# Patient Record
Sex: FEMALE | Race: OTHER | HISPANIC OR LATINO | URBAN - METROPOLITAN AREA
[De-identification: names, ages, dates, MRNs, and addresses within clinical notes are randomized per-mention and may not be internally consistent; named-entity substitution may affect disease eponyms.]

---

## 2017-01-13 ENCOUNTER — EMERGENCY (EMERGENCY)
Facility: HOSPITAL | Age: 82
LOS: 1 days | Discharge: HOME | End: 2017-01-13
Attending: EMERGENCY MEDICINE
Payer: COMMERCIAL

## 2017-01-13 VITALS
DIASTOLIC BLOOD PRESSURE: 89 MMHG | WEIGHT: 149.91 LBS | SYSTOLIC BLOOD PRESSURE: 210 MMHG | HEIGHT: 62 IN | OXYGEN SATURATION: 100 % | TEMPERATURE: 98 F | RESPIRATION RATE: 16 BRPM | HEART RATE: 80 BPM

## 2017-01-13 DIAGNOSIS — M79.661 PAIN IN RIGHT LOWER LEG: ICD-10-CM

## 2017-01-13 DIAGNOSIS — I10 ESSENTIAL (PRIMARY) HYPERTENSION: ICD-10-CM

## 2017-01-13 PROCEDURE — 93971 EXTREMITY STUDY: CPT | Mod: 26,RT

## 2017-01-13 PROCEDURE — 99284 EMERGENCY DEPT VISIT MOD MDM: CPT

## 2017-01-13 PROCEDURE — 73590 X-RAY EXAM OF LOWER LEG: CPT | Mod: 26,RT

## 2017-01-13 PROCEDURE — 73590 X-RAY EXAM OF LOWER LEG: CPT

## 2017-01-13 PROCEDURE — 93971 EXTREMITY STUDY: CPT

## 2017-01-13 RX ORDER — ACETAMINOPHEN 500 MG
650 TABLET ORAL ONCE
Qty: 0 | Refills: 0 | Status: COMPLETED | OUTPATIENT
Start: 2017-01-13 | End: 2017-01-13

## 2017-01-13 RX ORDER — ACETAMINOPHEN 500 MG
2 TABLET ORAL
Qty: 20 | Refills: 0 | OUTPATIENT
Start: 2017-01-13

## 2017-01-13 RX ADMIN — Medication 650 MILLIGRAM(S): at 15:00

## 2017-01-13 NOTE — ED ADULT NURSE NOTE - OBJECTIVE STATEMENT
AS PER DAUGHTER, PATIENT WAS GOING UPSTAIRS AND FELL 5 DAYS AGO, SINCE THEN WITH PAIN TO RIGHT LOWER LEG, AMBULATES WITH A LIMP. DENIES LOC, SOB , CHEST PAIN , ASSOCIATED WITH FALL. AS PER DAUGHTER, PATIENT WAS GOING UPSTAIRS ,SKIPPED A STEP AND FELL 5 DAYS AGO, SINCE THEN WITH PAIN TO RIGHT LOWER LEG, AMBULATES WITH A LIMP. DENIES LOC, SOB , CHEST PAIN , ASSOCIATED WITH FALL. AS PER DAUGHTER, PATIENT WAS GOING UPSTAIRS ,SKIPPED A STEP  5 DAYS AGO,AND SINCE THEN WITH PAIN TO RIGHT LOWER LEG. SINCE THEN WITH PAIN TO RIGHT LOWER LEG, AMBULATES WITH A LIMP. DENIES LOC, SOB , CHEST PAIN , ASSOCIATED WITH INJURY

## 2017-01-13 NOTE — ED PROVIDER NOTE - OBJECTIVE STATEMENT
87 y/o F w/ PMHx of HTN, last dosage Labitol 100 mg and Losartan 40 mg taken at 10:20, p/w R leg pain onset 6 days. Pt reports skipping a step and now c/o pain. Pt denies numbness, tingling, or any complaint. NKDA. 89 y/o F w/ PMHx of HTN, last dosage Labitol 100 mg and Losartan 40 mg taken at 10:20, p/w R leg pain onset 6 days. Pt reports skipping a step on a staircase and is now c/o pain. Pt denies fall, numbness, tingling, or any complaint. NKDA.

## 2017-01-13 NOTE — ED PROVIDER NOTE - PHYSICAL EXAMINATION
MSK: Reproducible tenderness to lower tib-fib w/ plantar pressure.  no bony deformities, no leg length discrepancy, femoral and pedal pulses intact, cap refill  < 2 secs.

## 2017-01-13 NOTE — ED PROVIDER NOTE - NS ED MD SCRIBE ATTENDING SCRIBE SECTIONS
DISPOSITION/PAST MEDICAL/SURGICAL/SOCIAL HISTORY/REVIEW OF SYSTEMS/HISTORY OF PRESENT ILLNESS/VITAL SIGNS( Pullset)/PHYSICAL EXAM/HIV

## 2018-04-17 ENCOUNTER — INPATIENT (INPATIENT)
Facility: HOSPITAL | Age: 83
LOS: 2 days | Discharge: HOME | DRG: 244 | End: 2018-04-20
Attending: INTERNAL MEDICINE | Admitting: INTERNAL MEDICINE
Payer: COMMERCIAL

## 2018-04-17 ENCOUNTER — TRANSCRIPTION ENCOUNTER (OUTPATIENT)
Age: 83
End: 2018-04-17

## 2018-04-17 VITALS
OXYGEN SATURATION: 94 % | HEIGHT: 62 IN | TEMPERATURE: 99 F | DIASTOLIC BLOOD PRESSURE: 88 MMHG | RESPIRATION RATE: 18 BRPM | WEIGHT: 160.06 LBS | HEART RATE: 50 BPM | SYSTOLIC BLOOD PRESSURE: 126 MMHG

## 2018-04-17 DIAGNOSIS — Z29.9 ENCOUNTER FOR PROPHYLACTIC MEASURES, UNSPECIFIED: ICD-10-CM

## 2018-04-17 DIAGNOSIS — E78.00 PURE HYPERCHOLESTEROLEMIA, UNSPECIFIED: ICD-10-CM

## 2018-04-17 DIAGNOSIS — I44.2 ATRIOVENTRICULAR BLOCK, COMPLETE: ICD-10-CM

## 2018-04-17 DIAGNOSIS — N28.9 DISORDER OF KIDNEY AND URETER, UNSPECIFIED: ICD-10-CM

## 2018-04-17 DIAGNOSIS — I10 ESSENTIAL (PRIMARY) HYPERTENSION: ICD-10-CM

## 2018-04-17 LAB
ACETONE SERPL-MCNC: ABNORMAL
ALBUMIN SERPL ELPH-MCNC: 3.8 G/DL — SIGNIFICANT CHANGE UP (ref 3.5–5)
ALP SERPL-CCNC: 105 U/L — SIGNIFICANT CHANGE UP (ref 40–120)
ALT FLD-CCNC: 48 U/L DA — SIGNIFICANT CHANGE UP (ref 10–60)
ANION GAP SERPL CALC-SCNC: 9 MMOL/L — SIGNIFICANT CHANGE UP (ref 5–17)
ANION GAP SERPL CALC-SCNC: 9 MMOL/L — SIGNIFICANT CHANGE UP (ref 5–17)
APTT BLD: 31.4 SEC — SIGNIFICANT CHANGE UP (ref 27.5–37.4)
AST SERPL-CCNC: 55 U/L — HIGH (ref 10–40)
BASOPHILS # BLD AUTO: 0.1 K/UL — SIGNIFICANT CHANGE UP (ref 0–0.2)
BASOPHILS NFR BLD AUTO: 1 % — SIGNIFICANT CHANGE UP (ref 0–2)
BILIRUB SERPL-MCNC: 0.6 MG/DL — SIGNIFICANT CHANGE UP (ref 0.2–1.2)
BUN SERPL-MCNC: 31 MG/DL — HIGH (ref 7–18)
BUN SERPL-MCNC: 33 MG/DL — HIGH (ref 7–18)
CALCIUM SERPL-MCNC: 8.8 MG/DL — SIGNIFICANT CHANGE UP (ref 8.4–10.5)
CALCIUM SERPL-MCNC: 9.4 MG/DL — SIGNIFICANT CHANGE UP (ref 8.4–10.5)
CHLORIDE SERPL-SCNC: 104 MMOL/L — SIGNIFICANT CHANGE UP (ref 96–108)
CHLORIDE SERPL-SCNC: 106 MMOL/L — SIGNIFICANT CHANGE UP (ref 96–108)
CK MB BLD-MCNC: 1.7 % — SIGNIFICANT CHANGE UP (ref 0–3.5)
CK MB BLD-MCNC: <1.9 % — SIGNIFICANT CHANGE UP (ref 0–3.5)
CK MB CFR SERPL CALC: 1.1 NG/ML — SIGNIFICANT CHANGE UP (ref 0–3.6)
CK MB CFR SERPL CALC: <1 NG/ML — SIGNIFICANT CHANGE UP (ref 0–3.6)
CK SERPL-CCNC: 54 U/L — SIGNIFICANT CHANGE UP (ref 21–215)
CK SERPL-CCNC: 65 U/L — SIGNIFICANT CHANGE UP (ref 21–215)
CO2 SERPL-SCNC: 25 MMOL/L — SIGNIFICANT CHANGE UP (ref 22–31)
CO2 SERPL-SCNC: 25 MMOL/L — SIGNIFICANT CHANGE UP (ref 22–31)
CREAT SERPL-MCNC: 1.34 MG/DL — HIGH (ref 0.5–1.3)
CREAT SERPL-MCNC: 1.44 MG/DL — HIGH (ref 0.5–1.3)
EOSINOPHIL # BLD AUTO: 0.2 K/UL — SIGNIFICANT CHANGE UP (ref 0–0.5)
EOSINOPHIL NFR BLD AUTO: 2.6 % — SIGNIFICANT CHANGE UP (ref 0–6)
GLUCOSE SERPL-MCNC: 122 MG/DL — HIGH (ref 70–99)
GLUCOSE SERPL-MCNC: 125 MG/DL — HIGH (ref 70–99)
HCT VFR BLD CALC: 37.2 % — SIGNIFICANT CHANGE UP (ref 34.5–45)
HGB BLD-MCNC: 11.8 G/DL — SIGNIFICANT CHANGE UP (ref 11.5–15.5)
INR BLD: 1.05 RATIO — SIGNIFICANT CHANGE UP (ref 0.88–1.16)
LYMPHOCYTES # BLD AUTO: 1.5 K/UL — SIGNIFICANT CHANGE UP (ref 1–3.3)
LYMPHOCYTES # BLD AUTO: 17.6 % — SIGNIFICANT CHANGE UP (ref 13–44)
MAGNESIUM SERPL-MCNC: 2.2 MG/DL — SIGNIFICANT CHANGE UP (ref 1.6–2.6)
MCHC RBC-ENTMCNC: 30.6 PG — SIGNIFICANT CHANGE UP (ref 27–34)
MCHC RBC-ENTMCNC: 31.7 GM/DL — LOW (ref 32–36)
MCV RBC AUTO: 96.6 FL — SIGNIFICANT CHANGE UP (ref 80–100)
MONOCYTES # BLD AUTO: 0.9 K/UL — SIGNIFICANT CHANGE UP (ref 0–0.9)
MONOCYTES NFR BLD AUTO: 10.6 % — SIGNIFICANT CHANGE UP (ref 2–14)
NEUTROPHILS # BLD AUTO: 6 K/UL — SIGNIFICANT CHANGE UP (ref 1.8–7.4)
NEUTROPHILS NFR BLD AUTO: 68.2 % — SIGNIFICANT CHANGE UP (ref 43–77)
NT-PROBNP SERPL-SCNC: 6846 PG/ML — HIGH (ref 0–450)
PLATELET # BLD AUTO: 154 K/UL — SIGNIFICANT CHANGE UP (ref 150–400)
POTASSIUM SERPL-MCNC: 4.3 MMOL/L — SIGNIFICANT CHANGE UP (ref 3.5–5.3)
POTASSIUM SERPL-MCNC: 4.7 MMOL/L — SIGNIFICANT CHANGE UP (ref 3.5–5.3)
POTASSIUM SERPL-SCNC: 4.3 MMOL/L — SIGNIFICANT CHANGE UP (ref 3.5–5.3)
POTASSIUM SERPL-SCNC: 4.7 MMOL/L — SIGNIFICANT CHANGE UP (ref 3.5–5.3)
PROT SERPL-MCNC: 7.8 G/DL — SIGNIFICANT CHANGE UP (ref 6–8.3)
PROTHROM AB SERPL-ACNC: 11.5 SEC — SIGNIFICANT CHANGE UP (ref 9.8–12.7)
RBC # BLD: 3.85 M/UL — SIGNIFICANT CHANGE UP (ref 3.8–5.2)
RBC # FLD: 12.6 % — SIGNIFICANT CHANGE UP (ref 10.3–14.5)
SODIUM SERPL-SCNC: 138 MMOL/L — SIGNIFICANT CHANGE UP (ref 135–145)
SODIUM SERPL-SCNC: 140 MMOL/L — SIGNIFICANT CHANGE UP (ref 135–145)
TROPONIN I SERPL-MCNC: <0.015 NG/ML — SIGNIFICANT CHANGE UP (ref 0–0.04)
TROPONIN I SERPL-MCNC: <0.015 NG/ML — SIGNIFICANT CHANGE UP (ref 0–0.04)
TSH SERPL-MCNC: 1.25 UU/ML — SIGNIFICANT CHANGE UP (ref 0.34–4.82)
WBC # BLD: 8.7 K/UL — SIGNIFICANT CHANGE UP (ref 3.8–10.5)
WBC # FLD AUTO: 8.7 K/UL — SIGNIFICANT CHANGE UP (ref 3.8–10.5)

## 2018-04-17 PROCEDURE — 71045 X-RAY EXAM CHEST 1 VIEW: CPT | Mod: 26

## 2018-04-17 PROCEDURE — 99291 CRITICAL CARE FIRST HOUR: CPT

## 2018-04-17 RX ORDER — HEPARIN SODIUM 5000 [USP'U]/ML
5000 INJECTION INTRAVENOUS; SUBCUTANEOUS EVERY 12 HOURS
Qty: 0 | Refills: 0 | Status: DISCONTINUED | OUTPATIENT
Start: 2018-04-17 | End: 2018-04-18

## 2018-04-17 RX ORDER — LOSARTAN POTASSIUM 100 MG/1
50 TABLET, FILM COATED ORAL DAILY
Qty: 0 | Refills: 0 | Status: DISCONTINUED | OUTPATIENT
Start: 2018-04-17 | End: 2018-04-18

## 2018-04-17 RX ORDER — LOSARTAN POTASSIUM 100 MG/1
1 TABLET, FILM COATED ORAL
Qty: 0 | Refills: 0 | COMMUNITY

## 2018-04-17 RX ORDER — SODIUM CHLORIDE 9 MG/ML
1000 INJECTION INTRAMUSCULAR; INTRAVENOUS; SUBCUTANEOUS
Qty: 0 | Refills: 0 | Status: DISCONTINUED | OUTPATIENT
Start: 2018-04-17 | End: 2018-04-17

## 2018-04-17 RX ORDER — SIMVASTATIN 20 MG/1
40 TABLET, FILM COATED ORAL AT BEDTIME
Qty: 0 | Refills: 0 | Status: DISCONTINUED | OUTPATIENT
Start: 2018-04-17 | End: 2018-04-18

## 2018-04-17 RX ADMIN — SODIUM CHLORIDE 125 MILLILITER(S): 9 INJECTION INTRAMUSCULAR; INTRAVENOUS; SUBCUTANEOUS at 20:59

## 2018-04-17 NOTE — ED PROVIDER NOTE - PROGRESS NOTE DETAILS
pt with [facility], will admit pt for pacemaker pt with [facility], will admit pt for pacemaker, case d/w Dr. Solano, will consult with Dr. DILLON Black case d/w Dr. Black, will admit to ICU, NPO, PPM tomorrow, ext pacing @ 30

## 2018-04-17 NOTE — ED ADULT NURSE NOTE - OBJECTIVE STATEMENT
pt is here for difficulty breathing by EMS.  According to her daughter, pt c/o leg pain yesterday and today.  c/o pain 8/10, pt calm at this time with family member. edema +1 lower extremities.

## 2018-04-17 NOTE — ED PROVIDER NOTE - OBJECTIVE STATEMENT
89 y.o. female BIBA c/o sob since last night, increased fatigue, weakness, wheezing, no CP, fever, vomiting, travelling, dizziness

## 2018-04-17 NOTE — H&P ADULT - ATTENDING COMMENTS
Patient is a 89 y/o female with PMH of HTN admitted with chief complaint of SOB for several days. She takes a beta blocker as one of her medications. ( took this morning ). The patient's ECG showed complete heart block and cardiology was consulted by phone. They are planning for a PPM in am and requested admission to the ICU for continuous monitoring of cardiac rhythm. Will hold negative chronotropes. On physical I detected a systolic murmur 3/6. No parvus et tardus. Initial troponin 0.01ng/ml. Brain natriuretic peptide 7k ng/ml. Dx- complete heart block. Among many potential causes of heart block; inferior ischemia, endocarditis, Lev's disease, Lyme disease and most likely drug effect. Critical care time 35 minutes.

## 2018-04-17 NOTE — H&P ADULT - PROBLEM SELECTOR PLAN 1
on bedside external pacer, will initiate etCO2 continuously  hold home bblocker  obtain TSH, ischemic eval, pt for PPM in AM with Dr Black  pt with elevated proBNP and mild congestion on CXR. Will monitor, provide O2 supplementation as needed and diurese as needed  initiate antiplatelet, statin, no AV benigno blocker in setting of CHB

## 2018-04-17 NOTE — H&P ADULT - ASSESSMENT
89 F with PMH of HTN, HLD, p/w c/o weakness and dyspnea, found to have 3rd degree heart block on EKG, admitted to MICU for monitoring

## 2018-04-17 NOTE — H&P ADULT - HISTORY OF PRESENT ILLNESS
89 yo F from home with PMH of HTN, HLD, p/w c/o weakness and dyspnea for past few days. Pt denies fever, chills, n/v/d, cough, CP, palpitations, syncope. ROS also positive for LE cramping. In ED, pt is well appearing and in NAD, but EKG notable for complete heart block with stable vitals. Pt started on external pacing and cardiology informed for PPM in AM.    GOC d/w family at bedside and they wish for full code at this time

## 2018-04-17 NOTE — ED ADULT TRIAGE NOTE - CCCP TRG CHIEF CMPLNT
difficulty breathing avoid future episode of asthma exacerbation You were seen and examined for your asthma exacerbation. You have clinically improved. You finished a course of antibiotics and on a tapering dose of prednisone.     PLEASE CONTINUE PRESCRIBED MEDS, AND FOLLOW UP WITH PCP IN 5 DAYS. Finished antibiotics course Cardiology Dr. Patel consulted. Echocardiogram Ejection Fraction 55%  Nuclear stress testing normal. BP< 150/90 Stable. Continue prescribed meds

## 2018-04-17 NOTE — H&P ADULT - PROBLEM SELECTOR PLAN 4
with unknown baseline Cr  pt received IVF in ED  will diureses as appropriate  obtain UA and urine studies

## 2018-04-18 ENCOUNTER — TRANSCRIPTION ENCOUNTER (OUTPATIENT)
Age: 83
End: 2018-04-18

## 2018-04-18 DIAGNOSIS — R06.09 OTHER FORMS OF DYSPNEA: ICD-10-CM

## 2018-04-18 DIAGNOSIS — I50.9 HEART FAILURE, UNSPECIFIED: ICD-10-CM

## 2018-04-18 DIAGNOSIS — R53.81 OTHER MALAISE: ICD-10-CM

## 2018-04-18 DIAGNOSIS — Z29.9 ENCOUNTER FOR PROPHYLACTIC MEASURES, UNSPECIFIED: ICD-10-CM

## 2018-04-18 DIAGNOSIS — Z51.5 ENCOUNTER FOR PALLIATIVE CARE: ICD-10-CM

## 2018-04-18 DIAGNOSIS — G30.1 ALZHEIMER'S DISEASE WITH LATE ONSET: ICD-10-CM

## 2018-04-18 PROBLEM — I10 ESSENTIAL (PRIMARY) HYPERTENSION: Chronic | Status: ACTIVE | Noted: 2017-01-13

## 2018-04-18 LAB
ANION GAP SERPL CALC-SCNC: 9 MMOL/L — SIGNIFICANT CHANGE UP (ref 5–17)
BASOPHILS # BLD AUTO: 0.1 K/UL — SIGNIFICANT CHANGE UP (ref 0–0.2)
BASOPHILS NFR BLD AUTO: 0.8 % — SIGNIFICANT CHANGE UP (ref 0–2)
BUN SERPL-MCNC: 32 MG/DL — HIGH (ref 7–18)
CALCIUM SERPL-MCNC: 9.4 MG/DL — SIGNIFICANT CHANGE UP (ref 8.4–10.5)
CHLORIDE SERPL-SCNC: 108 MMOL/L — SIGNIFICANT CHANGE UP (ref 96–108)
CHOLEST SERPL-MCNC: 162 MG/DL — SIGNIFICANT CHANGE UP (ref 10–199)
CK MB BLD-MCNC: <2.1 % — SIGNIFICANT CHANGE UP (ref 0–3.5)
CK MB CFR SERPL CALC: <1 NG/ML — SIGNIFICANT CHANGE UP (ref 0–3.6)
CK SERPL-CCNC: 47 U/L — SIGNIFICANT CHANGE UP (ref 21–215)
CO2 SERPL-SCNC: 22 MMOL/L — SIGNIFICANT CHANGE UP (ref 22–31)
CREAT SERPL-MCNC: 1.29 MG/DL — SIGNIFICANT CHANGE UP (ref 0.5–1.3)
EOSINOPHIL # BLD AUTO: 0.3 K/UL — SIGNIFICANT CHANGE UP (ref 0–0.5)
EOSINOPHIL NFR BLD AUTO: 3.4 % — SIGNIFICANT CHANGE UP (ref 0–6)
GLUCOSE SERPL-MCNC: 112 MG/DL — HIGH (ref 70–99)
HBA1C BLD-MCNC: 5.8 % — HIGH (ref 4–5.6)
HCT VFR BLD CALC: 36 % — SIGNIFICANT CHANGE UP (ref 34.5–45)
HDLC SERPL-MCNC: 56 MG/DL — SIGNIFICANT CHANGE UP (ref 40–125)
HGB BLD-MCNC: 11.8 G/DL — SIGNIFICANT CHANGE UP (ref 11.5–15.5)
LIPID PNL WITH DIRECT LDL SERPL: 91 MG/DL — SIGNIFICANT CHANGE UP
LYMPHOCYTES # BLD AUTO: 1.7 K/UL — SIGNIFICANT CHANGE UP (ref 1–3.3)
LYMPHOCYTES # BLD AUTO: 20.6 % — SIGNIFICANT CHANGE UP (ref 13–44)
MAGNESIUM SERPL-MCNC: 2.4 MG/DL — SIGNIFICANT CHANGE UP (ref 1.6–2.6)
MCHC RBC-ENTMCNC: 31.8 PG — SIGNIFICANT CHANGE UP (ref 27–34)
MCHC RBC-ENTMCNC: 32.7 GM/DL — SIGNIFICANT CHANGE UP (ref 32–36)
MCV RBC AUTO: 97.4 FL — SIGNIFICANT CHANGE UP (ref 80–100)
MONOCYTES # BLD AUTO: 1 K/UL — HIGH (ref 0–0.9)
MONOCYTES NFR BLD AUTO: 11.8 % — SIGNIFICANT CHANGE UP (ref 2–14)
NEUTROPHILS # BLD AUTO: 5.1 K/UL — SIGNIFICANT CHANGE UP (ref 1.8–7.4)
NEUTROPHILS NFR BLD AUTO: 63.4 % — SIGNIFICANT CHANGE UP (ref 43–77)
PHOSPHATE SERPL-MCNC: 3.1 MG/DL — SIGNIFICANT CHANGE UP (ref 2.5–4.5)
PLATELET # BLD AUTO: 114 K/UL — LOW (ref 150–400)
POTASSIUM SERPL-MCNC: 4.2 MMOL/L — SIGNIFICANT CHANGE UP (ref 3.5–5.3)
POTASSIUM SERPL-SCNC: 4.2 MMOL/L — SIGNIFICANT CHANGE UP (ref 3.5–5.3)
RBC # BLD: 3.7 M/UL — LOW (ref 3.8–5.2)
RBC # FLD: 12.9 % — SIGNIFICANT CHANGE UP (ref 10.3–14.5)
SODIUM SERPL-SCNC: 139 MMOL/L — SIGNIFICANT CHANGE UP (ref 135–145)
TOTAL CHOLESTEROL/HDL RATIO MEASUREMENT: 2.9 RATIO — LOW (ref 3.3–7.1)
TRIGL SERPL-MCNC: 75 MG/DL — SIGNIFICANT CHANGE UP (ref 10–149)
TROPONIN I SERPL-MCNC: <0.015 NG/ML — SIGNIFICANT CHANGE UP (ref 0–0.04)
WBC # BLD: 8 K/UL — SIGNIFICANT CHANGE UP (ref 3.8–10.5)
WBC # FLD AUTO: 8 K/UL — SIGNIFICANT CHANGE UP (ref 3.8–10.5)

## 2018-04-18 PROCEDURE — 99223 1ST HOSP IP/OBS HIGH 75: CPT

## 2018-04-18 PROCEDURE — 71045 X-RAY EXAM CHEST 1 VIEW: CPT | Mod: 26

## 2018-04-18 RX ORDER — FAMOTIDINE 10 MG/ML
20 INJECTION INTRAVENOUS DAILY
Qty: 0 | Refills: 0 | Status: DISCONTINUED | OUTPATIENT
Start: 2018-04-18 | End: 2018-04-18

## 2018-04-18 RX ORDER — IPRATROPIUM/ALBUTEROL SULFATE 18-103MCG
3 AEROSOL WITH ADAPTER (GRAM) INHALATION ONCE
Qty: 0 | Refills: 0 | Status: COMPLETED | OUTPATIENT
Start: 2018-04-18 | End: 2018-04-18

## 2018-04-18 RX ORDER — CEFAZOLIN SODIUM 1 G
2000 VIAL (EA) INJECTION EVERY 8 HOURS
Qty: 0 | Refills: 0 | Status: COMPLETED | OUTPATIENT
Start: 2018-04-18 | End: 2018-04-19

## 2018-04-18 RX ORDER — INFLUENZA VIRUS VACCINE 15; 15; 15; 15 UG/.5ML; UG/.5ML; UG/.5ML; UG/.5ML
0.5 SUSPENSION INTRAMUSCULAR ONCE
Qty: 0 | Refills: 0 | Status: COMPLETED | OUTPATIENT
Start: 2018-04-18 | End: 2018-04-18

## 2018-04-18 RX ORDER — AMLODIPINE BESYLATE 2.5 MG/1
10 TABLET ORAL DAILY
Qty: 0 | Refills: 0 | Status: DISCONTINUED | OUTPATIENT
Start: 2018-04-18 | End: 2018-04-19

## 2018-04-18 RX ORDER — CHLORHEXIDINE GLUCONATE 213 G/1000ML
1 SOLUTION TOPICAL
Qty: 0 | Refills: 0 | Status: DISCONTINUED | OUTPATIENT
Start: 2018-04-18 | End: 2018-04-20

## 2018-04-18 RX ORDER — LOSARTAN POTASSIUM 100 MG/1
25 TABLET, FILM COATED ORAL DAILY
Qty: 0 | Refills: 0 | Status: DISCONTINUED | OUTPATIENT
Start: 2018-04-18 | End: 2018-04-19

## 2018-04-18 RX ORDER — ASPIRIN/CALCIUM CARB/MAGNESIUM 324 MG
81 TABLET ORAL DAILY
Qty: 0 | Refills: 0 | Status: DISCONTINUED | OUTPATIENT
Start: 2018-04-18 | End: 2018-04-18

## 2018-04-18 RX ORDER — SODIUM CHLORIDE 9 MG/ML
1000 INJECTION, SOLUTION INTRAVENOUS
Qty: 0 | Refills: 0 | Status: DISCONTINUED | OUTPATIENT
Start: 2018-04-18 | End: 2018-04-18

## 2018-04-18 RX ORDER — IPRATROPIUM/ALBUTEROL SULFATE 18-103MCG
3 AEROSOL WITH ADAPTER (GRAM) INHALATION EVERY 6 HOURS
Qty: 0 | Refills: 0 | Status: DISCONTINUED | OUTPATIENT
Start: 2018-04-18 | End: 2018-04-18

## 2018-04-18 RX ADMIN — Medication 3 MILLILITER(S): at 05:48

## 2018-04-18 RX ADMIN — CHLORHEXIDINE GLUCONATE 1 APPLICATION(S): 213 SOLUTION TOPICAL at 18:26

## 2018-04-18 RX ADMIN — Medication 100 MILLIGRAM(S): at 21:16

## 2018-04-18 RX ADMIN — LOSARTAN POTASSIUM 50 MILLIGRAM(S): 100 TABLET, FILM COATED ORAL at 05:57

## 2018-04-18 RX ADMIN — AMLODIPINE BESYLATE 10 MILLIGRAM(S): 2.5 TABLET ORAL at 22:33

## 2018-04-18 RX ADMIN — HEPARIN SODIUM 5000 UNIT(S): 5000 INJECTION INTRAVENOUS; SUBCUTANEOUS at 05:57

## 2018-04-18 RX ADMIN — SODIUM CHLORIDE 50 MILLILITER(S): 9 INJECTION, SOLUTION INTRAVENOUS at 06:52

## 2018-04-18 NOTE — DISCHARGE NOTE ADULT - CARE PLAN
Principal Discharge DX:	Complete heart block  Goal:	resolved  Assessment and plan of treatment:	You have pacemaker placement for heart block. Follow up with Dr Harrell in a week outpatient.  Secondary Diagnosis:	Atrial fibrillation  Assessment and plan of treatment:	Continue with metoprolol 25mg BID. Medication sent to [facility] on record.  Secondary Diagnosis:	HTN (hypertension)  Assessment and plan of treatment:	Continue with amlodipine and losartan. Amlodipine dose decreased to 2.5 mg daily.

## 2018-04-18 NOTE — DISCHARGE NOTE ADULT - CARE PROVIDER_API CALL
Carol Harrell), Internal Medicine  [address] [address]  Phone: [phone]  Fax: [phone]    Rafy Solano), Internal Medicine; Pulmonary Disease  [address] [place] [address]  Phone: [phone]  Fax: [phone]

## 2018-04-18 NOTE — DISCHARGE NOTE ADULT - NS AS DC FOLLOWUP STROKE INST
PPM insertion and heart block Influenza vaccination ([facility] Pub Date: August 7, 2015)/PPM insertion and heart block

## 2018-04-18 NOTE — CONSULT NOTE ADULT - PROBLEM SELECTOR RECOMMENDATION 2
oxygen supp  R/o Bronchospasm  Bronchodilator prn  Pfts as OP
Pt asymptomatic. Possible PPM this afternoon. Cardiology following. Full code on file.

## 2018-04-18 NOTE — CONSULT NOTE ADULT - PROBLEM SELECTOR RECOMMENDATION 4
Lasix trial  Echo  cardio eval
Patient A&O x 3; dtr at the bedside. Pending PPM - will f/u and discuss goals of care and HCP form prior to discharge. Pt remains a full code.

## 2018-04-18 NOTE — CHART NOTE - NSCHARTNOTEFT_GEN_A_CORE
89 F with PMH of HTN, HLD, p/w c/o weakness and dyspnea, found to have 3rd degree heart block on EKG, admitted to MICU for monitoring        Complete heart block.    -was on bedside external pacer before ICU admission   -last labetalol dose 4/17 at 12PM  -PPM placed today with Dr Black 4/18  (elevated pro BNP and mild congestion on CXR.   Will monitor, provide O2 supplementation as needed and diurese as needed  initiate antiplatelet, statin).  -restarted on metoprolol     HTN (hypertension).    - on b blocker, ARB, peripheral CCB at home    Discussed with ICu attending and patient is medically stable to be downgraded to tele medicine. 89 F with PMH of HTN, HLD, p/w c/o weakness and dyspnea, found to have 3rd degree heart block on EKG, admitted to MICU for monitoring        Complete heart block.    -was on bedside external pacer before ICU admission   -last labetalol dose 4/17 at 12PM  -PPM placed with Dr Black 4/18 and then pt HR in 60s and stable  (elevated pro BNP and mild congestion on CXR)  -restarted on metoprolol for afib, aspirin and atorvastatin    HTN (hypertension).    - on b blocker, ARB, peripheral CCB at home    Discussed with ICU attending and patient is medically stable to be downgraded to tele medicine. 89 F with PMH of HTN, HLD, p/w c/o weakness and dyspnea, found to have 3rd degree heart block on EKG, admitted to MICU for monitoring        Complete heart block.    -was on bedside external pacer before ICU admission   -last labetalol dose 4/17 at 12PM  -PPM placed with Dr Black 4/18 and then pt HR in 60s and stable  (elevated pro BNP and mild congestion on CXR)  -restarted on metoprolol for afib, aspirin and atorvastatin    Atrial Fibrillation  - will increase metoprolol to control Afib and will not start anticoagulation due to age factor and fall risk for bleeding.   -on aspirin    HTN  Will decrease amlodipine dose to have more room for metoprolol for rate control as can not use anticoagulation.   Will continue with losartan    Discussed with ICU attending and patient is medically stable to be downgraded to tele medicine.  Discussed with Dr Lindsey, ICU attending and cardiologist.

## 2018-04-18 NOTE — CONSULT NOTE ADULT - PROBLEM SELECTOR RECOMMENDATION 3
monitor BP  cont meds
Per daughter's report, pt ambulatory, assist with ADLs; + short term memory loss. Pt's daughter moved in with her to help care for her. Pt noted with + weakness, +2 bilateral  strength.

## 2018-04-18 NOTE — DISCHARGE NOTE ADULT - MEDICATION SUMMARY - MEDICATIONS TO TAKE
I will START or STAY ON the medications listed below when I get home from the hospital:    aspirin 81 mg oral tablet, chewable  -- 1 tab(s) by mouth once a day  -- Indication: For Complete heart block    losartan 25 mg oral tablet  -- 2 tab(s) by mouth once a day  -- Indication: For Hypertension    simvastatin 40 mg oral tablet  -- 1 tab(s) by mouth once a day (at bedtime)  -- Indication: For Complete heart block    metoprolol tartrate 25 mg oral tablet  -- 1 tab(s) by mouth 2 times a day  -- Indication: For Atrial fibrillation    amLODIPine 2.5 mg oral tablet  -- 1 tab(s) by mouth once a day   -- It is very important that you take or use this exactly as directed.  Do not skip doses or discontinue unless directed by your doctor.  Some non-prescription drugs may aggravate your condition.  Read all labels carefully.  If a warning appears, check with your doctor before taking.    -- Indication: For Hypertension

## 2018-04-18 NOTE — DISCHARGE NOTE ADULT - PLAN OF CARE
resolved You have pacemaker placement for heart block. Follow up with Dr Harrell in a week outpatient. Continue with metoprolol 25mg BID. Medication sent to  pharmacy on record. Continue with amlodipine and losartan. Amlodipine dose decreased to 2.5 mg daily.

## 2018-04-18 NOTE — CONSULT NOTE ADULT - ASSESSMENT
89 yo F from home with PMH of HTN, HLD, p/w c/o weakness and dyspnea, complete heart block.  1.ICU monitoring.  2.External pacer at bedside.  3.PPM if by noon (24 hrs after last dose of labetalol) today still complete heart block.  4.HTN-Arb.  5.Cont asa,statin.  6.Echocardiogram- eval lv fx.  7.GI and DVT prophylaxis.

## 2018-04-18 NOTE — DISCHARGE NOTE ADULT - PATIENT PORTAL LINK FT
You can access the [facility], offered by [facility], by registering with the following website: [email or web address]

## 2018-04-18 NOTE — DISCHARGE NOTE ADULT - HOSPITAL COURSE
89 F with PMH of HTN, HLD, p/w c/o weakness and dyspnea, found to have 3rd degree heart block on EKG, admitted to MICU for monitoring        Complete heart block.    -was on bedside external pacer before ICU admission   -last labetalol dose 4/17 at 12PM  -PPM placed with Dr Blcak 4/18 and then pt HR in 60s and stable  (elevated pro BNP and mild congestion on CXR)  -restarted on metoprolol for afib, aspirin and atorvastatin    Atrial Fibrillation  - Increased metoprolol to control Afib and will not start anticoagulation due to age factor and fall risk for bleeding.   -on aspirin    HTN  Decreased amlodipine dose to have more room for metoprolol for rate control as can not use anticoagulation.    continue with losartan  Patient was downgraded to floor for further monitoring and plan to discharge after 24 hours.     Discussed with Dr Lindsey and Dr De Paz patient is medically stable to be discharged after 24 hour ICU monitor and 24 hour floor monitor.

## 2018-04-18 NOTE — CHART NOTE - NSCHARTNOTEFT_GEN_A_CORE
Discussed with Daughter patient is not allergic to aspirin. Just stomach indigestion after taking it. Pepcid given for indigestion.

## 2018-04-18 NOTE — CONSULT NOTE ADULT - PROBLEM SELECTOR RECOMMENDATION 9
Cardiology eval  Cardiac monitoring  For PPM today  R/o ACS protocol
Mild; FAST 6C. Pt A&O x 3. + short term memory loss per daughter's report. Pt's daughter moved in with her approx 1 year ago to help take care of her. Pt ambulatory at baseline, requires assistance with ADLs. Full code on file.

## 2018-04-18 NOTE — CONSULT NOTE ADULT - SUBJECTIVE AND OBJECTIVE BOX
CHIEF COMPLAINT:Patient is a 89y old  Female who presents with a chief complaint of weakness, dyspnea.      HPI:  89 yo F from home with PMH of HTN, HLD, p/w c/o weakness and dyspnea for past few days. Pt denies fever, chills, n/v/d, cough, CP, palpitations, syncope. ROS also positive for LE cramping. In ED, pt is well appearing and in NAD, but EKG notable for complete heart block with stable vitals. Pt started on external pacing and cardiology informed .As per daughter last dose of labetalol was at 12 noon prior to presentation.    GOC d/w family at bedside and they wish for full code at this time (17 Apr 2018 22:43)      PAST MEDICAL & SURGICAL HISTORY:  Hypercholesterolemia  HTN (hypertension)        MEDICATIONS  (STANDING):  aspirin  chewable 81 milliGRAM(s) Oral daily  dextrose 5%. 1000 milliLiter(s) (50 mL/Hr) IV Continuous <Continuous>  famotidine    Tablet 20 milliGRAM(s) Oral daily  heparin  Injectable 5000 Unit(s) SubCutaneous every 12 hours  influenza   Vaccine 0.5 milliLiter(s) IntraMuscular once  losartan 50 milliGRAM(s) Oral daily  simvastatin 40 milliGRAM(s) Oral at bedtime          FAMILY HISTORY:NO hx of CAD      SOCIAL HISTORY:    [x ] Non-smoker    [x ] Alcohol-denies    Allergies    No Known Allergies    Intolerances    	    REVIEW OF SYSTEMS:  CONSTITUTIONAL: No fever, weight loss, or fatigue  EYES: No eye pain, visual disturbances, or discharge  ENT:  No difficulty hearing, tinnitus, vertigo; No sinus or throat pain  NECK: No pain or stiffness  RESPIRATORY: No cough, wheezing, chills or hemoptysis; No Shortness of Breath  CARDIOVASCULAR: No chest pain, palpitations, passing out, dizziness, or leg swelling  GASTROINTESTINAL: No abdominal or epigastric pain. No nausea, vomiting, or hematemesis; No diarrhea or constipation. No melena or hematochezia.  GENITOURINARY: No dysuria, frequency, hematuria, or incontinence  NEUROLOGICAL: No headaches, memory loss, loss of strength, numbness, or tremors  SKIN: No itching, burning, rashes, or lesions   LYMPH Nodes: No enlarged glands  ENDOCRINE: No heat or cold intolerance; No hair loss  MUSCULOSKELETAL: No joint pain or swelling; No muscle, back, or extremity pain  PSYCHIATRIC: No depression, anxiety, mood swings, or difficulty sleeping  HEME/LYMPH: No easy bruising, or bleeding gums  ALLERGY AND IMMUNOLOGIC: No hives or eczema	      PHYSICAL EXAM:  T(C): 36.5 (04-18-18 @ 03:57), Max: 37.2 (04-17-18 @ 21:22)  HR: 41 (04-18-18 @ 07:00) (38 - 51)  BP: 151/55 (04-18-18 @ 07:00) (126/88 - 182/47)  RR: 22 (04-18-18 @ 07:00) (16 - 26)  SpO2: 98% (04-18-18 @ 07:00) (94% - 100%)    17 Apr 2018 07:01  -  18 Apr 2018 07:00  --------------------------------------------------------  IN: 50 mL / OUT: 200 mL / NET: -150 mL        Appearance: Normal	  HEENT:   Normal oral mucosa, PERRL, EOMI	  Lymphatic: No lymphadenopathy  Cardiovascular: Normal S1 S2, No JVD, No murmurs, No edema  Respiratory: Lungs clear to auscultation	  Psychiatry: A & O x 3, Mood & affect appropriate  Gastrointestinal:  Soft, Non-tender, + BS	  Skin: No rashes, No ecchymoses, No cyanosis	  Neurologic: Non-focal  Extremities: Normal range of motion, No clubbing, cyanosis or edema  Vascular: Peripheral pulses palpable 2+ bilaterally    Tele: complete heart block with junctional escape	    ECG:  Complete heart block	    	  LABS:	 	    CARDIAC MARKERS:  CARDIAC MARKERS ( 17 Apr 2018 23:07 )  <0.015 ng/mL / x     / 54 U/L / x     / <1.0 ng/mL  CARDIAC MARKERS ( 17 Apr 2018 17:12 )  <0.015 ng/mL / x     / 65 U/L / x     / 1.1 ng/mL                        11.8   8.0   )-----------( 114      ( 18 Apr 2018 05:46 )             36.0     04-18    139  |  108  |  32<H>  ----------------------------<  112<H>  4.2   |  22  |  1.29    Ca    9.4      18 Apr 2018 05:46  Phos  3.1     04-18  Mg     2.4     04-18    TPro  7.8  /  Alb  3.8  /  TBili  0.6  /  DBili  x   /  AST  55<H>  /  ALT  48  /  AlkPhos  105  04-17    proBNP: Serum Pro-Brain Natriuretic Peptide: 6846 pg/mL (04-17 @ 17:12)    TSH: Thyroid Stimulating Hormone, Serum: 1.25 uU/mL (04-17 @ 22:50)        EXAM:  XR CHEST PORTABLE IMMED 1V                            PROCEDURE DATE:  04/17/2018          INTERPRETATION:  AP semierect chest on April 17, 2018 at 5:23 PM. Patient   is short of breath.    COMPARISON: None available.    Heart is magnified by technique. The aorta is somewhat uncoiled.    There is suggestion of a calcified pleural reaction medially against the   right mediastinum.    Bilateral apical thickening that does not suggest activity is noted.    Slight vascular enlargement is suggested consistent with slight CHF.    IMPRESSION: As above.
PULMONARY CONSULT NOTE      CLAUDETTE GARCES  MRN-[id number]    Patient is a 89y old  Female who presents with a chief complaint of weakness, dyspnea (18 Apr 2018 11:04)      History of Present Illness:  Reason for Admission: weakness, dyspnea	  History of Present Illness: 	  89 yo F from home with PMH of HTN, HLD, p/w c/o weakness and dyspnea for past few days. Pt denies fever, chills, n/v/d, cough, CP, palpitations, syncope. ROS also positive for LE cramping. In ED, pt is well appearing and in NAD, but EKG notable for complete heart block with stable vitals. Pt started on external pacing and cardiology informed for PPM in AM.    HISTORY OF PRESENT ILLNESS: As above. Awake, alert, comfortable in bed in NAD. Has Damon with occasional cough and wheezing. No smoking or Asthma hx.    MEDICATIONS  (STANDING):  influenza   Vaccine 0.5 milliLiter(s) IntraMuscular once      MEDICATIONS  (PRN):      Allergies    No Known Allergies    Intolerances        PAST MEDICAL & SURGICAL HISTORY:  Hypercholesterolemia  HTN (hypertension)  No significant past surgical history      FAMILY HISTORY:      SOCIAL HISTORY  Smoking History:     REVIEW OF SYSTEMS:    CONSTITUTIONAL:  No fevers, chills, sweats    HEENT:  Eyes:  No diplopia or blurred vision. ENT:  No earache, sore throat or runny nose.    CARDIOVASCULAR:  No pressure, squeezing, tightness, or heaviness about the chest; no palpitations.    RESPIRATORY:  Per HPI    GASTROINTESTINAL:  No abdominal pain, nausea, vomiting or diarrhea.    GENITOURINARY:  No dysuria, frequency or urgency.    NEUROLOGIC:  No paresthesias, fasciculations, seizures or weakness.    PSYCHIATRIC:  No disorder of thought or mood.    Vital Signs Last 24 Hrs  T(C): 36.9 (18 Apr 2018 07:00), Max: 37.2 (17 Apr 2018 21:22)  T(F): 98.4 (18 Apr 2018 07:00), Max: 98.9 (17 Apr 2018 21:22)  HR: 41 (18 Apr 2018 11:00) (38 - 51)  BP: 164/53 (18 Apr 2018 11:00) (126/88 - 182/47)  BP(mean): 83 (18 Apr 2018 11:00) (71 - 84)  RR: 27 (18 Apr 2018 11:00) (16 - 27)  SpO2: 98% (18 Apr 2018 11:00) (94% - 100%)  I&O's Detail    17 Apr 2018 07:01  -  18 Apr 2018 07:00  --------------------------------------------------------  IN:    dextrose 5%.: 50 mL  Total IN: 50 mL    OUT:    Voided: 200 mL  Total OUT: 200 mL    Total NET: -150 mL      18 Apr 2018 07:01  -  18 Apr 2018 13:55  --------------------------------------------------------  IN:    dextrose 5%.: 200 mL  Total IN: 200 mL    OUT:    Voided: 150 mL  Total OUT: 150 mL    Total NET: 50 mL          PHYSICAL EXAMINATION:    GENERAL: The patient is a well-developed, well-nourished _____in no apparent distress.     HEENT: Head is normocephalic and atraumatic. Extraocular muscles are intact. Mucous membranes are moist.     NECK: Supple.     LUNGS: Clear to auscultation without wheezing, rales, or rhonchi. Respirations unlabored    HEART: Regular rate and rhythm without murmur.    ABDOMEN: Soft, nontender, and nondistended.  No hepatosplenomegaly is noted.    EXTREMITIES: Without any cyanosis, clubbing, rash, lesions or edema.    NEUROLOGIC: Grossly intact.      LABS:                        11.8   8.0   )-----------( 114      ( 18 Apr 2018 05:46 )             36.0     04-18    139  |  108  |  32<H>  ----------------------------<  112<H>  4.2   |  22  |  1.29    Ca    9.4      18 Apr 2018 05:46  Phos  3.1     04-18  Mg     2.4     04-18    TPro  7.8  /  Alb  3.8  /  TBili  0.6  /  DBili  x   /  AST  55<H>  /  ALT  48  /  AlkPhos  105  04-17    PT/INR - ( 17 Apr 2018 17:12 )   PT: 11.5 sec;   INR: 1.05 ratio         PTT - ( 17 Apr 2018 17:12 )  PTT:31.4 sec      CARDIAC MARKERS ( 18 Apr 2018 08:52 )  <0.015 ng/mL / x     / 47 U/L / x     / <1.0 ng/mL  CARDIAC MARKERS ( 17 Apr 2018 23:07 )  <0.015 ng/mL / x     / 54 U/L / x     / <1.0 ng/mL  CARDIAC MARKERS ( 17 Apr 2018 17:12 )  <0.015 ng/mL / x     / 65 U/L / x     / 1.1 ng/mL        Serum Pro-Brain Natriuretic Peptide: 6846 pg/mL (04-17-18 @ 17:12)          MICROBIOLOGY:    RADIOLOGY & ADDITIONAL STUDIES:    CXR:  < from: Xray Chest 1 View-PORTABLE IMMEDIATE (04.17.18 @ 17:49) >    INTERPRETATION:  AP semierect chest on April 17, 2018 at 5:23 PM. Patient   is short of breath.    COMPARISON: None available.    Heart is magnified by technique. The aorta is somewhat uncoiled.    There is suggestion of a calcified pleural reaction medially against the   right mediastinum.    Bilateral apical thickening that does not suggest activity is noted.    Slight vascular enlargement is suggested consistent with slight CHF.    IMPRESSION: As above.        < end of copied text >    Ct scan chest:    ekg;    echo:
89 yo F from home with PMH of HTN, HLD, p/w c/o weakness and dyspnea for past few days. Pt denies fever, chills, n/v/d, cough, CP, palpitations, syncope. ROS also positive for LE cramping. In ED, pt is well appearing and in NAD, but EKG notable for complete heart block with stable vitals. Pt started on external pacing and cardiology informed for PPM in AM.    GOC d/w family at bedside and they wish for full code at this time     Review of Systems:  · General Symptoms	weakness	  · Skin/Breast	negative	  · Ophthalmologic	negative	  · ENMT	negative	  · Respiratory and Thorax Symptoms	dyspnea	  · Cardiovascular Symptoms	dyspnea on exertion; claudication	  · Gastrointestinal	negative	  · Genitourinary	negative	  · Musculoskeletal	negative	  · Neurological	negative	  · Psychiatric	negative	      pt seen in icu [ x ], reg med floor [   ], bed [ x ], chair at bedside [   ], a+o x3 [ x ], lethargic [  ],  nad [x  ]    external ppm at bedside leads attached [ x ]        Allergies    No Known Allergies        Vitals    T(F): 98.4 (04-18-18 @ 15:44), Max: 99.5 (04-18-18 @ 12:00)  HR: 112 (04-18-18 @ 18:00) (38 - 112)  BP: 142/67 (04-18-18 @ 18:00) (140/51 - 182/47)  RR: 23 (04-18-18 @ 18:00) (16 - 30)  SpO2: 94% (04-18-18 @ 18:00) (67% - 100%)  Wt(kg): --  CAPILLARY BLOOD GLUCOSE          Labs                          11.8   8.0   )-----------( 114      ( 18 Apr 2018 05:46 )             36.0       04-18    139  |  108  |  32<H>  ----------------------------<  112<H>  4.2   |  22  |  1.29    Ca    9.4      18 Apr 2018 05:46  Phos  3.1     04-18  Mg     2.4     04-18    TPro  7.8  /  Alb  3.8  /  TBili  0.6  /  DBili  x   /  AST  55<H>  /  ALT  48  /  AlkPhos  105  04-17      CARDIAC MARKERS ( 18 Apr 2018 08:52 )  <0.015 ng/mL / x     / 47 U/L / x     / <1.0 ng/mL  CARDIAC MARKERS ( 17 Apr 2018 23:07 )  <0.015 ng/mL / x     / 54 U/L / x     / <1.0 ng/mL  CARDIAC MARKERS ( 17 Apr 2018 17:12 )  <0.015 ng/mL / x     / 65 U/L / x     / 1.1 ng/mL            Radiology Results      Meds    MEDICATIONS  (STANDING):  ceFAZolin   IVPB 2000 milliGRAM(s) IV Intermittent every 8 hours  chlorhexidine 4% Liquid 1 Application(s) Topical <User Schedule>  influenza   Vaccine 0.5 milliLiter(s) IntraMuscular once      MEDICATIONS  (PRN):      Physical Exam    Neuro :  no focal deficits  Respiratory: CTA B/L  CV: RRR, S1S2, no murmurs,   Abdominal: Soft, NT, ND +BS,  Extremities: No edema, + peripheral pulses    ASSESSMENT    Complete atrioventricular block  ridley  h/o Hypercholesterolemia  HTN (hypertension)  No significant past surgical history        PLAN    external pacer at bedside  cardio cons noted  pt for ppm placement  pt off beta blocker  pulm cons noted  cont current meds  mgmt as per icu
HPI:  89 yo F from home with PMH of HTN, HLD, p/w c/o weakness and dyspnea for past few days. Pt denies fever, chills, n/v/d, cough, CP, palpitations, syncope. ROS also positive for LE cramping. In ED, pt is well appearing and in NAD, but EKG notable for complete heart block with stable vitals. Pt started on external pacing; pending PPM. Full code on file       PAST MEDICAL & SURGICAL HISTORY:  Hypercholesterolemia  HTN (hypertension)  No significant past surgical history      SOCIAL HISTORY:    Admitted from:  home, lives with dtr  Home Opioid hx: none  Preferred Language: [place]awian    Surrogate/HCP/Guardian:  Tanya Gomez (dtr): [phone]    FAMILY HISTORY: noncontributory to current condition    Baseline ADLs (prior to admission): ambulatory, assist with ADLs; + short term memory loss per daughter's report    No Known Allergies    Present Symptoms:   Review of Systems: Pt reports being "tired" - denies pain/SOB      MEDICATIONS  (STANDING):  ALBUTerol/ipratropium for Nebulization 3 milliLiter(s) Nebulizer every 6 hours  aspirin  chewable 81 milliGRAM(s) Oral daily  dextrose 5%. 1000 milliLiter(s) (50 mL/Hr) IV Continuous <Continuous>  famotidine    Tablet 20 milliGRAM(s) Oral daily  heparin  Injectable 5000 Unit(s) SubCutaneous every 12 hours  influenza   Vaccine 0.5 milliLiter(s) IntraMuscular once  losartan 50 milliGRAM(s) Oral daily  simvastatin 40 milliGRAM(s) Oral at bedtime    MEDICATIONS  (PRN):      PHYSICAL EXAM:    Vital Signs Last 24 Hrs  T(C): 36.9 (18 Apr 2018 07:00), Max: 37.2 (17 Apr 2018 21:22)  T(F): 98.4 (18 Apr 2018 07:00), Max: 98.9 (17 Apr 2018 21:22)  HR: 41 (18 Apr 2018 11:00) (38 - 51)  BP: 164/53 (18 Apr 2018 11:00) (126/88 - 182/47)  BP(mean): 83 (18 Apr 2018 11:00) (71 - 84)  RR: 27 (18 Apr 2018 11:00) (16 - 27)  SpO2: 98% (18 Apr 2018 11:00) (94% - 100%)    General: alert  oriented x 3, verbal    Karnofsky Performance Score/Palliative Performance Status Version2:   40  %    HEENT: normal    Lungs: comfortable    CV: bradycardia  GI: normal    : normal   Musculoskeletal: ambulatory at baseline; + weakness, +2 bilateral  strength; assist with ADLs  Skin: normal     Neuro: Pt A&O x 3   Oral intake ability: unable/only mouth care    Diet: NPO    LABS:                        11.8   8.0   )-----------( 114      ( 18 Apr 2018 05:46 )             36.0     04-18    139  |  108  |  32<H>  ----------------------------<  112<H>  4.2   |  22  |  1.29    Ca    9.4      18 Apr 2018 05:46  Phos  3.1     04-18  Mg     2.4     04-18    TPro  7.8  /  Alb  3.8  /  TBili  0.6  /  DBili  x   /  AST  55<H>  /  ALT  48  /  AlkPhos  105  04-17        RADIOLOGY & ADDITIONAL STUDIES:  < from: Xray Chest 1 View-PORTABLE IMMEDIATE (04.17.18 @ 17:49) >  EXAM:  XR CHEST PORTABLE IMMED 1V                            PROCEDURE DATE:  04/17/2018          INTERPRETATION:  AP semierect chest on April 17, 2018 at 5:23 PM. Patient   is short of breath.    COMPARISON: None available.    Heart is magnified by technique. The aorta is somewhat uncoiled.    There is suggestion of a calcified pleural reaction medially against the   right mediastinum.    Bilateral apical thickening that does not suggest activity is noted.    Slight vascular enlargement is suggested consistent with slight CHF.    IMPRESSION: As above.      < end of copied text >    < from: Transthoracic Echocardiogram (04.18.18 @ 07:32) >  Patient name: CLAUDETTE GARCES  YOB: 1928   Age: 89 (F)   MR#: [id number]  Study Date: 4/18/2018  Location: [place] [id number]: Winston Vazquez GARRY  Study quality: Fair  Referring Physician:  SAMINA OTERO MD  Blood Pressure: 165/57 mmHg  Height: 157 cm  Weight: 76 kg  BSA: 1.8 m2  ------------------------------------------------------------------------    PROCEDURE: Transthoracic echocardiogram with 2-D, M-Mode  and complete spectral and color flow Doppler.  INDICATION: Abnormal electrocardiogram [ECG] [EKG] (R94.31)  HISTORY:  ------------------------------------------------------------------------  DIMENSIONS:  Dimensions:     Normal Values:  LA:     3.4 cm    2.0 - 4.0 cm  Ao:     3.0 cm    2.0 - 3.8 cm  SEPTUM: 1.0 cm  0.6 - 1.2 cm  PWT:    1.0 cm    0.6 - 1.1 cm  LVIDd:  4.2 cm    3.0 - 5.6 cm  LVIDs:  2.3 cm    1.8 - 4.0 cm      Derived Variables:  LVMI: 78 g/m2  RWT: 0.47  Ejection Fraction Visual Estimate: >55 %    ------------------------------------------------------------------------  OBSERVATIONS:  Mitral Valve: Mitral annular calcification. Mild mitral  regurgitation.  Aortic Root: Normal aortic root.  Aortic Valve: Calcified aortic valve with decreased  opening. Mean transaortic valve gradient equals 16 mm Hg,  consistent with mild aortic stenosis.  Left Atrium: Mildly dilated left atrium.  LA volume index =  41 cc/m2.  Left Ventricle: Endocardium not well visualized; grossly  normal left ventricular systolic function. Normal left  ventricular internal dimensions and wall thicknesses.  Right Heart: Normal right atrium. Normal right ventricular  size and function. There is moderate tricuspid  regurgitation. There is mild-moderate pulmonic  regurgitation.  Pericardium/PleuraNormal pericardium with no pericardial  effusion.  Hemodynamic: RA Pressure is 10 mm Hg. RV systolic pressure  is 47 mm Hg. Mild pulmonary hypertension.  ------------------------------------------------------------------------  CONCLUSIONS:  1. Mitral annular calcification. Mild mitral regurgitation.  2. Calcified aortic valve with decreased opening. Mean  transaortic valve gradient equals 16 mm Hg, consistent with  mild aortic stenosis.  3. Mildly dilated left atrium.  LA volume index = 41 cc/m2.  4. Normal left ventricular internal dimensions and wall  thicknesses.  5. Endocardium not well visualized; grossly normal left  ventricular systolic function.  6. Normal right ventricular size and function.  7. RV systolic pressure is 47 mm Hg. Mild pulmonary  hypertension.      < end of copied text >        ADVANCE DIRECTIVES: Full code

## 2018-04-18 NOTE — PROGRESS NOTE ADULT - SUBJECTIVE AND OBJECTIVE BOX
INTERVAL HPI/OVERNIGHT EVENTS: Patient is bradycardic to 41 at lowest and asymptomatic.     PRESSORS: [ ] YES [x ] NO  WHICH:    Antimicrobial:    Cardiovascular:  losartan 50 milliGRAM(s) Oral daily    Pulmonary:    Hematalogic:  aspirin  chewable 81 milliGRAM(s) Oral daily  heparin  Injectable 5000 Unit(s) SubCutaneous every 12 hours    Other:  dextrose 5%. 1000 milliLiter(s) IV Continuous <Continuous>  famotidine    Tablet 20 milliGRAM(s) Oral daily  influenza   Vaccine 0.5 milliLiter(s) IntraMuscular once  simvastatin 40 milliGRAM(s) Oral at bedtime    aspirin  chewable 81 milliGRAM(s) Oral daily  dextrose 5%. 1000 milliLiter(s) IV Continuous <Continuous>  famotidine    Tablet 20 milliGRAM(s) Oral daily  heparin  Injectable 5000 Unit(s) SubCutaneous every 12 hours  influenza   Vaccine 0.5 milliLiter(s) IntraMuscular once  losartan 50 milliGRAM(s) Oral daily  simvastatin 40 milliGRAM(s) Oral at bedtime    Drug Dosing Weight  Height (cm): 157.48 (18 Apr 2018 00:00)  Weight (kg): 75.5 (18 Apr 2018 00:00)  BMI (kg/m2): 30.4 (18 Apr 2018 00:00)  BSA (m2): 1.77 (18 Apr 2018 00:00)    CENTRAL LINE: [ ] YES [ ] NO  LOCATION:   DATE INSERTED:  REMOVE: [ ] YES [ ] NO  EXPLAIN:    LYONS: [ ] YES [ ] NO    DATE INSERTED:  REMOVE:  [ ] YES [ ] NO  EXPLAIN:    A-LINE:  [ ] YES [ ] NO  LOCATION:   DATE INSERTED:  REMOVE:  [ ] YES [ ] NO  EXPLAIN:    PMH -reviewed admission note, no change since admission    ICU Vital Signs Last 24 Hrs  T(C): 36.5 (18 Apr 2018 03:57), Max: 37.2 (17 Apr 2018 21:22)  T(F): 97.7 (18 Apr 2018 03:57), Max: 98.9 (17 Apr 2018 21:22)  HR: 41 (18 Apr 2018 07:00) (38 - 51)  BP: 151/55 (18 Apr 2018 07:00) (126/88 - 182/47)  BP(mean): 77 (18 Apr 2018 07:00) (71 - 78)  ABP: --  ABP(mean): --  RR: 22 (18 Apr 2018 07:00) (16 - 26)  SpO2: 98% (18 Apr 2018 07:00) (94% - 100%)            04-17 @ 07:01  -  04-18 @ 07:00  --------------------------------------------------------  IN: 50 mL / OUT: 200 mL / NET: -150 mL            PHYSICAL EXAM:    GENERAL: [x ]NAD, [ x]well-groomed, [ ]well-developed  HEAD:  [x ]Atraumatic, [ x]Normocephalic  EYES: [x ]EOMI, [ ]PERRLA, [ ]conjunctiva and sclera clear  ENMT: [ x]No tonsillar erythema, exudates, or enlargement; [ ]Moist mucous membranes, [ ]Good dentition, [ ]No lesions  NECK: [ x]Supple, normal appearance, [ ]No JVD; [ ]Normal thyroid; [ ]Trachea midline  NERVOUS SYSTEM:  [x ]Alert & Oriented X3, [ ]Good concentration; [ ]Motor Strength 5/5 B/L upper and lower extremities; [ ]DTRs 2+ intact and symmetric  CHEST/LUNG: [x ]No chest deformity; [ x]Normal percussion bilaterally; [ ]No rales, rhonchi, wheezing   HEART: [x ]Regular rate and rhythm; [x ]No murmurs, rubs, or gallops  ABDOMEN: [x ]Soft, Nontender, Nondistended; [ x]Bowel sounds present  EXTREMITIES:  [x ]2+ Peripheral Pulses, [ ]No clubbing, cyanosis, or edema  LYMPH: [x ]No lymphadenopathy noted  SKIN: [x ]No rashes or lesions; [ ]Good capillary refill      LABS:  CBC Full  -  ( 18 Apr 2018 05:46 )  WBC Count : 8.0 K/uL  Hemoglobin : 11.8 g/dL  Hematocrit : 36.0 %  Platelet Count - Automated : 114 K/uL  Mean Cell Volume : 97.4 fl  Mean Cell Hemoglobin : 31.8 pg  Mean Cell Hemoglobin Concentration : 32.7 gm/dL  Auto Neutrophil # : 5.1 K/uL  Auto Lymphocyte # : 1.7 K/uL  Auto Monocyte # : 1.0 K/uL  Auto Eosinophil # : 0.3 K/uL  Auto Basophil # : 0.1 K/uL  Auto Neutrophil % : 63.4 %  Auto Lymphocyte % : 20.6 %  Auto Monocyte % : 11.8 %  Auto Eosinophil % : 3.4 %  Auto Basophil % : 0.8 %    04-18    139  |  108  |  32<H>  ----------------------------<  112<H>  4.2   |  22  |  1.29    Ca    9.4      18 Apr 2018 05:46  Phos  3.1     04-18  Mg     2.4     04-18    TPro  7.8  /  Alb  3.8  /  TBili  0.6  /  DBili  x   /  AST  55<H>  /  ALT  48  /  AlkPhos  105  04-17    PT/INR - ( 17 Apr 2018 17:12 )   PT: 11.5 sec;   INR: 1.05 ratio         PTT - ( 17 Apr 2018 17:12 )  PTT:31.4 sec        RADIOLOGY & ADDITIONAL STUDIES REVIEWED:  ***    [x ]GOALS OF CARE DISCUSSION WITH PATIENT/FAMILY/PROXY:    CRITICAL CARE TIME SPENT: 35 minutes INTERVAL HPI / OVERNIGHT EVENTS: Patient is bradycardic to 41 at lowest and asymptomatic.     at rest pt has no new complaints.  d/w DTR bedside.     PRESSORS: [ ] YES [x ] NO  WHICH:    Antimicrobial:    Cardiovascular:  losartan 50 milliGRAM(s) Oral daily    Pulmonary:    Hematalogic:  aspirin  chewable 81 milliGRAM(s) Oral daily  heparin  Injectable 5000 Unit(s) SubCutaneous every 12 hours    Other:  dextrose 5%. 1000 milliLiter(s) IV Continuous <Continuous>  famotidine    Tablet 20 milliGRAM(s) Oral daily  influenza   Vaccine 0.5 milliLiter(s) IntraMuscular once  simvastatin 40 milliGRAM(s) Oral at bedtime    aspirin  chewable 81 milliGRAM(s) Oral daily  dextrose 5%. 1000 milliLiter(s) IV Continuous <Continuous>  famotidine    Tablet 20 milliGRAM(s) Oral daily  heparin  Injectable 5000 Unit(s) SubCutaneous every 12 hours  influenza   Vaccine 0.5 milliLiter(s) IntraMuscular once  losartan 50 milliGRAM(s) Oral daily  simvastatin 40 milliGRAM(s) Oral at bedtime    Drug Dosing Weight  Height (cm): 157.48 (18 Apr 2018 00:00)  Weight (kg): 75.5 (18 Apr 2018 00:00)  BMI (kg/m2): 30.4 (18 Apr 2018 00:00)  BSA (m2): 1.77 (18 Apr 2018 00:00)    CENTRAL LINE: [ ] YES [ ] NO  LOCATION:   DATE INSERTED:  REMOVE: [ ] YES [ ] NO  EXPLAIN:    LYONS: [ ] YES [ ] NO    DATE INSERTED:  REMOVE:  [ ] YES [ ] NO  EXPLAIN:    A-LINE:  [ ] YES [ ] NO  LOCATION:   DATE INSERTED:  REMOVE:  [ ] YES [ ] NO  EXPLAIN:    PMH -reviewed admission note, no change since admission    ICU Vital Signs Last 24 Hrs  T(C): 36.5 (18 Apr 2018 03:57), Max: 37.2 (17 Apr 2018 21:22)  T(F): 97.7 (18 Apr 2018 03:57), Max: 98.9 (17 Apr 2018 21:22)  HR: 41 (18 Apr 2018 07:00) (38 - 51)  BP: 151/55 (18 Apr 2018 07:00) (126/88 - 182/47)  BP(mean): 77 (18 Apr 2018 07:00) (71 - 78)  ABP: --  ABP(mean): --  RR: 22 (18 Apr 2018 07:00) (16 - 26)  SpO2: 98% (18 Apr 2018 07:00) (94% - 100%)            04-17 @ 07:01  -  04-18 @ 07:00  --------------------------------------------------------  IN: 50 mL / OUT: 200 mL / NET: -150 mL            PHYSICAL EXAM:    GENERAL: [x ]NAD, [ x]well-groomed, [ ]well-developed  HEAD:  [x ]Atraumatic, [ x]Normocephalic  EYES: [x ]EOMI, [ ]PERRLA, [ ]conjunctiva and sclera clear  ENMT: [ x]No tonsillar erythema, exudates, or enlargement; [ ]Moist mucous membranes, [ ]Good dentition, [ ]No lesions  NECK: [ x]Supple, normal appearance, [ ]No JVD; [ ]Normal thyroid; [ ]Trachea midline  NERVOUS SYSTEM:  [x ]Alert & Oriented X3, [ ]Good concentration; [x ]Motor Strength 5/5 B/L upper and lower extremities; [ ]DTRs 2+ intact and symmetric  CHEST/LUNG: [x ]No chest deformity; [ x]Normal percussion bilaterally; [ ]No rales, rhonchi, wheezing   HEART: [x ]Bradycardic; [x ]+ systolic  murmurs, rubs, or gallops  ABDOMEN: [x ]Soft, Nontender, Nondistended; [ x]Bowel sounds present  EXTREMITIES:  [x ]2+ Peripheral Pulses, [ ]No clubbing, cyanosis, or edema  LYMPH: [x ]No lymphadenopathy noted  SKIN: [x ]No rashes or lesions; [ ]Good capillary refill      LABS:  CBC Full  -  ( 18 Apr 2018 05:46 )  WBC Count : 8.0 K/uL  Hemoglobin : 11.8 g/dL  Hematocrit : 36.0 %  Platelet Count - Automated : 114 K/uL  Mean Cell Volume : 97.4 fl  Mean Cell Hemoglobin : 31.8 pg  Mean Cell Hemoglobin Concentration : 32.7 gm/dL  Auto Neutrophil # : 5.1 K/uL  Auto Lymphocyte # : 1.7 K/uL  Auto Monocyte # : 1.0 K/uL  Auto Eosinophil # : 0.3 K/uL  Auto Basophil # : 0.1 K/uL  Auto Neutrophil % : 63.4 %  Auto Lymphocyte % : 20.6 %  Auto Monocyte % : 11.8 %  Auto Eosinophil % : 3.4 %  Auto Basophil % : 0.8 %    04-18    139  |  108  |  32<H>  ----------------------------<  112<H>  4.2   |  22  |  1.29    Ca    9.4      18 Apr 2018 05:46  Phos  3.1     04-18  Mg     2.4     04-18    TPro  7.8  /  Alb  3.8  /  TBili  0.6  /  DBili  x   /  AST  55<H>  /  ALT  48  /  AlkPhos  105  04-17    PT/INR - ( 17 Apr 2018 17:12 )   PT: 11.5 sec;   INR: 1.05 ratio         PTT - ( 17 Apr 2018 17:12 )  PTT:31.4 sec        RADIOLOGY & ADDITIONAL STUDIES REVIEWED:  ***    [x ]GOALS OF CARE DISCUSSION WITH PATIENT/FAMILY/PROXY:    CRITICAL CARE TIME SPENT: 35 minutes

## 2018-04-19 LAB
ANION GAP SERPL CALC-SCNC: 7 MMOL/L — SIGNIFICANT CHANGE UP (ref 5–17)
BUN SERPL-MCNC: 23 MG/DL — HIGH (ref 7–18)
CALCIUM SERPL-MCNC: 9.1 MG/DL — SIGNIFICANT CHANGE UP (ref 8.4–10.5)
CHLORIDE SERPL-SCNC: 109 MMOL/L — HIGH (ref 96–108)
CO2 SERPL-SCNC: 25 MMOL/L — SIGNIFICANT CHANGE UP (ref 22–31)
CREAT SERPL-MCNC: 0.96 MG/DL — SIGNIFICANT CHANGE UP (ref 0.5–1.3)
GLUCOSE SERPL-MCNC: 111 MG/DL — HIGH (ref 70–99)
HCT VFR BLD CALC: 35.6 % — SIGNIFICANT CHANGE UP (ref 34.5–45)
HGB BLD-MCNC: 11.2 G/DL — LOW (ref 11.5–15.5)
MCHC RBC-ENTMCNC: 30.6 PG — SIGNIFICANT CHANGE UP (ref 27–34)
MCHC RBC-ENTMCNC: 31.4 GM/DL — LOW (ref 32–36)
MCV RBC AUTO: 97.4 FL — SIGNIFICANT CHANGE UP (ref 80–100)
PLATELET # BLD AUTO: 125 K/UL — LOW (ref 150–400)
POTASSIUM SERPL-MCNC: 4 MMOL/L — SIGNIFICANT CHANGE UP (ref 3.5–5.3)
POTASSIUM SERPL-SCNC: 4 MMOL/L — SIGNIFICANT CHANGE UP (ref 3.5–5.3)
RBC # BLD: 3.66 M/UL — LOW (ref 3.8–5.2)
RBC # FLD: 12.6 % — SIGNIFICANT CHANGE UP (ref 10.3–14.5)
SODIUM SERPL-SCNC: 141 MMOL/L — SIGNIFICANT CHANGE UP (ref 135–145)
WBC # BLD: 7.9 K/UL — SIGNIFICANT CHANGE UP (ref 3.8–10.5)
WBC # FLD AUTO: 7.9 K/UL — SIGNIFICANT CHANGE UP (ref 3.8–10.5)

## 2018-04-19 PROCEDURE — 71045 X-RAY EXAM CHEST 1 VIEW: CPT | Mod: 26

## 2018-04-19 RX ORDER — DRONEDARONE 400 MG/1
400 TABLET, FILM COATED ORAL
Qty: 0 | Refills: 0 | Status: DISCONTINUED | OUTPATIENT
Start: 2018-04-19 | End: 2018-04-20

## 2018-04-19 RX ORDER — AMLODIPINE BESYLATE 2.5 MG/1
2.5 TABLET ORAL DAILY
Qty: 0 | Refills: 0 | Status: DISCONTINUED | OUTPATIENT
Start: 2018-04-19 | End: 2018-04-20

## 2018-04-19 RX ORDER — ENOXAPARIN SODIUM 100 MG/ML
40 INJECTION SUBCUTANEOUS DAILY
Qty: 0 | Refills: 0 | Status: DISCONTINUED | OUTPATIENT
Start: 2018-04-19 | End: 2018-04-20

## 2018-04-19 RX ORDER — ACETAMINOPHEN 500 MG
650 TABLET ORAL EVERY 6 HOURS
Qty: 0 | Refills: 0 | Status: DISCONTINUED | OUTPATIENT
Start: 2018-04-19 | End: 2018-04-20

## 2018-04-19 RX ORDER — METOPROLOL TARTRATE 50 MG
25 TABLET ORAL
Qty: 0 | Refills: 0 | Status: DISCONTINUED | OUTPATIENT
Start: 2018-04-19 | End: 2018-04-20

## 2018-04-19 RX ORDER — ASPIRIN/CALCIUM CARB/MAGNESIUM 324 MG
81 TABLET ORAL DAILY
Qty: 0 | Refills: 0 | Status: DISCONTINUED | OUTPATIENT
Start: 2018-04-19 | End: 2018-04-20

## 2018-04-19 RX ORDER — LOSARTAN POTASSIUM 100 MG/1
25 TABLET, FILM COATED ORAL DAILY
Qty: 0 | Refills: 0 | Status: DISCONTINUED | OUTPATIENT
Start: 2018-04-19 | End: 2018-04-20

## 2018-04-19 RX ORDER — METOPROLOL TARTRATE 50 MG
25 TABLET ORAL
Qty: 0 | Refills: 0 | Status: DISCONTINUED | OUTPATIENT
Start: 2018-04-19 | End: 2018-04-19

## 2018-04-19 RX ORDER — AMLODIPINE BESYLATE 2.5 MG/1
10 TABLET ORAL DAILY
Qty: 0 | Refills: 0 | Status: DISCONTINUED | OUTPATIENT
Start: 2018-04-19 | End: 2018-04-19

## 2018-04-19 RX ADMIN — Medication 25 MILLIGRAM(S): at 17:24

## 2018-04-19 RX ADMIN — AMLODIPINE BESYLATE 10 MILLIGRAM(S): 2.5 TABLET ORAL at 06:15

## 2018-04-19 RX ADMIN — Medication 650 MILLIGRAM(S): at 22:30

## 2018-04-19 RX ADMIN — Medication 81 MILLIGRAM(S): at 11:55

## 2018-04-19 RX ADMIN — CHLORHEXIDINE GLUCONATE 1 APPLICATION(S): 213 SOLUTION TOPICAL at 06:16

## 2018-04-19 RX ADMIN — DRONEDARONE 400 MILLIGRAM(S): 400 TABLET, FILM COATED ORAL at 17:24

## 2018-04-19 RX ADMIN — ENOXAPARIN SODIUM 40 MILLIGRAM(S): 100 INJECTION SUBCUTANEOUS at 11:55

## 2018-04-19 RX ADMIN — Medication 650 MILLIGRAM(S): at 21:38

## 2018-04-19 RX ADMIN — Medication 100 MILLIGRAM(S): at 14:34

## 2018-04-19 RX ADMIN — Medication 100 MILLIGRAM(S): at 06:15

## 2018-04-19 RX ADMIN — LOSARTAN POTASSIUM 25 MILLIGRAM(S): 100 TABLET, FILM COATED ORAL at 06:15

## 2018-04-19 NOTE — PROGRESS NOTE ADULT - ATTENDING COMMENTS
Patient seen and examined with resident, Addendum to above.     pt feeling well at this time  pt oob  no cp, sob, palp    Assessment  complete heart block s/p PPM  underlying HTN, high chol    Plan  case d/w Dr Harrell, decrease norvasc, asa, arb, statin, Multaq, lopressor.  transfer to tele  d/c planning for ? am if stable
Patient seen and examined with resident, Addendum to above.     pt feeling well at this time  no cp, sob, n/v, light headed ness   + weakness     Assessment  complete heart block needs PPM  pre syncope  underlying HTN, high chol    Plan  case d/w Dr Harrell,  plan for PPM this afternoon.  check Echo  hold BB  can consider transfer to tele after pt stable after PPM

## 2018-04-19 NOTE — PHYSICAL THERAPY INITIAL EVALUATION ADULT - ADDITIONAL COMMENTS
As per daughter able to manage with ADLs at home IND however, for community ambulation needs assistance from daughter.

## 2018-04-19 NOTE — PHYSICAL THERAPY INITIAL EVALUATION ADULT - ACTIVE RANGE OF MOTION EXAMINATION, REHAB EVAL
bilateral  lower extremity Active ROM was WFL (within functional limits)/left shoulder flexion limited due to pacemaker implantation precautions./chiquita. upper extremity Active ROM was WNL (within normal limits)

## 2018-04-19 NOTE — PROGRESS NOTE ADULT - SUBJECTIVE AND OBJECTIVE BOX
CHIEF COMPLAINT:Patient is a 89y old  Female who presents with a chief complaint of weakness, dyspnea .Pt appears comfortable.    	  REVIEW OF SYSTEMS:  CONSTITUTIONAL: No fever, weight loss, or fatigue  EYES: No eye pain, visual disturbances, or discharge  ENT:  No difficulty hearing, tinnitus, vertigo; No sinus or throat pain  NECK: No pain or stiffness  RESPIRATORY: No cough, wheezing, chills or hemoptysis; No Shortness of Breath  CARDIOVASCULAR: No chest pain, palpitations, passing out, dizziness, or leg swelling  GASTROINTESTINAL: No abdominal or epigastric pain. No nausea, vomiting, or hematemesis; No diarrhea or constipation. No melena or hematochezia.  GENITOURINARY: No dysuria, frequency, hematuria, or incontinence  NEUROLOGICAL: No headaches, memory loss, loss of strength, numbness, or tremors  SKIN: No itching, burning, rashes, or lesions   LYMPH Nodes: No enlarged glands  ENDOCRINE: No heat or cold intolerance; No hair loss  MUSCULOSKELETAL: No joint pain or swelling; No muscle, back, or extremity pain  PSYCHIATRIC: No depression, anxiety, mood swings, or difficulty sleeping  HEME/LYMPH: No easy bruising, or bleeding gums  ALLERGY AND IMMUNOLOGIC: No hives or eczema	      PHYSICAL EXAM:  T(C): 37.1 (04-19-18 @ 07:00), Max: 37.7 (04-19-18 @ 00:00)  HR: 60 (04-19-18 @ 10:00) (41 - 112)  BP: 161/61 (04-19-18 @ 10:00) (130/113 - 173/62)  RR: 26 (04-19-18 @ 10:00) (18 - 30)  SpO2: 95% (04-19-18 @ 10:00) (67% - 100%)  Wt(kg): --  I&O's Summary    18 Apr 2018 07:01  -  19 Apr 2018 07:00  --------------------------------------------------------  IN: 400 mL / OUT: 850 mL / NET: -450 mL        Appearance: Normal	  HEENT:   Normal oral mucosa, PERRL, EOMI	  Lymphatic: No lymphadenopathy  Cardiovascular: Normal S1 S2, No JVD, No murmurs, No edema  Respiratory: Lungs clear to auscultation	  Psychiatry: A & O x 3, Mood & affect appropriate  Gastrointestinal:  Soft, Non-tender, + BS	  Skin: No rashes, No ecchymoses, No cyanosis	  Neurologic: Non-focal  Extremities: Normal range of motion, No clubbing, cyanosis or edema  Vascular: Peripheral pulses palpable 2+ bilaterally    MEDICATIONS  (STANDING):  amLODIPine   Tablet 10 milliGRAM(s) Oral daily  aspirin  chewable 81 milliGRAM(s) Oral daily  ceFAZolin   IVPB 2000 milliGRAM(s) IV Intermittent every 8 hours  chlorhexidine 4% Liquid 1 Application(s) Topical <User Schedule>  influenza   Vaccine 0.5 milliLiter(s) IntraMuscular once  losartan 25 milliGRAM(s) Oral daily  metoprolol tartrate 25 milliGRAM(s) Oral two times a day    Tele-v paced  	  LABS:	 	    CARDIAC MARKERS:  CARDIAC MARKERS ( 18 Apr 2018 08:52 )  <0.015 ng/mL / x     / 47 U/L / x     / <1.0 ng/mL  CARDIAC MARKERS ( 17 Apr 2018 23:07 )  <0.015 ng/mL / x     / 54 U/L / x     / <1.0 ng/mL  CARDIAC MARKERS ( 17 Apr 2018 17:12 )  <0.015 ng/mL / x     / 65 U/L / x     / 1.1 ng/mL                        11.2   7.9   )-----------( 125      ( 19 Apr 2018 06:29 )             35.6     04-19    141  |  109<H>  |  23<H>  ----------------------------<  111<H>  4.0   |  25  |  0.96    Ca    9.1      19 Apr 2018 06:29  Phos  3.1     04-18  Mg     2.4     04-18    TPro  7.8  /  Alb  3.8  /  TBili  0.6  /  DBili  x   /  AST  55<H>  /  ALT  48  /  AlkPhos  105  04-17    proBNP: Serum Pro-Brain Natriuretic Peptide: 6846 pg/mL (04-17 @ 17:12)    Lipid Profile: Cholesterol 162  LDL 91  HDL 56  TG 75    HgA1c: Hemoglobin A1C, Whole Blood: 5.8 % (04-18 @ 13:28)    TSH: Thyroid Stimulating Hormone, Serum: 1.25 uU/mL (04-17 @ 22:50)      OBSERVATIONS:  Mitral Valve: Mitral annular calcification. Mild mitral  regurgitation.  Aortic Root: Normal aortic root.  Aortic Valve: Calcified aortic valve with decreased  opening. Mean transaortic valve gradient equals 16 mm Hg,  consistent with mild aortic stenosis.  Left Atrium: Mildly dilated left atrium.  LA volume index =  41 cc/m2.  Left Ventricle: Endocardium not well visualized; grossly  normal left ventricular systolic function. Normal left  ventricular internal dimensions and wall thicknesses.  Right Heart: Normal right atrium. Normal right ventricular  size and function. There is moderate tricuspid  regurgitation. There is mild-moderate pulmonic  regurgitation.  Pericardium/PleuraNormal pericardium with no pericardial  effusion.  Hemodynamic: RA Pressure is 10 mm Hg. RV systolic pressure  is 47 mm Hg. Mild pulmonary hypertension.

## 2018-04-19 NOTE — PROGRESS NOTE ADULT - SUBJECTIVE AND OBJECTIVE BOX
Patient is a 89y old  Female who presents with a chief complaint of weakness, dyspnea (18 Apr 2018 11:04)    pt seen in icu [ x ], reg med floor [   ], bed [ x ], chair at bedside [   ], a+o x3 [ x ], lethargic [  ],  nad [x  ]      Allergies    No Known Allergies        Vitals    T(F): 98.7 (04-19-18 @ 07:00), Max: 99.8 (04-19-18 @ 00:00)  HR: 60 (04-19-18 @ 08:00) (41 - 112)  BP: 148/52 (04-19-18 @ 08:00) (130/113 - 173/62)  RR: 21 (04-19-18 @ 08:00) (18 - 30)  SpO2: 96% (04-19-18 @ 08:00) (67% - 100%)  Wt(kg): --  CAPILLARY BLOOD GLUCOSE          Labs                          11.2   7.9   )-----------( 125      ( 19 Apr 2018 06:29 )             35.6       04-19    141  |  109<H>  |  23<H>  ----------------------------<  111<H>  4.0   |  25  |  0.96    Ca    9.1      19 Apr 2018 06:29  Phos  3.1     04-18  Mg     2.4     04-18    TPro  7.8  /  Alb  3.8  /  TBili  0.6  /  DBili  x   /  AST  55<H>  /  ALT  48  /  AlkPhos  105  04-17      CARDIAC MARKERS ( 18 Apr 2018 08:52 )  <0.015 ng/mL / x     / 47 U/L / x     / <1.0 ng/mL  CARDIAC MARKERS ( 17 Apr 2018 23:07 )  <0.015 ng/mL / x     / 54 U/L / x     / <1.0 ng/mL  CARDIAC MARKERS ( 17 Apr 2018 17:12 )  <0.015 ng/mL / x     / 65 U/L / x     / 1.1 ng/mL            Radiology Results      Meds    MEDICATIONS  (STANDING):  amLODIPine   Tablet 10 milliGRAM(s) Oral daily  aspirin  chewable 81 milliGRAM(s) Oral daily  ceFAZolin   IVPB 2000 milliGRAM(s) IV Intermittent every 8 hours  chlorhexidine 4% Liquid 1 Application(s) Topical <User Schedule>  influenza   Vaccine 0.5 milliLiter(s) IntraMuscular once  losartan 25 milliGRAM(s) Oral daily  metoprolol tartrate 25 milliGRAM(s) Oral two times a day      MEDICATIONS  (PRN):      Physical Exam      Neuro :  no focal deficits  Respiratory: CTA B/L  CV: RRR, S1S2, no murmurs,   Abdominal: Soft, NT, ND +BS,  Extremities: No edema, + peripheral pulses    ASSESSMENT    Complete atrioventricular block  ridley  h/o Hypercholesterolemia  HTN (hypertension)  No significant past surgical history        PLAN    cardio f/u  s/p ppm placement  lopressor restarted  pulm f/u  cont current meds  mgmt as per icu Patient is a 89y old  Female who presents with a chief complaint of weakness, dyspnea (18 Apr 2018 11:04)    pt seen in icu [ x ], reg med floor [   ], bed [  ], chair at bedside [ x  ], a+o x3 [ x ], lethargic [  ],  nad [x  ]      Allergies    No Known Allergies        Vitals    T(F): 98.7 (04-19-18 @ 07:00), Max: 99.8 (04-19-18 @ 00:00)  HR: 60 (04-19-18 @ 08:00) (41 - 112)  BP: 148/52 (04-19-18 @ 08:00) (130/113 - 173/62)  RR: 21 (04-19-18 @ 08:00) (18 - 30)  SpO2: 96% (04-19-18 @ 08:00) (67% - 100%)  Wt(kg): --  CAPILLARY BLOOD GLUCOSE          Labs                          11.2   7.9   )-----------( 125      ( 19 Apr 2018 06:29 )             35.6       04-19    141  |  109<H>  |  23<H>  ----------------------------<  111<H>  4.0   |  25  |  0.96    Ca    9.1      19 Apr 2018 06:29  Phos  3.1     04-18  Mg     2.4     04-18    TPro  7.8  /  Alb  3.8  /  TBili  0.6  /  DBili  x   /  AST  55<H>  /  ALT  48  /  AlkPhos  105  04-17      CARDIAC MARKERS ( 18 Apr 2018 08:52 )  <0.015 ng/mL / x     / 47 U/L / x     / <1.0 ng/mL  CARDIAC MARKERS ( 17 Apr 2018 23:07 )  <0.015 ng/mL / x     / 54 U/L / x     / <1.0 ng/mL  CARDIAC MARKERS ( 17 Apr 2018 17:12 )  <0.015 ng/mL / x     / 65 U/L / x     / 1.1 ng/mL            Radiology Results      Meds    MEDICATIONS  (STANDING):  amLODIPine   Tablet 10 milliGRAM(s) Oral daily  aspirin  chewable 81 milliGRAM(s) Oral daily  ceFAZolin   IVPB 2000 milliGRAM(s) IV Intermittent every 8 hours  chlorhexidine 4% Liquid 1 Application(s) Topical <User Schedule>  influenza   Vaccine 0.5 milliLiter(s) IntraMuscular once  losartan 25 milliGRAM(s) Oral daily  metoprolol tartrate 25 milliGRAM(s) Oral two times a day      MEDICATIONS  (PRN):      Physical Exam      Neuro :  no focal deficits  Respiratory: CTA B/L  CV: RRR, S1S2, no murmurs,   Abdominal: Soft, NT, ND +BS,  Extremities: No edema, + peripheral pulses    ASSESSMENT    Complete atrioventricular block  ridley  h/o Hypercholesterolemia  HTN (hypertension)  No significant past surgical history        PLAN    cardio f/u  s/p ppm placement  lopressor restarted  pulm f/u  cont current meds  mgmt as per icu

## 2018-04-19 NOTE — PROGRESS NOTE ADULT - SUBJECTIVE AND OBJECTIVE BOX
INTERVAL HPI/OVERNIGHT EVENTS: No major event overnight    PRESSORS: [ ] YES [x ] NO      Antimicrobial:  ceFAZolin   IVPB 2000 milliGRAM(s) IV Intermittent every 8 hours    Cardiovascular:  amLODIPine   Tablet 10 milliGRAM(s) Oral daily  losartan 25 milliGRAM(s) Oral daily    Pulmonary:    Hematalogic:    Other:  chlorhexidine 4% Liquid 1 Application(s) Topical <User Schedule>  influenza   Vaccine 0.5 milliLiter(s) IntraMuscular once    amLODIPine   Tablet 10 milliGRAM(s) Oral daily  ceFAZolin   IVPB 2000 milliGRAM(s) IV Intermittent every 8 hours  chlorhexidine 4% Liquid 1 Application(s) Topical <User Schedule>  influenza   Vaccine 0.5 milliLiter(s) IntraMuscular once  losartan 25 milliGRAM(s) Oral daily    Drug Dosing Weight  Height (cm): 157.48 (18 Apr 2018 00:00)  Weight (kg): 75.5 (18 Apr 2018 00:00)  BMI (kg/m2): 30.4 (18 Apr 2018 00:00)  BSA (m2): 1.77 (18 Apr 2018 00:00)    CENTRAL LINE: [ ] YES [x ] NO  LOCATION:   DATE INSERTED:  REMOVE: [ ] YES [ ] NO  EXPLAIN:    LYONS: [ ] YES [x ] NO    DATE INSERTED:  REMOVE:  [ ] YES [ ] NO  EXPLAIN:    A-LINE:  [ ] YES [ x] NO  LOCATION:   DATE INSERTED:  REMOVE:  [ ] YES [ ] NO  EXPLAIN:    PMH -reviewed admission note, no change since admission      ICU Vital Signs Last 24 Hrs  T(C): 37.1 (19 Apr 2018 07:00), Max: 37.7 (19 Apr 2018 00:00)  T(F): 98.7 (19 Apr 2018 07:00), Max: 99.8 (19 Apr 2018 00:00)  HR: 60 (19 Apr 2018 07:00) (41 - 112)  BP: 148/60 (19 Apr 2018 07:00) (130/113 - 173/62)  BP(mean): 82 (19 Apr 2018 07:00) (71 - 117)  ABP: --  ABP(mean): --  RR: 25 (19 Apr 2018 07:00) (18 - 30)  SpO2: 96% (19 Apr 2018 07:00) (67% - 100%)            04-18 @ 07:01  -  04-19 @ 07:00  --------------------------------------------------------  IN: 400 mL / OUT: 850 mL / NET: -450 mL            PHYSICAL EXAM:    GENERAL: [x ]NAD, [ x]well-groomed, [ ]well-developed  HEAD:  [x ]Atraumatic, [ x]Normocephalic  EYES: [x ]EOMI, [ ]PERRLA, [ ]conjunctiva and sclera clear  ENMT: [ x]No tonsillar erythema, exudates, or enlargement; [ ]Moist mucous membranes, [ ]Good dentition, [ ]No lesions  NECK: [ x]Supple, normal appearance, [ ]No JVD; [ ]Normal thyroid; [ ]Trachea midline  NERVOUS SYSTEM:  [x ]Alert & Oriented X3, [ ]Good concentration; [x ]Motor Strength 5/5 B/L upper and lower extremities; [ ]DTRs 2+ intact and symmetric  CHEST/LUNG: [x ]No chest deformity; [ x]Normal percussion bilaterally; [ ]No rales, rhonchi, wheezing   HEART: [x ]Bradycardic; [x ]+ systolic  murmurs, rubs, or gallops  ABDOMEN: [x ]Soft, Nontender, Nondistended; [ x]Bowel sounds present  EXTREMITIES:  [x ]2+ Peripheral Pulses, [ ]No clubbing, cyanosis, or edema  LYMPH: [x ]No lymphadenopathy noted  SKIN: [x ]No rashes or lesions; [ ]Good capillary refill        LABS:  CBC Full  -  ( 19 Apr 2018 06:29 )  WBC Count : 7.9 K/uL  Hemoglobin : 11.2 g/dL  Hematocrit : 35.6 %  Platelet Count - Automated : 125 K/uL  Mean Cell Volume : 97.4 fl  Mean Cell Hemoglobin : 30.6 pg  Mean Cell Hemoglobin Concentration : 31.4 gm/dL    04-19    141  |  109<H>  |  23<H>  ----------------------------<  111<H>  4.0   |  25  |  0.96    Ca    9.1      19 Apr 2018 06:29  Phos  3.1     04-18  Mg     2.4     04-18    TPro  7.8  /  Alb  3.8  /  TBili  0.6  /  DBili  x   /  AST  55<H>  /  ALT  48  /  AlkPhos  105  04-17    PT/INR - ( 17 Apr 2018 17:12 )   PT: 11.5 sec;   INR: 1.05 ratio         PTT - ( 17 Apr 2018 17:12 )  PTT:31.4 sec        RADIOLOGY & ADDITIONAL STUDIES REVIEWED:  ***    [x ]GOALS OF CARE DISCUSSION WITH PATIENT/FAMILY/PROXY:    CRITICAL CARE TIME SPENT: 35 minutes

## 2018-04-19 NOTE — PROGRESS NOTE ADULT - SUBJECTIVE AND OBJECTIVE BOX
Patient is a 89y old  Female who presents with a chief complaint of weakness, dyspnea (18 Apr 2018 11:04)  Awake, alert, comfortable in NAD. S/p PPM placement.    INTERVAL HPI/OVERNIGHT EVENTS:      VITAL SIGNS:  T(F): 98.7 (04-19-18 @ 07:00)  HR: 60 (04-19-18 @ 11:00)  BP: 157/74 (04-19-18 @ 11:00)  RR: 26 (04-19-18 @ 11:00)  SpO2: 95% (04-19-18 @ 11:00)  Wt(kg): --  I&O's Detail    18 Apr 2018 07:01  -  19 Apr 2018 07:00  --------------------------------------------------------  IN:    dextrose 5%.: 200 mL    Oral Fluid: 200 mL  Total IN: 400 mL    OUT:    Voided: 850 mL  Total OUT: 850 mL    Total NET: -450 mL      19 Apr 2018 07:01  -  19 Apr 2018 12:19  --------------------------------------------------------  IN:    Oral Fluid: 200 mL  Total IN: 200 mL    OUT:  Total OUT: 0 mL    Total NET: 200 mL              REVIEW OF SYSTEMS:    CONSTITUTIONAL:  No fevers, chills, sweats    HEENT:  Eyes:  No diplopia or blurred vision. ENT:  No earache, sore throat or runny nose.    CARDIOVASCULAR:  No pressure, squeezing, tightness, or heaviness about the chest; no palpitations.    RESPIRATORY:  Per HPI    GASTROINTESTINAL:  No abdominal pain, nausea, vomiting or diarrhea.    GENITOURINARY:  No dysuria, frequency or urgency.    NEUROLOGIC:  No paresthesias, fasciculations, seizures or weakness.    PSYCHIATRIC:  No disorder of thought or mood.      PHYSICAL EXAM:    Constitutional: Well developed and nourished  Eyes:Perrla  ENMT: normal  Neck:supple  Respiratory: good air entry  Cardiovascular: S1 S2 regular  Gastrointestinal: Soft, Non tender  Extremities: No edema  Vascular:normal  Neurological:Awake, alert,Ox3  Musculoskeletal:Normal      MEDICATIONS  (STANDING):  amLODIPine   Tablet 2.5 milliGRAM(s) Oral daily  aspirin  chewable 81 milliGRAM(s) Oral daily  ceFAZolin   IVPB 2000 milliGRAM(s) IV Intermittent every 8 hours  chlorhexidine 4% Liquid 1 Application(s) Topical <User Schedule>  dronedarone 400 milliGRAM(s) Oral two times a day  enoxaparin Injectable 40 milliGRAM(s) SubCutaneous daily  influenza   Vaccine 0.5 milliLiter(s) IntraMuscular once  losartan 25 milliGRAM(s) Oral daily  metoprolol tartrate 25 milliGRAM(s) Oral two times a day    MEDICATIONS  (PRN):      Allergies    No Known Allergies    Intolerances        LABS:                        11.2   7.9   )-----------( 125      ( 19 Apr 2018 06:29 )             35.6     04-19    141  |  109<H>  |  23<H>  ----------------------------<  111<H>  4.0   |  25  |  0.96    Ca    9.1      19 Apr 2018 06:29  Phos  3.1     04-18  Mg     2.4     04-18    TPro  7.8  /  Alb  3.8  /  TBili  0.6  /  DBili  x   /  AST  55<H>  /  ALT  48  /  AlkPhos  105  04-17    PT/INR - ( 17 Apr 2018 17:12 )   PT: 11.5 sec;   INR: 1.05 ratio         PTT - ( 17 Apr 2018 17:12 )  PTT:31.4 sec      CARDIAC MARKERS ( 18 Apr 2018 08:52 )  <0.015 ng/mL / x     / 47 U/L / x     / <1.0 ng/mL  CARDIAC MARKERS ( 17 Apr 2018 23:07 )  <0.015 ng/mL / x     / 54 U/L / x     / <1.0 ng/mL  CARDIAC MARKERS ( 17 Apr 2018 17:12 )  <0.015 ng/mL / x     / 65 U/L / x     / 1.1 ng/mL      CAPILLARY BLOOD GLUCOSE        pro-bnp 6846 04-17 @ 17:12     d-dimer --  04-17 @ 17:12      RADIOLOGY & ADDITIONAL TESTS:    CXR:  < from: Xray Chest 1 View- PORTABLE-Urgent (04.18.18 @ 15:35) >  IMPRESSION: Somewhat improved CHF. Pacemaker has been inserted.    < end of copied text >    Ct scan chest:    ekg;    echo:

## 2018-04-20 VITALS
RESPIRATION RATE: 16 BRPM | DIASTOLIC BLOOD PRESSURE: 45 MMHG | HEART RATE: 59 BPM | OXYGEN SATURATION: 100 % | SYSTOLIC BLOOD PRESSURE: 123 MMHG | TEMPERATURE: 99 F

## 2018-04-20 LAB
ANION GAP SERPL CALC-SCNC: 8 MMOL/L — SIGNIFICANT CHANGE UP (ref 5–17)
BUN SERPL-MCNC: 27 MG/DL — HIGH (ref 7–18)
CALCIUM SERPL-MCNC: 8.9 MG/DL — SIGNIFICANT CHANGE UP (ref 8.4–10.5)
CHLORIDE SERPL-SCNC: 107 MMOL/L — SIGNIFICANT CHANGE UP (ref 96–108)
CO2 SERPL-SCNC: 26 MMOL/L — SIGNIFICANT CHANGE UP (ref 22–31)
CREAT SERPL-MCNC: 0.96 MG/DL — SIGNIFICANT CHANGE UP (ref 0.5–1.3)
GLUCOSE SERPL-MCNC: 113 MG/DL — HIGH (ref 70–99)
HCT VFR BLD CALC: 33.4 % — LOW (ref 34.5–45)
HGB BLD-MCNC: 11 G/DL — LOW (ref 11.5–15.5)
MCHC RBC-ENTMCNC: 32.1 PG — SIGNIFICANT CHANGE UP (ref 27–34)
MCHC RBC-ENTMCNC: 32.9 GM/DL — SIGNIFICANT CHANGE UP (ref 32–36)
MCV RBC AUTO: 97.5 FL — SIGNIFICANT CHANGE UP (ref 80–100)
PLATELET # BLD AUTO: 110 K/UL — LOW (ref 150–400)
POTASSIUM SERPL-MCNC: 4.2 MMOL/L — SIGNIFICANT CHANGE UP (ref 3.5–5.3)
POTASSIUM SERPL-SCNC: 4.2 MMOL/L — SIGNIFICANT CHANGE UP (ref 3.5–5.3)
RBC # BLD: 3.43 M/UL — LOW (ref 3.8–5.2)
RBC # FLD: 12.8 % — SIGNIFICANT CHANGE UP (ref 10.3–14.5)
SODIUM SERPL-SCNC: 141 MMOL/L — SIGNIFICANT CHANGE UP (ref 135–145)
WBC # BLD: 6.7 K/UL — SIGNIFICANT CHANGE UP (ref 3.8–10.5)
WBC # FLD AUTO: 6.7 K/UL — SIGNIFICANT CHANGE UP (ref 3.8–10.5)

## 2018-04-20 PROCEDURE — C1898: CPT

## 2018-04-20 PROCEDURE — 84443 ASSAY THYROID STIM HORMONE: CPT

## 2018-04-20 PROCEDURE — 80053 COMPREHEN METABOLIC PANEL: CPT

## 2018-04-20 PROCEDURE — 83735 ASSAY OF MAGNESIUM: CPT

## 2018-04-20 PROCEDURE — 86901 BLOOD TYPING SEROLOGIC RH(D): CPT

## 2018-04-20 PROCEDURE — 85730 THROMBOPLASTIN TIME PARTIAL: CPT

## 2018-04-20 PROCEDURE — 85027 COMPLETE CBC AUTOMATED: CPT

## 2018-04-20 PROCEDURE — 82550 ASSAY OF CK (CPK): CPT

## 2018-04-20 PROCEDURE — 99291 CRITICAL CARE FIRST HOUR: CPT | Mod: 25

## 2018-04-20 PROCEDURE — C1785: CPT

## 2018-04-20 PROCEDURE — 83036 HEMOGLOBIN GLYCOSYLATED A1C: CPT

## 2018-04-20 PROCEDURE — 85610 PROTHROMBIN TIME: CPT

## 2018-04-20 PROCEDURE — 84484 ASSAY OF TROPONIN QUANT: CPT

## 2018-04-20 PROCEDURE — 82009 KETONE BODYS QUAL: CPT

## 2018-04-20 PROCEDURE — 93306 TTE W/DOPPLER COMPLETE: CPT

## 2018-04-20 PROCEDURE — 94640 AIRWAY INHALATION TREATMENT: CPT

## 2018-04-20 PROCEDURE — 76000 FLUOROSCOPY <1 HR PHYS/QHP: CPT

## 2018-04-20 PROCEDURE — 80061 LIPID PANEL: CPT

## 2018-04-20 PROCEDURE — 71045 X-RAY EXAM CHEST 1 VIEW: CPT

## 2018-04-20 PROCEDURE — C1894: CPT

## 2018-04-20 PROCEDURE — 80048 BASIC METABOLIC PNL TOTAL CA: CPT

## 2018-04-20 PROCEDURE — 93005 ELECTROCARDIOGRAM TRACING: CPT

## 2018-04-20 PROCEDURE — 82553 CREATINE MB FRACTION: CPT

## 2018-04-20 PROCEDURE — C1769: CPT

## 2018-04-20 PROCEDURE — 86850 RBC ANTIBODY SCREEN: CPT

## 2018-04-20 PROCEDURE — 83880 ASSAY OF NATRIURETIC PEPTIDE: CPT

## 2018-04-20 PROCEDURE — 36415 COLL VENOUS BLD VENIPUNCTURE: CPT

## 2018-04-20 PROCEDURE — 97161 PT EVAL LOW COMPLEX 20 MIN: CPT

## 2018-04-20 PROCEDURE — 84100 ASSAY OF PHOSPHORUS: CPT

## 2018-04-20 RX ORDER — LABETALOL HCL 100 MG
1 TABLET ORAL
Qty: 0 | Refills: 0 | COMMUNITY

## 2018-04-20 RX ORDER — AMLODIPINE BESYLATE 2.5 MG/1
1 TABLET ORAL
Qty: 30 | Refills: 0 | OUTPATIENT
Start: 2018-04-20 | End: 2018-05-19

## 2018-04-20 RX ORDER — DOCUSATE SODIUM 100 MG
100 CAPSULE ORAL THREE TIMES A DAY
Qty: 0 | Refills: 0 | Status: DISCONTINUED | OUTPATIENT
Start: 2018-04-20 | End: 2018-04-20

## 2018-04-20 RX ORDER — METOPROLOL TARTRATE 50 MG
1 TABLET ORAL
Qty: 60 | Refills: 0 | OUTPATIENT
Start: 2018-04-20 | End: 2018-05-19

## 2018-04-20 RX ORDER — ASPIRIN/CALCIUM CARB/MAGNESIUM 324 MG
1 TABLET ORAL
Qty: 30 | Refills: 0 | OUTPATIENT
Start: 2018-04-20 | End: 2018-05-19

## 2018-04-20 RX ORDER — AMLODIPINE BESYLATE 2.5 MG/1
1 TABLET ORAL
Qty: 0 | Refills: 0 | COMMUNITY

## 2018-04-20 RX ORDER — SENNA PLUS 8.6 MG/1
2 TABLET ORAL AT BEDTIME
Qty: 0 | Refills: 0 | Status: DISCONTINUED | OUTPATIENT
Start: 2018-04-20 | End: 2018-04-20

## 2018-04-20 RX ADMIN — Medication 650 MILLIGRAM(S): at 11:26

## 2018-04-20 RX ADMIN — CHLORHEXIDINE GLUCONATE 1 APPLICATION(S): 213 SOLUTION TOPICAL at 07:00

## 2018-04-20 RX ADMIN — ENOXAPARIN SODIUM 40 MILLIGRAM(S): 100 INJECTION SUBCUTANEOUS at 11:25

## 2018-04-20 RX ADMIN — Medication 650 MILLIGRAM(S): at 12:44

## 2018-04-20 RX ADMIN — LOSARTAN POTASSIUM 25 MILLIGRAM(S): 100 TABLET, FILM COATED ORAL at 06:02

## 2018-04-20 RX ADMIN — AMLODIPINE BESYLATE 2.5 MILLIGRAM(S): 2.5 TABLET ORAL at 06:02

## 2018-04-20 RX ADMIN — DRONEDARONE 400 MILLIGRAM(S): 400 TABLET, FILM COATED ORAL at 06:02

## 2018-04-20 RX ADMIN — Medication 25 MILLIGRAM(S): at 06:02

## 2018-04-20 RX ADMIN — Medication 81 MILLIGRAM(S): at 12:43

## 2018-04-20 NOTE — PROGRESS NOTE ADULT - PROBLEM SELECTOR PLAN 3
monitor BP  cont meds
IMPROVE VTE Individual Risk Assessment    RISK                                                          Points  [] Previous VTE                                           3  [] Thrombophilia                                        2  [] Lower limb paralysis                              2   [] Current Cancer                                       2   [x] Immobilization > 24 hrs                        1  [x] ICU/CCU stay > 24 hours                       1  [x] Age > 60                                                   1    IMPROVE VTE Score; 3  On heparin DVT PPx dose.
monitor BP  cont meds
IMPROVE VTE Individual Risk Assessment    RISK                                                          Points  [] Previous VTE                                           3  [] Thrombophilia                                        2  [] Lower limb paralysis                              2   [] Current Cancer                                       2   [x] Immobilization > 24 hrs                        1  [x] ICU/CCU stay > 24 hours                       1  [x] Age > 60                                                   1    IMPROVE VTE Score; 3  On heparin DVT PPx dose.

## 2018-04-20 NOTE — PROGRESS NOTE ADULT - PROBLEM SELECTOR PLAN 1
S/p PPM placement  Cardio follow up
-was on bedside external pacer before ICU admission   -last labetalol dose 4/17 at 12PM  -PPM placed today with Dr Black 4/18  (elevated pro BNP and mild congestion on CXR.   Will monitor, provide O2 supplementation as needed and diurese as needed  initiate antiplatelet, statin).  -restarted on metoprolol
-was on bedside external pacer, will initiate etCO2 continuously  -last labetalol dose 4/17 at 12PM  -possible PPM after 12PM with Dr Black   (elevated pro BNP and mild congestion on CXR.   Will monitor, provide O2 supplementation as needed and diurese as needed  initiate antiplatelet, statin, no AV benigno blocker in setting of CHB)
S/p PPM placement  Cardio follow up

## 2018-04-20 NOTE — PROGRESS NOTE ADULT - ASSESSMENT
89 yo F from home with PMH of HTN, HLD, p/w c/o weakness and dyspnea, complete heart block,PAF, S/P PPM.  1.Transfer to Telemetry.  2.Dec norvasc 2.5mg qd.  3.PAF-ASA,add multaq 400mg bid and lopressor 25mg bid.  4.HTN-Arb.  5.Cont asa,statin.  6..GI and DVT prophylaxis.
89 yo F from home with PMH of HTN, HLD, p/w c/o weakness and dyspnea for past few days. Pt denies fever, chills, n/v/d, cough, CP, palpitations and syncope.     1. Complete heart block.    -was on bedside external pacer before ICU admission   -last labetalol dose 4/17 at 12PM  -PPM placed with Dr Black 4/18 and then pt HR in 60s and stable  (elevated pro BNP and mild congestion on CXR)  -restarted on metoprolol for afib, aspirin and atorvastatin    2. Atrial Fibrillation  - Increased metoprolol to control Afib and will not start anticoagulation due to age factor and fall risk for bleeding.   -on aspirin    3. HTN  Decreased amlodipine dose to have more room for metoprolol for rate control as can not use anticoagulation.    continue with losartan    4. DVT and GI ppx     patient stable for discharge today.
89 F with PMH of HTN, HLD, p/w c/o weakness and dyspnea, found to have 3rd degree heart block on EKG, admitted to MICU for monitoring
89 yo F from home with PMH of HTN, HLD, p/w c/o weakness and dyspnea, complete heart block,PAF, S/P PPM.  1.Cont norvasc 2.5mg qd.  2.PAF-ASA, multaq 400mg bid and lopressor 25mg bid.  3.HTN-Arb.  4.Cont asa,statin.  5.GI and DVT prophylaxis.  6.F/U as outpatient 2 weeks for PPM check.
89 F with PMH of HTN, HLD, p/w c/o weakness and dyspnea, found to have 3rd degree heart block on EKG, admitted to MICU for monitoring

## 2018-04-20 NOTE — PROGRESS NOTE ADULT - SUBJECTIVE AND OBJECTIVE BOX
INTERVAL HPI/OVERNIGHT EVENTS: patient was transferred to medical floor from ICU     VITAL SIGNS:  T(F): 97.9 (04-20-18 @ 05:42)  HR: 61 (04-20-18 @ 05:42)  BP: 141/69 (04-20-18 @ 05:42)  RR: 18 (04-20-18 @ 05:42)  SpO2: 97% (04-20-18 @ 05:42)  Wt(kg): --    PHYSICAL EXAM:    Constitutional: NAD, patient is stable and sitting comfortable in bed.   Eyes: PERRL, sclera clear  ENMT: no external lesions   Neck: supple, no JVD  Respiratory: CTAB   Cardiovascular: s1,s2 present no RMG  Gastrointestinal: soft non tender, non distended   Extremities: no cyanosis, edema or clubbing   Vascular: pulses 2+  Neurological: AO x 3      MEDICATIONS  (STANDING):  amLODIPine   Tablet 2.5 milliGRAM(s) Oral daily  aspirin  chewable 81 milliGRAM(s) Oral daily  chlorhexidine 4% Liquid 1 Application(s) Topical <User Schedule>  docusate sodium 100 milliGRAM(s) Oral three times a day  dronedarone 400 milliGRAM(s) Oral two times a day  enoxaparin Injectable 40 milliGRAM(s) SubCutaneous daily  influenza   Vaccine 0.5 milliLiter(s) IntraMuscular once  losartan 25 milliGRAM(s) Oral daily  metoprolol tartrate 25 milliGRAM(s) Oral two times a day  senna 2 Tablet(s) Oral at bedtime    MEDICATIONS  (PRN):  acetaminophen   Tablet. 650 milliGRAM(s) Oral every 6 hours PRN Moderate Pain (4 - 6)      Allergies    No Known Allergies    Intolerances        LABS:                        11.0   6.7   )-----------( 110      ( 20 Apr 2018 07:36 )             33.4     04-20    141  |  107  |  27<H>  ----------------------------<  113<H>  4.2   |  26  |  0.96    Ca    8.9      20 Apr 2018 07:36            RADIOLOGY & ADDITIONAL TESTS:

## 2018-04-20 NOTE — PROGRESS NOTE ADULT - PROBLEM SELECTOR PLAN 2
oxygen supp prn  Bronchodilators prn  Pfts as OP
- on b blocker, ARB, peripheral CCB at home  held BB  goal SBP <130.
oxygen supp prn  Bronchodilators prn  Pfts as OP
-on b blocker, ARB, peripheral CCB at home  held BB  goal SBP <130.

## 2018-04-20 NOTE — PROGRESS NOTE ADULT - PROVIDER SPECIALTY LIST ADULT
Cardiology
Critical Care
Internal Medicine
Pulmonology
Cardiology
Critical Care
Pulmonology

## 2018-04-20 NOTE — PROGRESS NOTE ADULT - SUBJECTIVE AND OBJECTIVE BOX
Patient is a 89y old  Female who presents with a chief complaint of weakness, dyspnea (18 Apr 2018 11:04)  Awake, alert, comfortable in bed in NAD. Mildly tachypnea but denies sob. S/p PPM because of complete HB    INTERVAL HPI/OVERNIGHT EVENTS:      VITAL SIGNS:  T(F): 97.9 (04-20-18 @ 05:42)  HR: 61 (04-20-18 @ 05:42)  BP: 141/69 (04-20-18 @ 05:42)  RR: 18 (04-20-18 @ 05:42)  SpO2: 97% (04-20-18 @ 05:42)  Wt(kg): --  I&O's Detail    19 Apr 2018 07:01  -  20 Apr 2018 07:00  --------------------------------------------------------  IN:    Oral Fluid: 300 mL    Solution: 50 mL  Total IN: 350 mL    OUT:  Total OUT: 0 mL    Total NET: 350 mL              REVIEW OF SYSTEMS:    CONSTITUTIONAL:  No fevers, chills, sweats    HEENT:  Eyes:  No diplopia or blurred vision. ENT:  No earache, sore throat or runny nose.    CARDIOVASCULAR:  No pressure, squeezing, tightness, or heaviness about the chest; no palpitations.    RESPIRATORY:  Per HPI    GASTROINTESTINAL:  No abdominal pain, nausea, vomiting or diarrhea.    GENITOURINARY:  No dysuria, frequency or urgency.    NEUROLOGIC:  No paresthesias, fasciculations, seizures or weakness.    PSYCHIATRIC:  No disorder of thought or mood.      PHYSICAL EXAM:    Constitutional: Well developed and nourished  Eyes:Perrla  ENMT: normal  Neck:supple  Respiratory: good air entry  Cardiovascular: S1 S2 regular  Gastrointestinal: Soft, Non tender  Extremities: No edema  Vascular:normal  Neurological:Awake, alert,Ox3  Musculoskeletal:Normal      MEDICATIONS  (STANDING):  amLODIPine   Tablet 2.5 milliGRAM(s) Oral daily  aspirin  chewable 81 milliGRAM(s) Oral daily  chlorhexidine 4% Liquid 1 Application(s) Topical <User Schedule>  docusate sodium 100 milliGRAM(s) Oral three times a day  dronedarone 400 milliGRAM(s) Oral two times a day  enoxaparin Injectable 40 milliGRAM(s) SubCutaneous daily  influenza   Vaccine 0.5 milliLiter(s) IntraMuscular once  losartan 25 milliGRAM(s) Oral daily  metoprolol tartrate 25 milliGRAM(s) Oral two times a day  senna 2 Tablet(s) Oral at bedtime    MEDICATIONS  (PRN):  acetaminophen   Tablet. 650 milliGRAM(s) Oral every 6 hours PRN Moderate Pain (4 - 6)      Allergies    No Known Allergies    Intolerances        LABS:                        11.0   6.7   )-----------( 110      ( 20 Apr 2018 07:36 )             33.4     04-20    141  |  107  |  27<H>  ----------------------------<  113<H>  4.2   |  26  |  0.96    Ca    8.9      20 Apr 2018 07:36                CAPILLARY BLOOD GLUCOSE        pro-bnp 6846 04-17 @ 17:12     d-dimer --  04-17 @ 17:12      RADIOLOGY & ADDITIONAL TESTS:    CXR:  < from: Xray Chest 1 View- PORTABLE-Routine (04.19.18 @ 12:48) >  IMPRESSION: Congestive changes somewhat improved.    < end of copied text >    Ct scan chest:    ekg;    echo:

## 2018-04-20 NOTE — PROGRESS NOTE ADULT - SUBJECTIVE AND OBJECTIVE BOX
Patient is a 89y old  Female who presents with a chief complaint of weakness, dyspnea (18 Apr 2018 11:04)    pt seen in icu [ x ], reg med floor [   ], bed [  ], chair at bedside [ x  ], a+o x3 [ x ], lethargic [  ],  nad [x  ]      Allergies    No Known Allergies        Vitals    T(F): 97.9 (04-20-18 @ 05:42), Max: 99.1 (04-19-18 @ 15:23)  HR: 61 (04-20-18 @ 05:42) (60 - 63)  BP: 141/69 (04-20-18 @ 05:42) (120/108 - 165/63)  RR: 18 (04-20-18 @ 05:42) (18 - 28)  SpO2: 97% (04-20-18 @ 05:42) (93% - 99%)  Wt(kg): --  CAPILLARY BLOOD GLUCOSE          Labs                          11.0   6.7   )-----------( 110      ( 20 Apr 2018 07:36 )             33.4       04-20    141  |  107  |  27<H>  ----------------------------<  113<H>  4.2   |  26  |  0.96    Ca    8.9      20 Apr 2018 07:36                  Radiology Results      Meds    MEDICATIONS  (STANDING):  amLODIPine   Tablet 2.5 milliGRAM(s) Oral daily  aspirin  chewable 81 milliGRAM(s) Oral daily  chlorhexidine 4% Liquid 1 Application(s) Topical <User Schedule>  docusate sodium 100 milliGRAM(s) Oral three times a day  dronedarone 400 milliGRAM(s) Oral two times a day  enoxaparin Injectable 40 milliGRAM(s) SubCutaneous daily  influenza   Vaccine 0.5 milliLiter(s) IntraMuscular once  losartan 25 milliGRAM(s) Oral daily  metoprolol tartrate 25 milliGRAM(s) Oral two times a day  senna 2 Tablet(s) Oral at bedtime      MEDICATIONS  (PRN):  acetaminophen   Tablet. 650 milliGRAM(s) Oral every 6 hours PRN Moderate Pain (4 - 6)      Physical Exam      Neuro :  no focal deficits  Respiratory: CTA B/L  CV: RRR, S1S2, no murmurs,   Abdominal: Soft, NT, ND +BS,  Extremities: No edema, + peripheral pulses    ASSESSMENT    Complete atrioventricular block  ridley  h/o Hypercholesterolemia  HTN (hypertension)  No significant past surgical history        PLAN    cardio f/u  s/p ppm placement  f/u cardiology outpt in 2 weeks for ppm ck  cont lopressor, multaq, amlodipine and losartan  pulm f/u  cont current meds  pt stable for d/c

## 2018-04-20 NOTE — PROGRESS NOTE ADULT - SUBJECTIVE AND OBJECTIVE BOX
CHIEF COMPLAINT:Patient is a 89y old  Female who presents with a chief complaint of weakness, dyspnea. Pt appears comfortable.    	  REVIEW OF SYSTEMS:  CONSTITUTIONAL: No fever, weight loss, or fatigue  EYES: No eye pain, visual disturbances, or discharge  ENT:  No difficulty hearing, tinnitus, vertigo; No sinus or throat pain  NECK: No pain or stiffness  RESPIRATORY: No cough, wheezing, chills or hemoptysis; No Shortness of Breath  CARDIOVASCULAR: No chest pain, palpitations, passing out, dizziness, or leg swelling  GASTROINTESTINAL: No abdominal or epigastric pain. No nausea, vomiting, or hematemesis; No diarrhea or constipation. No melena or hematochezia.  GENITOURINARY: No dysuria, frequency, hematuria, or incontinence  NEUROLOGICAL: No headaches, memory loss, loss of strength, numbness, or tremors  SKIN: No itching, burning, rashes, or lesions   LYMPH Nodes: No enlarged glands  ENDOCRINE: No heat or cold intolerance; No hair loss  MUSCULOSKELETAL: No joint pain or swelling; No muscle, back, or extremity pain  PSYCHIATRIC: No depression, anxiety, mood swings, or difficulty sleeping  HEME/LYMPH: No easy bruising, or bleeding gums  ALLERGY AND IMMUNOLOGIC: No hives or eczema	      PHYSICAL EXAM:  T(C): 36.6 (04-20-18 @ 05:42), Max: 37.3 (04-19-18 @ 15:23)  HR: 61 (04-20-18 @ 05:42) (60 - 63)  BP: 141/69 (04-20-18 @ 05:42) (120/108 - 165/63)  RR: 18 (04-20-18 @ 05:42) (18 - 28)  SpO2: 97% (04-20-18 @ 05:42) (93% - 99%)    I&O's Summary    19 Apr 2018 07:01  -  20 Apr 2018 07:00  --------------------------------------------------------  IN: 350 mL / OUT: 0 mL / NET: 350 mL        Appearance: Normal	  HEENT:   Normal oral mucosa, PERRL, EOMI	  Lymphatic: No lymphadenopathy  Cardiovascular: Normal S1 S2, No JVD, No murmurs, No edema  Respiratory: Lungs clear to auscultation	  Psychiatry: A & O x 3, Mood & affect appropriate  Gastrointestinal:  Soft, Non-tender, + BS	  Skin: No rashes, No ecchymoses, No cyanosis	  Neurologic: Non-focal  Extremities: Normal range of motion, No clubbing, cyanosis or edema  Vascular: Peripheral pulses palpable 2+ bilaterally    MEDICATIONS  (STANDING):  amLODIPine   Tablet 2.5 milliGRAM(s) Oral daily  aspirin  chewable 81 milliGRAM(s) Oral daily  chlorhexidine 4% Liquid 1 Application(s) Topical <User Schedule>  docusate sodium 100 milliGRAM(s) Oral three times a day  dronedarone 400 milliGRAM(s) Oral two times a day  enoxaparin Injectable 40 milliGRAM(s) SubCutaneous daily  losartan 25 milliGRAM(s) Oral daily  metoprolol tartrate 25 milliGRAM(s) Oral two times a day  senna 2 Tablet(s) Oral at bedtime        	  LABS:	 	                        11.0   6.7   )-----------( 110      ( 20 Apr 2018 07:36 )             33.4     04-20    141  |  107  |  27<H>  ----------------------------<  113<H>  4.2   |  26  |  0.96    Ca    8.9      20 Apr 2018 07:36      proBNP: Serum Pro-Brain Natriuretic Peptide: 6846 pg/mL (04-17 @ 17:12)    Lipid Profile: Cholesterol 162  LDL 91  HDL 56  TG 75    HgA1c: Hemoglobin A1C, Whole Blood: 5.8 % (04-18 @ 13:28)    TSH: Thyroid Stimulating Hormone, Serum: 1.25 uU/mL (04-17 @ 22:50)      EXAM:  XR CHEST PORTABLE ROUTINE 1V                            PROCEDURE DATE:  04/19/2018          INTERPRETATION:  AP chest on April 19, 2018 at 6:26 AM. Patient has   atrioventricular block. Patient had recent pacemaker placement.    Heart is magnified by technique. Double wire left-sided pacemaker again   noted.    On April 18 there was a interstitial prominence consistent with CHF. This   is again noted but shows slight improvement.    Chronic apical thickening right greater than left again noted.    IMPRESSION: Congestive changes somewhat improved.

## 2019-01-14 NOTE — H&P ADULT - NSTOBACCOSCREENHP_GEN_A_CS
Patient alert and oriented X4, vitals stable. Pain controlled with dilaudid and benadryl for itching. Continues with IVFs.  Patient wanting to remove tele and shower but before doing so I received a call from tele stating that patient heart rate was sustain No

## 2019-03-19 ENCOUNTER — INPATIENT (INPATIENT)
Facility: HOSPITAL | Age: 84
LOS: 3 days | Discharge: HOME | DRG: 291 | End: 2019-03-23
Attending: INTERNAL MEDICINE | Admitting: INTERNAL MEDICINE
Payer: COMMERCIAL

## 2019-03-19 VITALS
WEIGHT: 160.06 LBS | TEMPERATURE: 98 F | DIASTOLIC BLOOD PRESSURE: 105 MMHG | HEART RATE: 72 BPM | SYSTOLIC BLOOD PRESSURE: 208 MMHG | RESPIRATION RATE: 22 BRPM | OXYGEN SATURATION: 99 % | HEIGHT: 62 IN

## 2019-03-19 DIAGNOSIS — R06.02 SHORTNESS OF BREATH: ICD-10-CM

## 2019-03-19 DIAGNOSIS — I10 ESSENTIAL (PRIMARY) HYPERTENSION: ICD-10-CM

## 2019-03-19 DIAGNOSIS — Z29.9 ENCOUNTER FOR PROPHYLACTIC MEASURES, UNSPECIFIED: ICD-10-CM

## 2019-03-19 DIAGNOSIS — Z95.0 PRESENCE OF CARDIAC PACEMAKER: Chronic | ICD-10-CM

## 2019-03-19 DIAGNOSIS — I48.91 UNSPECIFIED ATRIAL FIBRILLATION: ICD-10-CM

## 2019-03-19 PROBLEM — E78.00 PURE HYPERCHOLESTEROLEMIA, UNSPECIFIED: Chronic | Status: ACTIVE | Noted: 2018-04-17

## 2019-03-19 LAB
ACETONE SERPL-MCNC: NEGATIVE — SIGNIFICANT CHANGE UP
ALBUMIN SERPL ELPH-MCNC: 1.5 G/DL — LOW (ref 3.5–5)
ALBUMIN SERPL ELPH-MCNC: 3.5 G/DL — SIGNIFICANT CHANGE UP (ref 3.5–5)
ALP SERPL-CCNC: 31 U/L — LOW (ref 40–120)
ALP SERPL-CCNC: 76 U/L — SIGNIFICANT CHANGE UP (ref 40–120)
ALT FLD-CCNC: 12 U/L DA — SIGNIFICANT CHANGE UP (ref 10–60)
ALT FLD-CCNC: 28 U/L DA — SIGNIFICANT CHANGE UP (ref 10–60)
ANION GAP SERPL CALC-SCNC: 5 MMOL/L — SIGNIFICANT CHANGE UP (ref 5–17)
ANION GAP SERPL CALC-SCNC: 8 MMOL/L — SIGNIFICANT CHANGE UP (ref 5–17)
APTT BLD: 29.8 SEC — SIGNIFICANT CHANGE UP (ref 27.5–36.3)
AST SERPL-CCNC: 10 U/L — SIGNIFICANT CHANGE UP (ref 10–40)
AST SERPL-CCNC: 34 U/L — SIGNIFICANT CHANGE UP (ref 10–40)
BASOPHILS # BLD AUTO: 0.07 K/UL — SIGNIFICANT CHANGE UP (ref 0–0.2)
BASOPHILS NFR BLD AUTO: 1.1 % — SIGNIFICANT CHANGE UP (ref 0–2)
BILIRUB SERPL-MCNC: 0.3 MG/DL — SIGNIFICANT CHANGE UP (ref 0.2–1.2)
BILIRUB SERPL-MCNC: 0.6 MG/DL — SIGNIFICANT CHANGE UP (ref 0.2–1.2)
BUN SERPL-MCNC: 14 MG/DL — SIGNIFICANT CHANGE UP (ref 7–18)
BUN SERPL-MCNC: 8 MG/DL — SIGNIFICANT CHANGE UP (ref 7–18)
CALCIUM SERPL-MCNC: 8.8 MG/DL — SIGNIFICANT CHANGE UP (ref 8.4–10.5)
CALCIUM SERPL-MCNC: <5 MG/DL — CRITICAL LOW (ref 8.4–10.5)
CHLORIDE SERPL-SCNC: 106 MMOL/L — SIGNIFICANT CHANGE UP (ref 96–108)
CHLORIDE SERPL-SCNC: 131 MMOL/L — HIGH (ref 96–108)
CK SERPL-CCNC: 22 U/L — SIGNIFICANT CHANGE UP (ref 21–215)
CO2 SERPL-SCNC: 14 MMOL/L — LOW (ref 22–31)
CO2 SERPL-SCNC: 28 MMOL/L — SIGNIFICANT CHANGE UP (ref 22–31)
CREAT SERPL-MCNC: 0.27 MG/DL — LOW (ref 0.5–1.3)
CREAT SERPL-MCNC: 1.07 MG/DL — SIGNIFICANT CHANGE UP (ref 0.5–1.3)
EOSINOPHIL # BLD AUTO: 0.26 K/UL — SIGNIFICANT CHANGE UP (ref 0–0.5)
EOSINOPHIL NFR BLD AUTO: 4.3 % — SIGNIFICANT CHANGE UP (ref 0–6)
FLU A RESULT: SIGNIFICANT CHANGE UP
FLU A RESULT: SIGNIFICANT CHANGE UP
FLUAV AG NPH QL: SIGNIFICANT CHANGE UP
FLUBV AG NPH QL: SIGNIFICANT CHANGE UP
GLUCOSE SERPL-MCNC: 114 MG/DL — HIGH (ref 70–99)
GLUCOSE SERPL-MCNC: 52 MG/DL — LOW (ref 70–99)
HCT VFR BLD CALC: 34.8 % — SIGNIFICANT CHANGE UP (ref 34.5–45)
HGB BLD-MCNC: 11.4 G/DL — LOW (ref 11.5–15.5)
IMM GRANULOCYTES NFR BLD AUTO: 0.3 % — SIGNIFICANT CHANGE UP (ref 0–1.5)
INR BLD: 1.1 RATIO — SIGNIFICANT CHANGE UP (ref 0.88–1.16)
LACTATE SERPL-SCNC: 1.1 MMOL/L — SIGNIFICANT CHANGE UP (ref 0.7–2)
LYMPHOCYTES # BLD AUTO: 0.81 K/UL — LOW (ref 1–3.3)
LYMPHOCYTES # BLD AUTO: 13.3 % — SIGNIFICANT CHANGE UP (ref 13–44)
MAGNESIUM SERPL-MCNC: 1 MG/DL — CRITICAL LOW (ref 1.6–2.6)
MCHC RBC-ENTMCNC: 31.8 PG — SIGNIFICANT CHANGE UP (ref 27–34)
MCHC RBC-ENTMCNC: 32.8 GM/DL — SIGNIFICANT CHANGE UP (ref 32–36)
MCV RBC AUTO: 96.9 FL — SIGNIFICANT CHANGE UP (ref 80–100)
MONOCYTES # BLD AUTO: 0.38 K/UL — SIGNIFICANT CHANGE UP (ref 0–0.9)
MONOCYTES NFR BLD AUTO: 6.2 % — SIGNIFICANT CHANGE UP (ref 2–14)
NEUTROPHILS # BLD AUTO: 4.57 K/UL — SIGNIFICANT CHANGE UP (ref 1.8–7.4)
NEUTROPHILS NFR BLD AUTO: 74.8 % — SIGNIFICANT CHANGE UP (ref 43–77)
NRBC # BLD: 0 /100 WBCS — SIGNIFICANT CHANGE UP (ref 0–0)
NT-PROBNP SERPL-SCNC: 822 PG/ML — HIGH (ref 0–450)
PLATELET # BLD AUTO: 147 K/UL — LOW (ref 150–400)
POTASSIUM SERPL-MCNC: 2 MMOL/L — CRITICAL LOW (ref 3.5–5.3)
POTASSIUM SERPL-MCNC: 4.7 MMOL/L — SIGNIFICANT CHANGE UP (ref 3.5–5.3)
POTASSIUM SERPL-SCNC: 2 MMOL/L — CRITICAL LOW (ref 3.5–5.3)
POTASSIUM SERPL-SCNC: 4.7 MMOL/L — SIGNIFICANT CHANGE UP (ref 3.5–5.3)
PROT SERPL-MCNC: 3.1 G/DL — LOW (ref 6–8.3)
PROT SERPL-MCNC: 7.7 G/DL — SIGNIFICANT CHANGE UP (ref 6–8.3)
PROTHROM AB SERPL-ACNC: 12.2 SEC — SIGNIFICANT CHANGE UP (ref 10–12.9)
RBC # BLD: 3.59 M/UL — LOW (ref 3.8–5.2)
RBC # FLD: 12.9 % — SIGNIFICANT CHANGE UP (ref 10.3–14.5)
RSV RESULT: SIGNIFICANT CHANGE UP
RSV RNA RESP QL NAA+PROBE: SIGNIFICANT CHANGE UP
SODIUM SERPL-SCNC: 139 MMOL/L — SIGNIFICANT CHANGE UP (ref 135–145)
SODIUM SERPL-SCNC: 153 MMOL/L — HIGH (ref 135–145)
TROPONIN I SERPL-MCNC: 0.02 NG/ML — SIGNIFICANT CHANGE UP (ref 0–0.04)
WBC # BLD: 6.11 K/UL — SIGNIFICANT CHANGE UP (ref 3.8–10.5)
WBC # FLD AUTO: 6.11 K/UL — SIGNIFICANT CHANGE UP (ref 3.8–10.5)

## 2019-03-19 PROCEDURE — 71045 X-RAY EXAM CHEST 1 VIEW: CPT | Mod: 26

## 2019-03-19 PROCEDURE — 99285 EMERGENCY DEPT VISIT HI MDM: CPT

## 2019-03-19 RX ORDER — HYDRALAZINE HCL 50 MG
10 TABLET ORAL ONCE
Qty: 0 | Refills: 0 | Status: COMPLETED | OUTPATIENT
Start: 2019-03-19 | End: 2019-03-19

## 2019-03-19 RX ORDER — POTASSIUM CHLORIDE 20 MEQ
10 PACKET (EA) ORAL
Qty: 0 | Refills: 0 | Status: COMPLETED | OUTPATIENT
Start: 2019-03-19 | End: 2019-03-19

## 2019-03-19 RX ORDER — AZITHROMYCIN 500 MG/1
500 TABLET, FILM COATED ORAL ONCE
Qty: 0 | Refills: 0 | Status: COMPLETED | OUTPATIENT
Start: 2019-03-19 | End: 2019-03-19

## 2019-03-19 RX ORDER — POTASSIUM CHLORIDE 20 MEQ
40 PACKET (EA) ORAL
Qty: 0 | Refills: 0 | Status: DISCONTINUED | OUTPATIENT
Start: 2019-03-19 | End: 2019-03-19

## 2019-03-19 RX ORDER — METOPROLOL TARTRATE 50 MG
100 TABLET ORAL ONCE
Qty: 0 | Refills: 0 | Status: COMPLETED | OUTPATIENT
Start: 2019-03-19 | End: 2019-03-19

## 2019-03-19 RX ORDER — SODIUM CHLORIDE 9 MG/ML
1000 INJECTION INTRAMUSCULAR; INTRAVENOUS; SUBCUTANEOUS
Qty: 0 | Refills: 0 | Status: DISCONTINUED | OUTPATIENT
Start: 2019-03-19 | End: 2019-03-19

## 2019-03-19 RX ORDER — ASPIRIN/CALCIUM CARB/MAGNESIUM 324 MG
81 TABLET ORAL DAILY
Qty: 0 | Refills: 0 | Status: DISCONTINUED | OUTPATIENT
Start: 2019-03-19 | End: 2019-03-23

## 2019-03-19 RX ORDER — CEFTRIAXONE 500 MG/1
1 INJECTION, POWDER, FOR SOLUTION INTRAMUSCULAR; INTRAVENOUS ONCE
Qty: 0 | Refills: 0 | Status: COMPLETED | OUTPATIENT
Start: 2019-03-19 | End: 2019-03-19

## 2019-03-19 RX ORDER — HYDRALAZINE HCL 50 MG
25 TABLET ORAL
Qty: 0 | Refills: 0 | Status: DISCONTINUED | OUTPATIENT
Start: 2019-03-19 | End: 2019-03-20

## 2019-03-19 RX ORDER — IPRATROPIUM/ALBUTEROL SULFATE 18-103MCG
3 AEROSOL WITH ADAPTER (GRAM) INHALATION
Qty: 0 | Refills: 0 | Status: COMPLETED | OUTPATIENT
Start: 2019-03-19 | End: 2019-03-19

## 2019-03-19 RX ORDER — MAGNESIUM SULFATE 500 MG/ML
2 VIAL (ML) INJECTION
Qty: 0 | Refills: 0 | Status: COMPLETED | OUTPATIENT
Start: 2019-03-19 | End: 2019-03-19

## 2019-03-19 RX ORDER — DRONEDARONE 400 MG/1
400 TABLET, FILM COATED ORAL
Qty: 0 | Refills: 0 | Status: DISCONTINUED | OUTPATIENT
Start: 2019-03-19 | End: 2019-03-23

## 2019-03-19 RX ORDER — METOPROLOL TARTRATE 50 MG
100 TABLET ORAL
Qty: 0 | Refills: 0 | Status: DISCONTINUED | OUTPATIENT
Start: 2019-03-19 | End: 2019-03-20

## 2019-03-19 RX ORDER — CALCIUM GLUCONATE 100 MG/ML
1 VIAL (ML) INTRAVENOUS ONCE
Qty: 0 | Refills: 0 | Status: COMPLETED | OUTPATIENT
Start: 2019-03-19 | End: 2019-03-19

## 2019-03-19 RX ORDER — SIMVASTATIN 20 MG/1
40 TABLET, FILM COATED ORAL AT BEDTIME
Qty: 0 | Refills: 0 | Status: DISCONTINUED | OUTPATIENT
Start: 2019-03-19 | End: 2019-03-23

## 2019-03-19 RX ORDER — LOSARTAN POTASSIUM 100 MG/1
2 TABLET, FILM COATED ORAL
Qty: 0 | Refills: 0 | COMMUNITY

## 2019-03-19 RX ADMIN — Medication 80 MILLIGRAM(S): at 18:28

## 2019-03-19 RX ADMIN — Medication 100 MILLIGRAM(S): at 18:02

## 2019-03-19 RX ADMIN — SODIUM CHLORIDE 125 MILLILITER(S): 9 INJECTION INTRAMUSCULAR; INTRAVENOUS; SUBCUTANEOUS at 22:09

## 2019-03-19 RX ADMIN — CEFTRIAXONE 1 GRAM(S): 500 INJECTION, POWDER, FOR SOLUTION INTRAMUSCULAR; INTRAVENOUS at 17:13

## 2019-03-19 RX ADMIN — Medication 10 MILLIGRAM(S): at 18:02

## 2019-03-19 RX ADMIN — Medication 3 MILLILITER(S): at 18:29

## 2019-03-19 RX ADMIN — CEFTRIAXONE 100 GRAM(S): 500 INJECTION, POWDER, FOR SOLUTION INTRAMUSCULAR; INTRAVENOUS at 14:03

## 2019-03-19 RX ADMIN — Medication 3 MILLILITER(S): at 18:00

## 2019-03-19 RX ADMIN — Medication 3 MILLILITER(S): at 18:05

## 2019-03-19 RX ADMIN — AZITHROMYCIN 250 MILLIGRAM(S): 500 TABLET, FILM COATED ORAL at 14:04

## 2019-03-19 RX ADMIN — AZITHROMYCIN 500 MILLIGRAM(S): 500 TABLET, FILM COATED ORAL at 17:13

## 2019-03-19 NOTE — ED PROVIDER NOTE - SECONDARY DIAGNOSIS.
RAD (reactive airway disease) with wheezing PNA (pneumonia) Hypernatremia Hypomagnesemia Hypocalcemia Hypokalemia

## 2019-03-19 NOTE — H&P ADULT - NSICDXPASTMEDICALHX_GEN_ALL_CORE_FT
PAST MEDICAL HISTORY:  Atrial fibrillation     History of complete heart block     HTN (hypertension)     Hypercholesterolemia     Pulmonary HTN

## 2019-03-19 NOTE — ED PROVIDER NOTE - CARE PLAN
Principal Discharge DX:	Shortness of breath  Secondary Diagnosis:	RAD (reactive airway disease) with wheezing  Secondary Diagnosis:	PNA (pneumonia)  Secondary Diagnosis:	Hypernatremia  Secondary Diagnosis:	Hypokalemia  Secondary Diagnosis:	Hypomagnesemia  Secondary Diagnosis:	Hypocalcemia

## 2019-03-19 NOTE — H&P ADULT - HISTORY OF PRESENT ILLNESS
89F, from home, AAOx3, lives with daugher w/ PMHx Complete Heart Block (s/p PPM), HTN 89F, from home, AAOx3, lives with daughter w/ PMHx Complete Heart Block (s/p PPM), HTN, HLD, Afib (on Multaq) p/w SOB and wheezing x yesterday. Daughter mentions patient symptoms worsened today in the morning after patient was found gasping for air. Denies cough, fever/chills, CP, SHERWOOD, palpitations, LE edema or any other complaint. 89F, from home, AAOx3, lives with daughter w/ PMHx Complete Heart Block (s/p PPM), HTN, HLD, Afib (on Multaq), pulmonary HTN p/w SOB and wheezing x yesterday. Daughter mentions patient symptoms worsened today in the morning after she was found gasping for air. Denies cough, fever/chills, CP, SHERWOOD, palpitations, LE edema or any other complaint. No recent travel or sick contacts. Also denies tobacco smoking in the past, Hx COPD or asthma. 89F, from home, AAOx3, lives with daughter w/ PMHx Complete Heart Block (s/p PPM), HTN, HLD, Afib (on Multaq), pulmonary HTN p/w SOB and wheezing x yesterday. Daughter mentions patient symptoms worsened today in the morning after she was found gasping for air. Denies cough, fever/chills, CP, SHERWOOD, palpitations, LE edema or any other complaint. No recent travel or sick contacts. Also denies tobacco smoking in the past, Hx COPD or asthma.

## 2019-03-19 NOTE — ED PROVIDER NOTE - MUSCULOSKELETAL, MLM
Spine appears normal, range of motion is not limited, no muscle or joint tenderness no calves tenderness

## 2019-03-19 NOTE — H&P ADULT - NSICDXPROBLEM_GEN_ALL_CORE_FT
PROBLEM DIAGNOSES  Problem: SOB (shortness of breath)  Assessment and Plan: Patient p/w SOB, wheezing x yesterday. Denies cough, fever.chills, CP  Afebrile, no leukocytosis, no signs of fluid overload, RVP negative  Could be 2/2 Interstitial Lung Disease, Vs worsening Pulmonary HTN vs Reactive Airway Dz  Lungs CTA B/L  Will need repeat TTE and a CT chest for further evaluation  c/w DUONEB's and O2 supplementation for now    Problem: HTN (hypertension)  Assessment and Plan: Resume home Hydralazine 25 mg BID + Lopressor 100 mg BID home dose  Monitor BP and adjust meds as needed    Problem: Afib  Assessment and Plan: c/w Multaq 400 mg BID + Lopressor 100 BID  Rate controlled at the moment    Problem: Need for prophylactic measure  Assessment and Plan: VTE score: 2, c/w Lovenox SQ for DVT PPx PROBLEM DIAGNOSES  Problem: SOB (shortness of breath)  Assessment and Plan: Patient p/w SOB, wheezing x yesterday. Denies cough, fever.chills, CP  Afebrile, no leukocytosis, no signs of fluid overload, RVP negative  Could be 2/2 Interstitial Lung Disease, Vs worsening Pulmonary HTN vs Reactive Airway Dz  Expiratory wheezing upon lung auscultation  Will need repeat TTE and a CT chest for further evaluation  c/w DUONEB's and O2 supplementation + Solumedrol for now  Lasix 40 mg IV    Problem: HTN (hypertension)  Assessment and Plan: Resume home Hydralazine 25 mg BID + Lopressor 100 mg BID home dose  Monitor BP and adjust meds as needed    Problem: Afib  Assessment and Plan: c/w Multaq 400 mg BID + Lopressor 100 BID  Rate controlled at the moment    Problem: Need for prophylactic measure  Assessment and Plan: VTE score: 2, c/w Lovenox SQ for DVT PPx

## 2019-03-19 NOTE — H&P ADULT - ASSESSMENT
89F, from home, AAOx3, lives with daughter w/ PMHx Complete Heart Block (s/p PPM), HTN, HLD, Afib (on Multaq), pulmonary HTN p/w SOB and wheezing x yesterday. Daughter mentions patient symptoms worsened today in the morning after she was found gasping for air. Denies cough, fever/chills, CP, SHERWOOD, palpitations, LE edema or any other complaint. No recent travel or sick contacts. Also denies tobacco smoking in the past, Hx COPD or asthma.     ED course: Vitals significant for BP: 208/105 mmHg RR: 22 rpm, afebrile, WBC WNL's, RVP negative, probNP: 822, ECG: paced rhythm VR 70 bpm, T1: negative  Initial BMP lab error. 89F, from home, AAOx3, lives with daughter w/ PMHx Complete Heart Block (s/p PPM), HTN, HLD, Afib (on Multaq), pulmonary HTN p/w SOB and wheezing x yesterday. Daughter mentions patient symptoms worsened today in the morning after she was found gasping for air. Denies cough, fever/chills, CP, SHERWOOD, palpitations, LE edema or any other complaint. No recent travel or sick contacts. Also denies tobacco smoking in the past, Hx COPD or asthma.     ED course: Vitals significant for BP: 208/105 mmHg RR: 22 rpm, afebrile, WBC WNL's, RVP negative, probNP: 822, ECG: paced rhythm VR 70 bpm, T1: negative  Initial BMP lab error.

## 2019-03-19 NOTE — ED PROVIDER NOTE - OBJECTIVE STATEMENT
89 y/o F with past hx of pacemaker presents to the ED c/o SOB, wheezing, weakness, and heavy cough. Since the beginning of February, pt has been experiencing wheezing and cough. Her appetite has been normal but pt appears mildly SOB. Pt is on Multaq and Prednisone, is a nonsmoker. She denies vomiting or CP. No further complaints at this time. 89 y/o F with past hx of pacemaker presents to the ED c/o SOB, wheezing, weakness, and heavy cough. Since the beginning of February, condition improved with Prednisone.  pt has been experiencing wheezing and cough. Her appetite has been normal but pt appears mildly SOB. Pt is on Multaq, is a nonsmoker. She denies vomiting or CP. No further complaints at this time.

## 2019-03-19 NOTE — ED PROVIDER NOTE - PROGRESS NOTE DETAILS
pt with RAD with wheezing, hypernatremia, hypokalemia, hypomagnesemia, d/w Dr. Lindsey & Dr. Harrell, will admit pt.

## 2019-03-20 DIAGNOSIS — E87.8 OTHER DISORDERS OF ELECTROLYTE AND FLUID BALANCE, NOT ELSEWHERE CLASSIFIED: ICD-10-CM

## 2019-03-20 DIAGNOSIS — I27.20 PULMONARY HYPERTENSION, UNSPECIFIED: ICD-10-CM

## 2019-03-20 LAB
24R-OH-CALCIDIOL SERPL-MCNC: 13.1 NG/ML — LOW (ref 30–80)
ANION GAP SERPL CALC-SCNC: 6 MMOL/L — SIGNIFICANT CHANGE UP (ref 5–17)
APPEARANCE UR: CLEAR — SIGNIFICANT CHANGE UP
BASOPHILS # BLD AUTO: 0.01 K/UL — SIGNIFICANT CHANGE UP (ref 0–0.2)
BASOPHILS NFR BLD AUTO: 0.2 % — SIGNIFICANT CHANGE UP (ref 0–2)
BILIRUB UR-MCNC: NEGATIVE — SIGNIFICANT CHANGE UP
BUN SERPL-MCNC: 16 MG/DL — SIGNIFICANT CHANGE UP (ref 7–18)
CALCIUM SERPL-MCNC: 9 MG/DL — SIGNIFICANT CHANGE UP (ref 8.4–10.5)
CHLORIDE SERPL-SCNC: 106 MMOL/L — SIGNIFICANT CHANGE UP (ref 96–108)
CHOLEST SERPL-MCNC: 149 MG/DL — SIGNIFICANT CHANGE UP (ref 10–199)
CO2 SERPL-SCNC: 26 MMOL/L — SIGNIFICANT CHANGE UP (ref 22–31)
COLOR SPEC: YELLOW — SIGNIFICANT CHANGE UP
CREAT SERPL-MCNC: 0.98 MG/DL — SIGNIFICANT CHANGE UP (ref 0.5–1.3)
DIFF PNL FLD: NEGATIVE — SIGNIFICANT CHANGE UP
EOSINOPHIL # BLD AUTO: 0 K/UL — SIGNIFICANT CHANGE UP (ref 0–0.5)
EOSINOPHIL NFR BLD AUTO: 0 % — SIGNIFICANT CHANGE UP (ref 0–6)
FOLATE SERPL-MCNC: 8.9 NG/ML — SIGNIFICANT CHANGE UP
GLUCOSE SERPL-MCNC: 125 MG/DL — HIGH (ref 70–99)
GLUCOSE UR QL: NEGATIVE — SIGNIFICANT CHANGE UP
HBA1C BLD-MCNC: 5.9 % — HIGH (ref 4–5.6)
HCT VFR BLD CALC: 35.9 % — SIGNIFICANT CHANGE UP (ref 34.5–45)
HDLC SERPL-MCNC: 65 MG/DL — SIGNIFICANT CHANGE UP
HGB BLD-MCNC: 11.8 G/DL — SIGNIFICANT CHANGE UP (ref 11.5–15.5)
IMM GRANULOCYTES NFR BLD AUTO: 0.5 % — SIGNIFICANT CHANGE UP (ref 0–1.5)
KETONES UR-MCNC: NEGATIVE — SIGNIFICANT CHANGE UP
LEUKOCYTE ESTERASE UR-ACNC: ABNORMAL
LIPID PNL WITH DIRECT LDL SERPL: 75 MG/DL — SIGNIFICANT CHANGE UP
LYMPHOCYTES # BLD AUTO: 0.58 K/UL — LOW (ref 1–3.3)
LYMPHOCYTES # BLD AUTO: 9.7 % — LOW (ref 13–44)
MAGNESIUM SERPL-MCNC: 2.3 MG/DL — SIGNIFICANT CHANGE UP (ref 1.6–2.6)
MCHC RBC-ENTMCNC: 31.6 PG — SIGNIFICANT CHANGE UP (ref 27–34)
MCHC RBC-ENTMCNC: 32.9 GM/DL — SIGNIFICANT CHANGE UP (ref 32–36)
MCV RBC AUTO: 96 FL — SIGNIFICANT CHANGE UP (ref 80–100)
MONOCYTES # BLD AUTO: 0.05 K/UL — SIGNIFICANT CHANGE UP (ref 0–0.9)
MONOCYTES NFR BLD AUTO: 0.8 % — LOW (ref 2–14)
NEUTROPHILS # BLD AUTO: 5.34 K/UL — SIGNIFICANT CHANGE UP (ref 1.8–7.4)
NEUTROPHILS NFR BLD AUTO: 88.8 % — HIGH (ref 43–77)
NITRITE UR-MCNC: NEGATIVE — SIGNIFICANT CHANGE UP
NRBC # BLD: 0 /100 WBCS — SIGNIFICANT CHANGE UP (ref 0–0)
PH UR: 5 — SIGNIFICANT CHANGE UP (ref 5–8)
PHOSPHATE SERPL-MCNC: 3.3 MG/DL — SIGNIFICANT CHANGE UP (ref 2.5–4.5)
PLATELET # BLD AUTO: 147 K/UL — LOW (ref 150–400)
POTASSIUM SERPL-MCNC: 4 MMOL/L — SIGNIFICANT CHANGE UP (ref 3.5–5.3)
POTASSIUM SERPL-SCNC: 4 MMOL/L — SIGNIFICANT CHANGE UP (ref 3.5–5.3)
PROCALCITONIN SERPL-MCNC: 0.03 NG/ML — SIGNIFICANT CHANGE UP (ref 0.02–0.1)
PROT UR-MCNC: NEGATIVE — SIGNIFICANT CHANGE UP
RBC # BLD: 3.74 M/UL — LOW (ref 3.8–5.2)
RBC # FLD: 13 % — SIGNIFICANT CHANGE UP (ref 10.3–14.5)
SODIUM SERPL-SCNC: 138 MMOL/L — SIGNIFICANT CHANGE UP (ref 135–145)
SP GR SPEC: 1.01 — SIGNIFICANT CHANGE UP (ref 1.01–1.02)
TOTAL CHOLESTEROL/HDL RATIO MEASUREMENT: 2.3 RATIO — LOW (ref 3.3–7.1)
TRIGL SERPL-MCNC: 43 MG/DL — SIGNIFICANT CHANGE UP (ref 10–149)
TSH SERPL-MCNC: 0.38 UU/ML — SIGNIFICANT CHANGE UP (ref 0.34–4.82)
UROBILINOGEN FLD QL: NEGATIVE — SIGNIFICANT CHANGE UP
VIT B12 SERPL-MCNC: 1218 PG/ML — SIGNIFICANT CHANGE UP (ref 232–1245)
WBC # BLD: 6.01 K/UL — SIGNIFICANT CHANGE UP (ref 3.8–10.5)
WBC # FLD AUTO: 6.01 K/UL — SIGNIFICANT CHANGE UP (ref 3.8–10.5)

## 2019-03-20 PROCEDURE — 71250 CT THORAX DX C-: CPT | Mod: 26

## 2019-03-20 RX ORDER — HYDRALAZINE HCL 50 MG
25 TABLET ORAL EVERY 8 HOURS
Qty: 0 | Refills: 0 | Status: DISCONTINUED | OUTPATIENT
Start: 2019-03-20 | End: 2019-03-20

## 2019-03-20 RX ORDER — HYDRALAZINE HCL 50 MG
50 TABLET ORAL EVERY 8 HOURS
Qty: 0 | Refills: 0 | Status: DISCONTINUED | OUTPATIENT
Start: 2019-03-20 | End: 2019-03-23

## 2019-03-20 RX ORDER — IPRATROPIUM/ALBUTEROL SULFATE 18-103MCG
3 AEROSOL WITH ADAPTER (GRAM) INHALATION EVERY 6 HOURS
Qty: 0 | Refills: 0 | Status: DISCONTINUED | OUTPATIENT
Start: 2019-03-20 | End: 2019-03-23

## 2019-03-20 RX ORDER — FUROSEMIDE 40 MG
40 TABLET ORAL DAILY
Qty: 0 | Refills: 0 | Status: DISCONTINUED | OUTPATIENT
Start: 2019-03-20 | End: 2019-03-22

## 2019-03-20 RX ORDER — METOPROLOL TARTRATE 50 MG
100 TABLET ORAL
Qty: 0 | Refills: 0 | Status: DISCONTINUED | OUTPATIENT
Start: 2019-03-20 | End: 2019-03-23

## 2019-03-20 RX ADMIN — Medication 40 MILLIGRAM(S): at 13:51

## 2019-03-20 RX ADMIN — Medication 25 MILLIGRAM(S): at 06:18

## 2019-03-20 RX ADMIN — Medication 25 MILLIGRAM(S): at 12:35

## 2019-03-20 RX ADMIN — Medication 3 MILLILITER(S): at 03:12

## 2019-03-20 RX ADMIN — DRONEDARONE 400 MILLIGRAM(S): 400 TABLET, FILM COATED ORAL at 06:18

## 2019-03-20 RX ADMIN — Medication 40 MILLIGRAM(S): at 11:42

## 2019-03-20 RX ADMIN — Medication 100 MILLIGRAM(S): at 06:18

## 2019-03-20 RX ADMIN — Medication 81 MILLIGRAM(S): at 11:42

## 2019-03-20 RX ADMIN — Medication 100 MILLIGRAM(S): at 18:23

## 2019-03-20 RX ADMIN — Medication 3 MILLILITER(S): at 14:36

## 2019-03-20 RX ADMIN — DRONEDARONE 400 MILLIGRAM(S): 400 TABLET, FILM COATED ORAL at 18:23

## 2019-03-20 NOTE — CONSULT NOTE ADULT - ASSESSMENT
89 yr old F, from home, AAOx3, lives with daughter w/ PMHx Complete Heart Block (s/p PPM), HTN, HLD, Afib (on Multaq), pulmonary HTN p/w SOB and wheezing x yesterday, acute diastolic HF, uncontrolled HTN.  1.D/C Steroids.  2.Cont IV lasix.  3.PAF-asa,lopressor,multaq.  4.HTN-inc hydralazine 50mg q8h.  5.Prediabetes-diet.  6.Lipid d/o- statin.  7.GI and DVT prophylaxis.

## 2019-03-20 NOTE — CONSULT NOTE ADULT - SUBJECTIVE AND OBJECTIVE BOX
PULMONARY CONSULT NOTE      CLAUDETTE GARCES  MRN-[id number]    Patient is a 89y old  Female who presents with a chief complaint of SOB (20 Mar 2019 08:20)    History of Present Illness:  Reason for Admission: SOB	  History of Present Illness: 	  89F, from home, AAOx3, lives with daughter w/ PMHx Complete Heart Block (s/p PPM), HTN, HLD, Afib (on Multaq), pulmonary HTN p/w SOB and wheezing x yesterday. Daughter mentions patient symptoms worsened today in the morning after she was found gasping for air. Denies cough, fever/chills, CP, SHERWOOD, palpitations, LE edema or any other complaint. No recent travel or sick contacts. Also denies tobacco smoking in the past, Hx COPD or asthma.      HISTORY OF PRESENT ILLNESS: As above. Awake, alert, comfortable in bed in NAD.    MEDICATIONS  (STANDING):  ALBUTerol/ipratropium for Nebulization 3 milliLiter(s) Nebulizer every 6 hours  aspirin  chewable 81 milliGRAM(s) Oral daily  dronedarone 400 milliGRAM(s) Oral two times a day  furosemide   Injectable 40 milliGRAM(s) IV Push daily  hydrALAZINE 25 milliGRAM(s) Oral two times a day  methylPREDNISolone sodium succinate Injectable 40 milliGRAM(s) IV Push every 8 hours  metoprolol tartrate 100 milliGRAM(s) Oral two times a day  simvastatin 40 milliGRAM(s) Oral at bedtime      MEDICATIONS  (PRN):      Allergies    No Known Allergies    Intolerances        PAST MEDICAL & SURGICAL HISTORY:  Pulmonary HTN  History of complete heart block  Atrial fibrillation  Hypercholesterolemia  HTN (hypertension)  Artificial pacemaker      FAMILY HISTORY:      SOCIAL HISTORY  Smoking History:     REVIEW OF SYSTEMS:    CONSTITUTIONAL:  No fevers, chills, sweats    HEENT:  Eyes:  No diplopia or blurred vision. ENT:  No earache, sore throat or runny nose.    CARDIOVASCULAR:  No pressure, squeezing, tightness, or heaviness about the chest; no palpitations.    RESPIRATORY:  Per HPI    GASTROINTESTINAL:  No abdominal pain, nausea, vomiting or diarrhea.    GENITOURINARY:  No dysuria, frequency or urgency.    NEUROLOGIC:  No paresthesias, fasciculations, seizures or weakness.    PSYCHIATRIC:  No disorder of thought or mood.    Vital Signs Last 24 Hrs  T(C): 36.8 (20 Mar 2019 07:17), Max: 37.4 (19 Mar 2019 12:43)  T(F): 98.2 (20 Mar 2019 07:17), Max: 99.4 (19 Mar 2019 12:43)  HR: 80 (20 Mar 2019 07:17) (67 - 85)  BP: 169/70 (20 Mar 2019 07:17) (159/75 - 208/105)  BP(mean): --  RR: 18 (20 Mar 2019 07:17) (16 - 22)  SpO2: 97% (20 Mar 2019 07:17) (97% - 99%)  I&O's Detail      PHYSICAL EXAMINATION:    GENERAL: The patient is a well-developed, well-nourished _____in no apparent distress.     HEENT: Head is normocephalic and atraumatic. Extraocular muscles are intact. Mucous membranes are moist.     NECK: Supple.     LUNGS: Clear to auscultation without wheezing, rales, or rhonchi. Respirations unlabored    HEART: Regular rate and rhythm without murmur.    ABDOMEN: Soft, nontender, and nondistended.  No hepatosplenomegaly is noted.    EXTREMITIES: Without any cyanosis, clubbing, rash, lesions or edema.    NEUROLOGIC: Grossly intact.      LABS:                        11.8   6.01  )-----------( 147      ( 20 Mar 2019 07:52 )             35.9     03-20    138  |  106  |  16  ----------------------------<  125<H>  4.0   |  26  |  0.98    Ca    9.0      20 Mar 2019 07:52  Phos  3.3     03-20  Mg     2.3     03-20    TPro  7.7  /  Alb  3.5  /  TBili  0.6  /  DBili  x   /  AST  34  /  ALT  28  /  AlkPhos  76  03-19    PT/INR - ( 19 Mar 2019 13:33 )   PT: 12.2 sec;   INR: 1.10 ratio         PTT - ( 19 Mar 2019 13:33 )  PTT:29.8 sec      CARDIAC MARKERS ( 19 Mar 2019 13:33 )  0.022 ng/mL / x     / 22 U/L / x     / x            Serum Pro-Brain Natriuretic Peptide: 822 pg/mL (03-19-19 @ 13:33)    Lactate, Blood: 1.1 mmol/L (03-19-19 @ 13:46)    Procalcitonin, Serum: 0.03 ng/mL (03-20-19 @ 09:56)      MICROBIOLOGY:    RADIOLOGY & ADDITIONAL STUDIES:    CXR:  < from: Xray Chest 1 View AP/PA (03.19.19 @ 14:03) >  Impression: No evidence for pleural effusion, or pneumothorax.     Pulmonary vascular congestion is again noted, slightly progressed;   underlying pulmonary infiltrates/pneumonia cannot be excluded. This   finding is communicated with the emergency department via the PACS   communication system.    Stable cardiac silhouette and left cardiac device.    < end of copied text >    Ct scan chest:  < from: CT Chest No Cont (03.20.19 @ 09:50) >  IMPRESSION:     1.  No evidence of interstitial lung disease.    2.  Small layering bilateral pleural effusions and subsegmental   atelectasis of basilar lower lobes.    < end of copied text >    ekg;    echo:

## 2019-03-20 NOTE — PROGRESS NOTE ADULT - SUBJECTIVE AND OBJECTIVE BOX
89F, from home, AAOx3, lives with daughter w/ PMHx Complete Heart Block (s/p PPM), HTN, HLD, Afib (on Multaq), pulmonary HTN p/w SOB and wheezing x yesterday. Daughter mentions patient symptoms worsened today in the morning after she was found gasping for air. Denies cough, fever/chills, CP, SHERWOOD, palpitations, LE edema or any other complaint. No recent travel or sick contacts. Also denies tobacco smoking in the past, Hx COPD or asthma.    Review of Systems:  Other Review of Systems: All other review of systems negative, except as noted in HPI 	      pt seen in ed [ x ], reg med floor [   ], bed [ x ], chair at bedside [   ], awake and responsive [ x ], lethargic [  ],  nad [ x ]      Allergies    No Known Allergies        Vitals    T(F): 98.2 (03-20-19 @ 07:17), Max: 99.4 (03-19-19 @ 12:43)  HR: 80 (03-20-19 @ 07:17) (67 - 85)  BP: 169/70 (03-20-19 @ 07:17) (159/75 - 208/105)  RR: 18 (03-20-19 @ 07:17) (16 - 22)  SpO2: 97% (03-20-19 @ 07:17) (97% - 99%)  Wt(kg): --  CAPILLARY BLOOD GLUCOSE      POCT Blood Glucose.: 114 mg/dL (19 Mar 2019 17:03)      Labs                          11.8   6.01  )-----------( 147      ( 20 Mar 2019 07:52 )             35.9       03-19    139  |  106  |  14  ----------------------------<  114<H>  4.7   |  28  |  1.07    Ca    8.8      19 Mar 2019 17:56  Mg     1.0     03-19    TPro  7.7  /  Alb  3.5  /  TBili  0.6  /  DBili  x   /  AST  34  /  ALT  28  /  AlkPhos  76  03-19      CARDIAC MARKERS ( 19 Mar 2019 13:33 )  0.022 ng/mL / x     / 22 U/L / x     / x                Radiology Results    < from: Xray Chest 1 View AP/PA (03.19.19 @ 14:03) >  Impression: No evidence for pleural effusion, or pneumothorax.     Pulmonary vascular congestion is again noted, slightly progressed;   underlying pulmonary infiltrates/pneumonia cannot be excluded. This   finding is communicated with the emergency department via the PACS   communication system.    Stable cardiac silhouette and left cardiac device.    < end of copied text >        Meds    MEDICATIONS  (STANDING):  ALBUTerol/ipratropium for Nebulization 3 milliLiter(s) Nebulizer every 6 hours  aspirin  chewable 81 milliGRAM(s) Oral daily  dronedarone 400 milliGRAM(s) Oral two times a day  hydrALAZINE 25 milliGRAM(s) Oral two times a day  metoprolol tartrate 100 milliGRAM(s) Oral two times a day  simvastatin 40 milliGRAM(s) Oral at bedtime      MEDICATIONS  (PRN):      Physical Exam    Neuro :  no focal deficits  Respiratory: CTA B/L with occasional wheeze  CV: RRR, S1S2, no murmurs,   Abdominal: Soft, NT, ND +BS,  Extremities: No edema, + peripheral pulses    ASSESSMENT    Shortness of breath poss 2nd to reactive airway disease vs chf  uncontrolled htn  s/p electrolyte imbalance  h/o Pulmonary HTN  History of complete heart block  Atrial fibrillation  Hypercholesterolemia  HTN (hypertension)  Artificial pacemaker        PLAN      cont bronchodilators  add trial of steroids  pulm cons  cxr with pulm vasc congestion noted above  add lasix 40 mg iv daily  cardio cons  bp better controlled  trop x 1 neg  electrolytes stable  cont current meds 89F, from home, AAOx3, lives with daughter w/ PMHx Complete Heart Block (s/p PPM), HTN, HLD, Afib (on Multaq), pulmonary HTN p/w SOB and wheezing x yesterday. Daughter mentions patient symptoms worsened today in the morning after she was found gasping for air. Denies cough, fever/chills, CP, SHERWOOD, palpitations, LE edema or any other complaint. No recent travel or sick contacts. Also denies tobacco smoking in the past, Hx COPD or asthma.    Review of Systems:  Other Review of Systems: All other review of systems negative, except as noted in HPI 	      pt seen in ed [ x ], reg med floor [   ], bed [ x ], chair at bedside [   ], awake and responsive [ x ], lethargic [  ],  nad [ x ]      Allergies    No Known Allergies        Vitals    T(F): 98.2 (03-20-19 @ 07:17), Max: 99.4 (03-19-19 @ 12:43)  HR: 80 (03-20-19 @ 07:17) (67 - 85)  BP: 169/70 (03-20-19 @ 07:17) (159/75 - 208/105)  RR: 18 (03-20-19 @ 07:17) (16 - 22)  SpO2: 97% (03-20-19 @ 07:17) (97% - 99%)  Wt(kg): --  CAPILLARY BLOOD GLUCOSE      POCT Blood Glucose.: 114 mg/dL (19 Mar 2019 17:03)      Labs                          11.8   6.01  )-----------( 147      ( 20 Mar 2019 07:52 )             35.9       03-19    139  |  106  |  14  ----------------------------<  114<H>  4.7   |  28  |  1.07    Ca    8.8      19 Mar 2019 17:56  Mg     1.0     03-19    TPro  7.7  /  Alb  3.5  /  TBili  0.6  /  DBili  x   /  AST  34  /  ALT  28  /  AlkPhos  76  03-19      CARDIAC MARKERS ( 19 Mar 2019 13:33 )  0.022 ng/mL / x     / 22 U/L / x     / x                Radiology Results    < from: Xray Chest 1 View AP/PA (03.19.19 @ 14:03) >  Impression: No evidence for pleural effusion, or pneumothorax.     Pulmonary vascular congestion is again noted, slightly progressed;   underlying pulmonary infiltrates/pneumonia cannot be excluded. This   finding is communicated with the emergency department via the PACS   communication system.    Stable cardiac silhouette and left cardiac device.    < end of copied text >        Meds    MEDICATIONS  (STANDING):  ALBUTerol/ipratropium for Nebulization 3 milliLiter(s) Nebulizer every 6 hours  aspirin  chewable 81 milliGRAM(s) Oral daily  dronedarone 400 milliGRAM(s) Oral two times a day  hydrALAZINE 25 milliGRAM(s) Oral two times a day  metoprolol tartrate 100 milliGRAM(s) Oral two times a day  simvastatin 40 milliGRAM(s) Oral at bedtime      MEDICATIONS  (PRN):      Physical Exam    Neuro :  no focal deficits  Respiratory: CTA B/L with occasional wheeze  CV: RRR, S1S2, no murmurs,   Abdominal: Soft, NT, ND +BS,  Extremities: No edema, + peripheral pulses    ASSESSMENT    Shortness of breath poss 2nd to reactive airway disease vs chf  uncontrolled htn  s/p electrolyte imbalance  h/o Pulmonary HTN  History of complete heart block  Atrial fibrillation  Hypercholesterolemia  HTN (hypertension)  Artificial pacemaker        PLAN      cont bronchodilators  add trial of steroids  pulm cons  cxr with pulm vasc congestion noted above  add lasix 40 mg iv daily  cardio cons  bp better controlled  f/u echo  trop x 1 neg  electrolytes stable  cont current meds

## 2019-03-20 NOTE — CONSULT NOTE ADULT - SUBJECTIVE AND OBJECTIVE BOX
CHIEF COMPLAINT:Patient is a 89y old  Female who presents with a chief complaint of SOB.      HPI:  89 yr old F, from home, AAOx3, lives with daughter w/ PMHx Complete Heart Block (s/p PPM), HTN, HLD, Afib (on Multaq), pulmonary HTN p/w SOB and wheezing x yesterday. Daughter mentions patient symptoms worsened today in the morning after she was found gasping for air. Denies cough, fever/chills, CP, SHERWOOD, palpitations, LE edema or any other complaint. No recent travel or sick contacts. Also denies tobacco smoking in the past, Hx COPD or asthma. (19 Mar 2019 22:58)      PAST MEDICAL & SURGICAL HISTORY:  Pulmonary HTN  History of complete heart block  Parox Atrial fibrillation  Hypercholesterolemia  HTN (hypertension)  Artificial pacemaker      MEDICATIONS  (STANDING):  ALBUTerol/ipratropium for Nebulization 3 milliLiter(s) Nebulizer every 6 hours  aspirin  chewable 81 milliGRAM(s) Oral daily  dronedarone 400 milliGRAM(s) Oral two times a day  furosemide   Injectable 40 milliGRAM(s) IV Push daily  hydrALAZINE 25 milliGRAM(s) Oral every 8 hours  methylPREDNISolone sodium succinate Injectable 40 milliGRAM(s) IV Push every 8 hours  metoprolol tartrate 100 milliGRAM(s) Oral two times a day  simvastatin 40 milliGRAM(s) Oral at bedtime    MEDICATIONS  (PRN):      FAMILY HISTORY:No hx of CAD      SOCIAL HISTORY:    [x ] Non-smoker    [ x] Alcohol-denies    Allergies    No Known Allergies    Intolerances    	    REVIEW OF SYSTEMS:  CONSTITUTIONAL: No fever, weight loss, or fatigue  EYES: No eye pain, visual disturbances, or discharge  ENT:  No difficulty hearing, tinnitus, vertigo; No sinus or throat pain  NECK: No pain or stiffness  RESPIRATORY: No cough, wheezing, chills or hemoptysis; + Shortness of Breath  CARDIOVASCULAR: No chest pain, palpitations, passing out, dizziness, or leg swelling  GASTROINTESTINAL: No abdominal or epigastric pain. No nausea, vomiting, or hematemesis; No diarrhea or constipation. No melena or hematochezia.  GENITOURINARY: No dysuria, frequency, hematuria, or incontinence  NEUROLOGICAL: No headaches, memory loss, loss of strength, numbness, or tremors  SKIN: No itching, burning, rashes, or lesions   LYMPH Nodes: No enlarged glands  ENDOCRINE: No heat or cold intolerance; No hair loss  MUSCULOSKELETAL: No joint pain or swelling; No muscle, back, or extremity pain  PSYCHIATRIC: No depression, anxiety, mood swings, or difficulty sleeping  HEME/LYMPH: No easy bruising, or bleeding gums  ALLERGY AND IMMUNOLOGIC: No hives or eczema	      PHYSICAL EXAM:  T(C): 36.7 (03-20-19 @ 11:34), Max: 37 (03-19-19 @ 19:17)  HR: 68 (03-20-19 @ 11:34) (67 - 85)  BP: 187/63 (03-20-19 @ 11:34) (159/75 - 206/100)  RR: 18 (03-20-19 @ 11:34) (16 - 20)  SpO2: 98% (03-20-19 @ 11:34) (97% - 99%)    Appearance: Normal	  HEENT:   Normal oral mucosa, PERRL, EOMI	  Lymphatic: No lymphadenopathy  Cardiovascular: Normal S1 S2, No JVD, No murmurs, No edema  Respiratory: Lungs clear to auscultation	  Psychiatry: A & O x 3, Mood & affect appropriate  Gastrointestinal:  Soft, Non-tender, + BS	  Skin: No rashes, No ecchymoses, No cyanosis	  Neurologic: Non-focal  Extremities: Normal range of motion, No clubbing, cyanosis or edema  Vascular: Peripheral pulses palpable 2+ bilaterally    	    ECG:  	a sensed v paced    	  LABS:	 	    CARDIAC MARKERS:  CARDIAC MARKERS ( 19 Mar 2019 13:33 )  0.022 ng/mL / x     / 22 U/L / x     / x                             11.8   6.01  )-----------( 147      ( 20 Mar 2019 07:52 )             35.9     03-20    138  |  106  |  16  ----------------------------<  125<H>  4.0   |  26  |  0.98    Ca    9.0      20 Mar 2019 07:52  Phos  3.3     03-20  Mg     2.3     03-20    TPro  7.7  /  Alb  3.5  /  TBili  0.6  /  DBili  x   /  AST  34  /  ALT  28  /  AlkPhos  76  03-19      Lipid Profile: Cholesterol 149  LDL 75  HDL 65  TG 43    HgA1c: Hemoglobin A1C, Whole Blood: 5.9 % (03-20 @ 09:49)    TSH: Thyroid Stimulating Hormone, Serum: 0.38 uU/mL (03-20 @ 07:52)    EXAM:  CT CHEST                            PROCEDURE DATE:  03/20/2019          INTERPRETATION:  CT CHEST WITHOUT CONTRAST    INDICATION: Shortness of breath, pulmonary hypertension, rule out   interstitial lung disease    TECHNIQUE: Unenhanced helical images were obtained of the chest. Coronal   and sagittal images were reconstructed. Maximum intensity projection   images were generated.     COMPARISON: No prior chest CT.    FINDINGS: The quality of the images are degraded by respiratory motion   and shallow inspiration.    TUBES/LINES: Tips of the leads of the left pacer within the right atrium   and right ventricle.    AIRWAYS, LUNGS, PLEURA: Patent central airways. No bronchial wall   thickening or bronchiectasis. Mucoid impaction within the bilateral lower   lobes.    Minimal interlobular septal thickening possibly mild interstitial edema.     No evidence of interstitial lung disease.    Small layering bilateral pleural effusions and subsegmental atelectasis   of basilar lower lobes.    MEDIASTINUM, LYMPH NODES: Multiple subcentimeter supraclavicular,   subpectoral and mediastinal lymph nodes of uncertain clinical   significance, probably benign.    HEART AND VASCULATURE: The left atrium is mildly dilated. No pericardial   effusion. The thoracic aorta and pulmonary artery are normal in course   and caliber. Mild aortic valve leaflet calcification.    UPPER ABDOMEN: The upper and 8 cm too small to characterize   hypoattenuation within the left lobe of the liver    BONES AND SOFT TISSUES: No aggressive osseous lesion. Degenerative   changes of the spine.    LOWER NECK: Multinodular goiter.    IMPRESSION:     1.  No evidence of interstitial lung disease.    2.  Small layering bilateral pleural effusions and subsegmental   atelectasis of basilar lower lobes.          OBSERVATIONS:  Mitral Valve: Mitral annular calcification. Mild mitral  regurgitation.  Aortic Root: Normal aortic root.  Aortic Valve: Calcified aortic valve with decreased  opening. Mean transaortic valve gradient equals 16 mm Hg,  consistent with mild aortic stenosis.  Left Atrium: Mildly dilated left atrium.  LA volume index =  41 cc/m2.  Left Ventricle: Endocardium not well visualized; grossly  normal left ventricular systolic function. Normal left  ventricular internal dimensions and wall thicknesses.  Right Heart: Normal right atrium. Normal right ventricular  size and function. There is moderate tricuspid  regurgitation. There is mild-moderate pulmonic  regurgitation.  Pericardium/PleuraNormal pericardium with no pericardial  effusion.  Hemodynamic: RA Pressure is 10 mm Hg. RV systolic pressure  is 47 mm Hg. Mild pulmonary hypertension.

## 2019-03-21 LAB — LEGIONELLA AG UR QL: NEGATIVE — SIGNIFICANT CHANGE UP

## 2019-03-21 PROCEDURE — 93306 TTE W/DOPPLER COMPLETE: CPT | Mod: 26

## 2019-03-21 RX ORDER — CHOLECALCIFEROL (VITAMIN D3) 125 MCG
1000 CAPSULE ORAL
Qty: 0 | Refills: 0 | Status: DISCONTINUED | OUTPATIENT
Start: 2019-03-21 | End: 2019-03-21

## 2019-03-21 RX ORDER — LOSARTAN POTASSIUM 100 MG/1
25 TABLET, FILM COATED ORAL DAILY
Qty: 0 | Refills: 0 | Status: DISCONTINUED | OUTPATIENT
Start: 2019-03-21 | End: 2019-03-23

## 2019-03-21 RX ORDER — ERGOCALCIFEROL 1.25 MG/1
50000 CAPSULE ORAL
Qty: 0 | Refills: 0 | Status: DISCONTINUED | OUTPATIENT
Start: 2019-03-21 | End: 2019-03-23

## 2019-03-21 RX ORDER — CHOLECALCIFEROL (VITAMIN D3) 125 MCG
1000 CAPSULE ORAL DAILY
Qty: 0 | Refills: 0 | Status: DISCONTINUED | OUTPATIENT
Start: 2019-03-21 | End: 2019-03-22

## 2019-03-21 RX ADMIN — Medication 100 MILLIGRAM(S): at 06:08

## 2019-03-21 RX ADMIN — Medication 3 MILLILITER(S): at 20:30

## 2019-03-21 RX ADMIN — Medication 50 MILLIGRAM(S): at 22:35

## 2019-03-21 RX ADMIN — DRONEDARONE 400 MILLIGRAM(S): 400 TABLET, FILM COATED ORAL at 17:26

## 2019-03-21 RX ADMIN — SIMVASTATIN 40 MILLIGRAM(S): 20 TABLET, FILM COATED ORAL at 02:05

## 2019-03-21 RX ADMIN — SIMVASTATIN 40 MILLIGRAM(S): 20 TABLET, FILM COATED ORAL at 22:35

## 2019-03-21 RX ADMIN — Medication 100 MILLIGRAM(S): at 17:26

## 2019-03-21 RX ADMIN — Medication 3 MILLILITER(S): at 09:02

## 2019-03-21 RX ADMIN — ERGOCALCIFEROL 50000 UNIT(S): 1.25 CAPSULE ORAL at 12:36

## 2019-03-21 RX ADMIN — Medication 50 MILLIGRAM(S): at 13:39

## 2019-03-21 RX ADMIN — DRONEDARONE 400 MILLIGRAM(S): 400 TABLET, FILM COATED ORAL at 06:08

## 2019-03-21 RX ADMIN — Medication 3 MILLILITER(S): at 02:05

## 2019-03-21 RX ADMIN — LOSARTAN POTASSIUM 25 MILLIGRAM(S): 100 TABLET, FILM COATED ORAL at 13:39

## 2019-03-21 RX ADMIN — Medication 50 MILLIGRAM(S): at 02:05

## 2019-03-21 RX ADMIN — Medication 81 MILLIGRAM(S): at 12:36

## 2019-03-21 NOTE — PROGRESS NOTE ADULT - SUBJECTIVE AND OBJECTIVE BOX
Patient is a 89y old  Female who presents with a chief complaint of SOB (21 Mar 2019 10:35)    pt seen in ed [ x ], reg med floor [   ], bed [ x ], chair at bedside [   ], awake and responsive [ x ], lethargic [  ],  nad [ x ]        Allergies    No Known Allergies        Vitals    T(F): 97.1 (03-21-19 @ 05:21), Max: 98.7 (03-20-19 @ 16:24)  HR: 66 (03-21-19 @ 12:38) (65 - 74)  BP: 167/58 (03-21-19 @ 12:38) (144/55 - 175/76)  RR: 16 (03-21-19 @ 12:38) (16 - 18)  SpO2: 97% (03-21-19 @ 12:38) (97% - 100%)  Wt(kg): --  CAPILLARY BLOOD GLUCOSE      POCT Blood Glucose.: 114 mg/dL (19 Mar 2019 17:03)      Labs                          11.8   6.01  )-----------( 147      ( 20 Mar 2019 07:52 )             35.9       03-20    138  |  106  |  16  ----------------------------<  125<H>  4.0   |  26  |  0.98    Ca    9.0      20 Mar 2019 07:52  Phos  3.3     03-20  Mg     2.3     03-20    TPro  7.7  /  Alb  3.5  /  TBili  0.6  /  DBili  x   /  AST  34  /  ALT  28  /  AlkPhos  76  03-19      CARDIAC MARKERS ( 19 Mar 2019 13:33 )  0.022 ng/mL / x     / 22 U/L / x     / x            .Blood  03-19 @ 18:55   No growth to date.  --  --          Radiology Results  < from: CT Chest No Cont (03.20.19 @ 09:50) >  IMPRESSION:     1.  No evidence of interstitial lung disease.    2.  Small layering bilateral pleural effusions and subsegmental   atelectasis of basilar lower lobes.    < end of copied text >            < from: Xray Chest 1 View AP/PA (03.19.19 @ 14:03) >  Impression: No evidence for pleural effusion, or pneumothorax.     Pulmonary vascular congestion is again noted, slightly progressed;   underlying pulmonary infiltrates/pneumonia cannot be excluded. This   finding is communicated with the emergency department via the PACS   communication system.    Stable cardiac silhouette and left cardiac device.      < end of copied text >          Meds    MEDICATIONS  (STANDING):  ALBUTerol/ipratropium for Nebulization 3 milliLiter(s) Nebulizer every 6 hours  aspirin  chewable 81 milliGRAM(s) Oral daily  dronedarone 400 milliGRAM(s) Oral two times a day  ergocalciferol 32955 Unit(s) Oral <User Schedule>  furosemide   Injectable 40 milliGRAM(s) IV Push daily  hydrALAZINE 50 milliGRAM(s) Oral every 8 hours  losartan 25 milliGRAM(s) Oral daily  metoprolol tartrate 100 milliGRAM(s) Oral two times a day  simvastatin 40 milliGRAM(s) Oral at bedtime      MEDICATIONS  (PRN):      Physical Exam    Neuro :  no focal deficits  Respiratory: CTA B/L with occasional wheeze  CV: RRR, S1S2, no murmurs,   Abdominal: Soft, NT, ND +BS,  Extremities: No edema, + peripheral pulses        ASSESSMENT    Shortness of breath poss 2nd to reactive airway disease vs chf  uncontrolled htn  s/p electrolyte imbalance  h/o Pulmonary HTN  History of complete heart block  Atrial fibrillation  Hypercholesterolemia  HTN (hypertension)  Artificial pacemaker        PLAN      cont bronchodilators  pulm cons noted  cxr with pulm vasc congestion noted above  cont lasix 40 mg iv daily  cardio cons noted  PAF-asa,lopressor,multaq  bp better controlled  f/u echo  trop x 1 neg  electrolytes stable  cont current meds Patient is a 89y old  Female who presents with a chief complaint of SOB (21 Mar 2019 10:35)    pt seen in ed [ x ], reg med floor [   ], bed [ x ], chair at bedside [   ], awake and responsive [ x ], lethargic [  ],  nad [ x ]        Allergies    No Known Allergies        Vitals    T(F): 97.1 (03-21-19 @ 05:21), Max: 98.7 (03-20-19 @ 16:24)  HR: 66 (03-21-19 @ 12:38) (65 - 74)  BP: 167/58 (03-21-19 @ 12:38) (144/55 - 175/76)  RR: 16 (03-21-19 @ 12:38) (16 - 18)  SpO2: 97% (03-21-19 @ 12:38) (97% - 100%)  Wt(kg): --  CAPILLARY BLOOD GLUCOSE      POCT Blood Glucose.: 114 mg/dL (19 Mar 2019 17:03)      Labs                          11.8   6.01  )-----------( 147      ( 20 Mar 2019 07:52 )             35.9       03-20    138  |  106  |  16  ----------------------------<  125<H>  4.0   |  26  |  0.98    Ca    9.0      20 Mar 2019 07:52  Phos  3.3     03-20  Mg     2.3     03-20    TPro  7.7  /  Alb  3.5  /  TBili  0.6  /  DBili  x   /  AST  34  /  ALT  28  /  AlkPhos  76  03-19      CARDIAC MARKERS ( 19 Mar 2019 13:33 )  0.022 ng/mL / x     / 22 U/L / x     / x            .Blood  03-19 @ 18:55   No growth to date.  --  --          Radiology Results  < from: CT Chest No Cont (03.20.19 @ 09:50) >  IMPRESSION:     1.  No evidence of interstitial lung disease.    2.  Small layering bilateral pleural effusions and subsegmental   atelectasis of basilar lower lobes.    < end of copied text >            < from: Xray Chest 1 View AP/PA (03.19.19 @ 14:03) >  Impression: No evidence for pleural effusion, or pneumothorax.     Pulmonary vascular congestion is again noted, slightly progressed;   underlying pulmonary infiltrates/pneumonia cannot be excluded. This   finding is communicated with the emergency department via the PACS   communication system.    Stable cardiac silhouette and left cardiac device.      < end of copied text >          Meds    MEDICATIONS  (STANDING):  ALBUTerol/ipratropium for Nebulization 3 milliLiter(s) Nebulizer every 6 hours  aspirin  chewable 81 milliGRAM(s) Oral daily  dronedarone 400 milliGRAM(s) Oral two times a day  ergocalciferol 63980 Unit(s) Oral <User Schedule>  furosemide   Injectable 40 milliGRAM(s) IV Push daily  hydrALAZINE 50 milliGRAM(s) Oral every 8 hours  losartan 25 milliGRAM(s) Oral daily  metoprolol tartrate 100 milliGRAM(s) Oral two times a day  simvastatin 40 milliGRAM(s) Oral at bedtime      MEDICATIONS  (PRN):      Physical Exam    Neuro :  no focal deficits  Respiratory: CTA B/L with occasional wheeze  CV: RRR, S1S2, no murmurs,   Abdominal: Soft, NT, ND +BS,  Extremities: No edema, + peripheral pulses        ASSESSMENT    Shortness of breath poss 2nd to reactive airway disease vs chf  uncontrolled htn  s/p electrolyte imbalance  h/o Pulmonary HTN  History of complete heart block  Atrial fibrillation  Hypercholesterolemia  HTN (hypertension)  Artificial pacemaker        PLAN      cont bronchodilators  pulm cons noted  cxr with pulm vasc congestion noted above  cont lasix 40 mg iv daily  cardio cons noted  PAF-asa,lopressor,multaq, add cozaar  bp better controlled  trop x 1 neg  electrolytes stable  cont current meds Patient is a 89y old  Female who presents with a chief complaint of SOB (21 Mar 2019 10:35)    pt seen in ed [ x ], reg med floor [   ], bed [ x ], chair at bedside [   ], awake and responsive [ x ], lethargic [  ],  nad [ x ]        Allergies    No Known Allergies        Vitals    T(F): 97.1 (03-21-19 @ 05:21), Max: 98.7 (03-20-19 @ 16:24)  HR: 66 (03-21-19 @ 12:38) (65 - 74)  BP: 167/58 (03-21-19 @ 12:38) (144/55 - 175/76)  RR: 16 (03-21-19 @ 12:38) (16 - 18)  SpO2: 97% (03-21-19 @ 12:38) (97% - 100%)  Wt(kg): --  CAPILLARY BLOOD GLUCOSE      POCT Blood Glucose.: 114 mg/dL (19 Mar 2019 17:03)      Labs                          11.8   6.01  )-----------( 147      ( 20 Mar 2019 07:52 )             35.9       03-20    138  |  106  |  16  ----------------------------<  125<H>  4.0   |  26  |  0.98    Ca    9.0      20 Mar 2019 07:52  Phos  3.3     03-20  Mg     2.3     03-20    TPro  7.7  /  Alb  3.5  /  TBili  0.6  /  DBili  x   /  AST  34  /  ALT  28  /  AlkPhos  76  03-19      CARDIAC MARKERS ( 19 Mar 2019 13:33 )  0.022 ng/mL / x     / 22 U/L / x     / x            .Blood  03-19 @ 18:55   No growth to date.  --  --          Radiology Results  < from: CT Chest No Cont (03.20.19 @ 09:50) >  IMPRESSION:     1.  No evidence of interstitial lung disease.    2.  Small layering bilateral pleural effusions and subsegmental   atelectasis of basilar lower lobes.    < end of copied text >            < from: Xray Chest 1 View AP/PA (03.19.19 @ 14:03) >  Impression: No evidence for pleural effusion, or pneumothorax.     Pulmonary vascular congestion is again noted, slightly progressed;   underlying pulmonary infiltrates/pneumonia cannot be excluded. This   finding is communicated with the emergency department via the PACS   communication system.    Stable cardiac silhouette and left cardiac device.      < end of copied text >          Meds    MEDICATIONS  (STANDING):  ALBUTerol/ipratropium for Nebulization 3 milliLiter(s) Nebulizer every 6 hours  aspirin  chewable 81 milliGRAM(s) Oral daily  dronedarone 400 milliGRAM(s) Oral two times a day  ergocalciferol 40925 Unit(s) Oral <User Schedule>  furosemide   Injectable 40 milliGRAM(s) IV Push daily  hydrALAZINE 50 milliGRAM(s) Oral every 8 hours  losartan 25 milliGRAM(s) Oral daily  metoprolol tartrate 100 milliGRAM(s) Oral two times a day  simvastatin 40 milliGRAM(s) Oral at bedtime      MEDICATIONS  (PRN):      Physical Exam    Neuro :  no focal deficits  Respiratory: CTA B/L with occasional wheeze  CV: RRR, S1S2, no murmurs,   Abdominal: Soft, NT, ND +BS,  Extremities: No edema, + peripheral pulses        ASSESSMENT    Shortness of breath poss 2nd to reactive airway disease   uncontrolled htn  s/p electrolyte imbalance  h/o Pulmonary HTN  History of complete heart block  Atrial fibrillation  Hypercholesterolemia  HTN (hypertension)  Artificial pacemaker        PLAN      cont bronchodilators  pulm cons noted  cxr with pulm vasc congestion noted above  cont lasix 40 mg iv daily  cardio cons noted  PAF-asa,lopressor,multaq, add cozaar  bp better controlled  trop x 1 neg  electrolytes stable  cont current meds Patient is a 89y old  Female who presents with a chief complaint of SOB (21 Mar 2019 10:35)    pt seen in ed [ x ], reg med floor [   ], bed [ x ], chair at bedside [   ], awake and responsive [ x ], lethargic [  ],  nad [ x ]        Allergies    No Known Allergies        Vitals    T(F): 97.1 (03-21-19 @ 05:21), Max: 98.7 (03-20-19 @ 16:24)  HR: 66 (03-21-19 @ 12:38) (65 - 74)  BP: 167/58 (03-21-19 @ 12:38) (144/55 - 175/76)  RR: 16 (03-21-19 @ 12:38) (16 - 18)  SpO2: 97% (03-21-19 @ 12:38) (97% - 100%)  Wt(kg): --  CAPILLARY BLOOD GLUCOSE      POCT Blood Glucose.: 114 mg/dL (19 Mar 2019 17:03)      Labs                          11.8   6.01  )-----------( 147      ( 20 Mar 2019 07:52 )             35.9       03-20    138  |  106  |  16  ----------------------------<  125<H>  4.0   |  26  |  0.98    Ca    9.0      20 Mar 2019 07:52  Phos  3.3     03-20  Mg     2.3     03-20    TPro  7.7  /  Alb  3.5  /  TBili  0.6  /  DBili  x   /  AST  34  /  ALT  28  /  AlkPhos  76  03-19      CARDIAC MARKERS ( 19 Mar 2019 13:33 )  0.022 ng/mL / x     / 22 U/L / x     / x            .Blood  03-19 @ 18:55   No growth to date.  --  --          Radiology Results  < from: CT Chest No Cont (03.20.19 @ 09:50) >  IMPRESSION:     1.  No evidence of interstitial lung disease.    2.  Small layering bilateral pleural effusions and subsegmental   atelectasis of basilar lower lobes.    < end of copied text >            < from: Xray Chest 1 View AP/PA (03.19.19 @ 14:03) >  Impression: No evidence for pleural effusion, or pneumothorax.     Pulmonary vascular congestion is again noted, slightly progressed;   underlying pulmonary infiltrates/pneumonia cannot be excluded. This   finding is communicated with the emergency department via the PACS   communication system.    Stable cardiac silhouette and left cardiac device.      < end of copied text >          Meds    MEDICATIONS  (STANDING):  ALBUTerol/ipratropium for Nebulization 3 milliLiter(s) Nebulizer every 6 hours  aspirin  chewable 81 milliGRAM(s) Oral daily  dronedarone 400 milliGRAM(s) Oral two times a day  ergocalciferol 29862 Unit(s) Oral <User Schedule>  furosemide   Injectable 40 milliGRAM(s) IV Push daily  hydrALAZINE 50 milliGRAM(s) Oral every 8 hours  losartan 25 milliGRAM(s) Oral daily  metoprolol tartrate 100 milliGRAM(s) Oral two times a day  simvastatin 40 milliGRAM(s) Oral at bedtime      MEDICATIONS  (PRN):      Physical Exam    Neuro :  no focal deficits  Respiratory: CTA B/L with occasional wheeze  CV: RRR, S1S2, no murmurs,   Abdominal: Soft, NT, ND +BS,  Extremities: No edema, + peripheral pulses        ASSESSMENT    Shortness of breath poss 2nd to reactive airway disease and acute diastolic heart failure   uncontrolled htn  s/p electrolyte imbalance  h/o Pulmonary HTN  History of complete heart block  Atrial fibrillation  Hypercholesterolemia  HTN (hypertension)  Artificial pacemaker        PLAN      cont bronchodilators  pulm cons noted  cxr with pulm vasc congestion noted above  cont lasix 40 mg iv daily  cardio cons noted  PAF-asa,lopressor,multaq, add cozaar  bp better controlled  trop x 1 neg  electrolytes stable  cont current meds

## 2019-03-21 NOTE — ED PROVIDER NOTE - CRITICAL CARE INDICATION, MLM
[facility] Medical Progress Note NAME: Jose Ma :  1943 MRM:  [id number] Date/Time: 3/21/2019  10:24 AM 
 
  
Assessment and Plan:  
 
Guille Moser cirrhosis / recurrent ascites - Failed to keep follow up with hepatologist. Lethia Plan too low for aldactone/lasix.  s/p 11L paracentesis on , s/p IV albumin. GI and palliative care following. Given poor prognosis, patient will enter hospice. Getting IR to place paracentesis port for comfort at home on hospice 
  
Severe anemia / severe thrombocytopenia - POA, persistent, related to cirrhosis vs early MDS.  Patient saw [facility] hematology (Dr. Alfred Morfin) once, but refused to go back. [facility] was started on prednisone 70mg daily, but can stop on hospice.  Now s/p 4u RBCs. Hematology following 
  
R pleural effusion - Secondary to ascites. thoracentesis is not needed, and dangerous I setting of low platelets. 
  
Esophageal varices - PPI 
  
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  Nutrition following. Encourage PO 
  
UTI - Dx on admit, but reject Dx based on dipheroid, likely contaminant. Subjective: Chief Complaint:  Weak. Awaiting drain port. Wants to stay due to fear of going home ROS: 
(bold if positive, if negative) ConstipationAbd PainSOB/HORN Tolerating minimal PT Tolerating some Diet Objective:  
 
Last 24hrs VS reviewed since prior progress note. Most recent are: 
 
Visit Vitals /59 (BP 1 Location: Left arm, BP Patient Position: At rest) Pulse 82 Temp 98.1 °F (36.7 °C) Resp 12 Ht 5' 10\" (1.778 m) Wt 71.3 kg (157 lb 3.2 oz) SpO2 96% BMI 22.56 kg/m² SpO2 Readings from Last 6 Encounters:  
19 96% 19 100% 19 100% 19 99% 19 98% 19 96% Intake/Output Summary (Last 24 hours) at 3/21/2019 1024 Last data filed at 3/21/2019 4887 Gross per 24 hour Intake 600 ml Output 200 ml Net 400 ml Physical Exam: 
 
Gen:  Frail, cachectic, in no acute distress HEENT:  Pink conjunctivae, PERRL, hearing intact to voice, moist mucous membranes Neck:  Supple, without masses, thyroid non-tender Resp:  No accessory muscle use, clear breath sounds without wheezes rales or rhonchi 
Card:  No murmurs, normal S1, S2 without thrills, bruits or peripheral edema Abd:  Soft, non-tender, distended, normoactive bowel sounds are present, no mass Lymph:  No cervical or inguinal adenopathy Musc:  No cyanosis or clubbing Skin:  No rashes or ulcers, skin turgor is reduced Neuro:  Cranial nerves are grossly intact, severe motor weakness, follows commands vaguely Psych: Poor insight, oriented to person, place, anxious Telemetry reviewed:   normal sinus rhythm 
__________________________________________________________________ Medications Reviewed: (see below) Medications:  
 
Current Facility-Administered Medications Medication Dose Route Frequency  0.9% sodium chloride infusion 250 mL  250 mL IntraVENous PRN  
 0.9% sodium chloride infusion 250 mL  250 mL IntraVENous PRN  pantoprazole (PROTONIX) tablet 40 mg  40 mg Oral ACB  sodium chloride (NS) flush 5-40 mL  5-40 mL IntraVENous Q8H  
 sodium chloride (NS) flush 5-40 mL  5-40 mL IntraVENous PRN  
 HYDROmorphone (PF) (DILAUDID) injection 0.5 mg  0.5 mg IntraVENous Q4H PRN  
 naloxone (NARCAN) injection 0.4 mg  0.4 mg IntraVENous PRN  
 diphenhydrAMINE (BENADRYL) injection 12.5 mg  12.5 mg IntraVENous Q4H PRN  
 ondansetron (ZOFRAN) injection 4 mg  4 mg IntraVENous Q6H PRN  
 docusate sodium (COLACE) capsule 100 mg  100 mg Oral BID  predniSONE (DELTASONE) tablet 70 mg  70 mg Oral DAILY WITH BREAKFAST Lab Data Reviewed: (see below) Lab Review:  
 
Recent Labs  
  03/21/19 
0207 03/20/19 
0422 03/19/19 
8534 WBC 2.4* 2.3* 2.2* HGB 7.9* 6.9* 6.7* HCT 22.5* 20.1* 18.9*  
PLT 16* 15* 18* Recent Labs  
  03/21/19 5018 03/20/19 
0422 03/19/19 
9102 * 129* 131*  
K 5.0 4.1 3.9 CL 97 98 100 CO2 26 25 25 * 133* 118* BUN 31* 26* 33* CREA 0.50* 0.44* 0.55* CA 8.0* 8.0* 7.6*  
MG 1.7 1.8 1.8 PHOS 2.0* 1.6* 2.3* No results found for: Altamease Patience No results for input(s): PH, PCO2, PO2, HCO3, FIO2 in the last 72 hours. No results for input(s): INR in the last 72 hours. No lab exists for component: INREXT All Micro Results Procedure Component Value Units Date/Time CULTURE, BODY FLUID Joshua Pollock [[id number]] Collected:  03/18/19 1057 Order Status:  Completed Specimen:  Ascitic Fluid Updated:  03/20/19 1115 Special Requests: NO SPECIAL REQUESTS     
  GRAM STAIN NO WBC'S SEEN     
   NO ORGANISMS SEEN Culture result:    
  Culture performed on Fluid swab specimen NO GROWTH 2 DAYS     
 CULTURE, URINE [[id number]]  (Abnormal) Collected:  03/17/19 1614 Order Status:  Completed Specimen:  Cath Urine Updated:  03/19/19 1003 Special Requests: NO SPECIAL REQUESTS Wiley Count --     
  [id number] COLONIES/mL Culture result: PROBABLE STAPHYLOCOCCUS SPECIES, COAGULASE NEGATIVE (2 COLONY TYPES) DIPHTHEROIDS (2 COLONY TYPES) URINE CULTURE HOLD SAMPLE [[id number]] Collected:  03/17/19 1614 Order Status:  Completed Specimen:  Urine from Serum Updated:  03/17/19 1635 Urine culture hold URINE ON HOLD IN MICROBIOLOGY DEPT FOR 3 DAYS. IF UNPRESERVED URINE IS SUBMITTED, IT CANNOT BE USED FOR ADDITIONAL TESTING AFTER 24 HRS, RECOLLECTION WILL BE REQUIRED. I have reviewed notes of prior 24hr. Other pertinent lab: none Total time spent with patient: 45 Minutes Care Plan discussed with: Patient, Care Manager, Nursing Staff, Consultant/Specialist and >50% of time spent in counseling and coordination of care Discussed:  Care Plan and D/C Planning Prophylaxis:  H2B/PPI Disposition:  Home w/Family and hospice 
        
___________________________________________________ Attending Physician: Lyn Chapman MD  
  
 patient was critically ill... Patient was critically ill with a high probability of imminent or life threatening deterioration.

## 2019-03-21 NOTE — PROGRESS NOTE ADULT - SUBJECTIVE AND OBJECTIVE BOX
Patient is a 89y old  Female who presents with a chief complaint of SOB (21 Mar 2019 10:35)  Awake, alert, comfortable in bed in NAD. NO chest pain    INTERVAL HPI/OVERNIGHT EVENTS:      VITAL SIGNS:  T(F): 97.1 (19 @ 05:21)  HR: 66 (19 @ 12:38)  BP: 167/58 (19 @ 12:38)  RR: 16 (19 @ 12:38)  SpO2: 97% (19 @ 12:38)  Wt(kg): --  I&O's Detail          REVIEW OF SYSTEMS:    CONSTITUTIONAL:  No fevers, chills, sweats    HEENT:  Eyes:  No diplopia or blurred vision. ENT:  No earache, sore throat or runny nose.    CARDIOVASCULAR:  No pressure, squeezing, tightness, or heaviness about the chest; no palpitations.    RESPIRATORY:  Per HPI    GASTROINTESTINAL:  No abdominal pain, nausea, vomiting or diarrhea.    GENITOURINARY:  No dysuria, frequency or urgency.    NEUROLOGIC:  No paresthesias, fasciculations, seizures or weakness.    PSYCHIATRIC:  No disorder of thought or mood.      PHYSICAL EXAM:    Constitutional: Well developed and nourished  Eyes:Perrla  ENMT: normal  Neck:supple  Respiratory: good air entry  Cardiovascular: S1 S2 regular  Gastrointestinal: Soft, Non tender  Extremities: No edema  Vascular:normal  Neurological:Awake, alert,Ox3  Musculoskeletal:Normal      MEDICATIONS  (STANDING):  ALBUTerol/ipratropium for Nebulization 3 milliLiter(s) Nebulizer every 6 hours  aspirin  chewable 81 milliGRAM(s) Oral daily  dronedarone 400 milliGRAM(s) Oral two times a day  ergocalciferol 20342 Unit(s) Oral <User Schedule>  furosemide   Injectable 40 milliGRAM(s) IV Push daily  hydrALAZINE 50 milliGRAM(s) Oral every 8 hours  losartan 25 milliGRAM(s) Oral daily  metoprolol tartrate 100 milliGRAM(s) Oral two times a day  simvastatin 40 milliGRAM(s) Oral at bedtime    MEDICATIONS  (PRN):      Allergies    No Known Allergies    Intolerances        LABS:                        11.8   6.01  )-----------( 147      ( 20 Mar 2019 07:52 )             35.9     03-20    138  |  106  |  16  ----------------------------<  125<H>  4.0   |  26  |  0.98    Ca    9.0      20 Mar 2019 07:52  Phos  3.3     -  Mg     2.3         TPro  7.7  /  Alb  3.5  /  TBili  0.6  /  DBili  x   /  AST  34  /  ALT  28  /  AlkPhos  76  -    PT/INR - ( 19 Mar 2019 13:33 )   PT: 12.2 sec;   INR: 1.10 ratio         PTT - ( 19 Mar 2019 13:33 )  PTT:29.8 sec  Urinalysis Basic - ( 20 Mar 2019 16:25 )    Color: Yellow / Appearance: Clear / S.010 / pH: x  Gluc: x / Ketone: Negative  / Bili: Negative / Urobili: Negative   Blood: x / Protein: Negative / Nitrite: Negative   Leuk Esterase: Trace / RBC: 0-2 /HPF / WBC 0-2 /HPF   Sq Epi: x / Non Sq Epi: Occasional /HPF / Bacteria: Few /HPF        CARDIAC MARKERS ( 19 Mar 2019 13:33 )  0.022 ng/mL / x     / 22 U/L / x     / x          CAPILLARY BLOOD GLUCOSE        pro-bnp 822  @ 13:33     d-dimer --   @ 13:33      RADIOLOGY & ADDITIONAL TESTS:    CXR:    Ct scan chest:    ekg;    echo:

## 2019-03-21 NOTE — PROGRESS NOTE ADULT - ASSESSMENT
89F, from home, AAOx3, lives with daughter w/ PMHx Complete Heart Block (s/p PPM), HTN, HLD, Afib (on Multaq), pulmonary HTN p/w SOB and wheezing x yesterday. Daughter mentions patient symptoms worsened today in the morning after she was found gasping for air. Denies cough, fever/chills, CP, SHERWOOD, palpitations, LE edema or any other complaint. No recent travel or sick contacts. Also denies tobacco smoking in the past, Hx COPD or asthma.     ED course: Vitals significant for BP: 208/105 mmHg RR: 22 rpm, afebrile, WBC WNL's, RVP negative, probNP: 822, ECG: paced rhythm VR 70 bpm, T1: negative  Initial BMP lab error.       Problem Selector:  PROBLEM DIAGNOSES  Problem: SOB (shortness of breath)  Assessment and Plan: Patient p/w SOB, wheezing x yesterday. Denies cough, fever.chills, CP  Afebrile, no leukocytosis, no signs of fluid overload, RVP negative  Could be 2/2 Interstitial Lung Disease, Vs worsening Pulmonary HTN vs Reactive Airway Dz  Expiratory wheezing upon lung auscultation  Will need repeat TTE and a CT chest for further evaluation  c/w DUONEB's and O2 supplementation + Solumedrol for now  Lasix 40 mg IV    Problem: HTN (hypertension)  Assessment and Plan: Resume home Hydralazine 25 mg BID + Lopressor 100 mg BID home dose  Monitor BP and adjust meds as needed    Problem: Afib  Assessment and Plan: c/w Multaq 400 mg BID + Lopressor 100 BID  Rate controlled at the moment    Problem: Need for prophylactic measure  Assessment and Plan: VTE score: 2, c/w Lovenox SQ for DVT PPx. 89F, from home, AAOx3, lives with daughter w/ PMHx Complete Heart Block (s/p PPM), HTN, HLD, Afib (on Multaq), pulmonary HTN p/w SOB and wheezing x yesterday. Daughter mentions patient symptoms worsened today in the morning after she was found gasping for air. Denies cough, fever/chills, CP, SHERWOOD, palpitations, LE edema or any other complaint. No recent travel or sick contacts. Also denies tobacco smoking in the past, Hx COPD or asthma.     ED course: Vitals significant for BP: 208/105 mmHg RR: 22 rpm, afebrile, WBC WNL's, RVP negative, probNP: 822, ECG: paced rhythm VR 70 bpm, T1: negative  Initial BMP lab error.       Problem Selector:  PROBLEM DIAGNOSES  Problem: SOB (shortness of breath)  Assessment and Plan: Patient p/w SOB, wheezing x yesterday. Denies cough, fever.chills, CP  Infectious etiology less likely given that pt remains afebrile, no leukocytosis, BCx, UA, RVP all negative  Acute CHF unlikely given baseline BNP, no symptoms or signs of fluid overload, CXR/CTC showing no evidence of CHF  More likely worsening Pulmonary HTN vs Reactive Airway Disease  Expiratory wheezing upon lung auscultation  Will need repeat TTE for further evaluation  c/w DUONEB's and O2 supplementation + Solumedrol for now  Lasix 40 mg IV    Problem: HTN (hypertension)  Assessment and Plan: Resume home Hydralazine 25 mg BID + Lopressor 100 mg BID home dose  Monitor BP and adjust meds as needed    Problem: Afib  Assessment and Plan: c/w Multaq 400 mg BID + Lopressor 100 BID  Rate controlled at the moment    Problem: Need for prophylactic measure  Assessment and Plan: VTE score: 2, c/w Lovenox SQ for DVT PPx. 89F, from home, AAOx3, lives with daughter w/ PMHx Complete Heart Block (s/p PPM), HTN, HLD, Afib (on Multaq), pulmonary HTN p/w SOB and wheezing x yesterday. Daughter mentions patient symptoms worsened today in the morning after she was found gasping for air. Denies cough, fever/chills, CP, SHERWOOD, palpitations, LE edema or any other complaint. No recent travel or sick contacts. Also denies tobacco smoking in the past, Hx COPD or asthma.     ED course: Vitals significant for BP: 208/105 mmHg RR: 22 rpm, afebrile, WBC WNL's, RVP negative, probNP: 822, ECG: paced rhythm VR 70 bpm, T1: negative  Initial BMP lab error.       Problem Selector:  PROBLEM DIAGNOSES  Problem: SOB (shortness of breath)  Assessment and Plan: Patient p/w SOB, wheezing x yesterday. Denies cough, fever.chills, CP  Infectious etiology less likely given that pt remains afebrile, no leukocytosis, BCx, UA, RVP all negative  Acute CHF unlikely given baseline BNP, no symptoms or signs of fluid overload, CXR/CTC showing no evidence of CHF  More likely worsening Pulmonary HTN vs Reactive Airway Disease  Expiratory wheezing upon lung auscultation  Will need repeat TTE for further evaluation  c/w DUONEB's and O2 supplementation + Solumedrol for now  Lasix 40 mg IV    Problem: HTN (hypertension)  Assessment and Plan: Resume home Hydralazine 25 mg BID + Lopressor 100 mg BID home dose  Monitor BP and adjust meds as needed  Pt's SBP still high, SBP sometimes peaking to 170s on 3/22. Consulted Dr. Harrell 3/22 for possible anti-HTN medication regimen adjustment.    Problem: Afib  Assessment and Plan: c/w Multaq 400 mg BID + Lopressor 100 BID  Rate controlled at the moment    Problem: Need for prophylactic measure  Assessment and Plan: VTE score: 2, c/w Lovenox SQ for DVT PPx.

## 2019-03-21 NOTE — PROGRESS NOTE ADULT - ASSESSMENT
89 yr old F, from home, AAOx3, lives with daughter w/ PMHx Complete Heart Block (s/p PPM), HTN, HLD, Afib (on Multaq), pulmonary HTN p/w SOB and wheezing x yesterday, acute diastolic HF, uncontrolled HTN.  1.Replace vit d.  2.Cont IV lasix.  3.PAF-asa,lopressor,multaq,add cozaar 25mg qd.  4.HTN- hydralazine 50mg q8h.  5.Prediabetes-diet.  6.Lipid d/o- statin.  7.GI and DVT prophylaxis.

## 2019-03-22 ENCOUNTER — TRANSCRIPTION ENCOUNTER (OUTPATIENT)
Age: 84
End: 2019-03-22

## 2019-03-22 LAB
ANION GAP SERPL CALC-SCNC: 6 MMOL/L — SIGNIFICANT CHANGE UP (ref 5–17)
BUN SERPL-MCNC: 30 MG/DL — HIGH (ref 7–18)
CALCIUM SERPL-MCNC: 9.2 MG/DL — SIGNIFICANT CHANGE UP (ref 8.4–10.5)
CHLORIDE SERPL-SCNC: 104 MMOL/L — SIGNIFICANT CHANGE UP (ref 96–108)
CO2 SERPL-SCNC: 27 MMOL/L — SIGNIFICANT CHANGE UP (ref 22–31)
CREAT ?TM UR-MCNC: 84 MG/DL — SIGNIFICANT CHANGE UP
CREAT SERPL-MCNC: 1.32 MG/DL — HIGH (ref 0.5–1.3)
CULTURE RESULTS: SIGNIFICANT CHANGE UP
GLUCOSE SERPL-MCNC: 102 MG/DL — HIGH (ref 70–99)
HCT VFR BLD CALC: 40.5 % — SIGNIFICANT CHANGE UP (ref 34.5–45)
HGB BLD-MCNC: 12.8 G/DL — SIGNIFICANT CHANGE UP (ref 11.5–15.5)
MCHC RBC-ENTMCNC: 30.5 PG — SIGNIFICANT CHANGE UP (ref 27–34)
MCHC RBC-ENTMCNC: 31.6 GM/DL — LOW (ref 32–36)
MCV RBC AUTO: 96.7 FL — SIGNIFICANT CHANGE UP (ref 80–100)
NRBC # BLD: 0 /100 WBCS — SIGNIFICANT CHANGE UP (ref 0–0)
OSMOLALITY UR: 448 MOS/KG — SIGNIFICANT CHANGE UP (ref 50–1200)
PLATELET # BLD AUTO: 186 K/UL — SIGNIFICANT CHANGE UP (ref 150–400)
POTASSIUM SERPL-MCNC: 4 MMOL/L — SIGNIFICANT CHANGE UP (ref 3.5–5.3)
POTASSIUM SERPL-SCNC: 4 MMOL/L — SIGNIFICANT CHANGE UP (ref 3.5–5.3)
PROT ?TM UR-MCNC: 7 MG/DL — SIGNIFICANT CHANGE UP (ref 0–12)
RBC # BLD: 4.19 M/UL — SIGNIFICANT CHANGE UP (ref 3.8–5.2)
RBC # FLD: 13.5 % — SIGNIFICANT CHANGE UP (ref 10.3–14.5)
S PNEUM AG SER QL: SIGNIFICANT CHANGE UP
SODIUM SERPL-SCNC: 137 MMOL/L — SIGNIFICANT CHANGE UP (ref 135–145)
SODIUM UR-SCNC: 48 MMOL/L — SIGNIFICANT CHANGE UP (ref 40–220)
SPECIMEN SOURCE: SIGNIFICANT CHANGE UP
WBC # BLD: 8.4 K/UL — SIGNIFICANT CHANGE UP (ref 3.8–10.5)
WBC # FLD AUTO: 8.4 K/UL — SIGNIFICANT CHANGE UP (ref 3.8–10.5)

## 2019-03-22 RX ORDER — SODIUM CHLORIDE 9 MG/ML
1000 INJECTION INTRAMUSCULAR; INTRAVENOUS; SUBCUTANEOUS
Qty: 0 | Refills: 0 | Status: DISCONTINUED | OUTPATIENT
Start: 2019-03-22 | End: 2019-03-22

## 2019-03-22 RX ORDER — METOPROLOL TARTRATE 50 MG
1 TABLET ORAL
Qty: 0 | Refills: 0 | COMMUNITY

## 2019-03-22 RX ORDER — HYDRALAZINE HCL 50 MG
1 TABLET ORAL
Qty: 90 | Refills: 0 | OUTPATIENT
Start: 2019-03-22 | End: 2019-04-20

## 2019-03-22 RX ORDER — LOSARTAN POTASSIUM 100 MG/1
1 TABLET, FILM COATED ORAL
Qty: 30 | Refills: 0 | OUTPATIENT
Start: 2019-03-22 | End: 2019-04-20

## 2019-03-22 RX ORDER — HYDRALAZINE HCL 50 MG
1 TABLET ORAL
Qty: 0 | Refills: 0 | COMMUNITY

## 2019-03-22 RX ORDER — METOPROLOL TARTRATE 50 MG
1 TABLET ORAL
Qty: 0 | Refills: 0 | COMMUNITY
Start: 2019-03-22

## 2019-03-22 RX ADMIN — Medication 81 MILLIGRAM(S): at 11:50

## 2019-03-22 RX ADMIN — DRONEDARONE 400 MILLIGRAM(S): 400 TABLET, FILM COATED ORAL at 06:30

## 2019-03-22 RX ADMIN — Medication 50 MILLIGRAM(S): at 15:12

## 2019-03-22 RX ADMIN — Medication 3 MILLILITER(S): at 15:53

## 2019-03-22 RX ADMIN — SIMVASTATIN 40 MILLIGRAM(S): 20 TABLET, FILM COATED ORAL at 23:14

## 2019-03-22 RX ADMIN — Medication 100 MILLIGRAM(S): at 17:36

## 2019-03-22 RX ADMIN — Medication 1000 UNIT(S): at 11:50

## 2019-03-22 RX ADMIN — Medication 3 MILLILITER(S): at 20:37

## 2019-03-22 RX ADMIN — LOSARTAN POTASSIUM 25 MILLIGRAM(S): 100 TABLET, FILM COATED ORAL at 06:30

## 2019-03-22 RX ADMIN — Medication 3 MILLILITER(S): at 09:33

## 2019-03-22 RX ADMIN — Medication 100 MILLIGRAM(S): at 06:30

## 2019-03-22 RX ADMIN — Medication 50 MILLIGRAM(S): at 23:13

## 2019-03-22 RX ADMIN — DRONEDARONE 400 MILLIGRAM(S): 400 TABLET, FILM COATED ORAL at 17:36

## 2019-03-22 RX ADMIN — Medication 40 MILLIGRAM(S): at 06:30

## 2019-03-22 RX ADMIN — Medication 50 MILLIGRAM(S): at 06:30

## 2019-03-22 NOTE — DISCHARGE NOTE PROVIDER - NSDCCPCAREPLAN_GEN_ALL_CORE_FT
PRINCIPAL DISCHARGE DIAGNOSIS  Diagnosis: Shortness of breath  Assessment and Plan of Treatment: You came here with shortness of breath and wheezing. cxr with pulm vasc congestion. She was treated with bronchodilators.  Lasix has been held since your Creatinine level increased, indicating possible deterioration of your kidney function. Please visit your PCP within 1 week of discharge to re-check for your kidney function and to see if you could be restarted on Lasix if indicated.      SECONDARY DISCHARGE DIAGNOSES  Diagnosis: Hypertension  Assessment and Plan of Treatment: You have a history of hypertension. Your blood pressure has been controlled with Cozar and hydralazxine. Hydralazine dose is increased from 25 to 50. Continue takilng cozaar 25. Continue with your medications as prescribed. You are advised to eat DASH diet. Please monitor your blood pressure daily, record it and take it to your primary doctor. Follow up with your Primary medical doctor.      Diagnosis: Atrial fibrillation  Assessment and Plan of Treatment: You were having abnormal electrical activity in your heart. Please continue the current medication regimen of aspirin, multaq. Follow up with your cardiology as out patient. Please see the physician if you notice any bleeding as you are on anticoagulants.

## 2019-03-22 NOTE — PROGRESS NOTE ADULT - SUBJECTIVE AND OBJECTIVE BOX
Pt is awake, alert, lying in bed in NAD. No cough/SOB at present.     INTERVAL HPI/OVERNIGHT EVENTS:      VITAL SIGNS:  T(F): 97 (19 @ 05:14)  HR: 63 (19 @ 11:31)  BP: 158/67 (19 @ 09:12)  RR: 16 (19 @ 05:14)  SpO2: 94% (19 @ 11:31)  Wt(kg): --  I&O's Detail          REVIEW OF SYSTEMS:    CONSTITUTIONAL:  No fevers, chills, sweats    HEENT:  Eyes:  No diplopia or blurred vision. ENT:  No earache, sore throat or runny nose.    CARDIOVASCULAR:  No pressure, squeezing, tightness, or heaviness about the chest; no palpitations.    RESPIRATORY:  Per HPI    GASTROINTESTINAL:  No abdominal pain, nausea, vomiting or diarrhea.    GENITOURINARY:  No dysuria, frequency or urgency.    NEUROLOGIC:  No paresthesias, fasciculations, seizures or weakness.    PSYCHIATRIC:  No disorder of thought or mood.      PHYSICAL EXAM:    Constitutional: Well developed and nourished  Eyes:Perrla  ENMT: normal  Neck:supple  Respiratory: good air entry, CTA   Cardiovascular: S1 S2 regular  Gastrointestinal: Soft, Non tender  Extremities: No edema  Vascular:normal  Neurological:Awake, alert,Ox3  Musculoskeletal:Normal      MEDICATIONS  (STANDING):  ALBUTerol/ipratropium for Nebulization 3 milliLiter(s) Nebulizer every 6 hours  aspirin  chewable 81 milliGRAM(s) Oral daily  dronedarone 400 milliGRAM(s) Oral two times a day  ergocalciferol 58133 Unit(s) Oral <User Schedule>  hydrALAZINE 50 milliGRAM(s) Oral every 8 hours  losartan 25 milliGRAM(s) Oral daily  metoprolol tartrate 100 milliGRAM(s) Oral two times a day  simvastatin 40 milliGRAM(s) Oral at bedtime    MEDICATIONS  (PRN):      Allergies    No Known Allergies    Intolerances        LABS:                        12.8   8.40  )-----------( 186      ( 22 Mar 2019 07:20 )             40.5         137  |  104  |  30<H>  ----------------------------<  102<H>  4.0   |  27  |  1.32<H>    Ca    9.2      22 Mar 2019 07:20        Urinalysis Basic - ( 20 Mar 2019 16:25 )    Color: Yellow / Appearance: Clear / S.010 / pH: x  Gluc: x / Ketone: Negative  / Bili: Negative / Urobili: Negative   Blood: x / Protein: Negative / Nitrite: Negative   Leuk Esterase: Trace / RBC: 0-2 /HPF / WBC 0-2 /HPF   Sq Epi: x / Non Sq Epi: Occasional /HPF / Bacteria: Few /HPF            CAPILLARY BLOOD GLUCOSE        pro-bnp 822  @ 13:33     d-dimer --   @ 13:33      RADIOLOGY & ADDITIONAL TESTS:    CXR:  < from: Xray Chest 1 View AP/PA (19 @ 14:03) >  Impression: No evidence for pleural effusion, or pneumothorax.     Pulmonary vascular congestion is again noted, slightly progressed;   underlying pulmonary infiltrates/pneumonia cannot be excluded. This   finding is communicated with the emergency department via the PACS   communication system.    Stable cardiac silhouette and left cardiac device.    < end of copied text >    Ct scan chest:  < from: CT Chest No Cont (19 @ 09:50) >  IMPRESSION:     1.  No evidence of interstitial lung disease.    2.  Small layering bilateral pleural effusions and subsegmental   atelectasis of basilar lower lobes.    < end of copied text >    ekg;    echo:  < from: Transthoracic Echocardiogram (19 @ 11:26) >  CONCLUSIONS:  1. Moderate posterior mitral annular calcification.  Moderate to severe mitral regurgitation.  2. Calcified trileaflet aortic valve with decreased  opening. Moderate aortic stenosis. Trace aortic  regurgitation.  3. Normal aortic root.  4. Normal left atrium.  5. Normal left ventricular internal dimensions and wall  thicknesses.  6. Normal Left Ventricular Systolic Function,  (EF = 60% by  biplane)  7. Grade II diastolic dysfunction.  8. Normal right atrium.  9. Normal right ventricular size and systolic function  (TAPSE 2.9 cm). A device lead is visualized in the right  heart.  10. RV systolic pressure is moderately increased at  43 mm  Hg.  11. Normal tricuspid valve. Moderate to severe tricuspid  regurgitation.  12. Pulmonic valve not well seen. Mild pulmonic  insufficiency is noted.  13. No pericardial effusion.    < end of copied text >

## 2019-03-22 NOTE — DISCHARGE NOTE PROVIDER - HOSPITAL COURSE
Lasix has been held since your Creatinine level increased, indicating possible deterioration of your kidney function. Please visit your PCP within 1 week of discharge to re-check for your kidney function and to see if you could be restarted on Lasix if indicated. 89F, from home, AAOx3, lives with daughter w/ PMHx Complete Heart Block (s/p PPM), HTN, HLD, Afib (on Multaq), pulmonary HTN p/w SOB and Wheezing. Vitals significant for BP: 208/105 mmHg RR: 22 rpm, afebrile, WBC WNL's, RVP negative, probNP: 822, ECG: paced rhythm VR 70 bpm, Troponins negative. cxr with pulm vasc congestion. She was treated with bronchodilators. Lasix was stopped due to worsening kidney function. Her shortness of breath improved.     cont bronchodilators. Given patient's improved clinical status and current hemodynamic stability, decision was made to discharge. Discussed with attending    Please refer to patient's complete medical chart with documents for a full hospital course, for this is only a brief summary.

## 2019-03-22 NOTE — PROGRESS NOTE ADULT - ASSESSMENT
89 yr old F, from home, AAOx3, lives with daughter w/ PMHx Complete Heart Block (s/p PPM), HTN, HLD, Afib (on Multaq), pulmonary HTN p/w SOB and wheezing x yesterday, acute diastolic HF, uncontrolled HTN.  1.Replace vit d.  2.D/C  IV lasix.  3.PAF-asa,lopressor,multaq.  4.HTN- Cont cozaar,hydralazine 50mg q8h.  5.Prediabetes-diet.  6.Lipid d/o- statin.  7.GI and DVT prophylaxis.

## 2019-03-22 NOTE — PROGRESS NOTE ADULT - SUBJECTIVE AND OBJECTIVE BOX
PGY 1 Note discussed with supervising resident and primary attending    Patient is a 89y old  Female who presents with a chief complaint of SOB (21 Mar 2019 12:52)      INTERVAL HPI/OVERNIGHT EVENTS: No acute events overnight, remains afebrile; HD stable, H/H stable, WBC WNL    MEDICATIONS  (STANDING):  ALBUTerol/ipratropium for Nebulization 3 milliLiter(s) Nebulizer every 6 hours  aspirin  chewable 81 milliGRAM(s) Oral daily  cholecalciferol 1000 Unit(s) Oral daily  dronedarone 400 milliGRAM(s) Oral two times a day  ergocalciferol 98827 Unit(s) Oral <User Schedule>  furosemide   Injectable 40 milliGRAM(s) IV Push daily  hydrALAZINE 50 milliGRAM(s) Oral every 8 hours  losartan 25 milliGRAM(s) Oral daily  metoprolol tartrate 100 milliGRAM(s) Oral two times a day  simvastatin 40 milliGRAM(s) Oral at bedtime  sodium chloride 0.9%. 1000 milliLiter(s) (60 mL/Hr) IV Continuous <Continuous>    MEDICATIONS  (PRN):      __________________________________________________  REVIEW OF SYSTEMS:    CONSTITUTIONAL: No fever,   EYES: no acute visual disturbances  NECK: No pain or stiffness  RESPIRATORY: No cough; shortness of breath, improving  CARDIOVASCULAR: No chest pain, no palpitations  GASTROINTESTINAL: No pain. No nausea or vomiting; No diarrhea   NEUROLOGICAL: No headache or numbness, no tremors  MUSCULOSKELETAL: No joint pain, no muscle pain  GENITOURINARY: no dysuria, no frequency, no hesitancy      Vital Signs Last 24 Hrs  T(C): 36.1 (22 Mar 2019 05:14), Max: 36.6 (21 Mar 2019 21:39)  T(F): 97 (22 Mar 2019 05:14), Max: 97.9 (21 Mar 2019 21:39)  HR: 63 (22 Mar 2019 11:31) (60 - 66)  BP: 158/67 (22 Mar 2019 09:12) (149/55 - 173/70)  BP(mean): --  RR: 16 (22 Mar 2019 05:14) (16 - 16)  SpO2: 94% (22 Mar 2019 11:31) (94% - 97%)    ________________________________________________  PHYSICAL EXAM:    GENERAL: NAD  HEENT: Normocephalic;  conjunctivae and sclerae clear; moist mucous membranes;   NECK : supple  CHEST/LUNG: Clear to auscultation bilaterally with decreased air entry   HEART: S1 S2  regular; no murmurs, gallops or rubs  ABDOMEN: Soft, Nontender, Nondistended; Bowel sounds present  EXTREMITIES: no cyanosis; no edema; no calf tenderness  SKIN: warm and dry; no rash  NERVOUS SYSTEM:  Awake and alert; no new deficits    _________________________________________________  LABS:                        12.8   8.40  )-----------( 186      ( 22 Mar 2019 07:20 )             40.5     03-22    137  |  104  |  30<H>  ----------------------------<  102<H>  4.0   |  27  |  1.32<H>    Ca    9.2      22 Mar 2019 07:20        Urinalysis Basic - ( 20 Mar 2019 16:25 )    Color: Yellow / Appearance: Clear / S.010 / pH: x  Gluc: x / Ketone: Negative  / Bili: Negative / Urobili: Negative   Blood: x / Protein: Negative / Nitrite: Negative   Leuk Esterase: Trace / RBC: 0-2 /HPF / WBC 0-2 /HPF   Sq Epi: x / Non Sq Epi: Occasional /HPF / Bacteria: Few /HPF      CAPILLARY BLOOD GLUCOSE            RADIOLOGY & ADDITIONAL TESTS:    Imaging Personally Reviewed:  YES    Consultant(s) Notes Reviewed:   YES    Care Discussed with Consultants :     Plan of care was discussed with patient and /or primary care giver; all questions and concerns were addressed and care was aligned with patient's wishes. Progress note to be completed    PGY 1 Note discussed with supervising resident and primary attending    Patient is a 89y old  Female who presents with a chief complaint of SOB (21 Mar 2019 12:52)      INTERVAL HPI/OVERNIGHT EVENTS: No acute events overnight, remains afebrile; HD stable, H/H stable, WBC WNL    MEDICATIONS  (STANDING):  ALBUTerol/ipratropium for Nebulization 3 milliLiter(s) Nebulizer every 6 hours  aspirin  chewable 81 milliGRAM(s) Oral daily  cholecalciferol 1000 Unit(s) Oral daily  dronedarone 400 milliGRAM(s) Oral two times a day  ergocalciferol 23533 Unit(s) Oral <User Schedule>  furosemide   Injectable 40 milliGRAM(s) IV Push daily  hydrALAZINE 50 milliGRAM(s) Oral every 8 hours  losartan 25 milliGRAM(s) Oral daily  metoprolol tartrate 100 milliGRAM(s) Oral two times a day  simvastatin 40 milliGRAM(s) Oral at bedtime  sodium chloride 0.9%. 1000 milliLiter(s) (60 mL/Hr) IV Continuous <Continuous>    MEDICATIONS  (PRN):      __________________________________________________  REVIEW OF SYSTEMS:    CONSTITUTIONAL: No fever,   EYES: no acute visual disturbances  NECK: No pain or stiffness  RESPIRATORY: No cough; shortness of breath, improving  CARDIOVASCULAR: No chest pain, no palpitations  GASTROINTESTINAL: No pain. No nausea or vomiting; No diarrhea   NEUROLOGICAL: No headache or numbness, no tremors  MUSCULOSKELETAL: No joint pain, no muscle pain  GENITOURINARY: no dysuria, no frequency, no hesitancy      Vital Signs Last 24 Hrs  T(C): 36.1 (22 Mar 2019 05:14), Max: 36.6 (21 Mar 2019 21:39)  T(F): 97 (22 Mar 2019 05:14), Max: 97.9 (21 Mar 2019 21:39)  HR: 63 (22 Mar 2019 11:31) (60 - 66)  BP: 158/67 (22 Mar 2019 09:12) (149/55 - 173/70)  BP(mean): --  RR: 16 (22 Mar 2019 05:14) (16 - 16)  SpO2: 94% (22 Mar 2019 11:31) (94% - 97%)    ________________________________________________  PHYSICAL EXAM:    GENERAL: NAD  HEENT: Normocephalic;  conjunctivae and sclerae clear; moist mucous membranes;   NECK : supple  CHEST/LUNG: Clear to auscultation bilaterally with decreased air entry   HEART: S1 S2  regular; no murmurs, gallops or rubs  ABDOMEN: Soft, Nontender, Nondistended; Bowel sounds present  EXTREMITIES: no cyanosis; no edema; no calf tenderness  SKIN: warm and dry; no rash  NERVOUS SYSTEM:  Awake and alert; no new deficits    _________________________________________________  LABS:                        12.8   8.40  )-----------( 186      ( 22 Mar 2019 07:20 )             40.5     03-22    137  |  104  |  30<H>  ----------------------------<  102<H>  4.0   |  27  |  1.32<H>    Ca    9.2      22 Mar 2019 07:20        Urinalysis Basic - ( 20 Mar 2019 16:25 )    Color: Yellow / Appearance: Clear / S.010 / pH: x  Gluc: x / Ketone: Negative  / Bili: Negative / Urobili: Negative   Blood: x / Protein: Negative / Nitrite: Negative   Leuk Esterase: Trace / RBC: 0-2 /HPF / WBC 0-2 /HPF   Sq Epi: x / Non Sq Epi: Occasional /HPF / Bacteria: Few /HPF      CAPILLARY BLOOD GLUCOSE            RADIOLOGY & ADDITIONAL TESTS:    Imaging Personally Reviewed:  YES    Consultant(s) Notes Reviewed:   YES    Care Discussed with Consultants :     Plan of care was discussed with patient and /or primary care giver; all questions and concerns were addressed and care was aligned with patient's wishes. PGY 1 Note discussed with supervising resident and primary attending    Patient is a 89y old  Female who presents with a chief complaint of SOB (21 Mar 2019 12:52)      INTERVAL HPI/OVERNIGHT EVENTS: No acute events overnight, remains afebrile; HD stable, H/H stable, WBC WNL  -Cr 1.07 0.98 1.32, urine lytes ordered, patient placed on Ns at 60  -TTE WNL except for G2DD. No significant PHTN noted.  -Lasix 40 mg IV discontinued in light of worsening kidney function  -SOB better, no wheezing noted on exam.     MEDICATIONS  (STANDING):  ALBUTerol/ipratropium for Nebulization 3 milliLiter(s) Nebulizer every 6 hours  aspirin  chewable 81 milliGRAM(s) Oral daily  cholecalciferol 1000 Unit(s) Oral daily  dronedarone 400 milliGRAM(s) Oral two times a day  ergocalciferol 44732 Unit(s) Oral <User Schedule>  furosemide   Injectable 40 milliGRAM(s) IV Push daily  hydrALAZINE 50 milliGRAM(s) Oral every 8 hours  losartan 25 milliGRAM(s) Oral daily  metoprolol tartrate 100 milliGRAM(s) Oral two times a day  simvastatin 40 milliGRAM(s) Oral at bedtime  sodium chloride 0.9%. 1000 milliLiter(s) (60 mL/Hr) IV Continuous <Continuous>    MEDICATIONS  (PRN):      __________________________________________________  REVIEW OF SYSTEMS:    CONSTITUTIONAL: No fever,   EYES: no acute visual disturbances  NECK: No pain or stiffness  RESPIRATORY: No cough; shortness of breath, improving  CARDIOVASCULAR: No chest pain, no palpitations  GASTROINTESTINAL: No pain. No nausea or vomiting; No diarrhea   NEUROLOGICAL: No headache or numbness, no tremors  MUSCULOSKELETAL: No joint pain, no muscle pain  GENITOURINARY: no dysuria, no frequency, no hesitancy      Vital Signs Last 24 Hrs  T(C): 36.1 (22 Mar 2019 05:14), Max: 36.6 (21 Mar 2019 21:39)  T(F): 97 (22 Mar 2019 05:14), Max: 97.9 (21 Mar 2019 21:39)  HR: 63 (22 Mar 2019 11:31) (60 - 66)  BP: 158/67 (22 Mar 2019 09:12) (149/55 - 173/70)  BP(mean): --  RR: 16 (22 Mar 2019 05:14) (16 - 16)  SpO2: 94% (22 Mar 2019 11:31) (94% - 97%)    ________________________________________________  PHYSICAL EXAM:    GENERAL: NAD  HEENT: Normocephalic;  conjunctivae and sclerae clear; moist mucous membranes;   NECK : supple  CHEST/LUNG: Decreased air entry, no wheezing noted this AM  HEART: S1 S2  regular; no murmurs, gallops or rubs  ABDOMEN: Soft, Nontender, Nondistended; Bowel sounds present  EXTREMITIES: no cyanosis; no edema; no calf tenderness  SKIN: warm and dry; no rash  NERVOUS SYSTEM:  Awake and alert; no new deficits    _________________________________________________  LABS:                        12.8   8.40  )-----------( 186      ( 22 Mar 2019 07:20 )             40.5     03-22    137  |  104  |  30<H>  ----------------------------<  102<H>  4.0   |  27  |  1.32<H>    Ca    9.2      22 Mar 2019 07:20        Urinalysis Basic - ( 20 Mar 2019 16:25 )    Color: Yellow / Appearance: Clear / S.010 / pH: x  Gluc: x / Ketone: Negative  / Bili: Negative / Urobili: Negative   Blood: x / Protein: Negative / Nitrite: Negative   Leuk Esterase: Trace / RBC: 0-2 /HPF / WBC 0-2 /HPF   Sq Epi: x / Non Sq Epi: Occasional /HPF / Bacteria: Few /HPF      CAPILLARY BLOOD GLUCOSE            RADIOLOGY & ADDITIONAL TESTS:    Imaging Personally Reviewed:  YES    Consultant(s) Notes Reviewed:   YES    Care Discussed with Consultants :     Plan of care was discussed with patient and /or primary care giver; all questions and concerns were addressed and care was aligned with patient's wishes.

## 2019-03-22 NOTE — PROGRESS NOTE ADULT - SUBJECTIVE AND OBJECTIVE BOX
Patient is a 89y old  Female who presents with a chief complaint of SOB (22 Mar 2019 11:37)    pt seen in ed [ ], reg med floor [ x  ], bed [ x ], chair at bedside [   ], awake and responsive [ x ], lethargic [  ],  nad [ x ]          Allergies    No Known Allergies        Vitals    T(F): 97 (03-22-19 @ 05:14), Max: 97.9 (03-21-19 @ 21:39)  HR: 63 (03-22-19 @ 11:31) (60 - 66)  BP: 158/67 (03-22-19 @ 09:12) (149/55 - 173/70)  RR: 16 (03-22-19 @ 05:14) (16 - 16)  SpO2: 94% (03-22-19 @ 11:31) (94% - 97%)  Wt(kg): --  CAPILLARY BLOOD GLUCOSE          Labs                          12.8   8.40  )-----------( 186      ( 22 Mar 2019 07:20 )             40.5       03-22    137  |  104  |  30<H>  ----------------------------<  102<H>  4.0   |  27  |  1.32<H>    Ca    9.2      22 Mar 2019 07:20              .Urine  03-21 @ 00:20   <10,000 CFU/mL Normal Urogenital Rosa Maria  --  --      .Blood  03-19 @ 18:55   No growth to date.  --  --          Radiology Results      Meds    MEDICATIONS  (STANDING):  ALBUTerol/ipratropium for Nebulization 3 milliLiter(s) Nebulizer every 6 hours  aspirin  chewable 81 milliGRAM(s) Oral daily  cholecalciferol 1000 Unit(s) Oral daily  dronedarone 400 milliGRAM(s) Oral two times a day  ergocalciferol 80765 Unit(s) Oral <User Schedule>  furosemide   Injectable 40 milliGRAM(s) IV Push daily  hydrALAZINE 50 milliGRAM(s) Oral every 8 hours  losartan 25 milliGRAM(s) Oral daily  metoprolol tartrate 100 milliGRAM(s) Oral two times a day  simvastatin 40 milliGRAM(s) Oral at bedtime  sodium chloride 0.9%. 1000 milliLiter(s) (60 mL/Hr) IV Continuous <Continuous>      MEDICATIONS  (PRN):        Physical Exam    Neuro :  no focal deficits  Respiratory: CTA B/L   CV: RRR, S1S2, no murmurs,   Abdominal: Soft, NT, ND +BS,  Extremities: No edema, + peripheral pulses        ASSESSMENT    Shortness of breath poss 2nd to reactive airway disease and acute diastolic heart failure   uncontrolled htn  s/p electrolyte imbalance  h/o Pulmonary HTN  History of complete heart block  Atrial fibrillation  Hypercholesterolemia  HTN (hypertension)  Artificial pacemaker        PLAN      cont bronchodilators  pulm f/u  cxr with pulm vasc congestion noted above  cont lasix 40 mg iv daily  cardio f/u  PAF- asa, lopressor, multaq, cozaar  bp better controlled  trop x 1 neg  electrolytes stable  cont current meds

## 2019-03-22 NOTE — PROGRESS NOTE ADULT - ASSESSMENT
89F, from home, AAOx3, lives with daughter w/ PMHx Complete Heart Block (s/p PPM), HTN, HLD, Afib (on Multaq), pulmonary HTN p/w SOB and wheezing x yesterday. Daughter mentions patient symptoms worsened today in the morning after she was found gasping for air. Denies cough, fever/chills, CP, SHERWOOD, palpitations, LE edema or any other complaint. No recent travel or sick contacts. Also denies tobacco smoking in the past, Hx COPD or asthma.     ED course: Vitals significant for BP: 208/105 mmHg RR: 22 rpm, afebrile, WBC WNL's, RVP negative, probNP: 822, ECG: paced rhythm VR 70 bpm, T1: negative  Initial BMP lab error.       Problem Selector:  PROBLEM DIAGNOSES  Problem: SOB (shortness of breath)  Assessment and Plan: Patient p/w SOB, wheezing x yesterday. Denies cough, fever.chills, CP  Infectious etiology less likely given that pt remains afebrile, no leukocytosis, BCx, UA, RVP all negative  Acute CHF unlikely given baseline BNP, no symptoms or signs of fluid overload, CXR/CTC showing no evidence of CHF  More likely worsening Pulmonary HTN vs Reactive Airway Disease  Expiratory wheezing upon lung auscultation  Will need repeat TTE for further evaluation  c/w DUONEB's and O2 supplementation + Solumedrol for now  Lasix 40 mg IV    Problem: HTN (hypertension)  Assessment and Plan: Resume home Hydralazine 25 mg BID + Lopressor 100 mg BID home dose  Monitor BP and adjust meds as needed    Problem: Afib  Assessment and Plan: c/w Multaq 400 mg BID + Lopressor 100 BID  Rate controlled at the moment    Problem: Need for prophylactic measure  Assessment and Plan: VTE score: 2, c/w Lovenox SQ for DVT PPx. 89F, from home, AAOx3, lives with daughter w/ PMHx Complete Heart Block (s/p PPM), HTN, HLD, Afib (on Multaq), pulmonary HTN p/w SOB and wheezing x yesterday. Daughter mentions patient symptoms worsened today in the morning after she was found gasping for air. Denies cough, fever/chills, CP, SHERWOOD, palpitations, LE edema or any other complaint. No recent travel or sick contacts. Also denies tobacco smoking in the past, Hx COPD or asthma.     ED course: Vitals significant for BP: 208/105 mmHg RR: 22 rpm, afebrile, WBC WNL's, RVP negative, probNP: 822, ECG: paced rhythm VR 70 bpm, T1: negative  Initial BMP lab error.       Problem Selector:  PROBLEM DIAGNOSES  Problem: SOB (shortness of breath)  Assessment and Plan: Patient p/w SOB, wheezing x yesterday. Denies cough, fever.chills, CP  Infectious etiology less likely given that pt remains afebrile, no leukocytosis, BCx, UA, RVP all negative  Acute CHF unlikely given baseline BNP, no symptoms or signs of fluid overload, CXR/CTC showing no evidence of CHF  TTE 3/21 WNL except for G2DD. No significant PHTN noted.  Given TTE findings ssx likely from reactive Airway Disease, rather than worsening Pulmonary HTN   C/w DUONEB's and O2 supplementation  Lasix 40 mg IV discontinued in light of worsening kidney function  Solu-Medrol discontinued    Problem: HTN (hypertension)  Assessment and Plan: Resume home Hydralazine 25 mg BID + Lopressor 100 mg BID home dose  Monitor BP and adjust meds as needed    Problem: Afib  Assessment and Plan: c/w Multaq 400 mg BID + Lopressor 100 BID  Rate controlled at the moment    Problem: CAMILLE  Assessment and Plan: Cr 1.07->0.98->1.32  Patient placed on gentle hydration  Furosemide discontinued for now    Problem: Need for prophylactic measure  Assessment and Plan: VTE score: 2, c/w Lovenox SQ for DVT PPx.

## 2019-03-22 NOTE — PROGRESS NOTE ADULT - SUBJECTIVE AND OBJECTIVE BOX
CHIEF COMPLAINT:Patient is a 89y old  Female who presents with a chief complaint of SOB (22 Mar 2019 12:05)    	  REVIEW OF SYSTEMS:  CONSTITUTIONAL: No fever, weight loss, or fatigue  EYES: No eye pain, visual disturbances, or discharge  ENT:  No difficulty hearing, tinnitus, vertigo; No sinus or throat pain  NECK: No pain or stiffness  RESPIRATORY: No cough, wheezing, chills or hemoptysis; No Shortness of Breath  CARDIOVASCULAR: No chest pain, palpitations, passing out, dizziness, or leg swelling  GASTROINTESTINAL: No abdominal or epigastric pain. No nausea, vomiting, or hematemesis; No diarrhea or constipation. No melena or hematochezia.  GENITOURINARY: No dysuria, frequency, hematuria, or incontinence  NEUROLOGICAL: No headaches, memory loss, loss of strength, numbness, or tremors  SKIN: No itching, burning, rashes, or lesions   LYMPH Nodes: No enlarged glands  ENDOCRINE: No heat or cold intolerance; No hair loss  MUSCULOSKELETAL: No joint pain or swelling; No muscle, back, or extremity pain  PSYCHIATRIC: No depression, anxiety, mood swings, or difficulty sleeping  HEME/LYMPH: No easy bruising, or bleeding gums  ALLERGY AND IMMUNOLOGIC: No hives or eczema	    [ ] All others negative	  [ ] Unable to obtain    PHYSICAL EXAM:  T(C): 36.1 (03-22-19 @ 05:14), Max: 36.6 (03-21-19 @ 21:39)  HR: 63 (03-22-19 @ 11:31) (60 - 66)  BP: 158/67 (03-22-19 @ 09:12) (149/55 - 173/70)  RR: 16 (03-22-19 @ 05:14) (16 - 16)  SpO2: 94% (03-22-19 @ 11:31) (94% - 97%)  Wt(kg): --  I&O's Summary      Appearance: Normal	  HEENT:   Normal oral mucosa, PERRL, EOMI	  Lymphatic: No lymphadenopathy  Cardiovascular: Normal S1 S2, No JVD, No murmurs, No edema  Respiratory: Lungs clear to auscultation	  Psychiatry: A & O x 3, Mood & affect appropriate  Gastrointestinal:  Soft, Non-tender, + BS	  Skin: No rashes, No ecchymoses, No cyanosis	  Neurologic: Non-focal  Extremities: Normal range of motion, No clubbing, cyanosis or edema  Vascular: Peripheral pulses palpable 2+ bilaterally    MEDICATIONS  (STANDING):  ALBUTerol/ipratropium for Nebulization 3 milliLiter(s) Nebulizer every 6 hours  aspirin  chewable 81 milliGRAM(s) Oral daily  dronedarone 400 milliGRAM(s) Oral two times a day  ergocalciferol 58420 Unit(s) Oral <User Schedule>  hydrALAZINE 50 milliGRAM(s) Oral every 8 hours  losartan 25 milliGRAM(s) Oral daily  metoprolol tartrate 100 milliGRAM(s) Oral two times a day  simvastatin 40 milliGRAM(s) Oral at bedtime      TELEMETRY: 	    ECG:  	  RADIOLOGY:  OTHER: 	  	  LABS:	 	    CARDIAC MARKERS:                                12.8   8.40  )-----------( 186      ( 22 Mar 2019 07:20 )             40.5     03-22    137  |  104  |  30<H>  ----------------------------<  102<H>  4.0   |  27  |  1.32<H>    Ca    9.2      22 Mar 2019 07:20      proBNP: Serum Pro-Brain Natriuretic Peptide: 822 pg/mL (03-19 @ 13:33)    Lipid Profile: Cholesterol 149  LDL 75  HDL 65  TG 43    HgA1c: Hemoglobin A1C, Whole Blood: 5.9 % (03-20 @ 09:49)    TSH: Thyroid Stimulating Hormone, Serum: 0.38 uU/mL (03-20 @ 07:52)

## 2019-03-23 ENCOUNTER — TRANSCRIPTION ENCOUNTER (OUTPATIENT)
Age: 84
End: 2019-03-23

## 2019-03-23 VITALS
TEMPERATURE: 99 F | OXYGEN SATURATION: 97 % | RESPIRATION RATE: 18 BRPM | DIASTOLIC BLOOD PRESSURE: 59 MMHG | SYSTOLIC BLOOD PRESSURE: 122 MMHG | HEART RATE: 73 BPM

## 2019-03-23 DIAGNOSIS — N17.9 ACUTE KIDNEY FAILURE, UNSPECIFIED: ICD-10-CM

## 2019-03-23 DIAGNOSIS — R06.02 SHORTNESS OF BREATH: ICD-10-CM

## 2019-03-23 PROCEDURE — 82607 VITAMIN B-12: CPT

## 2019-03-23 PROCEDURE — 93306 TTE W/DOPPLER COMPLETE: CPT

## 2019-03-23 PROCEDURE — 81001 URINALYSIS AUTO W/SCOPE: CPT

## 2019-03-23 PROCEDURE — 82306 VITAMIN D 25 HYDROXY: CPT

## 2019-03-23 PROCEDURE — 83605 ASSAY OF LACTIC ACID: CPT

## 2019-03-23 PROCEDURE — 84100 ASSAY OF PHOSPHORUS: CPT

## 2019-03-23 PROCEDURE — 80048 BASIC METABOLIC PNL TOTAL CA: CPT

## 2019-03-23 PROCEDURE — 96374 THER/PROPH/DIAG INJ IV PUSH: CPT

## 2019-03-23 PROCEDURE — 80061 LIPID PANEL: CPT

## 2019-03-23 PROCEDURE — 83935 ASSAY OF URINE OSMOLALITY: CPT

## 2019-03-23 PROCEDURE — 85730 THROMBOPLASTIN TIME PARTIAL: CPT

## 2019-03-23 PROCEDURE — 83735 ASSAY OF MAGNESIUM: CPT

## 2019-03-23 PROCEDURE — 82746 ASSAY OF FOLIC ACID SERUM: CPT

## 2019-03-23 PROCEDURE — 84156 ASSAY OF PROTEIN URINE: CPT

## 2019-03-23 PROCEDURE — 96375 TX/PRO/DX INJ NEW DRUG ADDON: CPT

## 2019-03-23 PROCEDURE — 87631 RESP VIRUS 3-5 TARGETS: CPT

## 2019-03-23 PROCEDURE — 87040 BLOOD CULTURE FOR BACTERIA: CPT

## 2019-03-23 PROCEDURE — 84443 ASSAY THYROID STIM HORMONE: CPT

## 2019-03-23 PROCEDURE — 82550 ASSAY OF CK (CPK): CPT

## 2019-03-23 PROCEDURE — 87086 URINE CULTURE/COLONY COUNT: CPT

## 2019-03-23 PROCEDURE — 84484 ASSAY OF TROPONIN QUANT: CPT

## 2019-03-23 PROCEDURE — 85610 PROTHROMBIN TIME: CPT

## 2019-03-23 PROCEDURE — 82570 ASSAY OF URINE CREATININE: CPT

## 2019-03-23 PROCEDURE — 84300 ASSAY OF URINE SODIUM: CPT

## 2019-03-23 PROCEDURE — 87449 NOS EACH ORGANISM AG IA: CPT

## 2019-03-23 PROCEDURE — 71045 X-RAY EXAM CHEST 1 VIEW: CPT

## 2019-03-23 PROCEDURE — 82962 GLUCOSE BLOOD TEST: CPT

## 2019-03-23 PROCEDURE — 83880 ASSAY OF NATRIURETIC PEPTIDE: CPT

## 2019-03-23 PROCEDURE — 36415 COLL VENOUS BLD VENIPUNCTURE: CPT

## 2019-03-23 PROCEDURE — 99285 EMERGENCY DEPT VISIT HI MDM: CPT | Mod: 25

## 2019-03-23 PROCEDURE — 80053 COMPREHEN METABOLIC PANEL: CPT

## 2019-03-23 PROCEDURE — 85027 COMPLETE CBC AUTOMATED: CPT

## 2019-03-23 PROCEDURE — 84145 PROCALCITONIN (PCT): CPT

## 2019-03-23 PROCEDURE — 71250 CT THORAX DX C-: CPT

## 2019-03-23 PROCEDURE — 82009 KETONE BODYS QUAL: CPT

## 2019-03-23 PROCEDURE — 94640 AIRWAY INHALATION TREATMENT: CPT

## 2019-03-23 PROCEDURE — 93005 ELECTROCARDIOGRAM TRACING: CPT

## 2019-03-23 PROCEDURE — 83036 HEMOGLOBIN GLYCOSYLATED A1C: CPT

## 2019-03-23 PROCEDURE — 87899 AGENT NOS ASSAY W/OPTIC: CPT

## 2019-03-23 RX ORDER — SIMVASTATIN 20 MG/1
1 TABLET, FILM COATED ORAL
Qty: 30 | Refills: 0
Start: 2019-03-23 | End: 2019-04-21

## 2019-03-23 RX ORDER — ASPIRIN/CALCIUM CARB/MAGNESIUM 324 MG
1 TABLET ORAL
Qty: 30 | Refills: 0
Start: 2019-03-23 | End: 2019-04-21

## 2019-03-23 RX ORDER — HYDRALAZINE HCL 50 MG
1 TABLET ORAL
Qty: 90 | Refills: 0 | OUTPATIENT
Start: 2019-03-23 | End: 2019-04-21

## 2019-03-23 RX ORDER — SIMVASTATIN 20 MG/1
1 TABLET, FILM COATED ORAL
Qty: 0 | Refills: 0 | COMMUNITY

## 2019-03-23 RX ORDER — DRONEDARONE 400 MG/1
1 TABLET, FILM COATED ORAL
Qty: 60 | Refills: 0
Start: 2019-03-23 | End: 2019-04-21

## 2019-03-23 RX ORDER — ERGOCALCIFEROL 1.25 MG/1
1 CAPSULE ORAL
Qty: 1 | Refills: 0
Start: 2019-03-23 | End: 2019-03-28

## 2019-03-23 RX ORDER — LOSARTAN POTASSIUM 100 MG/1
1 TABLET, FILM COATED ORAL
Qty: 30 | Refills: 0 | OUTPATIENT
Start: 2019-03-23 | End: 2019-04-21

## 2019-03-23 RX ORDER — DRONEDARONE 400 MG/1
1 TABLET, FILM COATED ORAL
Qty: 0 | Refills: 0 | COMMUNITY

## 2019-03-23 RX ORDER — METOPROLOL TARTRATE 50 MG
1 TABLET ORAL
Qty: 60 | Refills: 0
Start: 2019-03-23 | End: 2019-04-21

## 2019-03-23 RX ADMIN — DRONEDARONE 400 MILLIGRAM(S): 400 TABLET, FILM COATED ORAL at 06:05

## 2019-03-23 RX ADMIN — Medication 3 MILLILITER(S): at 02:46

## 2019-03-23 RX ADMIN — Medication 3 MILLILITER(S): at 09:23

## 2019-03-23 RX ADMIN — Medication 50 MILLIGRAM(S): at 06:05

## 2019-03-23 RX ADMIN — LOSARTAN POTASSIUM 25 MILLIGRAM(S): 100 TABLET, FILM COATED ORAL at 06:05

## 2019-03-23 RX ADMIN — Medication 81 MILLIGRAM(S): at 11:27

## 2019-03-23 RX ADMIN — Medication 100 MILLIGRAM(S): at 06:05

## 2019-03-23 NOTE — PROGRESS NOTE ADULT - ASSESSMENT
89 yr old F, from home, AAOx3, lives with daughter w/ PMHx Complete Heart Block (s/p PPM), HTN, HLD, Afib (on Multaq), pulmonary HTN p/w SOB and wheezing x yesterday, acute diastolic HF, uncontrolled HTN.  1.Replace vit d.  2.PAF-asa,lopressor,multaq.  3.HTN- Cont cozaar,hydralazine 50mg q8h.  4.Prediabetes-diet.  5.Lipid d/o- statin.  6.GI and DVT prophylaxis.

## 2019-03-23 NOTE — DISCHARGE NOTE NURSING/CASE MANAGEMENT/SOCIAL WORK - NSDCDPATPORTLINK_GEN_ALL_CORE
You can access the [facility] Patient Portal, offered by [facility], by registering with the following website: [email or web address]
Izabella Lee(Attending)

## 2019-03-23 NOTE — PROGRESS NOTE ADULT - SUBJECTIVE AND OBJECTIVE BOX
Patient is a 89y old  Female who presents with a chief complaint of SOB (22 Mar 2019 16:15)    pt seen in ed [ ], reg med floor [ x  ], bed [ x ], chair at bedside [   ], awake and responsive [ x ], lethargic [  ],  nad [ x ]      Allergies    No Known Allergies        Vitals    T(F): 98.2 (03-23-19 @ 05:56), Max: 98.2 (03-23-19 @ 05:56)  HR: 80 (03-23-19 @ 05:56) (60 - 80)  BP: 169/59 (03-23-19 @ 05:56) (128/53 - 171/66)  RR: 17 (03-23-19 @ 05:56) (16 - 17)  SpO2: 97% (03-23-19 @ 05:56) (94% - 97%)  Wt(kg): --  CAPILLARY BLOOD GLUCOSE          Labs                          12.8   8.40  )-----------( 186      ( 22 Mar 2019 07:20 )             40.5       03-22    137  |  104  |  30<H>  ----------------------------<  102<H>  4.0   |  27  |  1.32<H>    Ca    9.2      22 Mar 2019 07:20              .Urine  03-21 @ 00:20   <10,000 CFU/mL Normal Urogenital Rosa Maria  --  --      .Blood  03-19 @ 18:55   No growth to date.  --  --          Radiology Results      Meds    MEDICATIONS  (STANDING):  ALBUTerol/ipratropium for Nebulization 3 milliLiter(s) Nebulizer every 6 hours  aspirin  chewable 81 milliGRAM(s) Oral daily  dronedarone 400 milliGRAM(s) Oral two times a day  ergocalciferol 04411 Unit(s) Oral <User Schedule>  hydrALAZINE 50 milliGRAM(s) Oral every 8 hours  losartan 25 milliGRAM(s) Oral daily  metoprolol tartrate 100 milliGRAM(s) Oral two times a day  simvastatin 40 milliGRAM(s) Oral at bedtime      MEDICATIONS  (PRN):      Physical Exam      Neuro :  no focal deficits  Respiratory: CTA B/L   CV: RRR, S1S2, no murmurs,   Abdominal: Soft, NT, ND +BS,  Extremities: No edema, + peripheral pulses        ASSESSMENT    Shortness of breath poss 2nd to reactive airway disease and acute diastolic heart failure   uncontrolled htn  s/p electrolyte imbalance  h/o Pulmonary HTN  History of complete heart block  Atrial fibrillation  Hypercholesterolemia  HTN (hypertension)  Artificial pacemaker        PLAN      cont bronchodilators  pulm f/u  cxr with pulm vasc congestion noted above  lasix d/c'd  cardio f/u  PAF- asa, lopressor, multaq, cozaar, hydralazine 50mg q8h  bp better controlled  trop x 1 neg  electrolytes stable  cont current meds  d/c plan if cleared by cardio

## 2019-03-23 NOTE — PROGRESS NOTE ADULT - SUBJECTIVE AND OBJECTIVE BOX
CHIEF COMPLAINT:Patient is a 89y old  Female who presents with a chief complaint of SOB.Pt appears comfortable.    	  REVIEW OF SYSTEMS:  CONSTITUTIONAL: No fever, weight loss, or fatigue  EYES: No eye pain, visual disturbances, or discharge  ENT:  No difficulty hearing, tinnitus, vertigo; No sinus or throat pain  NECK: No pain or stiffness  RESPIRATORY: No cough, wheezing, chills or hemoptysis; No Shortness of Breath  CARDIOVASCULAR: No chest pain, palpitations, passing out, dizziness, or leg swelling  GASTROINTESTINAL: No abdominal or epigastric pain. No nausea, vomiting, or hematemesis; No diarrhea or constipation. No melena or hematochezia.  GENITOURINARY: No dysuria, frequency, hematuria, or incontinence  NEUROLOGICAL: No headaches, memory loss, loss of strength, numbness, or tremors  SKIN: No itching, burning, rashes, or lesions   LYMPH Nodes: No enlarged glands  ENDOCRINE: No heat or cold intolerance; No hair loss  MUSCULOSKELETAL: No joint pain or swelling; No muscle, back, or extremity pain  PSYCHIATRIC: No depression, anxiety, mood swings, or difficulty sleeping  HEME/LYMPH: No easy bruising, or bleeding gums  ALLERGY AND IMMUNOLOGIC: No hives or eczema	      PHYSICAL EXAM:  T(C): 36.8 (03-23-19 @ 05:56), Max: 36.8 (03-23-19 @ 05:56)  HR: 80 (03-23-19 @ 05:56) (63 - 80)  BP: 169/59 (03-23-19 @ 05:56) (128/53 - 171/66)  RR: 17 (03-23-19 @ 05:56) (16 - 17)  SpO2: 97% (03-23-19 @ 05:56) (94% - 97%)    Appearance: Normal	  HEENT:   Normal oral mucosa, PERRL, EOMI	  Lymphatic: No lymphadenopathy  Cardiovascular: Normal S1 S2, No JVD, No murmurs, No edema  Respiratory: Lungs clear to auscultation	  Psychiatry: A & O x 3, Mood & affect appropriate  Gastrointestinal:  Soft, Non-tender, + BS	  Skin: No rashes, No ecchymoses, No cyanosis	  Neurologic: Non-focal  Extremities: Normal range of motion, No clubbing, cyanosis or edema  Vascular: Peripheral pulses palpable 2+ bilaterally    MEDICATIONS  (STANDING):  ALBUTerol/ipratropium for Nebulization 3 milliLiter(s) Nebulizer every 6 hours  aspirin  chewable 81 milliGRAM(s) Oral daily  dronedarone 400 milliGRAM(s) Oral two times a day  ergocalciferol 00283 Unit(s) Oral <User Schedule>  hydrALAZINE 50 milliGRAM(s) Oral every 8 hours  losartan 25 milliGRAM(s) Oral daily  metoprolol tartrate 100 milliGRAM(s) Oral two times a day  simvastatin 40 milliGRAM(s) Oral at bedtime      	  LABS:	 	                      12.8   8.40  )-----------( 186      ( 22 Mar 2019 07:20 )             40.5     03-22    137  |  104  |  30<H>  ----------------------------<  102<H>  4.0   |  27  |  1.32<H>    Ca    9.2      22 Mar 2019 07:20      proBNP: Serum Pro-Brain Natriuretic Peptide: 822 pg/mL (03-19 @ 13:33)    Lipid Profile: Cholesterol 149  LDL 75  HDL 65  TG 43    HgA1c: Hemoglobin A1C, Whole Blood: 5.9 % (03-20 @ 09:49)    TSH: Thyroid Stimulating Hormone, Serum: 0.38 uU/mL (03-20 @ 07:52)

## 2019-03-23 NOTE — DISCHARGE NOTE NURSING/CASE MANAGEMENT/SOCIAL WORK - NSDCPEPT PROEDHF_GEN_ALL_CORE
Activities as tolerated/Report signs and symptoms to primary care provider/Low salt diet/Call primary care provider for follow up after discharge/Monitor weight daily

## 2019-03-23 NOTE — PROGRESS NOTE ADULT - SUBJECTIVE AND OBJECTIVE BOX
Patient is a 89y old  Female who presents with a chief complaint of SOB (23 Mar 2019 09:08)    Pt is awake, alert, lying in bed in NAD. No cough/SOB at  this moment. Pt has no new complaints.     INTERVAL HPI/OVERNIGHT EVENTS:      VITAL SIGNS:  T(F): 98.2 (03-23-19 @ 05:56)  HR: 80 (03-23-19 @ 05:56)  BP: 169/59 (03-23-19 @ 05:56)  RR: 17 (03-23-19 @ 05:56)  SpO2: 97% (03-23-19 @ 05:56)  Wt(kg): --  I&O's Detail          REVIEW OF SYSTEMS:    CONSTITUTIONAL:  No fevers, chills, sweats    HEENT:  Eyes:  No diplopia or blurred vision. ENT:  No earache, sore throat or runny nose.    CARDIOVASCULAR:  No pressure, squeezing, tightness, or heaviness about the chest; no palpitations.    RESPIRATORY:  Per HPI    GASTROINTESTINAL:  No abdominal pain, nausea, vomiting or diarrhea.    GENITOURINARY:  No dysuria, frequency or urgency.    NEUROLOGIC:  No paresthesias, fasciculations, seizures or weakness.    PSYCHIATRIC:  No disorder of thought or mood.      PHYSICAL EXAM:    Constitutional: Well developed and nourished  Eyes:Perrla  ENMT: normal  Neck:supple  Respiratory: good air entry  Cardiovascular: S1 S2 regular  Gastrointestinal: Soft, Non tender  Extremities: No edema  Vascular:normal  Neurological:Awake, alert,Ox3  Musculoskeletal:Normal      MEDICATIONS  (STANDING):  ALBUTerol/ipratropium for Nebulization 3 milliLiter(s) Nebulizer every 6 hours  aspirin  chewable 81 milliGRAM(s) Oral daily  dronedarone 400 milliGRAM(s) Oral two times a day  ergocalciferol 35101 Unit(s) Oral <User Schedule>  hydrALAZINE 50 milliGRAM(s) Oral every 8 hours  losartan 25 milliGRAM(s) Oral daily  metoprolol tartrate 100 milliGRAM(s) Oral two times a day  simvastatin 40 milliGRAM(s) Oral at bedtime    MEDICATIONS  (PRN):      Allergies    No Known Allergies    Intolerances        LABS:                        12.8   8.40  )-----------( 186      ( 22 Mar 2019 07:20 )             40.5     03-22    137  |  104  |  30<H>  ----------------------------<  102<H>  4.0   |  27  |  1.32<H>    Ca    9.2      22 Mar 2019 07:20                CAPILLARY BLOOD GLUCOSE        pro-bnp 822 03-19 @ 13:33     d-dimer --  03-19 @ 13:33      RADIOLOGY & ADDITIONAL TESTS:  CXR:  < from: Xray Chest 1 View AP/PA (03.19.19 @ 14:03) >  Impression: No evidence for pleural effusion, or pneumothorax.     Pulmonary vascular congestion is again noted, slightly progressed;   underlying pulmonary infiltrates/pneumonia cannot be excluded. This   finding is communicated with the emergency department via the PACS   communication system.    Stable cardiac silhouette and left cardiac device.    < end of copied text >    Ct scan chest:  < from: CT Chest No Cont (03.20.19 @ 09:50) >  IMPRESSION:     1.  No evidence of interstitial lung disease.    2.  Small layering bilateral pleural effusions and subsegmental   atelectasis of basilar lower lobes.    < end of copied text >    ekg;    echo:  < from: Transthoracic Echocardiogram (03.21.19 @ 11:26) >  CONCLUSIONS:  1. Moderate posterior mitral annular calcification.  Moderate to severe mitral regurgitation.  2. Calcified trileaflet aortic valve with decreased  opening. Moderate aortic stenosis. Trace aortic  regurgitation.  3. Normal aortic root.  4. Normal left atrium.  5. Normal left ventricular internal dimensions and wall  thicknesses.  6. Normal Left Ventricular Systolic Function,  (EF = 60% by  biplane)  7. Grade II diastolic dysfunction.  8. Normal right atrium.  9. Normal right ventricular size and systolic function  (TAPSE 2.9 cm). A device lead is visualized in the right  heart.  10. RV systolic pressure is moderately increased at  43 mm  Hg.  11. Normal tricuspid valve. Moderate to severe tricuspid  regurgitation.  12. Pulmonic valve not well seen. Mild pulmonic  insufficiency is noted.  13. No pericardial effusion.    < end of copied text >

## 2019-03-24 LAB
CULTURE RESULTS: SIGNIFICANT CHANGE UP
CULTURE RESULTS: SIGNIFICANT CHANGE UP
SPECIMEN SOURCE: SIGNIFICANT CHANGE UP
SPECIMEN SOURCE: SIGNIFICANT CHANGE UP

## 2019-04-05 ENCOUNTER — INPATIENT (INPATIENT)
Facility: HOSPITAL | Age: 84
LOS: 2 days | Discharge: HOME | DRG: 291 | End: 2019-04-08
Attending: INTERNAL MEDICINE | Admitting: INTERNAL MEDICINE
Payer: COMMERCIAL

## 2019-04-05 VITALS
OXYGEN SATURATION: 100 % | SYSTOLIC BLOOD PRESSURE: 229 MMHG | HEART RATE: 85 BPM | DIASTOLIC BLOOD PRESSURE: 73 MMHG | RESPIRATION RATE: 22 BRPM | HEIGHT: 62 IN | WEIGHT: 160.06 LBS

## 2019-04-05 DIAGNOSIS — I50.9 HEART FAILURE, UNSPECIFIED: ICD-10-CM

## 2019-04-05 DIAGNOSIS — Z29.9 ENCOUNTER FOR PROPHYLACTIC MEASURES, UNSPECIFIED: ICD-10-CM

## 2019-04-05 DIAGNOSIS — I48.91 UNSPECIFIED ATRIAL FIBRILLATION: ICD-10-CM

## 2019-04-05 DIAGNOSIS — I10 ESSENTIAL (PRIMARY) HYPERTENSION: ICD-10-CM

## 2019-04-05 DIAGNOSIS — I27.20 PULMONARY HYPERTENSION, UNSPECIFIED: ICD-10-CM

## 2019-04-05 DIAGNOSIS — J96.01 ACUTE RESPIRATORY FAILURE WITH HYPOXIA: ICD-10-CM

## 2019-04-05 DIAGNOSIS — Z95.0 PRESENCE OF CARDIAC PACEMAKER: Chronic | ICD-10-CM

## 2019-04-05 PROBLEM — Z86.79 PERSONAL HISTORY OF OTHER DISEASES OF THE CIRCULATORY SYSTEM: Chronic | Status: ACTIVE | Noted: 2019-03-19

## 2019-04-05 LAB
ALBUMIN SERPL ELPH-MCNC: 3.8 G/DL — SIGNIFICANT CHANGE UP (ref 3.5–5)
ALP SERPL-CCNC: 83 U/L — SIGNIFICANT CHANGE UP (ref 40–120)
ALT FLD-CCNC: 49 U/L DA — SIGNIFICANT CHANGE UP (ref 10–60)
ANION GAP SERPL CALC-SCNC: 7 MMOL/L — SIGNIFICANT CHANGE UP (ref 5–17)
APTT BLD: 31.1 SEC — SIGNIFICANT CHANGE UP (ref 27.5–36.3)
AST SERPL-CCNC: 37 U/L — SIGNIFICANT CHANGE UP (ref 10–40)
BASE EXCESS BLDA CALC-SCNC: 1.3 MMOL/L — SIGNIFICANT CHANGE UP (ref -2–2)
BASE EXCESS BLDV CALC-SCNC: 1.9 MMOL/L — SIGNIFICANT CHANGE UP (ref -2–2)
BASOPHILS # BLD AUTO: 0.08 K/UL — SIGNIFICANT CHANGE UP (ref 0–0.2)
BASOPHILS NFR BLD AUTO: 0.9 % — SIGNIFICANT CHANGE UP (ref 0–2)
BILIRUB SERPL-MCNC: 0.7 MG/DL — SIGNIFICANT CHANGE UP (ref 0.2–1.2)
BLOOD GAS COMMENTS ARTERIAL: SIGNIFICANT CHANGE UP
BLOOD GAS COMMENTS, VENOUS: SIGNIFICANT CHANGE UP
BUN SERPL-MCNC: 16 MG/DL — SIGNIFICANT CHANGE UP (ref 7–18)
CALCIUM SERPL-MCNC: 9 MG/DL — SIGNIFICANT CHANGE UP (ref 8.4–10.5)
CHLORIDE SERPL-SCNC: 105 MMOL/L — SIGNIFICANT CHANGE UP (ref 96–108)
CK MB BLD-MCNC: 2 % — SIGNIFICANT CHANGE UP (ref 0–3.5)
CK MB CFR SERPL CALC: 1.3 NG/ML — SIGNIFICANT CHANGE UP (ref 0–3.6)
CK SERPL-CCNC: 64 U/L — SIGNIFICANT CHANGE UP (ref 21–215)
CO2 SERPL-SCNC: 26 MMOL/L — SIGNIFICANT CHANGE UP (ref 22–31)
CREAT SERPL-MCNC: 1.11 MG/DL — SIGNIFICANT CHANGE UP (ref 0.5–1.3)
D DIMER BLD IA.RAPID-MCNC: 1030 NG/ML DDU — HIGH
EOSINOPHIL # BLD AUTO: 0.21 K/UL — SIGNIFICANT CHANGE UP (ref 0–0.5)
EOSINOPHIL NFR BLD AUTO: 2.2 % — SIGNIFICANT CHANGE UP (ref 0–6)
GLUCOSE SERPL-MCNC: 117 MG/DL — HIGH (ref 70–99)
HCO3 BLDA-SCNC: 26 MMOL/L — SIGNIFICANT CHANGE UP (ref 23–27)
HCO3 BLDV-SCNC: 28 MMOL/L — SIGNIFICANT CHANGE UP (ref 21–29)
HCT VFR BLD CALC: 37.9 % — SIGNIFICANT CHANGE UP (ref 34.5–45)
HGB BLD-MCNC: 12.1 G/DL — SIGNIFICANT CHANGE UP (ref 11.5–15.5)
HOROWITZ INDEX BLDA+IHG-RTO: 40 — SIGNIFICANT CHANGE UP
IMM GRANULOCYTES NFR BLD AUTO: 0.5 % — SIGNIFICANT CHANGE UP (ref 0–1.5)
INR BLD: 1.03 RATIO — SIGNIFICANT CHANGE UP (ref 0.88–1.16)
LYMPHOCYTES # BLD AUTO: 1.02 K/UL — SIGNIFICANT CHANGE UP (ref 1–3.3)
LYMPHOCYTES # BLD AUTO: 10.9 % — LOW (ref 13–44)
MCHC RBC-ENTMCNC: 31.3 PG — SIGNIFICANT CHANGE UP (ref 27–34)
MCHC RBC-ENTMCNC: 31.9 GM/DL — LOW (ref 32–36)
MCV RBC AUTO: 98.2 FL — SIGNIFICANT CHANGE UP (ref 80–100)
MONOCYTES # BLD AUTO: 0.52 K/UL — SIGNIFICANT CHANGE UP (ref 0–0.9)
MONOCYTES NFR BLD AUTO: 5.6 % — SIGNIFICANT CHANGE UP (ref 2–14)
NEUTROPHILS # BLD AUTO: 7.48 K/UL — HIGH (ref 1.8–7.4)
NEUTROPHILS NFR BLD AUTO: 79.9 % — HIGH (ref 43–77)
NRBC # BLD: 0 /100 WBCS — SIGNIFICANT CHANGE UP (ref 0–0)
NT-PROBNP SERPL-SCNC: 2291 PG/ML — HIGH (ref 0–450)
PCO2 BLDA: 43 MMHG — SIGNIFICANT CHANGE UP (ref 32–46)
PCO2 BLDV: 53 MMHG — HIGH (ref 35–50)
PH BLDA: 7.4 — SIGNIFICANT CHANGE UP (ref 7.35–7.45)
PH BLDV: 7.33 — LOW (ref 7.35–7.45)
PLATELET # BLD AUTO: 193 K/UL — SIGNIFICANT CHANGE UP (ref 150–400)
PO2 BLDA: 119 MMHG — HIGH (ref 74–108)
PO2 BLDV: 36 MMHG — SIGNIFICANT CHANGE UP (ref 25–45)
POTASSIUM SERPL-MCNC: 4.1 MMOL/L — SIGNIFICANT CHANGE UP (ref 3.5–5.3)
POTASSIUM SERPL-SCNC: 4.1 MMOL/L — SIGNIFICANT CHANGE UP (ref 3.5–5.3)
PROT SERPL-MCNC: 7.8 G/DL — SIGNIFICANT CHANGE UP (ref 6–8.3)
PROTHROM AB SERPL-ACNC: 11.4 SEC — SIGNIFICANT CHANGE UP (ref 10–12.9)
RBC # BLD: 3.86 M/UL — SIGNIFICANT CHANGE UP (ref 3.8–5.2)
RBC # FLD: 13.2 % — SIGNIFICANT CHANGE UP (ref 10.3–14.5)
SAO2 % BLDA: 99 % — HIGH (ref 92–96)
SAO2 % BLDV: 59 % — LOW (ref 67–88)
SODIUM SERPL-SCNC: 138 MMOL/L — SIGNIFICANT CHANGE UP (ref 135–145)
TROPONIN I SERPL-MCNC: <0.015 NG/ML — SIGNIFICANT CHANGE UP (ref 0–0.04)
TROPONIN I SERPL-MCNC: <0.015 NG/ML — SIGNIFICANT CHANGE UP (ref 0–0.04)
WBC # BLD: 9.36 K/UL — SIGNIFICANT CHANGE UP (ref 3.8–10.5)
WBC # FLD AUTO: 9.36 K/UL — SIGNIFICANT CHANGE UP (ref 3.8–10.5)

## 2019-04-05 PROCEDURE — 93010 ELECTROCARDIOGRAM REPORT: CPT

## 2019-04-05 PROCEDURE — 71045 X-RAY EXAM CHEST 1 VIEW: CPT | Mod: 26

## 2019-04-05 PROCEDURE — 71275 CT ANGIOGRAPHY CHEST: CPT | Mod: 26

## 2019-04-05 PROCEDURE — 99285 EMERGENCY DEPT VISIT HI MDM: CPT

## 2019-04-05 RX ORDER — METOPROLOL TARTRATE 50 MG
100 TABLET ORAL
Qty: 0 | Refills: 0 | Status: DISCONTINUED | OUTPATIENT
Start: 2019-04-05 | End: 2019-04-08

## 2019-04-05 RX ORDER — SIMVASTATIN 20 MG/1
40 TABLET, FILM COATED ORAL AT BEDTIME
Qty: 0 | Refills: 0 | Status: DISCONTINUED | OUTPATIENT
Start: 2019-04-05 | End: 2019-04-08

## 2019-04-05 RX ORDER — ALBUTEROL 90 UG/1
1 AEROSOL, METERED ORAL EVERY 4 HOURS
Qty: 0 | Refills: 0 | Status: DISCONTINUED | OUTPATIENT
Start: 2019-04-05 | End: 2019-04-08

## 2019-04-05 RX ORDER — TIOTROPIUM BROMIDE 18 UG/1
1 CAPSULE ORAL; RESPIRATORY (INHALATION) DAILY
Qty: 0 | Refills: 0 | Status: DISCONTINUED | OUTPATIENT
Start: 2019-04-05 | End: 2019-04-08

## 2019-04-05 RX ORDER — HYDRALAZINE HCL 50 MG
50 TABLET ORAL EVERY 8 HOURS
Qty: 0 | Refills: 0 | Status: DISCONTINUED | OUTPATIENT
Start: 2019-04-05 | End: 2019-04-06

## 2019-04-05 RX ORDER — IPRATROPIUM/ALBUTEROL SULFATE 18-103MCG
3 AEROSOL WITH ADAPTER (GRAM) INHALATION ONCE
Qty: 0 | Refills: 0 | Status: COMPLETED | OUTPATIENT
Start: 2019-04-05 | End: 2019-04-05

## 2019-04-05 RX ORDER — NITROGLYCERIN 6.5 MG
0.4 CAPSULE, EXTENDED RELEASE ORAL ONCE
Qty: 0 | Refills: 0 | Status: COMPLETED | OUTPATIENT
Start: 2019-04-05 | End: 2019-04-05

## 2019-04-05 RX ORDER — FUROSEMIDE 40 MG
80 TABLET ORAL ONCE
Qty: 0 | Refills: 0 | Status: COMPLETED | OUTPATIENT
Start: 2019-04-05 | End: 2019-04-05

## 2019-04-05 RX ORDER — LOSARTAN POTASSIUM 100 MG/1
25 TABLET, FILM COATED ORAL DAILY
Qty: 0 | Refills: 0 | Status: DISCONTINUED | OUTPATIENT
Start: 2019-04-05 | End: 2019-04-06

## 2019-04-05 RX ORDER — ENOXAPARIN SODIUM 100 MG/ML
30 INJECTION SUBCUTANEOUS DAILY
Qty: 0 | Refills: 0 | Status: DISCONTINUED | OUTPATIENT
Start: 2019-04-05 | End: 2019-04-08

## 2019-04-05 RX ORDER — ASPIRIN/CALCIUM CARB/MAGNESIUM 324 MG
81 TABLET ORAL DAILY
Qty: 0 | Refills: 0 | Status: DISCONTINUED | OUTPATIENT
Start: 2019-04-05 | End: 2019-04-08

## 2019-04-05 RX ORDER — DRONEDARONE 400 MG/1
400 TABLET, FILM COATED ORAL
Qty: 0 | Refills: 0 | Status: DISCONTINUED | OUTPATIENT
Start: 2019-04-05 | End: 2019-04-08

## 2019-04-05 RX ORDER — LABETALOL HCL 100 MG
5 TABLET ORAL ONCE
Qty: 0 | Refills: 0 | Status: COMPLETED | OUTPATIENT
Start: 2019-04-05 | End: 2019-04-05

## 2019-04-05 RX ORDER — IPRATROPIUM/ALBUTEROL SULFATE 18-103MCG
3 AEROSOL WITH ADAPTER (GRAM) INHALATION EVERY 6 HOURS
Qty: 0 | Refills: 0 | Status: DISCONTINUED | OUTPATIENT
Start: 2019-04-05 | End: 2019-04-06

## 2019-04-05 RX ORDER — ERGOCALCIFEROL 1.25 MG/1
50000 CAPSULE ORAL
Qty: 0 | Refills: 0 | Status: DISCONTINUED | OUTPATIENT
Start: 2019-04-05 | End: 2019-04-08

## 2019-04-05 RX ORDER — PANTOPRAZOLE SODIUM 20 MG/1
40 TABLET, DELAYED RELEASE ORAL
Qty: 0 | Refills: 0 | Status: DISCONTINUED | OUTPATIENT
Start: 2019-04-05 | End: 2019-04-08

## 2019-04-05 RX ORDER — FUROSEMIDE 40 MG
40 TABLET ORAL DAILY
Qty: 0 | Refills: 0 | Status: DISCONTINUED | OUTPATIENT
Start: 2019-04-06 | End: 2019-04-06

## 2019-04-05 RX ORDER — LOSARTAN POTASSIUM 100 MG/1
25 TABLET, FILM COATED ORAL ONCE
Qty: 0 | Refills: 0 | Status: COMPLETED | OUTPATIENT
Start: 2019-04-05 | End: 2019-04-05

## 2019-04-05 RX ADMIN — Medication 100 MILLIGRAM(S): at 15:39

## 2019-04-05 RX ADMIN — SIMVASTATIN 40 MILLIGRAM(S): 20 TABLET, FILM COATED ORAL at 21:30

## 2019-04-05 RX ADMIN — Medication 50 MILLIGRAM(S): at 21:30

## 2019-04-05 RX ADMIN — LOSARTAN POTASSIUM 25 MILLIGRAM(S): 100 TABLET, FILM COATED ORAL at 18:58

## 2019-04-05 RX ADMIN — Medication 3 MILLILITER(S): at 11:48

## 2019-04-05 RX ADMIN — LOSARTAN POTASSIUM 25 MILLIGRAM(S): 100 TABLET, FILM COATED ORAL at 15:40

## 2019-04-05 RX ADMIN — DRONEDARONE 400 MILLIGRAM(S): 400 TABLET, FILM COATED ORAL at 18:58

## 2019-04-05 RX ADMIN — Medication 80 MILLIGRAM(S): at 13:00

## 2019-04-05 RX ADMIN — Medication 5 MILLIGRAM(S): at 16:58

## 2019-04-05 RX ADMIN — Medication 50 MILLIGRAM(S): at 15:39

## 2019-04-05 RX ADMIN — ERGOCALCIFEROL 50000 UNIT(S): 1.25 CAPSULE ORAL at 21:30

## 2019-04-05 NOTE — H&P ADULT - ASSESSMENT
Patient is a 89 yr old F, from home, AAOx3, lives with daughter w/ PMHx Complete Heart Block (s/p PPM), HTN, HLD, Afib (on Multaq), pulmonary HTN p/w SOB and wheezing x yesterday. patient daughter at bed side, as per her patient was discharged 2 weeks ago with SOB, but she started feeling difficulty breathing since 1 week,  her progressively Got worse, Pt was gasping for air yesterday, using abdominal muscles for breathing. Patient also reports of dry cough. Patient denies fever, chills, headaches, chest pain, nausea, vomiting, diarrhea, abdominal pain, dysuria, or skin rashes.    on admission pt was afebrile, hypertensive, O2 88 at room air, no leucocytosis, normal electrolytes and renal functions   - CXR showed pulmonary congestion   - Trop Neg, BNP 2291  - EKG paced rhythm

## 2019-04-05 NOTE — ED PROVIDER NOTE - OBJECTIVE STATEMENT
89 y/o F with PMHx of pulmonary HTN, atrial fibrillation, HLD and PSHx of pacemaker placement presents to the ED with complaints of SOB x 2 days. Patient has history of similar in the past. Patient recently discharged, however concern for PNA. As per family, SOB worsened with this episode. Denies fever, chills, cough, leg pain, leg swelling or any other acute complaints.

## 2019-04-05 NOTE — H&P ADULT - PROBLEM SELECTOR PLAN 5
IMPROVE VTE Individual Risk Assessment          RISK                                                          Points  [  ] Previous VTE                                                3  [  ] Thrombophilia                                             2  [  ] Lower limb paralysis                                   2        (unable to hold up >15 seconds)    [  ] Current Cancer                                             2         (within 6 months)  [ x ] Immobilization > 24 hrs                              1  [  ] ICU/CCU stay > 24 hours                             1  [  x] Age > 60                                                         1    IMPROVE VTE Score: VTE score 2  Lovenox for Dvt prophylaxis

## 2019-04-05 NOTE — ED PROVIDER NOTE - CLINICAL SUMMARY MEDICAL DECISION MAKING FREE TEXT BOX
89 y/o F presents with SOB with accessory muscle use. Will treat with Albuterol given wheezing. Obtain blood work and X-ray. BiPAP and reassess.

## 2019-04-05 NOTE — H&P ADULT - HISTORY OF PRESENT ILLNESS
Patient is a 89 yr old F, from home, AAOx3, lives with daughter w/ PMHx Complete Heart Block (s/p PPM), HTN, HLD, Afib (on Multaq), pulmonary HTN p/w SOB and wheezing x yesterday. patient daughter at bed side, as per her patient was discharged 2 weeks ago with SOB, but she started feeling difficulty breathing since 1 week,  her progressively Got worse, Pt was gasping for air yesterday, using abdominal muscles for breathing. Patient also reports of dry cough. Patient denies fever, chills, headaches, chest pain, nausea, vomiting, diarrhea, abdominal pain, dysuria, or skin rashes.    on admission pt was afebrile, hypertensive, O2 88 at room air, no leucocytosis, normal electrolytes and renal functions   - CXR showed pulmonary congestion   - Trop Neg, BNP 2291

## 2019-04-05 NOTE — ED PROVIDER NOTE - PMH
Atrial fibrillation    History of complete heart block    HTN (hypertension)    Hypercholesterolemia    Pulmonary HTN

## 2019-04-05 NOTE — H&P ADULT - NSHPPHYSICALEXAM_GEN_ALL_CORE
Vital Signs Last 24 Hrs  T(C): 36.5 (05 Apr 2019 11:30), Max: 36.5 (05 Apr 2019 11:30)  T(F): 97.7 (05 Apr 2019 11:30), Max: 97.7 (05 Apr 2019 11:30)  HR: 72 (05 Apr 2019 11:30) (72 - 85)  BP: 190/70 (05 Apr 2019 11:30) (190/70 - 229/73)  BP(mean): --  RR: 20 (05 Apr 2019 11:30) (20 - 22)  SpO2: 98% (05 Apr 2019 11:30) (98% - 100%)

## 2019-04-05 NOTE — H&P ADULT - PROBLEM SELECTOR PLAN 1
patient came with sob, wheezing, with respiratory distress  on admission pt was afebrile, hypertensive, O2 88 at room air, no leucocytosis, normal electrolytes and renal functions   - CXR showed pulmonary congestion   - Trop Neg, BNP 2291  - EKG paced rhythm   - s/p IV lasix 80 mg and Bipap O2 sat improved   - respiratory failure likely due to Pulmonary congestion 2/2 DD heart failure   - ECHO march 2019 showed Mod AS, EF 60%, G 2 DD  - continue with aspirin, statin and BB  - lasix 40 IV daily   - f/u D-dimer

## 2019-04-05 NOTE — ED ADULT NURSE REASSESSMENT NOTE - NS ED NURSE REASSESS COMMENT FT1
Patient improved following O2 therapy and Bi-pap. Breathing is even and unlabored, she denies pain or discomfort. Will continue to monitor.

## 2019-04-05 NOTE — ED PROVIDER NOTE - PROGRESS NOTE DETAILS
patient taken off bipap for 30 minute, respiration improved. ambulated to bathroom. admitted to dr burden for chf, sob

## 2019-04-06 LAB
ANION GAP SERPL CALC-SCNC: 6 MMOL/L — SIGNIFICANT CHANGE UP (ref 5–17)
APPEARANCE UR: CLEAR — SIGNIFICANT CHANGE UP
BASOPHILS # BLD AUTO: 0.06 K/UL — SIGNIFICANT CHANGE UP (ref 0–0.2)
BASOPHILS NFR BLD AUTO: 1.1 % — SIGNIFICANT CHANGE UP (ref 0–2)
BILIRUB UR-MCNC: NEGATIVE — SIGNIFICANT CHANGE UP
BUN SERPL-MCNC: 15 MG/DL — SIGNIFICANT CHANGE UP (ref 7–18)
CALCIUM SERPL-MCNC: 8.9 MG/DL — SIGNIFICANT CHANGE UP (ref 8.4–10.5)
CHLORIDE SERPL-SCNC: 102 MMOL/L — SIGNIFICANT CHANGE UP (ref 96–108)
CK MB BLD-MCNC: 3 % — SIGNIFICANT CHANGE UP (ref 0–3.5)
CK MB CFR SERPL CALC: 1.6 NG/ML — SIGNIFICANT CHANGE UP (ref 0–3.6)
CK SERPL-CCNC: 53 U/L — SIGNIFICANT CHANGE UP (ref 21–215)
CO2 SERPL-SCNC: 30 MMOL/L — SIGNIFICANT CHANGE UP (ref 22–31)
COLOR SPEC: YELLOW — SIGNIFICANT CHANGE UP
CREAT SERPL-MCNC: 1.22 MG/DL — SIGNIFICANT CHANGE UP (ref 0.5–1.3)
DIFF PNL FLD: NEGATIVE — SIGNIFICANT CHANGE UP
EOSINOPHIL # BLD AUTO: 0.17 K/UL — SIGNIFICANT CHANGE UP (ref 0–0.5)
EOSINOPHIL NFR BLD AUTO: 3.1 % — SIGNIFICANT CHANGE UP (ref 0–6)
GLUCOSE SERPL-MCNC: 119 MG/DL — HIGH (ref 70–99)
GLUCOSE UR QL: NEGATIVE — SIGNIFICANT CHANGE UP
HCT VFR BLD CALC: 35.3 % — SIGNIFICANT CHANGE UP (ref 34.5–45)
HGB BLD-MCNC: 11.5 G/DL — SIGNIFICANT CHANGE UP (ref 11.5–15.5)
IMM GRANULOCYTES NFR BLD AUTO: 0.5 % — SIGNIFICANT CHANGE UP (ref 0–1.5)
KETONES UR-MCNC: NEGATIVE — SIGNIFICANT CHANGE UP
LEUKOCYTE ESTERASE UR-ACNC: ABNORMAL
LYMPHOCYTES # BLD AUTO: 0.88 K/UL — LOW (ref 1–3.3)
LYMPHOCYTES # BLD AUTO: 16 % — SIGNIFICANT CHANGE UP (ref 13–44)
MAGNESIUM SERPL-MCNC: 2.2 MG/DL — SIGNIFICANT CHANGE UP (ref 1.6–2.6)
MCHC RBC-ENTMCNC: 31.3 PG — SIGNIFICANT CHANGE UP (ref 27–34)
MCHC RBC-ENTMCNC: 32.6 GM/DL — SIGNIFICANT CHANGE UP (ref 32–36)
MCV RBC AUTO: 96.2 FL — SIGNIFICANT CHANGE UP (ref 80–100)
MONOCYTES # BLD AUTO: 0.46 K/UL — SIGNIFICANT CHANGE UP (ref 0–0.9)
MONOCYTES NFR BLD AUTO: 8.4 % — SIGNIFICANT CHANGE UP (ref 2–14)
NEUTROPHILS # BLD AUTO: 3.89 K/UL — SIGNIFICANT CHANGE UP (ref 1.8–7.4)
NEUTROPHILS NFR BLD AUTO: 70.9 % — SIGNIFICANT CHANGE UP (ref 43–77)
NITRITE UR-MCNC: NEGATIVE — SIGNIFICANT CHANGE UP
NRBC # BLD: 0 /100 WBCS — SIGNIFICANT CHANGE UP (ref 0–0)
PH UR: 5 — SIGNIFICANT CHANGE UP (ref 5–8)
PHOSPHATE SERPL-MCNC: 2.7 MG/DL — SIGNIFICANT CHANGE UP (ref 2.5–4.5)
PLATELET # BLD AUTO: 178 K/UL — SIGNIFICANT CHANGE UP (ref 150–400)
POTASSIUM SERPL-MCNC: 3.4 MMOL/L — LOW (ref 3.5–5.3)
POTASSIUM SERPL-SCNC: 3.4 MMOL/L — LOW (ref 3.5–5.3)
PROT UR-MCNC: 15
RBC # BLD: 3.67 M/UL — LOW (ref 3.8–5.2)
RBC # FLD: 13.2 % — SIGNIFICANT CHANGE UP (ref 10.3–14.5)
SODIUM SERPL-SCNC: 138 MMOL/L — SIGNIFICANT CHANGE UP (ref 135–145)
SP GR SPEC: 1.02 — SIGNIFICANT CHANGE UP (ref 1.01–1.02)
TROPONIN I SERPL-MCNC: <0.015 NG/ML — SIGNIFICANT CHANGE UP (ref 0–0.04)
UROBILINOGEN FLD QL: NEGATIVE — SIGNIFICANT CHANGE UP
WBC # BLD: 5.49 K/UL — SIGNIFICANT CHANGE UP (ref 3.8–10.5)
WBC # FLD AUTO: 5.49 K/UL — SIGNIFICANT CHANGE UP (ref 3.8–10.5)

## 2019-04-06 PROCEDURE — 93970 EXTREMITY STUDY: CPT | Mod: 26

## 2019-04-06 RX ORDER — IPRATROPIUM/ALBUTEROL SULFATE 18-103MCG
3 AEROSOL WITH ADAPTER (GRAM) INHALATION ONCE
Qty: 0 | Refills: 0 | Status: COMPLETED | OUTPATIENT
Start: 2019-04-06 | End: 2019-04-06

## 2019-04-06 RX ORDER — HYDRALAZINE HCL 50 MG
75 TABLET ORAL EVERY 8 HOURS
Qty: 0 | Refills: 0 | Status: DISCONTINUED | OUTPATIENT
Start: 2019-04-06 | End: 2019-04-08

## 2019-04-06 RX ORDER — IPRATROPIUM/ALBUTEROL SULFATE 18-103MCG
3 AEROSOL WITH ADAPTER (GRAM) INHALATION EVERY 6 HOURS
Qty: 0 | Refills: 0 | Status: DISCONTINUED | OUTPATIENT
Start: 2019-04-06 | End: 2019-04-08

## 2019-04-06 RX ORDER — FUROSEMIDE 40 MG
20 TABLET ORAL DAILY
Qty: 0 | Refills: 0 | Status: DISCONTINUED | OUTPATIENT
Start: 2019-04-06 | End: 2019-04-08

## 2019-04-06 RX ORDER — INFLUENZA VIRUS VACCINE 15; 15; 15; 15 UG/.5ML; UG/.5ML; UG/.5ML; UG/.5ML
0.5 SUSPENSION INTRAMUSCULAR ONCE
Qty: 0 | Refills: 0 | Status: COMPLETED | OUTPATIENT
Start: 2019-04-06 | End: 2019-04-06

## 2019-04-06 RX ADMIN — Medication 50 MILLIGRAM(S): at 06:02

## 2019-04-06 RX ADMIN — Medication 100 MILLIGRAM(S): at 18:04

## 2019-04-06 RX ADMIN — Medication 40 MILLIGRAM(S): at 06:02

## 2019-04-06 RX ADMIN — DRONEDARONE 400 MILLIGRAM(S): 400 TABLET, FILM COATED ORAL at 18:03

## 2019-04-06 RX ADMIN — Medication 100 MILLIGRAM(S): at 06:02

## 2019-04-06 RX ADMIN — DRONEDARONE 400 MILLIGRAM(S): 400 TABLET, FILM COATED ORAL at 06:02

## 2019-04-06 RX ADMIN — Medication 3 MILLILITER(S): at 14:09

## 2019-04-06 RX ADMIN — SIMVASTATIN 40 MILLIGRAM(S): 20 TABLET, FILM COATED ORAL at 21:58

## 2019-04-06 RX ADMIN — LOSARTAN POTASSIUM 25 MILLIGRAM(S): 100 TABLET, FILM COATED ORAL at 06:02

## 2019-04-06 RX ADMIN — Medication 75 MILLIGRAM(S): at 21:57

## 2019-04-06 RX ADMIN — Medication 75 MILLIGRAM(S): at 13:09

## 2019-04-06 RX ADMIN — ENOXAPARIN SODIUM 30 MILLIGRAM(S): 100 INJECTION SUBCUTANEOUS at 12:37

## 2019-04-06 RX ADMIN — Medication 3 MILLILITER(S): at 20:11

## 2019-04-06 RX ADMIN — PANTOPRAZOLE SODIUM 40 MILLIGRAM(S): 20 TABLET, DELAYED RELEASE ORAL at 06:01

## 2019-04-06 RX ADMIN — Medication 3 MILLILITER(S): at 01:13

## 2019-04-06 RX ADMIN — Medication 3 MILLILITER(S): at 08:00

## 2019-04-06 RX ADMIN — Medication 81 MILLIGRAM(S): at 12:37

## 2019-04-06 RX ADMIN — Medication 3 MILLILITER(S): at 21:44

## 2019-04-06 NOTE — PROGRESS NOTE ADULT - SUBJECTIVE AND OBJECTIVE BOX
Patient is a 89 yr old F, from home, AAOx3, lives with daughter w/ PMHx Complete Heart Block (s/p PPM), HTN, HLD, Afib (on Multaq), pulmonary HTN p/w SOB and wheezing x yesterday. patient daughter at bed side, as per her patient was discharged 2 weeks ago with SOB, but she started feeling difficulty breathing since 1 week,  her progressively Got worse, Pt was gasping for air yesterday, using abdominal muscles for breathing. Patient also reports of dry cough. Patient denies fever, chills, headaches, chest pain, nausea, vomiting, diarrhea, abdominal pain, dysuria, or skin rashes.    on admission pt was afebrile, hypertensive, O2 88 at room air, no leucocytosis, normal electrolytes and renal functions   - CXR showed pulmonary congestion   - Trop Neg, BNP 2291    Review of Systems:  Other Review of Systems: All other review of systems negative, except as noted in HPI	      pt seen in tele [ x ], reg med floor [   ], bed [x ], chair at bedside [   ], awake and responsive [ x ], lethargic [  ],  nad [ x ]      Allergies    No Known Allergies        Vitals    T(F): 98.5 (04-06-19 @ 07:32), Max: 98.6 (04-06-19 @ 04:47)  HR: 67 (04-06-19 @ 08:15) (62 - 94)  BP: 164/56 (04-06-19 @ 07:32) (154/59 - 229/73)  RR: 18 (04-06-19 @ 07:32) (17 - 22)  SpO2: 96% (04-06-19 @ 08:15) (93% - 100%)  Wt(kg): --  CAPILLARY BLOOD GLUCOSE          Labs                          11.5   5.49  )-----------( 178      ( 06 Apr 2019 07:42 )             35.3       04-06    138  |  102  |  15  ----------------------------<  119<H>  3.4<L>   |  30  |  1.22    Ca    8.9      06 Apr 2019 07:42  Phos  2.7     04-06  Mg     2.2     04-06    TPro  7.8  /  Alb  3.8  /  TBili  0.7  /  DBili  x   /  AST  37  /  ALT  49  /  AlkPhos  83  04-05      CARDIAC MARKERS ( 05 Apr 2019 23:43 )  <0.015 ng/mL / x     / 53 U/L / x     / 1.6 ng/mL  CARDIAC MARKERS ( 05 Apr 2019 18:52 )  <0.015 ng/mL / x     / 64 U/L / x     / 1.3 ng/mL  CARDIAC MARKERS ( 05 Apr 2019 11:15 )  <0.015 ng/mL / x     / x     / x     / x        < from: Transthoracic Echocardiogram (03.21.19 @ 11:26) >  CONCLUSIONS:  1. Moderate posterior mitral annular calcification.  Moderate to severe mitral regurgitation.  2. Calcified trileaflet aortic valve with decreased  opening. Moderate aortic stenosis. Trace aortic  regurgitation.  3. Normal aortic root.  4. Normal left atrium.  5. Normal left ventricular internal dimensions and wall  thicknesses.  6. Normal Left Ventricular Systolic Function,  (EF = 60% by  biplane)  7. Grade II diastolic dysfunction.  8. Normal right atrium.  9. Normal right ventricular size and systolic function  (TAPSE 2.9 cm). A device lead is visualized in the right  heart.  10. RV systolic pressure is moderately increased at  43 mm  Hg.  11. Normal tricuspid valve. Moderate to severe tricuspid  regurgitation.  12. Pulmonic valve not well seen. Mild pulmonic  insufficiency is noted.  13. No pericardial effusion.    < end of copied text >        Radiology Results    < from: Xray Chest 1 View-PORTABLE IMMEDIATE (04.05.19 @ 11:23) >  IMPRESSION: Increased interstitial opacities are identified suggesting   acute interstitial pulmonary edema (noted CT chest evaluation performed   March 20, 2019 showed no evidence for interstitial lung disease).    < end of copied text >      Meds    MEDICATIONS  (STANDING):  ALBUTerol    90 MICROgram(s) HFA Inhaler 1 Puff(s) Inhalation every 4 hours  ALBUTerol/ipratropium for Nebulization 3 milliLiter(s) Nebulizer every 6 hours  aspirin  chewable 81 milliGRAM(s) Oral daily  dronedarone 400 milliGRAM(s) Oral two times a day  enoxaparin Injectable 30 milliGRAM(s) SubCutaneous daily  ergocalciferol 85831 Unit(s) Oral <User Schedule>  furosemide   Injectable 20 milliGRAM(s) IV Push daily  hydrALAZINE 75 milliGRAM(s) Oral every 8 hours  influenza   Vaccine 0.5 milliLiter(s) IntraMuscular once  metoprolol tartrate 100 milliGRAM(s) Oral two times a day  pantoprazole    Tablet 40 milliGRAM(s) Oral before breakfast  simvastatin 40 milliGRAM(s) Oral at bedtime  tiotropium 18 MICROgram(s) Capsule 1 Capsule(s) Inhalation daily      MEDICATIONS  (PRN):      Physical Exam    Neuro :  no focal deficits  Respiratory:  B/L lower lobe crackles  CV: RRR, S1S2, no murmurs,   Abdominal: Soft, NT, ND +BS,  Extremities: No edema, + peripheral pulses    ASSESSMENT    acute heart failure  pulm edema  h/o Pulmonary HTN  History of complete heart block  paroxysmal Atrial fibrillation  Hypercholesterolemia  HTN (hypertension)  Artificial pacemaker      PLAN    cont tele,   ce q8 x3 neg noted above  echo 3/19 noted above  cardio cons noted  D/C Cozaar.  cont lasix 20mg iv qd.  Incr hydralazine 75mg q8h.  cont asa, lopressor, multaq.  pulm cons  cont bronchodilators  cont current meds

## 2019-04-06 NOTE — CONSULT NOTE ADULT - ASSESSMENT
Patient is a 89 yr old F, from home, AAOx3, lives with daughter w/ PMHx Complete Heart Block (s/p PPM), HTN, HLD, Afib (on Multaq), pulmonary HTN p/w SOB and wheezing x yesterday. patient daughter at bed side, as per her patient was discharged 2 weeks ago with SOB, but she started feeling difficulty breathing since 1 week,  her progressively Got worse, Pt was gasping for air yesterday, using abdominal muscles for breathing. Patient also reports of dry cough. Patient denies fever, chills, headaches, chest pain, nausea, vomiting, diarrhea, abdominal pain, dysuria, or skin rashes.

## 2019-04-06 NOTE — CONSULT NOTE ADULT - SUBJECTIVE AND OBJECTIVE BOX
PULMONARY CONSULT NOTE      CLAUDETTE GARCES  MRN-[id number]    Patient is a 89y old  Female who presents with a chief complaint of SOB (06 Apr 2019 10:14)       History of Present Illness:  Reason for Admission: SOB	  History of Present Illness: 	  Patient is a 89 yr old F, from home, AAOx3, lives with daughter w/ PMHx Complete Heart Block (s/p PPM), HTN, HLD, Afib (on Multaq), pulmonary HTN p/w SOB and wheezing x yesterday. patient daughter at bed side, as per her patient was discharged 2 weeks ago with SOB, but she started feeling difficulty breathing since 1 week,  her progressively Got worse, Pt was gasping for air yesterday, using abdominal muscles for breathing. Patient also reports of dry cough. Patient denies fever, chills, headaches, chest pain, nausea, vomiting, diarrhea, abdominal pain, dysuria, or skin rashes.    HISTORY OF PRESENT ILLNESS: As above ,awake ,alert , lying in bed. C/o SOB and dry cough x 1 week.     MEDICATIONS  (STANDING):  ALBUTerol    90 MICROgram(s) HFA Inhaler 1 Puff(s) Inhalation every 4 hours  ALBUTerol/ipratropium for Nebulization 3 milliLiter(s) Nebulizer every 6 hours  aspirin  chewable 81 milliGRAM(s) Oral daily  dronedarone 400 milliGRAM(s) Oral two times a day  enoxaparin Injectable 30 milliGRAM(s) SubCutaneous daily  ergocalciferol 48421 Unit(s) Oral <User Schedule>  furosemide   Injectable 20 milliGRAM(s) IV Push daily  hydrALAZINE 75 milliGRAM(s) Oral every 8 hours  influenza   Vaccine 0.5 milliLiter(s) IntraMuscular once  metoprolol tartrate 100 milliGRAM(s) Oral two times a day  pantoprazole    Tablet 40 milliGRAM(s) Oral before breakfast  simvastatin 40 milliGRAM(s) Oral at bedtime  tiotropium 18 MICROgram(s) Capsule 1 Capsule(s) Inhalation daily      MEDICATIONS  (PRN):      Allergies    No Known Allergies    Intolerances        PAST MEDICAL & SURGICAL HISTORY:  Pulmonary HTN  History of complete heart block  Atrial fibrillation  Hypercholesterolemia  HTN (hypertension)  Artificial pacemaker      FAMILY HISTORY:      SOCIAL HISTORY  Smoking History:     REVIEW OF SYSTEMS:    CONSTITUTIONAL:  No fevers, chills, sweats    HEENT:  Eyes:  No diplopia or blurred vision. ENT:  No earache, sore throat or runny nose.    CARDIOVASCULAR:  No pressure, squeezing, tightness, or heaviness about the chest; no palpitations.    RESPIRATORY:  Per HPI    GASTROINTESTINAL:  No abdominal pain, nausea, vomiting or diarrhea.    GENITOURINARY:  No dysuria, frequency or urgency.    NEUROLOGIC:  No paresthesias, fasciculations, seizures or weakness.    PSYCHIATRIC:  No disorder of thought or mood.    Vital Signs Last 24 Hrs  T(C): 36.9 (06 Apr 2019 07:32), Max: 37 (06 Apr 2019 04:47)  T(F): 98.5 (06 Apr 2019 07:32), Max: 98.6 (06 Apr 2019 04:47)  HR: 67 (06 Apr 2019 08:15) (62 - 94)  BP: 164/56 (06 Apr 2019 07:32) (154/59 - 184/55)  BP(mean): --  RR: 18 (06 Apr 2019 07:32) (17 - 19)  SpO2: 96% (06 Apr 2019 08:15) (93% - 98%)  I&O's Detail      PHYSICAL EXAMINATION:    GENERAL: The patient is a well-developed, well-nourished _____in no apparent distress.     HEENT: Head is normocephalic and atraumatic. Extraocular muscles are intact. Mucous membranes are moist.     NECK: Supple.     LUNGS: + basal lung crackles  and scattered wheezing      HEART: Regular rate and rhythm without murmur.    ABDOMEN: Soft, nontender, and nondistended.  No hepatosplenomegaly is noted.    EXTREMITIES: + edema     NEUROLOGIC: Grossly intact.      LABS:                        11.5   5.49  )-----------( 178      ( 06 Apr 2019 07:42 )             35.3     04-06    138  |  102  |  15  ----------------------------<  119<H>  3.4<L>   |  30  |  1.22    Ca    8.9      06 Apr 2019 07:42  Phos  2.7     04-06  Mg     2.2     04-06    TPro  7.8  /  Alb  3.8  /  TBili  0.7  /  DBili  x   /  AST  37  /  ALT  49  /  AlkPhos  83  04-05    PT/INR - ( 05 Apr 2019 11:15 )   PT: 11.4 sec;   INR: 1.03 ratio         PTT - ( 05 Apr 2019 11:15 )  PTT:31.1 sec    ABG - ( 05 Apr 2019 11:44 )  pH, Arterial: 7.40  pH, Blood: x     /  pCO2: 43    /  pO2: 119   / HCO3: 26    / Base Excess: 1.3   /  SaO2: 99                CARDIAC MARKERS ( 05 Apr 2019 23:43 )  <0.015 ng/mL / x     / 53 U/L / x     / 1.6 ng/mL  CARDIAC MARKERS ( 05 Apr 2019 18:52 )  <0.015 ng/mL / x     / 64 U/L / x     / 1.3 ng/mL  CARDIAC MARKERS ( 05 Apr 2019 11:15 )  <0.015 ng/mL / x     / x     / x     / x          D-Dimer Assay, Quantitative: 1030 ng/mL DDU (04-05-19 @ 15:46)    Serum Pro-Brain Natriuretic Peptide: 2291 pg/mL (04-05-19 @ 11:15)          MICROBIOLOGY:    RADIOLOGY & ADDITIONAL STUDIES:    CXR:  < from: Xray Chest 1 View-PORTABLE IMMEDIATE (04.05.19 @ 11:23) >  IMPRESSION: Increased interstitial opacities are identified suggesting   acute interstitial pulmonary edema (noted CT chest evaluation performed   March 20, 2019 showed no evidence for interstitial lung disease).    < end of copied text >    Ct scan chest:  < from: CT Angio Chest w/ IV Cont (04.05.19 @ 19:40) >  No evidence of pulmonary embolism. Pulmonary outflow tract, right main   pulmonary artery, left main pulmonary artery and visualized secondary   branches of the pulmonary arterial system patent.    No thoracic aortic aneurysm or pericardial effusion. Cardiac device is   visible in the soft tissues of the left anterior chest.    Central airway intact. Enlarged heterogeneous thyroid with calcification   likely related to the presence of a goiter.    No mediastinal lesions evident. No hilar lesions evident.    Small bibasilar effusions present with associated airspace   disease-compressive atelectasis.    No evidence of adrenal lesion. The spleen is not enlarged. Hypodensity   anterior left hepatic lobe likely a cyst too small to characterize. No   acute appearing osseous abnormalities.    IMPRESSION: No evidence of pulmonary embolism, see above report.    < end of copied text >    ekg;    echo: PULMONARY CONSULT NOTE      CLAUDETTE GARCES  MRN-[id number]    Patient is a 89y old  Female who presents with a chief complaint of SOB (06 Apr 2019 10:14)       History of Present Illness:  Reason for Admission: SOB	  History of Present Illness: 	  Patient is a 89 yr old F, from home, AAOx3, lives with daughter w/ PMHx Complete Heart Block (s/p PPM), HTN, HLD, Afib (on Multaq), pulmonary HTN p/w SOB and wheezing x yesterday. patient daughter at bed side, as per her patient was discharged 2 weeks ago with SOB, but she started feeling difficulty breathing since 1 week,  her progressively Got worse, Pt was gasping for air yesterday, using abdominal muscles for breathing. Patient also reports of dry cough. Patient denies fever, chills, headaches, chest pain, nausea, vomiting, diarrhea, abdominal pain, dysuria, or skin rashes.    HISTORY OF PRESENT ILLNESS: As above. Awake, alert, lying in bed. C/o SOB and dry cough x 1 week.     MEDICATIONS  (STANDING):  ALBUTerol    90 MICROgram(s) HFA Inhaler 1 Puff(s) Inhalation every 4 hours  ALBUTerol/ipratropium for Nebulization 3 milliLiter(s) Nebulizer every 6 hours  aspirin  chewable 81 milliGRAM(s) Oral daily  dronedarone 400 milliGRAM(s) Oral two times a day  enoxaparin Injectable 30 milliGRAM(s) SubCutaneous daily  ergocalciferol 31632 Unit(s) Oral <User Schedule>  furosemide   Injectable 20 milliGRAM(s) IV Push daily  hydrALAZINE 75 milliGRAM(s) Oral every 8 hours  influenza   Vaccine 0.5 milliLiter(s) IntraMuscular once  metoprolol tartrate 100 milliGRAM(s) Oral two times a day  pantoprazole    Tablet 40 milliGRAM(s) Oral before breakfast  simvastatin 40 milliGRAM(s) Oral at bedtime  tiotropium 18 MICROgram(s) Capsule 1 Capsule(s) Inhalation daily      MEDICATIONS  (PRN):      Allergies    No Known Allergies    Intolerances        PAST MEDICAL & SURGICAL HISTORY:  Pulmonary HTN  History of complete heart block  Atrial fibrillation  Hypercholesterolemia  HTN (hypertension)  Artificial pacemaker      FAMILY HISTORY:      SOCIAL HISTORY  Smoking History:     REVIEW OF SYSTEMS:    CONSTITUTIONAL:  No fevers, chills, sweats    HEENT:  Eyes:  No diplopia or blurred vision. ENT:  No earache, sore throat or runny nose.    CARDIOVASCULAR:  No pressure, squeezing, tightness, or heaviness about the chest; no palpitations.    RESPIRATORY:  Per HPI    GASTROINTESTINAL:  No abdominal pain, nausea, vomiting or diarrhea.    GENITOURINARY:  No dysuria, frequency or urgency.    NEUROLOGIC:  No paresthesias, fasciculations, seizures or weakness.    PSYCHIATRIC:  No disorder of thought or mood.    Vital Signs Last 24 Hrs  T(C): 36.9 (06 Apr 2019 07:32), Max: 37 (06 Apr 2019 04:47)  T(F): 98.5 (06 Apr 2019 07:32), Max: 98.6 (06 Apr 2019 04:47)  HR: 67 (06 Apr 2019 08:15) (62 - 94)  BP: 164/56 (06 Apr 2019 07:32) (154/59 - 184/55)  BP(mean): --  RR: 18 (06 Apr 2019 07:32) (17 - 19)  SpO2: 96% (06 Apr 2019 08:15) (93% - 98%)  I&O's Detail      PHYSICAL EXAMINATION:    GENERAL: The patient is a well-developed, well-nourished _____in no apparent distress.     HEENT: Head is normocephalic and atraumatic. Extraocular muscles are intact. Mucous membranes are moist.     NECK: Supple.     LUNGS: + basal lung crackles  and scattered wheezing      HEART: Regular rate and rhythm without murmur.    ABDOMEN: Soft, nontender, and nondistended.  No hepatosplenomegaly is noted.    EXTREMITIES: + edema     NEUROLOGIC: Grossly intact.      LABS:                        11.5   5.49  )-----------( 178      ( 06 Apr 2019 07:42 )             35.3     04-06    138  |  102  |  15  ----------------------------<  119<H>  3.4<L>   |  30  |  1.22    Ca    8.9      06 Apr 2019 07:42  Phos  2.7     04-06  Mg     2.2     04-06    TPro  7.8  /  Alb  3.8  /  TBili  0.7  /  DBili  x   /  AST  37  /  ALT  49  /  AlkPhos  83  04-05    PT/INR - ( 05 Apr 2019 11:15 )   PT: 11.4 sec;   INR: 1.03 ratio         PTT - ( 05 Apr 2019 11:15 )  PTT:31.1 sec    ABG - ( 05 Apr 2019 11:44 )  pH, Arterial: 7.40  pH, Blood: x     /  pCO2: 43    /  pO2: 119   / HCO3: 26    / Base Excess: 1.3   /  SaO2: 99                CARDIAC MARKERS ( 05 Apr 2019 23:43 )  <0.015 ng/mL / x     / 53 U/L / x     / 1.6 ng/mL  CARDIAC MARKERS ( 05 Apr 2019 18:52 )  <0.015 ng/mL / x     / 64 U/L / x     / 1.3 ng/mL  CARDIAC MARKERS ( 05 Apr 2019 11:15 )  <0.015 ng/mL / x     / x     / x     / x          D-Dimer Assay, Quantitative: 1030 ng/mL DDU (04-05-19 @ 15:46)    Serum Pro-Brain Natriuretic Peptide: 2291 pg/mL (04-05-19 @ 11:15)          MICROBIOLOGY:    RADIOLOGY & ADDITIONAL STUDIES:    CXR:  < from: Xray Chest 1 View-PORTABLE IMMEDIATE (04.05.19 @ 11:23) >  IMPRESSION: Increased interstitial opacities are identified suggesting   acute interstitial pulmonary edema (noted CT chest evaluation performed   March 20, 2019 showed no evidence for interstitial lung disease).    < end of copied text >    Ct scan chest:  < from: CT Angio Chest w/ IV Cont (04.05.19 @ 19:40) >  No evidence of pulmonary embolism. Pulmonary outflow tract, right main   pulmonary artery, left main pulmonary artery and visualized secondary   branches of the pulmonary arterial system patent.    No thoracic aortic aneurysm or pericardial effusion. Cardiac device is   visible in the soft tissues of the left anterior chest.    Central airway intact. Enlarged heterogeneous thyroid with calcification   likely related to the presence of a goiter.    No mediastinal lesions evident. No hilar lesions evident.    Small bibasilar effusions present with associated airspace   disease-compressive atelectasis.    No evidence of adrenal lesion. The spleen is not enlarged. Hypodensity   anterior left hepatic lobe likely a cyst too small to characterize. No   acute appearing osseous abnormalities.    IMPRESSION: No evidence of pulmonary embolism, see above report.    < end of copied text >    ekg;    echo:

## 2019-04-06 NOTE — CONSULT NOTE ADULT - PROBLEM SELECTOR RECOMMENDATION 9
----- Message from Georgia Menendez sent at 9/27/2017 11:14 AM CDT -----  Contact: Patient [phone]  Prescription Request:     Name of medication: predniSONE (DELTASONE) 10 MG tablet    Reason for request: Refill    Pharmacy: [facility] - [place], [place] - [id number]    Please advise.    Thank You     likely secondary to pulmonary congestion vs heart failure   CXR showed pulmonary congestion   Oxygen supp  Echo   aspirin, statin and BB  Lasix IV daily   Cardio eval likely secondary to heart failure   CXR showed pulmonary congestion   Oxygen supp  Echo   aspirin, statin and BB  Lasix IV daily   Cardio eval  R/o ACS protocol  Bronchodilators prn  [facility]fts as OP

## 2019-04-06 NOTE — CONSULT NOTE ADULT - ASSESSMENT
89 yr old F, from home, AAOx3, lives with daughter w/ PMHx Complete Heart Block (s/p PPM), HTN, HLD, Afib (on Multaq), pulmonary HTN p/w SOB and wheezing x yesterday,acute diastolic hf.  1.Tele monitoring.  2.D/C Cozaar.  3.Change lasix 20mg iv qd.  4.Inc hydralazine &5mg q8h.  5.HTN-cont rest of BP medication.  6.PAF-asa,b blocker,multaq.  7.PPI.  8.GI and DVT prophylaxis.

## 2019-04-06 NOTE — CONSULT NOTE ADULT - SUBJECTIVE AND OBJECTIVE BOX
CHIEF COMPLAINT:Patient is a 89y old  Female who presents with a chief complaint of SOB.      HPI:  89 yr old F, from home, AAOx3, lives with daughter w/ PMHx Complete Heart Block (s/p PPM), HTN, HLD, Afib (on Multaq), pulmonary HTN p/w SOB and wheezing x yesterday. patient daughter at bed side, as per her patient was discharged 2 weeks ago with SOB, but she started feeling difficulty breathing since 1 week,  her progressively Got worse, Pt was gasping for air yesterday, using abdominal muscles for breathing. Patient also reports of dry cough. Patient denies fever, chills, headaches, chest pain, nausea, vomiting, diarrhea, abdominal pain, dysuria, or skin rashes.  on admission pt was afebrile, hypertensive, O2 88 at room air, no leucocytosis, normal electrolytes and renal functions   - CXR showed pulmonary congestion   - Trop Neg, BNP 2291 (05 Apr 2019 15:14)      PAST MEDICAL & SURGICAL HISTORY:  Pulmonary HTN  History of complete heart block  Atrial fibrillation  Hypercholesterolemia  HTN (hypertension)  Artificial pacemaker      MEDICATIONS  (STANDING):  ALBUTerol    90 MICROgram(s) HFA Inhaler 1 Puff(s) Inhalation every 4 hours  ALBUTerol/ipratropium for Nebulization 3 milliLiter(s) Nebulizer every 6 hours  aspirin  chewable 81 milliGRAM(s) Oral daily  dronedarone 400 milliGRAM(s) Oral two times a day  enoxaparin Injectable 30 milliGRAM(s) SubCutaneous daily  ergocalciferol 22355 Unit(s) Oral <User Schedule>  furosemide   Injectable 40 milliGRAM(s) IV Push daily  hydrALAZINE 50 milliGRAM(s) Oral every 8 hours  influenza   Vaccine 0.5 milliLiter(s) IntraMuscular once  losartan 25 milliGRAM(s) Oral daily  methylPREDNISolone sodium succinate Injectable 40 milliGRAM(s) IV Push every 12 hours  metoprolol tartrate 100 milliGRAM(s) Oral two times a day  pantoprazole    Tablet 40 milliGRAM(s) Oral before breakfast  simvastatin 40 milliGRAM(s) Oral at bedtime  tiotropium 18 MICROgram(s) Capsule 1 Capsule(s) Inhalation daily    MEDICATIONS  (PRN):      FAMILY HISTORY:No hx of CAD      SOCIAL HISTORY:    [ x] Non-smoker    [x ] Alcohol-denies    Allergies    No Known Allergies    Intolerances    	    REVIEW OF SYSTEMS:  CONSTITUTIONAL: No fever, weight loss, or fatigue  EYES: No eye pain, visual disturbances, or discharge  ENT:  No difficulty hearing, tinnitus, vertigo; No sinus or throat pain  NECK: No pain or stiffness  RESPIRATORY: No cough, wheezing, chills or hemoptysis; + Shortness of Breath  CARDIOVASCULAR: No chest pain, palpitations, passing out, dizziness, or leg swelling  GASTROINTESTINAL: No abdominal or epigastric pain. No nausea, vomiting, or hematemesis; No diarrhea or constipation. No melena or hematochezia.  GENITOURINARY: No dysuria, frequency, hematuria, or incontinence  NEUROLOGICAL: No headaches, memory loss, loss of strength, numbness, or tremors  SKIN: No itching, burning, rashes, or lesions   LYMPH Nodes: No enlarged glands  ENDOCRINE: No heat or cold intolerance; No hair loss  MUSCULOSKELETAL: No joint pain or swelling; No muscle, back, or extremity pain  PSYCHIATRIC: No depression, anxiety, mood swings, or difficulty sleeping  HEME/LYMPH: No easy bruising, or bleeding gums  ALLERGY AND IMMUNOLOGIC: No hives or eczema	      PHYSICAL EXAM:  T(C): 36.9 (04-06-19 @ 07:32), Max: 37 (04-06-19 @ 04:47)  HR: 65 (04-06-19 @ 07:32) (62 - 94)  BP: 164/56 (04-06-19 @ 07:32) (154/59 - 229/73)  RR: 18 (04-06-19 @ 07:32) (17 - 22)  SpO2: 94% (04-06-19 @ 07:32) (93% - 100%)        Appearance: Normal	  HEENT:   Normal oral mucosa, PERRL, EOMI	  Lymphatic: No lymphadenopathy  Cardiovascular: Normal S1 S2, No JVD, No murmurs, No edema  Respiratory: Lungs clear to auscultation	  Psychiatry: A & O x 3, Mood & affect appropriate  Gastrointestinal:  Soft, Non-tender, + BS	  Skin: No rashes, No ecchymoses, No cyanosis	  Neurologic: Non-focal  Extremities: Normal range of motion, No clubbing, cyanosis or edema  Vascular: Peripheral pulses palpable 2+ bilaterally    TELEMETRY: 	nsr, intermittent paced      	  LABS:	 	    CARDIAC MARKERS:  CARDIAC MARKERS ( 05 Apr 2019 23:43 )  <0.015 ng/mL / x     / 53 U/L / x     / 1.6 ng/mL  CARDIAC MARKERS ( 05 Apr 2019 18:52 )  <0.015 ng/mL / x     / 64 U/L / x     / 1.3 ng/mL  CARDIAC MARKERS ( 05 Apr 2019 11:15 )  <0.015 ng/mL / x     / x     / x     / x                             11.5   5.49  )-----------( 178      ( 06 Apr 2019 07:42 )             35.3     04-06    138  |  102  |  15  ----------------------------<  119<H>  3.4<L>   |  30  |  1.22    Ca    8.9      06 Apr 2019 07:42  Phos  2.7     04-06  Mg     2.2     04-06    TPro  7.8  /  Alb  3.8  /  TBili  0.7  /  DBili  x   /  AST  37  /  ALT  49  /  AlkPhos  83  04-05    proBNP: Serum Pro-Brain Natriuretic Peptide: 2291 pg/mL (04-05 @ 11:15)      EXAM:  CT ANGIO CHEST (W)AW IC                            PROCEDURE DATE:  04/05/2019          INTERPRETATION:  Clinical information: Shortness of breath    Comparison study dated 3/20/2019.    Axial images obtained, coronal and sagittal images computer reformatted.   56 cc of Omnipaque 350 intravenously administered, 44 cc discarded. MIP   images obtained.      No evidence of pulmonary embolism. Pulmonary outflow tract, right main   pulmonary artery, left main pulmonary artery and visualized secondary   branches of the pulmonary arterial system patent.    No thoracic aortic aneurysm or pericardial effusion. Cardiac device is   visible in the soft tissues of the left anterior chest.    Central airway intact. Enlarged heterogeneous thyroid with calcification   likely related to the presence of a goiter.    No mediastinal lesions evident. No hilar lesions evident.    Small bibasilar effusions present with associated airspace   disease-compressive atelectasis.    No evidence of adrenal lesion. The spleen is not enlarged. Hypodensity   anterior left hepatic lobe likely a cyst too small to characterize. No   acute appearing osseous abnormalities.    IMPRESSION: No evidence of pulmonary embolism, see above report.    OBSERVATIONS:  Mitral Valve: Moderate posterior mitral annular  calcification. Moderate to severe mitral regurgitation.  Aortic Root: Normal aortic root.  Aortic Valve: Calcified trileaflet aortic valve with  decreased opening. Peak transaortic valve gradient equals  22.5 mm Hg, estimated aortic valve area equals 1.2 sqcm (by  continuity equation), consistent with moderate aortic  stenosis. Trace aortic regurgitation.  Left Atrium: Normal left atrium.  LA volume index = 34  cc/m2.  Left Ventricle: Normal Left Ventricular Systolic Function,  (EF = 60% by biplane) Not all LV wall segments were seen.  Normal left ventricular internal dimensions and wall  thicknesses. Grade II diastolic dysfunction.  Right Heart: Normal right atrium. Normal right ventricular  size and systolic function (TAPSE 2.9 cm). A device lead is  visualized in the right heart. Normal tricuspid valve.  Moderate to severe tricuspid regurgitation. Pulmonic valve  not well seen. Mild pulmonic insufficiency is noted.  Pericardium/PleuraNo pericardial effusion.  Hemodynamic: RA Pressure is 8 mm Hg. RV systolic pressure  is moderately increased at  43 mm Hg.

## 2019-04-07 LAB
ANION GAP SERPL CALC-SCNC: 7 MMOL/L — SIGNIFICANT CHANGE UP (ref 5–17)
BUN SERPL-MCNC: 23 MG/DL — HIGH (ref 7–18)
CALCIUM SERPL-MCNC: 9.1 MG/DL — SIGNIFICANT CHANGE UP (ref 8.4–10.5)
CHLORIDE SERPL-SCNC: 103 MMOL/L — SIGNIFICANT CHANGE UP (ref 96–108)
CO2 SERPL-SCNC: 30 MMOL/L — SIGNIFICANT CHANGE UP (ref 22–31)
CREAT SERPL-MCNC: 1.22 MG/DL — SIGNIFICANT CHANGE UP (ref 0.5–1.3)
CULTURE RESULTS: SIGNIFICANT CHANGE UP
GLUCOSE SERPL-MCNC: 131 MG/DL — HIGH (ref 70–99)
POTASSIUM SERPL-MCNC: 3.4 MMOL/L — LOW (ref 3.5–5.3)
POTASSIUM SERPL-SCNC: 3.4 MMOL/L — LOW (ref 3.5–5.3)
SODIUM SERPL-SCNC: 140 MMOL/L — SIGNIFICANT CHANGE UP (ref 135–145)
SPECIMEN SOURCE: SIGNIFICANT CHANGE UP
TSH SERPL-MCNC: 0.39 UU/ML — SIGNIFICANT CHANGE UP (ref 0.34–4.82)

## 2019-04-07 RX ORDER — POTASSIUM CHLORIDE 20 MEQ
20 PACKET (EA) ORAL ONCE
Qty: 0 | Refills: 0 | Status: COMPLETED | OUTPATIENT
Start: 2019-04-07 | End: 2019-04-07

## 2019-04-07 RX ADMIN — Medication 20 MILLIEQUIVALENT(S): at 11:18

## 2019-04-07 RX ADMIN — Medication 3 MILLILITER(S): at 09:08

## 2019-04-07 RX ADMIN — Medication 81 MILLIGRAM(S): at 11:18

## 2019-04-07 RX ADMIN — Medication 75 MILLIGRAM(S): at 05:53

## 2019-04-07 RX ADMIN — DRONEDARONE 400 MILLIGRAM(S): 400 TABLET, FILM COATED ORAL at 19:04

## 2019-04-07 RX ADMIN — PANTOPRAZOLE SODIUM 40 MILLIGRAM(S): 20 TABLET, DELAYED RELEASE ORAL at 05:53

## 2019-04-07 RX ADMIN — Medication 100 MILLIGRAM(S): at 19:04

## 2019-04-07 RX ADMIN — Medication 100 MILLIGRAM(S): at 05:53

## 2019-04-07 RX ADMIN — ENOXAPARIN SODIUM 30 MILLIGRAM(S): 100 INJECTION SUBCUTANEOUS at 11:18

## 2019-04-07 RX ADMIN — Medication 20 MILLIGRAM(S): at 05:53

## 2019-04-07 RX ADMIN — DRONEDARONE 400 MILLIGRAM(S): 400 TABLET, FILM COATED ORAL at 05:53

## 2019-04-07 RX ADMIN — Medication 75 MILLIGRAM(S): at 21:25

## 2019-04-07 RX ADMIN — Medication 3 MILLILITER(S): at 14:20

## 2019-04-07 RX ADMIN — Medication 3 MILLILITER(S): at 20:04

## 2019-04-07 RX ADMIN — SIMVASTATIN 40 MILLIGRAM(S): 20 TABLET, FILM COATED ORAL at 21:25

## 2019-04-07 NOTE — PROGRESS NOTE ADULT - SUBJECTIVE AND OBJECTIVE BOX
CHIEF COMPLAINT:Patient is a 89y old  Female who presents with a chief complaint of SOB.Pt appears comfortable.    	  REVIEW OF SYSTEMS:  CONSTITUTIONAL: No fever, weight loss, or fatigue  EYES: No eye pain, visual disturbances, or discharge  ENT:  No difficulty hearing, tinnitus, vertigo; No sinus or throat pain  NECK: No pain or stiffness  RESPIRATORY: No cough, wheezing, chills or hemoptysis; No Shortness of Breath  CARDIOVASCULAR: No chest pain, palpitations, passing out, dizziness, or leg swelling  GASTROINTESTINAL: No abdominal or epigastric pain. No nausea, vomiting, or hematemesis; No diarrhea or constipation. No melena or hematochezia.  GENITOURINARY: No dysuria, frequency, hematuria, or incontinence  NEUROLOGICAL: No headaches, memory loss, loss of strength, numbness, or tremors  SKIN: No itching, burning, rashes, or lesions   LYMPH Nodes: No enlarged glands  ENDOCRINE: No heat or cold intolerance; No hair loss  MUSCULOSKELETAL: No joint pain or swelling; No muscle, back, or extremity pain  PSYCHIATRIC: No depression, anxiety, mood swings, or difficulty sleeping  HEME/LYMPH: No easy bruising, or bleeding gums  ALLERGY AND IMMUNOLOGIC: No hives or eczema	      PHYSICAL EXAM:  T(C): 36.9 (04-07-19 @ 07:08), Max: 36.9 (04-06-19 @ 15:03)  HR: 77 (04-07-19 @ 07:08) (65 - 84)  BP: 140/52 (04-07-19 @ 07:08) (131/46 - 168/77)  RR: 18 (04-07-19 @ 07:08) (18 - 18)  SpO2: 94% (04-07-19 @ 07:08) (93% - 98%)    I&O's Summary    06 Apr 2019 07:01  -  07 Apr 2019 07:00  --------------------------------------------------------  IN: 850 mL / OUT: 0 mL / NET: 850 mL        Appearance: Normal	  HEENT:   Normal oral mucosa, PERRL, EOMI	  Lymphatic: No lymphadenopathy  Cardiovascular: Normal S1 S2, No JVD, No murmurs, No edema  Respiratory: Lungs clear to auscultation	  Psychiatry: A & O x 3, Mood & affect appropriate  Gastrointestinal:  Soft, Non-tender, + BS	  Skin: No rashes, No ecchymoses, No cyanosis	  Neurologic: Non-focal  Extremities: Normal range of motion, No clubbing, cyanosis or edema  Vascular: Peripheral pulses palpable 2+ bilaterally    MEDICATIONS  (STANDING):  ALBUTerol    90 MICROgram(s) HFA Inhaler 1 Puff(s) Inhalation every 4 hours  ALBUTerol/ipratropium for Nebulization 3 milliLiter(s) Nebulizer every 6 hours  aspirin  chewable 81 milliGRAM(s) Oral daily  dronedarone 400 milliGRAM(s) Oral two times a day  enoxaparin Injectable 30 milliGRAM(s) SubCutaneous daily  ergocalciferol 97272 Unit(s) Oral <User Schedule>  furosemide   Injectable 20 milliGRAM(s) IV Push daily  hydrALAZINE 75 milliGRAM(s) Oral every 8 hours  influenza   Vaccine 0.5 milliLiter(s) IntraMuscular once  metoprolol tartrate 100 milliGRAM(s) Oral two times a day  pantoprazole    Tablet 40 milliGRAM(s) Oral before breakfast  potassium chloride    Tablet ER 20 milliEquivalent(s) Oral once  simvastatin 40 milliGRAM(s) Oral at bedtime  tiotropium 18 MICROgram(s) Capsule 1 Capsule(s) Inhalation daily        	  LABS:	 	    CARDIAC MARKERS:  CARDIAC MARKERS ( 05 Apr 2019 23:43 )  <0.015 ng/mL / x     / 53 U/L / x     / 1.6 ng/mL  CARDIAC MARKERS ( 05 Apr 2019 18:52 )  <0.015 ng/mL / x     / 64 U/L / x     / 1.3 ng/mL  CARDIAC MARKERS ( 05 Apr 2019 11:15 )  <0.015 ng/mL / x     / x     / x     / x                                    11.5   5.49  )-----------( 178      ( 06 Apr 2019 07:42 )             35.3     04-07    140  |  103  |  23<H>  ----------------------------<  131<H>  3.4<L>   |  30  |  1.22    Ca    9.1      07 Apr 2019 08:38  Phos  2.7     04-06  Mg     2.2     04-06    TPro  7.8  /  Alb  3.8  /  TBili  0.7  /  DBili  x   /  AST  37  /  ALT  49  /  AlkPhos  83  04-05    proBNP: Serum Pro-Brain Natriuretic Peptide: 2291 pg/mL (04-05 @ 11:15)  Serum Pro-Brain Natriuretic Peptide: 822 pg/mL (03-19 @ 13:33)    Lipid Profile: Cholesterol 149  LDL 75  HDL 65  TG 43    HgA1c: Hemoglobin A1C, Whole Blood: 5.9 % (03-20 @ 09:49)    TSH: Thyroid Stimulating Hormone, Serum: 0.39 uU/mL (04-07 @ 08:38)  Thyroid Stimulating Hormone, Serum: 0.38 uU/mL (03-20 @ 07:52)

## 2019-04-07 NOTE — PROGRESS NOTE ADULT - SUBJECTIVE AND OBJECTIVE BOX
Patient is a 89y old  Female who presents with a chief complaint of SOB (2019 11:35)      INTERVAL HPI/OVERNIGHT EVENTS: no overnight events    MEDICATIONS  (STANDING):  ALBUTerol    90 MICROgram(s) HFA Inhaler 1 Puff(s) Inhalation every 4 hours  ALBUTerol/ipratropium for Nebulization 3 milliLiter(s) Nebulizer every 6 hours  aspirin  chewable 81 milliGRAM(s) Oral daily  dronedarone 400 milliGRAM(s) Oral two times a day  enoxaparin Injectable 30 milliGRAM(s) SubCutaneous daily  ergocalciferol 42347 Unit(s) Oral <User Schedule>  furosemide   Injectable 20 milliGRAM(s) IV Push daily  hydrALAZINE 75 milliGRAM(s) Oral every 8 hours  influenza   Vaccine 0.5 milliLiter(s) IntraMuscular once  metoprolol tartrate 100 milliGRAM(s) Oral two times a day  pantoprazole    Tablet 40 milliGRAM(s) Oral before breakfast  simvastatin 40 milliGRAM(s) Oral at bedtime  tiotropium 18 MICROgram(s) Capsule 1 Capsule(s) Inhalation daily    MEDICATIONS  (PRN):      Allergies    No Known Allergies    Intolerances        REVIEW OF SYSTEMS:  CONSTITUTIONAL: No fever, weight loss, or fatigue  RESPIRATORY: No cough, wheezing, chills or hemoptysis; No shortness of breath  CARDIOVASCULAR: No chest pain, palpitations, dizziness, or leg swelling  GASTROINTESTINAL: No abdominal or epigastric pain. No nausea, vomiting, or hematemesis; No diarrhea or constipation. No melena or hematochezia.  NEUROLOGICAL: No headaches, memory loss, loss of strength, numbness, or tremors  SKIN: No itching, burning, rashes, or lesions     Vital Signs Last 24 Hrs  T(C): 36.5 (2019 04:33), Max: 36.9 (2019 07:32)  T(F): 97.7 (2019 04:33), Max: 98.5 (2019 07:32)  HR: 84 (2019 04:33) (65 - 84)  BP: 168/77 (2019 04:33) (131/46 - 168/77)  BP(mean): --  RR: 18 (2019 04:33) (18 - 18)  SpO2: 94% (2019 04:33) (93% - 98%)    PHYSICAL EXAM:  GENERAL: NAD,  HEAD:  Atraumatic, Normocephalic  EYES: EOMI, PERRLA, conjunctiva and sclera clear  NECK: Supple, No JVD, Normal thyroid  CHEST/LUNG: Clear to percussion bilaterally; No rales, rhonchi, wheezing, or rubs  HEART: Regular rate and rhythm; No murmurs, rubs, or gallops  ABDOMEN: Soft, Nontender, Nondistended; Bowel sounds present  NERVOUS SYSTEM:  Alert & Oriented X3, Good concentration; Motor Strength 5/5 B/L   EXTREMITIES:  2+ Peripheral Pulses, No clubbing, cyanosis, or edema  SKIN;    LABS:                        11.5   5.49  )-----------( 178      ( 2019 07:42 )             35.3     04-06    138  |  102  |  15  ----------------------------<  119<H>  3.4<L>   |  30  |  1.22    Ca    8.9      2019 07:42  Phos  2.7     04-06  Mg     2.2     04-06    TPro  7.8  /  Alb  3.8  /  TBili  0.7  /  DBili  x   /  AST  37  /  ALT  49  /  AlkPhos  83  04-05    PT/INR - ( 2019 11:15 )   PT: 11.4 sec;   INR: 1.03 ratio         PTT - ( 2019 11:15 )  PTT:31.1 sec  Urinalysis Basic - ( 2019 20:07 )    Color: Yellow / Appearance: Clear / S.020 / pH: x  Gluc: x / Ketone: Negative  / Bili: Negative / Urobili: Negative   Blood: x / Protein: 15 / Nitrite: Negative   Leuk Esterase: Trace / RBC: 0-2 /HPF / WBC 6-10 /HPF   Sq Epi: x / Non Sq Epi: Moderate /HPF / Bacteria: Few /HPF      CAPILLARY BLOOD GLUCOSE          RADIOLOGY & ADDITIONAL TESTS:

## 2019-04-07 NOTE — PROGRESS NOTE ADULT - ASSESSMENT
89 yr old F, from home, AAOx3, lives with daughter w/ PMHx Complete Heart Block (s/p PPM), HTN, HLD, Afib (on Multaq), pulmonary HTN p/w SOB and wheezing x yesterday,acute diastolic hf.  1.Tele monitoring.  2.Replace k+.  3.Cont lasix 20mg iv qd.  4.Daughter was not giving hydralazine q8h.  5.HTN-cont  BP medication.  6.PAF-asa,b blocker,multaq.  7.PPI.  8.GI and DVT prophylaxis.

## 2019-04-07 NOTE — PROGRESS NOTE ADULT - PROBLEM SELECTOR PLAN 1
- CXR showed pulmonary congestion   - Trop Neg, BNP 2291  - EKG paced rhythm   - respiratory failure likely due to Pulmonary congestion 2/2 DD heart failure   - ECHO march 2019 showed Mod AS, EF 60%, G 2 DD  - continue with aspirin, statin and BB  - lasix 20 IV daily  c/w duonebs, spiriva

## 2019-04-07 NOTE — PROGRESS NOTE ADULT - PROBLEM SELECTOR PLAN 1
likely secondary to heart failure   CXR showed pulmonary congestion   Oxygen supp  Echo   aspirin, statin and BB  Lasix IV daily   Cardio follow up   R/o ACS protocol  Bronchodilators prn  Pfts as OP. likely secondary to heart failure   CXR showed pulmonary congestion   Oxygen supp  Echo   aspirin, statin and BB  Lasix IV daily   Cardio follow up   R/o ACS protocol  Replace lytes  Bronchodilators prn  Pfts as OP.

## 2019-04-07 NOTE — PROGRESS NOTE ADULT - SUBJECTIVE AND OBJECTIVE BOX
Patient is a 89y old  Female who presents with a chief complaint of SOB (07 Apr 2019 07:10)    pt seen in tele [ x ], reg med floor [   ], bed [x ], chair at bedside [   ], awake and responsive [ x ], lethargic [  ],  nad [ x ]    Allergies    No Known Allergies        Vitals    T(F): 98.5 (04-07-19 @ 07:08), Max: 98.5 (04-07-19 @ 07:08)  HR: 77 (04-07-19 @ 07:08) (65 - 84)  BP: 140/52 (04-07-19 @ 07:08) (131/46 - 168/77)  RR: 18 (04-07-19 @ 07:08) (18 - 18)  SpO2: 94% (04-07-19 @ 07:08) (93% - 98%)  Wt(kg): --  CAPILLARY BLOOD GLUCOSE          Labs                          11.5   5.49  )-----------( 178      ( 06 Apr 2019 07:42 )             35.3       04-06    138  |  102  |  15  ----------------------------<  119<H>  3.4<L>   |  30  |  1.22    Ca    8.9      06 Apr 2019 07:42  Phos  2.7     04-06  Mg     2.2     04-06    TPro  7.8  /  Alb  3.8  /  TBili  0.7  /  DBili  x   /  AST  37  /  ALT  49  /  AlkPhos  83  04-05      CARDIAC MARKERS ( 05 Apr 2019 23:43 )  <0.015 ng/mL / x     / 53 U/L / x     / 1.6 ng/mL  CARDIAC MARKERS ( 05 Apr 2019 18:52 )  <0.015 ng/mL / x     / 64 U/L / x     / 1.3 ng/mL  CARDIAC MARKERS ( 05 Apr 2019 11:15 )  <0.015 ng/mL / x     / x     / x     / x            .Blood  04-05 @ 19:49   No growth to date.  --  --        Radiology Results      Meds    MEDICATIONS  (STANDING):  ALBUTerol    90 MICROgram(s) HFA Inhaler 1 Puff(s) Inhalation every 4 hours  ALBUTerol/ipratropium for Nebulization 3 milliLiter(s) Nebulizer every 6 hours  aspirin  chewable 81 milliGRAM(s) Oral daily  dronedarone 400 milliGRAM(s) Oral two times a day  enoxaparin Injectable 30 milliGRAM(s) SubCutaneous daily  ergocalciferol 09735 Unit(s) Oral <User Schedule>  furosemide   Injectable 20 milliGRAM(s) IV Push daily  hydrALAZINE 75 milliGRAM(s) Oral every 8 hours  influenza   Vaccine 0.5 milliLiter(s) IntraMuscular once  metoprolol tartrate 100 milliGRAM(s) Oral two times a day  pantoprazole    Tablet 40 milliGRAM(s) Oral before breakfast  simvastatin 40 milliGRAM(s) Oral at bedtime  tiotropium 18 MICROgram(s) Capsule 1 Capsule(s) Inhalation daily      MEDICATIONS  (PRN):      Physical Exam      Neuro :  no focal deficits  Respiratory:  mild B/L lower lobe crackles  CV: RRR, S1S2, no murmurs,   Abdominal: Soft, NT, ND +BS,  Extremities: No edema, + peripheral pulses    ASSESSMENT    acute diastolic heart failure  pulm edema  h/o Pulmonary HTN  History of complete heart block  HFpEF  paroxysmal Atrial fibrillation  Hypercholesterolemia  HTN (hypertension)  Artificial pacemaker      PLAN    cont tele,   ce q8 x3 neg noted above  echo 3/19 noted with HFpEF  cardio f/u   cont lasix 20mg iv qd.  cont hydralazine 75mg q8h.  cont asa, lopressor, multaq.  pulm f/u  cont bronchodilators  cont current meds

## 2019-04-07 NOTE — PROGRESS NOTE ADULT - ASSESSMENT
Patient is a 89 yr old F, from home, AAOx3, lives with daughter w/ PMHx Complete Heart Block (s/p PPM), HTN, HLD, Afib (on Multaq), pulmonary HTN p/w SOB and wheezing x yesterday.

## 2019-04-08 ENCOUNTER — TRANSCRIPTION ENCOUNTER (OUTPATIENT)
Age: 84
End: 2019-04-08

## 2019-04-08 VITALS
SYSTOLIC BLOOD PRESSURE: 140 MMHG | HEART RATE: 63 BPM | RESPIRATION RATE: 17 BRPM | DIASTOLIC BLOOD PRESSURE: 48 MMHG | OXYGEN SATURATION: 95 % | TEMPERATURE: 98 F

## 2019-04-08 PROCEDURE — 80048 BASIC METABOLIC PNL TOTAL CA: CPT

## 2019-04-08 PROCEDURE — 82553 CREATINE MB FRACTION: CPT

## 2019-04-08 PROCEDURE — 99285 EMERGENCY DEPT VISIT HI MDM: CPT | Mod: 25

## 2019-04-08 PROCEDURE — 93970 EXTREMITY STUDY: CPT

## 2019-04-08 PROCEDURE — 87040 BLOOD CULTURE FOR BACTERIA: CPT

## 2019-04-08 PROCEDURE — 71275 CT ANGIOGRAPHY CHEST: CPT

## 2019-04-08 PROCEDURE — 82550 ASSAY OF CK (CPK): CPT

## 2019-04-08 PROCEDURE — 85730 THROMBOPLASTIN TIME PARTIAL: CPT

## 2019-04-08 PROCEDURE — 94660 CPAP INITIATION&MGMT: CPT

## 2019-04-08 PROCEDURE — 96374 THER/PROPH/DIAG INJ IV PUSH: CPT | Mod: XU

## 2019-04-08 PROCEDURE — 80053 COMPREHEN METABOLIC PANEL: CPT

## 2019-04-08 PROCEDURE — 84484 ASSAY OF TROPONIN QUANT: CPT

## 2019-04-08 PROCEDURE — 85379 FIBRIN DEGRADATION QUANT: CPT

## 2019-04-08 PROCEDURE — 94640 AIRWAY INHALATION TREATMENT: CPT

## 2019-04-08 PROCEDURE — 82803 BLOOD GASES ANY COMBINATION: CPT

## 2019-04-08 PROCEDURE — 71045 X-RAY EXAM CHEST 1 VIEW: CPT

## 2019-04-08 PROCEDURE — 85027 COMPLETE CBC AUTOMATED: CPT

## 2019-04-08 PROCEDURE — 83735 ASSAY OF MAGNESIUM: CPT

## 2019-04-08 PROCEDURE — 84443 ASSAY THYROID STIM HORMONE: CPT

## 2019-04-08 PROCEDURE — 87086 URINE CULTURE/COLONY COUNT: CPT

## 2019-04-08 PROCEDURE — 84100 ASSAY OF PHOSPHORUS: CPT

## 2019-04-08 PROCEDURE — 93005 ELECTROCARDIOGRAM TRACING: CPT

## 2019-04-08 PROCEDURE — 81001 URINALYSIS AUTO W/SCOPE: CPT

## 2019-04-08 PROCEDURE — 85610 PROTHROMBIN TIME: CPT

## 2019-04-08 PROCEDURE — 83880 ASSAY OF NATRIURETIC PEPTIDE: CPT

## 2019-04-08 PROCEDURE — 36415 COLL VENOUS BLD VENIPUNCTURE: CPT

## 2019-04-08 RX ORDER — FUROSEMIDE 40 MG
1 TABLET ORAL
Qty: 30 | Refills: 0
Start: 2019-04-08 | End: 2019-05-07

## 2019-04-08 RX ORDER — POTASSIUM CHLORIDE 20 MEQ
40 PACKET (EA) ORAL ONCE
Qty: 0 | Refills: 0 | Status: COMPLETED | OUTPATIENT
Start: 2019-04-08 | End: 2019-04-08

## 2019-04-08 RX ORDER — SPIRONOLACTONE 25 MG/1
1 TABLET, FILM COATED ORAL
Qty: 30 | Refills: 0
Start: 2019-04-08 | End: 2019-05-07

## 2019-04-08 RX ORDER — FUROSEMIDE 40 MG
20 TABLET ORAL DAILY
Qty: 0 | Refills: 0 | Status: DISCONTINUED | OUTPATIENT
Start: 2019-04-08 | End: 2019-04-08

## 2019-04-08 RX ORDER — IPRATROPIUM/ALBUTEROL SULFATE 18-103MCG
3 AEROSOL WITH ADAPTER (GRAM) INHALATION
Qty: 3 | Refills: 0
Start: 2019-04-08 | End: 2019-05-07

## 2019-04-08 RX ORDER — HYDRALAZINE HCL 50 MG
1 TABLET ORAL
Qty: 90 | Refills: 0 | OUTPATIENT
Start: 2019-04-08 | End: 2019-05-07

## 2019-04-08 RX ORDER — SPIRONOLACTONE 25 MG/1
25 TABLET, FILM COATED ORAL DAILY
Qty: 0 | Refills: 0 | Status: DISCONTINUED | OUTPATIENT
Start: 2019-04-08 | End: 2019-04-08

## 2019-04-08 RX ORDER — HYDRALAZINE HCL 50 MG
100 TABLET ORAL EVERY 8 HOURS
Qty: 0 | Refills: 0 | Status: DISCONTINUED | OUTPATIENT
Start: 2019-04-08 | End: 2019-04-08

## 2019-04-08 RX ADMIN — Medication 81 MILLIGRAM(S): at 11:55

## 2019-04-08 RX ADMIN — Medication 3 MILLILITER(S): at 08:08

## 2019-04-08 RX ADMIN — Medication 75 MILLIGRAM(S): at 05:07

## 2019-04-08 RX ADMIN — DRONEDARONE 400 MILLIGRAM(S): 400 TABLET, FILM COATED ORAL at 05:07

## 2019-04-08 RX ADMIN — Medication 40 MILLIEQUIVALENT(S): at 11:56

## 2019-04-08 RX ADMIN — SPIRONOLACTONE 25 MILLIGRAM(S): 25 TABLET, FILM COATED ORAL at 09:03

## 2019-04-08 RX ADMIN — Medication 20 MILLIGRAM(S): at 05:06

## 2019-04-08 RX ADMIN — Medication 100 MILLIGRAM(S): at 05:07

## 2019-04-08 RX ADMIN — ENOXAPARIN SODIUM 30 MILLIGRAM(S): 100 INJECTION SUBCUTANEOUS at 11:55

## 2019-04-08 RX ADMIN — Medication 20 MILLIGRAM(S): at 09:03

## 2019-04-08 NOTE — DISCHARGE NOTE PROVIDER - CARE PROVIDER_API CALL
Rafy Solano)  Internal Medicine; Pulmonary Disease  [address] [place] [address]  Phone: [phone]  Fax: [phone]  Follow Up Time:

## 2019-04-08 NOTE — DISCHARGE NOTE PROVIDER - HOSPITAL COURSE
Patient is a 89 yr old F, from home, AAOx3, lives with daughter w/ PMHx Complete Heart Block (s/p PPM), HTN, HLD, Afib (on Multaq), pulmonary HTN p/w SOB and wheezing x yesterday. patient daughter at bed side, as per her patient was discharged 2 weeks ago with SOB, but she started feeling difficulty breathing since 1 week,  her progressively Got worse, Pt was gasping for air yesterday, using abdominal muscles for breathing. Patient also reports of dry cough. Patient denies fever, chills, headaches, chest pain, nausea, vomiting, diarrhea, abdominal pain, dysuria, or skin rashes. On admission pt was afebrile, hypertensive, O2 88 at room air, no leucocytosis, normal electrolytes and renal functions     - CXR showed pulmonary congestion, Trop Neg x3 , BNP 2291. Patient's echo seen to [facility] preserved EF, grade 2 DD. Her cozaar was held as per cardio Dr Harrell, started on aldactone, increased dose of hydralazine. Also neds to be on bronchodilators for pulm hitn as per pulmo Dr Solano.        Patient clinically stable for discharge as discussed with attending Dr Lindsey

## 2019-04-08 NOTE — PROGRESS NOTE ADULT - SUBJECTIVE AND OBJECTIVE BOX
Patient is a 89y old  Female who presents with a chief complaint of SOB (08 Apr 2019 08:05)    pt seen in tele [ x ], reg med floor [   ], bed [x ], chair at bedside [   ], awake and responsive [ x ], lethargic [  ],  nad [ x ]      Allergies    No Known Allergies        Vitals    T(F): 97.7 (04-08-19 @ 08:03), Max: 98.3 (04-07-19 @ 10:54)  HR: 65 (04-08-19 @ 08:54) (63 - 72)  BP: 147/44 (04-08-19 @ 08:03) (113/38 - 164/51)  RR: 18 (04-08-19 @ 08:03) (18 - 18)  SpO2: 94% (04-08-19 @ 08:54) (91% - 100%)  Wt(kg): --  CAPILLARY BLOOD GLUCOSE          Labs        04-07    140  |  103  |  23<H>  ----------------------------<  131<H>  3.4<L>   |  30  |  1.22    Ca    9.1      07 Apr 2019 08:38              .Urine  04-06 @ 22:43   <10,000 CFU/mL Normal Urogenital Rosa Maria  --  --      .Blood  04-05 @ 19:49   No growth to date.  --  --        Radiology Results      Meds    MEDICATIONS  (STANDING):  ALBUTerol    90 MICROgram(s) HFA Inhaler 1 Puff(s) Inhalation every 4 hours  ALBUTerol/ipratropium for Nebulization 3 milliLiter(s) Nebulizer every 6 hours  aspirin  chewable 81 milliGRAM(s) Oral daily  dronedarone 400 milliGRAM(s) Oral two times a day  enoxaparin Injectable 30 milliGRAM(s) SubCutaneous daily  ergocalciferol 87405 Unit(s) Oral <User Schedule>  furosemide    Tablet 20 milliGRAM(s) Oral daily  hydrALAZINE 100 milliGRAM(s) Oral every 8 hours  metoprolol tartrate 100 milliGRAM(s) Oral two times a day  pantoprazole    Tablet 40 milliGRAM(s) Oral before breakfast  potassium chloride    Tablet ER 40 milliEquivalent(s) Oral once  simvastatin 40 milliGRAM(s) Oral at bedtime  spironolactone 25 milliGRAM(s) Oral daily  tiotropium 18 MICROgram(s) Capsule 1 Capsule(s) Inhalation daily      MEDICATIONS  (PRN):      Physical Exam      Neuro :  no focal deficits  Respiratory:  cta b/l  CV: RRR, S1S2, no murmurs,   Abdominal: Soft, NT, ND +BS,  Extremities: No edema, + peripheral pulses    ASSESSMENT    acute diastolic heart failure  pulm edema  h/o Pulmonary HTN  History of complete heart block  HFpEF  paroxysmal Atrial fibrillation  Hypercholesterolemia  HTN (hypertension)  Artificial pacemaker      PLAN    d/c tele,   ce q8 x3 neg noted above  echo 3/19 noted with HFpEF  cardio f/u noted  Add aldactone 25mg qd  Change lasix 20mg po qd.  inc hydralazine 100mg q8,  cont asa, lopressor, multaq.  pt to f/u with cardio outpt  pulm f/u  cont bronchodilators  cont current meds  pt stable for d/c

## 2019-04-08 NOTE — PROGRESS NOTE ADULT - PROBLEM SELECTOR PLAN 1
likely secondary to heart failure   CXR showed pulmonary congestion   Oxygen supp  Echo   aspirin, statin and BB  Lasix IV daily   Cardio follow up   R/o ACS protocol  Replace lytes  Bronchodilators prn  Pfts as OP.

## 2019-04-08 NOTE — PROGRESS NOTE ADULT - PROBLEM SELECTOR PLAN 5
DVT PPx
DVT PPx.
IMPROVE VTE Individual Risk Assessment          RISK                                                          Points  [  ] Previous VTE                                                3  [  ] Thrombophilia                                             2  [  ] Lower limb paralysis                                   2        (unable to hold up >15 seconds)    [  ] Current Cancer                                             2         (within 6 months)  [ x ] Immobilization > 24 hrs                              1  [  ] ICU/CCU stay > 24 hours                             1  [  x] Age > 60                                                         1    IMPROVE VTE Score: VTE score 2  Lovenox for Dvt prophylaxis

## 2019-04-08 NOTE — PROGRESS NOTE ADULT - SUBJECTIVE AND OBJECTIVE BOX
Patient is a 89y old  Female who presents with a chief complaint of SOB (2019 10:40)    Awake, alert, lying in bed in NAD. Feeling better. BP better controlled    INTERVAL HPI/OVERNIGHT EVENTS:      VITAL SIGNS:  T(F): 97.7 (19 @ 08:03)  HR: 65 (19 @ 08:54)  BP: 147/44 (19 @ 08:03)  RR: 18 (19 @ 08:03)  SpO2: 94% (19 @ 08:54)  Wt(kg): --  I&O's Detail    2019 07:01  -  2019 07:00  --------------------------------------------------------  IN:    Oral Fluid: 350 mL  Total IN: 350 mL    OUT:  Total OUT: 0 mL    Total NET: 350 mL              REVIEW OF SYSTEMS:    CONSTITUTIONAL:  No fevers, chills, sweats    HEENT:  Eyes:  No diplopia or blurred vision. ENT:  No earache, sore throat or runny nose.    CARDIOVASCULAR:  No pressure, squeezing, tightness, or heaviness about the chest; no palpitations.    RESPIRATORY:  Per HPI    GASTROINTESTINAL:  No abdominal pain, nausea, vomiting or diarrhea.    GENITOURINARY:  No dysuria, frequency or urgency.    NEUROLOGIC:  No paresthesias, fasciculations, seizures or weakness.    PSYCHIATRIC:  No disorder of thought or mood.      PHYSICAL EXAM:    Constitutional: Well developed and nourished  Eyes:Perrla  ENMT: normal  Neck:supple  Respiratory: + basal lung crackles  and scattered wheezing  Cardiovascular: S1 S2 regular  Gastrointestinal: Soft, Non tender  Extremities: + edema  Vascular:normal  Neurological:Awake, alert,Ox3  Musculoskeletal:Normal    MEDICATIONS  (STANDING):  ALBUTerol    90 MICROgram(s) HFA Inhaler 1 Puff(s) Inhalation every 4 hours  ALBUTerol/ipratropium for Nebulization 3 milliLiter(s) Nebulizer every 6 hours  aspirin  chewable 81 milliGRAM(s) Oral daily  dronedarone 400 milliGRAM(s) Oral two times a day  enoxaparin Injectable 30 milliGRAM(s) SubCutaneous daily  ergocalciferol 21632 Unit(s) Oral <User Schedule>  furosemide    Tablet 20 milliGRAM(s) Oral daily  hydrALAZINE 100 milliGRAM(s) Oral every 8 hours  metoprolol tartrate 100 milliGRAM(s) Oral two times a day  pantoprazole    Tablet 40 milliGRAM(s) Oral before breakfast  potassium chloride    Tablet ER 40 milliEquivalent(s) Oral once  simvastatin 40 milliGRAM(s) Oral at bedtime  spironolactone 25 milliGRAM(s) Oral daily  tiotropium 18 MICROgram(s) Capsule 1 Capsule(s) Inhalation daily    MEDICATIONS  (PRN):      Allergies    No Known Allergies    Intolerances        LABS:        140  |  103  |  23<H>  ----------------------------<  131<H>  3.4<L>   |  30  |  1.22    Ca    9.1      2019 08:38        Urinalysis Basic - ( 2019 20:07 )    Color: Yellow / Appearance: Clear / S.020 / pH: x  Gluc: x / Ketone: Negative  / Bili: Negative / Urobili: Negative   Blood: x / Protein: 15 / Nitrite: Negative   Leuk Esterase: Trace / RBC: 0-2 /HPF / WBC 6-10 /HPF   Sq Epi: x / Non Sq Epi: Moderate /HPF / Bacteria: Few /HPF            CAPILLARY BLOOD GLUCOSE        pro-bnp -- 04- @ 15:46     d-dimer 1030  - @ 15:46  pro-bnp 2291 - @ 11:15     d-dimer --  05 @ 11:15      RADIOLOGY & ADDITIONAL TESTS:    CXR:  < from: Xray Chest 1 View-PORTABLE IMMEDIATE (19 @ 11:23) >  IMPRESSION: Increased interstitial opacities are identified suggesting   acute interstitial pulmonary edema (noted CT chest evaluation performed   2019 showed no evidence for interstitial lung disease).    < end of copied text >    Ct scan chest:  < from: CT Angio Chest w/ IV Cont (19 @ 19:40) >  No evidence of pulmonary embolism. Pulmonary outflow tract, right main   pulmonary artery, left main pulmonary artery and visualized secondary   branches of the pulmonary arterial system patent.    No thoracic aortic aneurysm or pericardial effusion. Cardiac device is   visible in the soft tissues of the left anterior chest.    Central airway intact. Enlarged heterogeneous thyroid with calcification   likely related to the presence of a goiter.    No mediastinal lesions evident. No hilar lesions evident.    Small bibasilar effusions present with associated airspace   disease-compressive atelectasis.    No evidence of adrenal lesion. The spleen is not enlarged. Hypodensity   anterior left hepatic lobe likely a cyst too small to characterize. No   acute appearing osseous abnormalities.    IMPRESSION: No evidence of pulmonary embolism, see above report.    < end of copied text >    EKG:    echo: Patient is a 89y old  Female who presents with a chief complaint of SOB (2019 10:40)    Awake, alert, lying in bed in NAD. Feeling better. BP better controlled. No sob at rest.    INTERVAL HPI/OVERNIGHT EVENTS:      VITAL SIGNS:  T(F): 97.7 (19 @ 08:03)  HR: 65 (19 @ 08:54)  BP: 147/44 (19 @ 08:03)  RR: 18 (19 @ 08:03)  SpO2: 94% (19 @ 08:54)  Wt(kg): --  I&O's Detail    2019 07:01  -  2019 07:00  --------------------------------------------------------  IN:    Oral Fluid: 350 mL  Total IN: 350 mL    OUT:  Total OUT: 0 mL    Total NET: 350 mL              REVIEW OF SYSTEMS:    CONSTITUTIONAL:  No fevers, chills, sweats    HEENT:  Eyes:  No diplopia or blurred vision. ENT:  No earache, sore throat or runny nose.    CARDIOVASCULAR:  No pressure, squeezing, tightness, or heaviness about the chest; no palpitations.    RESPIRATORY:  Per HPI    GASTROINTESTINAL:  No abdominal pain, nausea, vomiting or diarrhea.    GENITOURINARY:  No dysuria, frequency or urgency.    NEUROLOGIC:  No paresthesias, fasciculations, seizures or weakness.    PSYCHIATRIC:  No disorder of thought or mood.      PHYSICAL EXAM:    Constitutional: Well developed and nourished  Eyes:Perrla  ENMT: normal  Neck:supple  Respiratory: + basal lung crackles  and scattered wheezing  Cardiovascular: S1 S2 regular  Gastrointestinal: Soft, Non tender  Extremities: + edema  Vascular:normal  Neurological:Awake, alert,Ox3  Musculoskeletal:Normal    MEDICATIONS  (STANDING):  ALBUTerol    90 MICROgram(s) HFA Inhaler 1 Puff(s) Inhalation every 4 hours  ALBUTerol/ipratropium for Nebulization 3 milliLiter(s) Nebulizer every 6 hours  aspirin  chewable 81 milliGRAM(s) Oral daily  dronedarone 400 milliGRAM(s) Oral two times a day  enoxaparin Injectable 30 milliGRAM(s) SubCutaneous daily  ergocalciferol 43746 Unit(s) Oral <User Schedule>  furosemide    Tablet 20 milliGRAM(s) Oral daily  hydrALAZINE 100 milliGRAM(s) Oral every 8 hours  metoprolol tartrate 100 milliGRAM(s) Oral two times a day  pantoprazole    Tablet 40 milliGRAM(s) Oral before breakfast  potassium chloride    Tablet ER 40 milliEquivalent(s) Oral once  simvastatin 40 milliGRAM(s) Oral at bedtime  spironolactone 25 milliGRAM(s) Oral daily  tiotropium 18 MICROgram(s) Capsule 1 Capsule(s) Inhalation daily    MEDICATIONS  (PRN):      Allergies    No Known Allergies    Intolerances        LABS:        140  |  103  |  23<H>  ----------------------------<  131<H>  3.4<L>   |  30  |  1.22    Ca    9.1      2019 08:38        Urinalysis Basic - ( 2019 20:07 )    Color: Yellow / Appearance: Clear / S.020 / pH: x  Gluc: x / Ketone: Negative  / Bili: Negative / Urobili: Negative   Blood: x / Protein: 15 / Nitrite: Negative   Leuk Esterase: Trace / RBC: 0-2 /HPF / WBC 6-10 /HPF   Sq Epi: x / Non Sq Epi: Moderate /HPF / Bacteria: Few /HPF            CAPILLARY BLOOD GLUCOSE        pro-bnp -- 04-05 @ 15:46     d-dimer 1030  - @ 15:46  pro-bnp 2291 -05 @ 11:15     d-dimer --  -05 @ 11:15      RADIOLOGY & ADDITIONAL TESTS:    CXR:  < from: Xray Chest 1 View-PORTABLE IMMEDIATE (19 @ 11:23) >  IMPRESSION: Increased interstitial opacities are identified suggesting   acute interstitial pulmonary edema (noted CT chest evaluation performed   2019 showed no evidence for interstitial lung disease).    < end of copied text >    Ct scan chest:  < from: CT Angio Chest w/ IV Cont (19 @ 19:40) >  No evidence of pulmonary embolism. Pulmonary outflow tract, right main   pulmonary artery, left main pulmonary artery and visualized secondary   branches of the pulmonary arterial system patent.    No thoracic aortic aneurysm or pericardial effusion. Cardiac device is   visible in the soft tissues of the left anterior chest.    Central airway intact. Enlarged heterogeneous thyroid with calcification   likely related to the presence of a goiter.    No mediastinal lesions evident. No hilar lesions evident.    Small bibasilar effusions present with associated airspace   disease-compressive atelectasis.    No evidence of adrenal lesion. The spleen is not enlarged. Hypodensity   anterior left hepatic lobe likely a cyst too small to characterize. No   acute appearing osseous abnormalities.    IMPRESSION: No evidence of pulmonary embolism, see above report.    < end of copied text >    EKG:    echo:

## 2019-04-08 NOTE — PROGRESS NOTE ADULT - PROVIDER SPECIALTY LIST ADULT
Cardiology
Cardiology
Internal Medicine
Pulmonology
Pulmonology

## 2019-04-08 NOTE — DISCHARGE NOTE PROVIDER - NSDCCPCAREPLAN_GEN_ALL_CORE_FT
PRINCIPAL DISCHARGE DIAGNOSIS  Diagnosis: Congestive heart failure, unspecified HF chronicity, unspecified heart failure type  Assessment and Plan of Treatment:       SECONDARY DISCHARGE DIAGNOSES  Diagnosis: HTN (hypertension)  Assessment and Plan of Treatment: HTN (hypertension)    Diagnosis: Atrial fibrillation  Assessment and Plan of Treatment: Atrial fibrillation    Diagnosis: Pulmonary HTN  Assessment and Plan of Treatment: COntinue nebulizers as prescribed, Follow up with Dr Solano. PRINCIPAL DISCHARGE DIAGNOSIS  Diagnosis: Congestive heart failure, unspecified HF chronicity, unspecified heart failure type  Assessment and Plan of Treatment: You were admitted for shortness of breath wheezing likely from your heart failure and pulmonary HTN. You were started on iv lasix and nebulizers with improvement. OCntinue with oyur medications a sprescribed. Follow up with your primary doctor in 3 days      SECONDARY DISCHARGE DIAGNOSES  Diagnosis: HTN (hypertension)  Assessment and Plan of Treatment: Your blood pressure medicaiotns were adjusted by our cardiologist: stop cozaar, added aldactone, increased fdose for your hydralazine. Please continue medications as prescribed. MOnitor your blood pressure. maintain a log and follow with your primary doctor as well as cardiologist.    Diagnosis: Atrial fibrillation  Assessment and Plan of Treatment: Please continue multaq, follow up with your cardiologist on a regular basis    Diagnosis: Pulmonary HTN  Assessment and Plan of Treatment: Continue nebulizers as prescribed, Follow up with Dr Solano.

## 2019-04-08 NOTE — PROGRESS NOTE ADULT - SUBJECTIVE AND OBJECTIVE BOX
CHIEF COMPLAINT:Patient is a 89y old  Female who presents with a chief complaint of SOB.Pt appears comfortable.    	  REVIEW OF SYSTEMS:  CONSTITUTIONAL: No fever, weight loss, or fatigue  EYES: No eye pain, visual disturbances, or discharge  ENT:  No difficulty hearing, tinnitus, vertigo; No sinus or throat pain  NECK: No pain or stiffness  RESPIRATORY: No cough, wheezing, chills or hemoptysis; No Shortness of Breath  CARDIOVASCULAR: No chest pain, palpitations, passing out, dizziness, or leg swelling  GASTROINTESTINAL: No abdominal or epigastric pain. No nausea, vomiting, or hematemesis; No diarrhea or constipation. No melena or hematochezia.  GENITOURINARY: No dysuria, frequency, hematuria, or incontinence  NEUROLOGICAL: No headaches, memory loss, loss of strength, numbness, or tremors  SKIN: No itching, burning, rashes, or lesions   LYMPH Nodes: No enlarged glands  ENDOCRINE: No heat or cold intolerance; No hair loss  MUSCULOSKELETAL: No joint pain or swelling; No muscle, back, or extremity pain  PSYCHIATRIC: No depression, anxiety, mood swings, or difficulty sleeping  HEME/LYMPH: No easy bruising, or bleeding gums  ALLERGY AND IMMUNOLOGIC: No hives or eczema	      PHYSICAL EXAM:  T(C): 36.5 (04-08-19 @ 08:03), Max: 36.8 (04-07-19 @ 10:54)  HR: 63 (04-08-19 @ 08:03) (63 - 72)  BP: 147/44 (04-08-19 @ 08:03) (113/38 - 164/51)  RR: 18 (04-08-19 @ 08:03) (18 - 18)  SpO2: 95% (04-08-19 @ 08:03) (91% - 100%)  Wt(kg): --  I&O's Summary    07 Apr 2019 07:01  -  08 Apr 2019 07:00  --------------------------------------------------------  IN: 350 mL / OUT: 0 mL / NET: 350 mL        Appearance: Normal	  HEENT:   Normal oral mucosa, PERRL, EOMI	  Lymphatic: No lymphadenopathy  Cardiovascular: Normal S1 S2, No JVD, No murmurs, No edema  Respiratory: Lungs clear to auscultation	  Psychiatry: A & O x 3, Mood & affect appropriate  Gastrointestinal:  Soft, Non-tender, + BS	  Skin: No rashes, No ecchymoses, No cyanosis	  Neurologic: Non-focal  Extremities: Normal range of motion, No clubbing, cyanosis or edema  Vascular: Peripheral pulses palpable 2+ bilaterally    MEDICATIONS  (STANDING):  ALBUTerol    90 MICROgram(s) HFA Inhaler 1 Puff(s) Inhalation every 4 hours  ALBUTerol/ipratropium for Nebulization 3 milliLiter(s) Nebulizer every 6 hours  aspirin  chewable 81 milliGRAM(s) Oral daily  dronedarone 400 milliGRAM(s) Oral two times a day  enoxaparin Injectable 30 milliGRAM(s) SubCutaneous daily  ergocalciferol 28223 Unit(s) Oral <User Schedule>  furosemide    Tablet 20 milliGRAM(s) Oral daily  hydrALAZINE 100 milliGRAM(s) Oral every 8 hours  metoprolol tartrate 100 milliGRAM(s) Oral two times a day  pantoprazole    Tablet 40 milliGRAM(s) Oral before breakfast  simvastatin 40 milliGRAM(s) Oral at bedtime  spironolactone 25 milliGRAM(s) Oral daily  tiotropium 18 MICROgram(s) Capsule 1 Capsule(s) Inhalation daily        	  LABS:	 	      04-07    140  |  103  |  23<H>  ----------------------------<  131<H>  3.4<L>   |  30  |  1.22    Ca    9.1      07 Apr 2019 08:38      proBNP: Serum Pro-Brain Natriuretic Peptide: 2291 pg/mL (04-05 @ 11:15)  Serum Pro-Brain Natriuretic Peptide: 822 pg/mL (03-19 @ 13:33)    Lipid Profile: Cholesterol 149  LDL 75  HDL 65  TG 43    HgA1c: Hemoglobin A1C, Whole Blood: 5.9 % (03-20 @ 09:49)    TSH: Thyroid Stimulating Hormone, Serum: 0.39 uU/mL (04-07 @ 08:38)  Thyroid Stimulating Hormone, Serum: 0.38 uU/mL (03-20 @ 07:52)

## 2019-04-08 NOTE — PROGRESS NOTE ADULT - PROBLEM SELECTOR PLAN 3
Cont meds  Tele monitoring   Cardio follow up. Cont meds  Tele monitoring   Cardio follow up.  Replace lytes

## 2019-04-08 NOTE — DISCHARGE NOTE NURSING/CASE MANAGEMENT/SOCIAL WORK - NSDCDPATPORTLINK_GEN_ALL_CORE
You can access the [facility] Patient Portal, offered by [facility], by registering with the following website: [email or web address]

## 2019-04-08 NOTE — PROGRESS NOTE ADULT - ASSESSMENT
89 yr old F, from home, AAOx3, lives with daughter w/ PMHx Complete Heart Block (s/p PPM), HTN, HLD, Afib (on Multaq), pulmonary HTN p/w SOB and wheezing x yesterday,acute diastolic hf.  1.D/C Tele monitoring.  2.Add aldactone 25mg qd  3.Change lasix 20mg po qd.  4.HTN-inc hydralazine 100mg q8,cont rest of BP medication.  6.PAF-asa,b blocker,multaq.  7.PPI.  8.GI and DVT prophylaxis.

## 2019-04-08 NOTE — PROGRESS NOTE ADULT - PROBLEM SELECTOR PLAN 2
Bronchodilators  Oxygen supp  CCB.
Bronchodilators  Oxygen supp  CCB.
s/p on PPM  - continue with Multaq and metoprolol    - aspirin for Stroke prophyalxis

## 2019-04-08 NOTE — DISCHARGE NOTE NURSING/CASE MANAGEMENT/SOCIAL WORK - NSDCPEPT PROEDHF_GEN_ALL_CORE
Activities as tolerated/Call primary care provider for follow up after discharge/Report signs and symptoms to primary care provider/Low salt diet/Monitor weight daily

## 2019-04-15 ENCOUNTER — INPATIENT (INPATIENT)
Facility: HOSPITAL | Age: 84
LOS: 2 days | Discharge: HOME | DRG: 872 | End: 2019-04-18
Attending: INTERNAL MEDICINE | Admitting: INTERNAL MEDICINE
Payer: COMMERCIAL

## 2019-04-15 VITALS
HEIGHT: 63 IN | TEMPERATURE: 101 F | SYSTOLIC BLOOD PRESSURE: 133 MMHG | OXYGEN SATURATION: 97 % | RESPIRATION RATE: 18 BRPM | WEIGHT: 160.06 LBS | DIASTOLIC BLOOD PRESSURE: 69 MMHG | HEART RATE: 78 BPM

## 2019-04-15 DIAGNOSIS — N39.0 URINARY TRACT INFECTION, SITE NOT SPECIFIED: ICD-10-CM

## 2019-04-15 DIAGNOSIS — Z95.0 PRESENCE OF CARDIAC PACEMAKER: Chronic | ICD-10-CM

## 2019-04-15 LAB
ALBUMIN SERPL ELPH-MCNC: 3.1 G/DL — LOW (ref 3.5–5)
ALP SERPL-CCNC: 64 U/L — SIGNIFICANT CHANGE UP (ref 40–120)
ALT FLD-CCNC: 21 U/L DA — SIGNIFICANT CHANGE UP (ref 10–60)
ANION GAP SERPL CALC-SCNC: 7 MMOL/L — SIGNIFICANT CHANGE UP (ref 5–17)
APPEARANCE UR: CLEAR — SIGNIFICANT CHANGE UP
APTT BLD: 26.8 SEC — LOW (ref 27.5–36.3)
AST SERPL-CCNC: 15 U/L — SIGNIFICANT CHANGE UP (ref 10–40)
BASOPHILS # BLD AUTO: 0 K/UL — SIGNIFICANT CHANGE UP (ref 0–0.2)
BASOPHILS NFR BLD AUTO: 0 % — SIGNIFICANT CHANGE UP (ref 0–2)
BILIRUB SERPL-MCNC: 0.5 MG/DL — SIGNIFICANT CHANGE UP (ref 0.2–1.2)
BILIRUB UR-MCNC: NEGATIVE — SIGNIFICANT CHANGE UP
BUN SERPL-MCNC: 26 MG/DL — HIGH (ref 7–18)
CALCIUM SERPL-MCNC: 8.6 MG/DL — SIGNIFICANT CHANGE UP (ref 8.4–10.5)
CHLORIDE SERPL-SCNC: 102 MMOL/L — SIGNIFICANT CHANGE UP (ref 96–108)
CO2 SERPL-SCNC: 27 MMOL/L — SIGNIFICANT CHANGE UP (ref 22–31)
COLOR SPEC: YELLOW — SIGNIFICANT CHANGE UP
CREAT SERPL-MCNC: 1.81 MG/DL — HIGH (ref 0.5–1.3)
DIFF PNL FLD: NEGATIVE — SIGNIFICANT CHANGE UP
EOSINOPHIL # BLD AUTO: 0 K/UL — SIGNIFICANT CHANGE UP (ref 0–0.5)
EOSINOPHIL NFR BLD AUTO: 0 % — SIGNIFICANT CHANGE UP (ref 0–6)
FLU A RESULT: SIGNIFICANT CHANGE UP
FLU A RESULT: SIGNIFICANT CHANGE UP
FLUAV AG NPH QL: SIGNIFICANT CHANGE UP
FLUBV AG NPH QL: SIGNIFICANT CHANGE UP
GLUCOSE SERPL-MCNC: 123 MG/DL — HIGH (ref 70–99)
GLUCOSE UR QL: NEGATIVE — SIGNIFICANT CHANGE UP
HCT VFR BLD CALC: 32.6 % — LOW (ref 34.5–45)
HGB BLD-MCNC: 10.7 G/DL — LOW (ref 11.5–15.5)
INR BLD: 1.27 RATIO — HIGH (ref 0.88–1.16)
KETONES UR-MCNC: NEGATIVE — SIGNIFICANT CHANGE UP
LACTATE SERPL-SCNC: 2 MMOL/L — SIGNIFICANT CHANGE UP (ref 0.7–2)
LEUKOCYTE ESTERASE UR-ACNC: ABNORMAL
LYMPHOCYTES # BLD AUTO: 0.66 K/UL — LOW (ref 1–3.3)
LYMPHOCYTES # BLD AUTO: 3 % — LOW (ref 13–44)
MCHC RBC-ENTMCNC: 31.3 PG — SIGNIFICANT CHANGE UP (ref 27–34)
MCHC RBC-ENTMCNC: 32.8 GM/DL — SIGNIFICANT CHANGE UP (ref 32–36)
MCV RBC AUTO: 95.3 FL — SIGNIFICANT CHANGE UP (ref 80–100)
MONOCYTES # BLD AUTO: 1.1 K/UL — HIGH (ref 0–0.9)
MONOCYTES NFR BLD AUTO: 5 % — SIGNIFICANT CHANGE UP (ref 2–14)
NEUTROPHILS # BLD AUTO: 20.26 K/UL — HIGH (ref 1.8–7.4)
NEUTROPHILS NFR BLD AUTO: 92 % — HIGH (ref 43–77)
NITRITE UR-MCNC: NEGATIVE — SIGNIFICANT CHANGE UP
PH UR: 5 — SIGNIFICANT CHANGE UP (ref 5–8)
PLATELET # BLD AUTO: 168 K/UL — SIGNIFICANT CHANGE UP (ref 150–400)
POTASSIUM SERPL-MCNC: 3.3 MMOL/L — LOW (ref 3.5–5.3)
POTASSIUM SERPL-SCNC: 3.3 MMOL/L — LOW (ref 3.5–5.3)
PROT SERPL-MCNC: 6.7 G/DL — SIGNIFICANT CHANGE UP (ref 6–8.3)
PROT UR-MCNC: 30 MG/DL
PROTHROM AB SERPL-ACNC: 14.2 SEC — HIGH (ref 10–12.9)
RBC # BLD: 3.42 M/UL — LOW (ref 3.8–5.2)
RBC # FLD: 13.4 % — SIGNIFICANT CHANGE UP (ref 10.3–14.5)
RSV RESULT: SIGNIFICANT CHANGE UP
RSV RNA RESP QL NAA+PROBE: SIGNIFICANT CHANGE UP
SODIUM SERPL-SCNC: 136 MMOL/L — SIGNIFICANT CHANGE UP (ref 135–145)
SP GR SPEC: 1.01 — SIGNIFICANT CHANGE UP (ref 1.01–1.02)
UROBILINOGEN FLD QL: NEGATIVE — SIGNIFICANT CHANGE UP
WBC # BLD: 22.02 K/UL — HIGH (ref 3.8–10.5)
WBC # FLD AUTO: 22.02 K/UL — HIGH (ref 3.8–10.5)

## 2019-04-15 PROCEDURE — 71045 X-RAY EXAM CHEST 1 VIEW: CPT | Mod: 26

## 2019-04-15 PROCEDURE — 71250 CT THORAX DX C-: CPT | Mod: 26

## 2019-04-15 PROCEDURE — 74176 CT ABD & PELVIS W/O CONTRAST: CPT | Mod: 26

## 2019-04-15 PROCEDURE — 99291 CRITICAL CARE FIRST HOUR: CPT

## 2019-04-15 RX ORDER — METRONIDAZOLE 500 MG
TABLET ORAL
Qty: 0 | Refills: 0 | Status: DISCONTINUED | OUTPATIENT
Start: 2019-04-16 | End: 2019-04-17

## 2019-04-15 RX ORDER — CEFTRIAXONE 500 MG/1
1 INJECTION, POWDER, FOR SOLUTION INTRAMUSCULAR; INTRAVENOUS ONCE
Qty: 0 | Refills: 0 | Status: COMPLETED | OUTPATIENT
Start: 2019-04-15 | End: 2019-04-15

## 2019-04-15 RX ORDER — METRONIDAZOLE 500 MG
500 TABLET ORAL EVERY 8 HOURS
Qty: 0 | Refills: 0 | Status: DISCONTINUED | OUTPATIENT
Start: 2019-04-16 | End: 2019-04-17

## 2019-04-15 RX ORDER — VANCOMYCIN HCL 1 G
125 VIAL (EA) INTRAVENOUS EVERY 6 HOURS
Qty: 0 | Refills: 0 | Status: DISCONTINUED | OUTPATIENT
Start: 2019-04-15 | End: 2019-04-17

## 2019-04-15 RX ORDER — ACETAMINOPHEN 500 MG
975 TABLET ORAL ONCE
Qty: 0 | Refills: 0 | Status: COMPLETED | OUTPATIENT
Start: 2019-04-15 | End: 2019-04-15

## 2019-04-15 RX ORDER — SODIUM CHLORIDE 9 MG/ML
500 INJECTION INTRAMUSCULAR; INTRAVENOUS; SUBCUTANEOUS ONCE
Qty: 0 | Refills: 0 | Status: COMPLETED | OUTPATIENT
Start: 2019-04-15 | End: 2019-04-15

## 2019-04-15 RX ORDER — METRONIDAZOLE 500 MG
500 TABLET ORAL ONCE
Qty: 0 | Refills: 0 | Status: COMPLETED | OUTPATIENT
Start: 2019-04-15 | End: 2019-04-16

## 2019-04-15 RX ADMIN — Medication 975 MILLIGRAM(S): at 19:13

## 2019-04-15 RX ADMIN — SODIUM CHLORIDE 1000 MILLILITER(S): 9 INJECTION INTRAMUSCULAR; INTRAVENOUS; SUBCUTANEOUS at 15:02

## 2019-04-15 RX ADMIN — CEFTRIAXONE 100 GRAM(S): 500 INJECTION, POWDER, FOR SOLUTION INTRAMUSCULAR; INTRAVENOUS at 19:56

## 2019-04-15 RX ADMIN — Medication 975 MILLIGRAM(S): at 15:02

## 2019-04-15 RX ADMIN — SODIUM CHLORIDE 1000 MILLILITER(S): 9 INJECTION INTRAMUSCULAR; INTRAVENOUS; SUBCUTANEOUS at 19:13

## 2019-04-15 NOTE — ED PROVIDER NOTE - OBJECTIVE STATEMENT
89 y/o F with a significant PMHx of atrial fibrillation, HTN, hypercholesterolemia, history of complete heart block and a significant PSHx of artificial pacemaker presents to the ED with her daughter with complaints of diarrhea x 5 days and fever x 2 days. Patient's daughter states that the patient complained of sore abdominal pain, resolved now. Patient's daughter reports the diarrhea looks pureed and is yellow; 2 episodes in the past 2 days. Patient's daughter states patient has not been eating as much as usual. Patient received Motrin last night. Denies cough, congestion, rhinorrhea, or any other complaints. 89 y/o F with a significant PMHx of atrial fibrillation, HTN, hypercholesterolemia, history of complete heart block and a significant PSHx of artificial pacemaker presents to the ED with her daughter with complaints of diarrhea x 5 days and fever x 2 days. Patient's daughter states that the patient complained of sore abdominal pain, resolved now. Patient's daughter reports the diarrhea looks pureed and is yellow, non-watery; 2 episodes in the past 2 days. Patient's daughter states patient has not been eating as much as usual. Patient received Motrin last night. Denies cough, congestion, rhinorrhea, or any other complaints. Was just discharged with pulmonary HTN, last abx use 1 month ago.

## 2019-04-15 NOTE — ED PROVIDER NOTE - CROS ED ENMT ALL NEG
% Increase/Decrease From Last Treatment: 25 Post-Care Instructions: I reviewed with the patient in detail post-care instructions. Patient should stay away from the sun and wear sun protection until treated areas are fully healed. Location #2: right elbow Patient Id:  Dose Setting #2 (Mj/Cm2): 468 Total Pulses (Will Not Render If 0): 0 Total Energy In Joules: 136 Treatment Number: 3 Consent: Written consent obtained, risks reviewed including but not limited to crusting, scabbing, blistering, scarring, darker or lighter pigmentary change, incidental hair removal, bruising, and/or incomplete removal. Total Square Area In Cm2 (Required For Proper Billing): 63.78 Detail Level: Simple Location #1: scalp - - -

## 2019-04-15 NOTE — H&P ADULT - PROBLEM SELECTOR PLAN 5
Will hold off from antihypertensives in setting of sepsis w/ SBP: 130's.  Monitor BP and start medication when feasible

## 2019-04-15 NOTE — H&P ADULT - PROBLEM/PLAN-3
Hypothyroid Medications  Protocol Criteria:  Appointment scheduled in the past 12 months or the next 3 months  TSH resulted in the past 12 months that is normal  Recent Outpatient Visits            9 months ago Hamstring muscle strain, left, initial encoun DISPLAY PLAN FREE TEXT

## 2019-04-15 NOTE — ED PROVIDER NOTE - CARE PLAN
Principal Discharge DX:	UTI (urinary tract infection)  Secondary Diagnosis:	Sepsis  Secondary Diagnosis:	CAMILLE (acute kidney injury)

## 2019-04-15 NOTE — ED PROVIDER NOTE - PHYSICAL EXAMINATION
Febrile, hemodynamically stable, saturating well  NAD, non toxic appearing  Head NCAT  EOMI grossly, anicteric  MM dry  RRR, nml S1/S2, no m/r/g  Lungs CTAB, no w/r/r  Abd soft, NT, ND, nml BS, no rebound or guarding  AAO, CN's 3-12 grossly intact  HERNANDEZ spontaneously, no leg cyanosis or edema  Skin warm, dry, no rashes or hives

## 2019-04-15 NOTE — H&P ADULT - ASSESSMENT
89F, from home, lives w/ daughter, w/ PMHx Complete Heart Block (s/p PPM), HTN, HLD, Afib (on Multaq), pulmonary HTN , p/w diarrhea and chills x 1 week. 89F, from home, lives w/ daughter, w/ PMHx Complete Heart Block (s/p PPM), HTN, HLD, Afib (on Multaq), pulmonary HTN , p/w diarrhea and chills x 1 week.     ED course: vitals significant for T: 101.2F, labs significant for WBC: 22 w/ left shift, lactate 2.0, BUN/Cr: 26/1.8, K: 3.3    CT A/P : Severe colitis involving the ascending colon and to a lesser degree the transverse colon.    Patient admitted for sepsis 2/2 colitis, will need to r/o C diff given recent hospitalization

## 2019-04-15 NOTE — ED PROVIDER NOTE - CLINICAL SUMMARY MEDICAL DECISION MAKING FREE TEXT BOX
No cellulitis, murmur of endocarditis, meningeal signs. Abdomen benign with low suspicion for acute process. No e/o PNA on exam. In light of high leukocytosis, will obtain CT C/A/P to r/o other etiology. Given abx, fluids. Not given full 30/kg fluid bolus in light of strong h/o CHF, including admission 4 days ago for fluid overload and LE edema. Signed off care to Dr. PATT Roland who will f/u pan-scan and admit.

## 2019-04-15 NOTE — ED PROVIDER NOTE - PROGRESS NOTE DETAILS
Signed off care to Dr. PATT Roland, will f/u CT's and reassess, anticipate admission I reassessed the patient, reviewed vital signs. <2 sec capillary refill, 2+ radial pulse, skin warm and dry. CT  reveals severe colitis, concern for c-diff given recent hospitalization. admit for isolation bed.

## 2019-04-15 NOTE — H&P ADULT - PROBLEM SELECTOR PLAN 1
2/2 severe R sided colitis, recently hospitalized   c/w Vanco 125 mg q6hrs + Flagyl 500 mg IV q 8hrs for now until C diff ruled out  c/w isolation precautions for now  Keep NPO, c/w IVF  Monitor BMP and replace electrolytes as needed 2/2 severe R sided colitis, recently hospitalized   c/w Vanco 125 mg q6hrs + Flagyl 500 mg IV q 8hrs for now until C diff ruled out  c/w isolation precautions for now  Keep NPO, c/w IVF  Monitor BMP and replace electrolytes as needed  Follow up C diff PCR, Stool Cx, BCx

## 2019-04-15 NOTE — H&P ADULT - HISTORY OF PRESENT ILLNESS
89F, from home, lives w/ daughter, w/ PMHx Complete Heart Block (s/p PPM), HTN, HLD, Afib (on Multaq), pulmonary HTN , p/w diarrhea and chills x 1 week. Patient was recently discharged from [facility] on 3/23 after being admitted for CHF exacerbation. Daughter mentions patient started developing symptoms several days after being discharged, w/ up to 4-5x loose bowel BM's. Daughter mentions patient could not make it to the bathroom and was having multiple accidents in bedroom. Mentions giving Imodium w/ some relief of diarrhea, however, diarrhea has worsened and now is associated w/ chills and LLQ pain. Patient denies N/V, melena, hematochezia, recent abx use. 89F, from home, lives w/ daughter, w/ PMHx Complete Heart Block (s/p PPM), HTN, HLD, Afib (on Multaq), pulmonary HTN , p/w diarrhea and chills x 1 week. Patient was recently discharged from [facility] on 3/23 after being admitted for CHF exacerbation. Daughter mentions patient started developing symptoms several days after being discharged, w/ up to 4-5x loose bowel BM's. Daughter mentions patient could not make it to the bathroom and was having multiple accidents in bedroom. Mentions giving Imodium w/ some relief of diarrhea, however, diarrhea has worsened and now is associated w/ chills and diffuse abdominal cramps. Patient denies N/V, melena, hematochezia, recent abx use. 89F, from home, lives w/ daughter, w/ PMHx Complete Heart Block (s/p PPM), HTN, HLD, Afib (on Multaq), pulmonary HTN , p/w diarrhea and chills x 1 week. Patient was recently discharged from [facility] on 3/23 after being admitted for CHF exacerbation. Daughter mentions patient started developing symptoms several days after being discharged, w/ up to 4-5x loose, foul smell, mucoid stools. Daughter mentions patient could not make it to the bathroom and was having multiple accidents in bedroom. Mentions giving Imodium w/ some relief of diarrhea, however, diarrhea has worsened and now is associated w/ chills and diffuse abdominal cramps. Patient denies N/V, melena, hematochezia, recent abx use.

## 2019-04-15 NOTE — H&P ADULT - PROBLEM SELECTOR PLAN 2
Likely pre-renal in setting of diarrhea/sepsis  BUN/Cr: 26/1.8, K: 3.3 (replacing)  s/p 1L NS, c/w IVF @75cc/hr x 12 hrs  Follow up BMP in AM

## 2019-04-15 NOTE — H&P ADULT - PROBLEM SELECTOR PLAN 4
c/w Multaq and Lopressor 25 mg BID (on 100 mg BID at home, but given sepsis will lower dose)  Monitor vitals and up-titrate lopressor as needed

## 2019-04-16 DIAGNOSIS — I10 ESSENTIAL (PRIMARY) HYPERTENSION: ICD-10-CM

## 2019-04-16 DIAGNOSIS — N17.9 ACUTE KIDNEY FAILURE, UNSPECIFIED: ICD-10-CM

## 2019-04-16 DIAGNOSIS — I48.91 UNSPECIFIED ATRIAL FIBRILLATION: ICD-10-CM

## 2019-04-16 DIAGNOSIS — N39.0 URINARY TRACT INFECTION, SITE NOT SPECIFIED: ICD-10-CM

## 2019-04-16 DIAGNOSIS — Z29.9 ENCOUNTER FOR PROPHYLACTIC MEASURES, UNSPECIFIED: ICD-10-CM

## 2019-04-16 DIAGNOSIS — Z71.89 OTHER SPECIFIED COUNSELING: ICD-10-CM

## 2019-04-16 DIAGNOSIS — A41.9 SEPSIS, UNSPECIFIED ORGANISM: ICD-10-CM

## 2019-04-16 LAB
24R-OH-CALCIDIOL SERPL-MCNC: 16.1 NG/ML — LOW (ref 30–80)
ANION GAP SERPL CALC-SCNC: 11 MMOL/L — SIGNIFICANT CHANGE UP (ref 5–17)
APTT BLD: 22.6 SEC — LOW (ref 27.5–36.3)
BASOPHILS # BLD AUTO: 0.08 K/UL — SIGNIFICANT CHANGE UP (ref 0–0.2)
BASOPHILS NFR BLD AUTO: 0.4 % — SIGNIFICANT CHANGE UP (ref 0–2)
BUN SERPL-MCNC: 26 MG/DL — HIGH (ref 7–18)
C DIFF BY PCR RESULT: DETECTED
C DIFF TOX GENS STL QL NAA+PROBE: SIGNIFICANT CHANGE UP
CALCIUM SERPL-MCNC: 8.4 MG/DL — SIGNIFICANT CHANGE UP (ref 8.4–10.5)
CHLORIDE SERPL-SCNC: 108 MMOL/L — SIGNIFICANT CHANGE UP (ref 96–108)
CHOLEST SERPL-MCNC: 122 MG/DL — SIGNIFICANT CHANGE UP (ref 10–199)
CO2 SERPL-SCNC: 21 MMOL/L — LOW (ref 22–31)
CREAT SERPL-MCNC: 1.42 MG/DL — HIGH (ref 0.5–1.3)
CULTURE RESULTS: SIGNIFICANT CHANGE UP
EOSINOPHIL # BLD AUTO: 0.09 K/UL — SIGNIFICANT CHANGE UP (ref 0–0.5)
EOSINOPHIL NFR BLD AUTO: 0.5 % — SIGNIFICANT CHANGE UP (ref 0–6)
FOLATE SERPL-MCNC: 9.3 NG/ML — SIGNIFICANT CHANGE UP
GLUCOSE SERPL-MCNC: 101 MG/DL — HIGH (ref 70–99)
HBA1C BLD-MCNC: 5.8 % — HIGH (ref 4–5.6)
HCT VFR BLD CALC: 31.7 % — LOW (ref 34.5–45)
HDLC SERPL-MCNC: 42 MG/DL — LOW
HGB BLD-MCNC: 10.5 G/DL — LOW (ref 11.5–15.5)
IMM GRANULOCYTES NFR BLD AUTO: 0.9 % — SIGNIFICANT CHANGE UP (ref 0–1.5)
INR BLD: 1.3 RATIO — HIGH (ref 0.88–1.16)
LIPID PNL WITH DIRECT LDL SERPL: 67 MG/DL — SIGNIFICANT CHANGE UP
LYMPHOCYTES # BLD AUTO: 0.69 K/UL — LOW (ref 1–3.3)
LYMPHOCYTES # BLD AUTO: 3.8 % — LOW (ref 13–44)
MAGNESIUM SERPL-MCNC: 2 MG/DL — SIGNIFICANT CHANGE UP (ref 1.6–2.6)
MCHC RBC-ENTMCNC: 31.5 PG — SIGNIFICANT CHANGE UP (ref 27–34)
MCHC RBC-ENTMCNC: 33.1 GM/DL — SIGNIFICANT CHANGE UP (ref 32–36)
MCV RBC AUTO: 95.2 FL — SIGNIFICANT CHANGE UP (ref 80–100)
MONOCYTES # BLD AUTO: 1.34 K/UL — HIGH (ref 0–0.9)
MONOCYTES NFR BLD AUTO: 7.4 % — SIGNIFICANT CHANGE UP (ref 2–14)
NEUTROPHILS # BLD AUTO: 15.81 K/UL — HIGH (ref 1.8–7.4)
NEUTROPHILS NFR BLD AUTO: 87 % — HIGH (ref 43–77)
NRBC # BLD: 0 /100 WBCS — SIGNIFICANT CHANGE UP (ref 0–0)
PHOSPHATE SERPL-MCNC: 2.6 MG/DL — SIGNIFICANT CHANGE UP (ref 2.5–4.5)
PLATELET # BLD AUTO: 148 K/UL — LOW (ref 150–400)
POTASSIUM SERPL-MCNC: 3.3 MMOL/L — LOW (ref 3.5–5.3)
POTASSIUM SERPL-SCNC: 3.3 MMOL/L — LOW (ref 3.5–5.3)
PROTHROM AB SERPL-ACNC: 14.6 SEC — HIGH (ref 10–12.9)
RBC # BLD: 3.33 M/UL — LOW (ref 3.8–5.2)
RBC # FLD: 13.2 % — SIGNIFICANT CHANGE UP (ref 10.3–14.5)
SODIUM SERPL-SCNC: 140 MMOL/L — SIGNIFICANT CHANGE UP (ref 135–145)
SPECIMEN SOURCE: SIGNIFICANT CHANGE UP
TOTAL CHOLESTEROL/HDL RATIO MEASUREMENT: 2.9 RATIO — LOW (ref 3.3–7.1)
TRIGL SERPL-MCNC: 67 MG/DL — SIGNIFICANT CHANGE UP (ref 10–149)
TSH SERPL-MCNC: 0.25 UU/ML — LOW (ref 0.34–4.82)
VIT B12 SERPL-MCNC: 1935 PG/ML — HIGH (ref 232–1245)
WBC # BLD: 18.17 K/UL — HIGH (ref 3.8–10.5)
WBC # FLD AUTO: 18.17 K/UL — HIGH (ref 3.8–10.5)

## 2019-04-16 RX ORDER — IPRATROPIUM/ALBUTEROL SULFATE 18-103MCG
3 AEROSOL WITH ADAPTER (GRAM) INHALATION EVERY 6 HOURS
Qty: 0 | Refills: 0 | Status: DISCONTINUED | OUTPATIENT
Start: 2019-04-16 | End: 2019-04-18

## 2019-04-16 RX ORDER — POTASSIUM CHLORIDE 20 MEQ
40 PACKET (EA) ORAL ONCE
Qty: 0 | Refills: 0 | Status: COMPLETED | OUTPATIENT
Start: 2019-04-16 | End: 2019-04-16

## 2019-04-16 RX ORDER — SODIUM CHLORIDE 9 MG/ML
1000 INJECTION, SOLUTION INTRAVENOUS
Qty: 0 | Refills: 0 | Status: DISCONTINUED | OUTPATIENT
Start: 2019-04-16 | End: 2019-04-18

## 2019-04-16 RX ORDER — ASPIRIN/CALCIUM CARB/MAGNESIUM 324 MG
81 TABLET ORAL DAILY
Qty: 0 | Refills: 0 | Status: DISCONTINUED | OUTPATIENT
Start: 2019-04-16 | End: 2019-04-18

## 2019-04-16 RX ORDER — DRONEDARONE 400 MG/1
400 TABLET, FILM COATED ORAL
Qty: 0 | Refills: 0 | Status: DISCONTINUED | OUTPATIENT
Start: 2019-04-16 | End: 2019-04-18

## 2019-04-16 RX ORDER — CEFTRIAXONE 500 MG/1
1 INJECTION, POWDER, FOR SOLUTION INTRAMUSCULAR; INTRAVENOUS EVERY 24 HOURS
Qty: 0 | Refills: 0 | Status: DISCONTINUED | OUTPATIENT
Start: 2019-04-17 | End: 2019-04-17

## 2019-04-16 RX ORDER — POTASSIUM CHLORIDE 20 MEQ
10 PACKET (EA) ORAL
Qty: 0 | Refills: 0 | Status: COMPLETED | OUTPATIENT
Start: 2019-04-16 | End: 2019-04-16

## 2019-04-16 RX ORDER — CEFTRIAXONE 500 MG/1
1 INJECTION, POWDER, FOR SOLUTION INTRAMUSCULAR; INTRAVENOUS ONCE
Qty: 0 | Refills: 0 | Status: COMPLETED | OUTPATIENT
Start: 2019-04-16 | End: 2019-04-16

## 2019-04-16 RX ORDER — CEFTRIAXONE 500 MG/1
INJECTION, POWDER, FOR SOLUTION INTRAMUSCULAR; INTRAVENOUS
Qty: 0 | Refills: 0 | Status: DISCONTINUED | OUTPATIENT
Start: 2019-04-16 | End: 2019-04-17

## 2019-04-16 RX ORDER — SPIRONOLACTONE 25 MG/1
25 TABLET, FILM COATED ORAL DAILY
Qty: 0 | Refills: 0 | Status: DISCONTINUED | OUTPATIENT
Start: 2019-04-16 | End: 2019-04-16

## 2019-04-16 RX ORDER — FUROSEMIDE 40 MG
20 TABLET ORAL DAILY
Qty: 0 | Refills: 0 | Status: DISCONTINUED | OUTPATIENT
Start: 2019-04-16 | End: 2019-04-16

## 2019-04-16 RX ORDER — HEPARIN SODIUM 5000 [USP'U]/ML
5000 INJECTION INTRAVENOUS; SUBCUTANEOUS EVERY 8 HOURS
Qty: 0 | Refills: 0 | Status: DISCONTINUED | OUTPATIENT
Start: 2019-04-16 | End: 2019-04-18

## 2019-04-16 RX ORDER — SIMVASTATIN 20 MG/1
40 TABLET, FILM COATED ORAL AT BEDTIME
Qty: 0 | Refills: 0 | Status: DISCONTINUED | OUTPATIENT
Start: 2019-04-16 | End: 2019-04-18

## 2019-04-16 RX ORDER — ACETAMINOPHEN 500 MG
650 TABLET ORAL EVERY 6 HOURS
Qty: 0 | Refills: 0 | Status: DISCONTINUED | OUTPATIENT
Start: 2019-04-16 | End: 2019-04-18

## 2019-04-16 RX ORDER — METOPROLOL TARTRATE 50 MG
25 TABLET ORAL
Qty: 0 | Refills: 0 | Status: DISCONTINUED | OUTPATIENT
Start: 2019-04-16 | End: 2019-04-17

## 2019-04-16 RX ADMIN — Medication 100 MILLIGRAM(S): at 02:00

## 2019-04-16 RX ADMIN — Medication 125 MILLIGRAM(S): at 11:36

## 2019-04-16 RX ADMIN — DRONEDARONE 400 MILLIGRAM(S): 400 TABLET, FILM COATED ORAL at 18:01

## 2019-04-16 RX ADMIN — Medication 100 MILLIEQUIVALENT(S): at 02:00

## 2019-04-16 RX ADMIN — Medication 100 MILLIGRAM(S): at 23:01

## 2019-04-16 RX ADMIN — Medication 125 MILLIGRAM(S): at 18:22

## 2019-04-16 RX ADMIN — Medication 100 MILLIEQUIVALENT(S): at 07:00

## 2019-04-16 RX ADMIN — Medication 25 MILLIGRAM(S): at 06:13

## 2019-04-16 RX ADMIN — Medication 125 MILLIGRAM(S): at 05:59

## 2019-04-16 RX ADMIN — HEPARIN SODIUM 5000 UNIT(S): 5000 INJECTION INTRAVENOUS; SUBCUTANEOUS at 06:13

## 2019-04-16 RX ADMIN — Medication 100 MILLIGRAM(S): at 13:50

## 2019-04-16 RX ADMIN — HEPARIN SODIUM 5000 UNIT(S): 5000 INJECTION INTRAVENOUS; SUBCUTANEOUS at 23:01

## 2019-04-16 RX ADMIN — Medication 125 MILLIGRAM(S): at 23:01

## 2019-04-16 RX ADMIN — Medication 100 MILLIEQUIVALENT(S): at 04:00

## 2019-04-16 RX ADMIN — HEPARIN SODIUM 5000 UNIT(S): 5000 INJECTION INTRAVENOUS; SUBCUTANEOUS at 13:50

## 2019-04-16 RX ADMIN — Medication 81 MILLIGRAM(S): at 11:26

## 2019-04-16 RX ADMIN — SIMVASTATIN 40 MILLIGRAM(S): 20 TABLET, FILM COATED ORAL at 23:01

## 2019-04-16 RX ADMIN — Medication 25 MILLIGRAM(S): at 18:01

## 2019-04-16 RX ADMIN — SODIUM CHLORIDE 75 MILLILITER(S): 9 INJECTION, SOLUTION INTRAVENOUS at 06:29

## 2019-04-16 RX ADMIN — DRONEDARONE 400 MILLIGRAM(S): 400 TABLET, FILM COATED ORAL at 06:13

## 2019-04-16 RX ADMIN — CEFTRIAXONE 100 GRAM(S): 500 INJECTION, POWDER, FOR SOLUTION INTRAMUSCULAR; INTRAVENOUS at 06:14

## 2019-04-16 RX ADMIN — Medication 100 MILLIGRAM(S): at 08:21

## 2019-04-16 NOTE — CONSULT NOTE ADULT - SUBJECTIVE AND OBJECTIVE BOX
[  ] STAT REQUEST              [ X ] ROUTINE REQUEST    Patient is a 89 year old female with abdominal pain and diarrhea. GI consulted to evaluate.    HPI:  89 year old female with multiple medical problems presented with abdominal pain, diarrhea and chills.  No nausea, vomiting, hematemesis, hematochezia, melena, fever, chills, chest pain, SOB, cough, palpitation, cough, hematuria or dysuria reported.        PAIN MANAGEMENT:  Pain Scale:               2-3/10  Pain Location:  Diffuse abdominal pain    Prior Colonoscopy:  Unknown    PAST MEDICAL HISTORY  Pulmonary HTN  History of complete heart block  Atrial fibrillation  Hypercholesterolemia  HTN (hypertension)      PAST SURGICAL HISTORY  Artificial pacemaker        Allergies    No Known Allergies          MEDICATIONS  (STANDING):  aspirin  chewable 81 milliGRAM(s) Oral daily  cefTRIAXone   IVPB      dronedarone 400 milliGRAM(s) Oral two times a day  heparin  Injectable 5000 Unit(s) SubCutaneous every 8 hours  metoprolol tartrate 25 milliGRAM(s) Oral two times a day  metroNIDAZOLE  IVPB      metroNIDAZOLE  IVPB 500 milliGRAM(s) IV Intermittent every 8 hours  simvastatin 40 milliGRAM(s) Oral at bedtime  sodium chloride 0.45%. 1000 milliLiter(s) (75 mL/Hr) IV Continuous <Continuous>  vancomycin    Solution 125 milliGRAM(s) Oral every 6 hours    MEDICATIONS  (PRN):  acetaminophen    Suspension .. 650 milliGRAM(s) Oral every 6 hours PRN Temp greater or equal to 38C (100.4F)      SOCIAL HISTORY  Advanced Directives:       [ X ] Full Code       [  ] DNR  Marital Status:         [  ] M      [ X ] S      [  ] D       [  ] W  Children:       [ X ] Yes      [  ] No  Occupation:        [  ] Employed       [X  ] Unemployed       [  ] Retired  Diet:       [ X ] Regular       [  ] PEG feeding          [  ] NG tube feeding  Drug Use:           [ X ] No          [  ] Yes  Alcohol:           [ X ] No             [  ] Yes (socially)         [  ] Yes (chronic)  Tobacco:           [  ] Yes           [X  ] No        FAMILY HISTORY  [ X ] Heart Disease            [  ] Diabetes             [  ] HTN             [  ] Colon Cancer             [  ] Stomach Cancer              [  ] Pancreatic Cancer        VITAL SIGNS   Vital Signs Last 24 Hrs  T(C): 36.4 (16 Apr 2019 15:30), Max: 36.8 (16 Apr 2019 08:28)  T(F): 97.6 (16 Apr 2019 15:30), Max: 98.3 (16 Apr 2019 08:28)  HR: 79 (16 Apr 2019 15:30) (60 - 87)  BP: 139/57 (16 Apr 2019 15:30) (139/57 - 151/58)   RR: 18 (16 Apr 2019 15:30) (18 - 19)  SpO2: 100% (16 Apr 2019 15:30) (97% - 100%)         CBC Full  -  ( 16 Apr 2019 05:45 )  WBC Count : 18.17 K/uL  RBC Count : 3.33 M/uL  Hemoglobin : 10.5 g/dL  Hematocrit : 31.7 %  Platelet Count - Automated : 148 K/uL  Mean Cell Volume : 95.2 fl  Mean Cell Hemoglobin : 31.5 pg  Mean Cell Hemoglobin Concentration : 33.1 gm/dL  Auto Neutrophil # : 15.81 K/uL  Auto Lymphocyte # : 0.69 K/uL  Auto Monocyte # : 1.34 K/uL  Auto Eosinophil # : 0.09 K/uL  Auto Basophil # : 0.08 K/uL  Auto Neutrophil % : 87.0 %  Auto Lymphocyte % : 3.8 %  Auto Monocyte % : 7.4 %  Auto Eosinophil % : 0.5 %  Auto Basophil % : 0.4 %      04-16    140  |  108  |  26<H>  ----------------------------<  101<H>  3.3<L>   |  21<L>  |  1.42<H>    Ca    8.4      16 Apr 2019 05:45  Phos  2.6     04-16  Mg     2.0     04-16    TPro  6.7  /  Alb  3.1<L>  /  TBili  0.5  /  DBili  x   /  AST  15  /  ALT  21  /  AlkPhos  64  04-15     PT/INR - ( 16 Apr 2019 10:41 )   PT: 14.6 sec;   INR: 1.30 ratio     PTT - ( 16 Apr 2019 10:41 )  PTT:22.6 sec    Urinalysis (04.15.19 @ 16:05)    Blood, Urine: Negative    Glucose Qualitative, Urine: Negative    pH Urine: 5.0    Color: Yellow    Urine Appearance: Clear    Bilirubin: Negative    Ketone - Urine: Negative    Specific Gravity: 1.015    Protein, Urine: 30 mg/dL    Urobilinogen: Negative    Nitrite: Negative    Leukocyte Esterase Concentration: Moderate    ECG  Ventricular Rate 73 BPM    Atrial Rate 73 BPM    P-R Interval 272 ms    QRS Duration 154 ms    Q-T Interval 468 ms    QTC Calculation(Bezet) 515 ms    P Axis 59 degrees    R Axis -69 degrees    T Axis 96 degrees    Diagnosis Line Atrial-sensed ventricular-paced rhythm with prolonged AV conduction  Abnormal ECG      RADIOLOGY/IMAGING     EXAM:  CT CHEST                          EXAM:  CT ABDOMEN AND PELVIS                            PROCEDURE DATE:  04/15/2019          INTERPRETATION:  CT CHEST/ABDOMEN/PELVIS:     CLINICAL INFORMATION: CT pulmonary angiogram of 4/5/2019    COMPARISON: None available.    PROCEDURE:  Using multislice helical CT, 2.5 mm sections were obtained   from the thoracic inlet through the ischial tuberosities without the   administration of intravenous contrast. Oral contrast was not   administered.    Coronal and sagittal reformations were performed by  CT technologist and   made available for review.    FINDINGS:  The visualized structures of the lower neck are again   remarkable for enlarged heterogeneous thyroid gland which may be further   evaluated with ultrasound on a nonemergent basis. There is   atherosclerotic calcification of the aorta. Pacemaker wires are seen..   The visualized aorta is of normal course and caliber. The heart is not   enlarged. There is no evidence of pericardial effusion.    There is no evidence of axillary, hilar, or mediastinal lymphadenopathy.    No focal lung consolidations identified. No evidence of pleural effusion   or pneumothorax. There is mild scarring in the lung apices. There is a   subpleural pulmonary nodule again noted in the right apex posterior   medially measuring approximately 6 mm..      The liver is of normal contour. No evidence of focal hepatic lesion on   this nonenhanced examination. The gallbladder is present. No evidence of   intra or extrahepatic biliary dilatation.     The pancreas, spleen, and adrenal glands are grossly unremarkable. There   is no evidence of hydronephrosis or perinephric stranding. No evidence of    nephrolithiasis.The bladder is unremarkable. There is no hydronephrosis.   The left kidney demonstrates a small mid pole cyst.    No evidence of lymphadenopathy utilizing CT size criteria. The aorta is   not aneurysmal. There is a right adnexal cyst measuring 2.8 cm which   should be further evaluated on a nonemergent basis with ultrasound.    There is no evidence of bowel obstruction. There is no evidence of   pneumoperitoneum or free fluid. A normal appendix is appreciated. There   is severe wall thickening involving the adjacent colon and transverse   colon with stranding in the surrounding fat consistent with a nonspecific   colitis    The visualized osseous structures demonstrate degenerative changes.    IMPRESSION: Severe colitis involving the ascending colon and to a lesser   degree the transverse colon.   Pacemaker  Heterogeneous enlarged thyroid gland again noted may be further evaluated   with ultrasound on a nonemergent basis.  Right adnexal cyst may be further evaluated with ultrasound on a   nonemergent basis  Nonspecific 6 mm subpleural right upper lobe pulmonary nodule is again   appreciated.

## 2019-04-16 NOTE — PROGRESS NOTE ADULT - SUBJECTIVE AND OBJECTIVE BOX
89F, from home, lives w/ daughter, w/ PMHx Complete Heart Block (s/p PPM), HTN, HLD, Afib (on Multaq), pulmonary HTN , p/w diarrhea and chills x 1 week. Patient was recently discharged from [facility] on 3/23 after being admitted for CHF exacerbation. Daughter mentions patient started developing symptoms several days after being discharged, w/ up to 4-5x loose, foul smell, mucoid stools. Daughter mentions patient could not make it to the bathroom and was having multiple accidents in bedroom. Mentions giving Imodium w/ some relief of diarrhea, however, diarrhea has worsened and now is associated w/ chills and diffuse abdominal cramps. Patient denies N/V, melena, hematochezia, recent abx use.    Review of Systems:  Other Review of Systems: All other review of systems negative, except as noted in HPI        pt seen in ed [ x ], reg med floor [   ], bed [ x ], chair at bedside [   ], a+o x3 [ x ], lethargic [  ],  nad [ x ]        Allergies    No Known Allergies        Vitals    T(F): 98.3 (04-16-19 @ 08:28), Max: 101.2 (04-15-19 @ 11:21)  HR: 87 (04-16-19 @ 08:28) (60 - 87)  BP: 151/58 (04-16-19 @ 08:28) (133/56 - 151/58)  RR: 19 (04-16-19 @ 08:28) (18 - 19)  SpO2: 98% (04-16-19 @ 08:28) (93% - 98%)  Wt(kg): --  CAPILLARY BLOOD GLUCOSE      POCT Blood Glucose.: 127 mg/dL (15 Apr 2019 12:07)      Labs                          10.5   18.17 )-----------( 148      ( 16 Apr 2019 05:45 )             31.7       04-16    140  |  108  |  26<H>  ----------------------------<  101<H>  3.3<L>   |  21<L>  |  1.42<H>    Ca    8.4      16 Apr 2019 05:45  Phos  2.6     04-16  Mg     2.0     04-16    TPro  6.7  /  Alb  3.1<L>  /  TBili  0.5  /  DBili  x   /  AST  15  /  ALT  21  /  AlkPhos  64  04-15          Radiology Results    < from: CT Abdomen and Pelvis No Cont (04.15.19 @ 18:52) >  IMPRESSION: Severe colitis involving the ascending colon and to a lesser   degree the transverse colon.   Pacemaker  Heterogeneous enlarged thyroid gland again noted may be further evaluated   with ultrasound on a nonemergent basis.  Right adnexal cyst may be further evaluated with ultrasound on a   nonemergent basis  Nonspecific 6 mm subpleural right upper lobe pulmonary nodule is again   appreciated.      < end of copied text >        Meds    MEDICATIONS  (STANDING):  aspirin  chewable 81 milliGRAM(s) Oral daily  cefTRIAXone   IVPB      dronedarone 400 milliGRAM(s) Oral two times a day  heparin  Injectable 5000 Unit(s) SubCutaneous every 8 hours  metoprolol tartrate 25 milliGRAM(s) Oral two times a day  metroNIDAZOLE  IVPB      metroNIDAZOLE  IVPB 500 milliGRAM(s) IV Intermittent every 8 hours  simvastatin 40 milliGRAM(s) Oral at bedtime  sodium chloride 0.45%. 1000 milliLiter(s) (75 mL/Hr) IV Continuous <Continuous>  vancomycin    Solution 125 milliGRAM(s) Oral every 6 hours      MEDICATIONS  (PRN):  acetaminophen    Suspension .. 650 milliGRAM(s) Oral every 6 hours PRN Temp greater or equal to 38C (100.4F)      Physical Exam    Neuro :  no focal deficits  Respiratory: CTA B/L  CV: RRR, S1S2, no murmurs,   Abdominal: Soft, NT, ND +BS,  Extremities: No edema, + peripheral pulses    ASSESSMENT      colitis  Urinary tract infection  darcie  hypokalemia  pulmonary nodule  h/o Pulmonary HTN  HFpEF  History of complete heart block  Atrial fibrillation  Hypercholesterolemia  HTN (hypertension)  Artificial pacemaker        PLAN      cont roceph and flagyl  id cons  f/u blood and ucx  f/u stool studies including c diff  ct abd pelv with ascending and transverse colitis  gi cons  supplement potassium  cont ivf  cardio cons  pulm cons  cont current meds

## 2019-04-16 NOTE — CONSULT NOTE ADULT - ASSESSMENT
1. Colitis  - Antibiotic  - Panculture  - Stool studies - R/O CDiff  - ID Eval  - GI Eval  - Replace lytes PRN  - CT Scan noted.   - GI PPX     2. Pulmonary Nodule  - CT chest noted - 6mm nodule - Subpleural RUL.   - Send for Quantiferon TB Gold.   - F/U CT Chest in 3-6mo to assess stability.     3. Pulmonary HTN  - Echo 3/2019  - RVP 43  - CCB  - Bronchodilators PRN  - O2 Supplement.

## 2019-04-16 NOTE — CONSULT NOTE ADULT - ASSESSMENT
The etiology for abdominal pain and diaarhea is due to:  1. C. diff colitis    Suggestions:    1. Po Metranidazole  2. Monitor electrolytes  3. Avoid NSAID  4. Protonix daily  5. DVT prophylaxis The etiology for abdominal pain and diaarhea is due to:  1. C. diff colitis    Suggestions:    1. Metronidazole PO  2. Monitor electrolytes  3. Avoid NSAID  4. Protonix daily  5. DVT prophylaxis

## 2019-04-16 NOTE — CONSULT NOTE ADULT - SUBJECTIVE AND OBJECTIVE BOX
PULMONARY CONSULT NOTE      CLAUDETTE GARCES  MRN-[id number]    HISTORY OF PRESENT ILLNESS:  History of Present Illness:  Reason for Admission: diarrhea	  History of Present Illness: 	  89F, from home, lives w/ daughter, w/ PMHx Complete Heart Block (s/p PPM), HTN, HLD, Afib (on Multaq), pulmonary HTN , p/w diarrhea and chills x 1 week. Patient was recently discharged from [facility] on 3/23 after being admitted for CHF exacerbation. Daughter mentions patient started developing symptoms several days after being discharged, w/ up to 4-5x loose, foul smell, mucoid stools. Daughter mentions patient could not make it to the bathroom and was having multiple accidents in bedroom. Mentions giving Imodium w/ some relief of diarrhea, however, diarrhea has worsened and now is associated w/ chills and diffuse abdominal cramps. Patient denies N/V, melena, hematochezia, recent abx use.    PT is awake, alert, lying in bed in NAD. O2 NC in place. States she feels better.     MEDICATIONS  (STANDING):  aspirin  chewable 81 milliGRAM(s) Oral daily  cefTRIAXone   IVPB      dronedarone 400 milliGRAM(s) Oral two times a day  heparin  Injectable 5000 Unit(s) SubCutaneous every 8 hours  metoprolol tartrate 25 milliGRAM(s) Oral two times a day  metroNIDAZOLE  IVPB      metroNIDAZOLE  IVPB 500 milliGRAM(s) IV Intermittent every 8 hours  simvastatin 40 milliGRAM(s) Oral at bedtime  sodium chloride 0.45%. 1000 milliLiter(s) (75 mL/Hr) IV Continuous <Continuous>  vancomycin    Solution 125 milliGRAM(s) Oral every 6 hours      MEDICATIONS  (PRN):  acetaminophen    Suspension .. 650 milliGRAM(s) Oral every 6 hours PRN Temp greater or equal to 38C (100.4F)      Allergies    No Known Allergies    Intolerances        PAST MEDICAL & SURGICAL HISTORY:  Pulmonary HTN  History of complete heart block  Atrial fibrillation  Hypercholesterolemia  HTN (hypertension)  Artificial pacemaker      FAMILY HISTORY:      SOCIAL HISTORY  Smoking History:     REVIEW OF SYSTEMS:    CONSTITUTIONAL:  No fevers, chills, sweats    HEENT:  Eyes:  No diplopia or blurred vision. ENT:  No earache, sore throat or runny nose.    CARDIOVASCULAR:  No pressure, squeezing, tightness, or heaviness about the chest; no palpitations.    RESPIRATORY:  Per HPI    GASTROINTESTINAL:  No abdominal pain, nausea, vomiting or diarrhea.    GENITOURINARY:  No dysuria, frequency or urgency.    NEUROLOGIC:  No paresthesias, fasciculations, seizures or weakness.    PSYCHIATRIC:  No disorder of thought or mood.    Vital Signs Last 24 Hrs  T(C): 36.4 (2019 15:30), Max: 36.8 (2019 08:28)  T(F): 97.6 (2019 15:30), Max: 98.3 (2019 08:28)  HR: 79 (2019 15:30) (60 - 87)  BP: 139/57 (2019 15:30) (139/57 - 151/58)  BP(mean): --  RR: 18 (2019 15:30) (18 - 19)  SpO2: 100% (2019 15:30) (97% - 100%)  I&O's Detail      PHYSICAL EXAMINATION:    GENERAL: The patient is a well-developed, well-nourished _____in no apparent distress.     HEENT: Head is normocephalic and atraumatic. Extraocular muscles are intact. Mucous membranes are moist.     NECK: Supple.     LUNGS: Clear to auscultation without wheezing, rales, or rhonchi. Respirations unlabored    HEART: Regular rate and rhythm without murmur.    ABDOMEN: Soft, nontender, and nondistended.  No hepatosplenomegaly is noted.    EXTREMITIES: Without any cyanosis, clubbing, rash, lesions or edema.    NEUROLOGIC: Grossly intact.      LABS:                        10.5   18.17 )-----------( 148      ( 2019 05:45 )             31.7     04-16    140  |  108  |  26<H>  ----------------------------<  101<H>  3.3<L>   |  21<L>  |  1.42<H>    Ca    8.4      2019 05:45  Phos  2.6     04-16  Mg     2.0     04-16    TPro  6.7  /  Alb  3.1<L>  /  TBili  0.5  /  DBili  x   /  AST  15  /  ALT  21  /  AlkPhos  64  04-15    PT/INR - ( 2019 10:41 )   PT: 14.6 sec;   INR: 1.30 ratio         PTT - ( 2019 10:41 )  PTT:22.6 sec  Urinalysis Basic - ( 15 Apr 2019 16:05 )    Color: Yellow / Appearance: Clear / S.015 / pH: x  Gluc: x / Ketone: Negative  / Bili: Negative / Urobili: Negative   Blood: x / Protein: 30 mg/dL / Nitrite: Negative   Leuk Esterase: Moderate / RBC: 0-2 /HPF / WBC 11-25 /HPF   Sq Epi: x / Non Sq Epi: Few /HPF / Bacteria: Moderate /HPF                      MICROBIOLOGY:    RADIOLOGY & ADDITIONAL STUDIES:    CXR:  < from: Xray Chest 1 View-PORTABLE IMMEDIATE (04.15.19 @ 15:18) >  IMPRESSION:    No lung consolidations.    < end of copied text >    Ct scan chest:  < from: CT Chest No Cont (04.15.19 @ 18:54) >  IMPRESSION: Severe colitis involving the ascending colon and to a lesser   degree the transverse colon.   Pacemaker  Heterogeneous enlarged thyroid gland again noted may be further evaluated   with ultrasound on a nonemergent basis.  Right adnexal cyst may be further evaluated with ultrasound on a   nonemergent basis  Nonspecific 6 mm subpleural right upper lobe pulmonary nodule is again   appreciated.    < end of copied text >    < from: US Duplex Venous Lower Ext Complete, Bilateral (19 @ 11:55) >  IMPRESSION:  No ultrasound evidence of DVT.    < end of copied text >    ekg;    echo:  < from: Transthoracic Echocardiogram (19 @ 11:26) >  CONCLUSIONS:  1. Moderate posterior mitral annular calcification.  Moderate to severe mitral regurgitation.  2. Calcified trileaflet aortic valve with decreased  opening. Moderate aortic stenosis. Trace aortic  regurgitation.  3. Normal aortic root.  4. Normal left atrium.  5. Normal left ventricular internal dimensions and wall  thicknesses.  6. Normal Left Ventricular Systolic Function,  (EF = 60% by  biplane)  7. Grade II diastolic dysfunction.  8. Normal right atrium.  9. Normal right ventricular size and systolic function  (TAPSE 2.9 cm). A device lead is visualized in the right  heart.  10. RV systolic pressure is moderately increased at  43 mm  Hg.  11. Normal tricuspid valve. Moderate to severe tricuspid  regurgitation.  12. Pulmonic valve not well seen. Mild pulmonic  insufficiency is noted.  13. No pericardial effusion.    < end of copied text >

## 2019-04-16 NOTE — CONSULT NOTE ADULT - ASSESSMENT
89 yr old F, from home, AAOx3, lives with daughter w/ PMHx Complete Heart Block (s/p PPM), HTN, HLD, Afib (on Multaq), pulmonary HTN here with colitis.  1.ABX.  2.GI eval.  3.HTN-cont BP medication.  4.AF-asa,b blocker,multaq.  5.PI.  6.I and DVT prophylaxis.

## 2019-04-16 NOTE — CONSULT NOTE ADULT - SUBJECTIVE AND OBJECTIVE BOX
CHIEF COMPLAINT:Patient is a 89y old  Female who presents with a chief complaint of diarrhea (16 Apr 2019 10:05)      HPI:  89F, from home, lives w/ daughter, w/ PMHx Complete Heart Block (s/p PPM), HTN, HLD, Afib (on Multaq), pulmonary HTN , p/w diarrhea and chills x 1 week. Patient was recently discharged from [facility] on 3/23 after being admitted for CHF exacerbation. Daughter mentions patient started developing symptoms several days after being discharged, w/ up to 4-5x loose, foul smell, mucoid stools. Daughter mentions patient could not make it to the bathroom and was having multiple accidents in bedroom. Mentions giving Imodium w/ some relief of diarrhea, however, diarrhea has worsened and now is associated w/ chills and diffuse abdominal cramps. Patient denies N/V, melena, hematochezia, recent abx use. (15 Apr 2019 23:09)      PAST MEDICAL & SURGICAL HISTORY:  Pulmonary HTN  History of complete heart block  Atrial fibrillation  Hypercholesterolemia  HTN (hypertension)  Artificial pacemaker      MEDICATIONS  (STANDING):  aspirin  chewable 81 milliGRAM(s) Oral daily  cefTRIAXone   IVPB      dronedarone 400 milliGRAM(s) Oral two times a day  heparin  Injectable 5000 Unit(s) SubCutaneous every 8 hours  metoprolol tartrate 25 milliGRAM(s) Oral two times a day  metroNIDAZOLE  IVPB      metroNIDAZOLE  IVPB 500 milliGRAM(s) IV Intermittent every 8 hours  simvastatin 40 milliGRAM(s) Oral at bedtime  sodium chloride 0.45%. 1000 milliLiter(s) (75 mL/Hr) IV Continuous <Continuous>  vancomycin    Solution 125 milliGRAM(s) Oral every 6 hours    MEDICATIONS  (PRN):  acetaminophen    Suspension .. 650 milliGRAM(s) Oral every 6 hours PRN Temp greater or equal to 38C (100.4F)      FAMILY HISTORY:No hx of CAD      SOCIAL HISTORY:    [x ] Non-smoker    [x ] Alcohol-denies    Allergies    No Known Allergies    Intolerances    	    REVIEW OF SYSTEMS:  CONSTITUTIONAL: No fever, weight loss, or fatigue  EYES: No eye pain, visual disturbances, or discharge  ENT:  No difficulty hearing, tinnitus, vertigo; No sinus or throat pain  NECK: No pain or stiffness  RESPIRATORY: No cough, wheezing, chills or hemoptysis; No Shortness of Breath  CARDIOVASCULAR: No chest pain, palpitations, passing out, dizziness, or leg swelling  GASTROINTESTINAL: No abdominal or epigastric pain. No nausea, vomiting, or hematemesis; + diarrhea.  GENITOURINARY: No dysuria, frequency, hematuria, or incontinence  NEUROLOGICAL: No headaches, memory loss, loss of strength, numbness, or tremors  SKIN: No itching, burning, rashes, or lesions   LYMPH Nodes: No enlarged glands  ENDOCRINE: No heat or cold intolerance; No hair loss  MUSCULOSKELETAL: No joint pain or swelling; No muscle, back, or extremity pain  PSYCHIATRIC: No depression, anxiety, mood swings, or difficulty sleeping  HEME/LYMPH: No easy bruising, or bleeding gums  ALLERGY AND IMMUNOLOGIC: No hives or eczema	      PHYSICAL EXAM:  T(C): 36.7 (04-16-19 @ 11:39), Max: 37.7 (04-15-19 @ 16:47)  HR: 80 (04-16-19 @ 11:39) (60 - 87)  BP: 141/59 (04-16-19 @ 11:39) (133/56 - 151/58)  RR: 18 (04-16-19 @ 11:39) (18 - 19)  SpO2: 100% (04-16-19 @ 11:39) (93% - 100%)      Appearance: Normal	  HEENT:   Normal oral mucosa, PERRL, EOMI	  Lymphatic: No lymphadenopathy  Cardiovascular: Normal S1 S2, No JVD, No murmurs, No edema  Respiratory: Lungs clear to auscultation	  Psychiatry: A & O x 3, Mood & affect appropriate  Gastrointestinal:  Soft, Non-tender, + BS	  Skin: No rashes, No ecchymoses, No cyanosis	  Neurologic: Non-focal  Extremities: Normal range of motion, No clubbing, cyanosis or edema  Vascular: Peripheral pulses palpable 2+ bilaterally    	    ECG:  	paced    	  LABS:	 	                        10.5   18.17 )-----------( 148      ( 16 Apr 2019 05:45 )             31.7     04-16    140  |  108  |  26<H>  ----------------------------<  101<H>  3.3<L>   |  21<L>  |  1.42<H>    Ca    8.4      16 Apr 2019 05:45  Phos  2.6     04-16  Mg     2.0     04-16    TPro  6.7  /  Alb  3.1<L>  /  TBili  0.5  /  DBili  x   /  AST  15  /  ALT  21  /  AlkPhos  64  04-15      Lipid Profile: Cholesterol 122  LDL 67  HDL 42  TG 67    HgA1c: Hemoglobin A1C, Whole Blood: 5.8 % (04-16 @ 10:23)    TSH: Thyroid Stimulating Hormone, Serum: 0.25 uU/mL (04-16 @ 05:45)      OBSERVATIONS:  Mitral Valve: Moderate posterior mitral annular  calcification. Moderate to severe mitral regurgitation.  Aortic Root: Normal aortic root.  Aortic Valve: Calcified trileaflet aortic valve with  decreased opening. Peak transaortic valve gradient equals  22.5 mm Hg, estimated aortic valve area equals 1.2 sqcm (by  continuity equation), consistent with moderate aortic  stenosis. Trace aortic regurgitation.  Left Atrium: Normal left atrium.  LA volume index = 34  cc/m2.  Left Ventricle: Normal Left Ventricular Systolic Function,  (EF = 60% by biplane) Not all LV wall segments were seen.  Normal left ventricular internal dimensions and wall  thicknesses. Grade II diastolic dysfunction.  Right Heart: Normal right atrium. Normal right ventricular  size and systolic function (TAPSE 2.9 cm). A device lead is  visualized in the right heart. Normal tricuspid valve.  Moderate to severe tricuspid regurgitation. Pulmonic valve  not well seen. Mild pulmonic insufficiency is noted.  Pericardium/PleuraNo pericardial effusion.  Hemodynamic: RA Pressure is 8 mm Hg. RV systolic pressure  is moderately increased at  43 mm Hg.      EXAM:  CT CHEST                          EXAM:  CT ABDOMEN AND PELVIS                            PROCEDURE DATE:  04/15/2019          INTERPRETATION:  CT CHEST/ABDOMEN/PELVIS:     CLINICAL INFORMATION: CT pulmonary angiogram of 4/5/2019    COMPARISON: None available.    PROCEDURE:  Using multislice helical CT, 2.5 mm sections were obtained   from the thoracic inlet through the ischial tuberosities without the   administration of intravenous contrast. Oral contrast was not   administered.    Coronal and sagittal reformations were performed by  CT technologist and   made available for review.    FINDINGS:  The visualized structures of the lower neck are again   remarkable for enlarged heterogeneous thyroid gland which may be further   evaluated with ultrasound on a nonemergent basis. There is   atherosclerotic calcification of the aorta. Pacemaker wires are seen..   The visualized aorta is of normal course and caliber. The heart is not   enlarged. There is no evidence of pericardial effusion.    There is no evidence of axillary, hilar, or mediastinal lymphadenopathy.    No focal lung consolidations identified. No evidence of pleural effusion   or pneumothorax. There is mild scarring in the lung apices. There is a   subpleural pulmonary nodule again noted in the right apex posterior   medially measuring approximately 6 mm..      The liver is of normal contour. No evidence of focal hepatic lesion on   this nonenhanced examination. The gallbladder is present. No evidence of   intra or extrahepatic biliary dilatation.     The pancreas, spleen, and adrenal glands are grossly unremarkable. There   is no evidence of hydronephrosis or perinephric stranding. No evidence of    nephrolithiasis.The bladder is unremarkable. There is no hydronephrosis.   The left kidney demonstrates a small mid pole cyst.    No evidence of lymphadenopathy utilizing CT size criteria. The aorta is   not aneurysmal. There is a right adnexal cyst measuring 2.8 cm which   should be further evaluated on a nonemergent basis with ultrasound.    There is no evidence of bowel obstruction. There is no evidence of   pneumoperitoneum or free fluid. A normal appendix is appreciated. There   is severe wall thickening involving the adjacent colon and transverse   colon with stranding in the surrounding fat consistent with a nonspecific   colitis    The visualized osseous structures demonstrate degenerative changes.    IMPRESSION: Severe colitis involving the ascending colon and to a lesser   degree the transverse colon.   Pacemaker  Heterogeneous enlarged thyroid gland again noted may be further evaluated   with ultrasound on a nonemergent basis.  Right adnexal cyst may be further evaluated with ultrasound on a   nonemergent basis  Nonspecific 6 mm subpleural right upper lobe pulmonary nodule is again   appreciated.

## 2019-04-17 LAB
ANION GAP SERPL CALC-SCNC: 10 MMOL/L — SIGNIFICANT CHANGE UP (ref 5–17)
BUN SERPL-MCNC: 20 MG/DL — HIGH (ref 7–18)
CALCIUM SERPL-MCNC: 8.6 MG/DL — SIGNIFICANT CHANGE UP (ref 8.4–10.5)
CHLORIDE SERPL-SCNC: 108 MMOL/L — SIGNIFICANT CHANGE UP (ref 96–108)
CO2 SERPL-SCNC: 21 MMOL/L — LOW (ref 22–31)
CREAT SERPL-MCNC: 0.96 MG/DL — SIGNIFICANT CHANGE UP (ref 0.5–1.3)
GLUCOSE SERPL-MCNC: 109 MG/DL — HIGH (ref 70–99)
HCT VFR BLD CALC: 31.1 % — LOW (ref 34.5–45)
HGB BLD-MCNC: 10.1 G/DL — LOW (ref 11.5–15.5)
MCHC RBC-ENTMCNC: 31.2 PG — SIGNIFICANT CHANGE UP (ref 27–34)
MCHC RBC-ENTMCNC: 32.5 GM/DL — SIGNIFICANT CHANGE UP (ref 32–36)
MCV RBC AUTO: 96 FL — SIGNIFICANT CHANGE UP (ref 80–100)
NRBC # BLD: 0 /100 WBCS — SIGNIFICANT CHANGE UP (ref 0–0)
PLATELET # BLD AUTO: 162 K/UL — SIGNIFICANT CHANGE UP (ref 150–400)
POTASSIUM SERPL-MCNC: 3.5 MMOL/L — SIGNIFICANT CHANGE UP (ref 3.5–5.3)
POTASSIUM SERPL-SCNC: 3.5 MMOL/L — SIGNIFICANT CHANGE UP (ref 3.5–5.3)
RBC # BLD: 3.24 M/UL — LOW (ref 3.8–5.2)
RBC # FLD: 13.4 % — SIGNIFICANT CHANGE UP (ref 10.3–14.5)
SODIUM SERPL-SCNC: 139 MMOL/L — SIGNIFICANT CHANGE UP (ref 135–145)
WBC # BLD: 10.7 K/UL — HIGH (ref 3.8–10.5)
WBC # FLD AUTO: 10.7 K/UL — HIGH (ref 3.8–10.5)

## 2019-04-17 RX ORDER — VANCOMYCIN HCL 1 G
250 VIAL (EA) INTRAVENOUS EVERY 6 HOURS
Qty: 0 | Refills: 0 | Status: DISCONTINUED | OUTPATIENT
Start: 2019-04-17 | End: 2019-04-18

## 2019-04-17 RX ORDER — METOPROLOL TARTRATE 50 MG
50 TABLET ORAL
Qty: 0 | Refills: 0 | Status: DISCONTINUED | OUTPATIENT
Start: 2019-04-17 | End: 2019-04-18

## 2019-04-17 RX ADMIN — Medication 3 MILLILITER(S): at 16:06

## 2019-04-17 RX ADMIN — HEPARIN SODIUM 5000 UNIT(S): 5000 INJECTION INTRAVENOUS; SUBCUTANEOUS at 06:15

## 2019-04-17 RX ADMIN — Medication 250 MILLIGRAM(S): at 17:14

## 2019-04-17 RX ADMIN — DRONEDARONE 400 MILLIGRAM(S): 400 TABLET, FILM COATED ORAL at 06:15

## 2019-04-17 RX ADMIN — Medication 81 MILLIGRAM(S): at 11:38

## 2019-04-17 RX ADMIN — Medication 100 MILLIGRAM(S): at 06:14

## 2019-04-17 RX ADMIN — Medication 250 MILLIGRAM(S): at 11:38

## 2019-04-17 RX ADMIN — Medication 3 MILLILITER(S): at 20:09

## 2019-04-17 RX ADMIN — Medication 25 MILLIGRAM(S): at 17:13

## 2019-04-17 RX ADMIN — Medication 3 MILLILITER(S): at 00:06

## 2019-04-17 RX ADMIN — HEPARIN SODIUM 5000 UNIT(S): 5000 INJECTION INTRAVENOUS; SUBCUTANEOUS at 13:22

## 2019-04-17 RX ADMIN — Medication 3 MILLILITER(S): at 10:14

## 2019-04-17 RX ADMIN — SIMVASTATIN 40 MILLIGRAM(S): 20 TABLET, FILM COATED ORAL at 21:32

## 2019-04-17 RX ADMIN — Medication 50 MILLIGRAM(S): at 18:48

## 2019-04-17 RX ADMIN — HEPARIN SODIUM 5000 UNIT(S): 5000 INJECTION INTRAVENOUS; SUBCUTANEOUS at 21:32

## 2019-04-17 RX ADMIN — Medication 125 MILLIGRAM(S): at 06:18

## 2019-04-17 RX ADMIN — CEFTRIAXONE 100 GRAM(S): 500 INJECTION, POWDER, FOR SOLUTION INTRAMUSCULAR; INTRAVENOUS at 06:15

## 2019-04-17 RX ADMIN — Medication 25 MILLIGRAM(S): at 06:15

## 2019-04-17 RX ADMIN — DRONEDARONE 400 MILLIGRAM(S): 400 TABLET, FILM COATED ORAL at 17:13

## 2019-04-17 NOTE — PROGRESS NOTE ADULT - ASSESSMENT
89 yr old F, from home, AAOx3, lives with daughter w/ PMHx Complete Heart Block (s/p PPM), HTN, HLD, Afib (on Multaq), pulmonary HTN here with c.diff colitis.  1.ABX.  2.GI f/u.  3.HTN-cont BP medication.  4.AF-asa,b blocker,multaq.  5.GI and DVT prophylaxis.

## 2019-04-17 NOTE — PROGRESS NOTE ADULT - SUBJECTIVE AND OBJECTIVE BOX
CHIEF COMPLAINT:Patient is a 89y old  Female who presents with a chief complaint of diarrhea.Pt appears comfortable.    	  REVIEW OF SYSTEMS:  CONSTITUTIONAL: No fever, weight loss, or fatigue  EYES: No eye pain, visual disturbances, or discharge  ENT:  No difficulty hearing, tinnitus, vertigo; No sinus or throat pain  NECK: No pain or stiffness  RESPIRATORY: No cough, wheezing, chills or hemoptysis; No Shortness of Breath  CARDIOVASCULAR: No chest pain, palpitations, passing out, dizziness, or leg swelling  GASTROINTESTINAL: No abdominal or epigastric pain. No nausea, vomiting, or hematemesis; No diarrhea or constipation. No melena or hematochezia.  GENITOURINARY: No dysuria, frequency, hematuria, or incontinence  NEUROLOGICAL: No headaches, memory loss, loss of strength, numbness, or tremors  SKIN: No itching, burning, rashes, or lesions   LYMPH Nodes: No enlarged glands  ENDOCRINE: No heat or cold intolerance; No hair loss  MUSCULOSKELETAL: No joint pain or swelling; No muscle, back, or extremity pain  PSYCHIATRIC: No depression, anxiety, mood swings, or difficulty sleeping  HEME/LYMPH: No easy bruising, or bleeding gums  ALLERGY AND IMMUNOLOGIC: No hives or eczema	      PHYSICAL EXAM:  T(C): 36.8 (04-17-19 @ 11:38), Max: 36.8 (04-17-19 @ 07:13)  HR: 59 (04-17-19 @ 11:38) (59 - 79)  BP: 151/49 (04-17-19 @ 11:38) (139/57 - 157/76)  RR: 18 (04-17-19 @ 11:38) (18 - 18)  SpO2: 98% (04-17-19 @ 11:38) (98% - 100%)      Appearance: Normal	  HEENT:   Normal oral mucosa, PERRL, EOMI	  Lymphatic: No lymphadenopathy  Cardiovascular: Normal S1 S2, No JVD, No murmurs, No edema  Respiratory: Lungs clear to auscultation	  Psychiatry: A & O x 3, Mood & affect appropriate  Gastrointestinal:  Soft, Non-tender, + BS	  Skin: No rashes, No ecchymoses, No cyanosis	  Neurologic: Non-focal  Extremities: Normal range of motion, No clubbing, cyanosis or edema  Vascular: Peripheral pulses palpable 2+ bilaterally    MEDICATIONS  (STANDING):  ALBUTerol/ipratropium for Nebulization 3 milliLiter(s) Nebulizer every 6 hours  aspirin  chewable 81 milliGRAM(s) Oral daily  cefTRIAXone   IVPB      cefTRIAXone   IVPB 1 Gram(s) IV Intermittent every 24 hours  dronedarone 400 milliGRAM(s) Oral two times a day  heparin  Injectable 5000 Unit(s) SubCutaneous every 8 hours  metoprolol tartrate 25 milliGRAM(s) Oral two times a day  simvastatin 40 milliGRAM(s) Oral at bedtime  sodium chloride 0.45%. 1000 milliLiter(s) (75 mL/Hr) IV Continuous <Continuous>  vancomycin    Solution 250 milliGRAM(s) Oral every 6 hours        	  LABS:	 	                      10.1   10.70 )-----------( 162      ( 17 Apr 2019 07:14 )             31.1     04-17    139  |  108  |  20<H>  ----------------------------<  109<H>  3.5   |  21<L>  |  0.96    Ca    8.6      17 Apr 2019 07:14  Phos  2.6     04-16  Mg     2.0     04-16    TPro  6.7  /  Alb  3.1<L>  /  TBili  0.5  /  DBili  x   /  AST  15  /  ALT  21  /  AlkPhos  64  04-15    proBNP: Serum Pro-Brain Natriuretic Peptide: 2291 pg/mL (04-05 @ 11:15)  Serum Pro-Brain Natriuretic Peptide: 822 pg/mL (03-19 @ 13:33)    Lipid Profile: Cholesterol 122  LDL 67  HDL 42  TG 67  Cholesterol 149  LDL 75  HDL 65  TG 43    HgA1c: Hemoglobin A1C, Whole Blood: 5.8 % (04-16 @ 10:23)  Hemoglobin A1C, Whole Blood: 5.9 % (03-20 @ 09:49)    TSH: Thyroid Stimulating Hormone, Serum: 0.25 uU/mL (04-16 @ 05:45)  Thyroid Stimulating Hormone, Serum: 0.39 uU/mL (04-07 @ 08:38)  Thyroid Stimulating Hormone, Serum: 0.38 uU/mL (03-20 @ 07:52)    Clostridium difficile Toxin by PCR (04.16.19 @ 09:33)    Clostridium difficile Toxin by PCR: The results of this test should be interpreted with consideration of all  clinical and laboratory findings. This test determines the presence of  the C. difficile tcdB gene at a given time and is not intended to  identify antibiotic associated disease or C. difficile infection without  clinical context.  Successful treatment is based on the resolution of clinical symptoms.  This test should not be used as a "test of cure" because C. difficile DNA  will persist after successful treatment. Repeat testing will not be  permitted.    This test is performed on the BD MAX system using Real-Time PCR and  fluorogenic target-specific hybridization.    C Diff by PCR Result: Detected      	      Culture - Urine (04.16.19 @ 01:24)    Specimen Source: .Urine    Culture Results:   <10,000 CFU/mL Normal Urogenital Rosa Maria

## 2019-04-17 NOTE — PROGRESS NOTE ADULT - SUBJECTIVE AND OBJECTIVE BOX
Patient is a 89y old  Female who presents with a chief complaint of diarrhea (16 Apr 2019 20:28)    pt seen in ed [ x ], reg med floor [   ], bed [ x ], chair at bedside [   ], a+o x3 [ x ], lethargic [  ],  nad [ x ]    pt and daughter states diarrhea is much improved    Allergies    No Known Allergies        Vitals    T(F): 98.3 (04-17-19 @ 07:13), Max: 98.3 (04-17-19 @ 07:13)  HR: 62 (04-17-19 @ 07:13) (60 - 80)  BP: 157/76 (04-17-19 @ 07:13) (139/57 - 157/76)  RR: 18 (04-17-19 @ 07:13) (18 - 18)  SpO2: 100% (04-17-19 @ 05:07) (100% - 100%)  Wt(kg): --  CAPILLARY BLOOD GLUCOSE      POCT Blood Glucose.: 127 mg/dL (15 Apr 2019 12:07)      Labs                          10.1   10.70 )-----------( 162      ( 17 Apr 2019 07:14 )             31.1       04-17    139  |  108  |  20<H>  ----------------------------<  109<H>  3.5   |  21<L>  |  0.96    Ca    8.6      17 Apr 2019 07:14  Phos  2.6     04-16  Mg     2.0     04-16    TPro  6.7  /  Alb  3.1<L>  /  TBili  0.5  /  DBili  x   /  AST  15  /  ALT  21  /  AlkPhos  64  04-15          Clostridium difficile Toxin by PCR (04.16.19 @ 09:33)    C Diff by PCR Result: Detected    .Stool None basilia tayo  04-16 @ 11:35   No enteric pathogens to date: Final culture pending  --  --    Culture - Urine (04.16.19 @ 01:24)    Specimen Source: .Urine    Culture Results:   <10,000 CFU/mL Normal Urogenital Rosa Maria        Radiology Results      Meds    MEDICATIONS  (STANDING):  ALBUTerol/ipratropium for Nebulization 3 milliLiter(s) Nebulizer every 6 hours  aspirin  chewable 81 milliGRAM(s) Oral daily  cefTRIAXone   IVPB      cefTRIAXone   IVPB 1 Gram(s) IV Intermittent every 24 hours  dronedarone 400 milliGRAM(s) Oral two times a day  heparin  Injectable 5000 Unit(s) SubCutaneous every 8 hours  metoprolol tartrate 25 milliGRAM(s) Oral two times a day  simvastatin 40 milliGRAM(s) Oral at bedtime  sodium chloride 0.45%. 1000 milliLiter(s) (75 mL/Hr) IV Continuous <Continuous>  vancomycin    Solution 250 milliGRAM(s) Oral every 6 hours      MEDICATIONS  (PRN):  acetaminophen    Suspension .. 650 milliGRAM(s) Oral every 6 hours PRN Temp greater or equal to 38C (100.4F)      Physical Exam      Neuro :  no focal deficits  Respiratory: CTA B/L  CV: RRR, S1S2, no murmurs,   Abdominal: Soft, NT, ND +BS,  Extremities: No edema, + peripheral pulses    ASSESSMENT      c diff colitis  Urinary tract infection  s/p darcie  s/p hypokalemia  pulmonary nodule  h/o Pulmonary HTN  HFpEF  History of complete heart block  Atrial fibrillation  Hypercholesterolemia  HTN (hypertension)  Artificial pacemaker        PLAN      cont roceph   d/c flagyl   cont vanco 250mg q6  id cons  f/u blood and   ucx with <10,000 CFU/mL Normal Urogenital Rosa Maria noted above  stool c diff positive noted above  ct abd pelv with ascending and transverse colitis  gi f/u  d/c ivf  start clear liquids  cardio f/u  pulm f/u  cont current meds

## 2019-04-17 NOTE — PROGRESS NOTE ADULT - ASSESSMENT
1. Colitis  - 2nd to C. Diff.  - Antibiotic  - F/U cultures.   - ID Eval  - GI Eval noted.   - Replace lytes PRN  - CT Scan noted.   - Contact isolation.   - GI PPX     2. Pulmonary Nodule  - CT chest noted - 6mm nodule - Subpleural RUL.   - Send for Quantiferon TB Gold.   - F/U CT Chest in 3-6mo to assess stability.     3. Pulmonary HTN  - Echo 3/2019  - RVP 43  - CCB  - Bronchodilators PRN  - O2 Supplement.

## 2019-04-17 NOTE — PROGRESS NOTE ADULT - ASSESSMENT
1. C.diff colitis  2. Anemia  3. No evidence of acute GI bleeding    Suggestions:    1. Continue antibiotics  2. Monitor electrolytes  3. Avoid NSAID  4. Monitor H/H  5. Transfuse PRBC as needed  6. DVT prophylaxix

## 2019-04-17 NOTE — PROGRESS NOTE ADULT - SUBJECTIVE AND OBJECTIVE BOX
Pt is awake, alert, lying in bed in NAD. Stool + for CDiff, was seen by GI, awaiting ID eval. On contact isolation precautions.     INTERVAL HPI/OVERNIGHT EVENTS:      VITAL SIGNS:  T(F): 97.6 (04-17-19 @ 15:08)  HR: 84 (04-17-19 @ 15:08)  BP: 156/41 (04-17-19 @ 15:08)  RR: 18 (04-17-19 @ 15:08)  SpO2: 97% (04-17-19 @ 15:08)  Wt(kg): --  I&O's Detail          REVIEW OF SYSTEMS:    CONSTITUTIONAL:  No fevers, chills, sweats    HEENT:  Eyes:  No diplopia or blurred vision. ENT:  No earache, sore throat or runny nose.    CARDIOVASCULAR:  No pressure, squeezing, tightness, or heaviness about the chest; no palpitations.    RESPIRATORY:  Per HPI    GASTROINTESTINAL:  No abdominal pain, nausea, vomiting or diarrhea.    GENITOURINARY:  No dysuria, frequency or urgency.    NEUROLOGIC:  No paresthesias, fasciculations, seizures or weakness.    PSYCHIATRIC:  No disorder of thought or mood.      PHYSICAL EXAM:    Constitutional: Well developed and nourished  Eyes:Perrla  ENMT: normal  Neck:supple  Respiratory: good air entry  Cardiovascular: S1 S2 regular  Gastrointestinal: Soft, Non tender  Extremities: No edema  Vascular:normal  Neurological:Awake, alert,Ox3  Musculoskeletal:Normal      MEDICATIONS  (STANDING):  ALBUTerol/ipratropium for Nebulization 3 milliLiter(s) Nebulizer every 6 hours  aspirin  chewable 81 milliGRAM(s) Oral daily  dronedarone 400 milliGRAM(s) Oral two times a day  heparin  Injectable 5000 Unit(s) SubCutaneous every 8 hours  metoprolol tartrate 50 milliGRAM(s) Oral two times a day  simvastatin 40 milliGRAM(s) Oral at bedtime  sodium chloride 0.45%. 1000 milliLiter(s) (75 mL/Hr) IV Continuous <Continuous>  vancomycin    Solution 250 milliGRAM(s) Oral every 6 hours    MEDICATIONS  (PRN):  acetaminophen    Suspension .. 650 milliGRAM(s) Oral every 6 hours PRN Temp greater or equal to 38C (100.4F)      Allergies    No Known Allergies    Intolerances        LABS:                        10.1   10.70 )-----------( 162      ( 17 Apr 2019 07:14 )             31.1     04-17    139  |  108  |  20<H>  ----------------------------<  109<H>  3.5   |  21<L>  |  0.96    Ca    8.6      17 Apr 2019 07:14  Phos  2.6     04-16  Mg     2.0     04-16      PT/INR - ( 16 Apr 2019 10:41 )   PT: 14.6 sec;   INR: 1.30 ratio         PTT - ( 16 Apr 2019 10:41 )  PTT:22.6 sec          CAPILLARY BLOOD GLUCOSE      Culture - Stool (04.16.19 @ 11:35)    Specimen Source: .Stool None basilia tayo    Culture Results:   No enteric pathogens to date: Final culture pending    Clostridium difficile Toxin by PCR (04.16.19 @ 09:33)    Clostridium difficile Toxin by PCR: The results of this test should be interpreted with consideration of all  clinical and laboratory findings. This test determines the presence of  the C. difficile tcdB gene at a given time and is not intended to  identify antibiotic associated disease or C. difficile infection without  clinical context.  Successful treatment is based on the resolution of clinical symptoms.  This test should not be used as a "test of cure" because C. difficile DNA  will persist after successful treatment. Repeat testing will not be  permitted.    This test is performed on the BD MAX system using Real-Time PCR and  fluorogenic target-specific hybridization.    C Diff by PCR Result: Detected          RADIOLOGY & ADDITIONAL TESTS:    CXR:    Ct scan chest:  < from: CT Abdomen and Pelvis No Cont (04.15.19 @ 18:52) >  IMPRESSION: Severe colitis involving the ascending colon and to a lesser   degree the transverse colon.   Pacemaker  Heterogeneous enlarged thyroid gland again noted may be further evaluated   with ultrasound on a nonemergent basis.  Right adnexal cyst may be further evaluated with ultrasound on a   nonemergent basis  Nonspecific 6 mm subpleural right upper lobe pulmonary nodule is again   appreciated.      < end of copied text >    ekg;    echo:

## 2019-04-17 NOTE — PROGRESS NOTE ADULT - SUBJECTIVE AND OBJECTIVE BOX
[   ] ICU                                          [   ] CCU                                      [  X ] Medical Floor    Patient is comfortable. No new complaints reported, No abdominal pain, N/V, hematemesis, hematochezia, melena, fever, chills, chest pain, SOB, cough or diarrhea reported.    VITALS  Vital Signs Last 24 Hrs  T(C): 36.2 (17 Apr 2019 20:35), Max: 36.8 (17 Apr 2019 07:13)  T(F): 97.1 (17 Apr 2019 20:35), Max: 98.3 (17 Apr 2019 07:13)  HR: 71 (17 Apr 2019 20:35) (59 - 84)  BP: 152/49 (17 Apr 2019 22:23) (144/48 - 174/66)   RR: 18 (17 Apr 2019 20:35) (17 - 18)  SpO2: 96% (17 Apr 2019 20:35) (96% - 100%)       MEDICATIONS  (STANDING):  ALBUTerol/ipratropium for Nebulization 3 milliLiter(s) Nebulizer every 6 hours  aspirin  chewable 81 milliGRAM(s) Oral daily  dronedarone 400 milliGRAM(s) Oral two times a day  heparin  Injectable 5000 Unit(s) SubCutaneous every 8 hours  metoprolol tartrate 50 milliGRAM(s) Oral two times a day  simvastatin 40 milliGRAM(s) Oral at bedtime  sodium chloride 0.45%. 1000 milliLiter(s) (75 mL/Hr) IV Continuous <Continuous>  vancomycin    Solution 250 milliGRAM(s) Oral every 6 hours    MEDICATIONS  (PRN):  acetaminophen    Suspension .. 650 milliGRAM(s) Oral every 6 hours PRN Temp greater or equal to 38C (100.4F)                            10.1   10.70 )-----------( 162      ( 17 Apr 2019 07:14 )             31.1       04-17    139  |  108  |  20<H>  ----------------------------<  109<H>  3.5   |  21<L>  |  0.96    Ca    8.6      17 Apr 2019 07:14  Phos  2.6     04-16  Mg     2.0     04-16        PT/INR - ( 16 Apr 2019 10:41 )   PT: 14.6 sec;   INR: 1.30 ratio         PTT - ( 16 Apr 2019 10:41 )  PTT:22.6 sec

## 2019-04-17 NOTE — PATIENT PROFILE ADULT - NSPROIMPLANTSMEDDEV_GEN_A_NUR
Constitutional: Alert, NAD.   ENT: Airway patent. Nose clear. Mouth with normal mucosa.   Head: Atraumatic.   Eyes: Clear bilaterally. PERRL.   Cardiac: Normal rate.   Respiratory: Breath sounds clear bilaterally.   GI: Abdomen soft, non-tender, no guarding.   : No CVA or bladder tenderness.   Musculoskeletal: FROM, no muscle or joint tenderness or swelling.   Neuro: alert and oriented, no focal deficits, no motor or sensory deficits.   Skin: Dry, intact, no rash. PICC in right upper arm with loose dressing  Psych: normal mood and affect.
Automatic Implantable Cardioverter Defibrillator

## 2019-04-18 ENCOUNTER — TRANSCRIPTION ENCOUNTER (OUTPATIENT)
Age: 84
End: 2019-04-18

## 2019-04-18 VITALS — DIASTOLIC BLOOD PRESSURE: 48 MMHG | SYSTOLIC BLOOD PRESSURE: 143 MMHG | HEART RATE: 82 BPM

## 2019-04-18 DIAGNOSIS — Z86.79 PERSONAL HISTORY OF OTHER DISEASES OF THE CIRCULATORY SYSTEM: ICD-10-CM

## 2019-04-18 DIAGNOSIS — A04.72 ENTEROCOLITIS DUE TO CLOSTRIDIUM DIFFICILE, NOT SPECIFIED AS RECURRENT: ICD-10-CM

## 2019-04-18 PROCEDURE — 82746 ASSAY OF FOLIC ACID SERUM: CPT

## 2019-04-18 PROCEDURE — 87493 C DIFF AMPLIFIED PROBE: CPT

## 2019-04-18 PROCEDURE — 83605 ASSAY OF LACTIC ACID: CPT

## 2019-04-18 PROCEDURE — 71045 X-RAY EXAM CHEST 1 VIEW: CPT

## 2019-04-18 PROCEDURE — 87040 BLOOD CULTURE FOR BACTERIA: CPT

## 2019-04-18 PROCEDURE — 74176 CT ABD & PELVIS W/O CONTRAST: CPT

## 2019-04-18 PROCEDURE — 85730 THROMBOPLASTIN TIME PARTIAL: CPT

## 2019-04-18 PROCEDURE — 87631 RESP VIRUS 3-5 TARGETS: CPT

## 2019-04-18 PROCEDURE — 80048 BASIC METABOLIC PNL TOTAL CA: CPT

## 2019-04-18 PROCEDURE — 36415 COLL VENOUS BLD VENIPUNCTURE: CPT

## 2019-04-18 PROCEDURE — 99291 CRITICAL CARE FIRST HOUR: CPT | Mod: 25

## 2019-04-18 PROCEDURE — 83735 ASSAY OF MAGNESIUM: CPT

## 2019-04-18 PROCEDURE — 80053 COMPREHEN METABOLIC PANEL: CPT

## 2019-04-18 PROCEDURE — 85610 PROTHROMBIN TIME: CPT

## 2019-04-18 PROCEDURE — 82306 VITAMIN D 25 HYDROXY: CPT

## 2019-04-18 PROCEDURE — 85027 COMPLETE CBC AUTOMATED: CPT

## 2019-04-18 PROCEDURE — 87046 STOOL CULTR AEROBIC BACT EA: CPT

## 2019-04-18 PROCEDURE — 87045 FECES CULTURE AEROBIC BACT: CPT

## 2019-04-18 PROCEDURE — 84443 ASSAY THYROID STIM HORMONE: CPT

## 2019-04-18 PROCEDURE — 80061 LIPID PANEL: CPT

## 2019-04-18 PROCEDURE — 84100 ASSAY OF PHOSPHORUS: CPT

## 2019-04-18 PROCEDURE — 82962 GLUCOSE BLOOD TEST: CPT

## 2019-04-18 PROCEDURE — 87086 URINE CULTURE/COLONY COUNT: CPT

## 2019-04-18 PROCEDURE — 82607 VITAMIN B-12: CPT

## 2019-04-18 PROCEDURE — 93005 ELECTROCARDIOGRAM TRACING: CPT

## 2019-04-18 PROCEDURE — 71250 CT THORAX DX C-: CPT

## 2019-04-18 PROCEDURE — 81001 URINALYSIS AUTO W/SCOPE: CPT

## 2019-04-18 PROCEDURE — 83036 HEMOGLOBIN GLYCOSYLATED A1C: CPT

## 2019-04-18 PROCEDURE — 94640 AIRWAY INHALATION TREATMENT: CPT

## 2019-04-18 RX ORDER — HYDRALAZINE HCL 50 MG
100 TABLET ORAL ONCE
Qty: 0 | Refills: 0 | Status: COMPLETED | OUTPATIENT
Start: 2019-04-18 | End: 2019-04-18

## 2019-04-18 RX ORDER — HYDRALAZINE HCL 50 MG
1 TABLET ORAL
Qty: 0 | Refills: 0 | DISCHARGE
Start: 2019-04-18

## 2019-04-18 RX ORDER — HYDRALAZINE HCL 50 MG
100 TABLET ORAL THREE TIMES A DAY
Qty: 0 | Refills: 0 | Status: DISCONTINUED | OUTPATIENT
Start: 2019-04-18 | End: 2019-04-18

## 2019-04-18 RX ADMIN — Medication 3 MILLILITER(S): at 09:17

## 2019-04-18 RX ADMIN — Medication 50 MILLIGRAM(S): at 06:26

## 2019-04-18 RX ADMIN — Medication 3 MILLILITER(S): at 14:20

## 2019-04-18 RX ADMIN — HEPARIN SODIUM 5000 UNIT(S): 5000 INJECTION INTRAVENOUS; SUBCUTANEOUS at 06:27

## 2019-04-18 RX ADMIN — Medication 100 MILLIGRAM(S): at 13:24

## 2019-04-18 RX ADMIN — Medication 250 MILLIGRAM(S): at 12:31

## 2019-04-18 RX ADMIN — Medication 250 MILLIGRAM(S): at 06:29

## 2019-04-18 RX ADMIN — Medication 250 MILLIGRAM(S): at 00:24

## 2019-04-18 RX ADMIN — DRONEDARONE 400 MILLIGRAM(S): 400 TABLET, FILM COATED ORAL at 06:26

## 2019-04-18 RX ADMIN — Medication 81 MILLIGRAM(S): at 11:14

## 2019-04-18 NOTE — PROGRESS NOTE ADULT - ASSESSMENT
89 yr old F, from home, AAOx3, lives with daughter w/ PMHx Complete Heart Block (s/p PPM), HTN, HLD, Afib (on Multaq), pulmonary HTN here with c.diff colitis.  1.ABX.  2.GI f/u.  3.HTN-resume hydralazine 100mg q8h, cont  rest of BP medication.  4.AF-asa,b blocker,multaq.  5.GI and DVT prophylaxis.

## 2019-04-18 NOTE — PROGRESS NOTE ADULT - SUBJECTIVE AND OBJECTIVE BOX
Patient is a 89y old  Female who presents with a chief complaint of diarrhea (18 Apr 2019 10:10)    pt seen in ed [ x ], reg med floor [   ], bed [ x ], chair at bedside [   ], a+o x3 [ x ], lethargic [  ],  nad [ x ]    pt and daughter states diarrhea is much improved      Allergies    No Known Allergies        Vitals    T(F): 98.8 (04-18-19 @ 06:02), Max: 98.8 (04-18-19 @ 06:02)  HR: 70 (04-18-19 @ 09:30) (70 - 84)  BP: 153/61 (04-18-19 @ 06:02) (147/65 - 174/66)  RR: 16 (04-18-19 @ 06:02) (16 - 18)  SpO2: 97% (04-18-19 @ 09:30) (96% - 98%)  Wt(kg): --  CAPILLARY BLOOD GLUCOSE          Labs                          10.1   10.70 )-----------( 162      ( 17 Apr 2019 07:14 )             31.1       04-17    139  |  108  |  20<H>  ----------------------------<  109<H>  3.5   |  21<L>  |  0.96    Ca    8.6      17 Apr 2019 07:14              .Stool None basilia tayo  04-16 @ 11:35   No enteric pathogens to date: Final culture pending  --  --      .Urine  04-16 @ 01:24   <10,000 CFU/mL Normal Urogenital Rosa Maria  --  --      .Blood  04-15 @ 19:32   No growth to date.  --  --      .Urine  04-06 @ 22:43   <10,000 CFU/mL Normal Urogenital Rosa Maria  --  --      .Blood  04-05 @ 19:49   No growth at 5 days.  --  --        Radiology Results      Meds    MEDICATIONS  (STANDING):  ALBUTerol/ipratropium for Nebulization 3 milliLiter(s) Nebulizer every 6 hours  aspirin  chewable 81 milliGRAM(s) Oral daily  dronedarone 400 milliGRAM(s) Oral two times a day  heparin  Injectable 5000 Unit(s) SubCutaneous every 8 hours  metoprolol tartrate 50 milliGRAM(s) Oral two times a day  simvastatin 40 milliGRAM(s) Oral at bedtime  sodium chloride 0.45%. 1000 milliLiter(s) (75 mL/Hr) IV Continuous <Continuous>  vancomycin    Solution 250 milliGRAM(s) Oral every 6 hours      MEDICATIONS  (PRN):  acetaminophen    Suspension .. 650 milliGRAM(s) Oral every 6 hours PRN Temp greater or equal to 38C (100.4F)      Physical Exam      Neuro :  no focal deficits  Respiratory: CTA B/L  CV: RRR, S1S2, no murmurs,   Abdominal: Soft, NT, ND +BS,  Extremities: No edema, + peripheral pulses    ASSESSMENT      c diff colitis  Urinary tract infection  s/p darcie  s/p hypokalemia  pulmonary nodule  h/o Pulmonary HTN  HFpEF  History of complete heart block  Atrial fibrillation  Hypercholesterolemia  HTN (hypertension)  Artificial pacemaker        PLAN      cont roceph   d/c flagyl   cont vanco 250mg q6  id cons  f/u blood cx  ucx with <10,000 CFU/mL Normal Urogenital Rosa Maria noted above  stool c diff positive noted above  ct abd pelv with ascending and transverse colitis  gi f/u  d/c ivf  start clear liquids  cardio f/u  pulm f/u  cont current meds

## 2019-04-18 NOTE — PROGRESS NOTE ADULT - SUBJECTIVE AND OBJECTIVE BOX
CHIEF COMPLAINT:Patient is a 89y old  Female who presents with a chief complaint of diarrhea, c-diff+.Pt appears comfortable, no further diarrhea.    	  REVIEW OF SYSTEMS:  CONSTITUTIONAL: No fever, weight loss, or fatigue  EYES: No eye pain, visual disturbances, or discharge  ENT:  No difficulty hearing, tinnitus, vertigo; No sinus or throat pain  NECK: No pain or stiffness  RESPIRATORY: No cough, wheezing, chills or hemoptysis; No Shortness of Breath  CARDIOVASCULAR: No chest pain, palpitations, passing out, dizziness, or leg swelling  GASTROINTESTINAL: No abdominal or epigastric pain. No nausea, vomiting, or hematemesis; No diarrhea or constipation. No melena or hematochezia.  GENITOURINARY: No dysuria, frequency, hematuria, or incontinence  NEUROLOGICAL: No headaches, memory loss, loss of strength, numbness, or tremors  SKIN: No itching, burning, rashes, or lesions   LYMPH Nodes: No enlarged glands  ENDOCRINE: No heat or cold intolerance; No hair loss  MUSCULOSKELETAL: No joint pain or swelling; No muscle, back, or extremity pain  PSYCHIATRIC: No depression, anxiety, mood swings, or difficulty sleeping  HEME/LYMPH: No easy bruising, or bleeding gums  ALLERGY AND IMMUNOLOGIC: No hives or eczema	      PHYSICAL EXAM:  T(C): 36.4 (04-18-19 @ 12:17), Max: 37.1 (04-18-19 @ 06:02)  HR: 74 (04-18-19 @ 12:53) (70 - 84)  BP: 181/68 (04-18-19 @ 12:53) (147/65 - 181/68)  RR: 17 (04-18-19 @ 12:17) (16 - 18)  SpO2: 95% (04-18-19 @ 12:17) (95% - 98%)      Appearance: Normal	  HEENT:   Normal oral mucosa, PERRL, EOMI	  Lymphatic: No lymphadenopathy  Cardiovascular: Normal S1 S2, No JVD, No murmurs, No edema  Respiratory: Lungs clear to auscultation	  Psychiatry: A & O x 3, Mood & affect appropriate  Gastrointestinal:  Soft, Non-tender, + BS	  Skin: No rashes, No ecchymoses, No cyanosis	  Neurologic: Non-focal  Extremities: Normal range of motion, No clubbing, cyanosis or edema  Vascular: Peripheral pulses palpable 2+ bilaterally    MEDICATIONS  (STANDING):  ALBUTerol/ipratropium for Nebulization 3 milliLiter(s) Nebulizer every 6 hours  aspirin  chewable 81 milliGRAM(s) Oral daily  dronedarone 400 milliGRAM(s) Oral two times a day  heparin  Injectable 5000 Unit(s) SubCutaneous every 8 hours  metoprolol tartrate 50 milliGRAM(s) Oral two times a day  simvastatin 40 milliGRAM(s) Oral at bedtime  sodium chloride 0.45%. 1000 milliLiter(s) (75 mL/Hr) IV Continuous <Continuous>  vancomycin    Solution 250 milliGRAM(s) Oral every 6 hours      LABS:	 	                     10.1   10.70 )-----------( 162      ( 17 Apr 2019 07:14 )             31.1     04-17    139  |  108  |  20<H>  ----------------------------<  109<H>  3.5   |  21<L>  |  0.96    Ca    8.6      17 Apr 2019 07:14      proBNP: Serum Pro-Brain Natriuretic Peptide: 2291 pg/mL (04-05 @ 11:15)  Serum Pro-Brain Natriuretic Peptide: 822 pg/mL (03-19 @ 13:33)    Lipid Profile: Cholesterol 122  LDL 67  HDL 42  TG 67  Cholesterol 149  LDL 75  HDL 65  TG 43    HgA1c: Hemoglobin A1C, Whole Blood: 5.8 % (04-16 @ 10:23)  Hemoglobin A1C, Whole Blood: 5.9 % (03-20 @ 09:49)    TSH: Thyroid Stimulating Hormone, Serum: 0.25 uU/mL (04-16 @ 05:45)  Thyroid Stimulating Hormone, Serum: 0.39 uU/mL (04-07 @ 08:38)  Thyroid Stimulating Hormone, Serum: 0.38 uU/mL (03-20 @ 07:52)

## 2019-04-18 NOTE — PROGRESS NOTE ADULT - ASSESSMENT
89F, from home, lives w/ daughter, w/ PMHx Complete Heart Block (s/p PPM), HTN, HLD, Afib (on Multaq), pulmonary HTN , p/w diarrhea and chills x 1 week. Patient was recently discharged from [facility] on 3/23 after being admitted for CHF exacerbation. Daughter mentions patient started developing symptoms several days after being discharged, w/ up to 4-5x loose, foul smell, mucoid stools. Daughter mentions patient could not make it to the bathroom and was having multiple accidents in bedroom. Mentions giving Imodium w/ some relief of diarrhea, however, diarrhea has worsened and now is associated w/ chills and diffuse abdominal cramps. Patient denies N/V, melena, hematochezia, recent abx use.  4/18/19 patient states her diarrhea improved. Patient calm, comfortable, continue with vancomycin PO.

## 2019-04-18 NOTE — PROGRESS NOTE ADULT - PROVIDER SPECIALTY LIST ADULT
Cardiology
Cardiology
Internal Medicine
Pulmonology
Pulmonology
Internal Medicine
Gastroenterology

## 2019-04-18 NOTE — DISCHARGE NOTE PROVIDER - NSDCCPCAREPLAN_GEN_ALL_CORE_FT
PRINCIPAL DISCHARGE DIAGNOSIS  Diagnosis: Clostridium difficile colitis  Assessment and Plan of Treatment: Clostridium difficile colitis      SECONDARY DISCHARGE DIAGNOSES  Diagnosis: HTN (hypertension)  Assessment and Plan of Treatment: HTN (hypertension)    Diagnosis: CAMILLE (acute kidney injury)  Assessment and Plan of Treatment:     Diagnosis: Sepsis  Assessment and Plan of Treatment:

## 2019-04-18 NOTE — PROGRESS NOTE ADULT - SUBJECTIVE AND OBJECTIVE BOX
Pt is awake, alert, lying in bed in NAD. Stool + for CDiff, was seen by GI, awaiting ID eval. On contact isolation precautions. Pt is awake, alert, lying in bed in NAD. Stool + for CDiff, on Vancomycin. Diarrhea improving. On contact isolation precautions.     INTERVAL HPI/OVERNIGHT EVENTS:      VITAL SIGNS:  T(F): 97.6 (04-18-19 @ 12:17)  HR: 82 (04-18-19 @ 14:30)  BP: 143/48 (04-18-19 @ 14:30)  RR: 17 (04-18-19 @ 12:17)  SpO2: 95% (04-18-19 @ 12:17)  Wt(kg): --  I&O's Detail          REVIEW OF SYSTEMS:    CONSTITUTIONAL:  No fevers, chills, sweats    HEENT:  Eyes:  No diplopia or blurred vision. ENT:  No earache, sore throat or runny nose.    CARDIOVASCULAR:  No pressure, squeezing, tightness, or heaviness about the chest; no palpitations.    RESPIRATORY:  Per HPI    GASTROINTESTINAL:  No abdominal pain, nausea, vomiting or diarrhea.    GENITOURINARY:  No dysuria, frequency or urgency.    NEUROLOGIC:  No paresthesias, fasciculations, seizures or weakness.    PSYCHIATRIC:  No disorder of thought or mood.      PHYSICAL EXAM:    Constitutional: Well developed and nourished  Eyes:Perrla  ENMT: normal  Neck:supple  Respiratory: good air entry  Cardiovascular: S1 S2 regular  Gastrointestinal: Soft, Non tender  Extremities: No edema  Vascular:normal  Neurological:Awake, alert   Musculoskeletal:Normal      MEDICATIONS  (STANDING):  ALBUTerol/ipratropium for Nebulization 3 milliLiter(s) Nebulizer every 6 hours  aspirin  chewable 81 milliGRAM(s) Oral daily  dronedarone 400 milliGRAM(s) Oral two times a day  heparin  Injectable 5000 Unit(s) SubCutaneous every 8 hours  hydrALAZINE 100 milliGRAM(s) Oral three times a day  metoprolol tartrate 50 milliGRAM(s) Oral two times a day  simvastatin 40 milliGRAM(s) Oral at bedtime  sodium chloride 0.45%. 1000 milliLiter(s) (75 mL/Hr) IV Continuous <Continuous>  vancomycin    Solution 250 milliGRAM(s) Oral every 6 hours    MEDICATIONS  (PRN):  acetaminophen    Suspension .. 650 milliGRAM(s) Oral every 6 hours PRN Temp greater or equal to 38C (100.4F)      Allergies    No Known Allergies    Intolerances        LABS:                        10.1   10.70 )-----------( 162      ( 17 Apr 2019 07:14 )             31.1     04-17    139  |  108  |  20<H>  ----------------------------<  109<H>  3.5   |  21<L>  |  0.96    Ca    8.6      17 Apr 2019 07:14                CAPILLARY BLOOD GLUCOSE            RADIOLOGY & ADDITIONAL TESTS:    CXR:    Ct scan chest:    ekg;    echo:

## 2019-04-18 NOTE — DISCHARGE NOTE PROVIDER - HOSPITAL COURSE
89F, from home, lives w/ daughter, w/ PMHx Complete Heart Block (s/p PPM), HTN, HLD, Afib (on Multaq), pulmonary HTN , p/w diarrhea and chills x 1 week. Patient was recently discharged from [facility] on 3/23 after being admitted for CHF exacerbation. Daughter mentions patient started developing symptoms several days after being discharged, w/ up to 4-5x loose, foul smell, mucoid stools. Daughter mentions patient could not make it to the bathroom and was having multiple accidents in bedroom. Mentions giving Imodium w/ some relief of diarrhea, however, diarrhea has worsened and now is associated w/ chills and diffuse abdominal cramps. Patient denies N/V, melena, hematochezia, recent abx use.    4/18/19 patient states her diarrhea improved. Patient calm, comfortable, continue with vancomycin PO.

## 2019-04-18 NOTE — PROGRESS NOTE ADULT - ASSESSMENT
1. Colitis  - 2nd to C. Diff.  - Antibiotic  - F/U cultures.   - ID Eval  - GI Eval noted.   - Replace lytes PRN  - CT Scan noted.   - Contact isolation.   - GI PPX     2. Pulmonary Nodule  - CT chest noted - 6mm nodule - Subpleural RUL.   - Send for Quantiferon TB Gold.   - F/U CT Chest in 3-6mo to assess stability.     3. Pulmonary HTN  - Echo 3/2019  - RVP 43  - CCB  - Bronchodilators PRN  - O2 Supplement. 1. Colitis  - 2nd to C. Diff.  - Antibiotic  - F/U cultures.   - Replace lytes PRN  - CT Scan noted.   - Contact isolation.   - GI PPX     2. Pulmonary Nodule  - CT chest noted - 6mm nodule - Subpleural RUL.   - Send for Quantiferon TB Gold.   - F/U CT Chest in 3-6mo to assess stability.     3. Pulmonary HTN  - Echo 3/2019  - RVP 43  - CCB  - Bronchodilators PRN  - O2 Supplement.

## 2019-04-18 NOTE — DISCHARGE NOTE PROVIDER - PROVIDER TOKENS
PROVIDER:[TOKEN:[[id number]],FOLLOWUP:[Routine]],PROVIDER:[TOKEN:[[id number]],FOLLOWUP:[Routine]]

## 2019-04-18 NOTE — PROGRESS NOTE ADULT - SUBJECTIVE AND OBJECTIVE BOX
NP Note discussed with  Primary Attending    Patient is a 89y old  Female who presents with a chief complaint of diarrhea (17 Apr 2019 23:33)      INTERVAL HPI/OVERNIGHT EVENTS: patient states diarrhea improved, one BM reported during the night. Hemodynamically stable. Awake, alert, oriented X 3.    MEDICATIONS  (STANDING):  ALBUTerol/ipratropium for Nebulization 3 milliLiter(s) Nebulizer every 6 hours  aspirin  chewable 81 milliGRAM(s) Oral daily  dronedarone 400 milliGRAM(s) Oral two times a day  heparin  Injectable 5000 Unit(s) SubCutaneous every 8 hours  metoprolol tartrate 50 milliGRAM(s) Oral two times a day  simvastatin 40 milliGRAM(s) Oral at bedtime  sodium chloride 0.45%. 1000 milliLiter(s) (75 mL/Hr) IV Continuous <Continuous>  vancomycin    Solution 250 milliGRAM(s) Oral every 6 hours    MEDICATIONS  (PRN):  acetaminophen    Suspension .. 650 milliGRAM(s) Oral every 6 hours PRN Temp greater or equal to 38C (100.4F)      __________________________________________________  REVIEW OF SYSTEMS:    CONSTITUTIONAL: No fever, well nourished and maintained for current condition.  EYES: no acute visual disturbances  NECK: No pain or stiffness  RESPIRATORY: No cough; No shortness of breath  CARDIOVASCULAR: No chest pain, no palpitations  GASTROINTESTINAL: No pain. No nausea or vomiting; loose stools x 1 in last 12 hours reported by patient.  NEUROLOGICAL: No headache or numbness, no tremors  MUSCULOSKELETAL: No joint pain, no muscle pain  GENITOURINARY: no dysuria, no frequency, no hesitancy  PSYCHIATRY: no depression , no anxiety  ALL OTHER  ROS negative        Vital Signs Last 24 Hrs  T(C): 37.1 (18 Apr 2019 06:02), Max: 37.1 (18 Apr 2019 06:02)  T(F): 98.8 (18 Apr 2019 06:02), Max: 98.8 (18 Apr 2019 06:02)  HR: 71 (18 Apr 2019 06:02) (59 - 84)  BP: 153/61 (18 Apr 2019 06:02) (147/65 - 174/66)  BP(mean): --  RR: 16 (18 Apr 2019 06:02) (16 - 18)  SpO2: 98% (18 Apr 2019 06:02) (96% - 98%)    ________________________________________________  PHYSICAL EXAM:  GENERAL: NAD  HEENT: Normocephalic;  conjunctivae and sclerae clear; moist mucous membranes;   NECK : supple  CHEST/LUNG: Clear to auscultation bilaterally with good air entry   HEART: S1 S2  regular; no murmurs, gallops or rubs  ABDOMEN: Soft, Nontender, Nondistended; Bowel sounds present  EXTREMITIES: no cyanosis; no edema; no calf tenderness  SKIN: warm and dry; no rash  NERVOUS SYSTEM:  Awake and alert; Oriented  to place, person and time ; no new deficits    _________________________________________________  LABS:                        10.1   10.70 )-----------( 162      ( 17 Apr 2019 07:14 )             31.1     04-17    139  |  108  |  20<H>  ----------------------------<  109<H>  3.5   |  21<L>  |  0.96    Ca    8.6      17 Apr 2019 07:14      PT/INR - ( 16 Apr 2019 10:41 )   PT: 14.6 sec;   INR: 1.30 ratio         PTT - ( 16 Apr 2019 10:41 )  PTT:22.6 sec    CAPILLARY BLOOD GLUCOSE            RADIOLOGY & ADDITIONAL TESTS:    < from: CT Chest No Cont (04.15.19 @ 18:54) >  IMPRESSION: Severe colitis involving the ascending colon and to a lesser   degree the transverse colon.   Pacemaker  Heterogeneous enlarged thyroid gland again noted may be further evaluated   with ultrasound on a nonemergent basis.  Right adnexal cyst may be further evaluated with ultrasound on a   nonemergent basis  Nonspecific 6 mm subpleural right upper lobe pulmonary nodule is again   appreciated.        < end of copied text >      Imaging  Reviewed:  YES    Consultant(s) Notes Reviewed:   YES/ No      Plan of care was discussed with patient and /or primary care giver; all questions and concerns were addressed

## 2019-04-18 NOTE — DISCHARGE NOTE PROVIDER - CARE PROVIDER_API CALL
Carol Harrell (MD)  Internal Medicine  [address]  [place] [address]  Phone: [phone]  Fax: [phone]  Follow Up Time: Routine    Yenni Black (DO)  Cardiology Medicine  [address]  [place] [address]  Phone: [phone]  Fax: [phone]  Follow Up Time: Routine

## 2019-04-18 NOTE — DISCHARGE NOTE NURSING/CASE MANAGEMENT/SOCIAL WORK - NSDCDPATPORTLINK_GEN_ALL_CORE
You can access the [facility] Patient Portal, offered by [facility], by registering with the following website: [email or web address]

## 2019-04-19 LAB
CULTURE RESULTS: SIGNIFICANT CHANGE UP
SPECIMEN SOURCE: SIGNIFICANT CHANGE UP

## 2020-01-09 ENCOUNTER — EMERGENCY (EMERGENCY)
Facility: HOSPITAL | Age: 85
LOS: 1 days | Discharge: HOME | End: 2020-01-09
Attending: EMERGENCY MEDICINE
Payer: COMMERCIAL

## 2020-01-09 VITALS
RESPIRATION RATE: 17 BRPM | OXYGEN SATURATION: 96 % | DIASTOLIC BLOOD PRESSURE: 64 MMHG | SYSTOLIC BLOOD PRESSURE: 157 MMHG | HEIGHT: 62 IN | TEMPERATURE: 97 F | HEART RATE: 60 BPM | WEIGHT: 164.91 LBS

## 2020-01-09 DIAGNOSIS — Z95.0 PRESENCE OF CARDIAC PACEMAKER: Chronic | ICD-10-CM

## 2020-01-09 PROCEDURE — 73502 X-RAY EXAM HIP UNI 2-3 VIEWS: CPT

## 2020-01-09 PROCEDURE — 73502 X-RAY EXAM HIP UNI 2-3 VIEWS: CPT | Mod: 26,RT

## 2020-01-09 PROCEDURE — 93971 EXTREMITY STUDY: CPT

## 2020-01-09 PROCEDURE — 99284 EMERGENCY DEPT VISIT MOD MDM: CPT | Mod: 25

## 2020-01-09 PROCEDURE — 93971 EXTREMITY STUDY: CPT | Mod: 26,RT

## 2020-01-09 PROCEDURE — 99284 EMERGENCY DEPT VISIT MOD MDM: CPT

## 2020-01-09 RX ORDER — ACETAMINOPHEN 500 MG
650 TABLET ORAL ONCE
Refills: 0 | Status: COMPLETED | OUTPATIENT
Start: 2020-01-09 | End: 2020-01-09

## 2020-01-09 RX ADMIN — Medication 650 MILLIGRAM(S): at 16:58

## 2020-01-09 NOTE — ED ADULT NURSE NOTE - NSIMPLEMENTINTERV_GEN_ALL_ED
Implemented All Fall Risk Interventions:  [place] to call system. Call bell, personal items and telephone within reach. Instruct patient to call for assistance. Room bathroom lighting operational. Non-slip footwear when patient is off stretcher. Physically safe environment: no spills, clutter or unnecessary equipment. Stretcher in lowest position, wheels locked, appropriate side rails in place. Provide visual cue, wrist band, yellow gown, etc. Monitor gait and stability. Monitor for mental status changes and reorient to person, place, and time. Review medications for side effects contributing to fall risk. Reinforce activity limits and safety measures with patient and family.

## 2020-01-09 NOTE — ED PROVIDER NOTE - MUSCULOSKELETAL MINIMAL EXAM
hip no shortening or external rotation, able to flex at knee and hips, thigh tenderness, no deformity

## 2020-01-09 NOTE — ED PROVIDER NOTE - OBJECTIVE STATEMENT
89 y/o F pt with a PMHx of Afib, HTN, HLD, pulmonary HTN, and a significant PSHx of artificial pacemaker, presents to the ED with complaints of right thigh pain after being on a 30hr trip. Patient notes pain with lifting. Patient denies falls, shortness of breath, previous clots or any other complaints. NKDA.

## 2020-01-09 NOTE — ED ADULT NURSE NOTE - OBJECTIVE STATEMENT
Pt presented to the Ed with pain to the right leg. PTs daughter states her mother was traveling for 30 hrs by [employer]trak Saturday into Sunday and after she started to get severe cramping in the leg.  Pt has SOB. Pt hx HTN, heart failure.  Pt uses nebulizer 2x/day.

## 2020-03-02 ENCOUNTER — INPATIENT (INPATIENT)
Facility: HOSPITAL | Age: 85
LOS: 2 days | Discharge: HOME | DRG: 194 | End: 2020-03-05
Attending: INTERNAL MEDICINE | Admitting: INTERNAL MEDICINE
Payer: COMMERCIAL

## 2020-03-02 VITALS
SYSTOLIC BLOOD PRESSURE: 180 MMHG | HEART RATE: 78 BPM | DIASTOLIC BLOOD PRESSURE: 70 MMHG | RESPIRATION RATE: 22 BRPM | OXYGEN SATURATION: 95 % | TEMPERATURE: 98 F | WEIGHT: 160.06 LBS

## 2020-03-02 DIAGNOSIS — I10 ESSENTIAL (PRIMARY) HYPERTENSION: ICD-10-CM

## 2020-03-02 DIAGNOSIS — J18.9 PNEUMONIA, UNSPECIFIED ORGANISM: ICD-10-CM

## 2020-03-02 DIAGNOSIS — I48.91 UNSPECIFIED ATRIAL FIBRILLATION: ICD-10-CM

## 2020-03-02 DIAGNOSIS — Z95.0 PRESENCE OF CARDIAC PACEMAKER: Chronic | ICD-10-CM

## 2020-03-02 DIAGNOSIS — N17.9 ACUTE KIDNEY FAILURE, UNSPECIFIED: ICD-10-CM

## 2020-03-02 DIAGNOSIS — Z29.9 ENCOUNTER FOR PROPHYLACTIC MEASURES, UNSPECIFIED: ICD-10-CM

## 2020-03-02 DIAGNOSIS — I50.9 HEART FAILURE, UNSPECIFIED: ICD-10-CM

## 2020-03-02 DIAGNOSIS — J10.1 INFLUENZA DUE TO OTHER IDENTIFIED INFLUENZA VIRUS WITH OTHER RESPIRATORY MANIFESTATIONS: ICD-10-CM

## 2020-03-02 LAB
ACETONE SERPL-MCNC: NEGATIVE — SIGNIFICANT CHANGE UP
ALBUMIN SERPL ELPH-MCNC: 3.6 G/DL — SIGNIFICANT CHANGE UP (ref 3.5–5)
ALP SERPL-CCNC: 51 U/L — SIGNIFICANT CHANGE UP (ref 40–120)
ALT FLD-CCNC: 24 U/L DA — SIGNIFICANT CHANGE UP (ref 10–60)
ANION GAP SERPL CALC-SCNC: 6 MMOL/L — SIGNIFICANT CHANGE UP (ref 5–17)
APPEARANCE UR: CLEAR — SIGNIFICANT CHANGE UP
APTT BLD: 38.1 SEC — HIGH (ref 27.5–36.3)
AST SERPL-CCNC: 21 U/L — SIGNIFICANT CHANGE UP (ref 10–40)
BACTERIA # UR AUTO: NEGATIVE /HPF — SIGNIFICANT CHANGE UP
BASOPHILS # BLD AUTO: 0 K/UL — SIGNIFICANT CHANGE UP (ref 0–0.2)
BASOPHILS NFR BLD AUTO: 0 % — SIGNIFICANT CHANGE UP (ref 0–2)
BILIRUB SERPL-MCNC: 0.5 MG/DL — SIGNIFICANT CHANGE UP (ref 0.2–1.2)
BILIRUB UR-MCNC: NEGATIVE — SIGNIFICANT CHANGE UP
BUN SERPL-MCNC: 20 MG/DL — HIGH (ref 7–18)
CALCIUM SERPL-MCNC: 9.2 MG/DL — SIGNIFICANT CHANGE UP (ref 8.4–10.5)
CHLORIDE SERPL-SCNC: 100 MMOL/L — SIGNIFICANT CHANGE UP (ref 96–108)
CO2 SERPL-SCNC: 30 MMOL/L — SIGNIFICANT CHANGE UP (ref 22–31)
COLOR SPEC: YELLOW — SIGNIFICANT CHANGE UP
CREAT SERPL-MCNC: 1.45 MG/DL — HIGH (ref 0.5–1.3)
DIFF PNL FLD: ABNORMAL
EOSINOPHIL # BLD AUTO: 0.12 K/UL — SIGNIFICANT CHANGE UP (ref 0–0.5)
EOSINOPHIL NFR BLD AUTO: 2 % — SIGNIFICANT CHANGE UP (ref 0–6)
EPI CELLS # UR: NEGATIVE /HPF — SIGNIFICANT CHANGE UP
FLU A RESULT: DETECTED
FLU A RESULT: DETECTED
FLUAV AG NPH QL: DETECTED
FLUBV AG NPH QL: SIGNIFICANT CHANGE UP
GLUCOSE SERPL-MCNC: 105 MG/DL — HIGH (ref 70–99)
GLUCOSE UR QL: NEGATIVE — SIGNIFICANT CHANGE UP
HCT VFR BLD CALC: 32.4 % — LOW (ref 34.5–45)
HGB BLD-MCNC: 10.9 G/DL — LOW (ref 11.5–15.5)
HYALINE CASTS # UR AUTO: ABNORMAL /LPF
INR BLD: 1.65 RATIO — HIGH (ref 0.88–1.16)
KETONES UR-MCNC: NEGATIVE — SIGNIFICANT CHANGE UP
LACTATE SERPL-SCNC: 1.7 MMOL/L — SIGNIFICANT CHANGE UP (ref 0.7–2)
LEUKOCYTE ESTERASE UR-ACNC: NEGATIVE — SIGNIFICANT CHANGE UP
LYMPHOCYTES # BLD AUTO: 0.74 K/UL — LOW (ref 1–3.3)
LYMPHOCYTES # BLD AUTO: 12 % — LOW (ref 13–44)
MAGNESIUM SERPL-MCNC: 2.3 MG/DL — SIGNIFICANT CHANGE UP (ref 1.6–2.6)
MANUAL SMEAR VERIFICATION: SIGNIFICANT CHANGE UP
MCHC RBC-ENTMCNC: 33 PG — SIGNIFICANT CHANGE UP (ref 27–34)
MCHC RBC-ENTMCNC: 33.6 GM/DL — SIGNIFICANT CHANGE UP (ref 32–36)
MCV RBC AUTO: 98.2 FL — SIGNIFICANT CHANGE UP (ref 80–100)
MONOCYTES # BLD AUTO: 1.11 K/UL — HIGH (ref 0–0.9)
MONOCYTES NFR BLD AUTO: 18 % — HIGH (ref 2–14)
NEUTROPHILS # BLD AUTO: 4.21 K/UL — SIGNIFICANT CHANGE UP (ref 1.8–7.4)
NEUTROPHILS NFR BLD AUTO: 68 % — SIGNIFICANT CHANGE UP (ref 43–77)
NITRITE UR-MCNC: NEGATIVE — SIGNIFICANT CHANGE UP
NRBC # BLD: 0 /100 — SIGNIFICANT CHANGE UP (ref 0–0)
NT-PROBNP SERPL-SCNC: 6674 PG/ML — HIGH (ref 0–450)
PH UR: 6.5 — SIGNIFICANT CHANGE UP (ref 5–8)
PLAT MORPH BLD: NORMAL — SIGNIFICANT CHANGE UP
PLATELET # BLD AUTO: 123 K/UL — LOW (ref 150–400)
POTASSIUM SERPL-MCNC: 4.2 MMOL/L — SIGNIFICANT CHANGE UP (ref 3.5–5.3)
POTASSIUM SERPL-SCNC: 4.2 MMOL/L — SIGNIFICANT CHANGE UP (ref 3.5–5.3)
PROT SERPL-MCNC: 7.1 G/DL — SIGNIFICANT CHANGE UP (ref 6–8.3)
PROT UR-MCNC: 30 MG/DL
PROTHROM AB SERPL-ACNC: 18.6 SEC — HIGH (ref 10–12.9)
RBC # BLD: 3.3 M/UL — LOW (ref 3.8–5.2)
RBC # FLD: 12.6 % — SIGNIFICANT CHANGE UP (ref 10.3–14.5)
RBC BLD AUTO: NORMAL — SIGNIFICANT CHANGE UP
RBC CASTS # UR COMP ASSIST: ABNORMAL /HPF (ref 0–2)
RSV RESULT: SIGNIFICANT CHANGE UP
RSV RNA RESP QL NAA+PROBE: SIGNIFICANT CHANGE UP
SODIUM SERPL-SCNC: 136 MMOL/L — SIGNIFICANT CHANGE UP (ref 135–145)
SP GR SPEC: 1.01 — SIGNIFICANT CHANGE UP (ref 1.01–1.02)
TROPONIN I SERPL-MCNC: <0.015 NG/ML — SIGNIFICANT CHANGE UP (ref 0–0.04)
UROBILINOGEN FLD QL: NEGATIVE — SIGNIFICANT CHANGE UP
WBC # BLD: 6.19 K/UL — SIGNIFICANT CHANGE UP (ref 3.8–10.5)
WBC # FLD AUTO: 6.19 K/UL — SIGNIFICANT CHANGE UP (ref 3.8–10.5)
WBC UR QL: SIGNIFICANT CHANGE UP /HPF (ref 0–5)

## 2020-03-02 PROCEDURE — 71045 X-RAY EXAM CHEST 1 VIEW: CPT | Mod: 26

## 2020-03-02 PROCEDURE — 99291 CRITICAL CARE FIRST HOUR: CPT

## 2020-03-02 RX ORDER — DRONEDARONE 400 MG/1
400 TABLET, FILM COATED ORAL
Refills: 0 | Status: DISCONTINUED | OUTPATIENT
Start: 2020-03-02 | End: 2020-03-05

## 2020-03-02 RX ORDER — PIPERACILLIN AND TAZOBACTAM 4; .5 G/20ML; G/20ML
3.38 INJECTION, POWDER, LYOPHILIZED, FOR SOLUTION INTRAVENOUS ONCE
Refills: 0 | Status: COMPLETED | OUTPATIENT
Start: 2020-03-02 | End: 2020-03-02

## 2020-03-02 RX ORDER — FUROSEMIDE 40 MG
20 TABLET ORAL DAILY
Refills: 0 | Status: DISCONTINUED | OUTPATIENT
Start: 2020-03-02 | End: 2020-03-02

## 2020-03-02 RX ORDER — METOPROLOL TARTRATE 50 MG
100 TABLET ORAL
Refills: 0 | Status: DISCONTINUED | OUTPATIENT
Start: 2020-03-02 | End: 2020-03-05

## 2020-03-02 RX ORDER — SODIUM CHLORIDE 9 MG/ML
1000 INJECTION INTRAMUSCULAR; INTRAVENOUS; SUBCUTANEOUS
Refills: 0 | Status: DISCONTINUED | OUTPATIENT
Start: 2020-03-02 | End: 2020-03-03

## 2020-03-02 RX ORDER — ALBUTEROL 90 UG/1
2 AEROSOL, METERED ORAL EVERY 6 HOURS
Refills: 0 | Status: DISCONTINUED | OUTPATIENT
Start: 2020-03-02 | End: 2020-03-02

## 2020-03-02 RX ORDER — ASPIRIN/CALCIUM CARB/MAGNESIUM 324 MG
81 TABLET ORAL DAILY
Refills: 0 | Status: DISCONTINUED | OUTPATIENT
Start: 2020-03-02 | End: 2020-03-05

## 2020-03-02 RX ORDER — IPRATROPIUM/ALBUTEROL SULFATE 18-103MCG
3 AEROSOL WITH ADAPTER (GRAM) INHALATION EVERY 6 HOURS
Refills: 0 | Status: DISCONTINUED | OUTPATIENT
Start: 2020-03-02 | End: 2020-03-04

## 2020-03-02 RX ORDER — HYDRALAZINE HCL 50 MG
100 TABLET ORAL THREE TIMES A DAY
Refills: 0 | Status: DISCONTINUED | OUTPATIENT
Start: 2020-03-02 | End: 2020-03-05

## 2020-03-02 RX ORDER — ACETAMINOPHEN 500 MG
650 TABLET ORAL ONCE
Refills: 0 | Status: COMPLETED | OUTPATIENT
Start: 2020-03-02 | End: 2020-03-02

## 2020-03-02 RX ORDER — APIXABAN 2.5 MG/1
2.5 TABLET, FILM COATED ORAL
Refills: 0 | Status: DISCONTINUED | OUTPATIENT
Start: 2020-03-02 | End: 2020-03-05

## 2020-03-02 RX ORDER — SIMVASTATIN 20 MG/1
40 TABLET, FILM COATED ORAL AT BEDTIME
Refills: 0 | Status: DISCONTINUED | OUTPATIENT
Start: 2020-03-02 | End: 2020-03-05

## 2020-03-02 RX ADMIN — PIPERACILLIN AND TAZOBACTAM 200 GRAM(S): 4; .5 INJECTION, POWDER, LYOPHILIZED, FOR SOLUTION INTRAVENOUS at 12:43

## 2020-03-02 RX ADMIN — SODIUM CHLORIDE 125 MILLILITER(S): 9 INJECTION INTRAMUSCULAR; INTRAVENOUS; SUBCUTANEOUS at 12:40

## 2020-03-02 RX ADMIN — DRONEDARONE 400 MILLIGRAM(S): 400 TABLET, FILM COATED ORAL at 18:16

## 2020-03-02 RX ADMIN — Medication 650 MILLIGRAM(S): at 13:23

## 2020-03-02 RX ADMIN — APIXABAN 2.5 MILLIGRAM(S): 2.5 TABLET, FILM COATED ORAL at 18:14

## 2020-03-02 RX ADMIN — Medication 650 MILLIGRAM(S): at 17:37

## 2020-03-02 RX ADMIN — Medication 100 MILLIGRAM(S): at 18:10

## 2020-03-02 RX ADMIN — Medication 3 MILLILITER(S): at 21:52

## 2020-03-02 RX ADMIN — Medication 100 MILLIGRAM(S): at 20:11

## 2020-03-02 NOTE — ED PROVIDER NOTE - MUSCULOSKELETAL, MLM
Spine appears normal, range of motion is not limited, no muscle or joint tenderness Spine appears normal, range of motion is not limited, no muscle or joint tenderness, no calves tenderness

## 2020-03-02 NOTE — ED PROVIDER NOTE - OBJECTIVE STATEMENT
89 y/o F pt with a PMHx of Afib, CHF, complete heart block, HTN, hypercholesterolemia, and a significant PSHx of pacemaker, presents to the ED with daughter at bedside for coughing and nasal congestion x 2 days. Daughter reports patient has decreased appetite, mild shortness of breath, runny nose with clear phlegm and is very tired and sleepy. States she gave patient nebulizer treatments with improvement, but this morning patient was a little confused and warm. Patient did not receive flu vaccinations and refuses to get any vaccinations. Daughter reports she was sick 1 month ago with a cold. No hx of asthma. Denies nausea, vomiting, chest pain or any other symptoms. NKDA.

## 2020-03-02 NOTE — ED PROVIDER NOTE - CARE PLAN
Principal Discharge DX:	PNA (pneumonia)  Secondary Diagnosis:	Sepsis  Secondary Diagnosis:	Renal insufficiency

## 2020-03-02 NOTE — H&P ADULT - ASSESSMENT
Pt is a 89 y/o  F, from home, AAOx3, lives with daughter with PMH of CHF, atrial fibrillation ( on Eliquis), Complete Heart Block (s/p PPM), HTN, HLD, pulmonary HTN, dementia who presented with cough, fever, runny nose since 2-3 days.

## 2020-03-02 NOTE — ED ADULT NURSE NOTE - OBJECTIVE STATEMENT
pt is here for fatigue and weakness.  As per family member, cough and fever with malaise, denied sob or fever at this time, no distress noted.

## 2020-03-02 NOTE — ED PROVIDER NOTE - CLINICAL SUMMARY MEDICAL DECISION MAKING FREE TEXT BOX
Patient with respiratory symptoms. Concerned for pneumonia. Will get labs, give treatment and reassess.

## 2020-03-02 NOTE — H&P ADULT - PROBLEM SELECTOR PLAN 1
Suspected  underlying Pneumonia  Chest x-ray unremarkable  Pt afebrile. WBC: wnl   F/u Blood culture  F/u CT chest  ID: Dr. christina consulted

## 2020-03-02 NOTE — H&P ADULT - NSICDXFAMILYHX_GEN_ALL_CORE_FT
FAMILY HISTORY:  Father  Still living? Unknown  FH: coronary artery disease, Age at diagnosis: Age Unknown  FH: hypertension, Age at diagnosis: Age Unknown    Mother  Still living? Unknown  FH: coronary artery disease, Age at diagnosis: Age Unknown  FH: hypertension, Age at diagnosis: Age Unknown

## 2020-03-02 NOTE — ED ADULT TRIAGE NOTE - CHIEF COMPLAINT QUOTE
Cough and fever with malaise, as per daughter patient asking odd questions.  FS/EKG in main ED Cough and fever with malaise, as per daughter patient asking odd questions.  FS/EKG/CORE in main ED

## 2020-03-02 NOTE — H&P ADULT - PROBLEM SELECTOR PLAN 4
Cr/BUN: 1.45/ 20  Likely pre renal   Monitor BMP  F/u Urine electrolytes  Avoid nephrotoxic medications Cr/BUN: 1.45/ 20  Likely pre renal   Monitor BMP  F/u Urine electrolytes  Avoid nephrotoxic medications  Hold on Lasix for now

## 2020-03-02 NOTE — ED PROVIDER NOTE - PMH
Atrial fibrillation    CHF (congestive heart failure)    History of complete heart block    HTN (hypertension)    Hypercholesterolemia    Pulmonary HTN

## 2020-03-02 NOTE — H&P ADULT - PROBLEM SELECTOR PLAN 7
IMPROVE VTE Individual Risk Assessment   RISK                                                          Points  [  ] Previous VTE                                                3  [  ] Thrombophilia                                             2  [  ] Lower limb paralysis                                    2        (unable to hold up >15 seconds)    [  ] Current Cancer                                             2         (within 6 months)  [  x] Immobilization > 24 hrs                              1  [  ] ICU/CCU stay > 24 hours                            1  [x  ] Age > 60                                                    1  IMPROVE VTE Score _________2  Pt is on Eliquis

## 2020-03-02 NOTE — H&P ADULT - NSICDXPASTMEDICALHX_GEN_ALL_CORE_FT
PAST MEDICAL HISTORY:  Atrial fibrillation     CHF (congestive heart failure)     History of complete heart block     HTN (hypertension)     Hypercholesterolemia     Pulmonary HTN

## 2020-03-02 NOTE — PHARMACOTHERAPY INTERVENTION NOTE - INTERVENTION CATEGORIES
RT Note:  Patient instructed on use of Incentive Spiormeter.  Patient ordered on home regime of respiratory medication.  Patient spouse states she does not take that except every once in awhile.  Will hold pulling medication until which time patient would request medication.  Patient considered home regime   Therapy

## 2020-03-02 NOTE — ED PROVIDER NOTE - NEUROLOGICAL, MLM
Alert and oriented, no focal deficits, no motor or sensory deficits. Alert and orientedx2, no focal deficits, no motor or sensory deficits.

## 2020-03-02 NOTE — PATIENT PROFILE ADULT - VISION (WITH CORRECTIVE LENSES IF THE PATIENT USUALLY WEARS THEM):
Normal vision: sees adequately in most situations; can see medication labels, newsprint Partially impaired: cannot see medication labels or newsprint, but can see obstacles in path, and the surrounding layout; can count fingers at arm's length/use glasses at home

## 2020-03-02 NOTE — H&P ADULT - HISTORY OF PRESENT ILLNESS
Pt is a 89 y/o  F, from home, AAOx3, lives with daughter with PMH of CHF, atrial fibrillation ( on Eliquis), Complete Heart Block (s/p PPM), HTN, HLD, pulmonary HTN, dementia who presented with cough, fever, runny nose since 2-3 days. Pt is AOx3 and speaks [place], has mild dementia as per daughter. Daughter at bedside helped translation. As per daughter, pt has been having dry cough along with fever since few days. Yesterday, her temperature was recorded 100F at home. She also has runny nose and generalized weakness. She used to walk with assistance, but now has difficulty walking due to weakness. She also has decreased appetite and mild shortness of breath which is worse on exertion.   She denied chest pain, nausea, vomiting, abdominal pain, recent travel, sick contacts and other complains.

## 2020-03-02 NOTE — H&P ADULT - NSHPLABSRESULTS_GEN_ALL_CORE
10.9   6.19  )-----------( 123      ( 02 Mar 2020 12:40 )             32.4       03-02    136  |  100  |  20<H>  ----------------------------<  105<H>  4.2   |  30  |  1.45<H>    Ca    9.2      02 Mar 2020 12:40  Mg     2.3     03-02    TPro  7.1  /  Alb  3.6  /  TBili  0.5  /  DBili  x   /  AST  21  /  ALT  24  /  AlkPhos  51  03-02

## 2020-03-02 NOTE — ED ADULT NURSE NOTE - NSIMPLEMENTINTERV_GEN_ALL_ED
Implemented All Fall with Harm Risk Interventions:  [place] to call system. Call bell, personal items and telephone within reach. Instruct patient to call for assistance. Room bathroom lighting operational. Non-slip footwear when patient is off stretcher. Physically safe environment: no spills, clutter or unnecessary equipment. Stretcher in lowest position, wheels locked, appropriate side rails in place. Provide visual cue, wrist band, yellow gown, etc. Monitor gait and stability. Monitor for mental status changes and reorient to person, place, and time. Review medications for side effects contributing to fall risk. Reinforce activity limits and safety measures with patient and family. Provide visual clues: red socks.

## 2020-03-03 ENCOUNTER — TRANSCRIPTION ENCOUNTER (OUTPATIENT)
Age: 85
End: 2020-03-03

## 2020-03-03 LAB
% ALBUMIN: 53.7 % — SIGNIFICANT CHANGE UP
% ALPHA 1: 5.5 % — SIGNIFICANT CHANGE UP
% ALPHA 2: 13.9 % — SIGNIFICANT CHANGE UP
% BETA: 13.1 % — SIGNIFICANT CHANGE UP
% GAMMA: 13.8 % — SIGNIFICANT CHANGE UP
ALBUMIN SERPL ELPH-MCNC: 3.8 G/DL — SIGNIFICANT CHANGE UP (ref 3.6–5.5)
ALBUMIN/GLOB SERPL ELPH: 1.2 RATIO — SIGNIFICANT CHANGE UP
ALPHA1 GLOB SERPL ELPH-MCNC: 0.4 G/DL — SIGNIFICANT CHANGE UP (ref 0.1–0.4)
ALPHA2 GLOB SERPL ELPH-MCNC: 1 G/DL — SIGNIFICANT CHANGE UP (ref 0.5–1)
APPEARANCE UR: CLEAR — SIGNIFICANT CHANGE UP
B-GLOBULIN SERPL ELPH-MCNC: 0.9 G/DL — SIGNIFICANT CHANGE UP (ref 0.5–1)
BILIRUB UR-MCNC: NEGATIVE — SIGNIFICANT CHANGE UP
COLOR SPEC: YELLOW — SIGNIFICANT CHANGE UP
CULTURE RESULTS: NO GROWTH — SIGNIFICANT CHANGE UP
DIFF PNL FLD: NEGATIVE — SIGNIFICANT CHANGE UP
GAMMA GLOBULIN: 1 G/DL — SIGNIFICANT CHANGE UP (ref 0.6–1.6)
GLUCOSE UR QL: NEGATIVE — SIGNIFICANT CHANGE UP
KETONES UR-MCNC: ABNORMAL
LEUKOCYTE ESTERASE UR-ACNC: NEGATIVE — SIGNIFICANT CHANGE UP
NITRITE UR-MCNC: NEGATIVE — SIGNIFICANT CHANGE UP
PH UR: 5 — SIGNIFICANT CHANGE UP (ref 5–8)
PROT PATTERN SERPL ELPH-IMP: SIGNIFICANT CHANGE UP
PROT SERPL-MCNC: 7.1 G/DL — SIGNIFICANT CHANGE UP (ref 6–8.3)
PROT UR-MCNC: NEGATIVE — SIGNIFICANT CHANGE UP
SP GR SPEC: 1.01 — SIGNIFICANT CHANGE UP (ref 1.01–1.02)
SPECIMEN SOURCE: SIGNIFICANT CHANGE UP
UROBILINOGEN FLD QL: NEGATIVE — SIGNIFICANT CHANGE UP

## 2020-03-03 PROCEDURE — 71250 CT THORAX DX C-: CPT | Mod: 26

## 2020-03-03 RX ORDER — ACETAMINOPHEN 500 MG
650 TABLET ORAL EVERY 6 HOURS
Refills: 0 | Status: DISCONTINUED | OUTPATIENT
Start: 2020-03-03 | End: 2020-03-05

## 2020-03-03 RX ORDER — HYDRALAZINE HCL 50 MG
50 TABLET ORAL ONCE
Refills: 0 | Status: COMPLETED | OUTPATIENT
Start: 2020-03-03 | End: 2020-03-03

## 2020-03-03 RX ADMIN — Medication 3 MILLILITER(S): at 13:21

## 2020-03-03 RX ADMIN — Medication 30 MILLIGRAM(S): at 06:24

## 2020-03-03 RX ADMIN — Medication 100 MILLIGRAM(S): at 06:25

## 2020-03-03 RX ADMIN — SIMVASTATIN 40 MILLIGRAM(S): 20 TABLET, FILM COATED ORAL at 22:36

## 2020-03-03 RX ADMIN — DRONEDARONE 400 MILLIGRAM(S): 400 TABLET, FILM COATED ORAL at 18:24

## 2020-03-03 RX ADMIN — Medication 3 MILLILITER(S): at 04:41

## 2020-03-03 RX ADMIN — DRONEDARONE 400 MILLIGRAM(S): 400 TABLET, FILM COATED ORAL at 06:25

## 2020-03-03 RX ADMIN — Medication 81 MILLIGRAM(S): at 12:45

## 2020-03-03 RX ADMIN — Medication 100 MILLIGRAM(S): at 18:24

## 2020-03-03 RX ADMIN — Medication 650 MILLIGRAM(S): at 00:25

## 2020-03-03 RX ADMIN — Medication 3 MILLILITER(S): at 20:29

## 2020-03-03 RX ADMIN — Medication 650 MILLIGRAM(S): at 02:28

## 2020-03-03 RX ADMIN — Medication 50 MILLIGRAM(S): at 00:25

## 2020-03-03 RX ADMIN — Medication 100 MILLIGRAM(S): at 22:36

## 2020-03-03 RX ADMIN — Medication 30 MILLIGRAM(S): at 18:23

## 2020-03-03 RX ADMIN — APIXABAN 2.5 MILLIGRAM(S): 2.5 TABLET, FILM COATED ORAL at 06:25

## 2020-03-03 RX ADMIN — APIXABAN 2.5 MILLIGRAM(S): 2.5 TABLET, FILM COATED ORAL at 18:23

## 2020-03-03 NOTE — DISCHARGE NOTE PROVIDER - CARE PROVIDER_API CALL
Carol Harrell)  Internal Medicine  [address] [place] [address]  Phone: [phone]  Fax: [phone]  Follow Up Time: Carol Harrell)  Internal Medicine  [address] [place] [address]  Phone: [phone]  Fax: [phone]  Follow Up Time:     Rafy Solano)  Internal Medicine; Pulmonary Disease  [address] [place] [address]  Phone: [phone]  Fax: [phone]  Follow Up Time:

## 2020-03-03 NOTE — CONSULT NOTE ADULT - WHEEZES
lower L/Expiratory wheezes/lower R lower R/Expiratory wheezes. Bilateral ronchi/lower L upper L/lower L/upper R/middle R/lower R/Expiratory wheezes. Bilateral rhonchi

## 2020-03-03 NOTE — PROGRESS NOTE ADULT - SUBJECTIVE AND OBJECTIVE BOX
PGY 1 Note discussed with supervising resident and primary attending    Patient is a 89y old  Female who presents with a chief complaint of Cough (02 Mar 2020 17:05)      INTERVAL HPI/OVERNIGHT EVENTS: offers no new complaints; current symptoms resolving    MEDICATIONS  (STANDING):  apixaban 2.5 milliGRAM(s) Oral two times a day  aspirin  chewable 81 milliGRAM(s) Oral daily  dronedarone 400 milliGRAM(s) Oral two times a day  hydrALAZINE 100 milliGRAM(s) Oral three times a day  metoprolol tartrate 100 milliGRAM(s) Oral two times a day  oseltamivir 30 milliGRAM(s) Oral two times a day  simvastatin 40 milliGRAM(s) Oral at bedtime    MEDICATIONS  (PRN):  acetaminophen   Tablet .. 650 milliGRAM(s) Oral every 6 hours PRN Temp greater or equal to 38C (100.4F), Mild Pain (1 - 3)  albuterol/ipratropium for Nebulization. 3 milliLiter(s) Nebulizer every 6 hours PRN Shortness of Breath and/or Wheezing      __________________________________________________  REVIEW OF SYSTEMS:    CONSTITUTIONAL: No fever,   EYES: no acute visual disturbances  NECK: No pain or stiffness  RESPIRATORY: No cough; No shortness of breath  CARDIOVASCULAR: No chest pain, no palpitations  GASTROINTESTINAL: No pain. No nausea or vomiting; No diarrhea   NEUROLOGICAL: No headache or numbness, no tremors  MUSCULOSKELETAL: No joint pain, no muscle pain  GENITOURINARY: no dysuria, no frequency, no hesitancy  PSYCHIATRY: no depression , no anxiety  ALL OTHER  ROS negative        Vital Signs Last 24 Hrs  T(C): 37.1 (03 Mar 2020 08:04), Max: 38.7 (02 Mar 2020 12:45)  T(F): 98.8 (03 Mar 2020 08:04), Max: 101.6 (02 Mar 2020 12:45)  HR: 73 (03 Mar 2020 08:04) (62 - 78)  BP: 143/58 (03 Mar 2020 08:04) (121/42 - 186/56)  BP(mean): --  RR: 20 (03 Mar 2020 08:04) (17 - 22)  SpO2: 95% (03 Mar 2020 08:04) (92% - 98%)    ________________________________________________  PHYSICAL EXAM:  GENERAL: NAD  HEENT: Normocephalic;  conjunctivae and sclerae clear; moist mucous membranes;   NECK : supple  CHEST/LUNG: Clear to auscultation bilaterally with good air entry   HEART: S1 S2  regular; no murmurs, gallops or rubs  ABDOMEN: Soft, Nontender, Nondistended; Bowel sounds present  EXTREMITIES: no cyanosis; no edema; no calf tenderness  SKIN: warm and dry; no rash  NERVOUS SYSTEM:  Awake and alert; Oriented  to place, person and time ; no new deficits    _________________________________________________  LABS:                        10.9   6.19  )-----------( 123      ( 02 Mar 2020 12:40 )             32.4     03-02    136  |  100  |  20<H>  ----------------------------<  105<H>  4.2   |  30  |  1.45<H>    Ca    9.2      02 Mar 2020 12:40  Mg     2.3     03-02    TPro  7.1  /  Alb  3.6  /  TBili  0.5  /  DBili  x   /  AST  21  /  ALT  24  /  AlkPhos  51  03-02    PT/INR - ( 02 Mar 2020 12:40 )   PT: 18.6 sec;   INR: 1.65 ratio         PTT - ( 02 Mar 2020 12:40 )  PTT:38.1 sec  Urinalysis Basic - ( 02 Mar 2020 15:01 )    Color: Yellow / Appearance: Clear / S.010 / pH: x  Gluc: x / Ketone: Negative  / Bili: Negative / Urobili: Negative   Blood: x / Protein: 30 mg/dL / Nitrite: Negative   Leuk Esterase: Negative / RBC: 5-10 /HPF / WBC 0-2 /HPF   Sq Epi: x / Non Sq Epi: Negative /HPF / Bacteria: Negative /HPF      CAPILLARY BLOOD GLUCOSE      POCT Blood Glucose.: 115 mg/dL (02 Mar 2020 12:04)        RADIOLOGY & ADDITIONAL TESTS:    Imaging Personally Reviewed:  YES/NO    Consultant(s) Notes Reviewed:   YES/ No    Care Discussed with Consultants :     Plan of care was discussed with patient and /or primary care giver; all questions and concerns were addressed and care was aligned with patient's wishes.

## 2020-03-03 NOTE — CONSULT NOTE ADULT - MS GEN HX ROS MEA POS PC
Leg pain on ambulation. Son refers that she needs a walker but she refuses/leg pain L/arthritis/leg pain R

## 2020-03-03 NOTE — DISCHARGE NOTE PROVIDER - NSDCMRMEDTOKEN_GEN_ALL_CORE_FT
aspirin 81 mg oral tablet, chewable: 1 tab(s) orally once a day  Eliquis 2.5 mg oral tablet: 1 tab(s) orally 2 times a day  furosemide 20 mg oral tablet: 1 tab(s) orally once a day  hydrALAZINE 100 mg oral tablet: 1 tab(s) orally 3 times a day  ipratropium-albuterol 0.5 mg-2.5 mg/3 mLinhalation solution: 3 milliliter(s) inhaled every 6 hours  metoprolol tartrate 100 mg oral tablet: 1 tab(s) orally 2 times a day  Multaq 400 mg oral tablet: 1 tab(s) orally 2 times a day  simvastatin 40 mg oral tablet: 1 tab(s) orally once a day (at bedtime) aspirin 81 mg oral tablet, chewable: 1 tab(s) orally once a day  cefuroxime 500 mg oral tablet: 1 tab(s) orally every 12 hours  Eliquis 2.5 mg oral tablet: 1 tab(s) orally 2 times a day  furosemide 20 mg oral tablet: 1 tab(s) orally once a day  hydrALAZINE 100 mg oral tablet: 1 tab(s) orally 3 times a day  ipratropium-albuterol 0.5 mg-2.5 mg/3 mLinhalation solution: 3 milliliter(s) inhaled every 6 hours  metoprolol tartrate 100 mg oral tablet: 1 tab(s) orally 2 times a day  Multaq 400 mg oral tablet: 1 tab(s) orally 2 times a day  oseltamivir 30 mg oral capsule: 1 cap(s) orally 2 times a day  predniSONE 10 mg oral tablet: 4 tab(s) once a day x 2 days  3 tab(s) once a day x 2days  2 tab(s) once a day x 2 days  1 tab(s) once a day x 1 day  simvastatin 40 mg oral tablet: 1 tab(s) orally once a day (at bedtime)

## 2020-03-03 NOTE — CONSULT NOTE ADULT - GASTROINTESTINAL DETAILS
nontender/soft/no distention/no masses palpable nontender/no rigidity/bowel sounds normal/no organomegaly/soft/no guarding/no distention/no masses palpable

## 2020-03-03 NOTE — DISCHARGE NOTE PROVIDER - NSDCCPCAREPLAN_GEN_ALL_CORE_FT
PRINCIPAL DISCHARGE DIAGNOSIS  Diagnosis: Influenza A  Assessment and Plan of Treatment: You presented with fevers, dry cough, rhinorrhea, generalized weakness, decreased appetite, and mild shortness of breath on exertion for 3 days. Vital signs were initially stable in the ED but you were noted to have a 101.5F fever, which was controlled with Tylenol. You were tested positive for Flu A and was started on Tamiflu. urine cultures were negative. CXR showed mild cardiomegaly without acute infiltrate and CT Chest showed patchy/nodular opacities in the left lower lobe, follow up with 6-week CT Chest is recommended. You were started on Ceftin 500mg twice a day for 7 days. You were seen by pulmonologist and he recommended IV solumedrol and then switch to oral prednisone upon discharge. Continue taking your medications as prescribed in the reconcillation        SECONDARY DISCHARGE DIAGNOSES  Diagnosis: Pneumonia  Assessment and Plan of Treatment: Treated in the hospital with respiratory therapy and IV antibiotics:  With significant imporvement to respiratory status. Will continue a course of oral antibiotics for 5 more days.  Make sure you follow up with Primary Care physician within 1-2 weeks of discharge to inform of your recent admission.      Diagnosis: CAMILLE (acute kidney injury)  Assessment and Plan of Treatment: You were found to have acute kidney injury on admission. This was likely due to dehydration leading to decreased blood flow to your kidneys. Your BUN/Cr were not monitored throughout your stay because you and your family refused blood draws and no further testing was done. You are advised to stay well hydrated with nutrition and fluids and follow up with your doctor to repeat the tests.       Diagnosis: Atrial fibrillation  Assessment and Plan of Treatment: Please continue with your anticoagulation medication as instructed in the medication reconciliation and your primary care physician.  Please continue with you rate control medication as instructed in the medication reconciliation and your primary care physician.      Diagnosis: HTN (hypertension)  Assessment and Plan of Treatment: Continue with blood pressure medication. Maintain a healthy diet that consist of low sugar, low fat, low sodium diet. Exercise frequently if possible.  Follow up with primary care physician in one week after discharge.

## 2020-03-03 NOTE — CONSULT NOTE ADULT - SUBJECTIVE AND OBJECTIVE BOX
HPI:  89 y/o  Female from home and lives with daughter AAOx2-3, lives with daughter with PMHx of CHF, atrial fibrillation (on Eliquis), Complete Heart Block (s/p PPM), HTN, HLD, pulmonary HTN, dementia who presented with cough, fever, runny nose since 2-3 days. Son is at bedside providing further history. As per son, patient has been having dry cough along with fever since the past 2-3 days. Patient attributes it to her heater that has been blowing hot air to her. Son refers that his sister told him that she had a temperature of 100 F. She used to walk with assistance, but now has difficulty walking due to weakness. Son refers that she has been more short of breath since and has been easting less. Patient denies any nausea, vomiting, chest pain or palpitations. No sick contacts at home.       PAST MEDICAL & SURGICAL HISTORY:  CHF (congestive heart failure)  Pulmonary HTN  History of complete heart block  Atrial fibrillation  Hypercholesterolemia  HTN (hypertension)  Artificial pacemaker      No Known Allergies      Meds:  acetaminophen   Tablet .. 650 milliGRAM(s) Oral every 6 hours PRN  albuterol/ipratropium for Nebulization. 3 milliLiter(s) Nebulizer every 6 hours PRN  apixaban 2.5 milliGRAM(s) Oral two times a day  aspirin  chewable 81 milliGRAM(s) Oral daily  dronedarone 400 milliGRAM(s) Oral two times a day  hydrALAZINE 100 milliGRAM(s) Oral three times a day  metoprolol tartrate 100 milliGRAM(s) Oral two times a day  oseltamivir 30 milliGRAM(s) Oral two times a day  simvastatin 40 milliGRAM(s) Oral at bedtime      SOCIAL HISTORY:  Smoker:   NO         ETOH use:  NO                Ilicit Drug use:  NO  Occupation: Housewife  Assisted device use: No but family says she need it  Live with daughter  Denies any sick contacts    FAMILY HISTORY:  FH: coronary artery disease (Father, Mother)  FH: hypertension (Father, Mother)      VITALS:  Vital Signs Last 24 Hrs  T(C): 37.1 (03 Mar 2020 08:04), Max: 38.7 (02 Mar 2020 12:45)  T(F): 98.8 (03 Mar 2020 08:04), Max: 101.6 (02 Mar 2020 12:45)  HR: 73 (03 Mar 2020 08:04) (62 - 78)  BP: 143/58 (03 Mar 2020 08:04) (121/42 - 186/56)  BP(mean): --  RR: 20 (03 Mar 2020 08:04) (17 - 22)  SpO2: 95% (03 Mar 2020 08:04) (92% - 98%)    LABS/DIAGNOSTIC TESTS:                          10.9   6.19  )-----------( 123      ( 02 Mar 2020 12:40 )             32.4     WBC Count: 6.19 K/uL ( @ 12:40)          136  |  100  |  20<H>  ----------------------------<  105<H>  4.2   |  30  |  1.45<H>    Ca    9.2      02 Mar 2020 12:40  Mg     2.3         TPro  7.1  /  Alb  3.6  /  TBili  0.5  /  DBili  x   /  AST  21  /  ALT  24  /  AlkPhos  51  03      Urinalysis Basic - ( 02 Mar 2020 15:01 )    Color: Yellow / Appearance: Clear / S.010 / pH: x  Gluc: x / Ketone: Negative  / Bili: Negative / Urobili: Negative   Blood: x / Protein: 30 mg/dL / Nitrite: Negative   Leuk Esterase: Negative / RBC: 5-10 /HPF / WBC 0-2 /HPF   Sq Epi: x / Non Sq Epi: Negative /HPF / Bacteria: Negative /HPF        LIVER FUNCTIONS - ( 02 Mar 2020 12:40 )  Alb: 3.6 g/dL / Pro: 7.1 g/dL / ALK PHOS: 51 U/L / ALT: 24 U/L DA / AST: 21 U/L / GGT: x             PT/INR - ( 02 Mar 2020 12:40 )   PT: 18.6 sec;   INR: 1.65 ratio         PTT - ( 02 Mar 2020 12:40 )  PTT:38.1 sec    LACTATE:Lactate, Blood: 1.7 mmol/L ( @ 12:40)      ABG -     CULTURES:       RADIOLOGY:    < from: Xray Chest 1 View AP/PA (20 @ 17:49) >    EXAM:  XR CHEST AP OR PA 1V                            PROCEDURE DATE:  2020          INTERPRETATION:  CLINICAL INDICATION: 89 years  Female with coughing, weakness 12.    COMPARISON: 4/15/2019    AP view of the chest demonstrates the lungs to be clear. There is no pleural effusion. There is no pneumothorax.    The heart is mildly enlarged. The bipolar pacemaker is unchanged. The aorta is atherosclerotic. There is no mediastinal or hilar mass.     The pulmonary vasculature is normal.     Mild thoracic degenerative changes and diffuse osteopenia are present.    IMPRESSION:    Mild cardiomegaly. Pacemaker.    No acute infiltrate.    < end of copied text >  ===========================================================          ROS  [  ] UNABLE TO ELICIT HPI:  89 y/o  Female from home and lives with daughter AAOx2-3, lives with daughter with PMHx of CHF, atrial fibrillation (on Eliquis), Complete Heart Block (s/p PPM), HTN, HLD, pulmonary HTN, dementia who presented with cough, fever, runny nose since 2-3 days. Son is at bedside providing further history. As per son, patient has been having dry cough along with fever since the past 2-3 days. Patient attributes it to her heater that has been blowing hot air to her. Son refers that his sister told him that she had a temperature of 100 F. She used to walk with assistance, but now has difficulty walking due to weakness. Son refers that she has been more short of breath since and has been easting less. Patient denies any nausea, vomiting, chest pain or palpitations. No sick contacts at home. Here she had a fever of 101.6 but is afebrile now. She states she is feeling better today as far as her cough and sob goes.       PAST MEDICAL & SURGICAL HISTORY:  CHF (congestive heart failure)  Pulmonary HTN  History of complete heart block  Atrial fibrillation  Hypercholesterolemia  HTN (hypertension)  Artificial pacemaker      No Known Allergies      Meds:  acetaminophen   Tablet .. 650 milliGRAM(s) Oral every 6 hours PRN  albuterol/ipratropium for Nebulization. 3 milliLiter(s) Nebulizer every 6 hours PRN  apixaban 2.5 milliGRAM(s) Oral two times a day  aspirin  chewable 81 milliGRAM(s) Oral daily  dronedarone 400 milliGRAM(s) Oral two times a day  hydrALAZINE 100 milliGRAM(s) Oral three times a day  metoprolol tartrate 100 milliGRAM(s) Oral two times a day  oseltamivir 30 milliGRAM(s) Oral two times a day  simvastatin 40 milliGRAM(s) Oral at bedtime      SOCIAL HISTORY:  Smoker:   NO         ETOH use:  NO                Ilicit Drug use:  NO  Assisted device use: No but family says she need it  Live with daughter  Denies any sick contacts    FAMILY HISTORY:  FH: coronary artery disease (Father, Mother)  FH: hypertension (Father, Mother)      VITALS:  Vital Signs Last 24 Hrs  T(C): 37.1 (03 Mar 2020 08:04), Max: 38.7 (02 Mar 2020 12:45)  T(F): 98.8 (03 Mar 2020 08:04), Max: 101.6 (02 Mar 2020 12:45)  HR: 73 (03 Mar 2020 08:04) (62 - 78)  BP: 143/58 (03 Mar 2020 08:04) (121/42 - 186/56)  BP(mean): --  RR: 20 (03 Mar 2020 08:04) (17 - 22)  SpO2: 95% (03 Mar 2020 08:04) (92% - 98%)    LABS/DIAGNOSTIC TESTS:                          10.9   6.19  )-----------( 123      ( 02 Mar 2020 12:40 )             32.4     WBC Count: 6.19 K/uL ( @ 12:40)          136  |  100  |  20<H>  ----------------------------<  105<H>  4.2   |  30  |  1.45<H>    Ca    9.2      02 Mar 2020 12:40  Mg     2.3         TPro  7.1  /  Alb  3.6  /  TBili  0.5  /  DBili  x   /  AST  21  /  ALT  24  /  AlkPhos  51  03-      Urinalysis Basic - ( 02 Mar 2020 15:01 )    Color: Yellow / Appearance: Clear / S.010 / pH: x  Gluc: x / Ketone: Negative  / Bili: Negative / Urobili: Negative   Blood: x / Protein: 30 mg/dL / Nitrite: Negative   Leuk Esterase: Negative / RBC: 5-10 /HPF / WBC 0-2 /HPF   Sq Epi: x / Non Sq Epi: Negative /HPF / Bacteria: Negative /HPF        LIVER FUNCTIONS - ( 02 Mar 2020 12:40 )  Alb: 3.6 g/dL / Pro: 7.1 g/dL / ALK PHOS: 51 U/L / ALT: 24 U/L DA / AST: 21 U/L / GGT: x             PT/INR - ( 02 Mar 2020 12:40 )   PT: 18.6 sec;   INR: 1.65 ratio         PTT - ( 02 Mar 2020 12:40 )  PTT:38.1 sec    LACTATE:Lactate, Blood: 1.7 mmol/L ( @ 12:40)      ABG -     CULTURES:       RADIOLOGY:    < from: Xray Chest 1 View AP/PA (20 @ 17:49) >    EXAM:  XR CHEST AP OR PA 1V                            PROCEDURE DATE:  2020          INTERPRETATION:  CLINICAL INDICATION: 89 years  Female with coughing, weakness 12.    COMPARISON: 4/15/2019    AP view of the chest demonstrates the lungs to be clear. There is no pleural effusion. There is no pneumothorax.    The heart is mildly enlarged. The bipolar pacemaker is unchanged. The aorta is atherosclerotic. There is no mediastinal or hilar mass.     The pulmonary vasculature is normal.     Mild thoracic degenerative changes and diffuse osteopenia are present.    IMPRESSION:    Mild cardiomegaly. Pacemaker.    No acute infiltrate.    < end of copied text >  ===========================================================          ROS  [  ] UNABLE TO ELICIT

## 2020-03-03 NOTE — CONSULT NOTE ADULT - ASSESSMENT
Assessment  Influenza A  Fevers (Resolved)       Plan:  C/W Tamiflu Day #2 for 5 days Assessment  Influenza A infection  Fevers. Last episode of fever around 12 in the morning 3/3. No fevers in the past 12 hours     Plan:  C/W Tamiflu 30mg BID for 5 days. Currently day #2  Await CT of the chest Assessment  Influenza A infection  Fevers - improving    Plan:  C/W Tamiflu 30mg BID for 5 days. Currently day #2  Await CT of the chest

## 2020-03-03 NOTE — DISCHARGE NOTE PROVIDER - HOSPITAL COURSE
Patient is a 89 y/o [place] speaking female, from home, lives with daughter, THANIA&Ox3 with pmhx CHF, afib (on Eliquis), complete heart block s/p pacemaker implant, HTN, pulmonary HTN, HLD, and mild dementia who presented with a low grade fever (100F at home), dry cough, rhinorrhea, generalized weakness, decreased appetite, and mild SOB on exertion for 3 days. No CP, palpitations, abd pain, n/v/d, urinary complaints, recent travel, or sick contacts. Vital signs were initially stable in the ED but patient did spike a 101.5F fever, which was controlled with Tylenol. She was tested positive for Flu A and was started on Tamiflu. WBC was WNL, BNP 6674. CXR was unremarkable and CT Chest showed _______. Patient also noted to have CAMILLE with BUN/Cr 20/1.45, GFR 31; however, she has a h/o diastolic CHF, so Lasix was held for ____. Patient is a 89 y/o [place] speaking female, from home, lives with daughter, THANIA&Ox3 with pmhx CHF, afib (on Eliquis), complete heart block s/p pacemaker implant, HTN, pulmonary HTN, HLD, and mild dementia who presented with a low grade fever (100F at home), dry cough, rhinorrhea, generalized weakness, decreased appetite, and mild SOB on exertion for 3 days. No CP, palpitations, abd pain, n/v/d, urinary complaints, recent travel, or sick contacts. Vital signs were initially stable in the ED but patient did spike a 101.5F fever, which was controlled with Tylenol. She was tested positive for Flu A and was started on Tamiflu. WBC was WNL, BNP 6674. CXR showed mild cardiomegaly without acute infiltrate and CT Chest showed _____. Patient also noted to have CAMILLE with BUN/Cr 20/1.45, GFR 31; however, she has a h/o diastolic CHF, so Lasix was held for ____. Patient is a 89 y/o [place]ese speaking female, from home, lives with daughter, THANIA&Ox3 with pmhx CHF, afib (on Eliquis), complete heart block s/p pacemaker implant, HTN, pulmonary HTN, HLD, and mild dementia who presented with a low grade fever (100F at home), dry cough, rhinorrhea, generalized weakness, decreased appetite, and mild SOB on exertion for 3 days. No CP, palpitations, abd pain, n/v/d, urinary complaints, recent travel, or sick contacts. Vital signs were initially stable in the ED but patient did spike a 101.5F fever, which was controlled with Tylenol. She was tested positive for Flu A and was started on Tamiflu. WBC was WNL, BNP 6674. Blood and urine cultures were negative. CXR showed mild cardiomegaly without acute infiltrate and CT Chest showed patchy/nodular opacities in the LLL, follow up with 6-week CT Chest recommended.         Patient also noted to have CAMILLE with BUN/Cr 20/1.45, GFR 31; however, she has a h/o diastolic CHF, so Lasix was held for ____. Patient is a 89 y/o Danish speaking female, from home, lives with daughter, THANIA&Ox3 with pmhx CHF, afib (on Eliquis), complete heart block s/p pacemaker implant, HTN, pulmonary HTN, HLD, and mild dementia who presented with a low grade fever (100F at home), dry cough, rhinorrhea, generalized weakness, decreased appetite, and mild SOB on exertion for 3 days. No CP, palpitations, abd pain, n/v/d, urinary complaints, recent travel, or sick contacts. Vital signs were initially stable in the ED but patient did spike a 101.5F fever, which was controlled with Tylenol. She was tested positive for Flu A and was started on Tamiflu. WBC was WNL, BNP 6674. Blood and urine cultures were negative. CXR showed mild cardiomegaly without acute infiltrate and CT Chest showed patchy/nodular opacities in the LLL, follow up with 6-week CT Chest recommended. Pt was started on Ceftin 500mg BID for 7 days. Pt was seen by pulmonologist and recommended IV solumedrol for 24-48 hs then switch to po prednisone upon DC        Patient also noted to have CAMILLE with BUN/Cr 20/1.45, GFR 31; however, she has a h/o diastolic CHF, so Lasix was held initially. Pt's family refused any further lab test and BMP was not repeated. Pt needs to follow up with doctor after discharge to follow up results.         Given patient's improved clinical status and current hemodynamic stability, decision was made to discharge.    Please refer to patient's complete medical chart with documents for a full hospital course, for this is only a brief summary.

## 2020-03-03 NOTE — CONSULT NOTE ADULT - CARDIOVASCULAR DETAILS
irregular rate and rhythm/positive S2/positive S1 irregular rate and rhythm/positive S2/murmur/positive S1

## 2020-03-03 NOTE — CONSULT NOTE ADULT - NEGATIVE GASTROINTESTINAL SYMPTOMS
no nausea/no change in bowel habits/no vomiting no nausea/no diarrhea/no change in bowel habits/no vomiting

## 2020-03-04 DIAGNOSIS — J45.909 UNSPECIFIED ASTHMA, UNCOMPLICATED: ICD-10-CM

## 2020-03-04 DIAGNOSIS — I27.20 PULMONARY HYPERTENSION, UNSPECIFIED: ICD-10-CM

## 2020-03-04 LAB — GLUCOSE BLDC GLUCOMTR-MCNC: 89 MG/DL — SIGNIFICANT CHANGE UP (ref 70–99)

## 2020-03-04 RX ORDER — CEFUROXIME AXETIL 250 MG
500 TABLET ORAL EVERY 12 HOURS
Refills: 0 | Status: DISCONTINUED | OUTPATIENT
Start: 2020-03-04 | End: 2020-03-04

## 2020-03-04 RX ORDER — CEFUROXIME AXETIL 250 MG
500 TABLET ORAL EVERY 12 HOURS
Refills: 0 | Status: DISCONTINUED | OUTPATIENT
Start: 2020-03-04 | End: 2020-03-05

## 2020-03-04 RX ORDER — FUROSEMIDE 40 MG
20 TABLET ORAL DAILY
Refills: 0 | Status: DISCONTINUED | OUTPATIENT
Start: 2020-03-04 | End: 2020-03-05

## 2020-03-04 RX ORDER — IPRATROPIUM/ALBUTEROL SULFATE 18-103MCG
3 AEROSOL WITH ADAPTER (GRAM) INHALATION EVERY 6 HOURS
Refills: 0 | Status: DISCONTINUED | OUTPATIENT
Start: 2020-03-04 | End: 2020-03-05

## 2020-03-04 RX ADMIN — Medication 30 MILLIGRAM(S): at 17:46

## 2020-03-04 RX ADMIN — Medication 500 MILLIGRAM(S): at 17:46

## 2020-03-04 RX ADMIN — DRONEDARONE 400 MILLIGRAM(S): 400 TABLET, FILM COATED ORAL at 17:46

## 2020-03-04 RX ADMIN — Medication 100 MILLIGRAM(S): at 06:42

## 2020-03-04 RX ADMIN — Medication 100 MILLIGRAM(S): at 13:09

## 2020-03-04 RX ADMIN — Medication 40 MILLIGRAM(S): at 17:46

## 2020-03-04 RX ADMIN — APIXABAN 2.5 MILLIGRAM(S): 2.5 TABLET, FILM COATED ORAL at 17:47

## 2020-03-04 RX ADMIN — Medication 40 MILLIGRAM(S): at 23:58

## 2020-03-04 RX ADMIN — Medication 100 MILLIGRAM(S): at 17:46

## 2020-03-04 RX ADMIN — SIMVASTATIN 40 MILLIGRAM(S): 20 TABLET, FILM COATED ORAL at 21:34

## 2020-03-04 RX ADMIN — Medication 100 MILLIGRAM(S): at 21:34

## 2020-03-04 RX ADMIN — APIXABAN 2.5 MILLIGRAM(S): 2.5 TABLET, FILM COATED ORAL at 06:42

## 2020-03-04 RX ADMIN — Medication 3 MILLILITER(S): at 20:56

## 2020-03-04 RX ADMIN — DRONEDARONE 400 MILLIGRAM(S): 400 TABLET, FILM COATED ORAL at 06:42

## 2020-03-04 RX ADMIN — Medication 3 MILLILITER(S): at 16:40

## 2020-03-04 RX ADMIN — Medication 30 MILLIGRAM(S): at 06:42

## 2020-03-04 RX ADMIN — Medication 3 MILLILITER(S): at 06:47

## 2020-03-04 RX ADMIN — Medication 81 MILLIGRAM(S): at 12:25

## 2020-03-04 RX ADMIN — Medication 20 MILLIGRAM(S): at 12:25

## 2020-03-04 NOTE — PROGRESS NOTE ADULT - PROBLEM SELECTOR PLAN 2
Flu A was positive  Continue Tamiflu  Droplet isolation
Flu A was positive  Continue Tamiflu to complete 5 days   Droplet isolation

## 2020-03-04 NOTE — PROGRESS NOTE ADULT - PROBLEM SELECTOR PLAN 6
Continue homed dose of hydralazine and metoprolol with parameters  Monitor Blood pressure
Continue homed dose of hydralazine and metoprolol with parameters  Monitor Blood pressure

## 2020-03-04 NOTE — PROGRESS NOTE ADULT - PROBLEM SELECTOR PLAN 1
Suspected  underlying Pneumonia  Chest x-ray unremarkable  Pt afebrile. WBC: wnl   F/u Blood culture  F/u CT chest  abx on hold until ID recc  ID: Dr. christina consulted
Suspected  underlying Pneumonia  Chest x-ray unremarkable  Pt afebrile. WBC: wnl   NGTD on Blood culture  CT chest shows LLL opacity will start on ceftin 500 po BID for 7 days starting 03/4  ID: Dr. christina consulted

## 2020-03-04 NOTE — CONSULT NOTE ADULT - SUBJECTIVE AND OBJECTIVE BOX
Chief complain/HPI  Pt is a 89 y/o  F, from home, AAOx3, lives with daughter with PMH of CHF, atrial fibrillation ( on Eliquis), Complete Heart Block (s/p PPM), HTN, HLD, pulmonary HTN, dementia who presented with cough, fever, runny nose since 2-3 days. Pt is AOx3 and speaks Estonian, has mild dementia as per daughter. Daughter at bedside helped translation. As per daughter, pt has been having dry cough along with fever since few days. Yesterday, her temperature was recorded 100F at home. She also has runny nose and generalized weakness. She used to walk with assistance, but now has difficulty walking due to weakness. She also has decreased appetite and mild shortness of breath which is worse on exertion.   She denied chest pain, nausea, vomiting, abdominal pain, recent travel, sick contacts and other complains.    Flu A was positive placed on  Tamiflu and  droplet isolation.       PAST MEDICAL & SURGICAL HISTORY:  CHF (congestive heart failure)  Pulmonary HTN  History of complete heart block  Atrial fibrillation  Hypercholesterolemia  HTN (hypertension)  Artificial pacemaker      Home Medications Reviewed    Hospital Medications:   MEDICATIONS  (STANDING):  albuterol/ipratropium for Nebulization. 3 milliLiter(s) Nebulizer every 6 hours  apixaban 2.5 milliGRAM(s) Oral two times a day  aspirin  chewable 81 milliGRAM(s) Oral daily  cefuroxime   Tablet 500 milliGRAM(s) Oral every 12 hours  dronedarone 400 milliGRAM(s) Oral two times a day  furosemide    Tablet 20 milliGRAM(s) Oral daily  hydrALAZINE 100 milliGRAM(s) Oral three times a day  methylPREDNISolone sodium succinate Injectable 40 milliGRAM(s) IV Push four times a day  metoprolol tartrate 100 milliGRAM(s) Oral two times a day  oseltamivir 30 milliGRAM(s) Oral two times a day  simvastatin 40 milliGRAM(s) Oral at bedtime    MEDICATIONS  (PRN):  acetaminophen   Tablet .. 650 milliGRAM(s) Oral every 6 hours PRN Temp greater or equal to 38C (100.4F), Mild Pain (1 - 3)      Allergies    No Known Allergies    Intolerances      Comprehensive Metabolic Panel (20 @ 12:40)    Sodium, Serum: 136 mmol/L    Potassium, Serum: 4.2 mmol/L    Chloride, Serum: 100 mmol/L    Carbon Dioxide, Serum: 30 mmol/L    Anion Gap, Serum: 6 mmol/L    Blood Urea Nitrogen, Serum: 20 mg/dL    Creatinine, Serum: 1.45 mg/dL    Glucose, Serum: 105 mg/dL    Calcium, Total Serum: 9.2 mg/dL    Protein Total, Serum: 7.1 g/dL    Albumin, Serum: 3.6 g/dL    Bilirubin Total, Serum: 0.5 mg/dL    Alkaline Phosphatase, Serum: 51 U/L    Aspartate Aminotransferase (AST/SGOT): 21 U/L    Alanine Aminotransferase (ALT/SGPT): 24 U/L DA    eGFR if Non : 31: Interpretative comment          TPro  x   /  Alb  3.8  /  TBili  x   /  DBili  x   /  AST  x   /  ALT  x   /  AlkPhos  x         Urinalysis Basic - ( 03 Mar 2020 12:21 )    Color: Yellow / Appearance: Clear / S.010 / pH: x  Gluc: x / Ketone: Small  / Bili: Negative / Urobili: Negative   Blood: x / Protein: Negative / Nitrite: Negative   Leuk Esterase: Negative / RBC: x / WBC x   Sq Epi: x / Non Sq Epi: x / Bacteria: x            RADIOLOGY & ADDITIONAL STUDIES:    SOCIAL HISTORY: Denies ETOh,Smoking,     FAMILY HISTORY:  FH: coronary artery disease (Father, Mother)  FH: hypertension (Father, Mother)      REVIEW OF SYSTEMS:   Reduced appetite in am  No vomit and no nausea  Denies cough.    VITALS:  Vital Signs Last 24 Hrs  T(C): 36.8 (04 Mar 2020 08:13), Max: 36.8 (04 Mar 2020 00:15)  T(F): 98.2 (04 Mar 2020 08:13), Max: 98.2 (04 Mar 2020 00:15)  HR: 76 (04 Mar 2020 08:13) (73 - 76)  BP: 169/56 (04 Mar 2020 08:13) (158/60 - 169/56)  BP(mean): --  RR: 18 (04 Mar 2020 08:13) (16 - 18)  SpO2: 100% (04 Mar 2020 08:13) (95% - 100%)        PHYSICAL EXAM:  Constitutional: NAD  HEENT: anicteric sclera, oropharynx clear, MMM  Neck: No JVD  Respiratory: bilateral expiratory wheezing.  Cardiovascular: S1, S2, RRR, no pericardial rub, no murmur  Gastrointestinal: BS+, soft, no tenderness, no distension, no bruit  Pelvis: bladder non-distended, no CVA tenderness  Extremities: No cyanosis or clubbing. No peripheral edema  Neurological: A/O x 3, no focal deficits  Psychiatric: Normal mood, normal affect  : No CVA tenderness. No mace.

## 2020-03-04 NOTE — PROGRESS NOTE ADULT - PROBLEM SELECTOR PLAN 3
Pt has h/o diastolic CHF  Lasix on hold for [place]  Strict Is and Os   Daily weight
Pt has h/o diastolic CHF  resuming lasix   Strict Is and Os   Daily weight

## 2020-03-04 NOTE — PHYSICAL THERAPY INITIAL EVALUATION ADULT - ADDITIONAL COMMENTS
Patient required minx1/cga with transfers and ambulation prior to hospitalization. Daughter assists with ADLS

## 2020-03-04 NOTE — PROGRESS NOTE ADULT - PROBLEM SELECTOR PLAN 7
IMPROVE VTE Individual Risk Assessment   RISK                                                          Points  [  ] Previous VTE                                                3  [  ] Thrombophilia                                             2  [  ] Lower limb paralysis                                    2        (unable to hold up >15 seconds)    [  ] Current Cancer                                             2         (within 6 months)  [  x] Immobilization > 24 hrs                              1  [  ] ICU/CCU stay > 24 hours                            1  [x  ] Age > 60                                                    1  IMPROVE VTE Score _________2  Pt is on Eliquis
IMPROVE VTE Individual Risk Assessment   RISK                                                          Points  [  ] Previous VTE                                                3  [  ] Thrombophilia                                             2  [  ] Lower limb paralysis                                    2        (unable to hold up >15 seconds)    [  ] Current Cancer                                             2         (within 6 months)  [  x] Immobilization > 24 hrs                              1  [  ] ICU/CCU stay > 24 hours                            1  [x  ] Age > 60                                                    1  IMPROVE VTE Score _________2  Pt is on Eliquis

## 2020-03-04 NOTE — PROGRESS NOTE ADULT - PROBLEM SELECTOR PLAN 5
Continue Eliquis for anticoagulation  Continue metoprolol for rate control
Continue Eliquis for anticoagulation  Continue metoprolol for rate control

## 2020-03-04 NOTE — PROGRESS NOTE ADULT - PROBLEM SELECTOR PLAN 4
Cr/BUN: 1.45/ 20- family refused todays labs   Likely pre renal   Monitor BMP  Avoid nephrotoxic medications  Hold on Lasix for now
Cr/BUN: 1.45/ 20- family refused todays labs   Likely pre renal   Monitor BMP  Avoid nephrotoxic medications  Hold on Lasix for now

## 2020-03-04 NOTE — PROGRESS NOTE ADULT - SUBJECTIVE AND OBJECTIVE BOX
PGY 1 Note discussed with supervising resident and primary attending    Patient is a 89y old  Female who presents with a chief complaint of Cough (03 Mar 2020 11:14)      INTERVAL HPI/OVERNIGHT EVENTS: offers no new complaints; current symptoms resolving    MEDICATIONS  (STANDING):  albuterol/ipratropium for Nebulization. 3 milliLiter(s) Nebulizer every 6 hours  apixaban 2.5 milliGRAM(s) Oral two times a day  aspirin  chewable 81 milliGRAM(s) Oral daily  dronedarone 400 milliGRAM(s) Oral two times a day  furosemide    Tablet 20 milliGRAM(s) Oral daily  hydrALAZINE 100 milliGRAM(s) Oral three times a day  metoprolol tartrate 100 milliGRAM(s) Oral two times a day  oseltamivir 30 milliGRAM(s) Oral two times a day  simvastatin 40 milliGRAM(s) Oral at bedtime    MEDICATIONS  (PRN):  acetaminophen   Tablet .. 650 milliGRAM(s) Oral every 6 hours PRN Temp greater or equal to 38C (100.4F), Mild Pain (1 - 3)      __________________________________________________  REVIEW OF SYSTEMS:    CONSTITUTIONAL: No fever,   EYES: no acute visual disturbances  NECK: No pain or stiffness  RESPIRATORY: No cough; No shortness of breath  CARDIOVASCULAR: No chest pain, no palpitations  GASTROINTESTINAL: No pain. No nausea or vomiting; No diarrhea   NEUROLOGICAL: No headache or numbness, no tremors  MUSCULOSKELETAL: No joint pain, no muscle pain  GENITOURINARY: no dysuria, no frequency, no hesitancy  PSYCHIATRY: no depression , no anxiety  ALL OTHER  ROS negative        Vital Signs Last 24 Hrs  T(C): 36.8 (04 Mar 2020 08:13), Max: 36.8 (04 Mar 2020 00:15)  T(F): 98.2 (04 Mar 2020 08:13), Max: 98.2 (04 Mar 2020 00:15)  HR: 76 (04 Mar 2020 08:13) (73 - 76)  BP: 169/56 (04 Mar 2020 08:13) (158/60 - 169/56)  BP(mean): --  RR: 18 (04 Mar 2020 08:13) (16 - 18)  SpO2: 100% (04 Mar 2020 08:13) (94% - 100%)    ________________________________________________  PHYSICAL EXAM:  GENERAL: NAD  HEENT: Normocephalic;  conjunctivae and sclerae clear; moist mucous membranes;   NECK : supple  CHEST/LUNG: Clear to auscultation bilaterally with good air entry   HEART: S1 S2  regular; no murmurs, gallops or rubs  ABDOMEN: Soft, Nontender, Nondistended; Bowel sounds present  EXTREMITIES: no cyanosis; no edema; no calf tenderness  SKIN: warm and dry; no rash  NERVOUS SYSTEM:  Awake and alert; Oriented  to place, person and time ; no new deficits    _________________________________________________  LABS:                        10.9   6.19  )-----------( 123      ( 02 Mar 2020 12:40 )             32.4     03-02    136  |  100  |  20<H>  ----------------------------<  105<H>  4.2   |  30  |  1.45<H>    Ca    9.2      02 Mar 2020 12:40  Mg     2.3     -    TPro  x   /  Alb  3.8  /  TBili  x   /  DBili  x   /  AST  x   /  ALT  x   /  AlkPhos  x   03-03    PT/INR - ( 02 Mar 2020 12:40 )   PT: 18.6 sec;   INR: 1.65 ratio         PTT - ( 02 Mar 2020 12:40 )  PTT:38.1 sec  Urinalysis Basic - ( 03 Mar 2020 12:21 )    Color: Yellow / Appearance: Clear / S.010 / pH: x  Gluc: x / Ketone: Small  / Bili: Negative / Urobili: Negative   Blood: x / Protein: Negative / Nitrite: Negative   Leuk Esterase: Negative / RBC: x / WBC x   Sq Epi: x / Non Sq Epi: x / Bacteria: x      CAPILLARY BLOOD GLUCOSE            RADIOLOGY & ADDITIONAL TESTS:    Imaging Personally Reviewed:  YES/NO    Consultant(s) Notes Reviewed:   YES/ No    Care Discussed with Consultants :     Plan of care was discussed with patient and /or primary care giver; all questions and concerns were addressed and care was aligned with patient's wishes. PGY 1 Note discussed with supervising resident and primary attending    Patient is a 89y old  Female who presents with a chief complaint of Cough (03 Mar 2020 11:14)      INTERVAL HPI/OVERNIGHT EVENTS: offers no new complaints; current symptoms resolving    MEDICATIONS  (STANDING):  albuterol/ipratropium for Nebulization. 3 milliLiter(s) Nebulizer every 6 hours  apixaban 2.5 milliGRAM(s) Oral two times a day  aspirin  chewable 81 milliGRAM(s) Oral daily  dronedarone 400 milliGRAM(s) Oral two times a day  furosemide    Tablet 20 milliGRAM(s) Oral daily  hydrALAZINE 100 milliGRAM(s) Oral three times a day  metoprolol tartrate 100 milliGRAM(s) Oral two times a day  oseltamivir 30 milliGRAM(s) Oral two times a day  simvastatin 40 milliGRAM(s) Oral at bedtime    MEDICATIONS  (PRN):  acetaminophen   Tablet .. 650 milliGRAM(s) Oral every 6 hours PRN Temp greater or equal to 38C (100.4F), Mild Pain (1 - 3)      __________________________________________________  REVIEW OF SYSTEMS:    CONSTITUTIONAL: No fever,   EYES: no acute visual disturbances  NECK: No pain or stiffness  RESPIRATORY: No cough; No shortness of breath  CARDIOVASCULAR: No chest pain, no palpitations  GASTROINTESTINAL: No pain. No nausea or vomiting; No diarrhea   NEUROLOGICAL: No headache or numbness, no tremors  MUSCULOSKELETAL: No joint pain, no muscle pain  GENITOURINARY: no dysuria, no frequency, no hesitancy  PSYCHIATRY: no depression , no anxiety  ALL OTHER  ROS negative        Vital Signs Last 24 Hrs  T(C): 36.8 (04 Mar 2020 08:13), Max: 36.8 (04 Mar 2020 00:15)  T(F): 98.2 (04 Mar 2020 08:13), Max: 98.2 (04 Mar 2020 00:15)  HR: 76 (04 Mar 2020 08:13) (73 - 76)  BP: 169/56 (04 Mar 2020 08:13) (158/60 - 169/56)  BP(mean): --  RR: 18 (04 Mar 2020 08:13) (16 - 18)  SpO2: 100% (04 Mar 2020 08:13) (94% - 100%)    ________________________________________________  PHYSICAL EXAM:  GENERAL: NAD  HEENT: Normocephalic;  conjunctivae and sclerae clear; moist mucous membranes;   NECK : supple  CHEST/LUNG: wheezing bilaterally   HEART: S1 S2  regular; no murmurs, gallops or rubs  ABDOMEN: Soft, Nontender, Nondistended; Bowel sounds present  EXTREMITIES: no cyanosis; no edema; no calf tenderness  SKIN: warm and dry; no rash  NERVOUS SYSTEM:  Awake and alert; Oriented  to place, person and time ; no new deficits    _________________________________________________  LABS:                        10.9   6.19  )-----------( 123      ( 02 Mar 2020 12:40 )             32.4     03-02    136  |  100  |  20<H>  ----------------------------<  105<H>  4.2   |  30  |  1.45<H>    Ca    9.2      02 Mar 2020 12:40  Mg     2.3     -02    TPro  x   /  Alb  3.8  /  TBili  x   /  DBili  x   /  AST  x   /  ALT  x   /  AlkPhos  x   03-03    PT/INR - ( 02 Mar 2020 12:40 )   PT: 18.6 sec;   INR: 1.65 ratio         PTT - ( 02 Mar 2020 12:40 )  PTT:38.1 sec  Urinalysis Basic - ( 03 Mar 2020 12:21 )    Color: Yellow / Appearance: Clear / S.010 / pH: x  Gluc: x / Ketone: Small  / Bili: Negative / Urobili: Negative   Blood: x / Protein: Negative / Nitrite: Negative   Leuk Esterase: Negative / RBC: x / WBC x   Sq Epi: x / Non Sq Epi: x / Bacteria: x      CAPILLARY BLOOD GLUCOSE            RADIOLOGY & ADDITIONAL TESTS:    Imaging Personally Reviewed:  YES/NO    Consultant(s) Notes Reviewed:   YES/ No    Care Discussed with Consultants :     Plan of care was discussed with patient and /or primary care giver; all questions and concerns were addressed and care was aligned with patient's wishes.

## 2020-03-04 NOTE — CONSULT NOTE ADULT - SUBJECTIVE AND OBJECTIVE BOX
PULMONARY CONSULT NOTE      CLAUDETTE GARCES  MRN-[id number]    Patient is a 89y old  Female who presents with a chief complaint of Cough (04 Mar 2020 10:14)    History of Present Illness:  Reason for Admission: Cough	  History of Present Illness: 	  Pt is a 89 y/o  F, from home, AAOx3, lives with daughter with PMH of CHF, atrial fibrillation ( on Eliquis), Complete Heart Block (s/p PPM), HTN, HLD, pulmonary HTN, dementia who presented with cough, fever, runny nose since 2-3 days. Pt is AOx3 and speaks Portuguese, has mild dementia as per daughter. Daughter at bedside helped translation. As per daughter, pt has been having dry cough along with fever since few days. Yesterday, her temperature was recorded 100F at home. She also has runny nose and generalized weakness. She used to walk with assistance, but now has difficulty walking due to weakness. She also has decreased appetite and mild shortness of breath which is worse on exertion.   She denied chest pain, nausea, vomiting, abdominal pain, recent travel, sick contacts and other complains.       HISTORY OF PRESENT ILLNESS: As above. Awake, alert, comfortable in bed in NAD    MEDICATIONS  (STANDING):  albuterol/ipratropium for Nebulization. 3 milliLiter(s) Nebulizer every 6 hours  apixaban 2.5 milliGRAM(s) Oral two times a day  aspirin  chewable 81 milliGRAM(s) Oral daily  cefuroxime   Tablet 500 milliGRAM(s) Oral every 12 hours  dronedarone 400 milliGRAM(s) Oral two times a day  furosemide    Tablet 20 milliGRAM(s) Oral daily  hydrALAZINE 100 milliGRAM(s) Oral three times a day  metoprolol tartrate 100 milliGRAM(s) Oral two times a day  oseltamivir 30 milliGRAM(s) Oral two times a day  simvastatin 40 milliGRAM(s) Oral at bedtime      MEDICATIONS  (PRN):  acetaminophen   Tablet .. 650 milliGRAM(s) Oral every 6 hours PRN Temp greater or equal to 38C (100.4F), Mild Pain (1 - 3)      Allergies    No Known Allergies    Intolerances        PAST MEDICAL & SURGICAL HISTORY:  CHF (congestive heart failure)  Pulmonary HTN  History of complete heart block  Atrial fibrillation  Hypercholesterolemia  HTN (hypertension)  Artificial pacemaker      FAMILY HISTORY:  FH: coronary artery disease (Father, Mother)  FH: hypertension (Father, Mother)      SOCIAL HISTORY  Smoking History:     REVIEW OF SYSTEMS:    CONSTITUTIONAL:  No fevers, chills, sweats    HEENT:  Eyes:  No diplopia or blurred vision. ENT:  No earache, sore throat or runny nose.    CARDIOVASCULAR:  No pressure, squeezing, tightness, or heaviness about the chest; no palpitations.    RESPIRATORY:  Per HPI    GASTROINTESTINAL:  No abdominal pain, nausea, vomiting or diarrhea.    GENITOURINARY:  No dysuria, frequency or urgency.    NEUROLOGIC:  No paresthesias, fasciculations, seizures or weakness.    PSYCHIATRIC:  No disorder of thought or mood.    Vital Signs Last 24 Hrs  T(C): 36.8 (04 Mar 2020 08:13), Max: 36.8 (04 Mar 2020 00:15)  T(F): 98.2 (04 Mar 2020 08:13), Max: 98.2 (04 Mar 2020 00:15)  HR: 76 (04 Mar 2020 08:13) (73 - 76)  BP: 169/56 (04 Mar 2020 08:13) (158/60 - 169/56)  BP(mean): --  RR: 18 (04 Mar 2020 08:13) (16 - 18)  SpO2: 100% (04 Mar 2020 08:13) (94% - 100%)  I&O's Detail      PHYSICAL EXAMINATION:    GENERAL: The patient is a well-developed, well-nourished _____in no apparent distress.     HEENT: Head is normocephalic and atraumatic. Extraocular muscles are intact. Mucous membranes are moist.     NECK: Supple.     LUNGS: Bilateral wheezes    HEART: Regular rate and rhythm without murmur.    ABDOMEN: Soft, nontender, and nondistended.  No hepatosplenomegaly is noted.    EXTREMITIES: Without any cyanosis, clubbing, rash, lesions or edema.    NEUROLOGIC: Grossly intact.      LABS:                        10.9   6.19  )-----------( 123      ( 02 Mar 2020 12:40 )             32.4     03-02    136  |  100  |  20<H>  ----------------------------<  105<H>  4.2   |  30  |  1.45<H>    Ca    9.2      02 Mar 2020 12:40  Mg     2.3     03-    TPro  x   /  Alb  3.8  /  TBili  x   /  DBili  x   /  AST  x   /  ALT  x   /  AlkPhos  x   -    PT/INR - ( 02 Mar 2020 12:40 )   PT: 18.6 sec;   INR: 1.65 ratio         PTT - ( 02 Mar 2020 12:40 )  PTT:38.1 sec  Urinalysis Basic - ( 03 Mar 2020 12:21 )    Color: Yellow / Appearance: Clear / S.010 / pH: x  Gluc: x / Ketone: Small  / Bili: Negative / Urobili: Negative   Blood: x / Protein: Negative / Nitrite: Negative   Leuk Esterase: Negative / RBC: x / WBC x   Sq Epi: x / Non Sq Epi: x / Bacteria: x        CARDIAC MARKERS ( 02 Mar 2020 12:40 )  <0.015 ng/mL / x     / x     / x     / x            Serum Pro-Brain Natriuretic Peptide: 6674 pg/mL (20 @ 12:40)          MICROBIOLOGY:    RADIOLOGY & ADDITIONAL STUDIES:    CXR:  < from: Xray Chest 1 View AP/PA (20 @ 17:49) >  IMPRESSION:    Mild cardiomegaly. Pacemaker.    No acute infiltrate.      < end of copied text >    Ct scan chest:  < from: CT Chest No Cont (20 @ 14:57) >  IMPRESSION:     Patchy/nodular opacities in the left lower lobe, likely infectious in etiology. 6 week follow-up chest CT is recommended.    Enlarged, heterogeneous thyroid with multiple thyroid nodules, as described above. Correlate with nonemergent thyroid ultrasound if clinically warranted.    Additional findings as above.      < end of copied text >    ekg;    echo:

## 2020-03-04 NOTE — PROGRESS NOTE ADULT - SUBJECTIVE AND OBJECTIVE BOX
CARDIOLOGY/MEDICAL ATTENDING    CHIEF COMPLAINT:Patient is a 89y old  Female who presents with a chief complaint of Cough.Pt feeling better.    	  REVIEW OF SYSTEMS:  CONSTITUTIONAL: No fever, weight loss, or fatigue  EYES: No eye pain, visual disturbances, or discharge  ENT:  No difficulty hearing, tinnitus, vertigo; No sinus or throat pain  NECK: No pain or stiffness  RESPIRATORY: + cough, No wheezing, chills or hemoptysis; No Shortness of Breath  CARDIOVASCULAR: No chest pain, palpitations, passing out, dizziness, or leg swelling  GASTROINTESTINAL: No abdominal or epigastric pain. No nausea, vomiting, or hematemesis; No diarrhea or constipation. No melena or hematochezia.  GENITOURINARY: No dysuria, frequency, hematuria, or incontinence  NEUROLOGICAL: No headaches, memory loss, loss of strength, numbness, or tremors  SKIN: No itching, burning, rashes, or lesions   LYMPH Nodes: No enlarged glands  ENDOCRINE: No heat or cold intolerance; No hair loss  MUSCULOSKELETAL: No joint pain or swelling; No muscle, back, or extremity pain  PSYCHIATRIC: No depression, anxiety, mood swings, or difficulty sleeping  HEME/LYMPH: No easy bruising, or bleeding gums  ALLERGY AND IMMUNOLOGIC: No hives or eczema	      PHYSICAL EXAM:  T(C): 36.8 (03-04-20 @ 08:13), Max: 36.8 (03-04-20 @ 00:15)  HR: 76 (03-04-20 @ 08:13) (73 - 76)  BP: 169/56 (03-04-20 @ 08:13) (158/60 - 169/56)  RR: 18 (03-04-20 @ 08:13) (16 - 18)  SpO2: 100% (03-04-20 @ 08:13) (94% - 100%)        Appearance: Normal	  HEENT:   Normal oral mucosa, PERRL, EOMI	  Lymphatic: No lymphadenopathy  Cardiovascular: Normal S1 S2, No JVD, No murmurs, No edema  Respiratory: B/L wheezing  Psychiatry: A & O x 3, Mood & affect appropriate  Gastrointestinal:  Soft, Non-tender, + BS	  Skin: No rashes, No ecchymoses, No cyanosis	  Neurologic: Non-focal  Extremities: Normal range of motion, No clubbing, cyanosis or edema  Vascular: Peripheral pulses palpable 2+ bilaterally    MEDICATIONS  (STANDING):  albuterol/ipratropium for Nebulization. 3 milliLiter(s) Nebulizer every 6 hours  apixaban 2.5 milliGRAM(s) Oral two times a day  aspirin  chewable 81 milliGRAM(s) Oral daily  cefuroxime   Tablet 500 milliGRAM(s) Oral every 12 hours  dronedarone 400 milliGRAM(s) Oral two times a day  furosemide    Tablet 20 milliGRAM(s) Oral daily  hydrALAZINE 100 milliGRAM(s) Oral three times a day  metoprolol tartrate 100 milliGRAM(s) Oral two times a day  oseltamivir 30 milliGRAM(s) Oral two times a day  simvastatin 40 milliGRAM(s) Oral at bedtime        	  LABS:	 	      CARDIAC MARKERS ( 02 Mar 2020 12:40 )  <0.015 ng/mL / x     / x     / x     / x                           10.9   6.19  )-----------( 123      ( 02 Mar 2020 12:40 )             32.4     03-02    136  |  100  |  20<H>  ----------------------------<  105<H>  4.2   |  30  |  1.45<H>    Ca    9.2      02 Mar 2020 12:40  Mg     2.3     03-02    TPro  x   /  Alb  3.8  /  TBili  x   /  DBili  x   /  AST  x   /  ALT  x   /  AlkPhos  x   03-03    proBNP: Serum Pro-Brain Natriuretic Peptide: 6674 pg/mL (03-02 @ 12:40)    	    EXAM:  CT CHEST                            PROCEDURE DATE:  03/03/2020          INTERPRETATION:  CLINICAL INFORMATION: Pneumonia    COMPARISON: 04/15/2019.    PROCEDURE:   CT of the Chest was performed without intravenous contrast.  Sagittal and coronal reformats were performed.  MIP reformats were performed.    FINDINGS:    LUNGS AND AIRWAYS: Patent central airways.  Patchy nodular opacities in the left lower lobe. Trace bibasilar atelectasis.    PLEURA: No pleural effusion.    MEDIASTINUM ANDHILA: No lymphadenopathy.    VESSELS: Atherosclerotic changes of the aorta and branching vessels.    HEART: Heart size is normal. No pericardial effusion. Coronary artery calcifications.    CHEST WALL AND LOWER NECK: Enlarged, heterogeneous thyroid lobe with multiple nodules. 2.5 cm exophytic nodule off the inferior aspect of the left thyroid lobe which extends into the upper mediastinum. Left-sided cardiac pacing device in place.    VISUALIZED UPPER ABDOMEN: Gallbladder is distended. Subcentimeter hypodensity in the left hepatic lobe, too small to characterize    BONES: Multilevel degenerative changes of the spine.    IMPRESSION:     Patchy/nodular opacities in the left lower lobe, likely infectious in etiology. 6 week follow-up chest CT is recommended.    Enlarged, heterogeneous thyroid with multiple thyroid nodules, as described above. Correlate with nonemergent thyroid ultrasound if clinically warranted.    Additional findings as above.
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
## 2020-03-05 ENCOUNTER — TRANSCRIPTION ENCOUNTER (OUTPATIENT)
Age: 85
End: 2020-03-05

## 2020-03-05 VITALS
RESPIRATION RATE: 17 BRPM | HEART RATE: 68 BPM | DIASTOLIC BLOOD PRESSURE: 57 MMHG | TEMPERATURE: 98 F | SYSTOLIC BLOOD PRESSURE: 156 MMHG | OXYGEN SATURATION: 97 %

## 2020-03-05 PROCEDURE — 87040 BLOOD CULTURE FOR BACTERIA: CPT

## 2020-03-05 PROCEDURE — 83880 ASSAY OF NATRIURETIC PEPTIDE: CPT

## 2020-03-05 PROCEDURE — 85610 PROTHROMBIN TIME: CPT

## 2020-03-05 PROCEDURE — 82962 GLUCOSE BLOOD TEST: CPT

## 2020-03-05 PROCEDURE — 99291 CRITICAL CARE FIRST HOUR: CPT | Mod: 25

## 2020-03-05 PROCEDURE — 87631 RESP VIRUS 3-5 TARGETS: CPT

## 2020-03-05 PROCEDURE — 93005 ELECTROCARDIOGRAM TRACING: CPT

## 2020-03-05 PROCEDURE — 85027 COMPLETE CBC AUTOMATED: CPT

## 2020-03-05 PROCEDURE — 87086 URINE CULTURE/COLONY COUNT: CPT

## 2020-03-05 PROCEDURE — 85730 THROMBOPLASTIN TIME PARTIAL: CPT

## 2020-03-05 PROCEDURE — 82009 KETONE BODYS QUAL: CPT

## 2020-03-05 PROCEDURE — 94640 AIRWAY INHALATION TREATMENT: CPT

## 2020-03-05 PROCEDURE — 84155 ASSAY OF PROTEIN SERUM: CPT

## 2020-03-05 PROCEDURE — 80053 COMPREHEN METABOLIC PANEL: CPT

## 2020-03-05 PROCEDURE — 83735 ASSAY OF MAGNESIUM: CPT

## 2020-03-05 PROCEDURE — 71250 CT THORAX DX C-: CPT

## 2020-03-05 PROCEDURE — 84484 ASSAY OF TROPONIN QUANT: CPT

## 2020-03-05 PROCEDURE — 81003 URINALYSIS AUTO W/O SCOPE: CPT

## 2020-03-05 PROCEDURE — 36415 COLL VENOUS BLD VENIPUNCTURE: CPT

## 2020-03-05 PROCEDURE — 81001 URINALYSIS AUTO W/SCOPE: CPT

## 2020-03-05 PROCEDURE — 83605 ASSAY OF LACTIC ACID: CPT

## 2020-03-05 PROCEDURE — 84165 PROTEIN E-PHORESIS SERUM: CPT

## 2020-03-05 PROCEDURE — 71045 X-RAY EXAM CHEST 1 VIEW: CPT

## 2020-03-05 RX ORDER — CEFUROXIME AXETIL 250 MG
1 TABLET ORAL
Qty: 10 | Refills: 0
Start: 2020-03-05 | End: 2020-03-09

## 2020-03-05 RX ADMIN — Medication 100 MILLIGRAM(S): at 06:12

## 2020-03-05 RX ADMIN — Medication 81 MILLIGRAM(S): at 11:36

## 2020-03-05 RX ADMIN — Medication 100 MILLIGRAM(S): at 13:42

## 2020-03-05 RX ADMIN — Medication 40 MILLIGRAM(S): at 11:36

## 2020-03-05 RX ADMIN — Medication 3 MILLILITER(S): at 14:34

## 2020-03-05 RX ADMIN — DRONEDARONE 400 MILLIGRAM(S): 400 TABLET, FILM COATED ORAL at 06:13

## 2020-03-05 RX ADMIN — Medication 30 MILLIGRAM(S): at 06:13

## 2020-03-05 RX ADMIN — Medication 3 MILLILITER(S): at 08:25

## 2020-03-05 RX ADMIN — Medication 20 MILLIGRAM(S): at 06:13

## 2020-03-05 RX ADMIN — Medication 40 MILLIGRAM(S): at 06:15

## 2020-03-05 RX ADMIN — APIXABAN 2.5 MILLIGRAM(S): 2.5 TABLET, FILM COATED ORAL at 06:13

## 2020-03-05 RX ADMIN — Medication 500 MILLIGRAM(S): at 06:13

## 2020-03-05 NOTE — PROGRESS NOTE ADULT - SUBJECTIVE AND OBJECTIVE BOX
CARDIOLOGY/MEDICAL ATTENDING    CHIEF COMPLAINT:Patient is a 89y old  Female who presents with a chief complaint of Cough.Pt appears comfortable.    	  REVIEW OF SYSTEMS:  CONSTITUTIONAL: No fever, weight loss, or fatigue  EYES: No eye pain, visual disturbances, or discharge  ENT:  No difficulty hearing, tinnitus, vertigo; No sinus or throat pain  NECK: No pain or stiffness  RESPIRATORY: No cough, wheezing, chills or hemoptysis; No Shortness of Breath  CARDIOVASCULAR: No chest pain, palpitations, passing out, dizziness, or leg swelling  GASTROINTESTINAL: No abdominal or epigastric pain. No nausea, vomiting, or hematemesis; No diarrhea or constipation. No melena or hematochezia.  GENITOURINARY: No dysuria, frequency, hematuria, or incontinence  NEUROLOGICAL: No headaches, memory loss, loss of strength, numbness, or tremors  SKIN: No itching, burning, rashes, or lesions   LYMPH Nodes: No enlarged glands  ENDOCRINE: No heat or cold intolerance; No hair loss  MUSCULOSKELETAL: No joint pain or swelling; No muscle, back, or extremity pain  PSYCHIATRIC: No depression, anxiety, mood swings, or difficulty sleeping  HEME/LYMPH: No easy bruising, or bleeding gums  ALLERGY AND IMMUNOLOGIC: No hives or eczema	      PHYSICAL EXAM:  T(C): 36.6 (03-05-20 @ 08:41), Max: 36.6 (03-05-20 @ 00:26)  HR: 68 (03-05-20 @ 08:41) (68 - 81)  BP: 156/57 (03-05-20 @ 08:41) (156/49 - 174/89)  RR: 17 (03-05-20 @ 08:41) (17 - 18)  SpO2: 97% (03-05-20 @ 08:41) (97% - 100%)  Wt(kg): --  I&O's Summary      Appearance: Normal	  HEENT:   Normal oral mucosa, PERRL, EOMI	  Lymphatic: No lymphadenopathy  Cardiovascular: Normal S1 S2, No JVD, No murmurs, No edema  Respiratory: Lungs clear to auscultation	  Psychiatry: A & O x 3, Mood & affect appropriate  Gastrointestinal:  Soft, Non-tender, + BS	  Skin: No rashes, No ecchymoses, No cyanosis	  Neurologic: Non-focal  Extremities: Normal range of motion, No clubbing, cyanosis or edema  Vascular: Peripheral pulses palpable 2+ bilaterally    MEDICATIONS  (STANDING):  albuterol/ipratropium for Nebulization. 3 milliLiter(s) Nebulizer every 6 hours  apixaban 2.5 milliGRAM(s) Oral two times a day  aspirin  chewable 81 milliGRAM(s) Oral daily  cefuroxime   Tablet 500 milliGRAM(s) Oral every 12 hours  dronedarone 400 milliGRAM(s) Oral two times a day  furosemide    Tablet 20 milliGRAM(s) Oral daily  hydrALAZINE 100 milliGRAM(s) Oral three times a day  methylPREDNISolone sodium succinate Injectable 40 milliGRAM(s) IV Push four times a day  metoprolol tartrate 100 milliGRAM(s) Oral two times a day  oseltamivir 30 milliGRAM(s) Oral two times a day  simvastatin 40 milliGRAM(s) Oral at bedtime        	  LABS:	 	    TPro  x   /  Alb  3.8  /  TBili  x   /  DBili  x   /  AST  x   /  ALT  x   /  AlkPhos  x   03-03    proBNP: Serum Pro-Brain Natriuretic Peptide: 6674 pg/mL (03-02 @ 12:40)

## 2020-03-05 NOTE — PROGRESS NOTE ADULT - SUBJECTIVE AND OBJECTIVE BOX
89y Female is under our care for influenza A infection and left sided pneumonia. Patient is doing much better and admits her cough and sputum production has dramatically improved. Ct chest reflected LLL opacities. No fevers for the past 2 days.  Due for dc home on PO antibiotics.     REVIEW OF SYSTEMS:  [  ] Not able to illicit  General: no fevers no malaise  Chest: +improved cough no sob  GI: no nvd  : no urinary sxs   Skin: no rashes  Musculoskeletal: no trauma no LBP  Neuro: no ha's no dizziness 	    MEDS:  cefuroxime   Tablet 500 milliGRAM(s) Oral every 12 hours  oseltamivir 30 milliGRAM(s) Oral two times a day    ALLERGIES: Allergies    No Known Allergies    Intolerances      VITALS:  Vital Signs Last 24 Hrs  T(C): 36.6 (05 Mar 2020 08:41), Max: 36.6 (05 Mar 2020 00:26)  T(F): 97.8 (05 Mar 2020 08:41), Max: 97.9 (05 Mar 2020 00:26)  HR: 68 (05 Mar 2020 08:41) (68 - 81)  BP: 156/57 (05 Mar 2020 08:41) (156/49 - 174/89)  BP(mean): --  RR: 17 (05 Mar 2020 08:41) (17 - 18)  SpO2: 97% (05 Mar 2020 08:41) (97% - 100%)      PHYSICAL EXAM:  HEENT: n/a  Neck: supple no LN's   Respiratory: scattered rales at L>R lung bases no wheezing  Cardiovascular: S1 S2 irreg no murmurs +PPM  Gastrointestinal: +BS with soft, nondistended abdomen; nontender  Extremities: no edema  Skin: no rashes  Ortho: n/a  Neuro: AAO x 3      LABS/DIAGNOSTIC TESTS:    WBC Count: 6.19 K/uL (03-02 @ 12:40)        CULTURES:   .Urine  03-03 @ 00:42   No growth  --  --      .Blood  03-02 @ 18:28   No growth to date.  --  --        RADIOLOGY:  no new studies

## 2020-03-05 NOTE — PROGRESS NOTE ADULT - SUBJECTIVE AND OBJECTIVE BOX
Pt is awake, alert, lying in bed in NAD.     INTERVAL HPI/OVERNIGHT EVENTS:      VITAL SIGNS:  T(F): 97.8 (20 @ 08:41)  HR: 68 (20 @ 08:41)  BP: 156/57 (20 @ 08:41)  RR: 17 (20 @ 08:41)  SpO2: 97% (20 @ 08:41)  Wt(kg): --  I&O's Detail    REVIEW OF SYSTEMS:    CONSTITUTIONAL:  No fevers, chills, sweats    HEENT:  Eyes:  No diplopia or blurred vision. ENT:  No earache, sore throat or runny nose.    CARDIOVASCULAR:  No pressure, squeezing, tightness, or heaviness about the chest; no palpitations.    RESPIRATORY:  Per HPI    GASTROINTESTINAL:  No abdominal pain, nausea, vomiting or diarrhea.    GENITOURINARY:  No dysuria, frequency or urgency.    NEUROLOGIC:  No paresthesias, fasciculations, seizures or weakness.    PSYCHIATRIC:  No disorder of thought or mood.    PHYSICAL EXAM:    Constitutional: Well developed and nourished  Eyes: Perrla  ENMT: normal  Neck: supple  Respiratory: few scattered wheeze.   Cardiovascular: S1 S2 regular  Gastrointestinal: Soft, Non tender  Extremities: No edema  Vascular: normal  Neurological: Awake, alert,Ox3  Musculoskeletal: Normal    MEDICATIONS  (STANDING):  albuterol/ipratropium for Nebulization. 3 milliLiter(s) Nebulizer every 6 hours  apixaban 2.5 milliGRAM(s) Oral two times a day  aspirin  chewable 81 milliGRAM(s) Oral daily  cefuroxime   Tablet 500 milliGRAM(s) Oral every 12 hours  dronedarone 400 milliGRAM(s) Oral two times a day  furosemide    Tablet 20 milliGRAM(s) Oral daily  hydrALAZINE 100 milliGRAM(s) Oral three times a day  methylPREDNISolone sodium succinate Injectable 40 milliGRAM(s) IV Push four times a day  metoprolol tartrate 100 milliGRAM(s) Oral two times a day  oseltamivir 30 milliGRAM(s) Oral two times a day  simvastatin 40 milliGRAM(s) Oral at bedtime    MEDICATIONS  (PRN):  acetaminophen   Tablet .. 650 milliGRAM(s) Oral every 6 hours PRN Temp greater or equal to 38C (100.4F), Mild Pain (1 - 3)    Allergies    No Known Allergies    Intolerances      LABS:    TPro  x   /  Alb  3.8  /  TBili  x   /  DBili  x   /  AST  x   /  ALT  x   /  AlkPhos  x         Urinalysis Basic - ( 03 Mar 2020 12:21 )    Color: Yellow / Appearance: Clear / S.010 / pH: x  Gluc: x / Ketone: Small  / Bili: Negative / Urobili: Negative   Blood: x / Protein: Negative / Nitrite: Negative   Leuk Esterase: Negative / RBC: x / WBC x   Sq Epi: x / Non Sq Epi: x / Bacteria: x    CAPILLARY BLOOD GLUCOSE      POCT Blood Glucose.: 89 mg/dL (04 Mar 2020 11:28)    pro-bnp 6674  @ 12:40     d-dimer --   @ 12:40      RADIOLOGY & ADDITIONAL TESTS:    CXR:  < from: CT Chest No Cont (20 @ 14:57) >  IMPRESSION:     Patchy/nodular opacities in the left lower lobe, likely infectious in etiology. 6 week follow-up chest CT is recommended.    Enlarged, heterogeneous thyroid with multiple thyroid nodules, as described above. Correlate with nonemergent thyroid ultrasound if clinically warranted.    Additional findings as above.    < end of copied text >    Ct scan chest:    ekg;    echo:

## 2020-03-05 NOTE — DISCHARGE NOTE NURSING/CASE MANAGEMENT/SOCIAL WORK - PATIENT PORTAL LINK FT
You can access the FollowMyHealth Patient Portal offered by [facility] by registering at the following website: [email or web address]. By joining [employer]’s FollowMyHealth portal, you will also be able to view your health information using other applications (apps) compatible with our system.

## 2020-03-05 NOTE — PROGRESS NOTE ADULT - ASSESSMENT
1.	Influenza A infection  2.	LLL pneumonia   3.	S/p fevers  ·	dc planning today  ·	dc on ceftin 500mg PO bid for 6 days more and tamiflu 30mg BID for 5 doses more.   ·	cont steroid tapering  ·	reconsult prn
89 yr old female with PMHX of PAF,HTN,CRI,Lipid d/os/p PPM here with Flu and LLL pneumonia.  1.Pneumonia and flu-ABX as per ID.  2.PAF-Multaq,lopressor,Eliquis.  3.HTN-cont bp medication.  4.Lipid d/o-statin.  5.CRI-f/u lytes.  6.Check PPM q 3mo.  7.Pulmonary eval-p. Will need repeat CT chest in 6 weeks.  8.D/C home when cleared by pulmonary.  9.PPI.
89 yr old female with PMHX of PAF,HTN,CRI,Lipid d/os/p PPM here with Flu and LLL pneumonia.  1.Pneumonia and flu-ABX as per ID.  2.PAF-Multaq,lopressor,Eliquis.  3.HTN-cont bp medication.  4.Lipid d/o-statin.  5.CRI-f/u lytes.  6.Check PPM q 3mo.  7.Pulmonary f/u. Will need repeat CT chest in 6 weeks.  8.D/C home with prednisone bishnu.  9.PPI.
Assessment and Recommendation:   Problem/Recommendation - 1:  Problem: Influenza A. Recommendation: Tamiflu po  analgesics prn  monitor temp and WBC.    Problem/Recommendation - 2:  ·  Problem: Community acquired pneumonia.  Recommendation: Continue Antibiotics  follow up CT chest in 6 weeks to assess for resolution.     Problem/Recommendation - 3:  ·  Problem: Atrial fibrillation.  Recommendation: control VR   Cont meds  Cardio follow up.     Problem/Recommendation - 4:  ·  Problem: CAMILLE (acute kidney injury).  Recommendation: Avoid nephrotoxic drugs  monitor BMP  Renal eval.     Problem/Recommendation - 5:  ·  Problem: HTN (hypertension).  Recommendation: monitor BP  cont meds.     Problem/Recommendation - 6:  Problem: Pulmonary HTN. Recommendation: Bronchodilators  oxygen supp  CCB.    Problem/Recommendation - 7:  Problem: Asthma. Recommendation: Bronchodilators  Can switch to PO steroids and taper.   F.U in 1 week as OP.  Pfts as OP.
Pt is a 89 y/o  F, from home, AAOx3, lives with daughter with PMH of CHF, atrial fibrillation ( on Eliquis), Complete Heart Block (s/p PPM), HTN, HLD, pulmonary HTN, dementia who presented with cough, fever, runny nose since 2-3 days.
Pt is a 89 y/o  F, from home, AAOx3, lives with daughter with PMH of CHF, atrial fibrillation ( on Eliquis), Complete Heart Block (s/p PPM), HTN, HLD, pulmonary HTN, dementia who presented with cough, fever, runny nose since 2-3 days.

## 2020-03-17 ENCOUNTER — EMERGENCY (EMERGENCY)
Facility: HOSPITAL | Age: 85
LOS: 1 days | Discharge: HOME | End: 2020-03-17
Attending: EMERGENCY MEDICINE
Payer: COMMERCIAL

## 2020-03-17 VITALS
WEIGHT: 160.06 LBS | OXYGEN SATURATION: 95 % | HEIGHT: 62 IN | HEART RATE: 70 BPM | DIASTOLIC BLOOD PRESSURE: 67 MMHG | SYSTOLIC BLOOD PRESSURE: 133 MMHG | TEMPERATURE: 100 F | RESPIRATION RATE: 18 BRPM

## 2020-03-17 VITALS
OXYGEN SATURATION: 96 % | HEART RATE: 70 BPM | DIASTOLIC BLOOD PRESSURE: 64 MMHG | RESPIRATION RATE: 18 BRPM | TEMPERATURE: 99 F | SYSTOLIC BLOOD PRESSURE: 135 MMHG

## 2020-03-17 DIAGNOSIS — Z95.0 PRESENCE OF CARDIAC PACEMAKER: Chronic | ICD-10-CM

## 2020-03-17 PROBLEM — I50.9 HEART FAILURE, UNSPECIFIED: Chronic | Status: ACTIVE | Noted: 2020-03-02

## 2020-03-17 LAB
ALBUMIN SERPL ELPH-MCNC: 2.8 G/DL — LOW (ref 3.5–5)
ALP SERPL-CCNC: 45 U/L — SIGNIFICANT CHANGE UP (ref 40–120)
ALT FLD-CCNC: 21 U/L DA — SIGNIFICANT CHANGE UP (ref 10–60)
ANION GAP SERPL CALC-SCNC: 6 MMOL/L — SIGNIFICANT CHANGE UP (ref 5–17)
AST SERPL-CCNC: 18 U/L — SIGNIFICANT CHANGE UP (ref 10–40)
BASOPHILS # BLD AUTO: 0.04 K/UL — SIGNIFICANT CHANGE UP (ref 0–0.2)
BASOPHILS NFR BLD AUTO: 0.3 % — SIGNIFICANT CHANGE UP (ref 0–2)
BILIRUB SERPL-MCNC: 0.7 MG/DL — SIGNIFICANT CHANGE UP (ref 0.2–1.2)
BUN SERPL-MCNC: 14 MG/DL — SIGNIFICANT CHANGE UP (ref 7–18)
CALCIUM SERPL-MCNC: 8.6 MG/DL — SIGNIFICANT CHANGE UP (ref 8.4–10.5)
CHLORIDE SERPL-SCNC: 103 MMOL/L — SIGNIFICANT CHANGE UP (ref 96–108)
CO2 SERPL-SCNC: 32 MMOL/L — HIGH (ref 22–31)
CREAT SERPL-MCNC: 1.08 MG/DL — SIGNIFICANT CHANGE UP (ref 0.5–1.3)
EOSINOPHIL # BLD AUTO: 0.14 K/UL — SIGNIFICANT CHANGE UP (ref 0–0.5)
EOSINOPHIL NFR BLD AUTO: 1.2 % — SIGNIFICANT CHANGE UP (ref 0–6)
GLUCOSE SERPL-MCNC: 103 MG/DL — HIGH (ref 70–99)
HCT VFR BLD CALC: 30.5 % — LOW (ref 34.5–45)
HGB BLD-MCNC: 10.1 G/DL — LOW (ref 11.5–15.5)
IMM GRANULOCYTES NFR BLD AUTO: 0.4 % — SIGNIFICANT CHANGE UP (ref 0–1.5)
LIDOCAIN IGE QN: 148 U/L — SIGNIFICANT CHANGE UP (ref 73–393)
LYMPHOCYTES # BLD AUTO: 0.97 K/UL — LOW (ref 1–3.3)
LYMPHOCYTES # BLD AUTO: 8.4 % — LOW (ref 13–44)
MCHC RBC-ENTMCNC: 32.7 PG — SIGNIFICANT CHANGE UP (ref 27–34)
MCHC RBC-ENTMCNC: 33.1 GM/DL — SIGNIFICANT CHANGE UP (ref 32–36)
MCV RBC AUTO: 98.7 FL — SIGNIFICANT CHANGE UP (ref 80–100)
MONOCYTES # BLD AUTO: 1.03 K/UL — HIGH (ref 0–0.9)
MONOCYTES NFR BLD AUTO: 8.9 % — SIGNIFICANT CHANGE UP (ref 2–14)
NEUTROPHILS # BLD AUTO: 9.33 K/UL — HIGH (ref 1.8–7.4)
NEUTROPHILS NFR BLD AUTO: 80.8 % — HIGH (ref 43–77)
NRBC # BLD: 0 /100 WBCS — SIGNIFICANT CHANGE UP (ref 0–0)
PLATELET # BLD AUTO: 183 K/UL — SIGNIFICANT CHANGE UP (ref 150–400)
POTASSIUM SERPL-MCNC: 3.5 MMOL/L — SIGNIFICANT CHANGE UP (ref 3.5–5.3)
POTASSIUM SERPL-SCNC: 3.5 MMOL/L — SIGNIFICANT CHANGE UP (ref 3.5–5.3)
PROT SERPL-MCNC: 6.2 G/DL — SIGNIFICANT CHANGE UP (ref 6–8.3)
RBC # BLD: 3.09 M/UL — LOW (ref 3.8–5.2)
RBC # FLD: 12.6 % — SIGNIFICANT CHANGE UP (ref 10.3–14.5)
SODIUM SERPL-SCNC: 141 MMOL/L — SIGNIFICANT CHANGE UP (ref 135–145)
WBC # BLD: 11.56 K/UL — HIGH (ref 3.8–10.5)
WBC # FLD AUTO: 11.56 K/UL — HIGH (ref 3.8–10.5)

## 2020-03-17 PROCEDURE — 99283 EMERGENCY DEPT VISIT LOW MDM: CPT

## 2020-03-17 PROCEDURE — 85027 COMPLETE CBC AUTOMATED: CPT

## 2020-03-17 PROCEDURE — 80053 COMPREHEN METABOLIC PANEL: CPT

## 2020-03-17 PROCEDURE — 83690 ASSAY OF LIPASE: CPT

## 2020-03-17 PROCEDURE — 36415 COLL VENOUS BLD VENIPUNCTURE: CPT

## 2020-03-17 RX ORDER — VANCOMYCIN HCL 1 G
1 VIAL (EA) INTRAVENOUS
Qty: 40 | Refills: 0
Start: 2020-03-17 | End: 2020-03-26

## 2020-03-17 RX ORDER — SODIUM CHLORIDE 9 MG/ML
500 INJECTION INTRAMUSCULAR; INTRAVENOUS; SUBCUTANEOUS ONCE
Refills: 0 | Status: COMPLETED | OUTPATIENT
Start: 2020-03-17 | End: 2020-03-17

## 2020-03-17 RX ADMIN — SODIUM CHLORIDE 500 MILLILITER(S): 9 INJECTION INTRAMUSCULAR; INTRAVENOUS; SUBCUTANEOUS at 13:00

## 2020-03-17 NOTE — ED PROVIDER NOTE - OBJECTIVE STATEMENT
89yoF with h/o afib on Eliquis, HTN, HLD, CHF, presents with diarrhea. Per daughter has been pureed with foul odor for 3 days over the past week, including this AM, and is concerned for C diff she had 1 yr ago after abx. Recently discharged with PNA on ceftin. Mildly more tired than usual but nml appetite. Denies vomiting, fever, abdominal pain, and all other symptoms.

## 2020-03-17 NOTE — ED PROVIDER NOTE - NSFOLLOWUPINSTRUCTIONS_ED_ALL_ED_FT
Please follow up with your primary care doctor in 1-2 days.  Please keep well hydrated.  Please use the vancomycin as prescribed.  Please return to the emergency department if you have worsening diarrhea, vomiting, difficulty keeping hydrated, abdominal pain, fever, or any other symptoms.

## 2020-03-17 NOTE — ED ADULT NURSE NOTE - NSIMPLEMENTINTERV_GEN_ALL_ED
Implemented All Fall with Harm Risk Interventions:  [place] to call system. Call bell, personal items and telephone within reach. Instruct patient to call for assistance. Room bathroom lighting operational. Non-slip footwear when patient is off stretcher. Physically safe environment: no spills, clutter or unnecessary equipment. Stretcher in lowest position, wheels locked, appropriate side rails in place. Provide visual cue, wrist band, yellow gown, etc. Monitor gait and stability. Monitor for mental status changes and reorient to person, place, and time. Review medications for side effects contributing to fall risk. Reinforce activity limits and safety measures with patient and family. Provide visual clues: red socks.

## 2020-03-17 NOTE — ED PROVIDER NOTE - PATIENT PORTAL LINK FT
You can access the FollowMyHealth Patient Portal offered by [facility] by registering at the following website: [email or web address]. By joining [employer]’s FollowMyHealth portal, you will also be able to view your health information using other applications (apps) compatible with our system.

## 2020-03-17 NOTE — ED PROVIDER NOTE - PHYSICAL EXAMINATION
Afebrile, hemodynamically stable, saturating well  NAD, well appearing  Head NCAT  EOMI grossly, anicteric  MMM  RRR, nml S1/S2, no m/r/g  Lungs CTAB, no w/r/r  Abd soft, NT, ND, increased BS, no rebound or guarding  AAO, CN's 3-12 grossly intact  HERNANDEZ spontaneously, no leg cyanosis or edema  Skin warm, well perfused, no rashes or hives

## 2020-03-17 NOTE — ED ADULT NURSE NOTE - OBJECTIVE STATEMENT
Pt aox2, BIB daughter c/o diarrhea x 3 days. Pt denies n/v/. recent travel.  Pt wsa recently dx with PNA, dc with antibx.

## 2020-03-17 NOTE — ED PROVIDER NOTE - CLINICAL SUMMARY MEDICAL DECISION MAKING FREE TEXT BOX
Abdomen benign with low suspicion for acute process. Pt well hydrated and by hx sounds well hydrated as well. Some concern for C diff Abdomen benign with low suspicion for acute process. Pt well hydrated and by hx sounds well hydrated as well. Some concern for C diff, d/w daughter the risks/benefits of vanco including renal failure and would like to take the course. Patient is well appearing, NAD, afebrile, hemodynamically stable. Discharged with rx vancomycin, instructions in further symptomatic care, return precautions, and need for PMD f/u.

## 2020-03-24 ENCOUNTER — INPATIENT (INPATIENT)
Facility: HOSPITAL | Age: 85
LOS: 2 days | Discharge: HOME | DRG: 871 | End: 2020-03-27
Attending: INTERNAL MEDICINE | Admitting: INTERNAL MEDICINE
Payer: COMMERCIAL

## 2020-03-24 VITALS
DIASTOLIC BLOOD PRESSURE: 87 MMHG | HEIGHT: 62 IN | WEIGHT: 164.91 LBS | HEART RATE: 99 BPM | TEMPERATURE: 102 F | RESPIRATION RATE: 18 BRPM | SYSTOLIC BLOOD PRESSURE: 174 MMHG | OXYGEN SATURATION: 89 %

## 2020-03-24 DIAGNOSIS — J18.9 PNEUMONIA, UNSPECIFIED ORGANISM: ICD-10-CM

## 2020-03-24 DIAGNOSIS — I10 ESSENTIAL (PRIMARY) HYPERTENSION: ICD-10-CM

## 2020-03-24 DIAGNOSIS — I48.91 UNSPECIFIED ATRIAL FIBRILLATION: ICD-10-CM

## 2020-03-24 DIAGNOSIS — Z95.0 PRESENCE OF CARDIAC PACEMAKER: Chronic | ICD-10-CM

## 2020-03-24 DIAGNOSIS — A41.9 SEPSIS, UNSPECIFIED ORGANISM: ICD-10-CM

## 2020-03-24 DIAGNOSIS — E78.00 PURE HYPERCHOLESTEROLEMIA, UNSPECIFIED: ICD-10-CM

## 2020-03-24 DIAGNOSIS — Z29.9 ENCOUNTER FOR PROPHYLACTIC MEASURES, UNSPECIFIED: ICD-10-CM

## 2020-03-24 DIAGNOSIS — I50.9 HEART FAILURE, UNSPECIFIED: ICD-10-CM

## 2020-03-24 LAB
ALBUMIN SERPL ELPH-MCNC: 2.9 G/DL — LOW (ref 3.5–5)
ALP SERPL-CCNC: 43 U/L — SIGNIFICANT CHANGE UP (ref 40–120)
ALT FLD-CCNC: 22 U/L DA — SIGNIFICANT CHANGE UP (ref 10–60)
ANION GAP SERPL CALC-SCNC: 6 MMOL/L — SIGNIFICANT CHANGE UP (ref 5–17)
APTT BLD: 31.8 SEC — SIGNIFICANT CHANGE UP (ref 27.5–36.3)
AST SERPL-CCNC: 29 U/L — SIGNIFICANT CHANGE UP (ref 10–40)
BASOPHILS # BLD AUTO: 0.02 K/UL — SIGNIFICANT CHANGE UP (ref 0–0.2)
BASOPHILS NFR BLD AUTO: 0.4 % — SIGNIFICANT CHANGE UP (ref 0–2)
BILIRUB SERPL-MCNC: 0.3 MG/DL — SIGNIFICANT CHANGE UP (ref 0.2–1.2)
BUN SERPL-MCNC: 13 MG/DL — SIGNIFICANT CHANGE UP (ref 7–18)
CALCIUM SERPL-MCNC: 8.8 MG/DL — SIGNIFICANT CHANGE UP (ref 8.4–10.5)
CHLORIDE SERPL-SCNC: 100 MMOL/L — SIGNIFICANT CHANGE UP (ref 96–108)
CO2 SERPL-SCNC: 32 MMOL/L — HIGH (ref 22–31)
CREAT SERPL-MCNC: 1.28 MG/DL — SIGNIFICANT CHANGE UP (ref 0.5–1.3)
EOSINOPHIL # BLD AUTO: 0 K/UL — SIGNIFICANT CHANGE UP (ref 0–0.5)
EOSINOPHIL NFR BLD AUTO: 0 % — SIGNIFICANT CHANGE UP (ref 0–6)
GLUCOSE SERPL-MCNC: 102 MG/DL — HIGH (ref 70–99)
HCT VFR BLD CALC: 29.2 % — LOW (ref 34.5–45)
HGB BLD-MCNC: 9.4 G/DL — LOW (ref 11.5–15.5)
IMM GRANULOCYTES NFR BLD AUTO: 0.4 % — SIGNIFICANT CHANGE UP (ref 0–1.5)
INR BLD: 1.52 RATIO — HIGH (ref 0.88–1.16)
LACTATE SERPL-SCNC: 1.6 MMOL/L — SIGNIFICANT CHANGE UP (ref 0.7–2)
LYMPHOCYTES # BLD AUTO: 0.55 K/UL — LOW (ref 1–3.3)
LYMPHOCYTES # BLD AUTO: 12 % — LOW (ref 13–44)
MCHC RBC-ENTMCNC: 31.8 PG — SIGNIFICANT CHANGE UP (ref 27–34)
MCHC RBC-ENTMCNC: 32.2 GM/DL — SIGNIFICANT CHANGE UP (ref 32–36)
MCV RBC AUTO: 98.6 FL — SIGNIFICANT CHANGE UP (ref 80–100)
MONOCYTES # BLD AUTO: 0.79 K/UL — SIGNIFICANT CHANGE UP (ref 0–0.9)
MONOCYTES NFR BLD AUTO: 17.2 % — HIGH (ref 2–14)
NEUTROPHILS # BLD AUTO: 3.21 K/UL — SIGNIFICANT CHANGE UP (ref 1.8–7.4)
NEUTROPHILS NFR BLD AUTO: 70 % — SIGNIFICANT CHANGE UP (ref 43–77)
NRBC # BLD: 0 /100 WBCS — SIGNIFICANT CHANGE UP (ref 0–0)
PLATELET # BLD AUTO: 157 K/UL — SIGNIFICANT CHANGE UP (ref 150–400)
POTASSIUM SERPL-MCNC: 3.9 MMOL/L — SIGNIFICANT CHANGE UP (ref 3.5–5.3)
POTASSIUM SERPL-SCNC: 3.9 MMOL/L — SIGNIFICANT CHANGE UP (ref 3.5–5.3)
PROT SERPL-MCNC: 6.7 G/DL — SIGNIFICANT CHANGE UP (ref 6–8.3)
PROTHROM AB SERPL-ACNC: 17.4 SEC — HIGH (ref 10–12.9)
RBC # BLD: 2.96 M/UL — LOW (ref 3.8–5.2)
RBC # FLD: 12.7 % — SIGNIFICANT CHANGE UP (ref 10.3–14.5)
SODIUM SERPL-SCNC: 138 MMOL/L — SIGNIFICANT CHANGE UP (ref 135–145)
WBC # BLD: 4.59 K/UL — SIGNIFICANT CHANGE UP (ref 3.8–10.5)
WBC # FLD AUTO: 4.59 K/UL — SIGNIFICANT CHANGE UP (ref 3.8–10.5)

## 2020-03-24 PROCEDURE — 99285 EMERGENCY DEPT VISIT HI MDM: CPT

## 2020-03-24 PROCEDURE — 71045 X-RAY EXAM CHEST 1 VIEW: CPT | Mod: 26

## 2020-03-24 RX ORDER — METOPROLOL TARTRATE 50 MG
25 TABLET ORAL
Refills: 0 | Status: DISCONTINUED | OUTPATIENT
Start: 2020-03-24 | End: 2020-03-27

## 2020-03-24 RX ORDER — NITROGLYCERIN 6.5 MG
1 CAPSULE, EXTENDED RELEASE ORAL ONCE
Refills: 0 | Status: COMPLETED | OUTPATIENT
Start: 2020-03-24 | End: 2020-03-24

## 2020-03-24 RX ORDER — CEFTRIAXONE 500 MG/1
1000 INJECTION, POWDER, FOR SOLUTION INTRAMUSCULAR; INTRAVENOUS ONCE
Refills: 0 | Status: COMPLETED | OUTPATIENT
Start: 2020-03-24 | End: 2020-03-24

## 2020-03-24 RX ORDER — FUROSEMIDE 40 MG
40 TABLET ORAL ONCE
Refills: 0 | Status: COMPLETED | OUTPATIENT
Start: 2020-03-24 | End: 2020-03-24

## 2020-03-24 RX ORDER — MORPHINE SULFATE 50 MG/1
1 CAPSULE, EXTENDED RELEASE ORAL ONCE
Refills: 0 | Status: DISCONTINUED | OUTPATIENT
Start: 2020-03-24 | End: 2020-03-24

## 2020-03-24 RX ORDER — SODIUM CHLORIDE 9 MG/ML
1550 INJECTION INTRAMUSCULAR; INTRAVENOUS; SUBCUTANEOUS ONCE
Refills: 0 | Status: COMPLETED | OUTPATIENT
Start: 2020-03-24 | End: 2020-03-24

## 2020-03-24 RX ORDER — APIXABAN 2.5 MG/1
2.5 TABLET, FILM COATED ORAL
Refills: 0 | Status: DISCONTINUED | OUTPATIENT
Start: 2020-03-24 | End: 2020-03-27

## 2020-03-24 RX ORDER — FUROSEMIDE 40 MG
20 TABLET ORAL DAILY
Refills: 0 | Status: DISCONTINUED | OUTPATIENT
Start: 2020-03-24 | End: 2020-03-26

## 2020-03-24 RX ORDER — DRONEDARONE 400 MG/1
400 TABLET, FILM COATED ORAL
Refills: 0 | Status: DISCONTINUED | OUTPATIENT
Start: 2020-03-24 | End: 2020-03-25

## 2020-03-24 RX ORDER — ASPIRIN/CALCIUM CARB/MAGNESIUM 324 MG
81 TABLET ORAL DAILY
Refills: 0 | Status: DISCONTINUED | OUTPATIENT
Start: 2020-03-24 | End: 2020-03-27

## 2020-03-24 RX ORDER — SIMVASTATIN 20 MG/1
40 TABLET, FILM COATED ORAL AT BEDTIME
Refills: 0 | Status: DISCONTINUED | OUTPATIENT
Start: 2020-03-24 | End: 2020-03-27

## 2020-03-24 RX ORDER — AZITHROMYCIN 500 MG/1
500 TABLET, FILM COATED ORAL ONCE
Refills: 0 | Status: COMPLETED | OUTPATIENT
Start: 2020-03-24 | End: 2020-03-24

## 2020-03-24 RX ADMIN — AZITHROMYCIN 255 MILLIGRAM(S): 500 TABLET, FILM COATED ORAL at 16:07

## 2020-03-24 RX ADMIN — CEFTRIAXONE 1000 MILLIGRAM(S): 500 INJECTION, POWDER, FOR SOLUTION INTRAMUSCULAR; INTRAVENOUS at 14:51

## 2020-03-24 RX ADMIN — SODIUM CHLORIDE 1550 MILLILITER(S): 9 INJECTION INTRAMUSCULAR; INTRAVENOUS; SUBCUTANEOUS at 14:21

## 2020-03-24 RX ADMIN — CEFTRIAXONE 100 MILLIGRAM(S): 500 INJECTION, POWDER, FOR SOLUTION INTRAMUSCULAR; INTRAVENOUS at 14:21

## 2020-03-24 RX ADMIN — SODIUM CHLORIDE 1550 MILLILITER(S): 9 INJECTION INTRAMUSCULAR; INTRAVENOUS; SUBCUTANEOUS at 15:21

## 2020-03-24 NOTE — H&P ADULT - PROBLEM SELECTOR PLAN 1
Patient is being admitted for bilateral pneumonia likely viral with bilateral opacities. Patient met sepsis criteria with tachycardia, tachypnea, and fever.   S/p IVF 30 cc per kg.   Blood cultures were sent.   Patient was given one dose of IV ceftriaxone and IV azithromycin.  f/u blood cultures.

## 2020-03-24 NOTE — ED PROVIDER NOTE - OBJECTIVE STATEMENT
Interval History: per nursing,patient is refused CPAP over night.    Review of Systems   Constitutional: Positive for activity change, appetite change and fatigue. Negative for chills, diaphoresis and fever.   HENT: Negative for congestion, hearing loss, sore throat, tinnitus and trouble swallowing.    Eyes: Negative for photophobia, discharge, itching and visual disturbance.   Respiratory: Positive for shortness of breath. Negative for apnea, cough, wheezing and stridor.         Dusky nailbeds; obvious WOB   Cardiovascular: Positive for leg swelling. Negative for chest pain and palpitations.   Gastrointestinal: Negative for abdominal distention, abdominal pain, blood in stool, constipation, diarrhea and nausea.   Endocrine: Negative for polydipsia, polyphagia and polyuria.   Genitourinary: Positive for difficulty urinating, hematuria and penile pain. Negative for dysuria, flank pain and frequency.   Musculoskeletal: Positive for joint swelling. Negative for arthralgias and neck stiffness.   Skin: Positive for pallor. Negative for color change, rash and wound.   Neurological: Positive for dizziness and weakness. Negative for tremors, seizures, light-headedness, numbness and headaches.   Hematological: Negative for adenopathy.   Psychiatric/Behavioral: Negative for hallucinations and self-injury.     Objective:     Vital Signs (Most Recent):  Temp: 98 °F (36.7 °C) (01/02/19 0521)  Pulse: (!) 125 (01/02/19 0521)  Resp: (!) 22 (01/02/19 0521)  BP: (!) 133/96 (01/02/19 0521)  SpO2: (!) 92 % (01/02/19 0521) Vital Signs (24h Range):  Temp:  [97.5 °F (36.4 °C)-98 °F (36.7 °C)] 98 °F (36.7 °C)  Pulse:  [] 125  Resp:  [18-24] 22  SpO2:  [92 %-99 %] 92 %  BP: (133-176)/(70-96) 133/96     Weight: 92.5 kg (203 lb 14.8 oz)  Body mass index is 30.11 kg/m².    Intake/Output Summary (Last 24 hours) at 1/2/2019 0740  Last data filed at 1/1/2019 2345  Gross per 24 hour   Intake 480 ml   Output 745 ml   Net -265 ml      Physical  Exam   Constitutional: He appears well-developed and well-nourished. He is cooperative.   HENT:   Head: Normocephalic and atraumatic.   Eyes: Conjunctivae, EOM and lids are normal. Pupils are equal, round, and reactive to light.   Neck: Normal range of motion and full passive range of motion without pain. Neck supple. JVD present. No edema present. No thyroid mass present.   Cardiovascular: Normal rate, S1 normal, S2 normal and intact distal pulses.   No murmur heard.  Pulmonary/Chest: He is in respiratory distress. He has rales.   Abdominal: Soft. Bowel sounds are normal. He exhibits no distension and no abdominal bruit. There is no splenomegaly or hepatomegaly. There is no tenderness. There is no CVA tenderness.   Genitourinary: Penile tenderness present.   Genitourinary Comments: Urinary retension   Musculoskeletal: Normal range of motion. He exhibits edema.   Lymphadenopathy:     He has no cervical adenopathy.     He has no axillary adenopathy.   Neurological: He is alert. He has normal reflexes. He displays no tremor. He displays no seizure activity.   Skin: Skin is warm, dry and intact.   Psychiatric: He has a normal mood and affect. His speech is normal. Thought content normal. Cognition and memory are normal.       Significant Labs:   CBC:   Recent Labs   Lab 12/31/18  1545   WBC 8.01   HGB 11.1*   HCT 36.6*        CMP:   Recent Labs   Lab 12/31/18  1545      K 4.2   *   CO2 23   GLU 89   BUN 22   CREATININE 1.4   CALCIUM 10.4   PROT 5.4*   ALBUMIN 2.3*   BILITOT 0.7   ALKPHOS 138*   AST 20   ALT 15   ANIONGAP 9   EGFRNONAA 47*     Cardiac Markers:   Recent Labs   Lab 12/31/18  1545   *     Lipase: No results for input(s): LIPASE in the last 48 hours.  Troponin:   Recent Labs   Lab 12/31/18  2131 01/01/19  0346 01/01/19  0830   TROPONINI 0.113* 0.136* 0.104*     TSH:   Recent Labs   Lab 07/09/18  2343   TSH 1.108     Significant Imaging:   Imaging Results          X-Ray Chest AP  Portable (Final result)  Result time 12/31/18 15:32:46    Final result by Macarena Woodard MD (12/31/18 15:32:46)                 Impression:      There is no evidence acute pulmonary disease.  The chest appears similar to November 30, 2018.      Electronically signed by: Macarena Woodard MD  Date:    12/31/2018  Time:    15:32             Narrative:    EXAMINATION:  XR CHEST AP PORTABLE    CLINICAL HISTORY:  Shortness of breath    TECHNIQUE:  Single frontal view of the chest was performed.    COMPARISON:  None    FINDINGS:  There is a left-sided pacer defibrillator with leads in the right atrium and right ventricle.  The patient is status post sternotomy.  The cardiac silhouette is enlarged.  There is no pneumothorax.                                 the pt unable to provide any history d/t dementia. son states she has fever and dyspnea

## 2020-03-24 NOTE — H&P ADULT - PROBLEM SELECTOR PLAN 6
Patient takes lopressor and hydralazine at home.  will hold in setting of infection.  monitor blood pressure.

## 2020-03-24 NOTE — ED PROVIDER NOTE - CLINICAL SUMMARY MEDICAL DECISION MAKING FREE TEXT BOX
89F who has no complaints but family reports shes having fever dyspnea at home  -given sick contact suspect covid  -will treat for sepsis given abnl vitals  -admit as xr shows b/l pna

## 2020-03-24 NOTE — ED ADULT NURSE NOTE - NSIMPLEMENTINTERV_GEN_ALL_ED
Implemented All Fall with Harm Risk Interventions:  [place] to call system. Call bell, personal items and telephone within reach. Instruct patient to call for assistance. Room bathroom lighting operational. Non-slip footwear when patient is off stretcher. Physically safe environment: no spills, clutter or unnecessary equipment. Stretcher in lowest position, wheels locked, appropriate side rails in place. Provide visual cue, wrist band, yellow gown, etc. Monitor gait and stability. Monitor for mental status changes and reorient to person, place, and time. Review medications for side effects contributing to fall risk. Reinforce activity limits and safety measures with patient and family. Provide visual clues: red socks.

## 2020-03-24 NOTE — H&P ADULT - ASSESSMENT
89 years old Mexican speaking female, from home, lives with daughter, THANIA&Ox3 with pmhx CHF, afib (on Eliquis), complete heart block s/p pacemaker implant, HTN, pulmonary HTN, HLD, and mild dementia who presented with fever and shortness of breath which is worsening for last few days. Patient was admitted to [facility] 3 weeks ago with pneumonia secondary to influenza A virus and was treated with antibiotics and discharged home. Patient was in ED last week for suspected C diff diarrhea for which patient was prescribed oral vancomycin.  Patient is being admitted for bilateral pneumonia likely viral with bilateral opacities. Patient met sepsis criteria with tachycardia, tachypnea, and fever. S/p IVF 30 cc per kg. Blood cultures were sent. Patient was given one dose of IV ceftriaxone and IV azithromycin. 89 years old Malian speaking female, from home, lives with daughter, THANIA&Gege3 with pmhx CHF, afib (on Eliquis), complete heart block s/p pacemaker implant, HTN, pulmonary HTN, HLD, and mild dementia who presented with fever and shortness of breath which is worsening for last few days. Patient was admitted to [facility] 3 weeks ago with pneumonia secondary to influenza A virus and was treated with antibiotics and discharged home. Patient was in ED last week for suspected C diff diarrhea for which patient was prescribed oral vancomycin.  Patient is being admitted for bilateral pneumonia likely viral with bilateral opacities. Patient met sepsis criteria with tachycardia, tachypnea, and fever. S/p IVF 30 cc per kg. Blood cultures were sent. Patient was given one dose of IV ceftriaxone and IV azithromycin.

## 2020-03-24 NOTE — CHART NOTE - NSCHARTNOTEFT_GEN_A_CORE
Discussed patient's acute respiratory failure and significant distress with daughter Tanya Palencia. I explained to Tanya that Ms. Ortega is not doing well and prognosis is guarded at this time. Tanya informs me she wants us to continue all medical management, but ultimately says she does not want aggressive measures to be taken on her mother that would prolong her suffering. Patient DNR/DNI per conversation. MOLST form drafted and placed in chart.

## 2020-03-24 NOTE — H&P ADULT - PROBLEM SELECTOR PLAN 7
IMPROVE VTE Individual Risk Assessment  RISK                                                          Points  [] Previous VTE                                           3  [] Thrombophilia                                        2  [] Lower limb paralysis                              2   [] Current Cancer                                       2   [] Immobilization > 24 hrs                        1  [] ICU/CCU stay > 24 hours                       1  [] Age > 60                                                   1  IMPROVE VTE Score = 2  pt is on eliquis

## 2020-03-24 NOTE — H&P ADULT - HISTORY OF PRESENT ILLNESS
89 years old Salvadorean speaking female, from home, lives with daughter, THANIA&Ox3 with pmhx CHF, afib (on Eliquis), complete heart block s/p pacemaker implant, HTN, pulmonary HTN, HLD, and mild dementia who presented with fever and shortness of breath which is worsening for last few days. Patient was admitted to [facility] 3 weeks ago with pneumonia secondary to influenza A virus and was treated with antibiotics and discharged home. Patient was in ED last week for suspected C diff diarrhea for which patient was prescribed oral vancomycin.  Patient is being admitted for bilateral pneumonia likely viral with bilateral opacities. Patient met sepsis criteria with tachycardia, tachypnea, and fever. S/p IVF 30 cc per kg. Blood cultures were sent. Patient was given one dose of IV ceftriaxone and IV azithromycin. 89 years old South Korean speaking female, from home, lives with daughter, THANIA&Gege3 with pmhx CHF, afib (on Eliquis), complete heart block s/p pacemaker implant, HTN, pulmonary HTN, HLD, and mild dementia who presented with fever and shortness of breath which is worsening for last few days. Patient was admitted to [facility] 3 weeks ago with pneumonia secondary to influenza A virus and was treated with antibiotics and discharged home. Patient was in ED last week for suspected C diff diarrhea for which patient was prescribed oral vancomycin.  Patient is being admitted for bilateral pneumonia likely viral with bilateral opacities. Patient met sepsis criteria with tachycardia, tachypnea, and fever. S/p IVF 30 cc per kg. Blood cultures were sent. Patient was given one dose of IV ceftriaxone and IV azithromycin. 89 years old Palestinian speaking female, from home, lives with daughter, THANIA&Ox3 with pmhx CHF, afib (on Eliquis), complete heart block s/p pacemaker implant, HTN, pulmonary HTN, HLD, and mild dementia who presented with fever and shortness of breath which is worsening for last few days. [facility] secondary to influenza A virus and was treated with antibiotics and discharged home. Patient was in ED last week for suspected C diff diarrhea for which patient was prescribed oral vancomycin.  Patient is being admitted for bilateral pneumonia likely viral with bilateral opacities. Patient met sepsis criteria with tachycardia, tachypnea, and fever. S/p IVF 30 cc per kg. Blood cultures were sent. Patient was given one dose of IV ceftriaxone and IV azithromycin.   code status: discussed with daughter, patient is full code.

## 2020-03-24 NOTE — H&P ADULT - NSHPPHYSICALEXAM_GEN_ALL_CORE
Vital Signs Last 24 Hrs  T(C): 37.9 (24 Mar 2020 19:18), Max: 38.8 (24 Mar 2020 11:14)  T(F): 100.2 (24 Mar 2020 19:18), Max: 101.9 (24 Mar 2020 11:14)  HR: 99 (24 Mar 2020 19:18) (98 - 99)  BP: 139/77 (24 Mar 2020 19:18) (139/77 - 174/87)  BP(mean): --  RR: 18 (24 Mar 2020 19:18) (18 - 18)  SpO2: 98% (24 Mar 2020 19:18) (75% - 98%)  · CONSTITUTIONAL: awake, alert, oriented to person, place, time/situation and in no apparent distress.  · ENMT: Airway patent  · CARDIAC: Normal rate, regular rhythm.  Heart sounds S1, S2.  No murmurs, rubs or gallops.  · RESPIRATORY: Breath sounds clear and equal bilaterally.  · GASTROINTESTINAL: Abdomen soft, non-tender, no guarding.  · NEUROLOGICAL: Alert and awake, moves all four extremities  · PSYCHIATRIC: Alert and oriented to person, place, time/situation. normal mood and affect. no apparent risk to self or others.

## 2020-03-24 NOTE — H&P ADULT - PROBLEM SELECTOR PLAN 2
Patient met sepsis criteria with tachycardia, tachypnea, and fever.   S/p IVF 30 cc per kg.   Blood cultures were sent.   Patient was given one dose of IV ceftriaxone and IV azithromycin.  f/u blood cultures.  f/u COVID 19

## 2020-03-24 NOTE — RAPID RESPONSE TEAM SUMMARY - NSSITUATIONBACKGROUNDRRT_GEN_ALL_CORE
89 years old Niuean speaking female, from home, lives with daughter, THANIA&Ox3 with pmhx CHF, afib (on Eliquis), complete heart block s/p pacemaker implant, HTN, pulmonary HTN, HLD, and mild dementia who presented with fever and shortness of breath which is worsening for last few days. Patient was admitted to [facility] 3 weeks ago with pneumonia secondary to influenza A virus and was treated with antibiotics and discharged home.Patient was in ED last week for suspected C diff diarrhea for which patient was prescribed oral vancomycin.  Patient is being admitted for bilateral pneumonia likely viral with bilateral opacities. Patient met sepsis criteria with tachycardia, tachypnea, and fever. S/p IVF 30 cc per kg. Blood cultures were sent. Patient was given one dose of IV ceftriaxone and IV azithromycin.     RR called for hypoxia to 70s and increasing respiratory distress. NRB replaced and patient noted to be coughing up yellow-colored sputum. GOC rediscussed with daughter, health surrogate, who wants her to DNR/DNI given poor prognosis. MOLST form drafted to reflect wishes.   Pt noted to have better saturation in the mid-90s, and hypertensive to 198/88 with tachycardia to 110-120 range. Nitrobid given, will continue to monitor vitals.

## 2020-03-25 DIAGNOSIS — J12.89 OTHER VIRAL PNEUMONIA: ICD-10-CM

## 2020-03-25 LAB
APPEARANCE UR: CLEAR — SIGNIFICANT CHANGE UP
BILIRUB UR-MCNC: NEGATIVE — SIGNIFICANT CHANGE UP
COLOR SPEC: YELLOW — SIGNIFICANT CHANGE UP
DIFF PNL FLD: ABNORMAL
GLUCOSE BLDC GLUCOMTR-MCNC: 186 MG/DL — HIGH (ref 70–99)
GLUCOSE UR QL: NEGATIVE — SIGNIFICANT CHANGE UP
KETONES UR-MCNC: NEGATIVE — SIGNIFICANT CHANGE UP
LEGIONELLA AG UR QL: NEGATIVE — SIGNIFICANT CHANGE UP
LEUKOCYTE ESTERASE UR-ACNC: NEGATIVE — SIGNIFICANT CHANGE UP
NITRITE UR-MCNC: NEGATIVE — SIGNIFICANT CHANGE UP
PH UR: 5 — SIGNIFICANT CHANGE UP (ref 5–8)
PROT UR-MCNC: 30 MG/DL
SARS-COV-2 RNA SPEC QL NAA+PROBE: DETECTED
SP GR SPEC: 1.01 — SIGNIFICANT CHANGE UP (ref 1.01–1.02)
UROBILINOGEN FLD QL: NEGATIVE — SIGNIFICANT CHANGE UP

## 2020-03-25 RX ORDER — HYDROXYCHLOROQUINE SULFATE 200 MG
TABLET ORAL
Refills: 0 | Status: DISCONTINUED | OUTPATIENT
Start: 2020-03-25 | End: 2020-03-27

## 2020-03-25 RX ORDER — HYDROXYCHLOROQUINE SULFATE 200 MG
400 TABLET ORAL EVERY 12 HOURS
Refills: 0 | Status: COMPLETED | OUTPATIENT
Start: 2020-03-25 | End: 2020-03-26

## 2020-03-25 RX ORDER — CEFTRIAXONE 500 MG/1
1000 INJECTION, POWDER, FOR SOLUTION INTRAMUSCULAR; INTRAVENOUS ONCE
Refills: 0 | Status: COMPLETED | OUTPATIENT
Start: 2020-03-25 | End: 2020-03-25

## 2020-03-25 RX ORDER — HYDROXYCHLOROQUINE SULFATE 200 MG
200 TABLET ORAL EVERY 12 HOURS
Refills: 0 | Status: DISCONTINUED | OUTPATIENT
Start: 2020-03-26 | End: 2020-03-27

## 2020-03-25 RX ORDER — PANTOPRAZOLE SODIUM 20 MG/1
40 TABLET, DELAYED RELEASE ORAL
Refills: 0 | Status: DISCONTINUED | OUTPATIENT
Start: 2020-03-25 | End: 2020-03-27

## 2020-03-25 RX ORDER — AZITHROMYCIN 500 MG/1
500 TABLET, FILM COATED ORAL ONCE
Refills: 0 | Status: COMPLETED | OUTPATIENT
Start: 2020-03-25 | End: 2020-03-25

## 2020-03-25 RX ORDER — SODIUM CHLORIDE 9 MG/ML
1000 INJECTION, SOLUTION INTRAVENOUS
Refills: 0 | Status: COMPLETED | OUTPATIENT
Start: 2020-03-25 | End: 2020-03-26

## 2020-03-25 RX ORDER — CEFTRIAXONE 500 MG/1
1000 INJECTION, POWDER, FOR SOLUTION INTRAMUSCULAR; INTRAVENOUS EVERY 24 HOURS
Refills: 0 | Status: DISCONTINUED | OUTPATIENT
Start: 2020-03-26 | End: 2020-03-27

## 2020-03-25 RX ORDER — CEFTRIAXONE 500 MG/1
INJECTION, POWDER, FOR SOLUTION INTRAMUSCULAR; INTRAVENOUS
Refills: 0 | Status: DISCONTINUED | OUTPATIENT
Start: 2020-03-25 | End: 2020-03-27

## 2020-03-25 RX ORDER — AZITHROMYCIN 500 MG/1
500 TABLET, FILM COATED ORAL DAILY
Refills: 0 | Status: DISCONTINUED | OUTPATIENT
Start: 2020-03-26 | End: 2020-03-27

## 2020-03-25 RX ORDER — AZITHROMYCIN 500 MG/1
TABLET, FILM COATED ORAL
Refills: 0 | Status: DISCONTINUED | OUTPATIENT
Start: 2020-03-25 | End: 2020-03-25

## 2020-03-25 RX ADMIN — Medication 40 MILLIGRAM(S): at 00:27

## 2020-03-25 RX ADMIN — MORPHINE SULFATE 1 MILLIGRAM(S): 50 CAPSULE, EXTENDED RELEASE ORAL at 00:28

## 2020-03-25 RX ADMIN — CEFTRIAXONE 100 MILLIGRAM(S): 500 INJECTION, POWDER, FOR SOLUTION INTRAMUSCULAR; INTRAVENOUS at 13:20

## 2020-03-25 RX ADMIN — Medication 1 INCH(S): at 00:28

## 2020-03-25 RX ADMIN — Medication 1 INCH(S): at 16:30

## 2020-03-25 RX ADMIN — AZITHROMYCIN 255 MILLIGRAM(S): 500 TABLET, FILM COATED ORAL at 13:20

## 2020-03-25 RX ADMIN — MORPHINE SULFATE 1 MILLIGRAM(S): 50 CAPSULE, EXTENDED RELEASE ORAL at 06:52

## 2020-03-25 NOTE — CHART NOTE - NSCHARTNOTEFT_GEN_A_CORE
Patient transferred to [address] [place] [address], report given to receiving medical team (dr Beltran)

## 2020-03-25 NOTE — CONSULT NOTE ADULT - GASTROINTESTINAL DETAILS
no distention/bowel sounds normal/no rigidity/no organomegaly/nontender/no guarding/soft/no masses palpable

## 2020-03-25 NOTE — CONSULT NOTE ADULT - ASSESSMENT
Statement Selected COVID - 19 infection  Pneumonia - doubt bacterial and likely secondary to Coronavirus  infection  Fevers      Plan - hold Multaq while on Hydroxychloraquine  Start Hydroxychloraquine 400mgs po bid x 1 day then 200mgs po bid x 4 days.  cont Rocephin 1 gm iv q24hrs  cont azithromycin 500mgs po q24hrs.

## 2020-03-25 NOTE — PROGRESS NOTE ADULT - SUBJECTIVE AND OBJECTIVE BOX
Chart  reviewed.  Patient seen and examined    CHIEF COMPLAINT: Patient is a 89y old  Female who presents with a chief complaint of shortness of breath (25 Mar 2020 10:27)    HPI: 89 years old Divehi speaking female, non-smoker from home, lives with daughter, AA&Ox3 with pmhx CHF, afib (on Eliquis), complete heart block s/p pacemaker implant, HTN, pulmonary HTN, HLD, and mild dementia who presented with fever and shortness of breath which is worsening for last few days. Patient was admitted to [facility] 3 weeks ago with pneumonia secondary to influenza A virus and was treated with antibiotics and discharged home. Patient was in ED last week for suspected C diff diarrhea for which patient was prescribed oral vancomycin.  Patient is being admitted for bilateral pneumonia likely viral with bilateral opacities. Patient met sepsis criteria with tachycardia, tachypnea, and fever. S/p IVF 30 cc per kg. Blood cultures were sent. Patient was given one dose of IV ceftriaxone and IV azithromycin.   code status: discussed with daughter, patient is full code. (24 Mar 2020 19:38)      INTERVAL EVENTS: had RRT overnight for hypoxia O2 sat 70's    Subjective/ROS: feels "ok"; Denies CP/palpitation/SOB/HA/dizziness/abd pain/n/v/d/f/c    Discussed with consultant Julio Cesar      MEDICATIONS  (STANDING):  apixaban 2.5 milliGRAM(s) Oral two times a day  aspirin  chewable 81 milliGRAM(s) Oral daily  dronedarone 400 milliGRAM(s) Oral two times a day  furosemide    Tablet 20 milliGRAM(s) Oral daily  metoprolol tartrate 25 milliGRAM(s) Oral two times a day  pantoprazole    Tablet 40 milliGRAM(s) Oral before breakfast  simvastatin 40 milliGRAM(s) Oral at bedtime    MEDICATIONS  (PRN):      VITALS:  Vital Signs Last 24 Hrs  T(C): 37.4 (25 Mar 2020 10:45), Max: 39.2 (24 Mar 2020 23:08)  T(F): 99.3 (25 Mar 2020 10:45), Max: 102.5 (24 Mar 2020 23:08)  HR: 59 (25 Mar 2020 10:45) (59 - 119)  BP: 96/54 (25 Mar 2020 10:45) (94/61 - 191/88)  BP(mean): --  RR: 19 (25 Mar 2020 10:45) (18 - 25)  SpO2: 99% (25 Mar 2020 10:45) (75% - 100%)      PHYSICAL EXAM:    GENERAL: supine in bed, NAD    HEENT:  AT/NC pupils equal and reactive, ~hard of hearing, no oropharyngeal lesions, erythema, exudates, oral thrush    NECK:   supple    CV:  +S1, +S2, regular, no murmurs or rubs    RESP:  even and unlabored breathing with O2 via NC, +b/l rales >R,, with diminished breath sounds, no wheezing,/ rhonchi    BREAST:  not examined    GI:  abdomen soft, non-tender, non-distended, normal BS, no bruits, no abdominal masses, no palpable masses    RECTAL:  not examined    :  not examined    MSK:   normal muscle tone, no atrophy, no rigidity, no contractions    EXT:   no clubbing, no cyanosis, no edema, no calf pain, swelling or erythema    VASCULAR:  pulses equal and symmetric in the upper and lower extremities    NEURO:  AAOX3, no focal neurological deficits, follows all commands, able to move extremities spontaneously    SKIN:  no ulcers, lesions or rashes      LABS:                      9.4    4.59  )-----------( 157      ( 24 Mar 2020 14:49 )             29.2     03-24    138  |  100  |  13  ----------------------------<  102<H>  3.9   |  32<H>  |  1.28    Ca    8.8      24 Mar 2020 14:49    TPro  6.7  /  Alb  2.9<L>  /  TBili  0.3  /  DBili  x   /  AST  29  /  ALT  22  /  AlkPhos  43  03-24    LIVER FUNCTIONS - ( 24 Mar 2020 14:49 )  Alb: 2.9 g/dL / Pro: 6.7 g/dL / ALK PHOS: 43 U/L / ALT: 22 U/L DA / AST: 29 U/L / GGT: x           PT/INR - ( 24 Mar 2020 14:49 )   PT: 17.4 sec;   INR: 1.52 ratio      PTT - ( 24 Mar 2020 14:49 )  PTT:31.8 sec  Urinalysis Basic - ( 25 Mar 2020 05:37 )    Color: Yellow / Appearance: Clear / S.015 / pH: x  Gluc: x / Ketone: Negative  / Bili: Negative / Urobili: Negative   Blood: x / Protein: 30 mg/dL / Nitrite: Negative   Leuk Esterase: Negative / RBC: 0-2 /HPF / WBC 0-2 /HPF   Sq Epi: x / Non Sq Epi: Few /HPF / Bacteria: Few /HPF    Lactate, Blood: 1.6 mmol/L ( @ 14:49)    Blood, Urine: Trace ( @ 05:37)

## 2020-03-25 NOTE — CONSULT NOTE ADULT - SUBJECTIVE AND OBJECTIVE BOX
PULMONARY CONSULT NOTE      CLAUDETTE GARCES  MRN-[id number]    Patient is a 89y old  Female who presents with a chief complaint of shortness of breath (25 Mar 2020 10:27)    History of Present Illness:  Reason for Admission: shortness of breath	  History of Present Illness: 	  89 years old Tajik speaking female, from home, lives with daughter, AA&Ox3 with pmhx CHF, afib (on Eliquis), complete heart block s/p pacemaker implant, HTN, pulmonary HTN, HLD, and mild dementia who presented with fever and shortness of breath which is worsening for last few days. Patient was admitted to [facility] 3 weeks ago with pneumonia secondary to influenza A virus and was treated with antibiotics and discharged home. Patient was in ED last week for suspected C diff diarrhea for which patient was prescribed oral vancomycin.  Patient is being admitted for bilateral pneumonia likely viral with bilateral opacities. Patient met sepsis criteria with tachycardia, tachypnea, and fever. S/p IVF 30 cc per kg. Blood cultures were sent. Patient was given one dose of IV ceftriaxone and IV azithromycin.   code status: discussed with daughter, patient is full code.        HISTORY OF PRESENT ILLNESS: As above. Patient is resting comfortable in bed in [facility]    MEDICATIONS  (STANDING):  apixaban 2.5 milliGRAM(s) Oral two times a day  aspirin  chewable 81 milliGRAM(s) Oral daily  azithromycin  IVPB      azithromycin  IVPB 500 milliGRAM(s) IV Intermittent once  cefTRIAXone   IVPB 1000 milliGRAM(s) IV Intermittent once  cefTRIAXone   IVPB      dronedarone 400 milliGRAM(s) Oral two times a day  furosemide    Tablet 20 milliGRAM(s) Oral daily  metoprolol tartrate 25 milliGRAM(s) Oral two times a day  pantoprazole    Tablet 40 milliGRAM(s) Oral before breakfast  simvastatin 40 milliGRAM(s) Oral at bedtime      MEDICATIONS  (PRN):      Allergies    No Known Allergies    Intolerances        PAST MEDICAL & SURGICAL HISTORY:  CHF (congestive heart failure)  Pulmonary HTN  History of complete heart block  Atrial fibrillation  Hypercholesterolemia  HTN (hypertension)  Artificial pacemaker      FAMILY HISTORY:  FH: coronary artery disease  FH: hypertension      SOCIAL HISTORY  Smoking History:     REVIEW OF SYSTEMS:    CONSTITUTIONAL:  No fevers, chills, sweats    HEENT:  Eyes:  No diplopia or blurred vision. ENT:  No earache, sore throat or runny nose.    CARDIOVASCULAR:  No pressure, squeezing, tightness, or heaviness about the chest; no palpitations.    RESPIRATORY:  Per HPI    GASTROINTESTINAL:  No abdominal pain, nausea, vomiting or diarrhea.    GENITOURINARY:  No dysuria, frequency or urgency.    NEUROLOGIC:  No paresthesias, fasciculations, seizures or weakness.    PSYCHIATRIC:  No disorder of thought or mood.    Vital Signs Last 24 Hrs  T(C): 37.4 (25 Mar 2020 10:45), Max: 39.2 (24 Mar 2020 23:08)  T(F): 99.3 (25 Mar 2020 10:45), Max: 102.5 (24 Mar 2020 23:08)  HR: 59 (25 Mar 2020 10:45) (59 - 119)  BP: 96/54 (25 Mar 2020 10:45) (94/61 - 191/88)  BP(mean): --  RR: 19 (25 Mar 2020 10:45) (18 - 25)  SpO2: 99% (25 Mar 2020 10:45) (75% - 100%)  I&O's Detail      PHYSICAL EXAMINATION:    GENERAL: The patient is a well-developed, well-nourished _____in no apparent distress.     HEENT: Head is normocephalic and atraumatic. Extraocular muscles are intact. Mucous membranes are moist.     NECK: Supple.     LUNGS: Bilateral rales    HEART: Regular rate and rhythm without murmur.    ABDOMEN: Soft, nontender, and nondistended.  No hepatosplenomegaly is noted.    EXTREMITIES: Without any cyanosis, clubbing, rash, lesions or edema.    NEUROLOGIC: Grossly intact.      LABS:                        9.4    4.59  )-----------( 157      ( 24 Mar 2020 14:49 )             29.2     03-24    138  |  100  |  13  ----------------------------<  102<H>  3.9   |  32<H>  |  1.28    Ca    8.8      24 Mar 2020 14:49    TPro  6.7  /  Alb  2.9<L>  /  TBili  0.3  /  DBili  x   /  AST  29  /  ALT  22  /  AlkPhos  43  03-24    PT/INR - ( 24 Mar 2020 14:49 )   PT: 17.4 sec;   INR: 1.52 ratio         PTT - ( 24 Mar 2020 14:49 )  PTT:31.8 sec  Urinalysis Basic - ( 25 Mar 2020 05:37 )    Color: Yellow / Appearance: Clear / S.015 / pH: x  Gluc: x / Ketone: Negative  / Bili: Negative / Urobili: Negative   Blood: x / Protein: 30 mg/dL / Nitrite: Negative   Leuk Esterase: Negative / RBC: 0-2 /HPF / WBC 0-2 /HPF   Sq Epi: x / Non Sq Epi: Few /HPF / Bacteria: Few /HPF      COVID-19 PCR . (20 @ 18:22)    COVID-19 PCR: Detected: LDT - Laboratory Developed Test All “detected” results on this new test  are considered presumptively positive results, are clinically actionable,  and specimens will be forwarded to CDC for confirmation testing.  Another report (corrected report) will only be issued if discordant  results occur.  This test has been validated by [employer] to be accurate;  though it has not been FDA cleared/approved by the usual pathway.  As with all laboratory tests, results should be correlated with clinical  findings.      Lactate, Blood: 1.6 mmol/L (20 @ 14:49)        MICROBIOLOGY:    RADIOLOGY & ADDITIONAL STUDIES:    CXR:  < from: Xray Chest 1 View AP/PA (20 @ 13:28) >  IMPRESSION: Diffuse interstitial infiltration essentially new since . It is symmetric.    < end of copied text >      Ct scan chest:  < from: CT Chest No Cont (20 @ 14:57) >  IMPRESSION:     Patchy/nodular opacities in the left lower lobe, likely infectious in etiology. 6 week follow-up chest CT is recommended.    Enlarged, heterogeneous thyroid with multiple thyroid nodules, as described above. Correlate with nonemergent thyroid ultrasound if clinically warranted.    Additional findings as above.      < end of copied text >    ekg;    echo:

## 2020-03-25 NOTE — CONSULT NOTE ADULT - SUBJECTIVE AND OBJECTIVE BOX
HPI:  89 years old Armenian speaking female, from home, lives with daughter, THANIA&Ox3 with pmhx CHF, afib (on Eliquis), complete heart block s/p pacemaker implant, HTN, pulmonary HTN, HLD, and mild dementia who presented with fever and shortness of breath which is worsening for last few days. Patient was admitted to [facility] 3 weeks ago with pneumonia secondary to influenza A virus and was treated with antibiotics and discharged home. Patient was in ED last week for suspected C diff diarrhea for which patient was prescribed oral vancomycin.  Patient is being admitted for bilateral pneumonia likely viral with bilateral opacities. Patient met sepsis criteria with tachycardia, tachypnea, and fever. S/p IVF 30 cc per kg. Blood cultures were sent. Patient was given one dose of IV ceftriaxone and IV azithromycin.   code status: discussed with daughter, patient is full code. (24 Mar 2020 19:38)      PAST MEDICAL & SURGICAL HISTORY:  CHF (congestive heart failure)  Pulmonary HTN  History of complete heart block  Atrial fibrillation  Hypercholesterolemia  HTN (hypertension)  Artificial pacemaker      No Known Allergies      Meds:  apixaban 2.5 milliGRAM(s) Oral two times a day  aspirin  chewable 81 milliGRAM(s) Oral daily  cefTRIAXone   IVPB      dronedarone 400 milliGRAM(s) Oral two times a day  furosemide    Tablet 20 milliGRAM(s) Oral daily  metoprolol tartrate 25 milliGRAM(s) Oral two times a day  pantoprazole    Tablet 40 milliGRAM(s) Oral before breakfast  simvastatin 40 milliGRAM(s) Oral at bedtime      SOCIAL HISTORY:  Smoker:  YES / NO        PACK YEARS:                         WHEN QUIT?  ETOH use:  YES / NO               FREQUENCY / QUANTITY:  Ilicit Drug use:  YES / NO  Occupation:  Assisted device use (Cane / Walker):  Live with:    FAMILY HISTORY:  FH: coronary artery disease  FH: hypertension      VITALS:  Vital Signs Last 24 Hrs  T(C): 37.4 (25 Mar 2020 10:45), Max: 39.2 (24 Mar 2020 23:08)  T(F): 99.3 (25 Mar 2020 10:45), Max: 102.5 (24 Mar 2020 23:08)  HR: 59 (25 Mar 2020 10:45) (59 - 119)  BP: 96/54 (25 Mar 2020 10:45) (94/61 - 191/88)  BP(mean): --  RR: 19 (25 Mar 2020 10:45) (18 - 25)  SpO2: 99% (25 Mar 2020 10:45) (75% - 100%)    LABS/DIAGNOSTIC TESTS:                          9.4    4.59  )-----------( 157      ( 24 Mar 2020 14:49 )             29.2     WBC Count: 4.59 K/uL ( @ 14:49)          138  |  100  |  13  ----------------------------<  102<H>  3.9   |  32<H>  |  1.28    Ca    8.8      24 Mar 2020 14:49    TPro  6.7  /  Alb  2.9<L>  /  TBili  0.3  /  DBili  x   /  AST  29  /  ALT  22  /  AlkPhos  43        Urinalysis Basic - ( 25 Mar 2020 05:37 )    Color: Yellow / Appearance: Clear / S.015 / pH: x  Gluc: x / Ketone: Negative  / Bili: Negative / Urobili: Negative   Blood: x / Protein: 30 mg/dL / Nitrite: Negative   Leuk Esterase: Negative / RBC: 0-2 /HPF / WBC 0-2 /HPF   Sq Epi: x / Non Sq Epi: Few /HPF / Bacteria: Few /HPF        LIVER FUNCTIONS - ( 24 Mar 2020 14:49 )  Alb: 2.9 g/dL / Pro: 6.7 g/dL / ALK PHOS: 43 U/L / ALT: 22 U/L DA / AST: 29 U/L / GGT: x             PT/INR - ( 24 Mar 2020 14:49 )   PT: 17.4 sec;   INR: 1.52 ratio         PTT - ( 24 Mar 2020 14:49 )  PTT:31.8 sec    LACTATE:Lactate, Blood: 1.6 mmol/L ( @ 14:49)      ABG -     CULTURES:   .Urine   @ 00:42   No growth  --  --      .Blood   @ 18:28   No growth at 5 days.  --  --          RADIOLOGY:< from: Xray Chest 1 View AP/PA (20 @ 13:28) >  EXAM:  XR CHEST AP OR PA 1V                            PROCEDURE DATE:  2020          INTERPRETATION:  AP semisupine chest on 2020 at 12:54 PM.    Heart is magnified by technique.    Left-sided pacemaker is again noted.    The abovefindings are similar to  of this year.    The present film shows diffuse symmetrically increased interstitial markings throughout both lung fields which more suggests CHF than infection..    IMPRESSION: Diffuse interstitial infiltration essentially new since . It is symmetric.                MED BOYCE M.D., ATTENDING RADIOLOGIST  This document has been electronically signed. Mar 24 2020  1:35PM          < end of copied text >        ROS  [  ] UNABLE TO ELICIT HPI:  89 years old Barbadian speaking female, from home, lives with daughter, THANIA&Ox3 with pmhx CHF, afib (on Eliquis), complete heart block s/p pacemaker implant, HTN, pulmonary HTN, HLD, and mild dementia who presented with fever and shortness of breath which is worsening for last few days. Patient was admitted to [facility] 3 weeks ago with pneumonia secondary to influenza A virus and was treated with antibiotics and discharged home. Patient was in ED last week for suspected C diff diarrhea for which patient was prescribed oral vancomycin.  Patient is being admitted for bilateral pneumonia likely viral with bilateral opacities. Patient met sepsis criteria with tachycardia, tachypnea, and fever. S/p IVF 30 cc per kg. Blood cultures were sent. Patient was given one dose of IV ceftriaxone and IV azithromycin.   code status: discussed with daughter, patient is full code. (24 Mar 2020 19:38)    History as above, pt who was admitted         PAST MEDICAL & SURGICAL HISTORY:  CHF (congestive heart failure)  Pulmonary HTN  History of complete heart block  Atrial fibrillation  Hypercholesterolemia  HTN (hypertension)  Artificial pacemaker      No Known Allergies      Meds:  apixaban 2.5 milliGRAM(s) Oral two times a day  aspirin  chewable 81 milliGRAM(s) Oral daily  cefTRIAXone   IVPB      dronedarone 400 milliGRAM(s) Oral two times a day  furosemide    Tablet 20 milliGRAM(s) Oral daily  metoprolol tartrate 25 milliGRAM(s) Oral two times a day  pantoprazole    Tablet 40 milliGRAM(s) Oral before breakfast  simvastatin 40 milliGRAM(s) Oral at bedtime      SOCIAL HISTORY:  Smoker:  no  ETOH use:  no    FAMILY HISTORY:  FH: coronary artery disease  FH: hypertension      VITALS:  Vital Signs Last 24 Hrs  T(C): 37.4 (25 Mar 2020 10:45), Max: 39.2 (24 Mar 2020 23:08)  T(F): 99.3 (25 Mar 2020 10:45), Max: 102.5 (24 Mar 2020 23:08)  HR: 59 (25 Mar 2020 10:45) (59 - 119)  BP: 96/54 (25 Mar 2020 10:45) (94/61 - 191/88)  BP(mean): --  RR: 19 (25 Mar 2020 10:45) (18 - 25)  SpO2: 99% (25 Mar 2020 10:45) (75% - 100%)    LABS/DIAGNOSTIC TESTS:                          9.4    4.59  )-----------( 157      ( 24 Mar 2020 14:49 )             29.2     WBC Count: 4.59 K/uL ( @ 14:49)          138  |  100  |  13  ----------------------------<  102<H>  3.9   |  32<H>  |  1.28    Ca    8.8      24 Mar 2020 14:49    TPro  6.7  /  Alb  2.9<L>  /  TBili  0.3  /  DBili  x   /  AST  29  /  ALT  22  /  AlkPhos  43        Urinalysis Basic - ( 25 Mar 2020 05:37 )    Color: Yellow / Appearance: Clear / S.015 / pH: x  Gluc: x / Ketone: Negative  / Bili: Negative / Urobili: Negative   Blood: x / Protein: 30 mg/dL / Nitrite: Negative   Leuk Esterase: Negative / RBC: 0-2 /HPF / WBC 0-2 /HPF   Sq Epi: x / Non Sq Epi: Few /HPF / Bacteria: Few /HPF        LIVER FUNCTIONS - ( 24 Mar 2020 14:49 )  Alb: 2.9 g/dL / Pro: 6.7 g/dL / ALK PHOS: 43 U/L / ALT: 22 U/L DA / AST: 29 U/L / GGT: x             PT/INR - ( 24 Mar 2020 14:49 )   PT: 17.4 sec;   INR: 1.52 ratio         PTT - ( 24 Mar 2020 14:49 )  PTT:31.8 sec    LACTATE:Lactate, Blood: 1.6 mmol/L ( @ 14:49)      ABG -     CULTURES:   .Urine   @ 00:42   No growth  --  --      .Blood   @ 18:28   No growth at 5 days.  --  --          RADIOLOGY:< from: Xray Chest 1 View AP/PA (20 @ 13:28) >  EXAM:  XR CHEST AP OR PA 1V                            PROCEDURE DATE:  2020          INTERPRETATION:  AP semisupine chest on 2020 at 12:54 PM.    Heart is magnified by technique.    Left-sided pacemaker is again noted.    The abovefindings are similar to  of this year.    The present film shows diffuse symmetrically increased interstitial markings throughout both lung fields which more suggests CHF than infection..    IMPRESSION: Diffuse interstitial infiltration essentially new since . It is symmetric.                MED BOYCE M.D., ATTENDING RADIOLOGIST  This document has been electronically signed. Mar 24 2020  1:35PM          < end of copied text >        ROS  [  ] UNABLE TO ELICIT HPI:  89 years old Senegalese speaking female, from home, lives with daughter, THANIA&Ox3 with pmhx CHF, afib (on Eliquis), complete heart block s/p pacemaker implant, HTN, pulmonary HTN, HLD, and mild dementia who presented with fever and shortness of breath which is worsening for last few days. Patient was admitted to [facility] 3 weeks ago with pneumonia secondary to influenza A virus and was treated with antibiotics and discharged home. Patient was in ED last week for suspected C diff diarrhea for which patient was prescribed oral vancomycin.  Patient is being admitted for bilateral pneumonia likely viral with bilateral opacities. Patient met sepsis criteria with tachycardia, tachypnea, and fever. S/p IVF 30 cc per kg. Blood cultures were sent. Patient was given one dose of IV ceftriaxone and IV azithromycin.   code status: discussed with daughter, patient is full code. (24 Mar 2020 19:38)    History as above, pt who was admitted  with coughing , SOB, fevers and tachycardia and tachypnea and found to have bilat infiltrates on CXR , she was here recently with Influenza A infection. She is elderly and was given Rocephin and azithromycin x 1 dose each , she has been having high fevers upto 102.5. She has paroxysmal A. Fib and is on Multaq which cross reacts with Hydroxychloroquine and so spoke with Dr Harrell and states we can hold multaq for now as she has PAF.      PAST MEDICAL & SURGICAL HISTORY:  CHF (congestive heart failure)  Pulmonary HTN  History of complete heart block  Atrial fibrillation  Hypercholesterolemia  HTN (hypertension)  Artificial pacemaker      No Known Allergies      Meds:  apixaban 2.5 milliGRAM(s) Oral two times a day  aspirin  chewable 81 milliGRAM(s) Oral daily  cefTRIAXone   IVPB      dronedarone 400 milliGRAM(s) Oral two times a day  furosemide    Tablet 20 milliGRAM(s) Oral daily  metoprolol tartrate 25 milliGRAM(s) Oral two times a day  pantoprazole    Tablet 40 milliGRAM(s) Oral before breakfast  simvastatin 40 milliGRAM(s) Oral at bedtime      SOCIAL HISTORY:  Smoker:  no  ETOH use:  no    FAMILY HISTORY:  FH: coronary artery disease  FH: hypertension      VITALS:  Vital Signs Last 24 Hrs  T(C): 37.4 (25 Mar 2020 10:45), Max: 39.2 (24 Mar 2020 23:08)  T(F): 99.3 (25 Mar 2020 10:45), Max: 102.5 (24 Mar 2020 23:08)  HR: 59 (25 Mar 2020 10:45) (59 - 119)  BP: 96/54 (25 Mar 2020 10:45) (94/61 - 191/88)  BP(mean): --  RR: 19 (25 Mar 2020 10:45) (18 - 25)  SpO2: 99% (25 Mar 2020 10:45) (75% - 100%)    LABS/DIAGNOSTIC TESTS:                          9.4    4.59  )-----------( 157      ( 24 Mar 2020 14:49 )             29.2     WBC Count: 4.59 K/uL ( @ 14:49)          138  |  100  |  13  ----------------------------<  102<H>  3.9   |  32<H>  |  1.28    Ca    8.8      24 Mar 2020 14:49    TPro  6.7  /  Alb  2.9<L>  /  TBili  0.3  /  DBili  x   /  AST  29  /  ALT  22  /  AlkPhos  43        Urinalysis Basic - ( 25 Mar 2020 05:37 )    Color: Yellow / Appearance: Clear / S.015 / pH: x  Gluc: x / Ketone: Negative  / Bili: Negative / Urobili: Negative   Blood: x / Protein: 30 mg/dL / Nitrite: Negative   Leuk Esterase: Negative / RBC: 0-2 /HPF / WBC 0-2 /HPF   Sq Epi: x / Non Sq Epi: Few /HPF / Bacteria: Few /HPF        LIVER FUNCTIONS - ( 24 Mar 2020 14:49 )  Alb: 2.9 g/dL / Pro: 6.7 g/dL / ALK PHOS: 43 U/L / ALT: 22 U/L DA / AST: 29 U/L / GGT: x             PT/INR - ( 24 Mar 2020 14:49 )   PT: 17.4 sec;   INR: 1.52 ratio         PTT - ( 24 Mar 2020 14:49 )  PTT:31.8 sec    LACTATE:Lactate, Blood: 1.6 mmol/L ( @ 14:49)      ABG -     CULTURES:   .Urine   @ 00:42   No growth  --  --      .Blood  03-02 @ 18:28   No growth at 5 days.  --  --          RADIOLOGY:< from: Xray Chest 1 View AP/PA (20 @ 13:28) >  EXAM:  XR CHEST AP OR PA 1V                            PROCEDURE DATE:  2020          INTERPRETATION:  AP semisupine chest on 2020 at 12:54 PM.    Heart is magnified by technique.    Left-sided pacemaker is again noted.    The abovefindings are similar to  of this year.    The present film shows diffuse symmetrically increased interstitial markings throughout both lung fields which more suggests CHF than infection..    IMPRESSION: Diffuse interstitial infiltration essentially new since . It is symmetric.                MED BOYCE M.D., ATTENDING RADIOLOGIST  This document has been electronically signed. Mar 24 2020  1:35PM          < end of copied text >        ROS  [  ] UNABLE TO ELICIT

## 2020-03-25 NOTE — PROGRESS NOTE ADULT - ASSESSMENT
89 yr old female with PMHX of PAF,HTN,CRI,Lipid d/os/p PPM here with sepsis due to  COVID+ and pneumonia.  1.Pneumonia and COVID+-ABX as per ID.  2.PAF-Multaq,lopressor,Eliquis.  3.HTN-cont bp medication.  4.Lipid d/o-statin.  5.CRI-f/u lytes.  6.Pulmonary eval-p.  7.PPI.

## 2020-03-25 NOTE — PROGRESS NOTE ADULT - SUBJECTIVE AND OBJECTIVE BOX
CARDIOLOGY/MEDICAL ATTENDING    CHIEF COMPLAINT:Patient is a 89y old  Female who presents with a chief complaint of shortness of breath .Pt feeling better today.    	  REVIEW OF SYSTEMS:  CONSTITUTIONAL: No fever, weight loss, or fatigue  EYES: No eye pain, visual disturbances, or discharge  ENT:  No difficulty hearing, tinnitus, vertigo; No sinus or throat pain  NECK: No pain or stiffness  RESPIRATORY: No cough, wheezing, chills or hemoptysis; No Shortness of Breath  CARDIOVASCULAR: No chest pain, palpitations, passing out, dizziness, or leg swelling  GASTROINTESTINAL: No abdominal or epigastric pain. No nausea, vomiting, or hematemesis; No diarrhea or constipation. No melena or hematochezia.  GENITOURINARY: No dysuria, frequency, hematuria, or incontinence  NEUROLOGICAL: No headaches, memory loss, loss of strength, numbness, or tremors  SKIN: No itching, burning, rashes, or lesions   LYMPH Nodes: No enlarged glands  ENDOCRINE: No heat or cold intolerance; No hair loss  MUSCULOSKELETAL: No joint pain or swelling; No muscle, back, or extremity pain  PSYCHIATRIC: No depression, anxiety, mood swings, or difficulty sleeping  HEME/LYMPH: No easy bruising, or bleeding gums  ALLERGY AND IMMUNOLOGIC: No hives or eczema	        PHYSICAL EXAM:  T(C): 36.9 (03-25-20 @ 10:08), Max: 39.2 (03-24-20 @ 23:08)  HR: 59 (03-25-20 @ 10:08) (59 - 119)  BP: 121/54 (03-25-20 @ 10:08) (94/61 - 191/88)  RR: 18 (03-25-20 @ 07:00) (18 - 25)  SpO2: 100% (03-25-20 @ 10:08) (75% - 100%)        Appearance: Normal	  HEENT:   Normal oral mucosa, PERRL, EOMI	  Lymphatic: No lymphadenopathy  Cardiovascular: Normal S1 S2, No JVD, No murmurs, No edema  Respiratory:B/L ronchi  Psychiatry: A & O x 3, Mood & affect appropriate  Gastrointestinal:  Soft, Non-tender, + BS	  Skin: No rashes, No ecchymoses, No cyanosis	  Neurologic: Non-focal  Extremities: Normal range of motion, No clubbing, cyanosis or edema  Vascular: Peripheral pulses palpable 2+ bilaterally    MEDICATIONS  (STANDING):  apixaban 2.5 milliGRAM(s) Oral two times a day  aspirin  chewable 81 milliGRAM(s) Oral daily  dronedarone 400 milliGRAM(s) Oral two times a day  furosemide    Tablet 20 milliGRAM(s) Oral daily  metoprolol tartrate 25 milliGRAM(s) Oral two times a day  simvastatin 40 milliGRAM(s) Oral at bedtime    LABS:	 	                     9.4    4.59  )-----------( 157      ( 24 Mar 2020 14:49 )             29.2     03-24    138  |  100  |  13  ----------------------------<  102<H>  3.9   |  32<H>  |  1.28    Ca    8.8      24 Mar 2020 14:49    TPro  6.7  /  Alb  2.9<L>  /  TBili  0.3  /  DBili  x   /  AST  29  /  ALT  22  /  AlkPhos  43  03-24    proBNP: Serum Pro-Brain Natriuretic Peptide: 6674 pg/mL (03-02 @ 12:40)    COVID+.    OBSERVATIONS:  Mitral Valve: Moderate posterior mitral annular  calcification. Moderate to severe mitral regurgitation.  Aortic Root: Normal aortic root.  Aortic Valve: Calcified trileaflet aortic valve with  decreased opening. Peak transaortic valve gradient equals  22.5 mm Hg, estimated aortic valve area equals 1.2 sqcm (by  continuity equation), consistent with moderate aortic  stenosis. Trace aortic regurgitation.  Left Atrium: Normal left atrium.  LA volume index = 34  cc/m2.  Left Ventricle: Normal Left Ventricular Systolic Function,  (EF = 60% by biplane) Not all LV wall segments were seen.  Normal left ventricular internal dimensions and wall  thicknesses. Grade II diastolic dysfunction.  Right Heart: Normal right atrium. Normal right ventricular  size and systolic function (TAPSE 2.9 cm). A device lead is  visualized in the right heart. Normal tricuspid valve.  Moderate to severe tricuspid regurgitation. Pulmonic valve  not well seen. Mild pulmonic insufficiency is noted.  Pericardium/PleuraNo pericardial effusion.  Hemodynamic: RA Pressure is 8 mm Hg. RV systolic pressure  is moderately increased at  43 mm Hg.  	    EXAM:  XR CHEST AP OR PA 1V                            PROCEDURE DATE:  03/24/2020          INTERPRETATION:  AP semisupine chest on March 24, 2020 at 12:54 PM.    Heart is magnified by technique.    Left-sided pacemaker is again noted.    The abovefindings are similar to March 2 of this year.    The present film shows diffuse symmetrically increased interstitial markings throughout both lung fields which more suggests CHF than infection..    IMPRESSION: Diffuse interstitial infiltration essentially new since March 2. It is symmetric.

## 2020-03-25 NOTE — CONSULT NOTE ADULT - ASSESSMENT
1. Covid-19 Positive  CT chest noted.   CXR noted.   Abx  Supportive care.   Monitor labs  Monitor SpO2 & temp    2. Sepsis  As above  ID eval.     3. PAF  Continue meds  Cardio F/U   DVT and GI PPX     4. Pulmonary HTN   By hx  Continue meds  Bronchodilators PRN     5. Asthma   Ongoing treatment for Covid-19  Continue meds.   Monitor SpO2 1. Covid-19 Positive  CT chest noted.   CXR noted.   Abx  Supportive care.   Monitor labs  Monitor SpO2 & temp    2. Sepsis  As above  ID eval.     3. PAF  Continue meds  Cardio F/U   DVT and GI PPX     4. Pulmonary HTN   By hx  Continue meds  Bronchodilators PRN     5. Asthma   Ongoing treatment for Covid-19  Trial fo Bronchodilators prn  Oxygen supp   Monitor SpO2

## 2020-03-26 LAB
ANION GAP SERPL CALC-SCNC: 4 MMOL/L — LOW (ref 5–17)
BUN SERPL-MCNC: 14 MG/DL — SIGNIFICANT CHANGE UP (ref 7–18)
CALCIUM SERPL-MCNC: 8.4 MG/DL — SIGNIFICANT CHANGE UP (ref 8.4–10.5)
CHLORIDE SERPL-SCNC: 104 MMOL/L — SIGNIFICANT CHANGE UP (ref 96–108)
CHOLEST SERPL-MCNC: 106 MG/DL — SIGNIFICANT CHANGE UP (ref 10–199)
CO2 SERPL-SCNC: 34 MMOL/L — HIGH (ref 22–31)
CREAT SERPL-MCNC: 0.92 MG/DL — SIGNIFICANT CHANGE UP (ref 0.5–1.3)
GLUCOSE SERPL-MCNC: 86 MG/DL — SIGNIFICANT CHANGE UP (ref 70–99)
HBA1C BLD-MCNC: 5.6 % — SIGNIFICANT CHANGE UP (ref 4–5.6)
HCT VFR BLD CALC: 26.6 % — LOW (ref 34.5–45)
HDLC SERPL-MCNC: 36 MG/DL — LOW
HGB BLD-MCNC: 8.7 G/DL — LOW (ref 11.5–15.5)
LIPID PNL WITH DIRECT LDL SERPL: 58 MG/DL — SIGNIFICANT CHANGE UP
MAGNESIUM SERPL-MCNC: 2.2 MG/DL — SIGNIFICANT CHANGE UP (ref 1.6–2.6)
MCHC RBC-ENTMCNC: 32.3 PG — SIGNIFICANT CHANGE UP (ref 27–34)
MCHC RBC-ENTMCNC: 32.7 GM/DL — SIGNIFICANT CHANGE UP (ref 32–36)
MCV RBC AUTO: 98.9 FL — SIGNIFICANT CHANGE UP (ref 80–100)
NRBC # BLD: 0 /100 WBCS — SIGNIFICANT CHANGE UP (ref 0–0)
PLATELET # BLD AUTO: 137 K/UL — LOW (ref 150–400)
POTASSIUM SERPL-MCNC: 3.5 MMOL/L — SIGNIFICANT CHANGE UP (ref 3.5–5.3)
POTASSIUM SERPL-SCNC: 3.5 MMOL/L — SIGNIFICANT CHANGE UP (ref 3.5–5.3)
RBC # BLD: 2.69 M/UL — LOW (ref 3.8–5.2)
RBC # FLD: 12.7 % — SIGNIFICANT CHANGE UP (ref 10.3–14.5)
SODIUM SERPL-SCNC: 142 MMOL/L — SIGNIFICANT CHANGE UP (ref 135–145)
TOTAL CHOLESTEROL/HDL RATIO MEASUREMENT: 2.9 RATIO — LOW (ref 3.3–7.1)
TRIGL SERPL-MCNC: 61 MG/DL — SIGNIFICANT CHANGE UP (ref 10–149)
WBC # BLD: 3.54 K/UL — LOW (ref 3.8–10.5)
WBC # FLD AUTO: 3.54 K/UL — LOW (ref 3.8–10.5)

## 2020-03-26 RX ADMIN — APIXABAN 2.5 MILLIGRAM(S): 2.5 TABLET, FILM COATED ORAL at 17:22

## 2020-03-26 RX ADMIN — CEFTRIAXONE 100 MILLIGRAM(S): 500 INJECTION, POWDER, FOR SOLUTION INTRAMUSCULAR; INTRAVENOUS at 12:17

## 2020-03-26 RX ADMIN — SIMVASTATIN 40 MILLIGRAM(S): 20 TABLET, FILM COATED ORAL at 22:38

## 2020-03-26 RX ADMIN — SODIUM CHLORIDE 50 MILLILITER(S): 9 INJECTION, SOLUTION INTRAVENOUS at 06:53

## 2020-03-26 RX ADMIN — AZITHROMYCIN 500 MILLIGRAM(S): 500 TABLET, FILM COATED ORAL at 12:17

## 2020-03-26 RX ADMIN — Medication 200 MILLIGRAM(S): at 17:22

## 2020-03-26 RX ADMIN — Medication 81 MILLIGRAM(S): at 12:17

## 2020-03-26 RX ADMIN — Medication 25 MILLIGRAM(S): at 17:22

## 2020-03-26 NOTE — DIETITIAN INITIAL EVALUATION ADULT. - PERTINENT MEDS FT
MEDICATIONS  (STANDING):  apixaban 2.5 milliGRAM(s) Oral two times a day  aspirin  chewable 81 milliGRAM(s) Oral daily  azithromycin   Tablet 500 milliGRAM(s) Oral daily  cefTRIAXone   IVPB 1000 milliGRAM(s) IV Intermittent every 24 hours  cefTRIAXone   IVPB      furosemide    Tablet 20 milliGRAM(s) Oral daily  hydroxychloroquine   Oral   hydroxychloroquine 200 milliGRAM(s) Oral every 12 hours  metoprolol tartrate 25 milliGRAM(s) Oral two times a day  pantoprazole    Tablet 40 milliGRAM(s) Oral before breakfast  simvastatin 40 milliGRAM(s) Oral at bedtime

## 2020-03-26 NOTE — PROGRESS NOTE ADULT - ASSESSMENT
1. Covid-19 Positive  CT chest noted.   CXR noted.   Abx  Supportive care.   Monitor labs  Monitor SpO2 & temp    2. Sepsis  As above  ID follow up     3. PAF  Continue meds  Cardio F/U   DVT and GI PPX     4. Pulmonary HTN   By hx  CCB  oxygen supp  Bronchodilators PRN     5. Asthma   Ongoing treatment for Covid-19  Trial fo Bronchodilators prn  Oxygen supp  Pfts as OP   Monitor SpO2

## 2020-03-26 NOTE — PROGRESS NOTE ADULT - SUBJECTIVE AND OBJECTIVE BOX
Patient is a 89y old  Female who presents with a chief complaint of shortness of breath (26 Mar 2020 11:12)  awake, alert, comfortable in bed in NAD. No cough or sob at rest    INTERVAL HPI/OVERNIGHT EVENTS:      VITAL SIGNS:  T(F): 98.2 (20 @ 10:55)  HR: 88 (20 @ 10:55)  BP: 149/75 (20 @ 10:55)  RR: 18 (20 @ 10:55)  SpO2: 96% (20 @ 10:55)  Wt(kg): --  I&O's Detail          REVIEW OF SYSTEMS:    CONSTITUTIONAL:  No fevers, chills, sweats    HEENT:  Eyes:  No diplopia or blurred vision. ENT:  No earache, sore throat or runny nose.    CARDIOVASCULAR:  No pressure, squeezing, tightness, or heaviness about the chest; no palpitations.    RESPIRATORY:  Per HPI    GASTROINTESTINAL:  No abdominal pain, nausea, vomiting or diarrhea.    GENITOURINARY:  No dysuria, frequency or urgency.    NEUROLOGIC:  No paresthesias, fasciculations, seizures or weakness.    PSYCHIATRIC:  No disorder of thought or mood.      PHYSICAL EXAM:    Constitutional: Well developed and nourished  Eyes:Perrla  ENMT: normal  Neck:supple  Respiratory: good air entry  Cardiovascular: S1 S2 regular  Gastrointestinal: Soft, Non tender  Extremities: No edema  Vascular:normal  Neurological:Awake, alert,Ox3  Musculoskeletal:Normal      MEDICATIONS  (STANDING):  apixaban 2.5 milliGRAM(s) Oral two times a day  aspirin  chewable 81 milliGRAM(s) Oral daily  azithromycin   Tablet 500 milliGRAM(s) Oral daily  cefTRIAXone   IVPB 1000 milliGRAM(s) IV Intermittent every 24 hours  cefTRIAXone   IVPB      hydroxychloroquine   Oral   hydroxychloroquine 200 milliGRAM(s) Oral every 12 hours  metoprolol tartrate 25 milliGRAM(s) Oral two times a day  pantoprazole    Tablet 40 milliGRAM(s) Oral before breakfast  simvastatin 40 milliGRAM(s) Oral at bedtime    MEDICATIONS  (PRN):      Allergies    No Known Allergies    Intolerances        LABS:                        8.7    3.54  )-----------( 137      ( 26 Mar 2020 08:22 )             26.6         142  |  104  |  14  ----------------------------<  86  3.5   |  34<H>  |  0.92    Ca    8.4      26 Mar 2020 08:22  Mg     2.2     -    TPro  6.7  /  Alb  2.9<L>  /  TBili  0.3  /  DBili  x   /  AST  29  /  ALT  22  /  AlkPhos  43  03-24    PT/INR - ( 24 Mar 2020 14:49 )   PT: 17.4 sec;   INR: 1.52 ratio         PTT - ( 24 Mar 2020 14:49 )  PTT:31.8 sec  Urinalysis Basic - ( 25 Mar 2020 05:37 )    Color: Yellow / Appearance: Clear / S.015 / pH: x  Gluc: x / Ketone: Negative  / Bili: Negative / Urobili: Negative   Blood: x / Protein: 30 mg/dL / Nitrite: Negative   Leuk Esterase: Negative / RBC: 0-2 /HPF / WBC 0-2 /HPF   Sq Epi: x / Non Sq Epi: Few /HPF / Bacteria: Few /HPF            CAPILLARY BLOOD GLUCOSE            RADIOLOGY & ADDITIONAL TESTS:    CXR:  < from: Xray Chest 1 View AP/PA (20 @ 13:28) >  IMPRESSION: Diffuse interstitial infiltration essentially new since . It is symmetric.    < end of copied text >    Ct scan chest:    ekg;    echo:

## 2020-03-26 NOTE — PROGRESS NOTE ADULT - SUBJECTIVE AND OBJECTIVE BOX
CARDIOLOGY/MEDICAL ATTENDING    CHIEF COMPLAINT:Patient is a 89y old  Female who presents with a chief complaint of shortness of breath.Pt appears comfortable.    	  REVIEW OF SYSTEMS:  CONSTITUTIONAL: No fever, weight loss, or fatigue  EYES: No eye pain, visual disturbances, or discharge  ENT:  No difficulty hearing, tinnitus, vertigo; No sinus or throat pain  NECK: No pain or stiffness  RESPIRATORY: No cough, wheezing, chills or hemoptysis; No Shortness of Breath  CARDIOVASCULAR: No chest pain, palpitations, passing out, dizziness, or leg swelling  GASTROINTESTINAL: No abdominal or epigastric pain. No nausea, vomiting, or hematemesis; No diarrhea or constipation. No melena or hematochezia.  GENITOURINARY: No dysuria, frequency, hematuria, or incontinence  NEUROLOGICAL: No headaches, memory loss, loss of strength, numbness, or tremors  SKIN: No itching, burning, rashes, or lesions   LYMPH Nodes: No enlarged glands  ENDOCRINE: No heat or cold intolerance; No hair loss  MUSCULOSKELETAL: No joint pain or swelling; No muscle, back, or extremity pain  PSYCHIATRIC: No depression, anxiety, mood swings, or difficulty sleeping  HEME/LYMPH: No easy bruising, or bleeding gums  ALLERGY AND IMMUNOLOGIC: No hives or eczema	        PHYSICAL EXAM:  T(C): 37.2 (03-26-20 @ 05:28), Max: 37.4 (03-25-20 @ 10:45)  HR: 82 (03-26-20 @ 05:28) (59 - 103)  BP: 134/57 (03-26-20 @ 05:28) (96/54 - 145/58)  RR: 18 (03-26-20 @ 05:28) (18 - 19)  SpO2: 95% (03-26-20 @ 05:28) (95% - 99%)  Wt(kg): --  I&O's Summary      Appearance: Normal	  HEENT:   Normal oral mucosa, PERRL, EOMI	  Lymphatic: No lymphadenopathy  Cardiovascular: Normal S1 S2, No JVD, No murmurs, No edema  Respiratory: B/L ronchi  Psychiatry: A & O x 3, Mood & affect appropriate  Gastrointestinal:  Soft, Non-tender, + BS	  Skin: No rashes, No ecchymoses, No cyanosis	  Neurologic: Non-focal  Extremities: Normal range of motion, No clubbing, cyanosis or edema  Vascular: Peripheral pulses palpable 2+ bilaterally    MEDICATIONS  (STANDING):  apixaban 2.5 milliGRAM(s) Oral two times a day  aspirin  chewable 81 milliGRAM(s) Oral daily  azithromycin   Tablet 500 milliGRAM(s) Oral daily  cefTRIAXone   IVPB 1000 milliGRAM(s) IV Intermittent every 24 hours  cefTRIAXone   IVPB      hydroxychloroquine   Oral   hydroxychloroquine 200 milliGRAM(s) Oral every 12 hours  metoprolol tartrate 25 milliGRAM(s) Oral two times a day  pantoprazole    Tablet 40 milliGRAM(s) Oral before breakfast  simvastatin 40 milliGRAM(s) Oral at bedtime    	  	  LABS:	 	                         8.7    3.54  )-----------( 137      ( 26 Mar 2020 08:22 )             26.6     03-26    142  |  104  |  14  ----------------------------<  86  3.5   |  34<H>  |  0.92    Ca    8.4      26 Mar 2020 08:22  Mg     2.2     03-26    TPro  6.7  /  Alb  2.9<L>  /  TBili  0.3  /  DBili  x   /  AST  29  /  ALT  22  /  AlkPhos  43  03-24    proBNP: Serum Pro-Brain Natriuretic Peptide: 6674 pg/mL (03-02 @ 12:40)    Lipid Profile: Cholesterol 106  LDL 58  HDL 36  TG 61

## 2020-03-26 NOTE — DIETITIAN INITIAL EVALUATION ADULT. - PROBLEM/PLAN-3
spouse/PH , 5 steps to enter, + steps inside but pt states he does not have to negotiate DISPLAY PLAN FREE TEXT

## 2020-03-26 NOTE — PROGRESS NOTE ADULT - ASSESSMENT
89 years old Mozambican speaking female, from home, lives with daughter, THANIA&Gege3 with pmhx CHF, afib (on Eliquis), complete heart block s/p pacemaker implant, HTN, pulmonary HTN, HLD, and mild dementia who presented with fever and shortness of breath which is worsening for last few days. Patient was admitted to [facility] 3 weeks ago with pneumonia secondary to influenza A virus and was treated with antibiotics and discharged home. Patient was in ED last week for suspected C diff diarrhea for which patient was prescribed oral vancomycin.  Patient is being admitted for bilateral pneumonia likely viral with bilateral opacities. Patient met sepsis criteria with tachycardia, tachypnea, and fever. S/p IVF 30 cc per kg. Blood cultures were sent. Patient was given one dose of IV ceftriaxone and IV azithromycin.

## 2020-03-26 NOTE — PROGRESS NOTE ADULT - SUBJECTIVE AND OBJECTIVE BOX
PGY1 Note discussed with supervising resident and primary attending.    Patient is a 89y old  Female who presents with a chief complaint of shortness of breath (26 Mar 2020 10:07)    INTERVAL HPI/OVERNIGHT EVENTS:  - No acute events overnight  - Patient is hemodynamically stable and afebrile   - Tolerating soft diet without adverse events  - Speaking in complete and full sentences with her son  - Saturating at 95%+ on room air   - Normal bowel and urinary habits     MEDICATIONS  (STANDING):  apixaban 2.5 milliGRAM(s) Oral two times a day  aspirin  chewable 81 milliGRAM(s) Oral daily  azithromycin   Tablet 500 milliGRAM(s) Oral daily  cefTRIAXone   IVPB 1000 milliGRAM(s) IV Intermittent every 24 hours  cefTRIAXone   IVPB      hydroxychloroquine   Oral   hydroxychloroquine 200 milliGRAM(s) Oral every 12 hours  metoprolol tartrate 25 milliGRAM(s) Oral two times a day  pantoprazole    Tablet 40 milliGRAM(s) Oral before breakfast  simvastatin 40 milliGRAM(s) Oral at bedtime    MEDICATIONS  (PRN):    Allergies    No Known Allergies    Intolerances    REVIEW OF SYSTEMS:  CONSTITUTIONAL: No fever, weight loss, or fatigue  RESPIRATORY: No cough, wheezing, chills or hemoptysis; No shortness of breath  CARDIOVASCULAR: No chest pain, palpitations, dizziness, or leg swelling  GASTROINTESTINAL: No abdominal or epigastric pain. No nausea, vomiting, or hematemesis; No diarrhea or constipation. No melena or hematochezia.  NEUROLOGICAL: No headaches, memory loss, loss of strength, numbness, or tremors  SKIN: No itching, burning, rashes, or lesions     Vital Signs Last 24 Hrs  T(C): 36.8 (26 Mar 2020 10:55), Max: 37.2 (26 Mar 2020 05:28)  T(F): 98.2 (26 Mar 2020 10:55), Max: 98.9 (26 Mar 2020 05:28)  HR: 88 (26 Mar 2020 10:55) (59 - 103)  BP: 149/75 (26 Mar 2020 10:55) (100/45 - 149/75)  BP(mean): --  RR: 18 (26 Mar 2020 10:55) (18 - 18)  SpO2: 96% (26 Mar 2020 10:55) (95% - 99%)    PHYSICAL EXAM:  GENERAL: NAD, well-groomed, well-developed  HEAD:  Atraumatic, Normocephalic  EYES: EOMI, PERRLA, conjunctiva and sclera clear  NECK: Supple, No JVD, Normal thyroid  CHEST/LUNG: Clear to percussion bilaterally; No rales, rhonchi, wheezing, or rubs  HEART: Regular rate and rhythm; No murmurs, rubs, or gallops  ABDOMEN: Soft, Nontender, Nondistended; Bowel sounds present  NERVOUS SYSTEM:  Alert & Oriented X3, Good concentration; Motor Strength 5/5 B/L   EXTREMITIES:  2+ Peripheral Pulses, No clubbing, cyanosis, or edema  SKIN: Warm, Dry    LABS:                        8.7    3.54  )-----------( 137      ( 26 Mar 2020 08:22 )             26.6     03-    142  |  104  |  14  ----------------------------<  86  3.5   |  34<H>  |  0.92    Ca    8.4      26 Mar 2020 08:22  Mg     2.2     -    TPro  6.7  /  Alb  2.9<L>  /  TBili  0.3  /  DBili  x   /  AST  29  /  ALT  22  /  AlkPhos  43  03-24    PT/INR - ( 24 Mar 2020 14:49 )   PT: 17.4 sec;   INR: 1.52 ratio       PTT - ( 24 Mar 2020 14:49 )  PTT:31.8 sec  Urinalysis Basic - ( 25 Mar 2020 05:37 )    Color: Yellow / Appearance: Clear / S.015 / pH: x  Gluc: x / Ketone: Negative  / Bili: Negative / Urobili: Negative   Blood: x / Protein: 30 mg/dL / Nitrite: Negative   Leuk Esterase: Negative / RBC: 0-2 /HPF / WBC 0-2 /HPF   Sq Epi: x / Non Sq Epi: Few /HPF / Bacteria: Few /HPF    CAPILLARY BLOOD GLUCOSE    RADIOLOGY & ADDITIONAL TESTS:    Imaging Personally Reviewed:  [ ] YES  [ ] NO    Consultant(s) Notes Reviewed:  [ ] YES  [ ] NO

## 2020-03-26 NOTE — DIETITIAN INITIAL EVALUATION ADULT. - NS FNS WEIGHT USED FOR CALC
ideal/Ht=5' 2"   XVG=015 lb   Admission om=836 lb  Current qc=552.1 lb 3/26/2020   BMI=17.5    Wt history data from [facility] reviewed: 164.2 lb 4/6/2020; 155.5 lb 3/5/2020; 164 lb 3/24/2020; 150.1 lb 3/26/2020, a bit fluctuated may due to scale variance

## 2020-03-26 NOTE — PROGRESS NOTE ADULT - ASSESSMENT
89 yr old female with PMHX of PAF,HTN,CRI,Lipid d/os/p PPM here with sepsis due to  COVID+ and pneumonia.  1.Pneumonia and COVID+-ABX as per ID.  2.PAF-Multaq on hold due to interaction with hydroxychloroquine ,lopressor,Eliquis.  3.HTN-cont bp medication.  4.Lipid d/o-statin.  5.CRI-f/u lytes.  6.Pulmonary eval appreciated.  7.PPI.

## 2020-03-26 NOTE — DIETITIAN INITIAL EVALUATION ADULT. - PERTINENT LABORATORY DATA
03-26 Na142 mmol/L Glu 86 mg/dL K+ 3.5 mmol/L Cr  0.92 mg/dL BUN 14 mg/dL     03-24 Alb 2.9 g/dL<L>       03-26 Chol 106 mg/dL LDL 58 mg/dL HDL 36 mg/dL<L> Trig 61 mg/dL

## 2020-03-26 NOTE — DIETITIAN INITIAL EVALUATION ADULT. - OTHER INFO
Pt lives home with family, confused, COVID19 +iv, in airborne/contact isolation room; s/p RRT 3/24/20 due to hypoxia; Discussed with MD/RN, NPO x 2 to 3d, pending swallow evaluation, no GI distress reported at present

## 2020-03-27 ENCOUNTER — TRANSCRIPTION ENCOUNTER (OUTPATIENT)
Age: 85
End: 2020-03-27

## 2020-03-27 VITALS
DIASTOLIC BLOOD PRESSURE: 66 MMHG | HEART RATE: 102 BPM | TEMPERATURE: 100 F | OXYGEN SATURATION: 96 % | SYSTOLIC BLOOD PRESSURE: 128 MMHG | RESPIRATION RATE: 16 BRPM

## 2020-03-27 LAB
ALBUMIN SERPL ELPH-MCNC: 2.4 G/DL — LOW (ref 3.5–5)
ALP SERPL-CCNC: 49 U/L — SIGNIFICANT CHANGE UP (ref 40–120)
ALT FLD-CCNC: 52 U/L DA — SIGNIFICANT CHANGE UP (ref 10–60)
ANION GAP SERPL CALC-SCNC: 6 MMOL/L — SIGNIFICANT CHANGE UP (ref 5–17)
AST SERPL-CCNC: 69 U/L — HIGH (ref 10–40)
BASOPHILS # BLD AUTO: 0.02 K/UL — SIGNIFICANT CHANGE UP (ref 0–0.2)
BASOPHILS NFR BLD AUTO: 0.5 % — SIGNIFICANT CHANGE UP (ref 0–2)
BILIRUB SERPL-MCNC: 0.5 MG/DL — SIGNIFICANT CHANGE UP (ref 0.2–1.2)
BUN SERPL-MCNC: 11 MG/DL — SIGNIFICANT CHANGE UP (ref 7–18)
CALCIUM SERPL-MCNC: 8.6 MG/DL — SIGNIFICANT CHANGE UP (ref 8.4–10.5)
CHLORIDE SERPL-SCNC: 103 MMOL/L — SIGNIFICANT CHANGE UP (ref 96–108)
CO2 SERPL-SCNC: 31 MMOL/L — SIGNIFICANT CHANGE UP (ref 22–31)
CREAT SERPL-MCNC: 0.82 MG/DL — SIGNIFICANT CHANGE UP (ref 0.5–1.3)
EOSINOPHIL # BLD AUTO: 0.06 K/UL — SIGNIFICANT CHANGE UP (ref 0–0.5)
EOSINOPHIL NFR BLD AUTO: 1.5 % — SIGNIFICANT CHANGE UP (ref 0–6)
GLUCOSE SERPL-MCNC: 103 MG/DL — HIGH (ref 70–99)
HCT VFR BLD CALC: 30.7 % — LOW (ref 34.5–45)
HGB BLD-MCNC: 10.1 G/DL — LOW (ref 11.5–15.5)
IMM GRANULOCYTES NFR BLD AUTO: 0.2 % — SIGNIFICANT CHANGE UP (ref 0–1.5)
LYMPHOCYTES # BLD AUTO: 0.98 K/UL — LOW (ref 1–3.3)
LYMPHOCYTES # BLD AUTO: 24.1 % — SIGNIFICANT CHANGE UP (ref 13–44)
MAGNESIUM SERPL-MCNC: 1.9 MG/DL — SIGNIFICANT CHANGE UP (ref 1.6–2.6)
MCHC RBC-ENTMCNC: 32.3 PG — SIGNIFICANT CHANGE UP (ref 27–34)
MCHC RBC-ENTMCNC: 32.9 GM/DL — SIGNIFICANT CHANGE UP (ref 32–36)
MCV RBC AUTO: 98.1 FL — SIGNIFICANT CHANGE UP (ref 80–100)
MONOCYTES # BLD AUTO: 0.37 K/UL — SIGNIFICANT CHANGE UP (ref 0–0.9)
MONOCYTES NFR BLD AUTO: 9.1 % — SIGNIFICANT CHANGE UP (ref 2–14)
NEUTROPHILS # BLD AUTO: 2.62 K/UL — SIGNIFICANT CHANGE UP (ref 1.8–7.4)
NEUTROPHILS NFR BLD AUTO: 64.6 % — SIGNIFICANT CHANGE UP (ref 43–77)
NRBC # BLD: 0 /100 WBCS — SIGNIFICANT CHANGE UP (ref 0–0)
PHOSPHATE SERPL-MCNC: 1.8 MG/DL — LOW (ref 2.5–4.5)
PLATELET # BLD AUTO: 165 K/UL — SIGNIFICANT CHANGE UP (ref 150–400)
POTASSIUM SERPL-MCNC: 3.2 MMOL/L — LOW (ref 3.5–5.3)
POTASSIUM SERPL-SCNC: 3.2 MMOL/L — LOW (ref 3.5–5.3)
PROT SERPL-MCNC: 6.2 G/DL — SIGNIFICANT CHANGE UP (ref 6–8.3)
RAPID RVP RESULT: SIGNIFICANT CHANGE UP
RBC # BLD: 3.13 M/UL — LOW (ref 3.8–5.2)
RBC # FLD: 12.4 % — SIGNIFICANT CHANGE UP (ref 10.3–14.5)
SODIUM SERPL-SCNC: 140 MMOL/L — SIGNIFICANT CHANGE UP (ref 135–145)
WBC # BLD: 4.06 K/UL — SIGNIFICANT CHANGE UP (ref 3.8–10.5)
WBC # FLD AUTO: 4.06 K/UL — SIGNIFICANT CHANGE UP (ref 3.8–10.5)

## 2020-03-27 PROCEDURE — 85610 PROTHROMBIN TIME: CPT

## 2020-03-27 PROCEDURE — 87040 BLOOD CULTURE FOR BACTERIA: CPT

## 2020-03-27 PROCEDURE — 82962 GLUCOSE BLOOD TEST: CPT

## 2020-03-27 PROCEDURE — 93005 ELECTROCARDIOGRAM TRACING: CPT

## 2020-03-27 PROCEDURE — 71045 X-RAY EXAM CHEST 1 VIEW: CPT

## 2020-03-27 PROCEDURE — 83735 ASSAY OF MAGNESIUM: CPT

## 2020-03-27 PROCEDURE — U0001: CPT

## 2020-03-27 PROCEDURE — 85027 COMPLETE CBC AUTOMATED: CPT

## 2020-03-27 PROCEDURE — 84100 ASSAY OF PHOSPHORUS: CPT

## 2020-03-27 PROCEDURE — 87486 CHLMYD PNEUM DNA AMP PROBE: CPT

## 2020-03-27 PROCEDURE — 80053 COMPREHEN METABOLIC PANEL: CPT

## 2020-03-27 PROCEDURE — 85730 THROMBOPLASTIN TIME PARTIAL: CPT

## 2020-03-27 PROCEDURE — 87633 RESP VIRUS 12-25 TARGETS: CPT

## 2020-03-27 PROCEDURE — 87581 M.PNEUMON DNA AMP PROBE: CPT

## 2020-03-27 PROCEDURE — 81001 URINALYSIS AUTO W/SCOPE: CPT

## 2020-03-27 PROCEDURE — 80061 LIPID PANEL: CPT

## 2020-03-27 PROCEDURE — 87798 DETECT AGENT NOS DNA AMP: CPT

## 2020-03-27 PROCEDURE — 80048 BASIC METABOLIC PNL TOTAL CA: CPT

## 2020-03-27 PROCEDURE — 87449 NOS EACH ORGANISM AG IA: CPT

## 2020-03-27 PROCEDURE — 36415 COLL VENOUS BLD VENIPUNCTURE: CPT

## 2020-03-27 PROCEDURE — 83036 HEMOGLOBIN GLYCOSYLATED A1C: CPT

## 2020-03-27 PROCEDURE — 99285 EMERGENCY DEPT VISIT HI MDM: CPT | Mod: 25

## 2020-03-27 PROCEDURE — 83605 ASSAY OF LACTIC ACID: CPT

## 2020-03-27 RX ORDER — SODIUM,POTASSIUM PHOSPHATES 278-250MG
1 POWDER IN PACKET (EA) ORAL
Refills: 0 | Status: DISCONTINUED | OUTPATIENT
Start: 2020-03-27 | End: 2020-03-27

## 2020-03-27 RX ORDER — HYDROXYCHLOROQUINE SULFATE 200 MG
1 TABLET ORAL
Qty: 4 | Refills: 0
Start: 2020-03-27 | End: 2020-03-28

## 2020-03-27 RX ORDER — ASCORBIC ACID 60 MG
1 TABLET,CHEWABLE ORAL
Qty: 4 | Refills: 0
Start: 2020-03-27 | End: 2020-03-30

## 2020-03-27 RX ORDER — PANTOPRAZOLE SODIUM 20 MG/1
1 TABLET, DELAYED RELEASE ORAL
Qty: 30 | Refills: 0
Start: 2020-03-27 | End: 2020-04-25

## 2020-03-27 RX ORDER — METOPROLOL TARTRATE 50 MG
100 TABLET ORAL
Refills: 0 | Status: DISCONTINUED | OUTPATIENT
Start: 2020-03-27 | End: 2020-03-27

## 2020-03-27 RX ORDER — CEFUROXIME AXETIL 250 MG
1 TABLET ORAL
Qty: 10 | Refills: 0
Start: 2020-03-27 | End: 2020-03-31

## 2020-03-27 RX ORDER — ASCORBIC ACID 60 MG
1000 TABLET,CHEWABLE ORAL DAILY
Refills: 0 | Status: DISCONTINUED | OUTPATIENT
Start: 2020-03-27 | End: 2020-03-27

## 2020-03-27 RX ORDER — ZINC SULFATE TAB 220 MG (50 MG ZINC EQUIVALENT) 220 (50 ZN) MG
220 TAB ORAL DAILY
Refills: 0 | Status: DISCONTINUED | OUTPATIENT
Start: 2020-03-27 | End: 2020-03-27

## 2020-03-27 RX ORDER — POTASSIUM CHLORIDE 20 MEQ
40 PACKET (EA) ORAL EVERY 4 HOURS
Refills: 0 | Status: COMPLETED | OUTPATIENT
Start: 2020-03-27 | End: 2020-03-27

## 2020-03-27 RX ADMIN — APIXABAN 2.5 MILLIGRAM(S): 2.5 TABLET, FILM COATED ORAL at 06:06

## 2020-03-27 RX ADMIN — Medication 40 MILLIEQUIVALENT(S): at 14:58

## 2020-03-27 RX ADMIN — ZINC SULFATE TAB 220 MG (50 MG ZINC EQUIVALENT) 220 MILLIGRAM(S): 220 (50 ZN) TAB at 12:08

## 2020-03-27 RX ADMIN — PANTOPRAZOLE SODIUM 40 MILLIGRAM(S): 20 TABLET, DELAYED RELEASE ORAL at 06:06

## 2020-03-27 RX ADMIN — Medication 200 MILLIGRAM(S): at 17:04

## 2020-03-27 RX ADMIN — Medication 1 TABLET(S): at 12:06

## 2020-03-27 RX ADMIN — Medication 81 MILLIGRAM(S): at 12:05

## 2020-03-27 RX ADMIN — Medication 100 MILLIGRAM(S): at 17:04

## 2020-03-27 RX ADMIN — Medication 1000 MILLIGRAM(S): at 12:07

## 2020-03-27 RX ADMIN — Medication 1 TABLET(S): at 17:04

## 2020-03-27 RX ADMIN — Medication 200 MILLIGRAM(S): at 06:05

## 2020-03-27 RX ADMIN — Medication 40 MILLIEQUIVALENT(S): at 12:07

## 2020-03-27 RX ADMIN — Medication 25 MILLIGRAM(S): at 06:06

## 2020-03-27 RX ADMIN — AZITHROMYCIN 500 MILLIGRAM(S): 500 TABLET, FILM COATED ORAL at 12:07

## 2020-03-27 RX ADMIN — APIXABAN 2.5 MILLIGRAM(S): 2.5 TABLET, FILM COATED ORAL at 17:04

## 2020-03-27 RX ADMIN — CEFTRIAXONE 100 MILLIGRAM(S): 500 INJECTION, POWDER, FOR SOLUTION INTRAMUSCULAR; INTRAVENOUS at 12:06

## 2020-03-27 NOTE — PROGRESS NOTE ADULT - PROBLEM SELECTOR PLAN 6
borderline low BP  monitor blood pressure  will likely need to hold lopressor or furosemide if he becomes hypotensives
Holding BP medications due to Sepsis  Monitor BP  DASH Diet
Holding BP medications due to Sepsis  Monitor BP  DASH Diet

## 2020-03-27 NOTE — PROGRESS NOTE ADULT - SUBJECTIVE AND OBJECTIVE BOX
Pt is awake, alert, lying in bed in NAD. D/C planning for home to complete abx     INTERVAL HPI/OVERNIGHT EVENTS:      VITAL SIGNS:  T(F): 98.7 (03-27-20 @ 05:16)  HR: 92 (03-27-20 @ 05:16)  BP: 150/80 (03-27-20 @ 05:16)  RR: 18 (03-27-20 @ 05:16)  SpO2: 96% (03-27-20 @ 05:16)  Wt(kg): --  I&O's Detail    26 Mar 2020 07:01  -  27 Mar 2020 07:00  --------------------------------------------------------  IN:    Oral Fluid: 240 mL  Total IN: 240 mL    OUT:  Total OUT: 0 mL    Total NET: 240 mL              REVIEW OF SYSTEMS:    CONSTITUTIONAL:  No fevers, chills, sweats    HEENT:  Eyes:  No diplopia or blurred vision. ENT:  No earache, sore throat or runny nose.    CARDIOVASCULAR:  No pressure, squeezing, tightness, or heaviness about the chest; no palpitations.    RESPIRATORY:  Per HPI    GASTROINTESTINAL:  No abdominal pain, nausea, vomiting or diarrhea.    GENITOURINARY:  No dysuria, frequency or urgency.    NEUROLOGIC:  No paresthesias, fasciculations, seizures or weakness.    PSYCHIATRIC:  No disorder of thought or mood.      PHYSICAL EXAM:    Constitutional: Well developed and nourished  Eyes: Perrla  ENMT: normal  Neck: supple  Respiratory: good air entry  Cardiovascular: S1 S2 regular  Gastrointestinal: Soft, Non tender  Extremities: No edema  Vascular: normal  Neurological: Awake, alert,Ox3  Musculoskeletal:Normal      MEDICATIONS  (STANDING):  apixaban 2.5 milliGRAM(s) Oral two times a day  ascorbic acid 1000 milliGRAM(s) Oral daily  aspirin  chewable 81 milliGRAM(s) Oral daily  azithromycin   Tablet 500 milliGRAM(s) Oral daily  cefTRIAXone   IVPB 1000 milliGRAM(s) IV Intermittent every 24 hours  cefTRIAXone   IVPB      hydroxychloroquine   Oral   hydroxychloroquine 200 milliGRAM(s) Oral every 12 hours  metoprolol tartrate 100 milliGRAM(s) Oral two times a day  pantoprazole    Tablet 40 milliGRAM(s) Oral before breakfast  potassium acid phosphate/sodium acid phosphate tablet (K-PHOS No. 2) 1 Tablet(s) Oral four times a day with meals  potassium chloride   Powder 40 milliEquivalent(s) Oral every 4 hours  simvastatin 40 milliGRAM(s) Oral at bedtime  zinc sulfate 220 milliGRAM(s) Oral daily    MEDICATIONS  (PRN):      Allergies    No Known Allergies    Intolerances        LABS:                        10.1   4.06  )-----------( 165      ( 27 Mar 2020 07:16 )             30.7     03-27    140  |  103  |  11  ----------------------------<  103<H>  3.2<L>   |  31  |  0.82    Ca    8.6      27 Mar 2020 07:16  Phos  1.8     03-27  Mg     1.9     03-27    TPro  6.2  /  Alb  2.4<L>  /  TBili  0.5  /  DBili  x   /  AST  69<H>  /  ALT  52  /  AlkPhos  49  03-27      COVID-19 PCR . (03.24.20 @ 18:22)    COVID-19 PCR: Detected: LDT - Laboratory Developed Test All “detected” results on this new test  are considered presumptively positive results, are clinically actionable,  and specimens will be forwarded to [facility] for confirmation testing.  Another report (corrected report) will only be issued if discordant  results occur.  This test has been validated by "[employer] to be accurate;  though it has not been FDA cleared/approved by the usual pathway.  As with all laboratory tests, results should be correlated with clinical  findings.            CAPILLARY BLOOD GLUCOSE            RADIOLOGY & ADDITIONAL TESTS:    CXR:  < from: Xray Chest 1 View AP/PA (03.24.20 @ 13:28) >  IMPRESSION: Diffuse interstitial infiltration essentially new since March 2. It is symmetric.      < end of copied text >    Ct scan chest:    ekg;    echo: Pt is awake, alert, lying in bed in NAD. D/C planning for home to complete abx. Clinically stable    INTERVAL HPI/OVERNIGHT EVENTS:      VITAL SIGNS:  T(F): 98.7 (03-27-20 @ 05:16)  HR: 92 (03-27-20 @ 05:16)  BP: 150/80 (03-27-20 @ 05:16)  RR: 18 (03-27-20 @ 05:16)  SpO2: 96% (03-27-20 @ 05:16)  Wt(kg): --  I&O's Detail    26 Mar 2020 07:01  -  27 Mar 2020 07:00  --------------------------------------------------------  IN:    Oral Fluid: 240 mL  Total IN: 240 mL    OUT:  Total OUT: 0 mL    Total NET: 240 mL              REVIEW OF SYSTEMS:    CONSTITUTIONAL:  No fevers, chills, sweats    HEENT:  Eyes:  No diplopia or blurred vision. ENT:  No earache, sore throat or runny nose.    CARDIOVASCULAR:  No pressure, squeezing, tightness, or heaviness about the chest; no palpitations.    RESPIRATORY:  Per HPI    GASTROINTESTINAL:  No abdominal pain, nausea, vomiting or diarrhea.    GENITOURINARY:  No dysuria, frequency or urgency.    NEUROLOGIC:  No paresthesias, fasciculations, seizures or weakness.    PSYCHIATRIC:  No disorder of thought or mood.      PHYSICAL EXAM:    Constitutional: Well developed and nourished  Eyes: Perrla  ENMT: normal  Neck: supple  Respiratory: good air entry  Cardiovascular: S1 S2 regular  Gastrointestinal: Soft, Non tender  Extremities: No edema  Vascular: normal  Neurological: Awake, alert,Ox3  Musculoskeletal:Normal      MEDICATIONS  (STANDING):  apixaban 2.5 milliGRAM(s) Oral two times a day  ascorbic acid 1000 milliGRAM(s) Oral daily  aspirin  chewable 81 milliGRAM(s) Oral daily  azithromycin   Tablet 500 milliGRAM(s) Oral daily  cefTRIAXone   IVPB 1000 milliGRAM(s) IV Intermittent every 24 hours  cefTRIAXone   IVPB      hydroxychloroquine   Oral   hydroxychloroquine 200 milliGRAM(s) Oral every 12 hours  metoprolol tartrate 100 milliGRAM(s) Oral two times a day  pantoprazole    Tablet 40 milliGRAM(s) Oral before breakfast  potassium acid phosphate/sodium acid phosphate tablet (K-PHOS No. 2) 1 Tablet(s) Oral four times a day with meals  potassium chloride   Powder 40 milliEquivalent(s) Oral every 4 hours  simvastatin 40 milliGRAM(s) Oral at bedtime  zinc sulfate 220 milliGRAM(s) Oral daily    MEDICATIONS  (PRN):      Allergies    No Known Allergies    Intolerances        LABS:                        10.1   4.06  )-----------( 165      ( 27 Mar 2020 07:16 )             30.7     03-27    140  |  103  |  11  ----------------------------<  103<H>  3.2<L>   |  31  |  0.82    Ca    8.6      27 Mar 2020 07:16  Phos  1.8     03-27  Mg     1.9     03-27    TPro  6.2  /  Alb  2.4<L>  /  TBili  0.5  /  DBili  x   /  AST  69<H>  /  ALT  52  /  AlkPhos  49  03-27      COVID-19 PCR . (03.24.20 @ 18:22)    COVID-19 PCR: Detected: LDT - Laboratory Developed Test All “detected” results on this new test  are considered presumptively positive results, are clinically actionable,  and specimens will be forwarded to [facility] for confirmation testing.  Another report (corrected report) will only be issued if discordant  results occur.  This test has been validated by [employer] to be accurate;  though it has not been FDA cleared/approved by the usual pathway.  As with all laboratory tests, results should be correlated with clinical  findings.            CAPILLARY BLOOD GLUCOSE            RADIOLOGY & ADDITIONAL TESTS:    CXR:  < from: Xray Chest 1 View AP/PA (03.24.20 @ 13:28) >  IMPRESSION: Diffuse interstitial infiltration essentially new since March 2. It is symmetric.      < end of copied text >    Ct scan chest:    ekg;    echo:

## 2020-03-27 NOTE — DISCHARGE NOTE PROVIDER - HOSPITAL COURSE
89 years old Tajik speaking female, from home, lives with daughter, Quentin with pmhx CHF, afib (on Eliquis), complete heart block s/p pacemaker implant, HTN, pulmonary HTN, HLD, and mild dementia who presented with fever and shortness of breath which is worsening for last few days. Patient was admitted to [facility] 3 weeks ago with pneumonia secondary to influenza A virus and was treated with antibiotics and discharged home. Patient was in ED last week for suspected C diff diarrhea for which patient was prescribed oral vancomycin.    Patient is being admitted for bilateral pneumonia likely viral with bilateral opacities. Patient met sepsis criteria with tachycardia, tachypnea, and fever. S/p IVF 30 cc per kg. Blood cultures were sent. Patient was given one dose of IV ceftriaxone and IV azithromycin.     code status: discussed with daughter, patient is full code.        Coronavirus Infection         Patient was admitted for shortness of breath and fever. Patient immediately was placed on Ceftriaxone, Azithromycin, and Hydroxychloroquine. Patient chest XR showed bilateral opacities suspicious for pneumonia and the patient was found to be septic and received IV fluids. Patient was found to be COVID19+ and was placed on supplemental oxygen. Prior to discharge patient remained afebrile and was saturating at 95%+ on room air was discharged to continue finishing her course of Hydroxychloroquine and Ceftin 250 mg twice a day for 5 days.         Atrial Fibrillation         Patient has a known history of atrial fibrillation and was continued on her Eliquis and Lopressor for anticoagulation and rate control. Patient's Multaq was held due to the interaction between Multaq and Hydroxychloroquine. Patient was advised to continue taking her Multaq after she completes her course of hydroxychloroquine.         Congestive Heart Failure        Patient has a history of Congestive Heart Failure and was asymptomatic from this pathology. During the admission the patient received one dose of Lasix with proper improvement.         Hypercholesterolemia         Patient has a known history of hypercholesterolemia and her lipid profile was found to be unremarkable and she was continued on her Simvastatin 40 mg daily and was instructed to continue taking it.         Hypertension         Patient's anti-hypertensive medications were held as a result of the patient being septic. On discharge the patient was instructed to continue her blood pressure medications as normal at home.

## 2020-03-27 NOTE — PROGRESS NOTE ADULT - SUBJECTIVE AND OBJECTIVE BOX
CARDIOLOGY/MEDICAL ATTENDING    CHIEF COMPLAINT: Patient is a 89y old  Female who presents with a chief complaint of shortness of breath. Pt appears comfortable.    	  REVIEW OF SYSTEMS:  CONSTITUTIONAL: No fever, weight loss, or fatigue  EYES: No eye pain, visual disturbances, or discharge  ENT:  No difficulty hearing, tinnitus, vertigo; No sinus or throat pain  NECK: No pain or stiffness  RESPIRATORY: No cough, wheezing, chills or hemoptysis; No Shortness of Breath  CARDIOVASCULAR: No chest pain, palpitations, passing out, dizziness, or leg swelling  GASTROINTESTINAL: No abdominal or epigastric pain. No nausea, vomiting, or hematemesis; No diarrhea or constipation. No melena or hematochezia.  GENITOURINARY: No dysuria, frequency, hematuria, or incontinence  NEUROLOGICAL: No headaches, memory loss, loss of strength, numbness, or tremors  SKIN: No itching, burning, rashes, or lesions   LYMPH Nodes: No enlarged glands  ENDOCRINE: No heat or cold intolerance; No hair loss  MUSCULOSKELETAL: No joint pain or swelling; No muscle, back, or extremity pain  PSYCHIATRIC: No depression, anxiety, mood swings, or difficulty sleeping  HEME/LYMPH: No easy bruising, or bleeding gums  ALLERGY AND IMMUNOLOGIC: No hives or eczema	        PHYSICAL EXAM:  T(C): 37.1 (03-27-20 @ 05:16), Max: 37.3 (03-26-20 @ 18:35)  HR: 92 (03-27-20 @ 05:16) (80 - 99)  BP: 150/80 (03-27-20 @ 05:16) (131/57 - 161/69)  RR: 18 (03-27-20 @ 05:16) (18 - 19)  SpO2: 96% (03-27-20 @ 05:16) (95% - 99%)    I&O's Summary    26 Mar 2020 07:01  -  27 Mar 2020 07:00  --------------------------------------------------------  IN: 240 mL / OUT: 0 mL / NET: 240 mL        Appearance: Normal	  HEENT:   Normal oral mucosa, PERRL, EOMI	  Lymphatic: No lymphadenopathy  Cardiovascular: Normal S1 S2, No JVD, No murmurs, No edema  Respiratory: B/L [facility]	  Psychiatry: A & O x 3, Mood & affect appropriate  Gastrointestinal:  Soft, Non-tender, + BS	  Skin: No rashes, No ecchymoses, No cyanosis	  Neurologic: Non-focal  Extremities: Normal range of motion, No clubbing, cyanosis or edema  Vascular: Peripheral pulses palpable 2+ bilaterally    MEDICATIONS  (STANDING):  apixaban 2.5 milliGRAM(s) Oral two times a day  ascorbic acid 1000 milliGRAM(s) Oral daily  aspirin  chewable 81 milliGRAM(s) Oral daily  azithromycin   Tablet 500 milliGRAM(s) Oral daily  cefTRIAXone   IVPB 1000 milliGRAM(s) IV Intermittent every 24 hours  cefTRIAXone   IVPB      hydroxychloroquine   Oral   hydroxychloroquine 200 milliGRAM(s) Oral every 12 hours  metoprolol tartrate 25 milliGRAM(s) Oral two times a day  pantoprazole    Tablet 40 milliGRAM(s) Oral before breakfast  potassium acid phosphate/sodium acid phosphate tablet (K-PHOS No. 2) 1 Tablet(s) Oral four times a day with meals  potassium chloride   Powder 40 milliEquivalent(s) Oral every 4 hours  simvastatin 40 milliGRAM(s) Oral at bedtime  zinc sulfate 220 milliGRAM(s) Oral daily        	  LABS:	 	                         10.1   4.06  )-----------( 165      ( 27 Mar 2020 07:16 )             30.7     03-27    140  |  103  |  11  ----------------------------<  103<H>  3.2<L>   |  31  |  0.82    Ca    8.6      27 Mar 2020 07:16  Phos  1.8     03-27  Mg     1.9     03-27    TPro  6.2  /  Alb  2.4<L>  /  TBili  0.5  /  DBili  x   /  AST  69<H>  /  ALT  52  /  AlkPhos  49  03-27    proBNP: Serum Pro-Brain Natriuretic Peptide: 6674 pg/mL (03-02 @ 12:40)    Lipid Profile: Cholesterol 106  LDL 58  HDL 36  TG 61    HgA1c: Hemoglobin A1C, Whole Blood: 5.6 % (03-26 @ 14:40)

## 2020-03-27 NOTE — DISCHARGE NOTE PROVIDER - PROVIDER TOKENS
PROVIDER:[TOKEN:[[id number]],FOLLOWUP:[2 weeks]],PROVIDER:[TOKEN:[[id number]],FOLLOWUP:[2 weeks]]

## 2020-03-27 NOTE — PROGRESS NOTE ADULT - ASSESSMENT
89 years old Jamaican speaking female, from home, lives with daughter, THANIA&Gege3 with pmhx CHF, afib (on Eliquis), complete heart block s/p pacemaker implant, HTN, pulmonary HTN, HLD, and mild dementia who presented with fever and shortness of breath which is worsening for last few days. Patient was admitted to [facility] 3 weeks ago with pneumonia secondary to influenza A virus and was treated with antibiotics and discharged home. Patient was in ED last week for suspected C diff diarrhea for which patient was prescribed oral vancomycin.  Patient is being admitted for bilateral pneumonia likely viral with bilateral opacities. Patient met sepsis criteria with tachycardia, tachypnea, and fever. S/p IVF 30 cc per kg. Blood cultures were sent. Patient was given one dose of IV ceftriaxone and IV azithromycin.

## 2020-03-27 NOTE — DISCHARGE NOTE PROVIDER - NSDCMRMEDTOKEN_GEN_ALL_CORE_FT
ascorbic acid 1000 mg oral tablet: 1 tab(s) orally once a day  aspirin 81 mg oral tablet, chewable: 1 tab(s) orally once a day  cefuroxime 250 mg oral tablet: 1 tab(s) orally 2 times a day   Eliquis 2.5 mg oral tablet: 1 tab(s) orally 2 times a day  furosemide 20 mg oral tablet: 1 tab(s) orally once a day  hydrALAZINE 100 mg oral tablet: 1 tab(s) orally 3 times a day  hydroxychloroquine 200 mg oral tablet: 1 tab(s) orally 2 times a day   ipratropium-albuterol 0.5 mg-2.5 mg/3 mLinhalation solution: 3 milliliter(s) inhaled every 6 hours  metoprolol tartrate 100 mg oral tablet: 1 tab(s) orally 2 times a day  pantoprazole 40 mg oral delayed release tablet: 1 tab(s) orally once a day (before a meal)  simvastatin 40 mg oral tablet: 1 tab(s) orally once a day (at bedtime)  vancomycin 125 mg oral capsule: 1 cap(s) orally 4 times a day x 10 days

## 2020-03-27 NOTE — PROGRESS NOTE ADULT - PROBLEM SELECTOR PLAN 4
appears euvolemic  con't home dose lasix  monitor input and output.
Patient received one dose of Lasix  Holding Lasix currently
Patient received one dose of Lasix  Holding Lasix currently

## 2020-03-27 NOTE — PROGRESS NOTE ADULT - PROBLEM SELECTOR PLAN 5
continue statin  f/u lipids ordered for am
continue statin  Lipid Profile Normal   c/w Simvastatin 40 mg daily
continue statin  Lipid Profile Normal   c/w Simvastatin 40 mg daily

## 2020-03-27 NOTE — PROGRESS NOTE ADULT - PROBLEM SELECTOR PLAN 2
acute hypoxia overnight  O2 sat 97% with O2 via NC  con't above abt  O2 support  airborne isolation  low grade fever now: will resume rocephin and azithromycin for now pending ID approval
See above
See above

## 2020-03-27 NOTE — PROGRESS NOTE ADULT - SUBJECTIVE AND OBJECTIVE BOX
PGY1 Note discussed with supervising resident and primary attending.    Patient is a 89y old  Female who presents with a chief complaint of shortness of breath (26 Mar 2020 11:38)    INTERVAL HPI/OVERNIGHT EVENTS:  - No acute events overnight  - Patient is comfortable   - Not requiring supplemental oxygen  - Tolerating normal diet   - Hemodynamically stable and afebrile     MEDICATIONS  (STANDING):  apixaban 2.5 milliGRAM(s) Oral two times a day  ascorbic acid 1000 milliGRAM(s) Oral daily  aspirin  chewable 81 milliGRAM(s) Oral daily  azithromycin   Tablet 500 milliGRAM(s) Oral daily  cefTRIAXone   IVPB 1000 milliGRAM(s) IV Intermittent every 24 hours  cefTRIAXone   IVPB      hydroxychloroquine   Oral   hydroxychloroquine 200 milliGRAM(s) Oral every 12 hours  metoprolol tartrate 25 milliGRAM(s) Oral two times a day  pantoprazole    Tablet 40 milliGRAM(s) Oral before breakfast  potassium acid phosphate/sodium acid phosphate tablet (K-PHOS No. 2) 1 Tablet(s) Oral four times a day with meals  potassium chloride   Powder 40 milliEquivalent(s) Oral every 4 hours  simvastatin 40 milliGRAM(s) Oral at bedtime  zinc sulfate 220 milliGRAM(s) Oral daily    MEDICATIONS  (PRN):    Allergies    No Known Allergies    Intolerances    REVIEW OF SYSTEMS:  CONSTITUTIONAL: No fever, weight loss, or fatigue  RESPIRATORY: No cough, wheezing, chills or hemoptysis; No shortness of breath  CARDIOVASCULAR: No chest pain, palpitations, dizziness, or leg swelling  GASTROINTESTINAL: No abdominal or epigastric pain. No nausea, vomiting, or hematemesis; No diarrhea or constipation. No melena or hematochezia.  NEUROLOGICAL: No headaches, memory loss, loss of strength, numbness, or tremors  SKIN: No itching, burning, rashes, or lesions     Vital Signs Last 24 Hrs  T(C): 37.1 (27 Mar 2020 05:16), Max: 37.3 (26 Mar 2020 18:35)  T(F): 98.7 (27 Mar 2020 05:16), Max: 99.1 (26 Mar 2020 18:35)  HR: 92 (27 Mar 2020 05:16) (80 - 99)  BP: 150/80 (27 Mar 2020 05:16) (131/57 - 161/69)  BP(mean): --  RR: 18 (27 Mar 2020 05:16) (18 - 19)  SpO2: 96% (27 Mar 2020 05:16) (95% - 99%)    PHYSICAL EXAM:  GENERAL: NAD, well-groomed, well-developed  HEAD:  Atraumatic, Normocephalic  EYES: EOMI, PERRLA, conjunctiva and sclera clear  NECK: Supple, No JVD, Normal thyroid  CHEST/LUNG: Clear to percussion bilaterally; No rales, rhonchi, wheezing, or rubs  HEART: Regular rate and rhythm; No murmurs, rubs, or gallops  ABDOMEN: Soft, Nontender, Nondistended; Bowel sounds present  NERVOUS SYSTEM:  Alert & Oriented X3, Good concentration; Motor Strength 5/5 B/L   EXTREMITIES:  2+ Peripheral Pulses, No clubbing, cyanosis, or edema  SKIN: Warm, Dry    LABS:                        10.1   4.06  )-----------( 165      ( 27 Mar 2020 07:16 )             30.7     03-27    140  |  103  |  11  ----------------------------<  103<H>  3.2<L>   |  31  |  0.82    Ca    8.6      27 Mar 2020 07:16  Phos  1.8     03-27  Mg     1.9     03-27    TPro  6.2  /  Alb  2.4<L>  /  TBili  0.5  /  DBili  x   /  AST  69<H>  /  ALT  52  /  AlkPhos  49  03-27    CAPILLARY BLOOD GLUCOSE    RADIOLOGY & ADDITIONAL TESTS:    Imaging Personally Reviewed:  [ ] YES  [ ] NO    Consultant(s) Notes Reviewed:  [ ] YES  [ ] NO

## 2020-03-27 NOTE — PROGRESS NOTE ADULT - ASSESSMENT
89 yr old female with PMHX of PAF,HTN,CRI,Lipid d/os/p PPM here with sepsis due to  COVID+ and pneumonia.  1.Pneumonia and COVID+-ABX as per ID.  2.PAF-Multaq on hold due to interaction with hydroxychloroquine ,lopressor,Eliquis.  3.HTN-cont bp medication.  4.Lipid d/o-statin.  5.CRI-f/u lytes.  6.PPI.  7.D/C home to complete ABX.

## 2020-03-27 NOTE — PROGRESS NOTE ADULT - PROBLEM SELECTOR PLAN 1
due to COVID-19  chart review noted for tachycardia, tachypnea, fever and elevated lactate in ED  S/p IVF 30 cc per kg in ED  con't IV ceftriaxone and IV azithromycin.  Dr Power consulted by admitting team
Patient is being admitted for bilateral pneumonia likely viral with bilateral opacities. Patient met sepsis criteria with tachycardia, tachypnea, and fever.   COVID19+  S/p IVF 30 cc per kg.   BCX -ve; Legionella -ve   C/w Ceftriaxone, Azithromycin, and Hydroxychloroquine
Patient is being admitted for bilateral pneumonia likely viral with bilateral opacities. Patient met sepsis criteria with tachycardia, tachypnea, and fever.   COVID19+  S/p IVF 30 cc per kg.   BCX -ve; Legionella -ve   C/w Ceftriaxone, Azithromycin, and Hydroxychloroquine  Discharge Planning

## 2020-03-27 NOTE — DISCHARGE NOTE NURSING/CASE MANAGEMENT/SOCIAL WORK - PATIENT PORTAL LINK FT
You can access the FollowMyHealth Patient Portal offered by [facility] by registering at the following website: [email or web address]. By joining [facility]’s FollowMyHealth portal, you will also be able to view your health information using other applications (apps) compatible with our system.

## 2020-03-27 NOTE — PROGRESS NOTE ADULT - PROBLEM SELECTOR PLAN 3
paroxysmal Afib  rate controlled / sinus on exam  continue home meds: eliquis/ metoprolol/multaq  vitals per floor routine
c/w Eliquis and Lopressor  Holding Multaq due to Hydroxychloroquine
c/w Eliquis and Lopressor  Holding Multaq due to Hydroxychloroquine

## 2020-03-27 NOTE — PROGRESS NOTE ADULT - ASSESSMENT
1. Covid-19 Positive  CT chest noted.   CXR noted.   Abx  Supportive care.   Monitor labs  Monitor SpO2 & temp  Continue meds - D/C planning   F/U as OP in 1 week.     2. Sepsis  As above  ID follow up     3. PAF  Continue meds  Cardio F/U   DVT and GI PPX     4. Pulmonary HTN   By hx  CCB  oxygen supp  Bronchodilators PRN     5. Asthma   Ongoing treatment for Covid-19  Trial fo Bronchodilators prn  Oxygen supp  Pfts as OP   Monitor SpO2 1. Covid-19 Positive  CT chest noted.   CXR noted.   Abx/ Plaquenil po  Supportive care.   Monitor labs  Monitor SpO2 & temp  Continue other  meds - D/C planning   F/U as OP in 1 week.     2. Sepsis  As above  ID follow up     3. PAF  Continue meds  Cardio F/U   DVT and GI PPX     4. Pulmonary HTN   By hx  CCB  oxygen supp  Bronchodilators PRN     5. Asthma   Ongoing treatment for Covid-19  Trial fo Bronchodilators prn  Oxygen supp  Pfts as OP   Monitor SpO2

## 2020-03-27 NOTE — DISCHARGE NOTE PROVIDER - NSDCCPCAREPLAN_GEN_ALL_CORE_FT
PRINCIPAL DISCHARGE DIAGNOSIS  Diagnosis: Pneumonia due to 2019 novel coronavirus  Assessment and Plan of Treatment: You were admitted for shortness of breath and fever. You were immediately placed on Ceftriaxone, Azithromycin, and Hydroxychloroquine. Your chest XR showed bilateral opacities suspicious for pneumonia and you were found to be septic and received IV fluids. You were found to be COVID19+ and were placed on supplemental oxygen. Prior to discharge you remained afebrile and were saturating at 95%+ on room air were discharged to continue finishing your course of Hydroxychloroquine, 200 mg twice a day for 2 days and Ceftin 250 mg twice a day for 5 days. Following completion of the Hydroxychloroquine you can resume your Multaq.      SECONDARY DISCHARGE DIAGNOSES  Diagnosis: Coronavirus infection  Assessment and Plan of Treatment: CORONAVIRUS INSTRUCTIONS:   Based on your current clinical status and stability, it has been determined that you no longer need hospitalization and can recover while remaining in self-quarantine at home. You should follow the prevention steps below until a healthcare provider or local or state health department says you can return to your normal activities.   1. You should restrict activities outside your home, except for getting medical care.   2. Do not go to work, school, or public areas.   3. Avoid using public transportation, ride-sharing, or taxis.   4. Separate yourself from other people and animals in your home as much as possible.  When you are around other people (e.g., sharing a room or vehicle) you should wear a facemask.  5. Wash your hands often with soap and water for at least 20 seconds, especially after blowing your nose, coughing, or sneezing; going to the bathroom; and before eating or preparing food.  6. Cover your mouth and nose with a tissue when you cough or sneeze. Throw used tissues in a lined trash can. Immediately wash your hands with soap and water for at least 20 seconds  7. High touch surfaces include counters, tabletops, doorknobs, bathroom fixtures, toilets, phones, keyboards, tablets, and bedside tables.  8. Avoid sharing dishes, drinking glasses, cups, eating utensils, towels, or bedding with other people or pets in your home. After using these items, they should be washed thoroughly with soap and water.  You are strongly advised to seek prompt medical attention if your illness worsens or you develop new symptoms like fever or difficulty breathing.      Diagnosis: Hypercholesterolemia  Assessment and Plan of Treatment: You have a known history of hypercholesterolemia and your lipid profile was found to be unremarkable and you were continued on  Simvastatin 40 mg daily and are instructed to continue taking it.    Diagnosis: Atrial fibrillation  Assessment and Plan of Treatment: You have a known history of atrial fibrillation and are continued on your Eliquis and Lopressor for anticoagulation and rate control. Your Multaq was held due to the interaction between Multaq and Hydroxychloroquine. Resume taking your Multaq after completion of your course of hydroxychloroquine.    Diagnosis: CHF (congestive heart failure)  Assessment and Plan of Treatment: You have a known history of Congestive Heart Failure and were asymptomatic from this pathology. During the admission you received one dose of Lasix with proper improvement.    Diagnosis: HTN (hypertension)  Assessment and Plan of Treatment: Your anti-hypertensive medications were held as a result of you being septic. On discharge you were instructed to continue taking your blood pressure medications as normal at home.

## 2020-03-27 NOTE — DISCHARGE NOTE PROVIDER - CARE PROVIDER_API CALL
Carol Harrell)  Internal Medicine  [address] [place] [address]  Phone: [phone]  Fax: [phone]  Follow Up Time: 2 weeks    Rafy Solano)  Internal Medicine; Pulmonary Disease  [address] [place] [address]  Phone: [phone]  Fax: [phone]  Follow Up Time: 2 weeks

## 2020-07-05 ENCOUNTER — INPATIENT (INPATIENT)
Facility: HOSPITAL | Age: 85
LOS: 4 days | Discharge: HOME | DRG: 292 | End: 2020-07-10
Attending: INTERNAL MEDICINE | Admitting: INTERNAL MEDICINE
Payer: COMMERCIAL

## 2020-07-05 VITALS
HEART RATE: 62 BPM | TEMPERATURE: 98 F | SYSTOLIC BLOOD PRESSURE: 153 MMHG | RESPIRATION RATE: 18 BRPM | WEIGHT: 149.91 LBS | HEIGHT: 62 IN | DIASTOLIC BLOOD PRESSURE: 62 MMHG | OXYGEN SATURATION: 99 %

## 2020-07-05 DIAGNOSIS — I48.91 UNSPECIFIED ATRIAL FIBRILLATION: ICD-10-CM

## 2020-07-05 DIAGNOSIS — I10 ESSENTIAL (PRIMARY) HYPERTENSION: ICD-10-CM

## 2020-07-05 DIAGNOSIS — I50.9 HEART FAILURE, UNSPECIFIED: ICD-10-CM

## 2020-07-05 DIAGNOSIS — Z86.79 PERSONAL HISTORY OF OTHER DISEASES OF THE CIRCULATORY SYSTEM: ICD-10-CM

## 2020-07-05 DIAGNOSIS — D64.89 OTHER SPECIFIED ANEMIAS: ICD-10-CM

## 2020-07-05 DIAGNOSIS — I50.33 ACUTE ON CHRONIC DIASTOLIC (CONGESTIVE) HEART FAILURE: ICD-10-CM

## 2020-07-05 DIAGNOSIS — E78.00 PURE HYPERCHOLESTEROLEMIA, UNSPECIFIED: ICD-10-CM

## 2020-07-05 DIAGNOSIS — Z95.0 PRESENCE OF CARDIAC PACEMAKER: Chronic | ICD-10-CM

## 2020-07-05 DIAGNOSIS — Z29.9 ENCOUNTER FOR PROPHYLACTIC MEASURES, UNSPECIFIED: ICD-10-CM

## 2020-07-05 LAB
ALBUMIN SERPL ELPH-MCNC: 3.3 G/DL — LOW (ref 3.5–5)
ALP SERPL-CCNC: 56 U/L — SIGNIFICANT CHANGE UP (ref 40–120)
ALT FLD-CCNC: 18 U/L DA — SIGNIFICANT CHANGE UP (ref 10–60)
ANION GAP SERPL CALC-SCNC: 7 MMOL/L — SIGNIFICANT CHANGE UP (ref 5–17)
ANISOCYTOSIS BLD QL: SIGNIFICANT CHANGE UP
APPEARANCE UR: CLEAR — SIGNIFICANT CHANGE UP
APTT BLD: 34.9 SEC — SIGNIFICANT CHANGE UP (ref 27.5–35.5)
AST SERPL-CCNC: 19 U/L — SIGNIFICANT CHANGE UP (ref 10–40)
BILIRUB SERPL-MCNC: 0.3 MG/DL — SIGNIFICANT CHANGE UP (ref 0.2–1.2)
BILIRUB UR-MCNC: NEGATIVE — SIGNIFICANT CHANGE UP
BLD GP AB SCN SERPL QL: SIGNIFICANT CHANGE UP
BUN SERPL-MCNC: 16 MG/DL — SIGNIFICANT CHANGE UP (ref 7–18)
CALCIUM SERPL-MCNC: 8.8 MG/DL — SIGNIFICANT CHANGE UP (ref 8.4–10.5)
CHLORIDE SERPL-SCNC: 103 MMOL/L — SIGNIFICANT CHANGE UP (ref 96–108)
CO2 SERPL-SCNC: 28 MMOL/L — SIGNIFICANT CHANGE UP (ref 22–31)
COLOR SPEC: YELLOW — SIGNIFICANT CHANGE UP
CREAT SERPL-MCNC: 1.31 MG/DL — HIGH (ref 0.5–1.3)
DACRYOCYTES BLD QL SMEAR: SLIGHT — SIGNIFICANT CHANGE UP
DIFF PNL FLD: NEGATIVE — SIGNIFICANT CHANGE UP
ELLIPTOCYTES BLD QL SMEAR: SLIGHT — SIGNIFICANT CHANGE UP
FERRITIN SERPL-MCNC: 26 NG/ML — SIGNIFICANT CHANGE UP (ref 15–150)
FOLATE SERPL-MCNC: >20 NG/ML — SIGNIFICANT CHANGE UP
GLUCOSE SERPL-MCNC: 98 MG/DL — SIGNIFICANT CHANGE UP (ref 70–99)
GLUCOSE UR QL: NEGATIVE — SIGNIFICANT CHANGE UP
HCT VFR BLD CALC: 18.4 % — CRITICAL LOW (ref 34.5–45)
HCT VFR BLD CALC: 19.6 % — CRITICAL LOW (ref 34.5–45)
HCT VFR BLD CALC: 29 % — LOW (ref 34.5–45)
HGB BLD-MCNC: 5.6 G/DL — CRITICAL LOW (ref 11.5–15.5)
HGB BLD-MCNC: 6.1 G/DL — CRITICAL LOW (ref 11.5–15.5)
HGB BLD-MCNC: 9.6 G/DL — LOW (ref 11.5–15.5)
HYPOCHROMIA BLD QL: SIGNIFICANT CHANGE UP
INR BLD: 1.71 RATIO — HIGH (ref 0.88–1.16)
IRON SATN MFR SERPL: 16 UG/DL — LOW (ref 40–150)
IRON SATN MFR SERPL: 4 % — LOW (ref 15–50)
KETONES UR-MCNC: NEGATIVE — SIGNIFICANT CHANGE UP
LDH SERPL L TO P-CCNC: 206 U/L — SIGNIFICANT CHANGE UP (ref 120–225)
LEUKOCYTE ESTERASE UR-ACNC: NEGATIVE — SIGNIFICANT CHANGE UP
MANUAL SMEAR VERIFICATION: SIGNIFICANT CHANGE UP
MCHC RBC-ENTMCNC: 23.7 PG — LOW (ref 27–34)
MCHC RBC-ENTMCNC: 24.2 PG — LOW (ref 27–34)
MCHC RBC-ENTMCNC: 25.8 PG — LOW (ref 27–34)
MCHC RBC-ENTMCNC: 30.4 GM/DL — LOW (ref 32–36)
MCHC RBC-ENTMCNC: 31.1 GM/DL — LOW (ref 32–36)
MCHC RBC-ENTMCNC: 33.1 GM/DL — SIGNIFICANT CHANGE UP (ref 32–36)
MCV RBC AUTO: 77.8 FL — LOW (ref 80–100)
MCV RBC AUTO: 78 FL — LOW (ref 80–100)
MCV RBC AUTO: 78 FL — LOW (ref 80–100)
NITRITE UR-MCNC: NEGATIVE — SIGNIFICANT CHANGE UP
NRBC # BLD: 0 /100 WBCS — SIGNIFICANT CHANGE UP (ref 0–0)
NT-PROBNP SERPL-SCNC: 2380 PG/ML — HIGH (ref 0–450)
OB PNL STL: NEGATIVE — SIGNIFICANT CHANGE UP
OVALOCYTES BLD QL SMEAR: SLIGHT — SIGNIFICANT CHANGE UP
PH UR: 7 — SIGNIFICANT CHANGE UP (ref 5–8)
PLAT MORPH BLD: NORMAL — SIGNIFICANT CHANGE UP
PLATELET # BLD AUTO: 173 K/UL — SIGNIFICANT CHANGE UP (ref 150–400)
PLATELET # BLD AUTO: 188 K/UL — SIGNIFICANT CHANGE UP (ref 150–400)
PLATELET # BLD AUTO: 211 K/UL — SIGNIFICANT CHANGE UP (ref 150–400)
POIKILOCYTOSIS BLD QL AUTO: SIGNIFICANT CHANGE UP
POTASSIUM SERPL-MCNC: 4.1 MMOL/L — SIGNIFICANT CHANGE UP (ref 3.5–5.3)
POTASSIUM SERPL-SCNC: 4.1 MMOL/L — SIGNIFICANT CHANGE UP (ref 3.5–5.3)
PROCALCITONIN SERPL-MCNC: 0.03 NG/ML — SIGNIFICANT CHANGE UP (ref 0.02–0.1)
PROT SERPL-MCNC: 6.7 G/DL — SIGNIFICANT CHANGE UP (ref 6–8.3)
PROT SERPL-MCNC: 6.7 G/DL — SIGNIFICANT CHANGE UP (ref 6–8.3)
PROT SERPL-MCNC: 7 G/DL — SIGNIFICANT CHANGE UP (ref 6–8.3)
PROT UR-MCNC: NEGATIVE — SIGNIFICANT CHANGE UP
PROTHROM AB SERPL-ACNC: 19.5 SEC — HIGH (ref 10.6–13.6)
RBC # BLD: 2.36 M/UL — LOW (ref 3.8–5.2)
RBC # BLD: 2.52 M/UL — LOW (ref 3.8–5.2)
RBC # BLD: 3.72 M/UL — LOW (ref 3.8–5.2)
RBC # FLD: 16.7 % — HIGH (ref 10.3–14.5)
RBC # FLD: 17.5 % — HIGH (ref 10.3–14.5)
RBC # FLD: 17.5 % — HIGH (ref 10.3–14.5)
RBC BLD AUTO: ABNORMAL
SARS-COV-2 RNA SPEC QL NAA+PROBE: SIGNIFICANT CHANGE UP
SCHISTOCYTES BLD QL AUTO: SLIGHT — SIGNIFICANT CHANGE UP
SODIUM SERPL-SCNC: 138 MMOL/L — SIGNIFICANT CHANGE UP (ref 135–145)
SP GR SPEC: 1 — LOW (ref 1.01–1.02)
TIBC SERPL-MCNC: 401 UG/DL — SIGNIFICANT CHANGE UP (ref 250–450)
TROPONIN I SERPL-MCNC: <0.015 NG/ML — SIGNIFICANT CHANGE UP (ref 0–0.04)
UIBC SERPL-MCNC: 385 UG/DL — HIGH (ref 110–370)
UROBILINOGEN FLD QL: NEGATIVE — SIGNIFICANT CHANGE UP
VIT B12 SERPL-MCNC: 1096 PG/ML — SIGNIFICANT CHANGE UP (ref 232–1245)
WBC # BLD: 4.92 K/UL — SIGNIFICANT CHANGE UP (ref 3.8–10.5)
WBC # BLD: 6.16 K/UL — SIGNIFICANT CHANGE UP (ref 3.8–10.5)
WBC # BLD: 6.32 K/UL — SIGNIFICANT CHANGE UP (ref 3.8–10.5)
WBC # FLD AUTO: 4.92 K/UL — SIGNIFICANT CHANGE UP (ref 3.8–10.5)
WBC # FLD AUTO: 6.16 K/UL — SIGNIFICANT CHANGE UP (ref 3.8–10.5)
WBC # FLD AUTO: 6.32 K/UL — SIGNIFICANT CHANGE UP (ref 3.8–10.5)

## 2020-07-05 PROCEDURE — 99285 EMERGENCY DEPT VISIT HI MDM: CPT

## 2020-07-05 PROCEDURE — 71046 X-RAY EXAM CHEST 2 VIEWS: CPT | Mod: 26

## 2020-07-05 PROCEDURE — 93010 ELECTROCARDIOGRAM REPORT: CPT

## 2020-07-05 RX ORDER — SIMVASTATIN 20 MG/1
40 TABLET, FILM COATED ORAL AT BEDTIME
Refills: 0 | Status: DISCONTINUED | OUTPATIENT
Start: 2020-07-05 | End: 2020-07-10

## 2020-07-05 RX ORDER — IOHEXOL 300 MG/ML
30 INJECTION, SOLUTION INTRAVENOUS ONCE
Refills: 0 | Status: COMPLETED | OUTPATIENT
Start: 2020-07-05 | End: 2020-07-05

## 2020-07-05 RX ORDER — DRONEDARONE 400 MG/1
400 TABLET, FILM COATED ORAL
Refills: 0 | Status: DISCONTINUED | OUTPATIENT
Start: 2020-07-05 | End: 2020-07-10

## 2020-07-05 RX ORDER — FUROSEMIDE 40 MG
40 TABLET ORAL DAILY
Refills: 0 | Status: DISCONTINUED | OUTPATIENT
Start: 2020-07-06 | End: 2020-07-06

## 2020-07-05 RX ORDER — IPRATROPIUM/ALBUTEROL SULFATE 18-103MCG
3 AEROSOL WITH ADAPTER (GRAM) INHALATION ONCE
Refills: 0 | Status: COMPLETED | OUTPATIENT
Start: 2020-07-05 | End: 2020-07-05

## 2020-07-05 RX ORDER — ATORVASTATIN CALCIUM 80 MG/1
40 TABLET, FILM COATED ORAL AT BEDTIME
Refills: 0 | Status: DISCONTINUED | OUTPATIENT
Start: 2020-07-05 | End: 2020-07-05

## 2020-07-05 RX ORDER — METOPROLOL TARTRATE 50 MG
100 TABLET ORAL
Refills: 0 | Status: DISCONTINUED | OUTPATIENT
Start: 2020-07-05 | End: 2020-07-10

## 2020-07-05 RX ORDER — ASPIRIN/CALCIUM CARB/MAGNESIUM 324 MG
81 TABLET ORAL DAILY
Refills: 0 | Status: DISCONTINUED | OUTPATIENT
Start: 2020-07-05 | End: 2020-07-10

## 2020-07-05 RX ORDER — FUROSEMIDE 40 MG
40 TABLET ORAL ONCE
Refills: 0 | Status: COMPLETED | OUTPATIENT
Start: 2020-07-05 | End: 2020-07-05

## 2020-07-05 RX ORDER — HYDRALAZINE HCL 50 MG
100 TABLET ORAL THREE TIMES A DAY
Refills: 0 | Status: DISCONTINUED | OUTPATIENT
Start: 2020-07-05 | End: 2020-07-10

## 2020-07-05 RX ORDER — LACTOBACILLUS ACIDOPHILUS 100MM CELL
1 CAPSULE ORAL DAILY
Refills: 0 | Status: DISCONTINUED | OUTPATIENT
Start: 2020-07-05 | End: 2020-07-10

## 2020-07-05 RX ORDER — ALBUTEROL 90 UG/1
2 AEROSOL, METERED ORAL EVERY 6 HOURS
Refills: 0 | Status: DISCONTINUED | OUTPATIENT
Start: 2020-07-05 | End: 2020-07-10

## 2020-07-05 RX ADMIN — ALBUTEROL 2 PUFF(S): 90 AEROSOL, METERED ORAL at 22:13

## 2020-07-05 RX ADMIN — ALBUTEROL 2 PUFF(S): 90 AEROSOL, METERED ORAL at 15:54

## 2020-07-05 RX ADMIN — Medication 81 MILLIGRAM(S): at 13:25

## 2020-07-05 RX ADMIN — Medication 100 MILLIGRAM(S): at 22:13

## 2020-07-05 RX ADMIN — Medication 125 MILLIGRAM(S): at 08:53

## 2020-07-05 RX ADMIN — SIMVASTATIN 40 MILLIGRAM(S): 20 TABLET, FILM COATED ORAL at 22:45

## 2020-07-05 RX ADMIN — Medication 100 MILLIGRAM(S): at 17:02

## 2020-07-05 RX ADMIN — Medication 40 MILLIGRAM(S): at 11:44

## 2020-07-05 RX ADMIN — Medication 3 MILLILITER(S): at 08:52

## 2020-07-05 RX ADMIN — Medication 100 MILLIGRAM(S): at 13:33

## 2020-07-05 RX ADMIN — IOHEXOL 30 MILLILITER(S): 300 INJECTION, SOLUTION INTRAVENOUS at 13:16

## 2020-07-05 RX ADMIN — DRONEDARONE 400 MILLIGRAM(S): 400 TABLET, FILM COATED ORAL at 17:03

## 2020-07-05 NOTE — ED PROVIDER NOTE - CARE PLAN
Principal Discharge DX:	Other congestive heart failure  Secondary Diagnosis:	Anemia due to other cause

## 2020-07-05 NOTE — H&P ADULT - PROBLEM SELECTOR PLAN 6
Patient takes Hydralazine and Metoprolol at home.  continue with home meds.  monitor blood pressure.

## 2020-07-05 NOTE — CONSULT NOTE ADULT - SUBJECTIVE AND OBJECTIVE BOX
[  ] STAT REQUEST              [ X ] ROUTINE REQUEST    Patient is a 89 year old female with sever symptomatic anemia. GI consulted to evaluate.         HPI:  89 year old female from home with multiple medical problems including HTN, dementia, CAD, CHF, Afib and COPD presented with 3 days history of SOB and cough. Patient found to have sever anemia. Patient is confused unable to give history. Patient's daughter reports patient with cough x 3 days . No abdominal pain, nausea, vomiting, hematemesis, hematochezia, melena, epistaxis, hemoptysis, palpitation, chest pain, hematuria, dysuria or diarrhea.          PAIN MANAGEMENT:  Pain Scale:                 0/10  Pain Location:      Prior Colonoscopy:   No prior colonoscopy    PAST MEDICAL HISTORY  CHF (congestive heart failure)  Pulmonary HTN  Complete heart block  Atrial fibrillation  Hypercholesterolemia  HTN (hypertension)      PAST SURGICAL HISTORY  Artificial pacemaker          Allergies    No Known Allergies    Intolerances  None         MEDICATIONS  (STANDING):  aspirin  chewable 81 milliGRAM(s) Oral daily  atorvastatin 40 milliGRAM(s) Oral at bedtime  furosemide   Injectable 40 milliGRAM(s) IV Push Once         SOCIAL HISTORY  Advanced Directives:       [X  ] Full Code       [  ] DNR  Marital Status:         [  ] M      [ X ] S      [  ] D       [  ] W  Children:       [ X ] Yes      [  ] No  Occupation:        [  ] Employed       [ X ] Unemployed       [  ] Retired  Diet:       [ X ] Regular       [  ] PEG feeding          [  ] NG tube feeding  Drug Use:           [ X ] No                 [  ] Yes  Alcohol:           [ X ] No             [  ] Yes (socially)         [  ] Yes (chronic)  Tobacco:           [  ] Yes           [ X ] No      FAMILY HISTORY  [ X ] Heart Disease            [ X ] Diabetes             [ X ] HTN             [  ] Colon Cancer             [  ] Stomach Cancer              [  ] Pancreatic Cancer      VITAL SIGNS   Vital Signs Last 24 Hrs  T(C): 36.6 (05 Jul 2020 10:53), Max: 36.7 (05 Jul 2020 07:57)  T(F): 97.8 (05 Jul 2020 10:53), Max: 98.1 (05 Jul 2020 07:57)  HR: 68 (05 Jul 2020 10:53) (62 - 68)  BP: 170/71 (05 Jul 2020 10:55) (153/62 - 175/67)   RR: 16 (05 Jul 2020 10:53) (16 - 18)  SpO2: 97% (05 Jul 2020 10:53) (97% - 99%)  Daily Height in cm: 157.48 (05 Jul 2020 07:57)          CBC Full  -  ( 05 Jul 2020 08:52 )  WBC Count : 6.32 K/uL  RBC Count : 2.36 M/uL  Hemoglobin : 5.6 g/dL  Hematocrit : 18.4 %  Platelet Count - Automated : 173 K/uL  Mean Cell Volume : 78.0 fl  Mean Cell Hemoglobin : 23.7 pg  Mean Cell Hemoglobin Concentration : 30.4 gm/dL  Auto Neutrophil # : x  Auto Lymphocyte # : x  Auto Monocyte # : x  Auto Eosinophil # : x  Auto Basophil # : x  Auto Neutrophil % : x  Auto Lymphocyte % : x  Auto Monocyte % : x  Auto Eosinophil % : x  Auto Basophil % : x      07-05    138  |  103  |  16  ----------------------------<  98  4.1   |  28  |  1.31<H>    Ca    8.8      05 Jul 2020 08:52    TPro  7.0  /  Alb  3.3<L>  /  TBili  0.3  /  DBili  x   /  AST  19  /  ALT  18  /  AlkPhos  56  07-05     PT/INR - ( 05 Jul 2020 09:56 )   PT: 19.5 sec;   INR: 1.71 ratio     PTT - ( 05 Jul 2020 09:56 )  PTT:34.9 sec      Occult Blood, Feces (07.05.20 @ 10:09)    Occult Blood, Feces: Negative    Urinalysis (03.25.20 @ 05:37)    pH Urine: 5.0    Blood, Urine: Trace    Glucose Qualitative, Urine: Negative    Color: Yellow    Urine Appearance: Clear    Bilirubin: Negative    Ketone - Urine: Negative    Specific Gravity: 1.015    Protein, Urine: 30 mg/dL    Urobilinogen: Negative    Nitrite: Negative    Leukocyte Esterase Concentration: Negative      ECG  Ventricular Rate 102 BPM    Atrial Rate 119 BPM    QRS Duration 140 ms    Q-T Interval 408 ms    QTC Calculation(Bezet) 531 ms    R Axis -65 degrees    T Axis 101 degrees    Diagnosis Line *** Poor data quality, interpretation may be adversely affected  Ventricular-paced rhythm  Abnormal ECG          COVID-19 PCR . (03.24.20 @ 18:22)    COVID-19 PCR: Detected: LDT - Laboratory Developed Test All “detected” results on this new test  are considered presumptively positive results, are clinically actionable,  and specimens will be forwarded to [facility] for confirmation testing.  Another report (corrected report) will only be issued if discordant  results occur.  This test has been validated by [employer] to be accurate;  though it has not been FDA cleared/approved by the usual pathway.  As with all laboratory tests, results should be correlated with clinical  findings.      RADIOLOGY/IMAGING                EXAM:  CT CHEST                            PROCEDURE DATE:  03/03/2020          INTERPRETATION:  CLINICAL INFORMATION: Pneumonia    COMPARISON: 04/15/2019.    PROCEDURE:   CT of the Chest was performed without intravenous contrast.  Sagittal and coronal reformats were performed.  MIP reformats were performed.    FINDINGS:    LUNGS AND AIRWAYS: Patent central airways.  Patchy nodular opacities in the left lower lobe. Trace bibasilar atelectasis.    PLEURA: No pleural effusion.    MEDIASTINUM ANDHILA: No lymphadenopathy.    VESSELS: Atherosclerotic changes of the aorta and branching vessels.    HEART: Heart size is normal. No pericardial effusion. Coronary artery calcifications.    CHEST WALL AND LOWER NECK: Enlarged, heterogeneous thyroid lobe with multiple nodules. 2.5 cm exophytic nodule off the inferior aspect of the left thyroid lobe which extends into the upper mediastinum. Left-sided cardiac pacing device in place.    VISUALIZED UPPER ABDOMEN: Gallbladder is distended. Subcentimeter hypodensity in the left hepatic lobe, too small to characterize    BONES: Multilevel degenerative changes of the spine.    IMPRESSION:     Patchy/nodular opacities in the left lower lobe, likely infectious in etiology. 6 week follow-up chest CT is recommended.    Enlarged, heterogeneous thyroid with multiple thyroid nodules, as described above. Correlate with nonemergent thyroid ultrasound if clinically warranted.        EXAM:  CT CHEST                          EXAM:  CT ABDOMEN AND PELVIS                            PROCEDURE DATE:  04/15/2019          INTERPRETATION:  CT CHEST/ABDOMEN/PELVIS:     CLINICAL INFORMATION: CT pulmonary angiogram of 4/5/2019    COMPARISON: None available.    PROCEDURE:  Using multislice helical CT, 2.5 mm sections were obtained   from the thoracic inlet through the ischial tuberosities without the   administration of intravenous contrast. Oral contrast was not   administered.    Coronal and sagittal reformations were performed by  CT technologist and   made available for review.    FINDINGS:  The visualized structures of the lower neck are again   remarkable for enlarged heterogeneous thyroid gland which may be further   evaluated with ultrasound on a nonemergent basis. There is   atherosclerotic calcification of the aorta. Pacemaker wires are seen..   The visualized aorta is of normal course and caliber. The heart is not   enlarged. There is no evidence of pericardial effusion.    There is no evidence of axillary, hilar, or mediastinal lymphadenopathy.    No focal lung consolidations identified. No evidence of pleural effusion   or pneumothorax. There is mild scarring in the lung apices. There is a   subpleural pulmonary nodule again noted in the right apex posterior   medially measuring approximately 6 mm..      The liver is of normal contour. No evidence of focal hepatic lesion on   this nonenhanced examination. The gallbladder is present. No evidence of   intra or extrahepatic biliary dilatation.     The pancreas, spleen, and adrenal glands are grossly unremarkable. There   is no evidence of hydronephrosis or perinephric stranding. No evidence of    nephrolithiasis.The bladder is unremarkable. There is no hydronephrosis.   The left kidney demonstrates a small mid pole cyst.    No evidence of lymphadenopathy utilizing CT size criteria. The aorta is   not aneurysmal. There is a right adnexal cyst measuring 2.8 cm which   should be further evaluated on a nonemergent basis with ultrasound.    There is no evidence of bowel obstruction. There is no evidence of   pneumoperitoneum or free fluid. A normal appendix is appreciated. There   is severe wall thickening involving the adjacent colon and transverse   colon with stranding in the surrounding fat consistent with a nonspecific   colitis    The visualized osseous structures demonstrate degenerative changes.    IMPRESSION: Severe colitis involving the ascending colon and to a lesser   degree the transverse colon.   Pacemaker  Heterogeneous enlarged thyroid gland again noted may be further evaluated   with ultrasound on a nonemergent basis.  Right adnexal cyst may be further evaluated with ultrasound on a   nonemergent basis  Nonspecific 6 mm subpleural right upper lobe pulmonary nodule is again   appreciated.        EXAM:  XR CHEST PA LAT 2V                            PROCEDURE DATE:  07/05/2020          INTERPRETATION:  Exam Date: 7/5/2020 9:31 AM    History: Chest pain    Technique: Frontal and lateral views of the chest with comparison to  3/24/2020    Findings:    Left-sided pacemaker. Heart is enlarged. Mild pulmonary edema with trace to small bilateral pleural effusions. The apices and hemidiaphragms are unremarkable. Degenerative changes of the visualized osseous structures.    Impression:    Mild pulmonary edema with trace to small bilateral pleural effusions [  ] STAT REQUEST              [ X ] ROUTINE REQUEST    Patient is a 89 year old female with sever symptomatic anemia. GI consulted to evaluate.         HPI:  89 year old female from home with multiple medical problems including HTN, dementia, CAD, CHF, Afib and COPD presented with 3 days history of SOB and cough. Patient found to have sever anemia. Patient's daughter reports patient with cough x 3 days . No abdominal pain, nausea, vomiting, hematemesis, hematochezia, melena, epistaxis, hemoptysis, palpitation, chest pain, hematuria, dysuria or diarrhea.          PAIN MANAGEMENT:  Pain Scale:                 0/10  Pain Location:      Prior Colonoscopy:   No prior colonoscopy    PAST MEDICAL HISTORY  CHF (congestive heart failure)  Pulmonary HTN  Complete heart block  Atrial fibrillation  Hypercholesterolemia  HTN (hypertension)      PAST SURGICAL HISTORY  Artificial pacemaker          Allergies    No Known Allergies    Intolerances  None         MEDICATIONS  (STANDING):  aspirin  chewable 81 milliGRAM(s) Oral daily  atorvastatin 40 milliGRAM(s) Oral at bedtime  furosemide   Injectable 40 milliGRAM(s) IV Push Once         SOCIAL HISTORY  Advanced Directives:       [X  ] Full Code       [  ] DNR  Marital Status:         [  ] M      [ X ] S      [  ] D       [  ] W  Children:       [ X ] Yes      [  ] No  Occupation:        [  ] Employed       [ X ] Unemployed       [  ] Retired  Diet:       [ X ] Regular       [  ] PEG feeding          [  ] NG tube feeding  Drug Use:           [ X ] No                 [  ] Yes  Alcohol:           [ X ] No             [  ] Yes (socially)         [  ] Yes (chronic)  Tobacco:           [  ] Yes           [ X ] No      FAMILY HISTORY  [ X ] Heart Disease            [ X ] Diabetes             [ X ] HTN             [  ] Colon Cancer             [  ] Stomach Cancer              [  ] Pancreatic Cancer      VITAL SIGNS   Vital Signs Last 24 Hrs  T(C): 36.6 (05 Jul 2020 10:53), Max: 36.7 (05 Jul 2020 07:57)  T(F): 97.8 (05 Jul 2020 10:53), Max: 98.1 (05 Jul 2020 07:57)  HR: 68 (05 Jul 2020 10:53) (62 - 68)  BP: 170/71 (05 Jul 2020 10:55) (153/62 - 175/67)   RR: 16 (05 Jul 2020 10:53) (16 - 18)  SpO2: 97% (05 Jul 2020 10:53) (97% - 99%)  Daily Height in cm: 157.48 (05 Jul 2020 07:57)          CBC Full  -  ( 05 Jul 2020 08:52 )  WBC Count : 6.32 K/uL  RBC Count : 2.36 M/uL  Hemoglobin : 5.6 g/dL  Hematocrit : 18.4 %  Platelet Count - Automated : 173 K/uL  Mean Cell Volume : 78.0 fl  Mean Cell Hemoglobin : 23.7 pg  Mean Cell Hemoglobin Concentration : 30.4 gm/dL  Auto Neutrophil # : x  Auto Lymphocyte # : x  Auto Monocyte # : x  Auto Eosinophil # : x  Auto Basophil # : x  Auto Neutrophil % : x  Auto Lymphocyte % : x  Auto Monocyte % : x  Auto Eosinophil % : x  Auto Basophil % : x      07-05    138  |  103  |  16  ----------------------------<  98  4.1   |  28  |  1.31<H>    Ca    8.8      05 Jul 2020 08:52    TPro  7.0  /  Alb  3.3<L>  /  TBili  0.3  /  DBili  x   /  AST  19  /  ALT  18  /  AlkPhos  56  07-05     PT/INR - ( 05 Jul 2020 09:56 )   PT: 19.5 sec;   INR: 1.71 ratio     PTT - ( 05 Jul 2020 09:56 )  PTT:34.9 sec      Occult Blood, Feces (07.05.20 @ 10:09)    Occult Blood, Feces: Negative    Urinalysis (03.25.20 @ 05:37)    pH Urine: 5.0    Blood, Urine: Trace    Glucose Qualitative, Urine: Negative    Color: Yellow    Urine Appearance: Clear    Bilirubin: Negative    Ketone - Urine: Negative    Specific Gravity: 1.015    Protein, Urine: 30 mg/dL    Urobilinogen: Negative    Nitrite: Negative    Leukocyte Esterase Concentration: Negative      ECG  Ventricular Rate 102 BPM    Atrial Rate 119 BPM    QRS Duration 140 ms    Q-T Interval 408 ms    QTC Calculation(Bezet) 531 ms    R Axis -65 degrees    T Axis 101 degrees    Diagnosis Line *** Poor data quality, interpretation may be adversely affected  Ventricular-paced rhythm  Abnormal ECG          COVID-19 PCR . (03.24.20 @ 18:22)    COVID-19 PCR: Detected: LDT - Laboratory Developed Test All “detected” results on this new test  are considered presumptively positive results, are clinically actionable,  and specimens will be forwarded to [facility] for confirmation testing.  Another report (corrected report) will only be issued if discordant  results occur.  This test has been validated by [employer] to be accurate;  though it has not been FDA cleared/approved by the usual pathway.  As with all laboratory tests, results should be correlated with clinical  findings.      RADIOLOGY/IMAGING                EXAM:  CT CHEST                            PROCEDURE DATE:  03/03/2020          INTERPRETATION:  CLINICAL INFORMATION: Pneumonia    COMPARISON: 04/15/2019.    PROCEDURE:   CT of the Chest was performed without intravenous contrast.  Sagittal and coronal reformats were performed.  MIP reformats were performed.    FINDINGS:    LUNGS AND AIRWAYS: Patent central airways.  Patchy nodular opacities in the left lower lobe. Trace bibasilar atelectasis.    PLEURA: No pleural effusion.    MEDIASTINUM ANDHILA: No lymphadenopathy.    VESSELS: Atherosclerotic changes of the aorta and branching vessels.    HEART: Heart size is normal. No pericardial effusion. Coronary artery calcifications.    CHEST WALL AND LOWER NECK: Enlarged, heterogeneous thyroid lobe with multiple nodules. 2.5 cm exophytic nodule off the inferior aspect of the left thyroid lobe which extends into the upper mediastinum. Left-sided cardiac pacing device in place.    VISUALIZED UPPER ABDOMEN: Gallbladder is distended. Subcentimeter hypodensity in the left hepatic lobe, too small to characterize    BONES: Multilevel degenerative changes of the spine.    IMPRESSION:     Patchy/nodular opacities in the left lower lobe, likely infectious in etiology. 6 week follow-up chest CT is recommended.    Enlarged, heterogeneous thyroid with multiple thyroid nodules, as described above. Correlate with nonemergent thyroid ultrasound if clinically warranted.        EXAM:  CT CHEST                          EXAM:  CT ABDOMEN AND PELVIS                            PROCEDURE DATE:  04/15/2019          INTERPRETATION:  CT CHEST/ABDOMEN/PELVIS:     CLINICAL INFORMATION: CT pulmonary angiogram of 4/5/2019    COMPARISON: None available.    PROCEDURE:  Using multislice helical CT, 2.5 mm sections were obtained   from the thoracic inlet through the ischial tuberosities without the   administration of intravenous contrast. Oral contrast was not   administered.    Coronal and sagittal reformations were performed by  CT technologist and   made available for review.    FINDINGS:  The visualized structures of the lower neck are again   remarkable for enlarged heterogeneous thyroid gland which may be further   evaluated with ultrasound on a nonemergent basis. There is   atherosclerotic calcification of the aorta. Pacemaker wires are seen..   The visualized aorta is of normal course and caliber. The heart is not   enlarged. There is no evidence of pericardial effusion.    There is no evidence of axillary, hilar, or mediastinal lymphadenopathy.    No focal lung consolidations identified. No evidence of pleural effusion   or pneumothorax. There is mild scarring in the lung apices. There is a   subpleural pulmonary nodule again noted in the right apex posterior   medially measuring approximately 6 mm..      The liver is of normal contour. No evidence of focal hepatic lesion on   this nonenhanced examination. The gallbladder is present. No evidence of   intra or extrahepatic biliary dilatation.     The pancreas, spleen, and adrenal glands are grossly unremarkable. There   is no evidence of hydronephrosis or perinephric stranding. No evidence of    nephrolithiasis.The bladder is unremarkable. There is no hydronephrosis.   The left kidney demonstrates a small mid pole cyst.    No evidence of lymphadenopathy utilizing CT size criteria. The aorta is   not aneurysmal. There is a right adnexal cyst measuring 2.8 cm which   should be further evaluated on a nonemergent basis with ultrasound.    There is no evidence of bowel obstruction. There is no evidence of   pneumoperitoneum or free fluid. A normal appendix is appreciated. There   is severe wall thickening involving the adjacent colon and transverse   colon with stranding in the surrounding fat consistent with a nonspecific   colitis    The visualized osseous structures demonstrate degenerative changes.    IMPRESSION: Severe colitis involving the ascending colon and to a lesser   degree the transverse colon.   Pacemaker  Heterogeneous enlarged thyroid gland again noted may be further evaluated   with ultrasound on a nonemergent basis.  Right adnexal cyst may be further evaluated with ultrasound on a   nonemergent basis  Nonspecific 6 mm subpleural right upper lobe pulmonary nodule is again   appreciated.        EXAM:  XR CHEST PA LAT 2V                            PROCEDURE DATE:  07/05/2020          INTERPRETATION:  Exam Date: 7/5/2020 9:31 AM    History: Chest pain    Technique: Frontal and lateral views of the chest with comparison to  3/24/2020    Findings:    Left-sided pacemaker. Heart is enlarged. Mild pulmonary edema with trace to small bilateral pleural effusions. The apices and hemidiaphragms are unremarkable. Degenerative changes of the visualized osseous structures.    Impression:    Mild pulmonary edema with trace to small bilateral pleural effusions

## 2020-07-05 NOTE — H&P ADULT - PROBLEM SELECTOR PLAN 2
Patients Hb on arrival was 5.6, repeat showed 6.1.   -likely iron deficient/ due to blood loss  -will give 2 units of PRBC.  -check post transfusion CBC.  -will hold eliquis.  -FOBT -ve, Check FOBT X 3  -Check CT abdomen pelvis for any bleeding.  -GI Dr Black.  -serum Iron Total, Serum: 16 ug/dL (07.05.20 @ 11:47)  -Iron - Total Binding Capacity.: 401 ug/dL (07.05.20 @ 11:47)  check vitamin b12, folate, SPEP, UPEP.

## 2020-07-05 NOTE — ED ADULT TRIAGE NOTE - NS ED NURSE DIRECT TO ROOM YN
[FreeTextEntry1] : 2 year old male with 2 days of fever and cough found to have viral URI. Brother with strep and flu, Rell's rapid strep and rapid flu both neg. Throat culture sent to lab and pending, will call mom/dad if positive.  Recommend supportive care including antipyretics, fluids, and salt water gargle. Return if symptoms worsen or persist. \par \par All questions answered. Parent verbalized agreement with the above plan. 
Yes

## 2020-07-05 NOTE — CONSULT NOTE ADULT - NEGATIVE PSYCHIATRIC SYMPTOMS
no memory loss/no agitation/no insomnia/no paranoia/no mood swings/no depression/no suicidal ideation/no anxiety

## 2020-07-05 NOTE — H&P ADULT - PROBLEM SELECTOR PLAN 7
IMPROVE VTE Individual Risk Assessment  RISK                                                                Points  [  ] Previous VTE                                                  3  [  ] Thrombophilia                                               2  [  ] Lower limb paralysis                                      2        (unable to hold up >15 seconds)    [  ] Current Cancer                                              2         (within 6 months)  [x  ] Immobilization > 24 hrs                                1  [  ] ICU/CCU stay > 24 hours                              1  [x  ] Age > 60                                                      1  IMPROVE VTE Score : 2.  Holding chemo ppx for anemia, SCDs

## 2020-07-05 NOTE — ED ADULT NURSE NOTE - NSIMPLEMENTINTERV_GEN_ALL_ED
Implemented All Fall with Harm Risk Interventions:  [place] to call system. Call bell, personal items and telephone within reach. Instruct patient to call for assistance. Room bathroom lighting operational. Non-slip footwear when patient is off stretcher. Physically safe environment: no spills, clutter or unnecessary equipment. Stretcher in lowest position, wheels locked, appropriate side rails in place. Provide visual cue, wrist band, yellow gown, etc. Monitor gait and stability. Monitor for mental status changes and reorient to person, place, and time. Review medications for side effects contributing to fall risk. Reinforce activity limits and safety measures with patient and family. Provide visual clues: red socks.

## 2020-07-05 NOTE — CONSULT NOTE ADULT - NEGATIVE ENMT SYMPTOMS
no nasal obstruction/no nasal congestion/no nose bleeds/no dry mouth/no ear pain/no hearing difficulty/no gum bleeding/no throat pain/no dysphagia/no nasal discharge/no vertigo

## 2020-07-05 NOTE — CONSULT NOTE ADULT - NEGATIVE MUSCULOSKELETAL SYMPTOMS
no muscle weakness/no leg pain R/no stiffness/no arm pain L/no neck pain/no leg pain L/no muscle cramps

## 2020-07-05 NOTE — H&P ADULT - ASSESSMENT
89 years old [place]ian speaking female, from home, lives with daughter, Quentin with medical history of  CHF, afib (on Eliquis), complete heart block s/p pacemaker implant, HTN, pulmonary HTN, HLD, Dementia, C diff in past, presented to ED after having Cough and difficulty in breathing since morning. Patient states that her cough was dry , no phlegm was noted.

## 2020-07-05 NOTE — H&P ADULT - PROBLEM SELECTOR PLAN 4
Patient has hx of Afib.   -rate controlled   continue with Multaq and metoprolol .  eliquis on hold for Severe anemia

## 2020-07-05 NOTE — CONSULT NOTE ADULT - NEGATIVE OPHTHALMOLOGIC SYMPTOMS
no blurred vision R/no irritation L/no lacrimation L/no diplopia/no irritation R/no lacrimation R/no blurred vision L/no discharge L/no discharge R/no pain L/no pain R/no photophobia

## 2020-07-05 NOTE — H&P ADULT - PROBLEM SELECTOR PLAN 1
Patient came in after cough.  Pro BNP 2380.  X ray showed pulmonary vascular congestion.  s/p 1 IV Lasix 40 In ED.   cw with 40 IV lasix daily.  Echo done in march 2019 showed severe mitral regurgitation normal LV systolic function and grade II Diastolic dysfunction.  -monitor Is and Os.  -daily weight.  taper to PO Lasix as clinically indicated.  -Low suspicion of CAP, check procalcitonin

## 2020-07-05 NOTE — H&P ADULT - NSHPPHYSICALEXAM_GEN_ALL_CORE
Vital Signs Last 24 Hrs  T(C): 36.6 (05 Jul 2020 10:53), Max: 36.7 (05 Jul 2020 07:57)  T(F): 97.8 (05 Jul 2020 10:53), Max: 98.1 (05 Jul 2020 07:57)  HR: 68 (05 Jul 2020 10:53) (62 - 68)  BP: 170/71 (05 Jul 2020 10:55) (153/62 - 175/67)  BP(mean): --  RR: 16 (05 Jul 2020 10:53) (16 - 18)  SpO2: 97% (05 Jul 2020 10:53) (97% - 99%)    CONSTITUTIONAL: Well appearing,  awake, alert and in no apparent distress  ENMT: Airway patent, Nasal mucosa clear. Mouth with normal mucosa. Throat has no vesicles, no oropharyngeal exudates and uvula is midline.  EYES: Clear bilaterally, pupils equal, round and reactive to light. EOMI.  CARDIAC: Normal rate, regular rhythm.  Heart sounds S1, S2.  No murmurs, rubs or gallops   RESPIRATORY: Decreased breath sounds, Wheezing B/L.  ABDOMEN: soft, non tender, non distended  MUSCULOSKELETAL: Spine appears normal, range of motion is not limited, no muscle or joint tenderness  EXTREMITIES: No edema, cyanosis or deformity   NEUROLOGICAL: Alert and oriented, no focal deficits, no motor or sensory deficits.  SKIN: No rash, skin turgor Vital Signs Last 24 Hrs  T(C): 36.6 (05 Jul 2020 10:53), Max: 36.7 (05 Jul 2020 07:57)  T(F): 97.8 (05 Jul 2020 10:53), Max: 98.1 (05 Jul 2020 07:57)  HR: 68 (05 Jul 2020 10:53) (62 - 68)  BP: 170/71 (05 Jul 2020 10:55) (153/62 - 175/67)  BP(mean): --  RR: 16 (05 Jul 2020 10:53) (16 - 18)  SpO2: 97% (05 Jul 2020 10:53) (97% - 99%)    CONSTITUTIONAL: Well appearing, elderly female,  awake, alert and in no apparent distress  ENMT: Airway patent, Nasal mucosa clear. Mouth with normal mucosa. Throat has no vesicles, no oropharyngeal exudates and uvula is midline.  EYES: Clear bilaterally, pupils equal, round and reactive to light. EOMI.  CARDIAC: Normal rate, regular rhythm.  Heart sounds S1, S2.  No murmurs, rubs or gallops   RESPIRATORY: Decreased breath sounds, Wheezing B/L.  ABDOMEN: soft, non tender, non distended  MUSCULOSKELETAL: Spine appears normal, range of motion is not limited, no muscle or joint tenderness  EXTREMITIES: bruises on both upper extremities . Edema +1 on both lower extremities  NEUROLOGICAL: Alert and oriented, no focal deficits, no motor or sensory deficits.  SKIN: No rash, skin turgor

## 2020-07-05 NOTE — ED PROVIDER NOTE - PHYSICAL EXAMINATION
General: pt lying in stretcher, appears stated age and is not in distress  HEENT: AT/NC, pale conjunctiva, anicteric sclerae, EOMI, PERRLA, mmm  Neck: supple, full ROM, trachea midline, no JVD, no cervical LAD, no midline ttp or stepoffs  Lungs: symmetric excursion, b/l clear vesicular breath sounds with no wheezes, crackles, or rhonchi  Heart: rrr, S1, S2 normal; no S3 or S4; no murmurs or rubs  Abd: normal bowel sounds; soft, nontender; negative McBurney's point tenderness, negative Gan's sign, no rebound, no guarding, spleen non-palpable; no hepatomegaly, no masses  Back: no midline spinal tenderness or stepoffs, no costovertebral angle tenderness  Extremities: no clubbing, cyanosis, or edema; no palpable deformities or fractures  Skin: good turgor; no rashes, petechiae, ecchymoses, or jaundice  Pulses: radial, posterior tibialis (PT), dorsalis pedis (DP) all 2+ & symmetric  Neuro: awake, alert, responsive; oriented to person, place and time; cranial nerves intact, EOMI, intact jaw movement, intact facial sensation, no facial asymmetry, hearing intact; no nystagmus, tongue midline; Motor: Normal tone in upper and lower extremities bilaterally strength 5/5; Sensory: intact   Rectum: good sphincter tone, brown stool, no gross blood, no masses or tenderness

## 2020-07-05 NOTE — H&P ADULT - HISTORY OF PRESENT ILLNESS
89 years old Citizen of [place] speaking female, from home, lives with daughter, Quentin with medical history of  CHF, afib (on Eliquis), complete heart block s/p pacemaker implant, HTN, pulmonary HTN, HLD, Dementia, C diff in past, presented to ED after having Cough and difficulty in breathing since morning. Patient states that her cough was dry , no phlegm was noted. Patient denies fever, chest pain ,orthopnea, leg swelling, palpitations, body ache , flu like symptoms, ill contacts, recent travel, any bleeding episode, dark stools , blood in urine.  When Patient came to ED her Labs were significant for HB 5.6, spoke to daughter Tanya, regarding any episode of bleeding in past, daughter states that last month her mother had stuffy nose and had two episodes of bleeding from nose, but was not large bleed.    GOC: were discussed with patients daughter, she wants DNR/DNI. Molst form in chart has to be signed by attending.

## 2020-07-05 NOTE — ED PROVIDER NOTE - OBJECTIVE STATEMENT
source: daughter  89 yo f hx of htn, chf, copd, dementia p/w cough x 3 days. Daughter reports patient has intermittent dry cough. Patient denies any dyspnea, CP, f/c/ns, abd pain, ,rectal bleeding, black stool, or any other complaints.

## 2020-07-06 DIAGNOSIS — R78.81 BACTEREMIA: ICD-10-CM

## 2020-07-06 LAB
-  STREPTOCOCCUS SP. (NOT GRP A, B OR S PNEUMONIAE): SIGNIFICANT CHANGE UP
ALBUMIN SERPL ELPH-MCNC: 3.3 G/DL — LOW (ref 3.5–5)
ALP SERPL-CCNC: 57 U/L — SIGNIFICANT CHANGE UP (ref 40–120)
ALT FLD-CCNC: 17 U/L DA — SIGNIFICANT CHANGE UP (ref 10–60)
ANION GAP SERPL CALC-SCNC: 11 MMOL/L — SIGNIFICANT CHANGE UP (ref 5–17)
AST SERPL-CCNC: 16 U/L — SIGNIFICANT CHANGE UP (ref 10–40)
BILIRUB SERPL-MCNC: 0.5 MG/DL — SIGNIFICANT CHANGE UP (ref 0.2–1.2)
BUN SERPL-MCNC: 25 MG/DL — HIGH (ref 7–18)
CALCIUM SERPL-MCNC: 9.1 MG/DL — SIGNIFICANT CHANGE UP (ref 8.4–10.5)
CHLORIDE SERPL-SCNC: 99 MMOL/L — SIGNIFICANT CHANGE UP (ref 96–108)
CO2 SERPL-SCNC: 29 MMOL/L — SIGNIFICANT CHANGE UP (ref 22–31)
CREAT SERPL-MCNC: 1.67 MG/DL — HIGH (ref 0.5–1.3)
GLUCOSE SERPL-MCNC: 190 MG/DL — HIGH (ref 70–99)
GRAM STN FLD: SIGNIFICANT CHANGE UP
HCT VFR BLD CALC: 27 % — LOW (ref 34.5–45)
HCT VFR BLD CALC: 28.4 % — LOW (ref 34.5–45)
HGB BLD-MCNC: 8.9 G/DL — LOW (ref 11.5–15.5)
HGB BLD-MCNC: 9.2 G/DL — LOW (ref 11.5–15.5)
MCHC RBC-ENTMCNC: 25.2 PG — LOW (ref 27–34)
MCHC RBC-ENTMCNC: 25.9 PG — LOW (ref 27–34)
MCHC RBC-ENTMCNC: 32.4 GM/DL — SIGNIFICANT CHANGE UP (ref 32–36)
MCHC RBC-ENTMCNC: 33 GM/DL — SIGNIFICANT CHANGE UP (ref 32–36)
MCV RBC AUTO: 77.8 FL — LOW (ref 80–100)
MCV RBC AUTO: 78.5 FL — LOW (ref 80–100)
METHOD TYPE: SIGNIFICANT CHANGE UP
NRBC # BLD: 1 /100 WBCS — HIGH (ref 0–0)
NRBC # BLD: 1 /100 WBCS — HIGH (ref 0–0)
OB PNL STL: NEGATIVE — SIGNIFICANT CHANGE UP
PLATELET # BLD AUTO: 202 K/UL — SIGNIFICANT CHANGE UP (ref 150–400)
PLATELET # BLD AUTO: 208 K/UL — SIGNIFICANT CHANGE UP (ref 150–400)
POTASSIUM SERPL-MCNC: 3.1 MMOL/L — LOW (ref 3.5–5.3)
POTASSIUM SERPL-SCNC: 3.1 MMOL/L — LOW (ref 3.5–5.3)
PROT SERPL-MCNC: 7 G/DL — SIGNIFICANT CHANGE UP (ref 6–8.3)
RBC # BLD: 3.44 M/UL — LOW (ref 3.8–5.2)
RBC # BLD: 3.65 M/UL — LOW (ref 3.8–5.2)
RBC # FLD: 16.9 % — HIGH (ref 10.3–14.5)
RBC # FLD: 16.9 % — HIGH (ref 10.3–14.5)
SARS-COV-2 IGG SERPL QL IA: POSITIVE
SARS-COV-2 IGM SERPL IA-ACNC: 83 INDEX — HIGH
SODIUM SERPL-SCNC: 139 MMOL/L — SIGNIFICANT CHANGE UP (ref 135–145)
SPECIMEN SOURCE: SIGNIFICANT CHANGE UP
WBC # BLD: 6.02 K/UL — SIGNIFICANT CHANGE UP (ref 3.8–10.5)
WBC # BLD: 8.94 K/UL — SIGNIFICANT CHANGE UP (ref 3.8–10.5)
WBC # FLD AUTO: 6.02 K/UL — SIGNIFICANT CHANGE UP (ref 3.8–10.5)
WBC # FLD AUTO: 8.94 K/UL — SIGNIFICANT CHANGE UP (ref 3.8–10.5)

## 2020-07-06 RX ORDER — IPRATROPIUM/ALBUTEROL SULFATE 18-103MCG
3 AEROSOL WITH ADAPTER (GRAM) INHALATION EVERY 6 HOURS
Refills: 0 | Status: DISCONTINUED | OUTPATIENT
Start: 2020-07-06 | End: 2020-07-10

## 2020-07-06 RX ORDER — POTASSIUM CHLORIDE 20 MEQ
40 PACKET (EA) ORAL ONCE
Refills: 0 | Status: COMPLETED | OUTPATIENT
Start: 2020-07-06 | End: 2020-07-06

## 2020-07-06 RX ORDER — CEFTRIAXONE 500 MG/1
1000 INJECTION, POWDER, FOR SOLUTION INTRAMUSCULAR; INTRAVENOUS EVERY 24 HOURS
Refills: 0 | Status: COMPLETED | OUTPATIENT
Start: 2020-07-06 | End: 2020-07-10

## 2020-07-06 RX ORDER — PANTOPRAZOLE SODIUM 20 MG/1
40 TABLET, DELAYED RELEASE ORAL
Refills: 0 | Status: DISCONTINUED | OUTPATIENT
Start: 2020-07-06 | End: 2020-07-10

## 2020-07-06 RX ORDER — IRON SUCROSE 20 MG/ML
200 INJECTION, SOLUTION INTRAVENOUS EVERY 24 HOURS
Refills: 0 | Status: COMPLETED | OUTPATIENT
Start: 2020-07-06 | End: 2020-07-08

## 2020-07-06 RX ADMIN — Medication 81 MILLIGRAM(S): at 11:05

## 2020-07-06 RX ADMIN — Medication 100 MILLIGRAM(S): at 05:27

## 2020-07-06 RX ADMIN — Medication 40 MILLIEQUIVALENT(S): at 15:01

## 2020-07-06 RX ADMIN — Medication 40 MILLIGRAM(S): at 05:27

## 2020-07-06 RX ADMIN — Medication 100 MILLIGRAM(S): at 17:08

## 2020-07-06 RX ADMIN — DRONEDARONE 400 MILLIGRAM(S): 400 TABLET, FILM COATED ORAL at 17:08

## 2020-07-06 RX ADMIN — DRONEDARONE 400 MILLIGRAM(S): 400 TABLET, FILM COATED ORAL at 05:27

## 2020-07-06 RX ADMIN — ALBUTEROL 2 PUFF(S): 90 AEROSOL, METERED ORAL at 13:05

## 2020-07-06 RX ADMIN — Medication 100 MILLIGRAM(S): at 22:28

## 2020-07-06 RX ADMIN — ALBUTEROL 2 PUFF(S): 90 AEROSOL, METERED ORAL at 22:28

## 2020-07-06 RX ADMIN — Medication 100 MILLIGRAM(S): at 13:05

## 2020-07-06 RX ADMIN — IRON SUCROSE 110 MILLIGRAM(S): 20 INJECTION, SOLUTION INTRAVENOUS at 09:59

## 2020-07-06 RX ADMIN — Medication 1 TABLET(S): at 11:05

## 2020-07-06 RX ADMIN — ALBUTEROL 2 PUFF(S): 90 AEROSOL, METERED ORAL at 08:45

## 2020-07-06 RX ADMIN — SIMVASTATIN 40 MILLIGRAM(S): 20 TABLET, FILM COATED ORAL at 22:28

## 2020-07-06 RX ADMIN — CEFTRIAXONE 100 MILLIGRAM(S): 500 INJECTION, POWDER, FOR SOLUTION INTRAMUSCULAR; INTRAVENOUS at 08:46

## 2020-07-06 RX ADMIN — Medication 3 MILLILITER(S): at 16:19

## 2020-07-06 NOTE — CONSULT NOTE ADULT - ASSESSMENT
Bacteremia - with streptococcus    Plan - Cont Rocephin 1 gm iv q24hrs  if Strep Bovis will need to do colonoscopy to R/O colon Ca  if Viridans then will have to treat as Endocarditis, also could be group C/G.  repeat blood cultures tomorrow.
1. The etiology for anemia in this patient is multifactorial  2. No evidence of acute GI bleeding  3. R/o chronic GI bleeding    Suggestions:  1. Monitor H/H  2. Transfuse PRBC as needed  3. Protonix daily  4. Avoid NSAID  5. Check stool for occult blood X 3  6. CT-Scan of abdomen and pelvis  7. DVT prophylaxis

## 2020-07-06 NOTE — CONSULT NOTE ADULT - RS GEN PE MLT RESP DETAILS PC
Adequate: hears normal conversation without difficulty
no rhonchi/good air movement/respirations non-labored/no rales/breath sounds equal/no wheezes/airway patent
no rales/no wheezes/clear to auscultation bilaterally/good air movement/no rhonchi

## 2020-07-06 NOTE — CONSULT NOTE ADULT - CONSTITUTIONAL DETAILS
well-developed/no distress/well-groomed/well-nourished
no distress/well-groomed/well-nourished/well-developed

## 2020-07-06 NOTE — CONSULT NOTE ADULT - SUBJECTIVE AND OBJECTIVE BOX
HPI:  89 years old Guamanian speaking female, from home, lives with daughter, THANIA&Niraj with medical history of  CHF, afib (on Eliquis), complete heart block s/p pacemaker implant, HTN, pulmonary HTN, HLD, Dementia, C diff in past, presented to ED after having Cough and difficulty in breathing since morning. Patient states that her cough was dry , no phlegm was noted. Patient denies fever, chest pain ,orthopnea, leg swelling, palpitations, body ache , flu like symptoms, ill contacts, recent travel, any bleeding episode, dark stools , blood in urine.  When Patient came to ED her Labs were significant for HB 5.6, spoke to daughter Tanya, regarding any episode of bleeding in past, daughter states that last month her mother had stuffy nose and had two episodes of bleeding from nose, but was not large bleed.    GOC: were discussed with patients daughter, she wants DNR/DNI. Molst form in chart has to be signed by attending. (2020 11:59)      PAST MEDICAL & SURGICAL HISTORY:  CHF (congestive heart failure)  Pulmonary HTN  History of complete heart block  Atrial fibrillation  Hypercholesterolemia  HTN (hypertension)  Artificial pacemaker      No Known Allergies      Meds:  ALBUTerol    90 MICROgram(s) HFA Inhaler 2 Puff(s) Inhalation every 6 hours  albuterol/ipratropium for Nebulization 3 milliLiter(s) Nebulizer every 6 hours PRN  aspirin  chewable 81 milliGRAM(s) Oral daily  cefTRIAXone   IVPB 1000 milliGRAM(s) IV Intermittent every 24 hours  dronedarone 400 milliGRAM(s) Oral two times a day  hydrALAZINE 100 milliGRAM(s) Oral three times a day  iron sucrose IVPB 200 milliGRAM(s) IV Intermittent every 24 hours  lactobacillus acidophilus 1 Tablet(s) Oral daily  metoprolol tartrate 100 milliGRAM(s) Oral two times a day  pantoprazole    Tablet 40 milliGRAM(s) Oral before breakfast  simvastatin 40 milliGRAM(s) Oral at bedtime      SOCIAL HISTORY:  Smoker:  YES / NO        PACK YEARS:                         WHEN QUIT?  ETOH use:  YES / NO               FREQUENCY / QUANTITY:  Ilicit Drug use:  YES / NO  Occupation:  Assisted device use (Cane / Walker):  Live with:    FAMILY HISTORY:  FH: coronary artery disease  FH: hypertension      VITALS:  Vital Signs Last 24 Hrs  T(C): 36.4 (2020 13:28), Max: 37.2 (2020 23:31)  T(F): 97.6 (2020 13:28), Max: 98.9 (2020 23:31)  HR: 89 (2020 13:28) (66 - 89)  BP: 132/74 (2020 13:28) (132/74 - 170/58)  BP(mean): --  RR: 18 (2020 13:28) (17 - 20)  SpO2: 95% (2020 13:28) (95% - 97%)    LABS/DIAGNOSTIC TESTS:                          9.2    8.94  )-----------( 208      ( 2020 15:31 )             28.4     WBC Count: 8.94 K/uL ( @ 15:31)  WBC Count: 6.02 K/uL ( @ 10:37)  WBC Count: 4.92 K/uL ( @ 19:05)  WBC Count: 6.16 K/uL ( @ 11:59)  WBC Count: 6.32 K/uL ( @ 08:52)          139  |  99  |  25<H>  ----------------------------<  190<H>  3.1<L>   |  29  |  1.67<H>    Ca    9.1      2020 10:37    TPro  7.0  /  Alb  3.3<L>  /  TBili  0.5  /  DBili  x   /  AST  16  /  ALT  17  /  AlkPhos  57  07-06      Urinalysis Basic - ( 2020 12:12 )    Color: Yellow / Appearance: Clear / S.005 / pH: x  Gluc: x / Ketone: Negative  / Bili: Negative / Urobili: Negative   Blood: x / Protein: Negative / Nitrite: Negative   Leuk Esterase: Negative / RBC: x / WBC x   Sq Epi: x / Non Sq Epi: x / Bacteria: x        LIVER FUNCTIONS - ( 2020 10:37 )  Alb: 3.3 g/dL / Pro: 7.0 g/dL / ALK PHOS: 57 U/L / ALT: 17 U/L DA / AST: 16 U/L / GGT: x             PT/INR - ( 2020 09:56 )   PT: 19.5 sec;   INR: 1.71 ratio         PTT - ( 2020 09:56 )  PTT:34.9 sec    LACTATE:    ABG -     CULTURES:   .Blood Blood  - @ 14:53   Growth in aerobic bottle: Gram Positive Cocci in Pairs and Chains          RADIOLOGY:< from: CT Abdomen and Pelvis w/ Oral Cont (20 @ 18:24) >  EXAM:  CT ABDOMEN AND PELVIS OC                            PROCEDURE DATE:  2020          INTERPRETATION:  CLINICAL INFORMATION: Symptomatic anemia    COMPARISON: CT chest, abdomen and pelvis 4/15/2019    PROCEDURE:   CT of the Abdomen and Pelvis was performed without intravenous contrast.   Intravenous contrast: None.  Oral contrast: positive contrast was administered.  Sagittal and coronal reformats were performed.    FINDINGS:  LOWER CHEST: Cardiac device leads are noted. Small left and trace right pleural effusions with adjacent atelectasis. Small hiatal hernia.    LIVER: Within normal limits.  BILE DUCTS: Normal caliber.  GALLBLADDER: Within normal limits.  SPLEEN: Within normal limits.  PANCREAS: Within normal limits.  ADRENALS:Within normal limits.  KIDNEYS/URETERS: No hydronephrosis.    BLADDER: No radiodense debris.  REPRODUCTIVE ORGANS: 3.3 cm right ovarian cystic lesion may be further evaluated with ultrasound as warranted.    BOWEL: No bowel obstruction. Appendix is unremarkable. Moderate stool in colon  PERITONEUM: No ascites.  VESSELS: Normal caliber abdominal aorta. Moderate atherosclerosis.  RETROPERITONEUM/LYMPH NODES: No lymphadenopathy. No retroperitoneal hemorrhage.  ABDOMINAL WALL: Within normal limits.  BONES: Degenerative changes in the spine.    IMPRESSION:   No retroperitoneal hemorrhage or CT explanation for anemia.        EVANGELINA IVY M.D., ATTENDING RADIOLOGIST  This document has been electronically signed. 2020  6:49PM      -------------------------------------------------------------------------------------------------------------------------------------------------------------------------------------    < from: Xray Chest 2 Views PA/Lat (20 @ 09:31) >  EXAM:  XR CHEST PA LAT 2V                            PROCEDURE DATE:  2020          INTERPRETATION:  Exam Date: 2020 9:31 AM    History: Chest pain    Technique: Frontal and lateral views of the chest with comparison to  3/24/2020    Findings:    Left-sided pacemaker. Heart is enlarged. Mild pulmonary edema with trace to small bilateral pleural effusions. The apices and hemidiaphragms are unremarkable. Degenerative changes of the visualized osseous structures.    Impression:    Mild pulmonary edema with trace to small bilateral pleural effusions                GI HAMMER M.D., ATTENDING RADIOLOGIST  This document has been electronically signed. 2020 10:21AM          < end of copied text >      RUPALI  [  ] UNABLE TO ELICIT HPI:  89 years old [place]ian speaking female, from home, lives with daughter, Quentin with medical history of  CHF, afib (on Eliquis), complete heart block s/p pacemaker implant, HTN, pulmonary HTN, HLD, Dementia, C diff in past, presented to ED after having Cough and difficulty in breathing since morning. Patient states that her cough was dry , no phlegm was noted. Patient denies fever, chest pain ,orthopnea, leg swelling, palpitations, body ache , flu like symptoms, ill contacts, recent travel, any bleeding episode, dark stools , blood in urine.  When Patient came to ED her Labs were significant for HB 5.6, spoke to daughter Tanya, regarding any episode of bleeding in past, daughter states that last month her mother had stuffy nose and had two episodes of bleeding from nose, but was not large bleed.  GOC: were discussed with patients daughter, she wants DNR/DNI. Molst form in chart has to be signed by attending. (2020 11:59)      History as above, pt who was admitted here 3 months ago with COVID - 19 infection, she currently is admitted with sob and was found to be very anemic, her daughter is at the bedside and giving history as her mother is a little demented  and a poor historian. Her daughter states that she was having some red blood per rectum upon wiping after a BM. Asked to eval patient as she has Bacteremia with Strep sp but not identified yet but is not group A, B or pneumococcus. She has no fevers or chills, no rashes, she has no teeth or gum disease.    PAST MEDICAL & SURGICAL HISTORY:  CHF (congestive heart failure)  Pulmonary HTN  History of complete heart block  Atrial fibrillation  Hypercholesterolemia  HTN (hypertension)  Artificial pacemaker      No Known Allergies      Meds:  ALBUTerol    90 MICROgram(s) HFA Inhaler 2 Puff(s) Inhalation every 6 hours  albuterol/ipratropium for Nebulization 3 milliLiter(s) Nebulizer every 6 hours PRN  aspirin  chewable 81 milliGRAM(s) Oral daily  cefTRIAXone   IVPB 1000 milliGRAM(s) IV Intermittent every 24 hours  dronedarone 400 milliGRAM(s) Oral two times a day  hydrALAZINE 100 milliGRAM(s) Oral three times a day  iron sucrose IVPB 200 milliGRAM(s) IV Intermittent every 24 hours  lactobacillus acidophilus 1 Tablet(s) Oral daily  metoprolol tartrate 100 milliGRAM(s) Oral two times a day  pantoprazole    Tablet 40 milliGRAM(s) Oral before breakfast  simvastatin 40 milliGRAM(s) Oral at bedtime      SOCIAL HISTORY:  Smoker:  no  ETOH use:  no    FAMILY HISTORY:  FH: coronary artery disease  FH: hypertension      VITALS:  Vital Signs Last 24 Hrs  T(C): 36.4 (2020 13:28), Max: 37.2 (2020 23:31)  T(F): 97.6 (2020 13:28), Max: 98.9 (2020 23:31)  HR: 89 (2020 13:28) (66 - 89)  BP: 132/74 (2020 13:28) (132/74 - 170/58)  BP(mean): --  RR: 18 (2020 13:28) (17 - 20)  SpO2: 95% (2020 13:28) (95% - 97%)    LABS/DIAGNOSTIC TESTS:                          9.2    8.94  )-----------( 208      ( 2020 15:31 )             28.4     WBC Count: 8.94 K/uL ( @ 15:31)  WBC Count: 6.02 K/uL ( @ 10:37)  WBC Count: 4.92 K/uL ( @ 19:05)  WBC Count: 6.16 K/uL ( @ 11:59)  WBC Count: 6.32 K/uL ( @ 08:52)          139  |  99  |  25<H>  ----------------------------<  190<H>  3.1<L>   |  29  |  1.67<H>    Ca    9.1      2020 10:37    TPro  7.0  /  Alb  3.3<L>  /  TBili  0.5  /  DBili  x   /  AST  16  /  ALT  17  /  AlkPhos  57        Urinalysis Basic - ( 2020 12:12 )    Color: Yellow / Appearance: Clear / S.005 / pH: x  Gluc: x / Ketone: Negative  / Bili: Negative / Urobili: Negative   Blood: x / Protein: Negative / Nitrite: Negative   Leuk Esterase: Negative / RBC: x / WBC x   Sq Epi: x / Non Sq Epi: x / Bacteria: x        LIVER FUNCTIONS - ( 2020 10:37 )  Alb: 3.3 g/dL / Pro: 7.0 g/dL / ALK PHOS: 57 U/L / ALT: 17 U/L DA / AST: 16 U/L / GGT: x             PT/INR - ( 2020 09:56 )   PT: 19.5 sec;   INR: 1.71 ratio         PTT - ( 2020 09:56 )  PTT:34.9 sec    LACTATE:    ABG -     CULTURES:   .Blood Blood   @ 14:53   Growth in aerobic bottle: Gram Positive Cocci in Pairs and Chains          RADIOLOGY:< from: CT Abdomen and Pelvis w/ Oral Cont (20 @ 18:24) >  EXAM:  CT ABDOMEN AND PELVIS OC                            PROCEDURE DATE:  2020          INTERPRETATION:  CLINICAL INFORMATION: Symptomatic anemia    COMPARISON: CT chest, abdomen and pelvis 4/15/2019    PROCEDURE:   CT of the Abdomen and Pelvis was performed without intravenous contrast.   Intravenous contrast: None.  Oral contrast: positive contrast was administered.  Sagittal and coronal reformats were performed.    FINDINGS:  LOWER CHEST: Cardiac device leads are noted. Small left and trace right pleural effusions with adjacent atelectasis. Small hiatal hernia.    LIVER: Within normal limits.  BILE DUCTS: Normal caliber.  GALLBLADDER: Within normal limits.  SPLEEN: Within normal limits.  PANCREAS: Within normal limits.  ADRENALS:Within normal limits.  KIDNEYS/URETERS: No hydronephrosis.    BLADDER: No radiodense debris.  REPRODUCTIVE ORGANS: 3.3 cm right ovarian cystic lesion may be further evaluated with ultrasound as warranted.    BOWEL: No bowel obstruction. Appendix is unremarkable. Moderate stool in colon  PERITONEUM: No ascites.  VESSELS: Normal caliber abdominal aorta. Moderate atherosclerosis.  RETROPERITONEUM/LYMPH NODES: No lymphadenopathy. No retroperitoneal hemorrhage.  ABDOMINAL WALL: Within normal limits.  BONES: Degenerative changes in the spine.    IMPRESSION:   No retroperitoneal hemorrhage or CT explanation for anemia.        EVANGELINA IVY M.D., ATTENDING RADIOLOGIST  This document has been electronically signed. 2020  6:49PM      -------------------------------------------------------------------------------------------------------------------------------------------------------------------------------------    < from: Xray Chest 2 Views PA/Lat (20 @ 09:31) >  EXAM:  XR CHEST PA LAT 2V                            PROCEDURE DATE:  2020          INTERPRETATION:  Exam Date: 2020 9:31 AM    History: Chest pain    Technique: Frontal and lateral views of the chest with comparison to  3/24/2020    Findings:    Left-sided pacemaker. Heart is enlarged. Mild pulmonary edema with trace to small bilateral pleural effusions. The apices and hemidiaphragms are unremarkable. Degenerative changes of the visualized osseous structures.    Impression:    Mild pulmonary edema with trace to small bilateral pleural effusions                GI HAMMER M.D., ATTENDING RADIOLOGIST  This document has been electronically signed. Jul  2020 10:21AM          < end of copied text >      ROS  [  ] UNABLE TO ELICIT

## 2020-07-06 NOTE — CONSULT NOTE ADULT - GASTROINTESTINAL DETAILS
soft/bowel sounds normal/no distention/no rigidity/nontender
no rigidity/soft/no masses palpable/bowel sounds normal/no guarding/no rebound tenderness/nontender/no organomegaly

## 2020-07-06 NOTE — CONSULT NOTE ADULT - NEGATIVE GASTROINTESTINAL SYMPTOMS
no abdominal pain/no diarrhea/no nausea/no vomiting
no hematochezia/no steatorrhea/no vomiting/no hiccoughs/no abdominal pain/no jaundice/no diarrhea/no nausea/no melena

## 2020-07-07 LAB
ALBUMIN SERPL ELPH-MCNC: 3.3 G/DL — LOW (ref 3.5–5)
ALP SERPL-CCNC: 55 U/L — SIGNIFICANT CHANGE UP (ref 40–120)
ALT FLD-CCNC: 17 U/L DA — SIGNIFICANT CHANGE UP (ref 10–60)
ANION GAP SERPL CALC-SCNC: 6 MMOL/L — SIGNIFICANT CHANGE UP (ref 5–17)
AST SERPL-CCNC: 19 U/L — SIGNIFICANT CHANGE UP (ref 10–40)
BASOPHILS # BLD AUTO: 0.11 K/UL — SIGNIFICANT CHANGE UP (ref 0–0.2)
BASOPHILS NFR BLD AUTO: 1.5 % — SIGNIFICANT CHANGE UP (ref 0–2)
BILIRUB SERPL-MCNC: 0.3 MG/DL — SIGNIFICANT CHANGE UP (ref 0.2–1.2)
BUN SERPL-MCNC: 25 MG/DL — HIGH (ref 7–18)
CALCIUM SERPL-MCNC: 9.2 MG/DL — SIGNIFICANT CHANGE UP (ref 8.4–10.5)
CHLORIDE SERPL-SCNC: 103 MMOL/L — SIGNIFICANT CHANGE UP (ref 96–108)
CO2 SERPL-SCNC: 31 MMOL/L — SIGNIFICANT CHANGE UP (ref 22–31)
CREAT SERPL-MCNC: 1.42 MG/DL — HIGH (ref 0.5–1.3)
EOSINOPHIL # BLD AUTO: 0.29 K/UL — SIGNIFICANT CHANGE UP (ref 0–0.5)
EOSINOPHIL NFR BLD AUTO: 3.9 % — SIGNIFICANT CHANGE UP (ref 0–6)
GLUCOSE SERPL-MCNC: 94 MG/DL — SIGNIFICANT CHANGE UP (ref 70–99)
HCT VFR BLD CALC: 27.3 % — LOW (ref 34.5–45)
HGB BLD-MCNC: 9 G/DL — LOW (ref 11.5–15.5)
IMM GRANULOCYTES NFR BLD AUTO: 1.4 % — SIGNIFICANT CHANGE UP (ref 0–1.5)
LYMPHOCYTES # BLD AUTO: 1.63 K/UL — SIGNIFICANT CHANGE UP (ref 1–3.3)
LYMPHOCYTES # BLD AUTO: 22 % — SIGNIFICANT CHANGE UP (ref 13–44)
MAGNESIUM SERPL-MCNC: 2.4 MG/DL — SIGNIFICANT CHANGE UP (ref 1.6–2.6)
MCHC RBC-ENTMCNC: 25.6 PG — LOW (ref 27–34)
MCHC RBC-ENTMCNC: 33 GM/DL — SIGNIFICANT CHANGE UP (ref 32–36)
MCV RBC AUTO: 77.8 FL — LOW (ref 80–100)
MONOCYTES # BLD AUTO: 0.79 K/UL — SIGNIFICANT CHANGE UP (ref 0–0.9)
MONOCYTES NFR BLD AUTO: 10.7 % — SIGNIFICANT CHANGE UP (ref 2–14)
NEUTROPHILS # BLD AUTO: 4.48 K/UL — SIGNIFICANT CHANGE UP (ref 1.8–7.4)
NEUTROPHILS NFR BLD AUTO: 60.5 % — SIGNIFICANT CHANGE UP (ref 43–77)
NRBC # BLD: 1 /100 WBCS — HIGH (ref 0–0)
PHOSPHATE SERPL-MCNC: 2.5 MG/DL — SIGNIFICANT CHANGE UP (ref 2.5–4.5)
PLATELET # BLD AUTO: 211 K/UL — SIGNIFICANT CHANGE UP (ref 150–400)
POTASSIUM SERPL-MCNC: 3.8 MMOL/L — SIGNIFICANT CHANGE UP (ref 3.5–5.3)
POTASSIUM SERPL-SCNC: 3.8 MMOL/L — SIGNIFICANT CHANGE UP (ref 3.5–5.3)
PROT SERPL-MCNC: 7 G/DL — SIGNIFICANT CHANGE UP (ref 6–8.3)
RBC # BLD: 3.51 M/UL — LOW (ref 3.8–5.2)
RBC # FLD: 17.6 % — HIGH (ref 10.3–14.5)
SODIUM SERPL-SCNC: 140 MMOL/L — SIGNIFICANT CHANGE UP (ref 135–145)
WBC # BLD: 7.4 K/UL — SIGNIFICANT CHANGE UP (ref 3.8–10.5)
WBC # FLD AUTO: 7.4 K/UL — SIGNIFICANT CHANGE UP (ref 3.8–10.5)

## 2020-07-07 RX ADMIN — Medication 1 TABLET(S): at 12:31

## 2020-07-07 RX ADMIN — CEFTRIAXONE 100 MILLIGRAM(S): 500 INJECTION, POWDER, FOR SOLUTION INTRAMUSCULAR; INTRAVENOUS at 06:11

## 2020-07-07 RX ADMIN — ALBUTEROL 2 PUFF(S): 90 AEROSOL, METERED ORAL at 23:02

## 2020-07-07 RX ADMIN — SIMVASTATIN 40 MILLIGRAM(S): 20 TABLET, FILM COATED ORAL at 23:02

## 2020-07-07 RX ADMIN — Medication 100 MILLIGRAM(S): at 05:20

## 2020-07-07 RX ADMIN — DRONEDARONE 400 MILLIGRAM(S): 400 TABLET, FILM COATED ORAL at 05:20

## 2020-07-07 RX ADMIN — PANTOPRAZOLE SODIUM 40 MILLIGRAM(S): 20 TABLET, DELAYED RELEASE ORAL at 05:21

## 2020-07-07 RX ADMIN — Medication 3 MILLILITER(S): at 16:31

## 2020-07-07 RX ADMIN — Medication 100 MILLIGRAM(S): at 05:19

## 2020-07-07 RX ADMIN — ALBUTEROL 2 PUFF(S): 90 AEROSOL, METERED ORAL at 10:26

## 2020-07-07 RX ADMIN — Medication 100 MILLIGRAM(S): at 17:16

## 2020-07-07 RX ADMIN — Medication 81 MILLIGRAM(S): at 12:31

## 2020-07-07 RX ADMIN — Medication 100 MILLIGRAM(S): at 23:02

## 2020-07-07 RX ADMIN — DRONEDARONE 400 MILLIGRAM(S): 400 TABLET, FILM COATED ORAL at 17:16

## 2020-07-07 RX ADMIN — IRON SUCROSE 110 MILLIGRAM(S): 20 INJECTION, SOLUTION INTRAVENOUS at 10:26

## 2020-07-07 RX ADMIN — ALBUTEROL 2 PUFF(S): 90 AEROSOL, METERED ORAL at 05:21

## 2020-07-07 RX ADMIN — Medication 100 MILLIGRAM(S): at 13:58

## 2020-07-08 LAB
CULTURE RESULTS: SIGNIFICANT CHANGE UP
ORGANISM # SPEC MICROSCOPIC CNT: SIGNIFICANT CHANGE UP
ORGANISM # SPEC MICROSCOPIC CNT: SIGNIFICANT CHANGE UP
SPECIMEN SOURCE: SIGNIFICANT CHANGE UP

## 2020-07-08 RX ADMIN — Medication 100 MILLIGRAM(S): at 06:17

## 2020-07-08 RX ADMIN — ALBUTEROL 2 PUFF(S): 90 AEROSOL, METERED ORAL at 03:16

## 2020-07-08 RX ADMIN — Medication 1 TABLET(S): at 12:46

## 2020-07-08 RX ADMIN — PANTOPRAZOLE SODIUM 40 MILLIGRAM(S): 20 TABLET, DELAYED RELEASE ORAL at 06:17

## 2020-07-08 RX ADMIN — IRON SUCROSE 110 MILLIGRAM(S): 20 INJECTION, SOLUTION INTRAVENOUS at 12:46

## 2020-07-08 RX ADMIN — CEFTRIAXONE 100 MILLIGRAM(S): 500 INJECTION, POWDER, FOR SOLUTION INTRAMUSCULAR; INTRAVENOUS at 07:11

## 2020-07-08 RX ADMIN — Medication 100 MILLIGRAM(S): at 13:25

## 2020-07-08 RX ADMIN — Medication 100 MILLIGRAM(S): at 21:32

## 2020-07-08 RX ADMIN — Medication 100 MILLIGRAM(S): at 17:13

## 2020-07-08 RX ADMIN — Medication 100 MILLIGRAM(S): at 06:18

## 2020-07-08 RX ADMIN — ALBUTEROL 2 PUFF(S): 90 AEROSOL, METERED ORAL at 21:33

## 2020-07-08 RX ADMIN — DRONEDARONE 400 MILLIGRAM(S): 400 TABLET, FILM COATED ORAL at 17:13

## 2020-07-08 RX ADMIN — SIMVASTATIN 40 MILLIGRAM(S): 20 TABLET, FILM COATED ORAL at 21:32

## 2020-07-08 RX ADMIN — Medication 81 MILLIGRAM(S): at 12:46

## 2020-07-08 RX ADMIN — DRONEDARONE 400 MILLIGRAM(S): 400 TABLET, FILM COATED ORAL at 06:18

## 2020-07-08 NOTE — PROGRESS NOTE ADULT - NEUROLOGICAL DETAILS
sensation intact/cranial nerves intact/responds to verbal commands/responds to pain
cranial nerves intact/responds to pain/sensation intact/responds to verbal commands
sensation intact/cranial nerves intact/responds to pain/responds to verbal commands

## 2020-07-09 LAB
ANION GAP SERPL CALC-SCNC: 8 MMOL/L — SIGNIFICANT CHANGE UP (ref 5–17)
BASOPHILS # BLD AUTO: 0.07 K/UL — SIGNIFICANT CHANGE UP (ref 0–0.2)
BASOPHILS NFR BLD AUTO: 0.9 % — SIGNIFICANT CHANGE UP (ref 0–2)
BUN SERPL-MCNC: 20 MG/DL — HIGH (ref 7–18)
CALCIUM SERPL-MCNC: 8.8 MG/DL — SIGNIFICANT CHANGE UP (ref 8.4–10.5)
CHLORIDE SERPL-SCNC: 105 MMOL/L — SIGNIFICANT CHANGE UP (ref 96–108)
CO2 SERPL-SCNC: 27 MMOL/L — SIGNIFICANT CHANGE UP (ref 22–31)
CREAT SERPL-MCNC: 1.32 MG/DL — HIGH (ref 0.5–1.3)
EOSINOPHIL # BLD AUTO: 1.13 K/UL — HIGH (ref 0–0.5)
EOSINOPHIL NFR BLD AUTO: 15.2 % — HIGH (ref 0–6)
GLUCOSE SERPL-MCNC: 103 MG/DL — HIGH (ref 70–99)
HCT VFR BLD CALC: 27.4 % — LOW (ref 34.5–45)
HGB BLD-MCNC: 8.7 G/DL — LOW (ref 11.5–15.5)
IMM GRANULOCYTES NFR BLD AUTO: 1.6 % — HIGH (ref 0–1.5)
LYMPHOCYTES # BLD AUTO: 1.17 K/UL — SIGNIFICANT CHANGE UP (ref 1–3.3)
LYMPHOCYTES # BLD AUTO: 15.7 % — SIGNIFICANT CHANGE UP (ref 13–44)
MAGNESIUM SERPL-MCNC: 2.4 MG/DL — SIGNIFICANT CHANGE UP (ref 1.6–2.6)
MCHC RBC-ENTMCNC: 25.6 PG — LOW (ref 27–34)
MCHC RBC-ENTMCNC: 31.8 GM/DL — LOW (ref 32–36)
MCV RBC AUTO: 80.6 FL — SIGNIFICANT CHANGE UP (ref 80–100)
MONOCYTES # BLD AUTO: 0.63 K/UL — SIGNIFICANT CHANGE UP (ref 0–0.9)
MONOCYTES NFR BLD AUTO: 8.5 % — SIGNIFICANT CHANGE UP (ref 2–14)
NEUTROPHILS # BLD AUTO: 4.33 K/UL — SIGNIFICANT CHANGE UP (ref 1.8–7.4)
NEUTROPHILS NFR BLD AUTO: 58.1 % — SIGNIFICANT CHANGE UP (ref 43–77)
NRBC # BLD: 2 /100 WBCS — HIGH (ref 0–0)
PHOSPHATE SERPL-MCNC: 2.7 MG/DL — SIGNIFICANT CHANGE UP (ref 2.5–4.5)
PLATELET # BLD AUTO: 198 K/UL — SIGNIFICANT CHANGE UP (ref 150–400)
POTASSIUM SERPL-MCNC: 3.9 MMOL/L — SIGNIFICANT CHANGE UP (ref 3.5–5.3)
POTASSIUM SERPL-SCNC: 3.9 MMOL/L — SIGNIFICANT CHANGE UP (ref 3.5–5.3)
RBC # BLD: 3.4 M/UL — LOW (ref 3.8–5.2)
RBC # FLD: 19.2 % — HIGH (ref 10.3–14.5)
SODIUM SERPL-SCNC: 140 MMOL/L — SIGNIFICANT CHANGE UP (ref 135–145)
WBC # BLD: 7.45 K/UL — SIGNIFICANT CHANGE UP (ref 3.8–10.5)
WBC # FLD AUTO: 7.45 K/UL — SIGNIFICANT CHANGE UP (ref 3.8–10.5)

## 2020-07-09 RX ORDER — IRON SUCROSE 20 MG/ML
200 INJECTION, SOLUTION INTRAVENOUS EVERY 24 HOURS
Refills: 0 | Status: DISCONTINUED | OUTPATIENT
Start: 2020-07-09 | End: 2020-07-10

## 2020-07-09 RX ADMIN — Medication 100 MILLIGRAM(S): at 06:04

## 2020-07-09 RX ADMIN — IRON SUCROSE 110 MILLIGRAM(S): 20 INJECTION, SOLUTION INTRAVENOUS at 13:42

## 2020-07-09 RX ADMIN — Medication 100 MILLIGRAM(S): at 21:55

## 2020-07-09 RX ADMIN — DRONEDARONE 400 MILLIGRAM(S): 400 TABLET, FILM COATED ORAL at 17:38

## 2020-07-09 RX ADMIN — PANTOPRAZOLE SODIUM 40 MILLIGRAM(S): 20 TABLET, DELAYED RELEASE ORAL at 06:03

## 2020-07-09 RX ADMIN — DRONEDARONE 400 MILLIGRAM(S): 400 TABLET, FILM COATED ORAL at 06:03

## 2020-07-09 RX ADMIN — CEFTRIAXONE 100 MILLIGRAM(S): 500 INJECTION, POWDER, FOR SOLUTION INTRAMUSCULAR; INTRAVENOUS at 06:35

## 2020-07-09 RX ADMIN — ALBUTEROL 2 PUFF(S): 90 AEROSOL, METERED ORAL at 21:55

## 2020-07-09 RX ADMIN — Medication 1 TABLET(S): at 13:41

## 2020-07-09 RX ADMIN — SIMVASTATIN 40 MILLIGRAM(S): 20 TABLET, FILM COATED ORAL at 21:55

## 2020-07-09 RX ADMIN — Medication 100 MILLIGRAM(S): at 13:41

## 2020-07-09 RX ADMIN — Medication 81 MILLIGRAM(S): at 13:41

## 2020-07-09 RX ADMIN — Medication 100 MILLIGRAM(S): at 17:38

## 2020-07-09 NOTE — DIETITIAN INITIAL EVALUATION ADULT. - PERTINENT LABORATORY DATA
Take zithromax daily for 5 days.   Take robitussin with codeine at bedtime as needed for cough.  May take up to every 4 hours.  Do not drive or work after taking.  Start asmanex daily and follow up with us in one month for asthma check.  Continue effexor.   Use maxalt as needed for migraine headache  Continue flonase nasal spray      At [facility], we strive to deliver an exceptional experience to you, every time we see you.  If you receive a survey in the mail, please send us back your thoughts. We really do value your feedback.    Based on your medical history, these are the current health maintenance/preventive care services that you are due for (some may have been done at this visit.)  Health Maintenance Due   Topic Date Due     DEPRESSION ACTION PLAN Q1 YR  12/12/1983     ASTHMA CONTROL TEST Q6 MOS  05/07/2018     PHQ-9 Q6 MONTHS  05/07/2018         Suggested websites for health information:  Www.[employer] : Up to date and easily searchable information on multiple topics.  Www.medlineplus.gov : medication info, interactive tutorials, watch real surgeries online  Www.familydoctor.org : good info from the Academy of Family Physicians  Www.cdc.gov : public health info, travel advisories, epidemics (H1N1)  Www.aap.org : children's health info, normal development, vaccinations  Www.health.state.[place].us : [place] dept of health, public health issues in [place], N1N1    Your care team:     Family Medicine   ESTELLE Cr MD Emily Bunt, APRN CNP   S. MD Melita Durand MD Angela Wermerskirchen, MD         Clinic hours: Monday - Wednesday 7 am-7 pm   Thursdays and Fridays 7 am-5 pm.     [facility]: Monday - Friday 11 am-9 pm,   Saturday and Sunday 9 am-5 pm.    [facility]: Monday -Thursday 8 am-8 pm; Friday 8 am-6 pm; Saturday and Sunday 9 am-5 pm.     [facility]: Monday Thursday 8 am   7 pm; Friday 8 am   6  pm    Clinic: [phone]   [facility]: [phone]   [facility]: [phone]               07-09 Na140 mmol/L Glu 103 mg/dL<H> K+ 3.9 mmol/L Cr  1.32 mg/dL<H> BUN 20 mg/dL<H> 07-09 Phos 2.7 mg/dL 07-07 Alb 3.3 g/dL<L>

## 2020-07-09 NOTE — PROGRESS NOTE ADULT - PROBLEM SELECTOR PLAN 3
Patients Hb on arrival was 5.6, repeat showed 6.1.   -likely iron deficient/ due to blood loss  -s/p 2 units of PRBC.  - Repeat H/h : 9  Total Iron: 16, TIBC: 385, IV iron   -continue holding eliquis.  -FOBT -ve,  -CT abdomen pelvis : no bleed  -GI Dr Black ON BOARD
Patients Hb on arrival was 5.6, repeat showed 6.1.   -likely iron deficient/ due to blood loss  -s/p 2 units of PRBC.  - Repeat H/h : 9  Total Iron: 16, TIBC: 385, IV iron   -continue holding eliquis.  -FOBT -ve,  -CT abdomen pelvis : no bleed  -GI Dr Black ON BOARD: follow up for plan for colonoscopy
Patients Hb on arrival was 5.6, repeat showed 6.1.   -likely iron deficient/ due to blood loss  -s/p 2 units of PRBC.  - Repeat H/h : 9  Total Iron: 16, TIBC: 385, IV iron   -continue holding eliquis.  -FOBT -ve,  -CT abdomen pelvis : no bleed  -GI Dr Black.
Patients Hb on arrival was 5.6, repeat showed 6.1.   -likely iron deficient/ due to blood loss  -s/p 2 units of PRBC.  - Repeat H/h : 9  Total Iron: 16, TIBC: 385, IV iron x3  -continue holding eliquis, will resume if FOBT negative x3 ,  -CT abdomen pelvis : no bleed  -GI Dr Black ON BOARD: family refused EGD/colonoscopy

## 2020-07-09 NOTE — DIETITIAN INITIAL EVALUATION ADULT. - OTHER INFO
unable to interview patient face to face as has covid-19 . attempted to contact patient On extension in room but No reply . no information on wt loss available

## 2020-07-09 NOTE — PROGRESS NOTE ADULT - PROBLEM SELECTOR PROBLEM 2
Acute on chronic diastolic congestive heart failure
Bacteremia

## 2020-07-09 NOTE — PROGRESS NOTE ADULT - PROBLEM SELECTOR PLAN 4
-Patient has a pace maker.  EKG noted

## 2020-07-09 NOTE — PROGRESS NOTE ADULT - PROBLEM SELECTOR PLAN 6
Patient takes Simvastatin at home.  -continue with simvastatin.

## 2020-07-09 NOTE — PROGRESS NOTE ADULT - PROBLEM SELECTOR PLAN 2
Patient came in after cough.  Pro BNP 2380.  X ray showed pulmonary vascular congestion.  s/p 1 IV Lasix 40 In ED.   - will hold lasix for now, as her symptoms have Improved   ECHO: normal EF with G II DD   - SHE WILL NEED HOSPITAL BED TO KEEP THE HEAD OF THE BED 30 DEGREE ELEVATED TO HELP THE SHORTNESS OF BREATH. ALSO, SHE NEEDS FREQUENT REPOSITIONING THAT CANNOT BE ACHIEVED WITH ORDINARY BED.  ON BOARD,
blood culture: strep   no WBC count, no fever   - Rocephin started   - Dr Power consulted   - Follow repeat Blood culture

## 2020-07-09 NOTE — PROGRESS NOTE ADULT - PROBLEM SELECTOR PLAN 5
Patient has hx of Afib.   -rate controlled   continue with Multaq and metoprolol .  eliquis on hold for Severe anemia
Patient has hx of Afib.   -rate controlled   continue with Multaq and metoprolol .  eliquis on hold for Severe anemia. will repeat FOBT, if negative x3 will resume Eliquis

## 2020-07-09 NOTE — PROGRESS NOTE ADULT - PROBLEM SELECTOR PLAN 1
blood culture: strep   no WBC count, no fever   - Rocephin continued   - Dr Power consulted   - Follow repeat Blood culture: testing
blood culture: strep mitis/oralis/salivaris/vestibularis.   no WBC count, no fever   - Rocephin continued   - Dr Power consulted   - Follow repeat Blood culture: NG  - Pt will need IV line for 4 weeks of Ab coverage.
Patient came in after cough.  Pro BNP 2380.  X ray showed pulmonary vascular congestion.  s/p 1 IV Lasix 40 In ED.   - will hold lasix after today's dose   Echo done in march 2019 showed severe mitral regurgitation normal LV systolic function and grade II Diastolic dysfunction.  repeat ECHO ordered   -monitor Is and Os.  -daily weight.
blood culture: strep mitis/oralis/salivaris/vestibularis, source: tooth infection   no WBC count, no fever   - Rocephin continued   - Dr Power consulted   - Follow repeat Blood culture: NG  - Pt will need IV line for 4 weeks of Ab coverage, : Rocephin IV OD, till Aug 3,   - Spoke to IR for DUAL line  placement. Approved by Dr Calderón. .

## 2020-07-10 ENCOUNTER — TRANSCRIPTION ENCOUNTER (OUTPATIENT)
Age: 85
End: 2020-07-10

## 2020-07-10 VITALS
RESPIRATION RATE: 18 BRPM | SYSTOLIC BLOOD PRESSURE: 144 MMHG | HEART RATE: 62 BPM | DIASTOLIC BLOOD PRESSURE: 45 MMHG | TEMPERATURE: 97 F | OXYGEN SATURATION: 97 %

## 2020-07-10 LAB
CULTURE RESULTS: SIGNIFICANT CHANGE UP
OB PNL STL: POSITIVE
SPECIMEN SOURCE: SIGNIFICANT CHANGE UP

## 2020-07-10 PROCEDURE — 84145 PROCALCITONIN (PCT): CPT

## 2020-07-10 PROCEDURE — 36430 TRANSFUSION BLD/BLD COMPNT: CPT

## 2020-07-10 PROCEDURE — 83880 ASSAY OF NATRIURETIC PEPTIDE: CPT

## 2020-07-10 PROCEDURE — 84155 ASSAY OF PROTEIN SERUM: CPT

## 2020-07-10 PROCEDURE — 86850 RBC ANTIBODY SCREEN: CPT

## 2020-07-10 PROCEDURE — 80048 BASIC METABOLIC PNL TOTAL CA: CPT

## 2020-07-10 PROCEDURE — P9040: CPT

## 2020-07-10 PROCEDURE — 82272 OCCULT BLD FECES 1-3 TESTS: CPT

## 2020-07-10 PROCEDURE — 36415 COLL VENOUS BLD VENIPUNCTURE: CPT

## 2020-07-10 PROCEDURE — 86900 BLOOD TYPING SEROLOGIC ABO: CPT

## 2020-07-10 PROCEDURE — 71046 X-RAY EXAM CHEST 2 VIEWS: CPT

## 2020-07-10 PROCEDURE — 80053 COMPREHEN METABOLIC PANEL: CPT

## 2020-07-10 PROCEDURE — 84165 PROTEIN E-PHORESIS SERUM: CPT

## 2020-07-10 PROCEDURE — 83735 ASSAY OF MAGNESIUM: CPT

## 2020-07-10 PROCEDURE — 93005 ELECTROCARDIOGRAM TRACING: CPT

## 2020-07-10 PROCEDURE — 87150 DNA/RNA AMPLIFIED PROBE: CPT

## 2020-07-10 PROCEDURE — 85027 COMPLETE CBC AUTOMATED: CPT

## 2020-07-10 PROCEDURE — 86769 SARS-COV-2 COVID-19 ANTIBODY: CPT

## 2020-07-10 PROCEDURE — 82728 ASSAY OF FERRITIN: CPT

## 2020-07-10 PROCEDURE — 83540 ASSAY OF IRON: CPT

## 2020-07-10 PROCEDURE — 82746 ASSAY OF FOLIC ACID SERUM: CPT

## 2020-07-10 PROCEDURE — 81003 URINALYSIS AUTO W/O SCOPE: CPT

## 2020-07-10 PROCEDURE — 86901 BLOOD TYPING SEROLOGIC RH(D): CPT

## 2020-07-10 PROCEDURE — 84100 ASSAY OF PHOSPHORUS: CPT

## 2020-07-10 PROCEDURE — 93306 TTE W/DOPPLER COMPLETE: CPT

## 2020-07-10 PROCEDURE — 83615 LACTATE (LD) (LDH) ENZYME: CPT

## 2020-07-10 PROCEDURE — 87040 BLOOD CULTURE FOR BACTERIA: CPT

## 2020-07-10 PROCEDURE — 86923 COMPATIBILITY TEST ELECTRIC: CPT

## 2020-07-10 PROCEDURE — 94640 AIRWAY INHALATION TREATMENT: CPT

## 2020-07-10 PROCEDURE — 82607 VITAMIN B-12: CPT

## 2020-07-10 PROCEDURE — U0003: CPT

## 2020-07-10 PROCEDURE — 85730 THROMBOPLASTIN TIME PARTIAL: CPT

## 2020-07-10 PROCEDURE — 84484 ASSAY OF TROPONIN QUANT: CPT

## 2020-07-10 PROCEDURE — 85610 PROTHROMBIN TIME: CPT

## 2020-07-10 PROCEDURE — 96374 THER/PROPH/DIAG INJ IV PUSH: CPT

## 2020-07-10 PROCEDURE — 99285 EMERGENCY DEPT VISIT HI MDM: CPT

## 2020-07-10 PROCEDURE — 83550 IRON BINDING TEST: CPT

## 2020-07-10 RX ORDER — CEFTRIAXONE 500 MG/1
1000 INJECTION, POWDER, FOR SOLUTION INTRAMUSCULAR; INTRAVENOUS EVERY 24 HOURS
Refills: 0 | Status: DISCONTINUED | OUTPATIENT
Start: 2020-07-10 | End: 2020-07-10

## 2020-07-10 RX ORDER — CEFTRIAXONE 500 MG/1
1 INJECTION, POWDER, FOR SOLUTION INTRAMUSCULAR; INTRAVENOUS
Qty: 24 | Refills: 0
Start: 2020-07-10 | End: 2020-08-02

## 2020-07-10 RX ORDER — APIXABAN 2.5 MG/1
1 TABLET, FILM COATED ORAL
Qty: 0 | Refills: 0 | DISCHARGE

## 2020-07-10 RX ORDER — METOPROLOL TARTRATE 50 MG
1 TABLET ORAL
Qty: 0 | Refills: 0 | DISCHARGE
Start: 2020-07-10

## 2020-07-10 RX ORDER — FERROUS SULFATE 325(65) MG
1 TABLET ORAL
Qty: 30 | Refills: 0
Start: 2020-07-10 | End: 2020-08-08

## 2020-07-10 RX ADMIN — Medication 100 MILLIGRAM(S): at 14:14

## 2020-07-10 RX ADMIN — CEFTRIAXONE 100 MILLIGRAM(S): 500 INJECTION, POWDER, FOR SOLUTION INTRAMUSCULAR; INTRAVENOUS at 06:35

## 2020-07-10 RX ADMIN — IRON SUCROSE 110 MILLIGRAM(S): 20 INJECTION, SOLUTION INTRAVENOUS at 12:37

## 2020-07-10 RX ADMIN — Medication 1 TABLET(S): at 11:07

## 2020-07-10 RX ADMIN — Medication 100 MILLIGRAM(S): at 05:29

## 2020-07-10 RX ADMIN — Medication 81 MILLIGRAM(S): at 11:07

## 2020-07-10 RX ADMIN — PANTOPRAZOLE SODIUM 40 MILLIGRAM(S): 20 TABLET, DELAYED RELEASE ORAL at 05:31

## 2020-07-10 RX ADMIN — DRONEDARONE 400 MILLIGRAM(S): 400 TABLET, FILM COATED ORAL at 05:30

## 2020-07-10 NOTE — DISCHARGE NOTE PROVIDER - NSDCCPCAREPLAN_GEN_ALL_CORE_FT
PRINCIPAL DISCHARGE DIAGNOSIS  Diagnosis: Bacteremia  Assessment and Plan of Treatment: You presented with blood culture grewing  strep mitis/ oralis/ salivaris/ vestibularis most likely due to tooth infection. You received IV Rocephin, she received Dual cath and IV antibiotics at home set up. You are recommended to take Rocephin 1g once a day  till Aug 3. You are recommended to get the IV line removed following the completion of the antibiotic course and follow up with PCP. Also, recommended to follow up with Dentist for tooth infection      SECONDARY DISCHARGE DIAGNOSES  Diagnosis: CHF exacerbation  Assessment and Plan of Treatment: You presented with CHF exacerbation and you received IV lasix. You are recommended to continue your home  medication regimen and follow up with PCP    Diagnosis: Anemia due to other cause  Assessment and Plan of Treatment: You presented with Hb: 5.6 on admission, you received 2 PRBC transfusion. Your anemia panel showed Total Iron: 16, TIBC: 385. You also received IV iron transfusion while in the hospital. You fecal occult test came back positive, one out of 3 FOBT. You refused any invasive GI workup to rule out any GI causes of anemia. Since you presented with anemia and FOBT was positive, your eliquis is discontinued. You are recommended to take Ferrous sulphate and follow up with PCP.       Diagnosis: Atrial fibrillation  Assessment and Plan of Treatment: You are recommended to stop taking eliquis as mentioned above. You are recommended to continue  Multaq and metoprolol as you were taking before and follow up with PCP    Diagnosis: Hypercholesterolemia  Assessment and Plan of Treatment: You are recommended to continue your home medication and follow up with PCP    Diagnosis: History of complete heart block  Assessment and Plan of Treatment: Follow up with PCP. You have PPM.    Diagnosis: HTN (hypertension)  Assessment and Plan of Treatment: You home medication regimen for HTNis continued. REcommended to follow up with PCP.

## 2020-07-10 NOTE — DISCHARGE NOTE NURSING/CASE MANAGEMENT/SOCIAL WORK - PATIENT PORTAL LINK FT
You can access the FollowMyHealth Patient Portal offered by [facility] by registering at the following website: [email or web address]. By joining [facility]’s FollowMyHealth portal, you will also be able to view your health information using other applications (apps) compatible with our system.

## 2020-07-10 NOTE — PROGRESS NOTE ADULT - NOSE
no deviation/no discharge
no discharge/no deviation
no discharge/no deviation/clear discharge

## 2020-07-10 NOTE — PROGRESS NOTE ADULT - NEGATIVE CARDIOVASCULAR SYMPTOMS
no chest pain/no orthopnea
no orthopnea/no chest pain
no orthopnea/no chest pain
no chest pain/no orthopnea
no chest pain/no orthopnea

## 2020-07-10 NOTE — PROGRESS NOTE ADULT - NEGATIVE GENERAL GENITOURINARY SYMPTOMS
no flank pain L/no flank pain R/no renal colic/no dysuria/no hematuria/no incontinence
no dysuria/no flank pain L/no renal colic/no incontinence/no flank pain R/no hematuria
no incontinence/no flank pain L/no hematuria/no flank pain R/no dysuria/no renal colic
no dysuria/no flank pain L/no incontinence/no hematuria/no flank pain R/no renal colic
no flank pain R/no renal colic/no dysuria/no hematuria/no incontinence/no flank pain L

## 2020-07-10 NOTE — PROGRESS NOTE ADULT - SUBJECTIVE AND OBJECTIVE BOX
CARDIOLOGY/MEDICAL ATTENDING    CHIEF COMPLAINT:Patient is a 89y old  Female who presents with a chief complaint of SOB,cough.    HPI:Pt is a 89 yr old female well known to me with PMHX of PAF,MR,ASS/P PPM,HTN,CRI,CHF,Pulmonary HTN, COVID 3/20 who presents to ER with sob and cough but no fever. In ER, CXR reveales congestion and blood work reveals anemia, occult-.      PAST MEDICAL & SURGICAL HISTORY:  CHF (congestive heart failure)  Pulmonary HTN  History of complete heart block  Parox Atrial fibrillation  Hypercholesterolemia  HTN (hypertension)  Artificial pacemaker  CRI  MR   AS  COVID-3/20      MEDICATIONS  (STANDING):  furosemide   Injectable 40 milliGRAM(s) IV Push Once,Eliquis,lopressor,lipitor,hydralazine    MEDICATIONS  (PRN):      FAMILY HISTORY:  FH: coronary artery disease  FH: hypertension      SOCIAL HISTORY:    [x ] Non-smoker    [x ] Alcohol-denies    Allergies    No Known Allergies    Intolerances    	    REVIEW OF SYSTEMS:  CONSTITUTIONAL: No fever, weight loss, or fatigue  EYES: No eye pain, visual disturbances, or discharge  ENT:  No difficulty hearing, tinnitus, vertigo; No sinus or throat pain  NECK: No pain or stiffness  RESPIRATORY: No cough, wheezing, chills or hemoptysis; +Shortness of Breath  CARDIOVASCULAR: No chest pain, palpitations, passing out, dizziness, or leg swelling  GASTROINTESTINAL: No abdominal or epigastric pain. No nausea, vomiting, or hematemesis; No diarrhea or constipation. No melena or hematochezia.  GENITOURINARY: No dysuria, frequency, hematuria, or incontinence  NEUROLOGICAL: No headaches, memory loss, loss of strength, numbness, or tremors  SKIN: No itching, burning, rashes, or lesions   LYMPH Nodes: No enlarged glands  ENDOCRINE: No heat or cold intolerance; No hair loss  MUSCULOSKELETAL: No joint pain or swelling; No muscle, back, or extremity pain  PSYCHIATRIC: No depression, anxiety, mood swings, or difficulty sleeping  HEME/LYMPH: No easy bruising, or bleeding gums  ALLERGY AND IMMUNOLOGIC: No hives or eczema	        PHYSICAL EXAM:  T(C): 36.6 (07-05-20 @ 10:53), Max: 36.7 (07-05-20 @ 07:57)  HR: 68 (07-05-20 @ 10:53) (62 - 68)  BP: 170/71 (07-05-20 @ 10:55) (153/62 - 175/67)  RR: 16 (07-05-20 @ 10:53) (16 - 18)  SpO2: 97% (07-05-20 @ 10:53) (97% - 99%)  Wt(kg): --  I&O's Summary      Appearance: Normal	  HEENT:   Normal oral mucosa, PERRL, EOMI	  Lymphatic: No lymphadenopathy  Cardiovascular: Normal S1 S2, No JVD, No murmurs, No edema  Respiratory: Dec  BS at bases  Psychiatry: A & O x 3, Mood & affect appropriate  Gastrointestinal:  Soft, Non-tender, + BS	  Skin: No rashes, No ecchymoses, No cyanosis	  Neurologic: Non-focal  Extremities: Normal range of motion, No clubbing, cyanosis or edema  Vascular: Peripheral pulses palpable 2+ bilaterally      	  LABS:	 	    CARDIAC MARKERS:  CARDIAC MARKERS ( 05 Jul 2020 08:52 )  <0.015 ng/mL / x     / x     / x     / x                                  5.6    6.32  )-----------( 173      ( 05 Jul 2020 08:52 )             18.4     07-05    138  |  103  |  16  ----------------------------<  98  4.1   |  28  |  1.31<H>    Ca    8.8      05 Jul 2020 08:52    TPro  7.0  /  Alb  3.3<L>  /  TBili  0.3  /  DBili  x   /  AST  19  /  ALT  18  /  AlkPhos  56  07-05    proBNP: Serum Pro-Brain Natriuretic Peptide: 2380 pg/mL (07-05 @ 08:52)      EXAM:  XR CHEST PA LAT 2V                            PROCEDURE DATE:  07/05/2020          INTERPRETATION:  Exam Date: 7/5/2020 9:31 AM    History: Chest pain    Technique: Frontal and lateral views of the chest with comparison to  3/24/2020    Findings:    Left-sided pacemaker. Heart is enlarged. Mild pulmonary edema with trace to small bilateral pleural effusions. The apices and hemidiaphragms are unremarkable. Degenerative changes of the visualized osseous structures.    Impression:    Mild pulmonary edema with trace to small bilateral pleural effusions      Occult Blood, Feces (07.05.20 @ 10:09)    Occult Blood, Feces: Negative    OBSERVATIONS:  Mitral Valve: Moderate posterior mitral annular  calcification. Moderate to severe mitral regurgitation.  Aortic Root: Normal aortic root.  Aortic Valve: Calcified trileaflet aortic valve with  decreased opening. Peak transaortic valve gradient equals  22.5 mm Hg, estimated aortic valve area equals 1.2 sqcm (by  continuity equation), consistent with moderate aortic  stenosis. Trace aortic regurgitation.  Left Atrium: Normal left atrium.  LA volume index = 34  cc/m2.  Left Ventricle: Normal Left Ventricular Systolic Function,  (EF = 60% by biplane) Not all LV wall segments were seen.  Normal left ventricular internal dimensions and wall  thicknesses. Grade II diastolic dysfunction.  Right Heart: Normal right atrium. Normal right ventricular  size and systolic function (TAPSE 2.9 cm). A device lead is  visualized in the right heart. Normal tricuspid valve.  Moderate to severe tricuspid regurgitation. Pulmonic valve  not well seen. Mild pulmonic insufficiency is noted.  Pericardium/PleuraNo pericardial effusion.  Hemodynamic: RA Pressure is 8 mm Hg. RV systolic pressure  is moderately increased at  43 mm Hg.
CARDIUOLOGY/MEDICAL ATTENDING    CHIEF COMPLAINT:Patient is a 89y old  Female who presents with a chief complaint of Cough.Pt appears comfortable.    	  REVIEW OF SYSTEMS:  CONSTITUTIONAL: No fever, weight loss, or fatigue  EYES: No eye pain, visual disturbances, or discharge  ENT:  No difficulty hearing, tinnitus, vertigo; No sinus or throat pain  NECK: No pain or stiffness  RESPIRATORY: No cough, wheezing, chills or hemoptysis; No Shortness of Breath  CARDIOVASCULAR: No chest pain, palpitations, passing out, dizziness, or leg swelling  GASTROINTESTINAL: No abdominal or epigastric pain. No nausea, vomiting, or hematemesis; No diarrhea or constipation. No melena or hematochezia.  GENITOURINARY: No dysuria, frequency, hematuria, or incontinence  NEUROLOGICAL: No headaches, memory loss, loss of strength, numbness, or tremors  SKIN: No itching, burning, rashes, or lesions   LYMPH Nodes: No enlarged glands  ENDOCRINE: No heat or cold intolerance; No hair loss  MUSCULOSKELETAL: No joint pain or swelling; No muscle, back, or extremity pain  PSYCHIATRIC: No depression, anxiety, mood swings, or difficulty sleeping  HEME/LYMPH: No easy bruising, or bleeding gums  ALLERGY AND IMMUNOLOGIC: No hives or eczema	      PHYSICAL EXAM:  T(C): 36.9 (07-06-20 @ 05:10), Max: 37.2 (07-05-20 @ 23:31)  HR: 70 (07-06-20 @ 05:10) (66 - 77)  BP: 149/65 (07-06-20 @ 05:10) (103/66 - 175/67)  RR: 17 (07-06-20 @ 05:10) (16 - 20)  SpO2: 95% (07-06-20 @ 05:10) (95% - 97%)  Wt(kg): --  I&O's Summary      Appearance: Normal	  HEENT:   Normal oral mucosa, PERRL, EOMI	  Lymphatic: No lymphadenopathy  Cardiovascular: Normal S1 S2, 2/6sm  Respiratory: Lungs clear to auscultation	  Psychiatry: A & O x 3, Mood & affect appropriate  Gastrointestinal:  Soft, Non-tender, + BS	  Skin: No rashes, No ecchymoses, No cyanosis	  Neurologic: Non-focal  Extremities: Normal range of motion, No clubbing, cyanosis or edema  Vascular: Peripheral pulses palpable 2+ bilaterally    MEDICATIONS  (STANDING):  ALBUTerol    90 MICROgram(s) HFA Inhaler 2 Puff(s) Inhalation every 6 hours  aspirin  chewable 81 milliGRAM(s) Oral daily  cefTRIAXone   IVPB 1000 milliGRAM(s) IV Intermittent every 24 hours  dronedarone 400 milliGRAM(s) Oral two times a day  furosemide   Injectable 40 milliGRAM(s) IV Push daily  hydrALAZINE 100 milliGRAM(s) Oral three times a day  lactobacillus acidophilus 1 Tablet(s) Oral daily  metoprolol tartrate 100 milliGRAM(s) Oral two times a day  pantoprazole    Tablet 40 milliGRAM(s) Oral before breakfast  simvastatin 40 milliGRAM(s) Oral at bedtime        	  LABS:	 	    CARDIAC MARKERS ( 05 Jul 2020 08:52 )  <0.015 ng/mL / x     / x     / x     / x                                9.6    4.92  )-----------( 188      ( 05 Jul 2020 19:05 )             29.0     07-05    138  |  103  |  16  ----------------------------<  98  4.1   |  28  |  1.31<H>    Ca    8.8      05 Jul 2020 08:52    TPro  6.7  /  Alb  x   /  TBili  x   /  DBili  x   /  AST  x   /  ALT  x   /  AlkPhos  x   07-05    proBNP: Serum Pro-Brain Natriuretic Peptide: 2380 pg/mL (07-05 @ 08:52)    Culture - Blood (07.05.20 @ 14:53)    Gram Stain:   Growth in aerobic bottle: Gram Positive Cocci in Pairs and Chains    -  Streptococcus sp. (Not Grp A, B or S pneumoniae): Detec    Specimen Source: .Blood Blood    Organism: Blood Culture PCR    Culture Results:   Growth in aerobic bottle: Gram Positive Cocci in Pairs and Chains  ***Blood Panel PCR results on this specimen are available  approximately 3 hours after the Gram stain result.***  Gram stain, PCR, and/or culture results may not always  correspond due to difference in methodologies.  ************************************************************  This PCR assay was performed using [employer].  The following targets are tested for: Enterococcus,  vancomycin resistant enterococci, Listeria monocytogenes,  coagulase negative staphylococci, S. aureus,  methicillin resistant S. aureus, Streptococcus agalactiae  (Group B), S. pneumoniae, S. pyogenes (Group A),  Acinetobacter baumannii, Enterobacter cloacae, E. coli,  Klebsiella oxytoca, K. pneumoniae, Proteus sp.,  Serratia marcescens, Haemophilus influenzae,  Neisseria meningitidis, Pseudomonas aeruginosa, Candida  albicans, C. glabrata, C krusei, C parapsilosis,  C. tropicalis and the KPC resistance gene.  "Due to technical problems, Proteus sp. will Not be reported as part of  the BCID panel until further notice"    Organism Identification: Blood Culture PCR    Method Type: PCR    EXAM:  CT ABDOMEN AND PELVIS OC                            PROCEDURE DATE:  07/05/2020          INTERPRETATION:  CLINICAL INFORMATION: Symptomatic anemia    COMPARISON: CT chest, abdomen and pelvis 4/15/2019    PROCEDURE:   CT of the Abdomen and Pelvis was performed without intravenous contrast.   Intravenous contrast: None.  Oral contrast: positive contrast was administered.  Sagittal and coronal reformats were performed.    FINDINGS:  LOWER CHEST: Cardiac device leads are noted. Small left and trace right pleural effusions with adjacent atelectasis. Small hiatal hernia.    LIVER: Within normal limits.  BILE DUCTS: Normal caliber.  GALLBLADDER: Within normal limits.  SPLEEN: Within normal limits.  PANCREAS: Within normal limits.  ADRENALS:Within normal limits.  KIDNEYS/URETERS: No hydronephrosis.    BLADDER: No radiodense debris.  REPRODUCTIVE ORGANS: 3.3 cm right ovarian cystic lesion may be further evaluated with ultrasound as warranted.    BOWEL: No bowel obstruction. Appendix is unremarkable. Moderate stool in colon  PERITONEUM: No ascites.  VESSELS: Normal caliber abdominal aorta. Moderate atherosclerosis.  RETROPERITONEUM/LYMPH NODES: No lymphadenopathy. No retroperitoneal hemorrhage.  ABDOMINAL WALL: Within normal limits.  BONES: Degenerative changes in the spine.    IMPRESSION:   No retroperitoneal hemorrhage or CT explanation for anemia.        Iron with Total Binding Capacity (07.05.20 @ 11:47)    Iron - Total Binding Capacity.: 401 ug/dL    % Saturation, Iron: 4 %    Iron Total, Serum: 16 ug/dL    Unsaturated Iron Binding Capacity: 385 ug/dL
PGY 3 Note discussed with primary attending    Patient is a 89y old  Female who presents with a chief complaint of Cough (07 Jul 2020 09:36)      INTERVAL HPI/OVERNIGHT EVENTS:   Pt seen and examined at the bedside.   Repeat Blood culture; testing.   hold IV lasix for now   denies any new complains     MEDICATIONS  (STANDING):  ALBUTerol    90 MICROgram(s) HFA Inhaler 2 Puff(s) Inhalation every 6 hours  aspirin  chewable 81 milliGRAM(s) Oral daily  cefTRIAXone   IVPB 1000 milliGRAM(s) IV Intermittent every 24 hours  dronedarone 400 milliGRAM(s) Oral two times a day  hydrALAZINE 100 milliGRAM(s) Oral three times a day  iron sucrose IVPB 200 milliGRAM(s) IV Intermittent every 24 hours  lactobacillus acidophilus 1 Tablet(s) Oral daily  metoprolol tartrate 100 milliGRAM(s) Oral two times a day  pantoprazole    Tablet 40 milliGRAM(s) Oral before breakfast  simvastatin 40 milliGRAM(s) Oral at bedtime    MEDICATIONS  (PRN):  albuterol/ipratropium for Nebulization 3 milliLiter(s) Nebulizer every 6 hours PRN Bronchospasm      __________________________________________________  REVIEW OF SYSTEMS:    CONSTITUTIONAL: No fever,   EYES: no acute visual disturbances  NECK: No pain or stiffness  RESPIRATORY: No cough; No shortness of breath  CARDIOVASCULAR: No chest pain, no palpitations  GASTROINTESTINAL: No pain. No nausea or vomiting; No diarrhea   NEUROLOGICAL: No headache or numbness, no tremors  MUSCULOSKELETAL: No joint pain, no muscle pain  GENITOURINARY: no dysuria, no frequency, no hesitancy  PSYCHIATRY: no depression , no anxiety  ALL OTHER  ROS negative        Vital Signs Last 24 Hrs  T(C): 36.7 (07 Jul 2020 05:15), Max: 36.7 (06 Jul 2020 21:36)  T(F): 98.1 (07 Jul 2020 05:15), Max: 98.1 (06 Jul 2020 21:36)  HR: 60 (07 Jul 2020 05:15) (58 - 89)  BP: 146/50 (07 Jul 2020 05:15) (132/74 - 153/51)  BP(mean): --  RR: 18 (07 Jul 2020 05:15) (18 - 18)  SpO2: 100% (07 Jul 2020 05:15) (95% - 100%)    ________________________________________________  PHYSICAL EXAM:  GENERAL: female in bed   HEENT: Normocephalic;  conjunctivae and sclerae clear; moist mucous membranes;   NECK : supple  CHEST/LUNG: Clear to auscultation bilaterally with good air entry   HEART: S1 S2  regular; no murmurs, gallops or rubs  ABDOMEN: Soft, Nontender, Nondistended; Bowel sounds present  EXTREMITIES: no cyanosis; no edema; no calf tenderness  SKIN: warm and dry; no rash  NERVOUS SYSTEM:  Awake and alert; AAOx 2     _________________________________________________  LABS:                        9.0    7.40  )-----------( 211      ( 07 Jul 2020 06:37 )             27.3     07-07    140  |  103  |  25<H>  ----------------------------<  94  3.8   |  31  |  1.42<H>    Ca    9.2      07 Jul 2020 06:37  Phos  2.5     07-07  Mg     2.4     07-07    TPro  7.0  /  Alb  3.3<L>  /  TBili  0.3  /  DBili  x   /  AST  19  /  ALT  17  /  AlkPhos  55  07-07        CAPILLARY BLOOD GLUCOSE            RADIOLOGY & ADDITIONAL TESTS:    Imaging Personally Reviewed:  YES/  < from: Transthoracic Echocardiogram (07.06.20 @ 08:13) >    Patient name: CLAUDETTE GARCES  YOB: 1928   Age: 89 (F)   MR#: [id number]  Study Date: 7/6/2020  Location: [address] [place], [place] [address]Sonographer: Arpit Ware RDCS  Study quality: Technically good  Referring Physician: Carol Harrell MD  Blood Pressure: 149/65 mmHg  Height: 157 cm  Weight: 68 kg  BSA: 1.7 m2  ------------------------------------------------------------------------    PROCEDURE: Transthoracic echocardiogram with 2-D, M-Mode  and complete spectral and color flow Doppler.  INDICATION: Heart failure, unspecified (I50.9)  HISTORY:  ------------------------------------------------------------------------  DIMENSIONS:  Dimensions:     Normal Values:  LA:     3.6 cm    2.0 - 4.0 cm  Ao:     2.6 cm    2.0 - 3.8 cm  SEPTUM: 1.1 cm    0.6 - 1.2 cm  PWT:    0.9 cm    0.6 - 1.1 cm  LVIDd:  4.8 cm    3.0 - 5.6 cm  LVIDs:  3.2 cm    1.8 - 4.0 cm      Derived Variables:  LVMI: 101 g/m2  RWT: 0.37  Ejection Fraction Visual Estimate: 55-60 %  Ejection Fraction [place]: 63 %  Peak Velocity (m/sec): AoV=2.5  ------------------------------------------------------------------------  OBSERVATIONS:  Mitral Valve: Mitral annular calcification, and calcified  mitral leaflets with normal diastolic opening. Moderate to  severe mitral regurgitation.  Aortic Root: Aortic Root: 2.6 cm.  LVOT diameter: 1.9 cm.    Aortic Valve: Calcified trileaflet aortic valve with  decreased opening. Peak transaortic valve gradient equals  26.8 mm Hg, estimated aortic valve area equals 1.4 sqcm (by  continuity equation), consistent with moderate aortic  stenosis.The dimensionless index is .40. Mild aortic  regurgitation.  Left Atrium: Severely dilated left atrium.  LA volume index  = 52 cc/m2.  Left Ventricle: Normal Left Ventricular Systolic Function,  (EF = 55 to 60%) Normal left ventricular internal  dimensions and wall thicknesses. Grade II diastolic  dysfunction.  Right Heart: Normal right atrium. Normal right ventricular  size and function. A device lead is visualized in the right  heart. There is moderate tricuspid regurgitation. There is  mild-moderate pulmonic regurgitation.  Pericardium/PleuraNormal pericardium with no pericardial  effusion.  Hemodynamic: RA Pressure is 8 mm Hg. RV systolic pressure  is mildly increased at  44 mm Hg.  ------------------------------------------------------------------------  CONCLUSIONS:  1. Mitral annular calcification, and calcified mitral  leaflets with normal diastolic opening. Moderate to severe  mitral regurgitation.  2. Calcified trileaflet aorticvalve with decreased  opening. Peak transaortic valve gradient equals 26.8 mm Hg,  estimated aortic valve area equals 1.4 sqcm (by continuity  equation), consistent with moderate aortic stenosis.The  dimensionless index is .40. Mild aortic regurgitation.  3. Aortic Root: 2.6 cm.  LVOT diameter: 1.9 cm.    4. Severely dilated left atrium.  LA volume index = 52  cc/m2.  5. Normal left ventricular internal dimensions and wall  thicknesses.  6. Normal Left Ventricular Systolic Function,  (EF = 55 to  60%)  7. Grade II diastolic dysfunction.  8. Normal right atrium.  9. Normal right ventricular size and function. A device  lead is visualized in the right heart.  10. RA Pressure is 8 mm Hg.  11. RV systolic pressure is mildly increased at  44 mm Hg.  12. There is moderate tricuspid regurgitation.  13. There is mild-moderate pulmonic regurgitation.  14. Normal pericardium with no pericardial effusion.    < end of copied text >      Consultant(s) Notes Reviewed:   YES/     Care Discussed with Consultants :     Plan of care was discussed with patient and /or primary care giver; all questions and concerns were addressed and care was aligned with patient's wishes.
PGY 3 Note discussed with primary attending    Patient is a 89y old  Female who presents with a chief complaint of Cough (08 Jul 2020 12:43)      INTERVAL HPI/OVERNIGHT EVENTS: offers no new complaints; current symptoms resolving  Repeat Blood culture: NG       MEDICATIONS  (STANDING):  ALBUTerol    90 MICROgram(s) HFA Inhaler 2 Puff(s) Inhalation every 6 hours  aspirin  chewable 81 milliGRAM(s) Oral daily  cefTRIAXone   IVPB 1000 milliGRAM(s) IV Intermittent every 24 hours  dronedarone 400 milliGRAM(s) Oral two times a day  hydrALAZINE 100 milliGRAM(s) Oral three times a day  lactobacillus acidophilus 1 Tablet(s) Oral daily  metoprolol tartrate 100 milliGRAM(s) Oral two times a day  pantoprazole    Tablet 40 milliGRAM(s) Oral before breakfast  simvastatin 40 milliGRAM(s) Oral at bedtime    MEDICATIONS  (PRN):  albuterol/ipratropium for Nebulization 3 milliLiter(s) Nebulizer every 6 hours PRN Bronchospasm      __________________________________________________  REVIEW OF SYSTEMS:    CONSTITUTIONAL: No fever,   EYES: no acute visual disturbances  NECK: No pain or stiffness  RESPIRATORY: No cough; No shortness of breath  CARDIOVASCULAR: No chest pain, no palpitations  GASTROINTESTINAL: No pain. No nausea or vomiting; No diarrhea   NEUROLOGICAL: No headache or numbness, no tremors  MUSCULOSKELETAL: No joint pain, no muscle pain  GENITOURINARY: no dysuria, no frequency, no hesitancy  PSYCHIATRY: no depression , no anxiety  ALL OTHER  ROS negative        Vital Signs Last 24 Hrs  T(C): 36.3 (08 Jul 2020 13:24), Max: 36.7 (07 Jul 2020 21:36)  T(F): 97.4 (08 Jul 2020 13:24), Max: 98.1 (07 Jul 2020 21:36)  HR: 60 (08 Jul 2020 13:24) (60 - 63)  BP: 148/46 (08 Jul 2020 13:24) (125/45 - 148/46)  BP(mean): --  RR: 16 (08 Jul 2020 13:24) (16 - 18)  SpO2: 100% (08 Jul 2020 13:24) (95% - 100%)    ________________________________________________  PHYSICAL EXAM:  GENERAL: NAD  HEENT: Normocephalic;  conjunctivae and sclerae clear; moist mucous membranes;   NECK : supple  CHEST/LUNG: Clear to auscultation bilaterally with good air entry   HEART: S1 S2  regular; no murmurs, gallops or rubs  ABDOMEN: Soft, Nontender, Nondistended; Bowel sounds present  EXTREMITIES: no cyanosis; no edema; no calf tenderness  SKIN: warm and dry; no rash  NERVOUS SYSTEM:  Awake and alert;   _________________________________________________  LABS:                        9.0    7.40  )-----------( 211      ( 07 Jul 2020 06:37 )             27.3     07-07    140  |  103  |  25<H>  ----------------------------<  94  3.8   |  31  |  1.42<H>    Ca    9.2      07 Jul 2020 06:37  Phos  2.5     07-07  Mg     2.4     07-07    TPro  7.0  /  Alb  3.3<L>  /  TBili  0.3  /  DBili  x   /  AST  19  /  ALT  17  /  AlkPhos  55  07-07        CAPILLARY BLOOD GLUCOSE            RADIOLOGY & ADDITIONAL TESTS:    Imaging Personally Reviewed:  YES/    Consultant(s) Notes Reviewed:   YES/   Care Discussed with Consultants :     Plan of care was discussed with patient and /or primary care giver; all questions and concerns were addressed and care was aligned with patient's wishes.
[   ] ICU                                          [   ] CCU                                      [  X ] Medical Floor      Patient is comfortable. No new complaints reported, No abdominal pain, N/V, hematemesis, hematochezia, melena, fever, chills, chest pain, SOB, cough or diarrhea reported.    VITALS  Vital Signs Last 24 Hrs  T(C): 36.4 (08 Jul 2020 05:41), Max: 36.7 (07 Jul 2020 21:36)  T(F): 97.5 (08 Jul 2020 05:41), Max: 98.1 (07 Jul 2020 21:36)  HR: 61 (08 Jul 2020 05:41) (60 - 63)  BP: 139/77 (08 Jul 2020 05:41) (125/45 - 142/51)  BP(mean): 86 (07 Jul 2020 13:57) (86 - 86)  RR: 18 (08 Jul 2020 05:41) (18 - 18)  SpO2: 96% (08 Jul 2020 05:41) (95% - 96%)       MEDICATIONS  (STANDING):  ALBUTerol    90 MICROgram(s) HFA Inhaler 2 Puff(s) Inhalation every 6 hours  aspirin  chewable 81 milliGRAM(s) Oral daily  cefTRIAXone   IVPB 1000 milliGRAM(s) IV Intermittent every 24 hours  dronedarone 400 milliGRAM(s) Oral two times a day  hydrALAZINE 100 milliGRAM(s) Oral three times a day  iron sucrose IVPB 200 milliGRAM(s) IV Intermittent every 24 hours  lactobacillus acidophilus 1 Tablet(s) Oral daily  metoprolol tartrate 100 milliGRAM(s) Oral two times a day  pantoprazole    Tablet 40 milliGRAM(s) Oral before breakfast  simvastatin 40 milliGRAM(s) Oral at bedtime    MEDICATIONS  (PRN):  albuterol/ipratropium for Nebulization 3 milliLiter(s) Nebulizer every 6 hours PRN Bronchospasm                            9.0    7.40  )-----------( 211      ( 07 Jul 2020 06:37 )             27.3       07-07    140  |  103  |  25<H>  ----------------------------<  94  3.8   |  31  |  1.42<H>    Ca    9.2      07 Jul 2020 06:37  Phos  2.5     07-07  Mg     2.4     07-07    TPro  7.0  /  Alb  3.3<L>  /  TBili  0.3  /  DBili  x   /  AST  19  /  ALT  17  /  AlkPhos  55  07-07
[   ] ICU                                          [   ] CCU                                      [ X  ] Medical Floor      Patient is comfortable. No new complaints reported, No abdominal pain, N/V, hematemesis, hematochezia, melena, fever, chills, chest pain, SOB, cough or diarrhea reported.    VITALS  Vital Signs Last 24 Hrs  T(C): 36.3 (10 Jul 2020 14:36), Max: 36.9 (09 Jul 2020 21:37)  T(F): 97.4 (10 Jul 2020 14:36), Max: 98.5 (09 Jul 2020 21:37)  HR: 62 (10 Jul 2020 14:36) (60 - 66)  BP: 144/45 (10 Jul 2020 14:36) (133/48 - 172/73)   RR: 18 (10 Jul 2020 14:36) (18 - 18)  SpO2: 97% (10 Jul 2020 14:36) (96% - 99%)       MEDICATIONS  (STANDING):  ALBUTerol    90 MICROgram(s) HFA Inhaler 2 Puff(s) Inhalation every 6 hours  aspirin  chewable 81 milliGRAM(s) Oral daily  cefTRIAXone   IVPB 1000 milliGRAM(s) IV Intermittent every 24 hours  dronedarone 400 milliGRAM(s) Oral two times a day  hydrALAZINE 100 milliGRAM(s) Oral three times a day  iron sucrose IVPB 200 milliGRAM(s) IV Intermittent every 24 hours  lactobacillus acidophilus 1 Tablet(s) Oral daily  metoprolol tartrate 100 milliGRAM(s) Oral two times a day  pantoprazole    Tablet 40 milliGRAM(s) Oral before breakfast  simvastatin 40 milliGRAM(s) Oral at bedtime    MEDICATIONS  (PRN):  albuterol/ipratropium for Nebulization 3 milliLiter(s) Nebulizer every 6 hours PRN Bronchospasm                            8.7    7.45  )-----------( 198      ( 09 Jul 2020 07:16 )             27.4       07-09    140  |  105  |  20<H>  ----------------------------<  103<H>  3.9   |  27  |  1.32<H>    Ca    8.8      09 Jul 2020 07:16  Phos  2.7     07-09  Mg     2.4     07-09
89y Female is under our care for bacteremia. Patient is doing well and has no complaints at this time. Underwent placement of extended dwell cath this am. Awaiting arrangements for home infusion services and hospital bed. Remains afebrile with normal wbc count.      REVIEW OF SYSTEMS:  [  ] Not able to illicit  General: no fevers no malaise  Chest: no cough no sob  GI: no nvd  Skin: no rashes  Neuro: no ha's no dizziness     MEDS:  cefTRIAXone   IVPB 1000 milliGRAM(s) IV Intermittent every 24 hours    ALLERGIES: Allergies    No Known Allergies    Intolerances      VITALS:  Vital Signs Last 24 Hrs  T(C): 36.7 (09 Jul 2020 05:45), Max: 36.7 (09 Jul 2020 05:45)  T(F): 98.1 (09 Jul 2020 05:45), Max: 98.1 (09 Jul 2020 05:45)  HR: 60 (09 Jul 2020 13:39) (60 - 73)  BP: 160/59 (09 Jul 2020 13:39) (117/68 - 160/59)  BP(mean): 85 (09 Jul 2020 13:39) (85 - 85)  RR: 15 (09 Jul 2020 13:39) (15 - 17)  SpO2: 100% (09 Jul 2020 13:39) (94% - 100%)      PHYSICAL EXAM:  HEENT: n/a  Neck: supple no LN's   Respiratory: lungs clear no rales  Cardiovascular: S1 S2 reg no murmurs  Gastrointestinal: +BS with soft, nondistended abdomen; nontender  Extremities: no edema, right arm extended dwell cath  Skin: no rashes  Ortho: n/a  Neuro: AAO x 2-3      LABS/DIAGNOSTIC TESTS:                        8.7    7.45  )-----------( 198      ( 09 Jul 2020 07:16 )             27.4     WBC Count: 7.45 K/uL (07-09 @ 07:16)  WBC Count: 7.40 K/uL (07-07 @ 06:37)  WBC Count: 8.94 K/uL (07-06 @ 15:31)  WBC Count: 6.02 K/uL (07-06 @ 10:37)  WBC Count: 4.92 K/uL (07-05 @ 19:05)    07-09    140  |  105  |  20<H>  ----------------------------<  103<H>  3.9   |  27  |  1.32<H> 1.42 < 1.67    Ca    8.8      09 Jul 2020 07:16  Phos  2.7     07-09  Mg     2.4     07-09        CULTURES:   .Blood Blood-Peripheral  07-07 @ 10:21   No growth to date.  --  --      .Blood Blood  07-05 @ 14:53   Growth in aerobic bottle: Streptococcus mitis/oralis group  Growth in aerobic bottle: Streptococcus salivarius/vestibularis group  Alpha hemolytic strep  (not Strep. pneumoniae or Enterococcus)  Single set isolate, possible contaminant. Contact  Microbiology if susceptibility testing clinically  indicated.        RADIOLOGY:  no new studies
CARDIOLOGY/MEDICAL ATTENDING    CHIEF COMPLAINT:Patient is a 89y old  Female who presents with a chief complaint of Cough .Pt feeling better.    	  REVIEW OF SYSTEMS:  CONSTITUTIONAL: No fever, weight loss, or fatigue  EYES: No eye pain, visual disturbances, or discharge  ENT:  No difficulty hearing, tinnitus, vertigo; No sinus or throat pain  NECK: No pain or stiffness  RESPIRATORY: No cough, wheezing, chills or hemoptysis; No Shortness of Breath  CARDIOVASCULAR: No chest pain, palpitations, passing out, dizziness, or leg swelling  GASTROINTESTINAL: No abdominal or epigastric pain. No nausea, vomiting, or hematemesis; No diarrhea or constipation. No melena or hematochezia.  GENITOURINARY: No dysuria, frequency, hematuria, or incontinence  NEUROLOGICAL: No headaches, memory loss, loss of strength, numbness, or tremors  SKIN: No itching, burning, rashes, or lesions   LYMPH Nodes: No enlarged glands  ENDOCRINE: No heat or cold intolerance; No hair loss  MUSCULOSKELETAL: No joint pain or swelling; No muscle, back, or extremity pain  PSYCHIATRIC: No depression, anxiety, mood swings, or difficulty sleeping  HEME/LYMPH: No easy bruising, or bleeding gums  ALLERGY AND IMMUNOLOGIC: No hives or eczema	        PHYSICAL EXAM:  T(C): 36.7 (07-07-20 @ 05:15), Max: 36.7 (07-06-20 @ 21:36)  HR: 60 (07-07-20 @ 05:15) (58 - 89)  BP: 146/50 (07-07-20 @ 05:15) (132/74 - 153/51)  RR: 18 (07-07-20 @ 05:15) (18 - 18)  SpO2: 100% (07-07-20 @ 05:15) (95% - 100%)  Wt(kg): --  I&O's Summary    06 Jul 2020 07:01  -  07 Jul 2020 07:00  --------------------------------------------------------  IN: 240 mL / OUT: 0 mL / NET: 240 mL        Appearance: Normal	  HEENT:   Normal oral mucosa, PERRL, EOMI	  Lymphatic: No lymphadenopathy  Cardiovascular: Normal S1 S2, No JVD, No murmurs, No edema  Respiratory: Lungs clear to auscultation	  Psychiatry: A & O x 3, Mood & affect appropriate  Gastrointestinal:  Soft, Non-tender, + BS	  Skin: No rashes, No ecchymoses, No cyanosis	  Neurologic: Non-focal  Extremities: Normal range of motion, No clubbing, cyanosis or edema  Vascular: Peripheral pulses palpable 2+ bilaterally    MEDICATIONS  (STANDING):  ALBUTerol    90 MICROgram(s) HFA Inhaler 2 Puff(s) Inhalation every 6 hours  aspirin  chewable 81 milliGRAM(s) Oral daily  cefTRIAXone   IVPB 1000 milliGRAM(s) IV Intermittent every 24 hours  dronedarone 400 milliGRAM(s) Oral two times a day  hydrALAZINE 100 milliGRAM(s) Oral three times a day  iron sucrose IVPB 200 milliGRAM(s) IV Intermittent every 24 hours  lactobacillus acidophilus 1 Tablet(s) Oral daily  metoprolol tartrate 100 milliGRAM(s) Oral two times a day  pantoprazole    Tablet 40 milliGRAM(s) Oral before breakfast  simvastatin 40 milliGRAM(s) Oral at bedtime      	  	  LABS:	 	                       9.0    7.40  )-----------( 211      ( 07 Jul 2020 06:37 )             27.3     07-07    140  |  103  |  25<H>  ----------------------------<  94  3.8   |  31  |  1.42<H>    Ca    9.2      07 Jul 2020 06:37  Phos  2.5     07-07  Mg     2.4     07-07    TPro  7.0  /  Alb  3.3<L>  /  TBili  0.3  /  DBili  x   /  AST  19  /  ALT  17  /  AlkPhos  55  07-07    proBNP: Serum Pro-Brain Natriuretic Peptide: 2380 pg/mL (07-05 @ 08:52)      Echocardiogram:    CONCLUSIONS:  1. Mitral annular calcification, and calcified mitral  leaflets with normal diastolic opening. Moderate to severe  mitral regurgitation.  2. Calcified trileaflet aorticvalve with decreased  opening. Peak transaortic valve gradient equals 26.8 mm Hg,  estimated aortic valve area equals 1.4 sqcm (by continuity  equation), consistent with moderate aortic stenosis.The  dimensionless index is .40. Mild aortic regurgitation.  3. Aortic Root: 2.6 cm.  LVOT diameter: 1.9 cm.    4. Severely dilated left atrium.  LA volume index = 52  cc/m2.  5. Normal left ventricular internal dimensions and wall  thicknesses.  6. Normal Left Ventricular Systolic Function,  (EF = 55 to  60%)  7. Grade II diastolic dysfunction.  8. Normal right atrium.  9. Normal right ventricular size and function. A device  lead is visualized in the right heart.  10. RA Pressure is 8 mm Hg.  11. RV systolic pressure is mildly increased at  44 mm Hg.  12. There is moderate tricuspid regurgitation.  13. There is mild-moderate pulmonic regurgitation.  14. Normal pericardium with no pericardial effusion.    ------------------------------------------------------------------------  Confirmed on  7/7/2020 - 07:50:02 by Carol Harrell MD  	        Culture - Blood (07.05.20 @ 14:53)    Gram Stain:   Growth in aerobic bottle: Gram Positive Cocci in Pairs and Chains    -  Streptococcus sp. (Not Grp A, B or S pneumoniae): Detec    Specimen Source: .Blood Blood    Organism: Blood Culture PCR    Culture Results:   Growth in aerobic bottle: Streptococcus mitis/oralis group  Alpha hemolytic strep  (not Strep. pneumoniae or Enterococcus)  Single set isolate, possible contaminant. Contact  Microbiology if susceptibility testing clinically  indicated.  ***Blood Panel PCR results on this specimen are available  approximately 3 hours after the Gram stain result.***  Gram stain, PCR, and/or culture results may not always  correspond due to difference in methodologies.  ************************************************************  This PCR assay was performed using [employer].  The following targets are tested for: Enterococcus,  vancomycin resistant enterococci, Listeria monocytogenes,  coagulase negative staphylococci, S. aureus,  methicillin resistant S. aureus, Streptococcus agalactiae  (Group B), S. pneumoniae, S. pyogenes (Group A),  Acinetobacter baumannii, Enterobacter cloacae, E. coli,  Klebsiella oxytoca, K. pneumoniae, Proteus sp.,  Serratia marcescens, Haemophilus influenzae,  Neisseriameningitidis, Pseudomonas aeruginosa, Candida  albicans, C. glabrata, C krusei, C parapsilosis,  C. tropicalis and the KPC resistance gene.  "Due to technical problems, Proteus sp. will Not be reported as part of  the BCID panel until further notice"    Organism Identification: Blood Culture PCR    Method Type: PCR
CARDIOLOGY/MEDICAL ATTENDING    CHIEF COMPLAINT:Patient is a 89y old  Female who presents with a chief complaint of Cough.Pt appears comfortable.    	  REVIEW OF SYSTEMS:  CONSTITUTIONAL: No fever, weight loss, or fatigue  EYES: No eye pain, visual disturbances, or discharge  ENT:  No difficulty hearing, tinnitus, vertigo; No sinus or throat pain  NECK: No pain or stiffness  RESPIRATORY: No cough, wheezing, chills or hemoptysis; No Shortness of Breath  CARDIOVASCULAR: No chest pain, palpitations, passing out, dizziness, or leg swelling  GASTROINTESTINAL: No abdominal or epigastric pain. No nausea, vomiting, or hematemesis; No diarrhea or constipation. No melena or hematochezia.  GENITOURINARY: No dysuria, frequency, hematuria, or incontinence  NEUROLOGICAL: No headaches, memory loss, loss of strength, numbness, or tremors  SKIN: No itching, burning, rashes, or lesions   LYMPH Nodes: No enlarged glands  ENDOCRINE: No heat or cold intolerance; No hair loss  MUSCULOSKELETAL: No joint pain or swelling; No muscle, back, or extremity pain  PSYCHIATRIC: No depression, anxiety, mood swings, or difficulty sleeping  HEME/LYMPH: No easy bruising, or bleeding gums  ALLERGY AND IMMUNOLOGIC: No hives or eczema	        PHYSICAL EXAM:  T(C): 36.3 (07-10-20 @ 05:23), Max: 36.9 (07-09-20 @ 21:37)  HR: 66 (07-10-20 @ 05:23) (60 - 66)  BP: 172/73 (07-10-20 @ 05:23) (133/48 - 172/73)  RR: 18 (07-10-20 @ 05:23) (15 - 18)  SpO2: 96% (07-10-20 @ 05:23) (96% - 100%)  Wt(kg): --  I&O's Summary    09 Jul 2020 07:01  -  10 Jul 2020 07:00  --------------------------------------------------------  IN: 240 mL / OUT: 200 mL / NET: 40 mL        Appearance: Normal	  HEENT:   Normal oral mucosa, PERRL, EOMI	  Lymphatic: No lymphadenopathy  Cardiovascular: Normal S1 S2, No JVD, No murmurs, No edema  Respiratory: Lungs clear to auscultation	  Psychiatry: A & O x 3, Mood & affect appropriate  Gastrointestinal:  Soft, Non-tender, + BS	  Skin: No rashes, No ecchymoses, No cyanosis	  Neurologic: Non-focal  Extremities: Normal range of motion, No clubbing, cyanosis or edema  Vascular: Peripheral pulses palpable 2+ bilaterally    MEDICATIONS  (STANDING):  ALBUTerol    90 MICROgram(s) HFA Inhaler 2 Puff(s) Inhalation every 6 hours  aspirin  chewable 81 milliGRAM(s) Oral daily  dronedarone 400 milliGRAM(s) Oral two times a day  hydrALAZINE 100 milliGRAM(s) Oral three times a day  iron sucrose IVPB 200 milliGRAM(s) IV Intermittent every 24 hours  lactobacillus acidophilus 1 Tablet(s) Oral daily  metoprolol tartrate 100 milliGRAM(s) Oral two times a day  pantoprazole    Tablet 40 milliGRAM(s) Oral before breakfast  simvastatin 40 milliGRAM(s) Oral at bedtime      	  	  LABS:	 	                      8.7    7.45  )-----------( 198      ( 09 Jul 2020 07:16 )             27.4     07-09    140  |  105  |  20<H>  ----------------------------<  103<H>  3.9   |  27  |  1.32<H>    Ca    8.8      09 Jul 2020 07:16  Phos  2.7     07-09  Mg     2.4     07-09      proBNP: Serum Pro-Brain Natriuretic Peptide: 2380 pg/mL (07-05 @ 08:52)    Blood cx x2-.
CARDIOLOGY/MEDICAL ATTENDING    CHIEF COMPLAINT:Patient is a 89y old  Female who presents with a chief complaint of Cough.Pt appears comfortable.    	  REVIEW OF SYSTEMS:  CONSTITUTIONAL: No fever, weight loss, or fatigue  EYES: No eye pain, visual disturbances, or discharge  ENT:  No difficulty hearing, tinnitus, vertigo; No sinus or throat pain  NECK: No pain or stiffness  RESPIRATORY: No cough, wheezing, chills or hemoptysis; No Shortness of Breath  CARDIOVASCULAR: No chest pain, palpitations, passing out, dizziness, or leg swelling  GASTROINTESTINAL: No abdominal or epigastric pain. No nausea, vomiting, or hematemesis; No diarrhea or constipation. No melena or hematochezia.  GENITOURINARY: No dysuria, frequency, hematuria, or incontinence  NEUROLOGICAL: No headaches, memory loss, loss of strength, numbness, or tremors  SKIN: No itching, burning, rashes, or lesions   LYMPH Nodes: No enlarged glands  ENDOCRINE: No heat or cold intolerance; No hair loss  MUSCULOSKELETAL: No joint pain or swelling; No muscle, back, or extremity pain  PSYCHIATRIC: No depression, anxiety, mood swings, or difficulty sleeping  HEME/LYMPH: No easy bruising, or bleeding gums  ALLERGY AND IMMUNOLOGIC: No hives or eczema	        PHYSICAL EXAM:  T(C): 36.7 (07-09-20 @ 05:45), Max: 36.7 (07-09-20 @ 05:45)  HR: 63 (07-09-20 @ 05:45) (60 - 73)  BP: 157/56 (07-09-20 @ 05:45) (117/68 - 157/56)  RR: 17 (07-09-20 @ 05:45) (16 - 17)  SpO2: 98% (07-09-20 @ 05:45) (94% - 100%)    I&O's Summary    08 Jul 2020 07:01  -  09 Jul 2020 07:00  --------------------------------------------------------  IN: 240 mL / OUT: 0 mL / NET: 240 mL      Appearance: Normal	  HEENT:   Normal oral mucosa, PERRL, EOMI	  Lymphatic: No lymphadenopathy  Cardiovascular: Normal S1 S2, No JVD, No murmurs, No edema  Respiratory: Lungs clear to auscultation	  Psychiatry: A & O x 3, Mood & affect appropriate  Gastrointestinal:  Soft, Non-tender, + BS	  Skin: No rashes, No ecchymoses, No cyanosis	  Neurologic: Non-focal  Extremities: Normal range of motion, No clubbing, cyanosis or edema  Vascular: Peripheral pulses palpable 2+ bilaterally    MEDICATIONS  (STANDING):  ALBUTerol    90 MICROgram(s) HFA Inhaler 2 Puff(s) Inhalation every 6 hours  aspirin  chewable 81 milliGRAM(s) Oral daily  cefTRIAXone   IVPB 1000 milliGRAM(s) IV Intermittent every 24 hours  dronedarone 400 milliGRAM(s) Oral two times a day  hydrALAZINE 100 milliGRAM(s) Oral three times a day  lactobacillus acidophilus 1 Tablet(s) Oral daily  metoprolol tartrate 100 milliGRAM(s) Oral two times a day  pantoprazole    Tablet 40 milliGRAM(s) Oral before breakfast  simvastatin 40 milliGRAM(s) Oral at bedtime      	  	  LABS:	 	                     8.7    7.45  )-----------( 198      ( 09 Jul 2020 07:16 )             27.4     07-09    140  |  105  |  20<H>  ----------------------------<  103<H>  3.9   |  27  |  1.32<H>    Ca    8.8      09 Jul 2020 07:16  Phos  2.7     07-09  Mg     2.4     07-09      proBNP: Serum Pro-Brain Natriuretic Peptide: 2380 pg/mL (07-05 @ 08:52)    Culture - Blood in AM (07.07.20 @ 10:21)    Specimen Source: .Blood Blood-Peripheral    Culture Results:   No growth to date.    Culture - Blood in AM (07.07.20 @ 10:21)    Specimen Source: .Blood Blood-Peripheral    Culture Results:   No growth to date.
CARDIOLOGY/MEDICAL ATTENDING    CHIEF COMPLAINT:Patient is a 89y old  Female who presents with a chief complaint of Cough.Pt appears comfortable.    	  REVIEW OF SYSTEMS:  CONSTITUTIONAL: No fever, weight loss, or fatigue  EYES: No eye pain, visual disturbances, or discharge  ENT:  No difficulty hearing, tinnitus, vertigo; No sinus or throat pain  NECK: No pain or stiffness  RESPIRATORY: No cough, wheezing, chills or hemoptysis; No Shortness of Breath  CARDIOVASCULAR: No chest pain, palpitations, passing out, dizziness, or leg swelling  GASTROINTESTINAL: No abdominal or epigastric pain. No nausea, vomiting, or hematemesis; No diarrhea or constipation. No melena or hematochezia.  GENITOURINARY: No dysuria, frequency, hematuria, or incontinence  NEUROLOGICAL: No headaches, memory loss, loss of strength, numbness, or tremors  SKIN: No itching, burning, rashes, or lesions   LYMPH Nodes: No enlarged glands  ENDOCRINE: No heat or cold intolerance; No hair loss  MUSCULOSKELETAL: No joint pain or swelling; No muscle, back, or extremity pain  PSYCHIATRIC: No depression, anxiety, mood swings, or difficulty sleeping  HEME/LYMPH: No easy bruising, or bleeding gums  ALLERGY AND IMMUNOLOGIC: No hives or eczema	      PHYSICAL EXAM:  T(C): 36.4 (07-08-20 @ 05:41), Max: 36.7 (07-07-20 @ 21:36)  HR: 61 (07-08-20 @ 05:41) (60 - 63)  BP: 139/77 (07-08-20 @ 05:41) (125/45 - 142/51)  RR: 18 (07-08-20 @ 05:41) (18 - 18)  SpO2: 96% (07-08-20 @ 05:41) (95% - 96%)  Wt(kg): --  I&O's Summary    07 Jul 2020 07:01  -  08 Jul 2020 07:00  --------------------------------------------------------  IN: 0 mL / OUT: 400 mL / NET: -400 mL        Appearance: Normal	  HEENT:   Normal oral mucosa, PERRL, EOMI	  Lymphatic: No lymphadenopathy  Cardiovascular: Normal S1 S2, No JVD, No murmurs, No edema  Respiratory: Lungs clear to auscultation	  Psychiatry: A & O x 3, Mood & affect appropriate  Gastrointestinal:  Soft, Non-tender, + BS	  Skin: No rashes, No ecchymoses, No cyanosis	  Neurologic: Non-focal  Extremities: Normal range of motion, No clubbing, cyanosis or edema  Vascular: Peripheral pulses palpable 2+ bilaterally    MEDICATIONS  (STANDING):  ALBUTerol    90 MICROgram(s) HFA Inhaler 2 Puff(s) Inhalation every 6 hours  aspirin  chewable 81 milliGRAM(s) Oral daily  cefTRIAXone   IVPB 1000 milliGRAM(s) IV Intermittent every 24 hours  dronedarone 400 milliGRAM(s) Oral two times a day  hydrALAZINE 100 milliGRAM(s) Oral three times a day  iron sucrose IVPB 200 milliGRAM(s) IV Intermittent every 24 hours  lactobacillus acidophilus 1 Tablet(s) Oral daily  metoprolol tartrate 100 milliGRAM(s) Oral two times a day  pantoprazole    Tablet 40 milliGRAM(s) Oral before breakfast  simvastatin 40 milliGRAM(s) Oral at bedtime        LABS:	 	                        9.0    7.40  )-----------( 211      ( 07 Jul 2020 06:37 )             27.3     07-07    140  |  103  |  25<H>  ----------------------------<  94  3.8   |  31  |  1.42<H>    Ca    9.2      07 Jul 2020 06:37  Phos  2.5     07-07  Mg     2.4     07-07    TPro  7.0  /  Alb  3.3<L>  /  TBili  0.3  /  DBili  x   /  AST  19  /  ALT  17  /  AlkPhos  55  07-07    proBNP: Serum Pro-Brain Natriuretic Peptide: 2380 pg/mL (07-05 @ 08:52)    Occult Blood, Feces (07.06.20 @ 16:18)    Occult Blood, Feces: Negative    Occult Blood, Feces (07.05.20 @ 10:09)    Occult Blood, Feces: Negative       OBSERVATIONS:  Mitral Valve: Mitral annular calcification, and calcified  mitral leaflets with normal diastolic opening. Moderate to  severe mitral regurgitation.  Aortic Root: Aortic Root: 2.6 cm.  LVOT diameter: 1.9 cm.    Aortic Valve: Calcified trileaflet aortic valve with  decreased opening. Peak transaortic valve gradient equals  26.8 mm Hg, estimated aortic valve area equals 1.4 sqcm (by  continuity equation), consistent with moderate aortic  stenosis.The dimensionless index is .40. Mild aortic  regurgitation.  Left Atrium: Severely dilated left atrium.  LA volume index  = 52 cc/m2.  Left Ventricle: Normal Left Ventricular Systolic Function,  (EF = 55 to 60%) Normal left ventricular internal  dimensions and wall thicknesses. Grade II diastolic  dysfunction.  Right Heart: Normal right atrium. Normal right ventricular  size and function. A device lead is visualized in the right  heart. There is moderate tricuspid regurgitation. There is  mild-moderate pulmonic regurgitation.  Pericardium/PleuraNormal pericardium with no pericardial  effusion.  Hemodynamic: RA Pressure is 8 mm Hg. RV systolic pressure  is mildly increased at  44 mm Hg.  ------------------------------------------------------------------------
PGY 3 Note discussed with primary attending    Patient is a 89y old  Female who presents with a chief complaint of Cough (09 Jul 2020 10:45)      INTERVAL HPI/OVERNIGHT EVENTS:   Pt seen and examined at the bedside.   No overnight events   Blood culture: strep Oralis, source: tooth infection. REpeat blood cutlure: neg.   - Consulted IR for Midline placement for Antibiotic till Aug 3,: IV rocephin 1g OD    MEDICATIONS  (STANDING):  ALBUTerol    90 MICROgram(s) HFA Inhaler 2 Puff(s) Inhalation every 6 hours  aspirin  chewable 81 milliGRAM(s) Oral daily  cefTRIAXone   IVPB 1000 milliGRAM(s) IV Intermittent every 24 hours  dronedarone 400 milliGRAM(s) Oral two times a day  hydrALAZINE 100 milliGRAM(s) Oral three times a day  lactobacillus acidophilus 1 Tablet(s) Oral daily  metoprolol tartrate 100 milliGRAM(s) Oral two times a day  pantoprazole    Tablet 40 milliGRAM(s) Oral before breakfast  simvastatin 40 milliGRAM(s) Oral at bedtime    MEDICATIONS  (PRN):  albuterol/ipratropium for Nebulization 3 milliLiter(s) Nebulizer every 6 hours PRN Bronchospasm      __________________________________________________  REVIEW OF SYSTEMS:    CONSTITUTIONAL: No fever,   EYES: no acute visual disturbances  NECK: No pain or stiffness  RESPIRATORY: No cough; No shortness of breath  CARDIOVASCULAR: No chest pain, no palpitations  GASTROINTESTINAL: No pain. No nausea or vomiting; No diarrhea   NEUROLOGICAL: No headache or numbness, no tremors  MUSCULOSKELETAL: No joint pain, no muscle pain  GENITOURINARY: no dysuria, no frequency, no hesitancy  PSYCHIATRY: no depression , no anxiety  ALL OTHER  ROS negative        Vital Signs Last 24 Hrs  T(C): 36.7 (09 Jul 2020 05:45), Max: 36.7 (09 Jul 2020 05:45)  T(F): 98.1 (09 Jul 2020 05:45), Max: 98.1 (09 Jul 2020 05:45)  HR: 63 (09 Jul 2020 05:45) (60 - 73)  BP: 157/56 (09 Jul 2020 05:45) (117/68 - 157/56)  BP(mean): 85 (08 Jul 2020 17:12) (85 - 85)  RR: 17 (09 Jul 2020 05:45) (16 - 17)  SpO2: 98% (09 Jul 2020 05:45) (94% - 100%)    ________________________________________________  PHYSICAL EXAM:  GENERAL: female in bed   HEENT: Normocephalic;  conjunctivae and sclerae clear; moist mucous membranes;   NECK : supple  CHEST/LUNG: Clear to auscultation bilaterally with good air entry   HEART: S1 S2  regular; no murmurs, gallops or rubs  ABDOMEN: Soft, Nontender, Nondistended; Bowel sounds present  EXTREMITIES: no cyanosis; no edema; no calf tenderness  SKIN: warm and dry; no rash  NERVOUS SYSTEM:  Awake and alert; AAO x1-2   _________________________________________________  LABS:                        8.7    7.45  )-----------( 198      ( 09 Jul 2020 07:16 )             27.4     07-09    140  |  105  |  20<H>  ----------------------------<  103<H>  3.9   |  27  |  1.32<H>    Ca    8.8      09 Jul 2020 07:16  Phos  2.7     07-09  Mg     2.4     07-09          CAPILLARY BLOOD GLUCOSE            RADIOLOGY & ADDITIONAL TESTS  Imaging Personally Reviewed:  YES    Consultant(s) Notes Reviewed:   YES/     Care Discussed with Consultants :     Plan of care was discussed with patient and /or primary care giver; all questions and concerns were addressed and care was aligned with patient's wishes.
PGY 3 Note discussed with primary attending    Patient is a 89y old  Female who presents with a chief complaint of Cough (2020 09:06)      INTERVAL HPI/OVERNIGHT EVENTS:   Pt seen and examined at the bedside.   Blood culture: Strep, started on Rocephin, Dr Power consulted   Hold Lasix for now       MEDICATIONS  (STANDING):  ALBUTerol    90 MICROgram(s) HFA Inhaler 2 Puff(s) Inhalation every 6 hours  aspirin  chewable 81 milliGRAM(s) Oral daily  cefTRIAXone   IVPB 1000 milliGRAM(s) IV Intermittent every 24 hours  dronedarone 400 milliGRAM(s) Oral two times a day  hydrALAZINE 100 milliGRAM(s) Oral three times a day  iron sucrose IVPB 200 milliGRAM(s) IV Intermittent every 24 hours  lactobacillus acidophilus 1 Tablet(s) Oral daily  metoprolol tartrate 100 milliGRAM(s) Oral two times a day  pantoprazole    Tablet 40 milliGRAM(s) Oral before breakfast  simvastatin 40 milliGRAM(s) Oral at bedtime    MEDICATIONS  (PRN):      __________________________________________________  REVIEW OF SYSTEMS:    CONSTITUTIONAL: No fever,   EYES: no acute visual disturbances  NECK: No pain or stiffness  RESPIRATORY: No cough; No shortness of breath  CARDIOVASCULAR: No chest pain, no palpitations  GASTROINTESTINAL: No pain. No nausea or vomiting; No diarrhea   NEUROLOGICAL: No headache or numbness, no tremors  MUSCULOSKELETAL: No joint pain, no muscle pain  GENITOURINARY: no dysuria, no frequency, no hesitancy  PSYCHIATRY: no depression , no anxiety  ALL OTHER  ROS negative        Vital Signs Last 24 Hrs  T(C): 36.4 (2020 13:28), Max: 37.2 (2020 23:31)  T(F): 97.6 (2020 13:28), Max: 98.9 (2020 23:31)  HR: 89 (2020 13:28) (66 - 89)  BP: 132/74 (2020 13:28) (132/74 - 170/58)  BP(mean): --  RR: 18 (2020 13:28) (17 - 20)  SpO2: 95% (2020 13:28) (95% - 97%)    ________________________________________________  PHYSICAL EXAM:  GENERAL: NAD  HEENT: Normocephalic;  conjunctivae and sclerae clear; moist mucous membranes;   NECK : supple  CHEST/LUNG: Clear to auscultation bilaterally with good air entry   HEART: S1 S2  regular; no murmurs, gallops or rubs  ABDOMEN: Soft, Nontender, Nondistended; Bowel sounds present  EXTREMITIES: no cyanosis; no edema; no calf tenderness  SKIN: warm and dry; no rash  NERVOUS SYSTEM:  Awake and alert; Oriented  to place, person and time ; no new deficits    _________________________________________________  LABS:                        8.9    6.02  )-----------( 202      ( 2020 10:37 )             27.0     07-06    139  |  99  |  25<H>  ----------------------------<  190<H>  3.1<L>   |  29  |  1.67<H>    Ca    9.1      2020 10:37    TPro  7.0  /  Alb  3.3<L>  /  TBili  0.5  /  DBili  x   /  AST  16  /  ALT  17  /  AlkPhos  57  07-06    PT/INR - ( 2020 09:56 )   PT: 19.5 sec;   INR: 1.71 ratio         PTT - ( 2020 09:56 )  PTT:34.9 sec  Urinalysis Basic - ( 2020 12:12 )    Color: Yellow / Appearance: Clear / S.005 / pH: x  Gluc: x / Ketone: Negative  / Bili: Negative / Urobili: Negative   Blood: x / Protein: Negative / Nitrite: Negative   Leuk Esterase: Negative / RBC: x / WBC x   Sq Epi: x / Non Sq Epi: x / Bacteria: x      CAPILLARY BLOOD GLUCOSE            RADIOLOGY & ADDITIONAL TESTS:    Imaging Personally Reviewed:  YES/    Consultant(s) Notes Reviewed:   YES/     Care Discussed with Consultants :     Plan of care was discussed with patient and /or primary care giver; all questions and concerns were addressed and care was aligned with patient's wishes.
[   ] ICU                                          [   ] CCU                                      [  X ] Medical Floor      Patient is comfortable. No new complaints reported, No abdominal pain, N/V, hematemesis, hematochezia, melena, fever, chills, chest pain, SOB, cough or diarrhea reported.    VITALS  Vital Signs Last 24 Hrs  T(C): 36.7 (09 Jul 2020 05:45), Max: 36.7 (09 Jul 2020 05:45)  T(F): 98.1 (09 Jul 2020 05:45), Max: 98.1 (09 Jul 2020 05:45)  HR: 60 (09 Jul 2020 13:39) (60 - 73)  BP: 160/59 (09 Jul 2020 13:39) (117/68 - 160/59)  BP(mean): 85 (09 Jul 2020 13:39) (85 - 85)  RR: 15 (09 Jul 2020 13:39) (15 - 17)  SpO2: 100% (09 Jul 2020 13:39) (94% - 100%)       MEDICATIONS  (STANDING):  ALBUTerol    90 MICROgram(s) HFA Inhaler 2 Puff(s) Inhalation every 6 hours  aspirin  chewable 81 milliGRAM(s) Oral daily  cefTRIAXone   IVPB 1000 milliGRAM(s) IV Intermittent every 24 hours  dronedarone 400 milliGRAM(s) Oral two times a day  hydrALAZINE 100 milliGRAM(s) Oral three times a day  iron sucrose IVPB 200 milliGRAM(s) IV Intermittent every 24 hours  lactobacillus acidophilus 1 Tablet(s) Oral daily  metoprolol tartrate 100 milliGRAM(s) Oral two times a day  pantoprazole    Tablet 40 milliGRAM(s) Oral before breakfast  simvastatin 40 milliGRAM(s) Oral at bedtime    MEDICATIONS  (PRN):  albuterol/ipratropium for Nebulization 3 milliLiter(s) Nebulizer every 6 hours PRN Bronchospasm                            8.7    7.45  )-----------( 198      ( 09 Jul 2020 07:16 )             27.4       07-09    140  |  105  |  20<H>  ----------------------------<  103<H>  3.9   |  27  |  1.32<H>    Ca    8.8      09 Jul 2020 07:16  Phos  2.7     07-09  Mg     2.4     07-09
[   ] ICU                                          [   ] CCU                                      [ X  ] Medical Floor      Patient is comfortable. No new complaints reported, No abdominal pain, N/V, hematemesis, hematochezia, melena, fever, chills, chest pain, SOB, cough or diarrhea reported.    VITALS  Vital Signs Last 24 Hrs  T(C): 36.7 (07 Jul 2020 05:15), Max: 36.7 (06 Jul 2020 21:36)  T(F): 98.1 (07 Jul 2020 05:15), Max: 98.1 (06 Jul 2020 21:36)  HR: 60 (07 Jul 2020 13:57) (58 - 60)  BP: 139/57 (07 Jul 2020 13:57) (139/57 - 153/51)  BP(mean): 86 (07 Jul 2020 13:57) (86 - 86)  RR: 18 (07 Jul 2020 05:15) (18 - 18)  SpO2: 100% (07 Jul 2020 05:15) (95% - 100%)         MEDICATIONS  (STANDING):  ALBUTerol    90 MICROgram(s) HFA Inhaler 2 Puff(s) Inhalation every 6 hours  aspirin  chewable 81 milliGRAM(s) Oral daily  cefTRIAXone   IVPB 1000 milliGRAM(s) IV Intermittent every 24 hours  dronedarone 400 milliGRAM(s) Oral two times a day  hydrALAZINE 100 milliGRAM(s) Oral three times a day  iron sucrose IVPB 200 milliGRAM(s) IV Intermittent every 24 hours  lactobacillus acidophilus 1 Tablet(s) Oral daily  metoprolol tartrate 100 milliGRAM(s) Oral two times a day  pantoprazole    Tablet 40 milliGRAM(s) Oral before breakfast  simvastatin 40 milliGRAM(s) Oral at bedtime    MEDICATIONS  (PRN):  albuterol/ipratropium for Nebulization 3 milliLiter(s) Nebulizer every 6 hours PRN Bronchospasm                            9.0    7.40  )-----------( 211      ( 07 Jul 2020 06:37 )             27.3       07-07    140  |  103  |  25<H>  ----------------------------<  94  3.8   |  31  |  1.42<H>    Ca    9.2      07 Jul 2020 06:37  Phos  2.5     07-07  Mg     2.4     07-07    TPro  7.0  /  Alb  3.3<L>  /  TBili  0.3  /  DBili  x   /  AST  19  /  ALT  17  /  AlkPhos  55  07-07
extended dwell catheter placed via right brachial vein. 20 nina 6 cm . Dressing  applied. Catheter flushed without resistance . May  use immediately.
[  ] ICU                                          [   ] CCU                                      [ X  ] Medical Floor      Patient is comfortable. No new complaints reported, No abdominal pain, N/V, hematemesis, hematochezia, melena, fever, chills, chest pain, SOB, cough or diarrhea reported.    VITALS  Vital Signs Last 24 Hrs  T(C): 36.9 (06 Jul 2020 05:10), Max: 37.2 (05 Jul 2020 23:31)  T(F): 98.4 (06 Jul 2020 05:10), Max: 98.9 (05 Jul 2020 23:31)  HR: 70 (06 Jul 2020 05:10) (66 - 77)  BP: 149/65 (06 Jul 2020 05:10) (103/66 - 175/67)   RR: 17 (06 Jul 2020 05:10) (16 - 20)  SpO2: 95% (06 Jul 2020 05:10) (95% - 97%)       MEDICATIONS  (STANDING):  ALBUTerol    90 MICROgram(s) HFA Inhaler 2 Puff(s) Inhalation every 6 hours  aspirin  chewable 81 milliGRAM(s) Oral daily  cefTRIAXone   IVPB 1000 milliGRAM(s) IV Intermittent every 24 hours  dronedarone 400 milliGRAM(s) Oral two times a day  furosemide   Injectable 40 milliGRAM(s) IV Push daily  hydrALAZINE 100 milliGRAM(s) Oral three times a day  iron sucrose IVPB 200 milliGRAM(s) IV Intermittent every 24 hours  lactobacillus acidophilus 1 Tablet(s) Oral daily  metoprolol tartrate 100 milliGRAM(s) Oral two times a day  pantoprazole    Tablet 40 milliGRAM(s) Oral before breakfast  simvastatin 40 milliGRAM(s) Oral at bedtime                             9.6    4.92  )-----------( 188      ( 05 Jul 2020 19:05 )             29.0       07-05    138  |  103  |  16  ----------------------------<  98  4.1   |  28  |  1.31<H>    Ca    8.8      05 Jul 2020 08:52    TPro  6.7  /  Alb  x   /  TBili  x   /  DBili  x   /  AST  x   /  ALT  x   /  AlkPhos  x   07-05      PT/INR - ( 05 Jul 2020 09:56 )   PT: 19.5 sec;   INR: 1.71 ratio       PTT - ( 05 Jul 2020 09:56 )  PTT:34.9 sec    Iron with Total Binding Capacity (07.05.20 @ 11:47)    Iron - Total Binding Capacity.: 401 ug/dL    % Saturation, Iron: 4 %    Iron Total, Serum: 16 ug/dL    Unsaturated Iron Binding Capacity: 385 ug/dL        EXAM:  CT ABDOMEN AND PELVIS OC                            PROCEDURE DATE:  07/05/2020          INTERPRETATION:  CLINICAL INFORMATION: Symptomatic anemia    COMPARISON: CT chest, abdomen and pelvis 4/15/2019    PROCEDURE:   CT of the Abdomen and Pelvis was performed without intravenous contrast.   Intravenous contrast: None.  Oral contrast: positive contrast was administered.  Sagittal and coronal reformats were performed.    FINDINGS:  LOWER CHEST: Cardiac device leads are noted. Small left and trace right pleural effusions with adjacent atelectasis. Small hiatal hernia.    LIVER: Within normal limits.  BILE DUCTS: Normal caliber.  GALLBLADDER: Within normal limits.  SPLEEN: Within normal limits.  PANCREAS: Within normal limits.  ADRENALS:Within normal limits.  KIDNEYS/URETERS: No hydronephrosis.    BLADDER: No radiodense debris.  REPRODUCTIVE ORGANS: 3.3 cm right ovarian cystic lesion may be further evaluated with ultrasound as warranted.    BOWEL: No bowel obstruction. Appendix is unremarkable. Moderate stool in colon  PERITONEUM: No ascites.  VESSELS: Normal caliber abdominal aorta. Moderate atherosclerosis.  RETROPERITONEUM/LYMPH NODES: No lymphadenopathy. No retroperitoneal hemorrhage.  ABDOMINAL WALL: Within normal limits.  BONES: Degenerative changes in the spine.    IMPRESSION:   No retroperitoneal hemorrhage or CT explanation for anemia.

## 2020-07-10 NOTE — PROGRESS NOTE ADULT - MS EXT PE MLT D E PC
no cyanosis/no clubbing
no clubbing/no cyanosis
no clubbing/no cyanosis
no cyanosis/no clubbing
no cyanosis/no clubbing

## 2020-07-10 NOTE — PROGRESS NOTE ADULT - NSHPATTENDINGPLANDISCUSS_GEN_ALL_CORE
house staff covering

## 2020-07-10 NOTE — DISCHARGE NOTE PROVIDER - CARE PROVIDER_API CALL
[facility]  INTERNAL MEDICINE  [address] [address]  Phone: [phone]  Fax: [phone]  Follow Up Time:

## 2020-07-10 NOTE — PROGRESS NOTE ADULT - TONSILS
no redness/no swelling
no redness
no redness/no swelling

## 2020-07-10 NOTE — PROGRESS NOTE ADULT - REASON FOR ADMISSION
Cough
CHF,Anemia
Cough

## 2020-07-10 NOTE — PROGRESS NOTE ADULT - NEGATIVE GASTROINTESTINAL SYMPTOMS
no melena/no hematochezia/no steatorrhea/no vomiting/no jaundice/no nausea/no diarrhea/no hiccoughs/no abdominal pain
no diarrhea/no vomiting/no abdominal pain/no jaundice/no hematochezia/no hiccoughs/no nausea/no melena/no steatorrhea
no hematochezia/no steatorrhea/no hiccoughs/no diarrhea/no vomiting/no nausea/no abdominal pain/no melena/no jaundice
no diarrhea/no nausea/no steatorrhea/no jaundice/no abdominal pain/no melena/no vomiting/no hematochezia/no hiccoughs
no hematochezia/no nausea/no steatorrhea/no vomiting/no hiccoughs/no abdominal pain/no jaundice/no diarrhea/no melena

## 2020-07-10 NOTE — PROGRESS NOTE ADULT - NEGATIVE PSYCHIATRIC SYMPTOMS
no anxiety/no depression/no memory loss/no mood swings/no agitation/no suicidal ideation/no insomnia/no paranoia
no anxiety/no insomnia/no depression/no agitation/no suicidal ideation/no memory loss/no paranoia/no mood swings
no paranoia/no mood swings/no anxiety/no memory loss/no agitation/no suicidal ideation/no depression/no insomnia
no suicidal ideation/no paranoia/no depression/no agitation/no insomnia/no anxiety/no memory loss/no mood swings
no suicidal ideation/no insomnia/no anxiety/no memory loss/no agitation/no depression/no mood swings/no paranoia

## 2020-07-10 NOTE — PROGRESS NOTE ADULT - NEGATIVE GENERAL SYMPTOMS
no chills/no fever/no weight loss/no sweating/no anorexia/no weight gain
no fever/no chills/no anorexia/no sweating/no weight loss/no weight gain
no fever/no sweating/no anorexia/no chills/no weight gain/no weight loss
no weight loss/no weight gain/no fever/no chills/no sweating/no anorexia
no sweating/no weight gain/no anorexia/no chills/no fever/no weight loss

## 2020-07-10 NOTE — PROGRESS NOTE ADULT - GASTROINTESTINAL DETAILS
no organomegaly/no distention/soft/no guarding/bowel sounds normal/no rebound tenderness/no rigidity/no bruit/nontender
no distention/soft/no bruit/no masses palpable/no guarding/no rigidity/bowel sounds normal/nontender/no organomegaly/no rebound tenderness
no masses palpable/no organomegaly/no distention/nontender/soft/no guarding/no bruit/no rebound tenderness/no rigidity
no rebound tenderness/no guarding/no bruit/no rigidity/bowel sounds normal/nontender/soft/no distention/no masses palpable
nontender/no masses palpable/no guarding/no rigidity/no bruit/no organomegaly/soft/bowel sounds normal/no distention

## 2020-07-10 NOTE — PROGRESS NOTE ADULT - NEGATIVE RESPIRATORY AND THORAX SYMPTOMS
no pleuritic chest pain/no hemoptysis/no wheezing
no wheezing/no pleuritic chest pain/no hemoptysis
no pleuritic chest pain/no hemoptysis/no wheezing
no pleuritic chest pain/no wheezing/no hemoptysis
no wheezing/no pleuritic chest pain/no hemoptysis

## 2020-07-10 NOTE — PROGRESS NOTE ADULT - NEGATIVE ENDOCRINE SYMPTOMS
no heat intolerance/no cold intolerance/no hirsutism
no heat intolerance/no hirsutism/no cold intolerance
no hirsutism/no heat intolerance/no cold intolerance

## 2020-07-10 NOTE — PROGRESS NOTE ADULT - CONSTITUTIONAL DETAILS
well-groomed/well-nourished/no distress/well-developed
no distress/well-groomed/well-developed
well-developed/well-groomed/no distress
well-developed/well-nourished/no distress/well-groomed
well-groomed/no distress/well-developed

## 2020-07-10 NOTE — PROGRESS NOTE ADULT - NEGATIVE ENMT SYMPTOMS
no dry mouth/no nasal obstruction/no nose bleeds/no hearing difficulty/no ear pain/no gum bleeding/no nasal discharge/no vertigo/no dysphagia/no throat pain/no nasal congestion
no nasal congestion/no throat pain/no dysphagia/no hearing difficulty/no ear pain/no nose bleeds/no gum bleeding/no nasal obstruction/no dry mouth/no vertigo/no nasal discharge
no gum bleeding/no dry mouth/no ear pain/no throat pain/no vertigo/no nose bleeds/no hearing difficulty/no dysphagia/no nasal congestion/no nasal discharge/no nasal obstruction
no nose bleeds/no nasal discharge/no gum bleeding/no hearing difficulty/no ear pain/no dysphagia/no dry mouth/no nasal obstruction/no vertigo/no throat pain/no nasal congestion
no nasal congestion/no nasal obstruction/no throat pain/no dysphagia/no hearing difficulty/no dry mouth/no nasal discharge/no ear pain/no nose bleeds/no vertigo/no gum bleeding

## 2020-07-10 NOTE — DISCHARGE NOTE PROVIDER - NSDCMRMEDTOKEN_GEN_ALL_CORE_FT
aspirin 81 mg oral tablet, chewable: 1 tab(s) orally once a day  cefTRIAXone 1 g intravenous injection: 1 gram(s) intravenous every 24 hours  ferrous sulfate 325 mg (65 mg elemental iron) oral tablet: 1 tab(s) orally once a day   hydrALAZINE 100 mg oral tablet: 1 tab(s) orally 3 times a day  ipratropium-albuterol 0.5 mg-2.5 mg/3 mLinhalation solution: 3 milliliter(s) inhaled every 6 hours  Lasix 20 mg oral tablet: 1 tab(s) orally every other day  metoprolol tartrate 100 mg oral tablet: 1 tab(s) orally 2 times a day  Multaq 400 mg oral tablet: 1 tab(s) orally 2 times a day  simvastatin 40 mg oral tablet: 1 tab(s) orally once a day (at bedtime)

## 2020-07-10 NOTE — PROGRESS NOTE ADULT - NEGATIVE SKIN SYMPTOMS
no itching/no rash/no dryness
no rash/no itching/no dryness
no itching/no dryness/no rash

## 2020-07-10 NOTE — PROGRESS NOTE ADULT - ASSESSMENT
89 years old Ivorian speaking female, from home, lives with daughter, Quentin with medical history of  CHF, afib (on Eliquis), complete heart block s/p pacemaker implant, HTN, pulmonary HTN, HLD, Dementia, C diff in past, presented to ED after having Cough and difficulty in breathing since morning. Patient states that her cough was dry , no phlegm was noted.
89 years old [place]ankan speaking female, from home, lives with daughter, Quentin with medical history of  CHF, afib (on Eliquis), complete heart block s/p pacemaker implant, HTN, pulmonary HTN, HLD, Dementia, C diff in past, presented to ED after having Cough and difficulty in breathing since morning. Patient states that her cough was dry , no phlegm was noted.
89 yr old female with PMHX of PAF,HTN,CRI,Lipid d/os/p PPM,MR,AS,Pulm HTN  here with acute diastolic HF and anemia.  1.Anemia-Iron profile,GI eval, CT abd and pelvis,Transfuse 2 units PRBC.   2.PAF-Multaq,lopressor,Eliquis-on hold, asa.  3.HTN-cont bp medication.  4.Lipid d/o-statin.  5.Acute diastolic HF-IV lasix 40mg IV QD.  6.PPI.
89 yr old female with PMHX of PAF,HTN,CRI,Lipid d/os/p PPM,MR,AS,Pulm HTN  here with acute diastolic HF,Fe def anemia,Strep bacetermia..  1.Anemia-Iron profile,GI eval noted.  2.PAF-Multaq,lopressor,Eliquis-on hold, asa.  3.HTN-cont bp medication.  4.Lipid d/o-statin.  5.Acute diastolic HF-IV lasix 40mg IV QD.  6.Strep bacteremia-ID eval called, ABX.  7.Echocardiogram.  8.PPI.
1.	Bacteremia - with streptococcus  ·	dc planning arrangements for IV home infusion services  ·	cont Rocephin 1 gm IV daily for 24 days more  ·	reconsult prn
89 years old Chilean speaking female, from home, lives with daughter, Quentin with medical history of  CHF, afib (on Eliquis), complete heart block s/p pacemaker implant, HTN, pulmonary HTN, HLD, Dementia, C diff in past, presented to ED after having Cough and difficulty in breathing since morning. Patient states that her cough was dry , no phlegm was noted.
89 years old Mosotho speaking female, from home, lives with daughter, Quentin with medical history of  CHF, afib (on Eliquis), complete heart block s/p pacemaker implant, HTN, pulmonary HTN, HLD, Dementia, C diff in past, presented to ED after having Cough and difficulty in breathing since morning. Patient states that her cough was dry , no phlegm was noted.
89 yr old female with PMHX of PAF,HTN,CRI,Lipid d/os/p PPM,MR,AS,Pulm HTN  here with acute diastolic HF,Fe def anemia,Strep bacetermia..  1.Anemia-Iron profile,GI f/u.  2.PAF-Multaq,lopressor,Eliquis-on hold, asa.  3.HTN-cont bp medication.  4.Lipid d/o-statin.  5.Acute diastolic HF-D/C IV lasix 40mg IV QD.  6.Strep bacteremia-ID eval noted, ABX.  7.PPI.
89 yr old female with PMHX of PAF,HTN,CRI,Lipid d/os/p PPM,MR,AS,Pulm HTN  here with acute diastolic HF,Fe def anemia,Strep bacetermia..  1.Anemia-Iron profile,GI f/u.  2.PAF-Multaq,lopressor,Eliquis-on hold, asa. Await decision regarding EGD/Colonoscopy.  3.HTN-cont bp medication.  4.Lipid d/o-statin.  5.Acute diastolic HF-D/C IV lasix 40mg IV QD.  6.Strep bacteremia-ID f/u, ABX,repeat blood cx-p.  7.PPI.
89 yr old female with PMHX of PAF,HTN,CRI,Lipid d/os/p PPM,MR,AS,Pulm HTN  here with acute diastolic HF,Fe def anemia,Strep bacetermia..  1.Anemia-Iron profile,GI f/u.  2.PAF-Multaq,lopressor,Eliquis-on hold, asa.Family declines EGD/Colonoscopy.  3.HTN-cont bp medication.  4.Lipid d/o-statin.  5.Acute diastolic HF-off lasix.  6.Strep bacteremia-ID f/u, ABX,repeat blood cx-,await PICC-will treat for 4 weeks.  7.PPI.
89 yr old female with PMHX of PAF,HTN,CRI,Lipid d/os/p PPM,MR,AS,Pulm HTN  here with acute diastolic HF,Fe def anemia,Strep bacetermia..  1.Anemia-Iron profile,GI f/u.If occultx3-, will resume eliquis. Pt doesn't eat red meat.  2.PAF-Multaq,lopressor,Eliquis-on hold, asa.Family declines EGD/Colonoscopy.  3.HTN-cont bp medication.  4.Lipid d/o-statin.  5.Acute diastolic HF-off lasix.  6.Strep bacteremia-ID f/u, ABX,repeat blood cx-,s/pPICC-will treat for 4 weeks.  7.PPI.
1. Sever Iron deficiency anemia  2. No evidence of acute GI bleeding  3. R/o chronic GI bleeding    Suggestions:  1. Monitor H/H  2. Transfuse PRBC as needed  3. Protonix daily  4. Avoid NSAID  5. Check stool for occult blood X 3  6. EGD and colonoscopy  7. DVT prophylaxis
1. Sever Iron deficiency anemia  2. No evidence of acute GI bleeding  3. R/o chronic GI bleeding    Suggestions:  1. Monitor H/H  2. Transfuse PRBC as needed  3. Protonix daily  4. Avoid NSAID  5. Check stool for occult blood X 3  6. EGD and colonoscopy out patient  7. DVT prophylaxis
1. Sever Iron deficiency anemia  2. No evidence of acute GI bleeding  3. R/o chronic GI bleeding    Suggestions:  1. Monitor H/H  2. Transfuse PRBC as needed  3. Protonix daily  4. Avoid NSAID  5. Check stool for occult blood X 3  6. EGD and colonoscopy  7. DVT prophylaxis

## 2020-07-10 NOTE — PROGRESS NOTE ADULT - PROVIDER SPECIALTY LIST ADULT
Gastroenterology
Gastroenterology
Infectious Disease
Internal Medicine
Intervent Radiology
Internal Medicine
Internal Medicine
Gastroenterology
Gastroenterology
Internal Medicine
Internal Medicine
Gastroenterology

## 2020-07-10 NOTE — PROGRESS NOTE ADULT - CARDIOVASCULAR DETAILS
positive S1/positive S2
positive S2/positive S1
irregular rate and rhythm

## 2020-07-10 NOTE — PROGRESS NOTE ADULT - RS GEN PE MLT RESP DETAILS PC
respirations non-labored/breath sounds equal/no rales/airway patent/good air movement/no rhonchi/no wheezes
airway patent/clear to auscultation bilaterally/breath sounds equal/good air movement/no rhonchi/respirations non-labored/no wheezes
breath sounds equal/no wheezes/airway patent/no rales/good air movement/no rhonchi
no rhonchi/airway patent/good air movement/breath sounds equal/no rales
respirations non-labored/breath sounds equal/no rales/good air movement/airway patent/no rhonchi

## 2020-07-10 NOTE — PROGRESS NOTE ADULT - PHARYNX
no redness/no discharge
no discharge/no redness
no redness/no discharge

## 2020-07-10 NOTE — PROGRESS NOTE ADULT - MOUTH
normal mouth and gums/moist
moist/normal mouth and gums
moist/normal mouth and gums
normal mouth and gums/moist
normal mouth and gums/moist

## 2020-07-10 NOTE — PROGRESS NOTE ADULT - CVS HE PE MLT D E PC
no rub
no rub/regular rate and rhythm
regular rate and rhythm
regular rate and rhythm/no rub
regular rate and rhythm/no rub

## 2020-07-10 NOTE — PROGRESS NOTE ADULT - NEGATIVE OPHTHALMOLOGIC SYMPTOMS
no photophobia/no discharge R/no pain L/no lacrimation L/no discharge L/no pain R/no irritation R/no irritation L/no diplopia/no blurred vision L/no blurred vision R/no lacrimation R
no photophobia/no lacrimation L/no discharge L/no pain L/no lacrimation R/no blurred vision L/no blurred vision R/no discharge R/no irritation R/no pain R/no irritation L/no diplopia
no blurred vision L/no diplopia/no irritation R/no photophobia/no discharge L/no irritation L/no discharge R/no pain R/no blurred vision R/no pain L/no lacrimation R/no lacrimation L
no discharge L/no pain L/no photophobia/no pain R/no irritation R/no irritation L/no lacrimation R/no diplopia/no blurred vision L/no lacrimation L/no blurred vision R/no discharge R
no discharge R/no blurred vision L/no irritation L/no lacrimation L/no irritation R/no lacrimation R/no blurred vision R/no pain R/no discharge L/no pain L/no diplopia/no photophobia

## 2020-07-10 NOTE — DISCHARGE NOTE PROVIDER - HOSPITAL COURSE
89 years old Thai speaking female, from home, lives with daughter, Quentin with medical history of  CHF, afib (on Eliquis), complete heart block s/p pacemaker implant, HTN, pulmonary HTN, HLD, Dementia, C diff in past, presented to ED after having Cough and difficulty in breathing since morning. Patient states that her cough was dry , no phlegm was noted. Pt was admitted with acute /chronic Diastolic CHF and anemia. Pt received IV lasix. ECHO showed normal EF with G II DD. For anemia, Hb: 5.6 on admission, she received 2PRBC transfusion. Anemia Panel showed Total Iron: 16, TIBC: 385, pt refused  IV iron transfusion. Pt's FOBT: 1/3 was positive, family refused any invasive GI work up, Family refused EGD/colonoscopy. As FOBT was positive times 1, her ELiquis was discontinued in setting of anemia and FOBT positive.         Pt's blood culture sent on admission, came back positive for strep mitis/oralis/salivaris/vestibularis most likely due to tooth infection. Pt received IV Rocephin, she received Dual cath and IV antibiotics at home set up, till Aug 3.     For Afib, Multaq and metoprolol was continued. Home medication for HTN continued.         Pt is medically stable for discharge and case is discussed with the attending.

## 2020-07-10 NOTE — PROGRESS NOTE ADULT - NEGATIVE HEMATOLOGY SYMPTOMS
no gum bleeding/no nose bleeding
no nose bleeding/no gum bleeding

## 2020-07-10 NOTE — PROGRESS NOTE ADULT - NEGATIVE NEUROLOGICAL SYMPTOMS
no tremors/no loss of consciousness/no vertigo/no loss of sensation/no headache/no syncope
no tremors/no loss of sensation/no syncope/no loss of consciousness/no headache/no vertigo
no tremors/no syncope/no loss of consciousness/no headache/no vertigo/no loss of sensation
no vertigo/no loss of sensation/no loss of consciousness/no tremors/no headache/no syncope
no tremors/no loss of sensation/no loss of consciousness/no headache/no syncope/no vertigo

## 2020-07-10 NOTE — PROGRESS NOTE ADULT - NEGATIVE MUSCULOSKELETAL SYMPTOMS
no arm pain L/no leg pain L/no muscle weakness/no neck pain/no stiffness/no leg pain R/no muscle cramps
no muscle weakness/no leg pain L/no leg pain R/no stiffness/no neck pain/no arm pain L/no muscle cramps
no muscle weakness/no leg pain L/no neck pain/no muscle cramps/no stiffness/no arm pain L/no leg pain R
no muscle weakness/no stiffness/no arm pain L/no leg pain L/no neck pain/no leg pain R/no muscle cramps
no leg pain L/no leg pain R/no muscle weakness/no neck pain/no arm pain L/no muscle cramps/no stiffness

## 2020-07-14 LAB
% ALBUMIN: 54 % — SIGNIFICANT CHANGE UP
% ALPHA 1: 5.2 % — SIGNIFICANT CHANGE UP
% ALPHA 2: 12.5 % — SIGNIFICANT CHANGE UP
% BETA: 14.6 % — SIGNIFICANT CHANGE UP
% GAMMA: 13.7 % — SIGNIFICANT CHANGE UP
ALBUMIN SERPL ELPH-MCNC: 3.6 G/DL — SIGNIFICANT CHANGE UP (ref 3.6–5.5)
ALBUMIN/GLOB SERPL ELPH: 1.2 RATIO — SIGNIFICANT CHANGE UP
ALPHA1 GLOB SERPL ELPH-MCNC: 0.3 G/DL — SIGNIFICANT CHANGE UP (ref 0.1–0.4)
ALPHA2 GLOB SERPL ELPH-MCNC: 0.8 G/DL — SIGNIFICANT CHANGE UP (ref 0.5–1)
B-GLOBULIN SERPL ELPH-MCNC: 1 G/DL — SIGNIFICANT CHANGE UP (ref 0.5–1)
GAMMA GLOBULIN: 0.9 G/DL — SIGNIFICANT CHANGE UP (ref 0.6–1.6)
PROT PATTERN SERPL ELPH-IMP: SIGNIFICANT CHANGE UP

## 2020-08-28 NOTE — ED ADULT NURSE NOTE - NS ED NURSE RECORD ANOTHER HT AND WT
"Brady Chun is a 4 year old male who is being evaluated via a billable video visit.      The patient has been notified of following:     \"This video visit will be conducted via a call between you and your physician/provider. We have found that certain health care needs can be provided without the need for an in-person physical exam.  This service lets us provide the care you need with a video conversation.  If a prescription is necessary we can send it directly to your pharmacy.  If lab work is needed we can place an order for that and you can then stop by our lab to have the test done at a later time.    If during the course of the call the physician/provider feels a video visit is not appropriate, you will not be charged for this service.\"     Patient has given verbal consent for Video visit? Yes    Patient would like the video invitation sent by: Send to e-mail at: [email or web address]      Video Start Time: 11:53 AM    Brady Chun complains of    Chief Complaint   Patient presents with     RECHECK     Patient is here today for ALD follow up       No Pain (0)  Data Unavailable      I have reviewed and updated the patient's Past Medical History, Social History, Family History and Medication List.    ALLERGIES  Patient has no known allergies.    " Yes

## 2021-05-14 NOTE — ED PROVIDER NOTE - CONSTITUTIONAL MANNER
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 [facility] #: [id number]  Wero Arreguin DO  Referring physician: Cholo Dixon   Onset Date:Birth   INSURANCE  SLP Insurance Information: [employer] Total # of Visits Approved: 30 Total # of Visits to Date: 15 No Show: 3   Canceled Appointment: 1     Dates of Service to Include: 4/15/21 through 21    Evaluations      Procedure/Modalities  [x]Speech/Lang Evaluation/Re-evaluation  [x] Speech Therapy Treatment   []Aphasia Evaluation     []Cognitive Skills Treatment  [] Evaluation: Swallow/Oral Function   [] Swallow/Oral Function Treatment  [] Evaluation: Communication Device  []  Group Therapy Treatment   [] Evaluation: Voice     [] Modification of AAC Device         [] Electrical Stimulation (NMES)         []Therapeutic Exercises:                  Frequency: 1 times/week   Timeframe for Short Term Goals: 90 days         Short-term Goal(s): Current Progress   Goal 1: Patient will produce /r-blends/ in all positions of words in reading in 90% accuracy.    []Met  [x]Partially met  []Not met   Goal 2: Patient will self-correct incorrect production of /r/ in conversation x 8 []Met  [x]Partially met  []Not met   Goal 3: Patient will produce vocalic /r/ words with 90% accuracy []Met  [x]Partially met  []Not met       Timeframe for Long-term Goals: 6 months        Long-term Goal(s): Current Progress   Goal 1: Pt will produce age-appropriate speech sounds in all positions of words at conversational levels with 80% accuracy independently   []Met  [x]Partially met  []Not met     Rehab Potential  [] Excellent  [x] Good   [] Fair   [] Poor    Plan: Based on
appropriate for situation

## 2021-06-09 NOTE — CONSULT NOTE ADULT - COMMENTS
Brief nephrology note  Call from RN: patient has made 300cc urine since Bumex dose 3 hours ago, BP has dropped, O2 requirements up slightly, she is wondering about more diuretics. In a pt making avg 100ml/hr UOP, I would not give additional diuretics now, especially if there was a hemodynamic response to last dose. Recommend continued close monitoring of O2 status and VS, continue with current plan for diuretics.   Carmelo Ta MD  [facility]  Pager [phone]   Pt unable

## 2021-12-10 ENCOUNTER — INPATIENT (INPATIENT)
Facility: HOSPITAL | Age: 86
LOS: 4 days | Discharge: HOME | DRG: 291 | End: 2021-12-15
Attending: INTERNAL MEDICINE | Admitting: INTERNAL MEDICINE
Payer: COMMERCIAL

## 2021-12-10 VITALS
RESPIRATION RATE: 16 BRPM | WEIGHT: 139.99 LBS | TEMPERATURE: 98 F | OXYGEN SATURATION: 94 % | HEART RATE: 60 BPM | HEIGHT: 62 IN | DIASTOLIC BLOOD PRESSURE: 81 MMHG | SYSTOLIC BLOOD PRESSURE: 188 MMHG

## 2021-12-10 DIAGNOSIS — I50.9 HEART FAILURE, UNSPECIFIED: ICD-10-CM

## 2021-12-10 DIAGNOSIS — Z95.0 PRESENCE OF CARDIAC PACEMAKER: Chronic | ICD-10-CM

## 2021-12-10 LAB
ALBUMIN SERPL ELPH-MCNC: 3.5 G/DL — SIGNIFICANT CHANGE UP (ref 3.5–5)
ALP SERPL-CCNC: 62 U/L — SIGNIFICANT CHANGE UP (ref 40–120)
ALT FLD-CCNC: 25 U/L DA — SIGNIFICANT CHANGE UP (ref 10–60)
ANION GAP SERPL CALC-SCNC: 7 MMOL/L — SIGNIFICANT CHANGE UP (ref 5–17)
AST SERPL-CCNC: 22 U/L — SIGNIFICANT CHANGE UP (ref 10–40)
BASOPHILS # BLD AUTO: 0.09 K/UL — SIGNIFICANT CHANGE UP (ref 0–0.2)
BASOPHILS NFR BLD AUTO: 1.3 % — SIGNIFICANT CHANGE UP (ref 0–2)
BILIRUB SERPL-MCNC: 0.4 MG/DL — SIGNIFICANT CHANGE UP (ref 0.2–1.2)
BUN SERPL-MCNC: 15 MG/DL — SIGNIFICANT CHANGE UP (ref 7–18)
CALCIUM SERPL-MCNC: 9.4 MG/DL — SIGNIFICANT CHANGE UP (ref 8.4–10.5)
CHLORIDE SERPL-SCNC: 102 MMOL/L — SIGNIFICANT CHANGE UP (ref 96–108)
CO2 SERPL-SCNC: 30 MMOL/L — SIGNIFICANT CHANGE UP (ref 22–31)
CREAT SERPL-MCNC: 1.31 MG/DL — HIGH (ref 0.5–1.3)
EOSINOPHIL # BLD AUTO: 0.58 K/UL — HIGH (ref 0–0.5)
EOSINOPHIL NFR BLD AUTO: 8.3 % — HIGH (ref 0–6)
GLUCOSE SERPL-MCNC: 120 MG/DL — HIGH (ref 70–99)
HCT VFR BLD CALC: 36.4 % — SIGNIFICANT CHANGE UP (ref 34.5–45)
HGB BLD-MCNC: 12.2 G/DL — SIGNIFICANT CHANGE UP (ref 11.5–15.5)
IMM GRANULOCYTES NFR BLD AUTO: 0.3 % — SIGNIFICANT CHANGE UP (ref 0–1.5)
LYMPHOCYTES # BLD AUTO: 1.16 K/UL — SIGNIFICANT CHANGE UP (ref 1–3.3)
LYMPHOCYTES # BLD AUTO: 16.6 % — SIGNIFICANT CHANGE UP (ref 13–44)
MCHC RBC-ENTMCNC: 32.2 PG — SIGNIFICANT CHANGE UP (ref 27–34)
MCHC RBC-ENTMCNC: 33.5 GM/DL — SIGNIFICANT CHANGE UP (ref 32–36)
MCV RBC AUTO: 96 FL — SIGNIFICANT CHANGE UP (ref 80–100)
MONOCYTES # BLD AUTO: 0.39 K/UL — SIGNIFICANT CHANGE UP (ref 0–0.9)
MONOCYTES NFR BLD AUTO: 5.6 % — SIGNIFICANT CHANGE UP (ref 2–14)
NEUTROPHILS # BLD AUTO: 4.75 K/UL — SIGNIFICANT CHANGE UP (ref 1.8–7.4)
NEUTROPHILS NFR BLD AUTO: 67.9 % — SIGNIFICANT CHANGE UP (ref 43–77)
NRBC # BLD: 0 /100 WBCS — SIGNIFICANT CHANGE UP (ref 0–0)
PLATELET # BLD AUTO: 159 K/UL — SIGNIFICANT CHANGE UP (ref 150–400)
POTASSIUM SERPL-MCNC: 4 MMOL/L — SIGNIFICANT CHANGE UP (ref 3.5–5.3)
POTASSIUM SERPL-SCNC: 4 MMOL/L — SIGNIFICANT CHANGE UP (ref 3.5–5.3)
PROT SERPL-MCNC: 7.4 G/DL — SIGNIFICANT CHANGE UP (ref 6–8.3)
RBC # BLD: 3.79 M/UL — LOW (ref 3.8–5.2)
RBC # FLD: 11.9 % — SIGNIFICANT CHANGE UP (ref 10.3–14.5)
SARS-COV-2 RNA SPEC QL NAA+PROBE: SIGNIFICANT CHANGE UP
SODIUM SERPL-SCNC: 139 MMOL/L — SIGNIFICANT CHANGE UP (ref 135–145)
WBC # BLD: 6.99 K/UL — SIGNIFICANT CHANGE UP (ref 3.8–10.5)
WBC # FLD AUTO: 6.99 K/UL — SIGNIFICANT CHANGE UP (ref 3.8–10.5)

## 2021-12-10 PROCEDURE — 99285 EMERGENCY DEPT VISIT HI MDM: CPT

## 2021-12-10 PROCEDURE — 71045 X-RAY EXAM CHEST 1 VIEW: CPT | Mod: 26

## 2021-12-10 RX ORDER — FUROSEMIDE 40 MG
20 TABLET ORAL ONCE
Refills: 0 | Status: COMPLETED | OUTPATIENT
Start: 2021-12-10 | End: 2021-12-10

## 2021-12-10 RX ADMIN — Medication 20 MILLIGRAM(S): at 21:04

## 2021-12-10 NOTE — ED PROVIDER NOTE - PHYSICAL EXAMINATION
Gen: AAOx3, non-toxic  Head: NCAT  HEENT: EOMI, oral mucosa moist, normal conjunctiva  Lung: bibasilar crackles, no respiratory distress, speaking in full sentences  CV: RRR, no murmurs, rubs or gallops  Abd: soft, NTND, no guarding, no CVA tenderness  MSK: no visible deformities  Neuro: No focal sensory or motor deficits, normal CN exam   Skin: Warm, well perfused, no rash, no LE edema   Psych: normal affect.     Rey Hicks, DO

## 2021-12-10 NOTE — H&P ADULT - HISTORY OF PRESENT ILLNESS
Patient is a 89 y/o female, from home, minimally ambulatory with assistance, with PMHx of HTN, HFpEF (Grade II DD), Afib (not on AC due to GIB), Complete Heart Block s/p PPM, HLD, and Dementia   Patient is a 89 y/o female, from home, minimally ambulatory with assistance, with PMHx of HTN, HFpEF (Grade II DD), Afib (not on AC due to GIB), Complete Heart Block s/p PPM, HLD, and Dementia who presented with complaints of cough and shortness of breath x 2 days. The patient is an unreliable historian, and most of the history is from her daughter. She endorses that the patient has been having a non productive cough, with some shortness of breath for the past few days. She also reports wheezing in the early morning and reduced appetite. She denies any fevers, headaches, N/V/D, abdominal pain, dysuria or lower extremity swelling.  Patient is a 89 y/o female, from home, minimally ambulatory with assistance, with PMHx of HTN, HFpEF (Grade II DD),Parox Afib (not on AC due to GIB),CRI, Complete Heart Block s/p PPM, HLD, and Dementia who presented with complaints of cough and shortness of breath x 2 days. The patient is an unreliable historian, and most of the history is from her daughter. She endorses that the patient has been having a non productive cough, with some shortness of breath for the past few days. She also reports wheezing in the early morning and reduced appetite. She denies any fevers, headaches, N/V/D, abdominal pain, dysuria or lower extremity swelling.

## 2021-12-10 NOTE — ED PROVIDER NOTE - OBJECTIVE STATEMENT
89F with PMH including HTN, PPM, CHF p/w dry cough and sneezing x 2 days. Denies any other symptoms including fever, chest pain, abd pain, N/V/D. Per daughter at bedside she has been giving the pt her 20 mg lasix every other day recently because potassium was low on outpatient labs. Not vaccinated for COVID.

## 2021-12-10 NOTE — ED ADULT NURSE NOTE - WILL THE PATIENT ACCEPT THE PFIZER COVID-19 VACCINE IF ELIGIBLE AND IT IS AVAILABLE?
----- Message from Miguel Ángel Fan sent at 3/12/2018  8:54 AM CDT -----  Contact: Pharmacy Services  Called to f/u on request for Lidocaine ointment.  Pharamcy can be reached @ [phone]   No

## 2021-12-10 NOTE — H&P ADULT - PROBLEM SELECTOR PLAN 7
Aspirin   IMPROVE VTE Individual Risk Assessment  RISK                                                                Points  [  ] Previous VTE                                                  3  [x  ] Thrombophilia                                               2  [  ] Lower limb paralysis                                      2        (unable to hold up >15 seconds)    [  ] Current Cancer                                              2         (within 6 months)  [ x ] Immobilization > 24 hrs                                1  [  ] ICU/CCU stay > 24 hours                              1  [ x ] Age > 60                                                      1  IMPROVE VTE Score ___4______

## 2021-12-10 NOTE — ED PROVIDER NOTE - CLINICAL SUMMARY MEDICAL DECISION MAKING FREE TEXT BOX
89F with PMH including HTN, PPM, CHF p/w dry cough and sneezing x 2 days. Denies any other symptoms. Recently decreased lasix frequency. Mild crackles on exam. PNA vs. CHF exacerbations. Labs, XR, reassess.

## 2021-12-10 NOTE — H&P ADULT - CONVERSATION DETAILS
Patient is DNR/DNI as per past admission and was reconfirmed with Pt's HCP Daughter Tanya after updating her on the current treatment plan, risks, and prognosis.

## 2021-12-10 NOTE — H&P ADULT - PROBLEM SELECTOR PLAN 1
Clinically does not appear to peripheraly fluid overloaded but CXR with increasing vascularity and no focal PNA   EKG wide complex, paced sinus   C/w Aspirin, Statin  IV Lasix 20 BID due to age and severity of fluid overload   Replete electrolytes aggressively   Tele monitoring, Strict I/O's, daily weights, Na and 1.2L fluid restriction   F/u Echo

## 2021-12-10 NOTE — H&P ADULT - PROBLEM SELECTOR PLAN 2
Pt with history of Afib, on multaq, lopressor  No AC due to prior GIB/ anemia; only on ASA  EKG rate controlled and paced

## 2021-12-10 NOTE — H&P ADULT - PROBLEM SELECTOR PLAN 6
C/w supportive measures   Fall/ aspiration precautions   Daughter is able to reorient her frequently

## 2021-12-10 NOTE — ED ADULT NURSE NOTE - NSIMPLEMENTINTERV_GEN_ALL_ED
Implemented All Fall with Harm Risk Interventions:  [place] to call system. Call bell, personal items and telephone within reach. Instruct patient to call for assistance. Room bathroom lighting operational. Non-slip footwear when patient is off stretcher. Physically safe environment: no spills, clutter or unnecessary equipment. Stretcher in lowest position, wheels locked, appropriate side rails in place. Provide visual cue, wrist band, yellow gown, etc. Monitor gait and stability. Monitor for mental status changes and reorient to person, place, and time. Review medications for side effects contributing to fall risk. Reinforce activity limits and safety measures with patient and family. Provide visual clues: red socks.

## 2021-12-10 NOTE — H&P ADULT - NSHPPHYSICALEXAM_GEN_ALL_CORE
GENERAL APPEARANCE: Elderly  woman, AA0x3, in NAD on RA   HEENT: Normocephalic, PERRL, EOMI External auditory canals clear, hearing grossly intact, no nasal discharge   THROAT: Oral cavity and pharynx normal. No inflammation, swelling, exudate, or lesions.  CARDIAC: Normal S1 and S2. No S3, S4 or murmurs. Rhythm is regular. There is no peripheral edema, cyanosis or pallor.  LUNGS: Lower zone rhonchi  ABDOMEN: Positive bowel sounds. Soft, nondistended, nontender. No guarding or rebound. No masses.  EXTREMITIES: No significant deformity or joint abnormality. No edema. Peripheral pulses intact. No varicosities.  NEUROLOGICAL: Unable to assess, moving all 4 extremities to command, able to walk with assistance   SKIN: No skin breakdown, lesions or rashes noted

## 2021-12-10 NOTE — H&P ADULT - ATTENDING COMMENTS
89 y/o female, from home, minimally ambulatory with assistance, with PMHx of HTN, HFpEF (Grade II DD),Parox Afib (not on AC due to GIB),CRI, Complete Heart Block s/p PPM, HLD, s/p COVID and Dementia who presented with complaints of cough and shortness of breath x 2 days-acute diastolic HF.  1.Tele monitoring.  2.Ecchocardiogram.  3.Acute diastolic HF-IV lasix.  4.PAF-asa,multaq,lopressor.  5.HTN-Imdur,hydralazoine.  6.Hx of GI Bleed-off AC.  7.CRI-F/U Lytes.

## 2021-12-10 NOTE — ED PROVIDER NOTE - NS ED ROS FT
ROS:  GENERAL: +sneezing. No fever, no chills  EYES: no change in vision  HEENT: no trouble swallowing, no trouble speaking  CARDIAC: no chest pain  PULMONARY: +cough. no SOB   GI: no abdominal pain, no nausea, no vomiting, no diarrhea, no constipation  : No dysuria, no frequency, no change in appearance, or odor of urine  SKIN: no rashes  NEURO: no headache, no weakness  MSK: No joint pain    Rey Hicks, DO

## 2021-12-10 NOTE — H&P ADULT - ASSESSMENT
Patient is a 89 y/o female, from home, minimally ambulatory with assistance, with PMHx of HTN, HFpEF (Grade II DD), Afib (not on AC due to GIB), Complete Heart Block s/p PPM, HLD, and Dementia who presented with complaints of cough and shortness of breath x 2 days, admitted for CHF exacerbation, and hypertensive urgency.      In the ED,  VS: 97.6F, HR 66bpm, /77, RR 16, Spo2 96% on RA   Labs significant for Sr Cr 1.31 (baseline 0.8-1), BNP 2753  CXR with moderate fluid overload features bilaterally

## 2021-12-11 DIAGNOSIS — R05.9 COUGH, UNSPECIFIED: ICD-10-CM

## 2021-12-11 DIAGNOSIS — I48.91 UNSPECIFIED ATRIAL FIBRILLATION: ICD-10-CM

## 2021-12-11 DIAGNOSIS — Z29.9 ENCOUNTER FOR PROPHYLACTIC MEASURES, UNSPECIFIED: ICD-10-CM

## 2021-12-11 DIAGNOSIS — I50.9 HEART FAILURE, UNSPECIFIED: ICD-10-CM

## 2021-12-11 DIAGNOSIS — Z86.79 PERSONAL HISTORY OF OTHER DISEASES OF THE CIRCULATORY SYSTEM: ICD-10-CM

## 2021-12-11 DIAGNOSIS — N17.9 ACUTE KIDNEY FAILURE, UNSPECIFIED: ICD-10-CM

## 2021-12-11 DIAGNOSIS — F03.90 UNSPECIFIED DEMENTIA, UNSPECIFIED SEVERITY, WITHOUT BEHAVIORAL DISTURBANCE, PSYCHOTIC DISTURBANCE, MOOD DISTURBANCE, AND ANXIETY: ICD-10-CM

## 2021-12-11 DIAGNOSIS — I10 ESSENTIAL (PRIMARY) HYPERTENSION: ICD-10-CM

## 2021-12-11 DIAGNOSIS — E78.00 PURE HYPERCHOLESTEROLEMIA, UNSPECIFIED: ICD-10-CM

## 2021-12-11 DIAGNOSIS — I27.20 PULMONARY HYPERTENSION, UNSPECIFIED: ICD-10-CM

## 2021-12-11 LAB
A1C WITH ESTIMATED AVERAGE GLUCOSE RESULT: 5.2 % — SIGNIFICANT CHANGE UP (ref 4–5.6)
ANION GAP SERPL CALC-SCNC: 3 MMOL/L — LOW (ref 5–17)
ANION GAP SERPL CALC-SCNC: 8 MMOL/L — SIGNIFICANT CHANGE UP (ref 5–17)
APTT BLD: 29.9 SEC — SIGNIFICANT CHANGE UP (ref 27.5–35.5)
BUN SERPL-MCNC: 13 MG/DL — SIGNIFICANT CHANGE UP (ref 7–18)
BUN SERPL-MCNC: 16 MG/DL — SIGNIFICANT CHANGE UP (ref 7–18)
CALCIUM SERPL-MCNC: 9.6 MG/DL — SIGNIFICANT CHANGE UP (ref 8.4–10.5)
CALCIUM SERPL-MCNC: 9.7 MG/DL — SIGNIFICANT CHANGE UP (ref 8.4–10.5)
CHLORIDE SERPL-SCNC: 101 MMOL/L — SIGNIFICANT CHANGE UP (ref 96–108)
CHLORIDE SERPL-SCNC: 107 MMOL/L — SIGNIFICANT CHANGE UP (ref 96–108)
CO2 SERPL-SCNC: 24 MMOL/L — SIGNIFICANT CHANGE UP (ref 22–31)
CO2 SERPL-SCNC: 34 MMOL/L — HIGH (ref 22–31)
CREAT SERPL-MCNC: 0.66 MG/DL — SIGNIFICANT CHANGE UP (ref 0.5–1.3)
CREAT SERPL-MCNC: 1.32 MG/DL — HIGH (ref 0.5–1.3)
ESTIMATED AVERAGE GLUCOSE: 103 MG/DL — SIGNIFICANT CHANGE UP (ref 68–114)
GLUCOSE SERPL-MCNC: 120 MG/DL — HIGH (ref 70–99)
GLUCOSE SERPL-MCNC: 219 MG/DL — HIGH (ref 70–99)
HCT VFR BLD CALC: 36.3 % — SIGNIFICANT CHANGE UP (ref 34.5–45)
HGB BLD-MCNC: 12.2 G/DL — SIGNIFICANT CHANGE UP (ref 11.5–15.5)
INR BLD: 1.03 RATIO — SIGNIFICANT CHANGE UP (ref 0.88–1.16)
MCHC RBC-ENTMCNC: 32.3 PG — SIGNIFICANT CHANGE UP (ref 27–34)
MCHC RBC-ENTMCNC: 33.6 GM/DL — SIGNIFICANT CHANGE UP (ref 32–36)
MCV RBC AUTO: 96 FL — SIGNIFICANT CHANGE UP (ref 80–100)
NRBC # BLD: 0 /100 WBCS — SIGNIFICANT CHANGE UP (ref 0–0)
PLATELET # BLD AUTO: 156 K/UL — SIGNIFICANT CHANGE UP (ref 150–400)
POTASSIUM SERPL-MCNC: 3.4 MMOL/L — LOW (ref 3.5–5.3)
POTASSIUM SERPL-MCNC: 3.9 MMOL/L — SIGNIFICANT CHANGE UP (ref 3.5–5.3)
POTASSIUM SERPL-SCNC: 3.4 MMOL/L — LOW (ref 3.5–5.3)
POTASSIUM SERPL-SCNC: 3.9 MMOL/L — SIGNIFICANT CHANGE UP (ref 3.5–5.3)
PROTHROM AB SERPL-ACNC: 12.2 SEC — SIGNIFICANT CHANGE UP (ref 10.6–13.6)
RBC # BLD: 3.78 M/UL — LOW (ref 3.8–5.2)
RBC # FLD: 12 % — SIGNIFICANT CHANGE UP (ref 10.3–14.5)
SODIUM SERPL-SCNC: 138 MMOL/L — SIGNIFICANT CHANGE UP (ref 135–145)
SODIUM SERPL-SCNC: 139 MMOL/L — SIGNIFICANT CHANGE UP (ref 135–145)
WBC # BLD: 6.09 K/UL — SIGNIFICANT CHANGE UP (ref 3.8–10.5)
WBC # FLD AUTO: 6.09 K/UL — SIGNIFICANT CHANGE UP (ref 3.8–10.5)

## 2021-12-11 RX ORDER — LABETALOL HCL 100 MG
10 TABLET ORAL ONCE
Refills: 0 | Status: COMPLETED | OUTPATIENT
Start: 2021-12-11 | End: 2021-12-11

## 2021-12-11 RX ORDER — ISOSORBIDE MONONITRATE 60 MG/1
30 TABLET, EXTENDED RELEASE ORAL DAILY
Refills: 0 | Status: DISCONTINUED | OUTPATIENT
Start: 2021-12-11 | End: 2021-12-15

## 2021-12-11 RX ORDER — FERROUS SULFATE 325(65) MG
325 TABLET ORAL DAILY
Refills: 0 | Status: DISCONTINUED | OUTPATIENT
Start: 2021-12-11 | End: 2021-12-15

## 2021-12-11 RX ORDER — ALBUTEROL 90 UG/1
2 AEROSOL, METERED ORAL EVERY 6 HOURS
Refills: 0 | Status: DISCONTINUED | OUTPATIENT
Start: 2021-12-11 | End: 2021-12-15

## 2021-12-11 RX ORDER — METOPROLOL TARTRATE 50 MG
100 TABLET ORAL
Refills: 0 | Status: DISCONTINUED | OUTPATIENT
Start: 2021-12-11 | End: 2021-12-15

## 2021-12-11 RX ORDER — ASPIRIN/CALCIUM CARB/MAGNESIUM 324 MG
81 TABLET ORAL DAILY
Refills: 0 | Status: DISCONTINUED | OUTPATIENT
Start: 2021-12-11 | End: 2021-12-15

## 2021-12-11 RX ORDER — HEPARIN SODIUM 5000 [USP'U]/ML
5000 INJECTION INTRAVENOUS; SUBCUTANEOUS EVERY 12 HOURS
Refills: 0 | Status: DISCONTINUED | OUTPATIENT
Start: 2021-12-11 | End: 2021-12-15

## 2021-12-11 RX ORDER — DRONEDARONE 400 MG/1
400 TABLET, FILM COATED ORAL
Refills: 0 | Status: DISCONTINUED | OUTPATIENT
Start: 2021-12-11 | End: 2021-12-15

## 2021-12-11 RX ORDER — FUROSEMIDE 40 MG
20 TABLET ORAL
Refills: 0 | Status: DISCONTINUED | OUTPATIENT
Start: 2021-12-11 | End: 2021-12-13

## 2021-12-11 RX ORDER — ACETAMINOPHEN 500 MG
650 TABLET ORAL EVERY 6 HOURS
Refills: 0 | Status: DISCONTINUED | OUTPATIENT
Start: 2021-12-11 | End: 2021-12-15

## 2021-12-11 RX ORDER — HYDRALAZINE HCL 50 MG
5 TABLET ORAL ONCE
Refills: 0 | Status: COMPLETED | OUTPATIENT
Start: 2021-12-11 | End: 2021-12-11

## 2021-12-11 RX ORDER — LANOLIN ALCOHOL/MO/W.PET/CERES
3 CREAM (GRAM) TOPICAL AT BEDTIME
Refills: 0 | Status: DISCONTINUED | OUTPATIENT
Start: 2021-12-11 | End: 2021-12-15

## 2021-12-11 RX ORDER — ONDANSETRON 8 MG/1
4 TABLET, FILM COATED ORAL EVERY 8 HOURS
Refills: 0 | Status: DISCONTINUED | OUTPATIENT
Start: 2021-12-11 | End: 2021-12-11

## 2021-12-11 RX ORDER — PANTOPRAZOLE SODIUM 20 MG/1
40 TABLET, DELAYED RELEASE ORAL
Refills: 0 | Status: DISCONTINUED | OUTPATIENT
Start: 2021-12-11 | End: 2021-12-15

## 2021-12-11 RX ORDER — INFLUENZA VIRUS VACCINE 15; 15; 15; 15 UG/.5ML; UG/.5ML; UG/.5ML; UG/.5ML
0.7 SUSPENSION INTRAMUSCULAR ONCE
Refills: 0 | Status: DISCONTINUED | OUTPATIENT
Start: 2021-12-11 | End: 2021-12-15

## 2021-12-11 RX ORDER — SIMVASTATIN 20 MG/1
40 TABLET, FILM COATED ORAL AT BEDTIME
Refills: 0 | Status: DISCONTINUED | OUTPATIENT
Start: 2021-12-11 | End: 2021-12-15

## 2021-12-11 RX ORDER — HYDRALAZINE HCL 50 MG
50 TABLET ORAL THREE TIMES A DAY
Refills: 0 | Status: DISCONTINUED | OUTPATIENT
Start: 2021-12-11 | End: 2021-12-12

## 2021-12-11 RX ADMIN — Medication 50 MILLIGRAM(S): at 07:22

## 2021-12-11 RX ADMIN — Medication 81 MILLIGRAM(S): at 11:34

## 2021-12-11 RX ADMIN — Medication 100 MILLIGRAM(S): at 07:21

## 2021-12-11 RX ADMIN — ISOSORBIDE MONONITRATE 30 MILLIGRAM(S): 60 TABLET, EXTENDED RELEASE ORAL at 11:34

## 2021-12-11 RX ADMIN — DRONEDARONE 400 MILLIGRAM(S): 400 TABLET, FILM COATED ORAL at 17:50

## 2021-12-11 RX ADMIN — DRONEDARONE 400 MILLIGRAM(S): 400 TABLET, FILM COATED ORAL at 07:22

## 2021-12-11 RX ADMIN — HEPARIN SODIUM 5000 UNIT(S): 5000 INJECTION INTRAVENOUS; SUBCUTANEOUS at 17:50

## 2021-12-11 RX ADMIN — Medication 325 MILLIGRAM(S): at 11:34

## 2021-12-11 RX ADMIN — Medication 20 MILLIGRAM(S): at 17:50

## 2021-12-11 RX ADMIN — Medication 10 MILLIGRAM(S): at 01:50

## 2021-12-11 RX ADMIN — Medication 5 MILLIGRAM(S): at 19:54

## 2021-12-11 RX ADMIN — ALBUTEROL 2 PUFF(S): 90 AEROSOL, METERED ORAL at 22:37

## 2021-12-11 RX ADMIN — Medication 50 MILLIGRAM(S): at 21:45

## 2021-12-11 RX ADMIN — SIMVASTATIN 40 MILLIGRAM(S): 20 TABLET, FILM COATED ORAL at 21:45

## 2021-12-11 RX ADMIN — Medication 20 MILLIGRAM(S): at 07:22

## 2021-12-11 RX ADMIN — Medication 100 MILLIGRAM(S): at 17:50

## 2021-12-11 RX ADMIN — Medication 50 MILLIGRAM(S): at 14:32

## 2021-12-11 NOTE — CONSULT NOTE ADULT - SUBJECTIVE AND OBJECTIVE BOX
PULMONARY CONSULT NOTE      CLAUDETTE GARCES  MRN-[id number]    History of Present Illness:  Reason for Admission: Cough, Fluid Overload  History of Present Illness:   Patient is a 89 y/o female, from home, minimally ambulatory with assistance, with PMHx of HTN, HFpEF (Grade II DD),Parox Afib (not on AC due to GIB),CRI, Complete Heart Block s/p PPM, HLD, and Dementia who presented with complaints of cough and shortness of breath x 2 days. The patient is an unreliable historian, and most of the history is from her daughter. She endorses that the patient has been having a non productive cough, with some shortness of breath for the past few days. She also reports wheezing in the early morning and reduced appetite. She denies any fevers, headaches, N/V/D, abdominal pain, dysuria or lower extremity swelling.         HISTORY OF PRESENT ILLNESS: As above. Awake, alert, comfortable in bed in NAD    MEDICATIONS  (STANDING):  ALBUTerol    90 MICROgram(s) HFA Inhaler 2 Puff(s) Inhalation every 6 hours  aspirin  chewable 81 milliGRAM(s) Oral daily  dronedarone 400 milliGRAM(s) Oral two times a day  ferrous    sulfate 325 milliGRAM(s) Oral daily  furosemide   Injectable 20 milliGRAM(s) IV Push two times a day  heparin   Injectable 5000 Unit(s) SubCutaneous every 12 hours  hydrALAZINE 50 milliGRAM(s) Oral three times a day  isosorbide   mononitrate ER Tablet (IMDUR) 30 milliGRAM(s) Oral daily  metoprolol tartrate 100 milliGRAM(s) Oral two times a day  pantoprazole    Tablet 40 milliGRAM(s) Oral before breakfast  simvastatin 40 milliGRAM(s) Oral at bedtime      MEDICATIONS  (PRN):  acetaminophen     Tablet .. 650 milliGRAM(s) Oral every 6 hours PRN Temp greater or equal to 38C (100.4F), Mild Pain (1 - 3)  aluminum hydroxide/magnesium hydroxide/simethicone Suspension 30 milliLiter(s) Oral every 4 hours PRN Dyspepsia  melatonin 3 milliGRAM(s) Oral at bedtime PRN Insomnia      Allergies    No Known Allergies    Intolerances        PAST MEDICAL & SURGICAL HISTORY:  HTN (hypertension)    Hypercholesterolemia    Atrial fibrillation    History of complete heart block    Pulmonary HTN    CHF (congestive heart failure)    Artificial pacemaker        FAMILY HISTORY:  FH: hypertension    FH: coronary artery disease        SOCIAL HISTORY  Smoking History:     REVIEW OF SYSTEMS:    CONSTITUTIONAL:  No fevers, chills, sweats    HEENT:  Eyes:  No diplopia or blurred vision. ENT:  No earache, sore throat or runny nose.    CARDIOVASCULAR:  No pressure, squeezing, tightness, or heaviness about the chest; no palpitations.    RESPIRATORY:  Per HPI    GASTROINTESTINAL:  No abdominal pain, nausea, vomiting or diarrhea.    GENITOURINARY:  No dysuria, frequency or urgency.    NEUROLOGIC:  No paresthesias, fasciculations, seizures or weakness.    PSYCHIATRIC:  No disorder of thought or mood.    Vital Signs Last 24 Hrs  T(C): 36.4 (11 Dec 2021 08:04), Max: 36.7 (10 Dec 2021 17:45)  T(F): 97.6 (11 Dec 2021 08:04), Max: 98.1 (10 Dec 2021 17:45)  HR: 60 (11 Dec 2021 08:04) (60 - 75)  BP: 221/75 (11 Dec 2021 08:04) (155/70 - 221/75)  BP(mean): --  RR: 18 (11 Dec 2021 08:04) (16 - 18)  SpO2: 94% (11 Dec 2021 08:04) (94% - 97%)  I&O's Detail      PHYSICAL EXAMINATION:    GENERAL: The patient is a well-developed, well-nourished _____in no apparent distress.     HEENT: Head is normocephalic and atraumatic. Extraocular muscles are intact. Mucous membranes are moist.     NECK: Supple.     LUNGS: Clear to auscultation without wheezing, rales, or rhonchi. Respirations unlabored    HEART: Regular rate and rhythm without murmur.    ABDOMEN: Soft, nontender, and nondistended.  No hepatosplenomegaly is noted.    EXTREMITIES: Without any cyanosis, clubbing, rash, lesions or edema.    NEUROLOGIC: Grossly intact.      LABS:                        12.2   6.09  )-----------( 156      ( 11 Dec 2021 05:50 )             36.3     12-11    138  |  101  |  16  ----------------------------<  120<H>  3.4<L>   |  34<H>  |  1.32<H>    Ca    9.7      11 Dec 2021 05:50    TPro  7.4  /  Alb  3.5  /  TBili  0.4  /  DBili  x   /  AST  22  /  ALT  25  /  AlkPhos  62  12-10    PT/INR - ( 11 Dec 2021 05:50 )   PT: 12.2 sec;   INR: 1.03 ratio         PTT - ( 11 Dec 2021 05:50 )  PTT:29.9 sec            Serum Pro-Brain Natriuretic Peptide: 2753 pg/mL (12-10-21 @ 20:25)          MICROBIOLOGY:    RADIOLOGY & ADDITIONAL STUDIES:    CXR:    Ct scan chest;    ekg;    echo:

## 2021-12-11 NOTE — PATIENT PROFILE ADULT - FALL HARM RISK - HARM RISK INTERVENTIONS

## 2021-12-12 DIAGNOSIS — N18.9 CHRONIC KIDNEY DISEASE, UNSPECIFIED: ICD-10-CM

## 2021-12-12 LAB
24R-OH-CALCIDIOL SERPL-MCNC: 31.3 NG/ML — SIGNIFICANT CHANGE UP (ref 30–80)
ANION GAP SERPL CALC-SCNC: 5 MMOL/L — SIGNIFICANT CHANGE UP (ref 5–17)
BUN SERPL-MCNC: 17 MG/DL — SIGNIFICANT CHANGE UP (ref 7–18)
CALCIUM SERPL-MCNC: 10 MG/DL — SIGNIFICANT CHANGE UP (ref 8.4–10.5)
CHLORIDE SERPL-SCNC: 99 MMOL/L — SIGNIFICANT CHANGE UP (ref 96–108)
CHOLEST SERPL-MCNC: 181 MG/DL — SIGNIFICANT CHANGE UP
CO2 SERPL-SCNC: 35 MMOL/L — HIGH (ref 22–31)
CREAT SERPL-MCNC: 1.4 MG/DL — HIGH (ref 0.5–1.3)
GLUCOSE BLDC GLUCOMTR-MCNC: 94 MG/DL — SIGNIFICANT CHANGE UP (ref 70–99)
GLUCOSE SERPL-MCNC: 105 MG/DL — HIGH (ref 70–99)
HCT VFR BLD CALC: 36.5 % — SIGNIFICANT CHANGE UP (ref 34.5–45)
HDLC SERPL-MCNC: 62 MG/DL — SIGNIFICANT CHANGE UP
HGB BLD-MCNC: 12.5 G/DL — SIGNIFICANT CHANGE UP (ref 11.5–15.5)
LIPID PNL WITH DIRECT LDL SERPL: 102 MG/DL — HIGH
MCHC RBC-ENTMCNC: 32.3 PG — SIGNIFICANT CHANGE UP (ref 27–34)
MCHC RBC-ENTMCNC: 34.2 GM/DL — SIGNIFICANT CHANGE UP (ref 32–36)
MCV RBC AUTO: 94.3 FL — SIGNIFICANT CHANGE UP (ref 80–100)
NON HDL CHOLESTEROL: 119 MG/DL — SIGNIFICANT CHANGE UP
NRBC # BLD: 0 /100 WBCS — SIGNIFICANT CHANGE UP (ref 0–0)
PLATELET # BLD AUTO: 149 K/UL — LOW (ref 150–400)
POTASSIUM SERPL-MCNC: 3.2 MMOL/L — LOW (ref 3.5–5.3)
POTASSIUM SERPL-SCNC: 3.2 MMOL/L — LOW (ref 3.5–5.3)
RBC # BLD: 3.87 M/UL — SIGNIFICANT CHANGE UP (ref 3.8–5.2)
RBC # FLD: 11.9 % — SIGNIFICANT CHANGE UP (ref 10.3–14.5)
SODIUM SERPL-SCNC: 139 MMOL/L — SIGNIFICANT CHANGE UP (ref 135–145)
TRIGL SERPL-MCNC: 86 MG/DL — SIGNIFICANT CHANGE UP
TSH SERPL-MCNC: 1.36 UU/ML — SIGNIFICANT CHANGE UP (ref 0.34–4.82)
VIT B12 SERPL-MCNC: 1197 PG/ML — SIGNIFICANT CHANGE UP (ref 232–1245)
WBC # BLD: 6.01 K/UL — SIGNIFICANT CHANGE UP (ref 3.8–10.5)
WBC # FLD AUTO: 6.01 K/UL — SIGNIFICANT CHANGE UP (ref 3.8–10.5)

## 2021-12-12 RX ORDER — POTASSIUM CHLORIDE 20 MEQ
40 PACKET (EA) ORAL
Refills: 0 | Status: COMPLETED | OUTPATIENT
Start: 2021-12-12 | End: 2021-12-12

## 2021-12-12 RX ORDER — HYDRALAZINE HCL 50 MG
75 TABLET ORAL EVERY 8 HOURS
Refills: 0 | Status: DISCONTINUED | OUTPATIENT
Start: 2021-12-12 | End: 2021-12-15

## 2021-12-12 RX ORDER — LATANOPROST 0.05 MG/ML
1 SOLUTION/ DROPS OPHTHALMIC; TOPICAL AT BEDTIME
Refills: 0 | Status: DISCONTINUED | OUTPATIENT
Start: 2021-12-12 | End: 2021-12-15

## 2021-12-12 RX ORDER — DORZOLAMIDE HYDROCHLORIDE TIMOLOL MALEATE 20; 5 MG/ML; MG/ML
1 SOLUTION/ DROPS OPHTHALMIC
Refills: 0 | Status: DISCONTINUED | OUTPATIENT
Start: 2021-12-12 | End: 2021-12-15

## 2021-12-12 RX ORDER — HYDRALAZINE HCL 50 MG
10 TABLET ORAL ONCE
Refills: 0 | Status: COMPLETED | OUTPATIENT
Start: 2021-12-12 | End: 2021-12-12

## 2021-12-12 RX ADMIN — LATANOPROST 1 DROP(S): 0.05 SOLUTION/ DROPS OPHTHALMIC; TOPICAL at 22:49

## 2021-12-12 RX ADMIN — HEPARIN SODIUM 5000 UNIT(S): 5000 INJECTION INTRAVENOUS; SUBCUTANEOUS at 05:31

## 2021-12-12 RX ADMIN — Medication 325 MILLIGRAM(S): at 12:19

## 2021-12-12 RX ADMIN — SIMVASTATIN 40 MILLIGRAM(S): 20 TABLET, FILM COATED ORAL at 22:43

## 2021-12-12 RX ADMIN — Medication 40 MILLIEQUIVALENT(S): at 15:18

## 2021-12-12 RX ADMIN — HEPARIN SODIUM 5000 UNIT(S): 5000 INJECTION INTRAVENOUS; SUBCUTANEOUS at 18:09

## 2021-12-12 RX ADMIN — DRONEDARONE 400 MILLIGRAM(S): 400 TABLET, FILM COATED ORAL at 18:09

## 2021-12-12 RX ADMIN — Medication 75 MILLIGRAM(S): at 22:43

## 2021-12-12 RX ADMIN — Medication 81 MILLIGRAM(S): at 12:19

## 2021-12-12 RX ADMIN — Medication 75 MILLIGRAM(S): at 15:52

## 2021-12-12 RX ADMIN — Medication 40 MILLIEQUIVALENT(S): at 12:19

## 2021-12-12 RX ADMIN — ISOSORBIDE MONONITRATE 30 MILLIGRAM(S): 60 TABLET, EXTENDED RELEASE ORAL at 12:20

## 2021-12-12 RX ADMIN — ALBUTEROL 2 PUFF(S): 90 AEROSOL, METERED ORAL at 18:37

## 2021-12-12 RX ADMIN — PANTOPRAZOLE SODIUM 40 MILLIGRAM(S): 20 TABLET, DELAYED RELEASE ORAL at 05:31

## 2021-12-12 RX ADMIN — Medication 20 MILLIGRAM(S): at 05:30

## 2021-12-12 RX ADMIN — Medication 10 MILLIGRAM(S): at 00:36

## 2021-12-12 RX ADMIN — Medication 100 MILLIGRAM(S): at 05:31

## 2021-12-12 RX ADMIN — Medication 50 MILLIGRAM(S): at 05:31

## 2021-12-12 RX ADMIN — Medication 100 MILLIGRAM(S): at 18:12

## 2021-12-12 RX ADMIN — DRONEDARONE 400 MILLIGRAM(S): 400 TABLET, FILM COATED ORAL at 05:30

## 2021-12-12 RX ADMIN — DORZOLAMIDE HYDROCHLORIDE TIMOLOL MALEATE 1 DROP(S): 20; 5 SOLUTION/ DROPS OPHTHALMIC at 18:08

## 2021-12-12 RX ADMIN — Medication 20 MILLIGRAM(S): at 18:08

## 2021-12-12 NOTE — PROGRESS NOTE ADULT - PROBLEM SELECTOR PLAN 2
Pt with history of Afib, on multaq, lopressor  No AC due to prior GIB/ anemia; only on ASA  EKG rate controlled and paced  c/w ,multaq and lopressor

## 2021-12-13 LAB
ANION GAP SERPL CALC-SCNC: 6 MMOL/L — SIGNIFICANT CHANGE UP (ref 5–17)
BUN SERPL-MCNC: 20 MG/DL — HIGH (ref 7–18)
CALCIUM SERPL-MCNC: 10 MG/DL — SIGNIFICANT CHANGE UP (ref 8.4–10.5)
CHLORIDE SERPL-SCNC: 101 MMOL/L — SIGNIFICANT CHANGE UP (ref 96–108)
CO2 SERPL-SCNC: 33 MMOL/L — HIGH (ref 22–31)
CREAT SERPL-MCNC: 1.88 MG/DL — HIGH (ref 0.5–1.3)
GLUCOSE SERPL-MCNC: 102 MG/DL — HIGH (ref 70–99)
HCT VFR BLD CALC: 39.5 % — SIGNIFICANT CHANGE UP (ref 34.5–45)
HGB BLD-MCNC: 13.2 G/DL — SIGNIFICANT CHANGE UP (ref 11.5–15.5)
MAGNESIUM SERPL-MCNC: 2.3 MG/DL — SIGNIFICANT CHANGE UP (ref 1.6–2.6)
MCHC RBC-ENTMCNC: 32.2 PG — SIGNIFICANT CHANGE UP (ref 27–34)
MCHC RBC-ENTMCNC: 33.4 GM/DL — SIGNIFICANT CHANGE UP (ref 32–36)
MCV RBC AUTO: 96.3 FL — SIGNIFICANT CHANGE UP (ref 80–100)
NRBC # BLD: 0 /100 WBCS — SIGNIFICANT CHANGE UP (ref 0–0)
PHOSPHATE SERPL-MCNC: 3.5 MG/DL — SIGNIFICANT CHANGE UP (ref 2.5–4.5)
PLATELET # BLD AUTO: 148 K/UL — LOW (ref 150–400)
POTASSIUM SERPL-MCNC: 3.5 MMOL/L — SIGNIFICANT CHANGE UP (ref 3.5–5.3)
POTASSIUM SERPL-SCNC: 3.5 MMOL/L — SIGNIFICANT CHANGE UP (ref 3.5–5.3)
RBC # BLD: 4.1 M/UL — SIGNIFICANT CHANGE UP (ref 3.8–5.2)
RBC # FLD: 12.3 % — SIGNIFICANT CHANGE UP (ref 10.3–14.5)
SODIUM SERPL-SCNC: 140 MMOL/L — SIGNIFICANT CHANGE UP (ref 135–145)
WBC # BLD: 6.74 K/UL — SIGNIFICANT CHANGE UP (ref 3.8–10.5)
WBC # FLD AUTO: 6.74 K/UL — SIGNIFICANT CHANGE UP (ref 3.8–10.5)

## 2021-12-13 RX ORDER — SODIUM CHLORIDE 9 MG/ML
500 INJECTION INTRAMUSCULAR; INTRAVENOUS; SUBCUTANEOUS ONCE
Refills: 0 | Status: COMPLETED | OUTPATIENT
Start: 2021-12-13 | End: 2021-12-13

## 2021-12-13 RX ADMIN — DRONEDARONE 400 MILLIGRAM(S): 400 TABLET, FILM COATED ORAL at 06:53

## 2021-12-13 RX ADMIN — Medication 325 MILLIGRAM(S): at 11:00

## 2021-12-13 RX ADMIN — Medication 75 MILLIGRAM(S): at 06:53

## 2021-12-13 RX ADMIN — DRONEDARONE 400 MILLIGRAM(S): 400 TABLET, FILM COATED ORAL at 17:10

## 2021-12-13 RX ADMIN — HEPARIN SODIUM 5000 UNIT(S): 5000 INJECTION INTRAVENOUS; SUBCUTANEOUS at 17:10

## 2021-12-13 RX ADMIN — Medication 20 MILLIGRAM(S): at 06:53

## 2021-12-13 RX ADMIN — ISOSORBIDE MONONITRATE 30 MILLIGRAM(S): 60 TABLET, EXTENDED RELEASE ORAL at 11:00

## 2021-12-13 RX ADMIN — Medication 81 MILLIGRAM(S): at 11:00

## 2021-12-13 RX ADMIN — ALBUTEROL 2 PUFF(S): 90 AEROSOL, METERED ORAL at 11:00

## 2021-12-13 RX ADMIN — DORZOLAMIDE HYDROCHLORIDE TIMOLOL MALEATE 1 DROP(S): 20; 5 SOLUTION/ DROPS OPHTHALMIC at 17:10

## 2021-12-13 RX ADMIN — HEPARIN SODIUM 5000 UNIT(S): 5000 INJECTION INTRAVENOUS; SUBCUTANEOUS at 06:53

## 2021-12-13 RX ADMIN — SODIUM CHLORIDE 1000 MILLILITER(S): 9 INJECTION INTRAMUSCULAR; INTRAVENOUS; SUBCUTANEOUS at 16:00

## 2021-12-13 RX ADMIN — PANTOPRAZOLE SODIUM 40 MILLIGRAM(S): 20 TABLET, DELAYED RELEASE ORAL at 06:54

## 2021-12-13 RX ADMIN — Medication 100 MILLIGRAM(S): at 06:53

## 2021-12-13 RX ADMIN — DORZOLAMIDE HYDROCHLORIDE TIMOLOL MALEATE 1 DROP(S): 20; 5 SOLUTION/ DROPS OPHTHALMIC at 06:54

## 2021-12-13 RX ADMIN — Medication 100 MILLIGRAM(S): at 17:10

## 2021-12-13 RX ADMIN — ALBUTEROL 2 PUFF(S): 90 AEROSOL, METERED ORAL at 14:03

## 2021-12-13 NOTE — DIETITIAN INITIAL EVALUATION ADULT. - PERTINENT MEDS FT
MEDICATIONS  (STANDING):  ALBUTerol    90 MICROgram(s) HFA Inhaler 2 Puff(s) Inhalation every 6 hours  aspirin  chewable 81 milliGRAM(s) Oral daily  dorzolamide 2%/timolol 0.5% Ophthalmic Solution 1 Drop(s) Both EYES two times a day  dronedarone 400 milliGRAM(s) Oral two times a day  ferrous    sulfate 325 milliGRAM(s) Oral daily  heparin   Injectable 5000 Unit(s) SubCutaneous every 12 hours  hydrALAZINE 75 milliGRAM(s) Oral every 8 hours  influenza  Vaccine (HIGH DOSE) 0.7 milliLiter(s) IntraMuscular once  isosorbide   mononitrate ER Tablet (IMDUR) 30 milliGRAM(s) Oral daily  latanoprost 0.005% Ophthalmic Solution 1 Drop(s) Both EYES at bedtime  metoprolol tartrate 100 milliGRAM(s) Oral two times a day  pantoprazole    Tablet 40 milliGRAM(s) Oral before breakfast  simvastatin 40 milliGRAM(s) Oral at bedtime

## 2021-12-13 NOTE — DIETITIAN INITIAL EVALUATION ADULT. - FACTORS AFF FOOD INTAKE
acute on chronic comorbidities including CHF, and the other extensive heart-pulmonary issues; advanced age with dementia/change in mental status/persistent lack of appetite/M[employer]onism/ethnic/cultural/personal food preferences

## 2021-12-13 NOTE — DIETITIAN INITIAL EVALUATION ADULT. - ETIOLOGY
chronic comorbidities including CHF, advanced age with dementia; Cultural/ethnical/individual food preferences

## 2021-12-13 NOTE — DIETITIAN INITIAL EVALUATION ADULT. - PERTINENT LABORATORY DATA
12-13 Na140 mmol/L Glu 102 mg/dL<H> K+ 3.5 mmol/L Cr  1.88 mg/dL<H> BUN 20 mg/dL<H>   12-13 Phos 3.5 mg/dL   12-10 Alb 3.5 g/dL       12-12 Chol 181 mg/dL LDL --    HDL 62 mg/dL Trig 86 mg/dL  12-11-21 @ 11:12 HgbA1C 5.2 [4.0 - 5.6]

## 2021-12-13 NOTE — DIETITIAN INITIAL EVALUATION ADULT. - OTHER INFO
Pt lives home with family PTA, alert, confused with dementia, spoke to family ( daughter) at bedside; decreased intake x a couple of days PTA, improving slowly, prefers home made [place] food from family, unclear recent wt changes, nausea at times, but not at present, denied GI distress, chewing or swallowing problem at present, food choices obtained and forwarded to Dietary, willing to try oral nutritional supplement, "Service Recovery" provided; family wants to liberalize her diet without restriction for quality of life

## 2021-12-14 LAB
ANION GAP SERPL CALC-SCNC: 6 MMOL/L — SIGNIFICANT CHANGE UP (ref 5–17)
BASOPHILS # BLD AUTO: 0.09 K/UL — SIGNIFICANT CHANGE UP (ref 0–0.2)
BASOPHILS NFR BLD AUTO: 1.4 % — SIGNIFICANT CHANGE UP (ref 0–2)
BUN SERPL-MCNC: 34 MG/DL — HIGH (ref 7–18)
CALCIUM SERPL-MCNC: 9.6 MG/DL — SIGNIFICANT CHANGE UP (ref 8.4–10.5)
CHLORIDE SERPL-SCNC: 103 MMOL/L — SIGNIFICANT CHANGE UP (ref 96–108)
CO2 SERPL-SCNC: 31 MMOL/L — SIGNIFICANT CHANGE UP (ref 22–31)
CREAT SERPL-MCNC: 2.14 MG/DL — HIGH (ref 0.5–1.3)
EOSINOPHIL # BLD AUTO: 0.84 K/UL — HIGH (ref 0–0.5)
EOSINOPHIL NFR BLD AUTO: 12.7 % — HIGH (ref 0–6)
GLUCOSE SERPL-MCNC: 108 MG/DL — HIGH (ref 70–99)
HCT VFR BLD CALC: 36.7 % — SIGNIFICANT CHANGE UP (ref 34.5–45)
HGB BLD-MCNC: 12.4 G/DL — SIGNIFICANT CHANGE UP (ref 11.5–15.5)
IMM GRANULOCYTES NFR BLD AUTO: 0.5 % — SIGNIFICANT CHANGE UP (ref 0–1.5)
LYMPHOCYTES # BLD AUTO: 1.9 K/UL — SIGNIFICANT CHANGE UP (ref 1–3.3)
LYMPHOCYTES # BLD AUTO: 28.8 % — SIGNIFICANT CHANGE UP (ref 13–44)
MAGNESIUM SERPL-MCNC: 2.4 MG/DL — SIGNIFICANT CHANGE UP (ref 1.6–2.6)
MCHC RBC-ENTMCNC: 32.7 PG — SIGNIFICANT CHANGE UP (ref 27–34)
MCHC RBC-ENTMCNC: 33.8 GM/DL — SIGNIFICANT CHANGE UP (ref 32–36)
MCV RBC AUTO: 96.8 FL — SIGNIFICANT CHANGE UP (ref 80–100)
MONOCYTES # BLD AUTO: 0.62 K/UL — SIGNIFICANT CHANGE UP (ref 0–0.9)
MONOCYTES NFR BLD AUTO: 9.4 % — SIGNIFICANT CHANGE UP (ref 2–14)
NEUTROPHILS # BLD AUTO: 3.12 K/UL — SIGNIFICANT CHANGE UP (ref 1.8–7.4)
NEUTROPHILS NFR BLD AUTO: 47.2 % — SIGNIFICANT CHANGE UP (ref 43–77)
NRBC # BLD: 0 /100 WBCS — SIGNIFICANT CHANGE UP (ref 0–0)
PHOSPHATE SERPL-MCNC: 3.1 MG/DL — SIGNIFICANT CHANGE UP (ref 2.5–4.5)
PLATELET # BLD AUTO: 143 K/UL — LOW (ref 150–400)
POTASSIUM SERPL-MCNC: 3.5 MMOL/L — SIGNIFICANT CHANGE UP (ref 3.5–5.3)
POTASSIUM SERPL-SCNC: 3.5 MMOL/L — SIGNIFICANT CHANGE UP (ref 3.5–5.3)
RBC # BLD: 3.79 M/UL — LOW (ref 3.8–5.2)
RBC # FLD: 12.6 % — SIGNIFICANT CHANGE UP (ref 10.3–14.5)
SODIUM SERPL-SCNC: 140 MMOL/L — SIGNIFICANT CHANGE UP (ref 135–145)
WBC # BLD: 6.6 K/UL — SIGNIFICANT CHANGE UP (ref 3.8–10.5)
WBC # FLD AUTO: 6.6 K/UL — SIGNIFICANT CHANGE UP (ref 3.8–10.5)

## 2021-12-14 RX ORDER — SODIUM CHLORIDE 9 MG/ML
1000 INJECTION INTRAMUSCULAR; INTRAVENOUS; SUBCUTANEOUS
Refills: 0 | Status: DISCONTINUED | OUTPATIENT
Start: 2021-12-14 | End: 2021-12-15

## 2021-12-14 RX ADMIN — ALBUTEROL 2 PUFF(S): 90 AEROSOL, METERED ORAL at 21:53

## 2021-12-14 RX ADMIN — SIMVASTATIN 40 MILLIGRAM(S): 20 TABLET, FILM COATED ORAL at 21:48

## 2021-12-14 RX ADMIN — Medication 325 MILLIGRAM(S): at 11:21

## 2021-12-14 RX ADMIN — DRONEDARONE 400 MILLIGRAM(S): 400 TABLET, FILM COATED ORAL at 06:01

## 2021-12-14 RX ADMIN — LATANOPROST 1 DROP(S): 0.05 SOLUTION/ DROPS OPHTHALMIC; TOPICAL at 21:52

## 2021-12-14 RX ADMIN — Medication 100 MILLIGRAM(S): at 17:08

## 2021-12-14 RX ADMIN — HEPARIN SODIUM 5000 UNIT(S): 5000 INJECTION INTRAVENOUS; SUBCUTANEOUS at 06:01

## 2021-12-14 RX ADMIN — Medication 100 MILLIGRAM(S): at 06:01

## 2021-12-14 RX ADMIN — ALBUTEROL 2 PUFF(S): 90 AEROSOL, METERED ORAL at 11:28

## 2021-12-14 RX ADMIN — Medication 75 MILLIGRAM(S): at 21:48

## 2021-12-14 RX ADMIN — HEPARIN SODIUM 5000 UNIT(S): 5000 INJECTION INTRAVENOUS; SUBCUTANEOUS at 17:08

## 2021-12-14 RX ADMIN — PANTOPRAZOLE SODIUM 40 MILLIGRAM(S): 20 TABLET, DELAYED RELEASE ORAL at 06:01

## 2021-12-14 RX ADMIN — Medication 3 MILLIGRAM(S): at 21:48

## 2021-12-14 RX ADMIN — DORZOLAMIDE HYDROCHLORIDE TIMOLOL MALEATE 1 DROP(S): 20; 5 SOLUTION/ DROPS OPHTHALMIC at 17:08

## 2021-12-14 RX ADMIN — DORZOLAMIDE HYDROCHLORIDE TIMOLOL MALEATE 1 DROP(S): 20; 5 SOLUTION/ DROPS OPHTHALMIC at 06:01

## 2021-12-14 RX ADMIN — ISOSORBIDE MONONITRATE 30 MILLIGRAM(S): 60 TABLET, EXTENDED RELEASE ORAL at 11:21

## 2021-12-14 RX ADMIN — Medication 75 MILLIGRAM(S): at 14:02

## 2021-12-14 RX ADMIN — DRONEDARONE 400 MILLIGRAM(S): 400 TABLET, FILM COATED ORAL at 17:08

## 2021-12-14 RX ADMIN — Medication 81 MILLIGRAM(S): at 11:21

## 2021-12-14 RX ADMIN — Medication 75 MILLIGRAM(S): at 06:01

## 2021-12-14 NOTE — PROGRESS NOTE ADULT - PROBLEM SELECTOR PLAN 2
Labs significant for Sr Cr 1.31 > 1.8 (baseline 0.8-1) on admission  Creatinine 1.88> 2.14  lasix on hold  Will given gentle hydration   f/u urine lytes

## 2021-12-14 NOTE — PHYSICAL THERAPY INITIAL EVALUATION ADULT - GENERAL OBSERVATIONS, REHAB EVAL
functional 89 year female patient referral in tele unit, reinforce consumption of meals, resolving gait with bedside ambulation and rw-therapist assistance

## 2021-12-15 ENCOUNTER — TRANSCRIPTION ENCOUNTER (OUTPATIENT)
Age: 86
End: 2021-12-15

## 2021-12-15 VITALS
HEART RATE: 60 BPM | DIASTOLIC BLOOD PRESSURE: 48 MMHG | SYSTOLIC BLOOD PRESSURE: 152 MMHG | TEMPERATURE: 97 F | RESPIRATION RATE: 18 BRPM | OXYGEN SATURATION: 98 %

## 2021-12-15 LAB
ANION GAP SERPL CALC-SCNC: 5 MMOL/L — SIGNIFICANT CHANGE UP (ref 5–17)
BUN SERPL-MCNC: 33 MG/DL — HIGH (ref 7–18)
CALCIUM SERPL-MCNC: 10.1 MG/DL — SIGNIFICANT CHANGE UP (ref 8.4–10.5)
CHLORIDE SERPL-SCNC: 103 MMOL/L — SIGNIFICANT CHANGE UP (ref 96–108)
CO2 SERPL-SCNC: 31 MMOL/L — SIGNIFICANT CHANGE UP (ref 22–31)
CREAT ?TM UR-MCNC: 147 MG/DL — SIGNIFICANT CHANGE UP
CREAT SERPL-MCNC: 1.7 MG/DL — HIGH (ref 0.5–1.3)
GLUCOSE SERPL-MCNC: 132 MG/DL — HIGH (ref 70–99)
HCT VFR BLD CALC: 36.2 % — SIGNIFICANT CHANGE UP (ref 34.5–45)
HGB BLD-MCNC: 12 G/DL — SIGNIFICANT CHANGE UP (ref 11.5–15.5)
MAGNESIUM SERPL-MCNC: 2.6 MG/DL — SIGNIFICANT CHANGE UP (ref 1.6–2.6)
MCHC RBC-ENTMCNC: 32.2 PG — SIGNIFICANT CHANGE UP (ref 27–34)
MCHC RBC-ENTMCNC: 33.1 GM/DL — SIGNIFICANT CHANGE UP (ref 32–36)
MCV RBC AUTO: 97.1 FL — SIGNIFICANT CHANGE UP (ref 80–100)
NRBC # BLD: 0 /100 WBCS — SIGNIFICANT CHANGE UP (ref 0–0)
OSMOLALITY UR: 552 MOS/KG — SIGNIFICANT CHANGE UP (ref 50–1200)
PHOSPHATE SERPL-MCNC: 2.9 MG/DL — SIGNIFICANT CHANGE UP (ref 2.5–4.5)
PLATELET # BLD AUTO: 149 K/UL — LOW (ref 150–400)
POTASSIUM SERPL-MCNC: 3.9 MMOL/L — SIGNIFICANT CHANGE UP (ref 3.5–5.3)
POTASSIUM SERPL-SCNC: 3.9 MMOL/L — SIGNIFICANT CHANGE UP (ref 3.5–5.3)
RBC # BLD: 3.73 M/UL — LOW (ref 3.8–5.2)
RBC # FLD: 12.3 % — SIGNIFICANT CHANGE UP (ref 10.3–14.5)
SODIUM SERPL-SCNC: 139 MMOL/L — SIGNIFICANT CHANGE UP (ref 135–145)
SODIUM UR-SCNC: 38 MMOL/L — SIGNIFICANT CHANGE UP
UUN UR-MCNC: 878 MG/DL — SIGNIFICANT CHANGE UP
WBC # BLD: 5.51 K/UL — SIGNIFICANT CHANGE UP (ref 3.8–10.5)
WBC # FLD AUTO: 5.51 K/UL — SIGNIFICANT CHANGE UP (ref 3.8–10.5)

## 2021-12-15 PROCEDURE — 76775 US EXAM ABDO BACK WALL LIM: CPT | Mod: 26

## 2021-12-15 PROCEDURE — 71250 CT THORAX DX C-: CPT | Mod: 26

## 2021-12-15 RX ORDER — HYDRALAZINE HCL 50 MG
1.5 TABLET ORAL
Qty: 135 | Refills: 0
Start: 2021-12-15 | End: 2022-01-13

## 2021-12-15 RX ORDER — DORZOLAMIDE HYDROCHLORIDE TIMOLOL MALEATE 20; 5 MG/ML; MG/ML
1 SOLUTION/ DROPS OPHTHALMIC
Qty: 0 | Refills: 0 | DISCHARGE
Start: 2021-12-15

## 2021-12-15 RX ORDER — PANTOPRAZOLE SODIUM 20 MG/1
1 TABLET, DELAYED RELEASE ORAL
Qty: 30 | Refills: 0
Start: 2021-12-15 | End: 2022-01-13

## 2021-12-15 RX ORDER — PANTOPRAZOLE SODIUM 20 MG/1
1 TABLET, DELAYED RELEASE ORAL
Qty: 0 | Refills: 0 | DISCHARGE
Start: 2021-12-15

## 2021-12-15 RX ORDER — LATANOPROST 0.05 MG/ML
1 SOLUTION/ DROPS OPHTHALMIC; TOPICAL
Qty: 0 | Refills: 0 | DISCHARGE
Start: 2021-12-15

## 2021-12-15 RX ADMIN — ISOSORBIDE MONONITRATE 30 MILLIGRAM(S): 60 TABLET, EXTENDED RELEASE ORAL at 12:39

## 2021-12-15 RX ADMIN — Medication 100 MILLIGRAM(S): at 05:16

## 2021-12-15 RX ADMIN — Medication 325 MILLIGRAM(S): at 12:39

## 2021-12-15 RX ADMIN — DRONEDARONE 400 MILLIGRAM(S): 400 TABLET, FILM COATED ORAL at 05:16

## 2021-12-15 RX ADMIN — Medication 75 MILLIGRAM(S): at 14:59

## 2021-12-15 RX ADMIN — PANTOPRAZOLE SODIUM 40 MILLIGRAM(S): 20 TABLET, DELAYED RELEASE ORAL at 06:22

## 2021-12-15 RX ADMIN — Medication 81 MILLIGRAM(S): at 12:39

## 2021-12-15 RX ADMIN — ALBUTEROL 2 PUFF(S): 90 AEROSOL, METERED ORAL at 12:39

## 2021-12-15 RX ADMIN — HEPARIN SODIUM 5000 UNIT(S): 5000 INJECTION INTRAVENOUS; SUBCUTANEOUS at 05:15

## 2021-12-15 RX ADMIN — DORZOLAMIDE HYDROCHLORIDE TIMOLOL MALEATE 1 DROP(S): 20; 5 SOLUTION/ DROPS OPHTHALMIC at 06:22

## 2021-12-15 RX ADMIN — Medication 75 MILLIGRAM(S): at 05:15

## 2021-12-15 NOTE — PROGRESS NOTE ADULT - REASON FOR ADMISSION
Cough, Fluid Overload

## 2021-12-15 NOTE — PROGRESS NOTE ADULT - PROBLEM SELECTOR PLAN 7
C/w supportive measures   Fall/ aspiration precautions   Daughter is able to reorient her frequently
lipid panel  statin
C/w supportive measures   Fall/ aspiration precautions   Daughter is able to reorient her frequently
lipid panel  statin
lipid panel  statin
C/w supportive measures   Fall/ aspiration precautions   Daughter is able to reorient her frequently
lipid panel  statin
C/w supportive measures   Fall/ aspiration precautions   Daughter is able to reorient her frequently

## 2021-12-15 NOTE — PROGRESS NOTE ADULT - PROBLEM SELECTOR PROBLEM 6
History of complete heart block
HTN (hypertension)
History of complete heart block
History of complete heart block
HTN (hypertension)
History of complete heart block
HTN (hypertension)
HTN (hypertension)

## 2021-12-15 NOTE — PROGRESS NOTE ADULT - PROBLEM SELECTOR PLAN 1
Clinically does not appear to peripheral fluid overloaded but CXR with increasing vascularity and no focal PNA   EKG wide complex, paced sinus   BNP 2700  C/w Aspirin, Statin  Replete electrolytes aggressively   Tele monitoring, Strict I/O's, daily weights, Na and 1.2L fluid restriction   Echo: grade I DD, mild pHTN, mild aortic stenosis/regurg, Concentric LVH  lasix on hold as pt creatinine up trending  PT consult: home with PT
lasix IV   follow up CXR  Echo noted  Cardio follow up  ACS rueld out  ASA, BB, Statin
lasix IV  CXR  Echo noted  Cardio follow up  R/o ACS protocol  ASA, BB, Statin
Clinically does not appear to peripheral fluid overloaded but CXR with increasing vascularity and no focal PNA   EKG wide complex, paced sinus   BNP 2700  C/w Aspirin, Statin  Replete electrolytes aggressively   Tele monitoring, Strict I/O's, daily weights, Na and 1.2L fluid restriction   F/u Echo  Will d/c lasix as pt creatinine up trending  PT consult
Clinically does not appear to peripheral fluid overloaded but CXR with increasing vascularity and no focal PNA   EKG wide complex, paced sinus   BNP 2700  C/w Aspirin, Statin  Replete electrolytes aggressively   Tele monitoring, Strict I/O's, daily weights, Na and 1.2L fluid restriction   Echo: grade I DD, mild pHTN, mild aortic stenosis/regurg, Concentric LVH  lasix on hold as pt creatinine up trending  c/w gentle hydration as creatinine is increasing   PT consult: home with PT  f/u BMP  Pt had a CXR which showed tracheal deviation  Will get a CT chest
lasix IV  CXR  Echo noted  Cardio follow up  ACS rueld out  ASA, BB, Statin
Clinically does not appear to peripheral fluid overloaded but CXR with increasing vascularity and no focal PNA   EKG wide complex, paced sinus   C/w Aspirin, Statin  Replete electrolytes aggressively K 3.2- repleted   Tele monitoring, Strict I/O's, daily weights, Na and 1.2L fluid restriction   Out: 900ml Net -190ml  F/u Echo  c/w lasix
lasix IV  CXR  Echo  Cardio follow up  R/o ACS protocol  ASA, BB, Statin

## 2021-12-15 NOTE — DISCHARGE NOTE PROVIDER - NSDCCPCAREPLAN_GEN_ALL_CORE_FT
PRINCIPAL DISCHARGE DIAGNOSIS  Diagnosis: CHF exacerbation  Assessment and Plan of Treatment: You have a history of diastolic heart failure, and you presented after having an exacerbation of this conidtion. You had an ECHO of your heart that showed  grade I Diastolic Dysfunction,, mild pulmonary hypertension, mild aortic stenosis/regurg, with Concentric Left ventricular hypertrophy. We held your Lasix because your kidney function was decreasing. This improved with fluids. You can continue to take your Lasix. Please follow up with your cardiologist in 1-2 weeks.      SECONDARY DISCHARGE DIAGNOSES  Diagnosis: Atrial fibrillation  Assessment and Plan of Treatment: You have a history of atrial fibrillation. You heart rate was well controlled with your home medications. Please continue to take your medications as prescribed.    Diagnosis: HTN (hypertension)  Assessment and Plan of Treatment: You have high blood pressure. Please continue to taking your medications as prescribed. Please measure your blood pressure at least once daily. Maintain a healthy diet which includes incorporating more vegetables and decreasing the amount of salt (low sodium) and sugar in your diet such as rice, bread, and pasta low sugar, low fat, low sodium diet. Exercise frequently if possible.  Please follow up with your primary care provider within one week of your discharge.      Diagnosis: Chronic kidney disease  Assessment and Plan of Treatment: You may have kidney disease. You had an ultrasound of your kidneys that showed bilateral renal cysts. Please follow up with a nephrologist.

## 2021-12-15 NOTE — PROGRESS NOTE ADULT - PROBLEM SELECTOR PLAN 5
Avoid nephrotoxic drugs  correct electrolytes  Renal eval
Avoid nephrotoxic drugs  correct electrolytes  Renal follow up
Avoid nephrotoxic drugs  correct electroytes  Renal eval
C/w home meds
C/w home meds
Avoid nephrotoxic drugs  correct electroytes  Renal eval
C/w home meds
C/w home meds

## 2021-12-15 NOTE — PROGRESS NOTE ADULT - PROBLEM SELECTOR PROBLEM 5
Hypercholesterolemia
CAMILLE (acute kidney injury)
Hypercholesterolemia
CAMILLE (acute kidney injury)
CAMILLE (acute kidney injury)
Hypercholesterolemia
CAMILLE (acute kidney injury)
Hypercholesterolemia

## 2021-12-15 NOTE — PROGRESS NOTE ADULT - PROBLEM SELECTOR PROBLEM 1
Acute on chronic congestive heart failure

## 2021-12-15 NOTE — PROGRESS NOTE ADULT - PROBLEM SELECTOR PLAN 3
oxygen supp  Bronchodilators  CCB
oxygen supp  Bronchodilators  CCB
Pt with history of Afib, on multaq, lopressor  No AC due to prior GIB/ anemia; only on ASA  EKG rate controlled and paced  c/w ,multaq and lopressor
oxygen supp  Bronchodilators  CCB
Labs significant for Sr Cr 1.31 > 1.8 (baseline 0.8-1) on admission  Creatinine 1.8  will d/c lasix   f/u urine lytes
oxygen supp  Bronchodilators  CCB
Labs significant for Sr Cr 1.31 (baseline 0.8-1) on admission  Creatinine 1.40  likely cardio renal  c/w Lasix
Pt with history of Afib, on multaq, lopressor  No AC due to prior GIB/ anemia; only on ASA  EKG rate controlled and paced  c/w ,multaq and lopressor

## 2021-12-15 NOTE — DISCHARGE NOTE PROVIDER - HOSPITAL COURSE
Patient is a 89 y/o female, from home, minimally ambulatory with assistance, with PMHx of HTN, HFpEF (Grade II DD), Afib (not on AC due to GIB), Complete Heart Block s/p PPM, HLD, and Dementia who presented with complaints of cough and shortness of breath x 2 days, admitted for CHF exacerbation, and hypertensive urgency.    CHF exacerbation: Clinically does not appear to be peripheral fluid overloaded but CXR with increasing vascularity .BNP 2700. Echo: grade I DD, mild pHTN, mild aortic stenosis/regurg, with Concentric LVH. Lasix was held because creatinine was up trending. CAMILLE was resolving with gentle fluids.     CAMILLE: Creatinine at baseline 0.8. Creatinine was up trending to 2.14. Fena 0.4% likely pre- renal. Downtrended to 1.7 with gentle hydration. Nephrology was consulted and ultrasound of the Kidney was performed which showed bilateral cysts. Will need to follow up outpatient with nephrologist.     Afib: c/w ASA Multaq, lopressor.Only on ASA, as pt has hx of GI bleeds.     HTN: Imdur, lopressor, and decreased hydralazine to 75mg Q 8 hrs    Concern for tracheal devation: CXR showed possible tracheal deviation. CT chest showed Tracheal deviation described on the portable chest x-ray  may be partially due to patient positioning and due to the patient's mediastinal anatomy. CT did show Heterogeneous enlargement of the thyroid gland unchanged since March 3, 2020.    Pt is stable for discharge. Pt has been advised to follow up as outpatient. Case has been discussed with the attending. This is just a summary of the case. For further information please refer to pt. chart document.

## 2021-12-15 NOTE — PROGRESS NOTE ADULT - PROBLEM SELECTOR PROBLEM 7
Hypercholesterolemia
Dementia
Hypercholesterolemia

## 2021-12-15 NOTE — DISCHARGE NOTE PROVIDER - CARE PROVIDER_API CALL
[facility]  INTERNAL MEDICINE  [address] [place] [address]  Phone: [phone]  Fax: [phone]  Follow Up Time:

## 2021-12-15 NOTE — DISCHARGE NOTE NURSING/CASE MANAGEMENT/SOCIAL WORK - PATIENT PORTAL LINK FT
You can access the FollowMyHealth Patient Portal offered by [facility] by registering at the following website: [email or web address]. By joining [facility]’s FollowMyHealth portal, you will also be able to view your health information using other applications (apps) compatible with our system.

## 2021-12-15 NOTE — CONSULT NOTE ADULT - SUBJECTIVE AND OBJECTIVE BOX
Chief complain/HPI  Patient is a 89 y/o female, from home, minimally ambulatory with assistance, with PMHx of HTN, HFpEF (Grade II DD),Parox Afib (not on AC due to GIB),CRI, Complete Heart Block s/p PPM, HLD, and Dementia who presented with complaints of cough and shortness of breath x 2 days. The patient is an unreliable historian, and most of the history is from her daughter. She endorses that the patient has been having a non productive cough, with some shortness of breath for the past few days. She also reports wheezing in the early morning and reduced appetite. She denies any fevers, headaches, N/V/D, abdominal pain, dysuria or lower extremity swelling.       < from: Xray Chest 1 View- PORTABLE-Urgent (Xray Chest 1 View- PORTABLE-Urgent .) (12.10.21 @ 20:16) >  IMPRESSION:    Left ICD. Heart magnified by projection. Deviation of the supraclavicular   trachea to the right may reflect intrathoracic thyroid or other   mediastinal mass/adenopathy. Correlate with chest CT. No consolidation or   effusion.    Result to ED via PACS at the time of this interpretation    --- End of Report ---      < end of copied text >    Home Medications:   * Patient Currently Takes Medications as of 10-Jul-2020 14:24 documented in Structured Notes  · 	ferrous sulfate 325 mg (65 mg elemental iron) oral tablet: 1 tab(s) orally once a day   · 	cefTRIAXone 1 g intravenous injection: 1 gram(s) intravenous every 24 hours  · 	metoprolol tartrate 100 mg oral tablet: 1 tab(s) orally 2 times a day  · 	hydrALAZINE 100 mg oral tablet: 1 tab(s) orally 3 times a day  · 	ipratropium-albuterol 0.5 mg-2.5 mg/3 mLinhalation solution: 3 milliliter(s) inhaled every 6 hours  · 	aspirin 81 mg oral tablet, chewable: 1 tab(s) orally once a day  · 	simvastatin 40 mg oral tablet: 1 tab(s) orally once a day (at bedtime)  · 	Multaq 400 mg oral tablet: 1 tab(s) orally 2 times a day  · 	Lasix 20 mg oral tablet: 1 tab(s) orally every other day    .      PAST MEDICAL & SURGICAL HISTORY:  HTN (hypertension)    Hypercholesterolemia    Atrial fibrillation    History of complete heart block    Pulmonary HTN    CHF (congestive heart failure)    Artificial pacemaker        Home Medications Reviewed    Hospital Medications:   MEDICATIONS  (STANDING):  ALBUTerol    90 MICROgram(s) HFA Inhaler 2 Puff(s) Inhalation every 6 hours  aspirin  chewable 81 milliGRAM(s) Oral daily  dorzolamide 2%/timolol 0.5% Ophthalmic Solution 1 Drop(s) Both EYES two times a day  dronedarone 400 milliGRAM(s) Oral two times a day  ferrous    sulfate 325 milliGRAM(s) Oral daily  heparin   Injectable 5000 Unit(s) SubCutaneous every 12 hours  hydrALAZINE 75 milliGRAM(s) Oral every 8 hours  influenza  Vaccine (HIGH DOSE) 0.7 milliLiter(s) IntraMuscular once  isosorbide   mononitrate ER Tablet (IMDUR) 30 milliGRAM(s) Oral daily  latanoprost 0.005% Ophthalmic Solution 1 Drop(s) Both EYES at bedtime  metoprolol tartrate 100 milliGRAM(s) Oral two times a day  pantoprazole    Tablet 40 milliGRAM(s) Oral before breakfast  simvastatin 40 milliGRAM(s) Oral at bedtime  sodium chloride 0.9%. 1000 milliLiter(s) (50 mL/Hr) IV Continuous <Continuous>    MEDICATIONS  (PRN):  acetaminophen     Tablet .. 650 milliGRAM(s) Oral every 6 hours PRN Temp greater or equal to 38C (100.4F), Mild Pain (1 - 3)  melatonin 3 milliGRAM(s) Oral at bedtime PRN Insomnia      Allergies    No Known Allergies    Intolerances                              12.0   5.51  )-----------( 149      ( 15 Dec 2021 09:55 )             36.2     12-14    140  |  103  |  34<H>  ----------------------------<  108<H>  3.5   |  31  |  2.14<H>    Ca    9.6      14 Dec 2021 07:45  Phos  3.1     12-14  Mg     2.4     12-14          Osmolality, Random Urine: 552 mos/kg (12-15 @ 07:51)  Sodium, Random Urine: 38 mmol/L (12-15 @ 07:51)  Creatinine, Random Urine: 147 mg/dL (12-15 @ 07:51)        RADIOLOGY & ADDITIONAL STUDIES:    SOCIAL HISTORY: Denies ETOh,Smoking,     FAMILY HISTORY:  FH: hypertension    FH: coronary artery disease        REVIEW OF SYSTEMS:  CONSTITUTIONAL: No malaise, No fatigue, No fevers or chills, well developed, no diaphoresis  EYES/ENT: No visual changes;  No vertigo or throat pain   NECK: No pain or stiffness  RESPIRATORY: No cough, wheezing, hemoptysis; No shortness of breath  CARDIOVASCULAR: No chest pain or palpitations. No edema  GASTROINTESTINAL: No abdominal or epigastric pain. No nausea, vomiting, or hematemesis; No diarrhea or constipation. No melena or hematochezia.  GENITOURINARY: No dysuria, frequency, foamy urine, urinary urgency, incontinence or hematuria  NEUROLOGICAL: No numbness or weakness, No tremor  SKIN: No itching, burning, rashes, or lesions   VASCULAR: No claudication  Musculoskeletal: no arthralgia, no myalgia  All other review of systems is negative unless indicated above.    VITALS:  Vital Signs Last 24 Hrs  T(C): 36.7 (15 Dec 2021 07:59), Max: 36.8 (14 Dec 2021 16:04)  T(F): 98.1 (15 Dec 2021 07:59), Max: 98.2 (14 Dec 2021 16:04)  HR: 60 (15 Dec 2021 07:59) (57 - 71)  BP: 156/52 (15 Dec 2021 07:59) (124/49 - 165/45)  BP(mean): --  RR: 18 (15 Dec 2021 07:59) (17 - 19)  SpO2: 95% (15 Dec 2021 07:59) (95% - 98%)    12-14 @ 07:01  -  12-15 @ 07:00  --------------------------------------------------------  IN: 40 mL / OUT: 680 mL / NET: -640 mL          PHYSICAL EXAM:  Constitutional: NAD  HEENT: anicteric sclera, oropharynx clear, MMM  Neck: No JVD  Respiratory: good air entrance B/L, no wheezes, rales or rhonchi  Cardiovascular: S1, S2, RRR, no pericardial rub, no murmur  Gastrointestinal: BS+, soft, no tenderness, no distension, no bruit  Pelvis: bladder non-distended, no CVA tenderness  Extremities: No cyanosis or clubbing. No peripheral edema  Neurological: A/O x 3, no focal deficits  Psychiatric: Normal mood, normal affect  : No CVA tenderness. No mace.   Skin: No rashes  Vascular: all pulses present  Access:                     Chief complain/HPI  Patient is a 89 y/o female, from home, minimally ambulatory with assistance, with PMHx of HTN, HFpEF (Grade II DD),Parox Afib (not on AC due to GIB),CRI, Complete Heart Block s/p PPM, HLD, and Dementia who presented with complaints of cough and shortness of breath x 2 days on 12/10/21.  Patient was given diuretics and nebulizers with improvement.     Renal consult was called for CAMILLE creatinine 2.4  Creatinine 1.31 on admission.  `    < from: Xray Chest 1 View- PORTABLE-Urgent (Xray Chest 1 View- PORTABLE-Urgent .) (12.10.21 @ 20:16) >  IMPRESSION:    Left ICD. Heart magnified by projection. Deviation of the supraclavicular   trachea to the right may reflect intrathoracic thyroid or other   mediastinal mass/adenopathy. Correlate with chest CT. No consolidation or   effusion.    Result to ED via PACS at the time of this interpretation    --- End of Report ---      < end of copied text >    `< from: Transthoracic Echocardiogram (12.11.21 @ 06:53) >  OBSERVATIONS:  Mitral Valve: Mitral annular calcification. Moderate,  eccentric mitral regurgitation.  Aortic Root: Normal aortic root.  Aortic Valve: Calcified aortic valve with decreased  opening. Peak transaortic valve gradient equals 21 mm Hg,  mean transaortic valve gradient equals 10 mm Hg, estimated  aortic valve area equals 2 sqcm (by continuity equation),  consistent with mild aortic stenosis. Mild aortic  regurgitation.  Left Atrium: Mildly dilated left atrium.  LA volume index =  41 cc/m2.  Left Ventricle: Endocardium not well visualized; grossly  normal left ventricular systolic function. Moderate  concentric left ventricular hypertrophy. Grade I diastolic  dysfunction (Impaired relaxation, mild).  Right Heart: Normal right atrium. Normal right ventricular  size and function.   A device lead is visualized in the  right heart. There is mild tricuspid regurgitation. There  is mild pulmonic regurgitation.  Pericardium/PleuraNormal pericardium with no pericardial  effusion.  Hemodynamic: RA Pressure is 10 mm Hg. RV systolic pressure  is 41 mm Hg. Mild pulmonary hypertension.    < end of copied text >      Home Medications:   * Patient Currently Takes Medications as of 10-Jul-2020 14:24 documented in Structured Notes  · 	ferrous sulfate 325 mg (65 mg elemental iron) oral tablet: 1 tab(s) orally once a day   · 	cefTRIAXone 1 g intravenous injection: 1 gram(s) intravenous every 24 hours  · 	metoprolol tartrate 100 mg oral tablet: 1 tab(s) orally 2 times a day  · 	hydrALAZINE 100 mg oral tablet: 1 tab(s) orally 3 times a day  · 	ipratropium-albuterol 0.5 mg-2.5 mg/3 mLinhalation solution: 3 milliliter(s) inhaled every 6 hours  · 	aspirin 81 mg oral tablet, chewable: 1 tab(s) orally once a day  · 	simvastatin 40 mg oral tablet: 1 tab(s) orally once a day (at bedtime)  · 	Multaq 400 mg oral tablet: 1 tab(s) orally 2 times a day  · 	Lasix 20 mg oral tablet: 1 tab(s) orally every other day    .      PAST MEDICAL & SURGICAL HISTORY:  HTN (hypertension)    Hypercholesterolemia    Atrial fibrillation    History of complete heart block    Pulmonary HTN    CHF (congestive heart failure)    Artificial pacemaker        Home Medications Reviewed    Hospital Medications:   MEDICATIONS  (STANDING):  ALBUTerol    90 MICROgram(s) HFA Inhaler 2 Puff(s) Inhalation every 6 hours  aspirin  chewable 81 milliGRAM(s) Oral daily  dorzolamide 2%/timolol 0.5% Ophthalmic Solution 1 Drop(s) Both EYES two times a day  dronedarone 400 milliGRAM(s) Oral two times a day  ferrous    sulfate 325 milliGRAM(s) Oral daily  heparin   Injectable 5000 Unit(s) SubCutaneous every 12 hours  hydrALAZINE 75 milliGRAM(s) Oral every 8 hours  influenza  Vaccine (HIGH DOSE) 0.7 milliLiter(s) IntraMuscular once  isosorbide   mononitrate ER Tablet (IMDUR) 30 milliGRAM(s) Oral daily  latanoprost 0.005% Ophthalmic Solution 1 Drop(s) Both EYES at bedtime  metoprolol tartrate 100 milliGRAM(s) Oral two times a day  pantoprazole    Tablet 40 milliGRAM(s) Oral before breakfast  simvastatin 40 milliGRAM(s) Oral at bedtime  sodium chloride 0.9%. 1000 milliLiter(s) (50 mL/Hr) IV Continuous <Continuous>    MEDICATIONS  (PRN):  acetaminophen     Tablet .. 650 milliGRAM(s) Oral every 6 hours PRN Temp greater or equal to 38C (100.4F), Mild Pain (1 - 3)  melatonin 3 milliGRAM(s) Oral at bedtime PRN Insomnia      Allergies    No Known Allergies    Intolerances                              12.0   5.51  )-----------( 149      ( 15 Dec 2021 09:55 )             36.2     12-14    140  |  103  |  34<H>  ----------------------------<  108<H>  3.5   |  31  |  2.14<H>    Ca    9.6      14 Dec 2021 07:45  Phos  3.1     12-14  Mg     2.4     12-14    FENa 0.4%    `Basic Metabolic Panel in AM (12.15.21 @ 09:55)   Sodium, Serum: 139 mmol/L   Potassium, Serum: 3.9 mmol/L   Chloride, Serum: 103 mmol/L   Carbon Dioxide, Serum: 31 mmol/L   Anion Gap, Serum: 5 mmol/L   Blood Urea Nitrogen, Serum: 33 mg/dL   Creatinine, Serum: 1.70 mg/dL   Glucose, Serum: 132 mg/dL   Calcium, Total Serum: 10.1 mg/dL   eGFR if Non : 26: Interpretative comment   The units for eGFR are mL/min/1.73M2 (normalized body surface area). The   Osmolality, Random Urine: 552 mos/kg (12-15 @ 07:51)  Sodium, Random Urine: 38 mmol/L (12-15 @ 07:51)  Creatinine, Random Urine: 147 mg/dL (12-15 @ 07:51)        RADIOLOGY & ADDITIONAL STUDIES:    SOCIAL HISTORY: Denies ETOh,Smoking,     FAMILY HISTORY:  FH: hypertension    FH: coronary artery disease        REVIEW OF SYSTEMS:  CONSTITUTIONAL: No malaise, No fatigue, No fevers or chills, well developed, no diaphoresis    RESPIRATORY: No cough, wheezing, hemoptysis; No shortness of breath  CARDIOVASCULAR: No chest pain or palpitations. No edema  GASTROINTESTINAL: No abdominal or epigastric pain. No nausea, vomiting, or hematemesis; No diarrhea or constipation. No melena or hematochezia.  GENITOURINARY: No dysuria, no flank pain , urinates well      VITALS:  Vital Signs Last 24 Hrs  T(C): 36.7 (15 Dec 2021 07:59), Max: 36.8 (14 Dec 2021 16:04)  T(F): 98.1 (15 Dec 2021 07:59), Max: 98.2 (14 Dec 2021 16:04)  HR: 60 (15 Dec 2021 07:59) (57 - 71)  BP: 156/52 (15 Dec 2021 07:59) (124/49 - 165/45)  BP(mean): --  RR: 18 (15 Dec 2021 07:59) (17 - 19)  SpO2: 95% (15 Dec 2021 07:59) (95% - 98%)    12-14 @ 07:01  -  12-15 @ 07:00  --------------------------------------------------------  IN: 40 mL / OUT: 680 mL / NET: -640 mL          PHYSICAL EXAM:  Constitutional: NAD  HEENT: anicteric sclera, oropharynx clear, MMM  Neck: No JVD  Respiratory: good air entrance B/L, no wheezes, rales or rhonchi  Cardiovascular: S1, S2, RRR, no pericardial rub, no murmur  Gastrointestinal: BS+, soft, no tenderness, no distension, no bruit  Pelvis: bladder non-distended, no CVA tenderness  Extremities: No cyanosis or clubbing. No peripheral edema

## 2021-12-15 NOTE — DISCHARGE NOTE PROVIDER - NSDCMRMEDTOKEN_GEN_ALL_CORE_FT
aspirin 81 mg oral tablet, chewable: 1 tab(s) orally once a day  dorzolamide-timolol 2%-0.5% preservative-free ophthalmic solution: 1 drop(s) to each affected eye 2 times a day  ferrous sulfate 325 mg (65 mg elemental iron) oral tablet: 1 tab(s) orally once a day   hydrALAZINE 50 mg oral tablet: 1.5 tab(s) orally every 8 hours   Imdur 30 mg oral tablet, extended release: 1 tab(s) orally once a day (in the morning)  ipratropium-albuterol 0.5 mg-2.5 mg/3 mLinhalation solution: 3 milliliter(s) inhaled every 6 hours  Lasix 20 mg oral tablet: 1 tab(s) orally every other day  latanoprost 0.005% ophthalmic solution: 1 drop(s) to each affected eye once a day (at bedtime)  metoprolol tartrate 100 mg oral tablet: 1 tab(s) orally 2 times a day  Multaq 400 mg oral tablet: 1 tab(s) orally 2 times a day  pantoprazole 40 mg oral delayed release tablet: 1 tab(s) orally once a day (before a meal)  simvastatin 40 mg oral tablet: 1 tab(s) orally once a day (at bedtime)

## 2021-12-15 NOTE — PROGRESS NOTE ADULT - PROBLEM SELECTOR PROBLEM 2
Atrial fibrillation
Chronic kidney disease
Atrial fibrillation
Chronic kidney disease
Atrial fibrillation
Atrial fibrillation

## 2021-12-15 NOTE — PROGRESS NOTE ADULT - PROBLEM SELECTOR PLAN 6
monitor BP  cont meds
monitor BP  cont meds
S/p PPM implantation   No palpitations or chest pain  d/c  Tele monitoring
monitor BP  cont meds
monitor BP  cont meds
S/p PPM implantation   No palpitations or chest pain  C/w Tele monitoring

## 2021-12-15 NOTE — PROGRESS NOTE ADULT - PROBLEM SELECTOR PROBLEM 3
Atrial fibrillation
Pulmonary HTN
Chronic kidney disease
Atrial fibrillation
Pulmonary HTN
Chronic kidney disease

## 2021-12-15 NOTE — PROGRESS NOTE ADULT - PROVIDER SPECIALTY LIST ADULT
Internal Medicine
Cardiology
Internal Medicine
Pulmonology
Internal Medicine
Pulmonology
Internal Medicine

## 2021-12-15 NOTE — PROGRESS NOTE ADULT - ASSESSMENT
89 y/o female, from home, minimally ambulatory with assistance, with PMHx of HTN, HFpEF (Grade II DD),Parox Afib (not on AC due to GIB),CRI, Complete Heart Block s/p PPM, HLD, s/p COVID,AS and Dementia who presented with complaints of cough and shortness of breath x 2 days-acute diastolic HF.  1.CAMILLE-IVF,Renal eval.  2.Acute diastolic HF-off IV lasix.  3.PAF-asa,multaq,lopressor.  4.HTN-Imdur,hydralazine 75mg q8.  5.Hx of GI Bleed-off AC.  6.Anemia-Iron.  7.PPI.  8.DVT prophylaxis.  9.CT chest for tracheal deviation on CXR.    
89 y/o female, from home, minimally ambulatory with assistance, with PMHx of HTN, HFpEF (Grade II DD),Parox Afib (not on AC due to GIB),CRI, Complete Heart Block s/p PPM, HLD, s/p COVID,AS and Dementia who presented with complaints of cough and shortness of breath x 2 days-acute diastolic HF.  1.Tele monitoring.  2.Ecchocardiogram.  3.Acute diastolic HF-IV lasix.  4.PAF-asa,multaq,lopressor.  5.HTN-Imdur,hydralazoine.  6.Hx of GI Bleed-off AC.  7.Anemia-Iron.  8.CRI-F/U Lytes.  9.PPI.  10.DVT prophylaxis.  11.Check TSH,A1c,vit b12 and d in am.  
89 y/o female, from home, minimally ambulatory with assistance, with PMHx of HTN, HFpEF (Grade II DD),Parox Afib (not on AC due to GIB),CRI, Complete Heart Block s/p PPM, HLD, s/p COVID,AS and Dementia who presented with complaints of cough and shortness of breath x 2 days-acute diastolic HF.  1.D/C Tele monitoring.  2.Ecchocardiogram.  3.Acute diastolic HF-D/C IV lasix.  4.PAF-asa,multaq,lopressor.  5.HTN-Imdur,hydralazine 75mg q8.  6.Hx of GI Bleed-off AC.  7.Anemia-Iron.  8.CAMILLE on CRI-F/U Lytes,d/c lasix.  9.PPI.  10.DVT prophylaxis.    
89 y/o female, from home, minimally ambulatory with assistance, with PMHx of HTN, HFpEF (Grade II DD),Parox Afib (not on AC due to GIB),CRI, Complete Heart Block s/p PPM, HLD, s/p COVID,AS and Dementia who presented with complaints of cough and shortness of breath x 2 days-acute diastolic HF.  1.Tele monitoring.  2.Ecchocardiogram.  3.Acute diastolic HF-IV lasix.  4.PAF-asa,multaq,lopressor.  5.HTN-Imdur,inc hydralazine 75mg q8.  6.Hx of GI Bleed-off AC.  7.Anemia-Iron.  8.CRI-F/U Lytes.  9.PPI.  10.DVT prophylaxis.    
89 y/o female, from home, minimally ambulatory with assistance, with PMHx of HTN, HFpEF (Grade II DD),Parox Afib (not on AC due to GIB),CRI, Complete Heart Block s/p PPM, HLD, s/p COVID,AS and Dementia who presented with complaints of cough and shortness of breath x 2 days-acute diastolic HF.  1.CAMILLE-IVF.  2.Acute diastolic HF-off IV lasix.  3.PAF-asa,multaq,lopressor.  4.HTN-Imdur,hydralazine 75mg q8.  5.Hx of GI Bleed-off AC.  6.Anemia-Iron.  7.PPI.  8.DVT prophylaxis.    
Patient is a 89 y/o female, from home, minimally ambulatory with assistance, with PMHx of HTN, HFpEF (Grade II DD), Afib (not on AC due to GIB), Complete Heart Block s/p PPM, HLD, and Dementia who presented with complaints of cough and shortness of breath x 2 days, admitted for CHF exacerbation, and hypertensive urgency.    
Patient is a 89 y/o female, from home, minimally ambulatory with assistance, with PMHx of HTN, HFpEF (Grade II DD), Afib (not on AC due to GIB), Complete Heart Block s/p PPM, HLD, and Dementia who presented with complaints of cough and shortness of breath x 2 days, admitted for CHF exacerbation, and hypertensive urgency.    

## 2021-12-15 NOTE — CONSULT NOTE ADULT - ASSESSMENT
CKD stage 3, CAMILLE pre renal.  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  Hypertension improved BP.    CAMILLE with low FENa 0.4 % -prerenal due to diuretics that were stopped.    Follow renal US.   Increased Carbon dioxide improved.  Hypercalcemia due to dehydration. Follow Vitamin D and PTH if persists.      `    `

## 2021-12-15 NOTE — PROGRESS NOTE ADULT - PROBLEM SELECTOR PLAN 4
C/w home meds   Recently added imdur to her regimen outpatient  c/w imdur, lopressor, hydralazine,
R/o underlying bronchospasm  Bronchodilators prn
C/w home meds   Recently added imdur to her regimen outpatient  c/w imdur, lopressor, hydralazine,
C/w home meds   Recently added imdur to her regimen outpatient  c/w imdur, lopressor, increased hydralazine,
R/o underlying bronchospasm  Bronchodilators prn
C/w home meds   Recently added imdur to her regimen outpatient  BP was in the 200's overnight, was given hydralazine 5 and 10mg push  c/w imdur, lopressor, hydralazine,

## 2021-12-15 NOTE — PROGRESS NOTE ADULT - PROBLEM SELECTOR PLAN 2
Labs significant for Sr Cr 1.31 > 1.8 (baseline 0.8-1) on admission  Creatinine 1.88> 2.14  lasix on hold, gentle hydration   Will given gentle hydration   f/u urine lytes Labs significant for Sr Cr 1.31 > 1.8 (baseline 0.8-1) on admission  Creatinine 1.88> 2.14 > 1.7  lasix on hold, gentle hydration   Fena: 0.4 %  Dr. Oglesby consulted: f/u ultrasound  Hypercalcemia due to dehydration. Follow Vitamin D and PTH if persists.

## 2021-12-15 NOTE — PROGRESS NOTE ADULT - SUBJECTIVE AND OBJECTIVE BOX
CHIEF COMPLAINT:Patient is a 89y old  Female who presents with a chief complaint of Cough, Fluid Overload.Pt appears comfortable.    	  REVIEW OF SYSTEMS:  CONSTITUTIONAL: No fever, weight loss, or fatigue  EYES: No eye pain, visual disturbances, or discharge  ENT:  No difficulty hearing, tinnitus, vertigo; No sinus or throat pain  NECK: No pain or stiffness  RESPIRATORY: No cough, wheezing, chills or hemoptysis; No Shortness of Breath  CARDIOVASCULAR: No chest pain, palpitations, passing out, dizziness, or leg swelling  GASTROINTESTINAL: No abdominal or epigastric pain. No nausea, vomiting, or hematemesis; No diarrhea or constipation. No melena or hematochezia.  GENITOURINARY: No dysuria, frequency, hematuria, or incontinence  NEUROLOGICAL: No headaches, memory loss, loss of strength, numbness, or tremors  SKIN: No itching, burning, rashes, or lesions   LYMPH Nodes: No enlarged glands  ENDOCRINE: No heat or cold intolerance; No hair loss  MUSCULOSKELETAL: No joint pain or swelling; No muscle, back, or extremity pain  PSYCHIATRIC: No depression, anxiety, mood swings, or difficulty sleeping  HEME/LYMPH: No easy bruising, or bleeding gums  ALLERGY AND IMMUNOLOGIC: No hives or eczema	        PHYSICAL EXAM:  T(C): 36.8 (12-14-21 @ 07:45), Max: 36.9 (12-13-21 @ 20:09)  HR: 60 (12-14-21 @ 07:45) (60 - 66)  BP: 153/52 (12-14-21 @ 07:45) (101/47 - 161/61)  RR: 18 (12-14-21 @ 07:45) (17 - 20)  SpO2: 94% (12-14-21 @ 07:45) (92% - 98%)  Wt(kg): --  I&O's Summary      Appearance: Normal	  HEENT:   Normal oral mucosa, PERRL, EOMI	  Lymphatic: No lymphadenopathy  Cardiovascular: Normal S1 S2, 2/6sm  Respiratory: Lungs clear to auscultation	  Psychiatry: A & O x 3, Mood & affect appropriate  Gastrointestinal:  Soft, Non-tender, + BS	  Skin: No rashes, No ecchymoses, No cyanosis	  Neurologic: Non-focal  Extremities: Normal range of motion, No clubbing, cyanosis or edema  Vascular: Peripheral pulses palpable 2+ bilaterally    MEDICATIONS  (STANDING):  ALBUTerol    90 MICROgram(s) HFA Inhaler 2 Puff(s) Inhalation every 6 hours  aspirin  chewable 81 milliGRAM(s) Oral daily  dorzolamide 2%/timolol 0.5% Ophthalmic Solution 1 Drop(s) Both EYES two times a day  dronedarone 400 milliGRAM(s) Oral two times a day  ferrous    sulfate 325 milliGRAM(s) Oral daily  heparin   Injectable 5000 Unit(s) SubCutaneous every 12 hours  hydrALAZINE 75 milliGRAM(s) Oral every 8 hours  influenza  Vaccine (HIGH DOSE) 0.7 milliLiter(s) IntraMuscular once  isosorbide   mononitrate ER Tablet (IMDUR) 30 milliGRAM(s) Oral daily  latanoprost 0.005% Ophthalmic Solution 1 Drop(s) Both EYES at bedtime  metoprolol tartrate 100 milliGRAM(s) Oral two times a day  pantoprazole    Tablet 40 milliGRAM(s) Oral before breakfast  simvastatin 40 milliGRAM(s) Oral at bedtime  sodium chloride 0.9%. 1000 milliLiter(s) (50 mL/Hr) IV Continuous <Continuous>    	  	  LABS:	 	                        12.4   6.60  )-----------( 143      ( 14 Dec 2021 07:45 )             36.7     12-14    140  |  103  |  34<H>  ----------------------------<  108<H>  3.5   |  31  |  2.14<H>    Ca    9.6      14 Dec 2021 07:45  Phos  3.1     12-14  Mg     2.4     12-14      proBNP: Serum Pro-Brain Natriuretic Peptide: 2753 pg/mL (12-10 @ 20:25)    Lipid Profile: Cholesterol 181  LDL --  HDL 62  TG 86  Ldl calc 102  Ratio --    HgA1c:   TSH: Thyroid Stimulating Hormone, Serum: 1.36 uU/mL (12-12 @ 05:50)      	      c< from: Transthoracic Echocardiogram (12.11.21 @ 06:53) >  OBSERVATIONS:  Mitral Valve: Mitral annular calcification. Moderate,  eccentric mitral regurgitation.  Aortic Root: Normal aortic root.  Aortic Valve: Calcified aortic valve with decreased  opening. Peak transaortic valve gradient equals 21 mm Hg,  mean transaortic valve gradient equals 10 mm Hg, estimated  aortic valve area equals 2 sqcm (by continuity equation),  consistent with mild aortic stenosis. Mild aortic  regurgitation.  Left Atrium: Mildly dilated left atrium.  LA volume index =  41 cc/m2.  Left Ventricle: Endocardium not well visualized; grossly  normal left ventricular systolic function. Moderate  concentric left ventricular hypertrophy. Grade I diastolic  dysfunction (Impaired relaxation, mild).  Right Heart: Normal right atrium. Normal right ventricular  size and function.   A device lead is visualized in the  right heart. There is mild tricuspid regurgitation. There  is mild pulmonic regurgitation.  Pericardium/PleuraNormal pericardium with no pericardial  effusion.  Hemodynamic: RA Pressure is 10 mm Hg. RV systolic pressure  is 41 mm Hg. Mild pulmonary hypertension.    < end of copied text >    
  CHIEF COMPLAINT:Patient is a 89y old  Female who presents with a chief complaint of Cough, Fluid Overload.Pt appears comfortable.    	  REVIEW OF SYSTEMS:  CONSTITUTIONAL: No fever, weight loss, or fatigue  EYES: No eye pain, visual disturbances, or discharge  ENT:  No difficulty hearing, tinnitus, vertigo; No sinus or throat pain  NECK: No pain or stiffness  RESPIRATORY: No cough, wheezing, chills or hemoptysis; No Shortness of Breath  CARDIOVASCULAR: No chest pain, palpitations, passing out, dizziness, or leg swelling  GASTROINTESTINAL: No abdominal or epigastric pain. No nausea, vomiting, or hematemesis; No diarrhea or constipation. No melena or hematochezia.  GENITOURINARY: No dysuria, frequency, hematuria, or incontinence  NEUROLOGICAL: No headaches, memory loss, loss of strength, numbness, or tremors  SKIN: No itching, burning, rashes, or lesions   LYMPH Nodes: No enlarged glands  ENDOCRINE: No heat or cold intolerance; No hair loss  MUSCULOSKELETAL: No joint pain or swelling; No muscle, back, or extremity pain  PSYCHIATRIC: No depression, anxiety, mood swings, or difficulty sleeping  HEME/LYMPH: No easy bruising, or bleeding gums  ALLERGY AND IMMUNOLOGIC: No hives or eczema	      PHYSICAL EXAM:  T(C): 36.7 (12-13-21 @ 07:18), Max: 36.7 (12-13-21 @ 07:18)  HR: 68 (12-13-21 @ 07:18) (60 - 68)  BP: 155/60 (12-13-21 @ 07:18) (133/68 - 191/63)  RR: 20 (12-13-21 @ 07:18) (18 - 20)  SpO2: 98% (12-13-21 @ 07:18) (97% - 100%)  Wt(kg): --  I&O's Summary      Appearance: Normal	  HEENT:   Normal oral mucosa, PERRL, EOMI	  Lymphatic: No lymphadenopathy  Cardiovascular: Normal S1 S2, No JVD, No murmurs, No edema  Respiratory: Lungs clear to auscultation	  Psychiatry: A & O x 3, Mood & affect appropriate  Gastrointestinal:  Soft, Non-tender, + BS	  Skin: No rashes, No ecchymoses, No cyanosis	  Neurologic: Non-focal  Extremities: Normal range of motion, No clubbing, cyanosis or edema  Vascular: Peripheral pulses palpable 2+ bilaterally    MEDICATIONS  (STANDING):  ALBUTerol    90 MICROgram(s) HFA Inhaler 2 Puff(s) Inhalation every 6 hours  aspirin  chewable 81 milliGRAM(s) Oral daily  dorzolamide 2%/timolol 0.5% Ophthalmic Solution 1 Drop(s) Both EYES two times a day  dronedarone 400 milliGRAM(s) Oral two times a day  ferrous    sulfate 325 milliGRAM(s) Oral daily  heparin   Injectable 5000 Unit(s) SubCutaneous every 12 hours  hydrALAZINE 75 milliGRAM(s) Oral every 8 hours  influenza  Vaccine (HIGH DOSE) 0.7 milliLiter(s) IntraMuscular once  isosorbide   mononitrate ER Tablet (IMDUR) 30 milliGRAM(s) Oral daily  latanoprost 0.005% Ophthalmic Solution 1 Drop(s) Both EYES at bedtime  metoprolol tartrate 100 milliGRAM(s) Oral two times a day  pantoprazole    Tablet 40 milliGRAM(s) Oral before breakfast  simvastatin 40 milliGRAM(s) Oral at bedtime    	  	  LABS:	 	                      13.2   6.74  )-----------( 148      ( 13 Dec 2021 06:21 )             39.5     12-13    140  |  101  |  20<H>  ----------------------------<  102<H>  3.5   |  33<H>  |  1.88<H>    Ca    10.0      13 Dec 2021 06:21  Phos  3.5     12-13  Mg     2.3     12-13      proBNP: Serum Pro-Brain Natriuretic Peptide: 2753 pg/mL (12-10 @ 20:25)    Lipid Profile: Cholesterol 181  LDL --  HDL 62  TG 86  Ldl calc 102  Ratio --    HgA1c:   TSH: Thyroid Stimulating Hormone, Serum: 1.36 uU/mL (12-12 @ 05:50)      	      
CARDIOLOGY/MEDICAL ATTENDING    CHIEF COMPLAINT:Patient is a 89y old  Female who presents with a chief complaint of Cough, Fluid Overload.Pt appears comfortable.    	  REVIEW OF SYSTEMS:  CONSTITUTIONAL: No fever, weight loss, or fatigue  EYES: No eye pain, visual disturbances, or discharge  ENT:  No difficulty hearing, tinnitus, vertigo; No sinus or throat pain  NECK: No pain or stiffness  RESPIRATORY: No cough, wheezing, chills or hemoptysis; No Shortness of Breath  CARDIOVASCULAR: No chest pain, palpitations, passing out, dizziness, or leg swelling  GASTROINTESTINAL: No abdominal or epigastric pain. No nausea, vomiting, or hematemesis; No diarrhea or constipation. No melena or hematochezia.  GENITOURINARY: No dysuria, frequency, hematuria, or incontinence  NEUROLOGICAL: No headaches, memory loss, loss of strength, numbness, or tremors  SKIN: No itching, burning, rashes, or lesions   LYMPH Nodes: No enlarged glands  ENDOCRINE: No heat or cold intolerance; No hair loss  MUSCULOSKELETAL: No joint pain or swelling; No muscle, back, or extremity pain  PSYCHIATRIC: No depression, anxiety, mood swings, or difficulty sleeping  HEME/LYMPH: No easy bruising, or bleeding gums  ALLERGY AND IMMUNOLOGIC: No hives or eczema	      PHYSICAL EXAM:  T(C): 36.4 (12-12-21 @ 07:19), Max: 36.9 (12-11-21 @ 20:41)  HR: 60 (12-12-21 @ 07:19) (60 - 89)  BP: 161/64 (12-12-21 @ 07:19) (149/50 - 210/67)  RR: 18 (12-12-21 @ 07:19) (18 - 18)  SpO2: 94% (12-12-21 @ 07:19) (93% - 98%)  Wt(kg): --  I&O's Summary    11 Dec 2021 07:01  -  12 Dec 2021 07:00  --------------------------------------------------------  IN: 708 mL / OUT: 900 mL / NET: -192 mL        Appearance: Normal	  HEENT:   Normal oral mucosa, PERRL, EOMI	  Lymphatic: No lymphadenopathy  Cardiovascular: Normal S1 S2, 2/6sm  Respiratory: Lungs clear to auscultation	  Psychiatry: A & O x 3, Mood & affect appropriate  Gastrointestinal:  Soft, Non-tender, + BS	  Skin: No rashes, No ecchymoses, No cyanosis	  Neurologic: Non-focal  Extremities: Normal range of motion, No clubbing, cyanosis or edema  Vascular: Peripheral pulses palpable 2+ bilaterally    MEDICATIONS  (STANDING):  ALBUTerol    90 MICROgram(s) HFA Inhaler 2 Puff(s) Inhalation every 6 hours  aspirin  chewable 81 milliGRAM(s) Oral daily  dronedarone 400 milliGRAM(s) Oral two times a day  ferrous    sulfate 325 milliGRAM(s) Oral daily  furosemide   Injectable 20 milliGRAM(s) IV Push two times a day  heparin   Injectable 5000 Unit(s) SubCutaneous every 12 hours  hydrALAZINE 75 milliGRAM(s) Oral every 8 hours  influenza  Vaccine (HIGH DOSE) 0.7 milliLiter(s) IntraMuscular once  isosorbide   mononitrate ER Tablet (IMDUR) 30 milliGRAM(s) Oral daily  metoprolol tartrate 100 milliGRAM(s) Oral two times a day  pantoprazole    Tablet 40 milliGRAM(s) Oral before breakfast  potassium chloride   Powder 40 milliEquivalent(s) Oral every 2 hours  simvastatin 40 milliGRAM(s) Oral at bedtime      TELEMETRY: 	a sensed v paced    	  	  LABS:	 	                       12.5   6.01  )-----------( 149      ( 12 Dec 2021 05:50 )             36.5     12-12    139  |  99  |  17  ----------------------------<  105<H>  3.2<L>   |  35<H>  |  1.40<H>    Ca    10.0      12 Dec 2021 05:50    TPro  7.4  /  Alb  3.5  /  TBili  0.4  /  DBili  x   /  AST  22  /  ALT  25  /  AlkPhos  62  12-10    proBNP: Serum Pro-Brain Natriuretic Peptide: 2753 pg/mL (12-10 @ 20:25)    Lipid Profile: Cholesterol 181  LDL --  HDL 62  TG 86  Ldl calc 102  Ratio --      TSH: Thyroid Stimulating Hormone, Serum: 1.36 uU/mL (12-12 @ 05:50)      	        
CARDIOLOGY/MEDICAL ATTENDING    CHIEF COMPLAINT:Patient is a 89y old  Female who presents with a chief complaint of Cough, Fluid Overload.Pt appears comfortable.    	  REVIEW OF SYSTEMS:  CONSTITUTIONAL: No fever, weight loss, or fatigue  EYES: No eye pain, visual disturbances, or discharge  ENT:  No difficulty hearing, tinnitus, vertigo; No sinus or throat pain  NECK: No pain or stiffness  RESPIRATORY: No cough, wheezing, chills or hemoptysis; No Shortness of Breath  CARDIOVASCULAR: No chest pain, palpitations, passing out, dizziness, or leg swelling  GASTROINTESTINAL: No abdominal or epigastric pain. No nausea, vomiting, or hematemesis; No diarrhea or constipation. No melena or hematochezia.  GENITOURINARY: No dysuria, frequency, hematuria, or incontinence  NEUROLOGICAL: No headaches, memory loss, loss of strength, numbness, or tremors  SKIN: No itching, burning, rashes, or lesions   LYMPH Nodes: No enlarged glands  ENDOCRINE: No heat or cold intolerance; No hair loss  MUSCULOSKELETAL: No joint pain or swelling; No muscle, back, or extremity pain  PSYCHIATRIC: No depression, anxiety, mood swings, or difficulty sleeping  HEME/LYMPH: No easy bruising, or bleeding gums  ALLERGY AND IMMUNOLOGIC: No hives or eczema	        PHYSICAL EXAM:  T(C): 36.4 (12-11-21 @ 08:04), Max: 36.7 (12-10-21 @ 17:45)  HR: 60 (12-11-21 @ 08:04) (60 - 75)  BP: 221/75 (12-11-21 @ 08:04) (155/70 - 221/75)  RR: 18 (12-11-21 @ 08:04) (16 - 18)  SpO2: 94% (12-11-21 @ 08:04) (94% - 97%)        Appearance: Normal	  HEENT:   Normal oral mucosa, PERRL, EOMI	  Lymphatic: No lymphadenopathy  Cardiovascular: Normal S1 S2, 2/6sm  Respiratory: Lungs clear to auscultation	  Psychiatry: A & O x 3, Mood & affect appropriate  Gastrointestinal:  Soft, Non-tender, + BS	  Skin: No rashes, No ecchymoses, No cyanosis	  Neurologic: Non-focal  Extremities: Normal range of motion, No clubbing, cyanosis or edema  Vascular: Peripheral pulses palpable 2+ bilaterally    MEDICATIONS  (STANDING):  ALBUTerol    90 MICROgram(s) HFA Inhaler 2 Puff(s) Inhalation every 6 hours  aspirin  chewable 81 milliGRAM(s) Oral daily  dronedarone 400 milliGRAM(s) Oral two times a day  ferrous    sulfate 325 milliGRAM(s) Oral daily  furosemide   Injectable 20 milliGRAM(s) IV Push two times a day  hydrALAZINE 50 milliGRAM(s) Oral three times a day  isosorbide   mononitrate ER Tablet (IMDUR) 30 milliGRAM(s) Oral daily  metoprolol tartrate 100 milliGRAM(s) Oral two times a day  simvastatin 40 milliGRAM(s) Oral at bedtime      TELEMETRY: 	a sensed v paced    LABS:	 	                        12.2   6.09  )-----------( 156      ( 11 Dec 2021 05:50 )             36.3     12-11    138  |  101  |  16  ----------------------------<  120<H>  3.4<L>   |  34<H>  |  1.32<H>    Ca    9.7      11 Dec 2021 05:50    TPro  7.4  /  Alb  3.5  /  TBili  0.4  /  DBili  x   /  AST  22  /  ALT  25  /  AlkPhos  62  12-10    proBNP: Serum Pro-Brain Natriuretic Peptide: 2753 pg/mL (12-10 @ 20:25)    ec< from: Transthoracic Echocardiogram (07.06.20 @ 08:13) >  OBSERVATIONS:  Mitral Valve: Mitral annular calcification, and calcified  mitral leaflets with normal diastolic opening. Moderate to  severe mitral regurgitation.  Aortic Root: Aortic Root: 2.6 cm.  LVOT diameter: 1.9 cm.  Aortic Valve: Calcified trileaflet aortic valve with  decreased opening. Peak transaortic valve gradient equals  26.8 mm Hg, estimated aortic valve area equals 1.4 sqcm (by  continuity equation), consistent with moderate aortic  stenosis.The dimensionless index is .40. Mild aortic  regurgitation.  Left Atrium: Severely dilated left atrium.  LA volume index  = 52 cc/m2.  Left Ventricle: Normal Left Ventricular Systolic Function,  (EF = 55 to 60%) Normal left ventricular internal  dimensions and wall thicknesses. Grade II diastolic  dysfunction.  Right Heart: Normal right atrium. Normal right ventricular  size and function. A device lead is visualized in the right  heart. There is moderate tricuspid regurgitation. There is  mild-moderate pulmonic regurgitation.  Pericardium/PleuraNormal pericardium with no pericardial  effusion.  Hemodynamic: RA Pressure is 8 mm Hg. RV systolic pressure  is mildly increased at  44 mm Hg.      	        
CARDIOLOGY/MEDICAL ATTENDING    CHIEF COMPLAINT:Patient is a 89y old  Female who presents with a chief complaint of Cough, Fluid Overload .Pt appears comfortable.    	  REVIEW OF SYSTEMS:  CONSTITUTIONAL: No fever, weight loss, or fatigue  EYES: No eye pain, visual disturbances, or discharge  ENT:  No difficulty hearing, tinnitus, vertigo; No sinus or throat pain  NECK: No pain or stiffness  RESPIRATORY: No cough, wheezing, chills or hemoptysis; No Shortness of Breath  CARDIOVASCULAR: No chest pain, palpitations, passing out, dizziness, or leg swelling  GASTROINTESTINAL: No abdominal or epigastric pain. No nausea, vomiting, or hematemesis; No diarrhea or constipation. No melena or hematochezia.  GENITOURINARY: No dysuria, frequency, hematuria, or incontinence  NEUROLOGICAL: No headaches, memory loss, loss of strength, numbness, or tremors  SKIN: No itching, burning, rashes, or lesions   LYMPH Nodes: No enlarged glands  ENDOCRINE: No heat or cold intolerance; No hair loss  MUSCULOSKELETAL: No joint pain or swelling; No muscle, back, or extremity pain  PSYCHIATRIC: No depression, anxiety, mood swings, or difficulty sleeping  HEME/LYMPH: No easy bruising, or bleeding gums  ALLERGY AND IMMUNOLOGIC: No hives or eczema	        PHYSICAL EXAM:  T(C): 36.7 (12-15-21 @ 07:59), Max: 36.8 (12-14-21 @ 16:04)  HR: 60 (12-15-21 @ 07:59) (57 - 71)  BP: 156/52 (12-15-21 @ 07:59) (124/49 - 165/45)  RR: 18 (12-15-21 @ 07:59) (17 - 19)  SpO2: 95% (12-15-21 @ 07:59) (95% - 98%)  Wt(kg): --  I&O's Summary    14 Dec 2021 07:01  -  15 Dec 2021 07:00  --------------------------------------------------------  IN: 40 mL / OUT: 680 mL / NET: -640 mL        Appearance: Normal	  HEENT:   Normal oral mucosa, PERRL, EOMI	  Lymphatic: No lymphadenopathy  Cardiovascular: Normal S1 S2, 2/6sm  Respiratory: Lungs clear to auscultation	  Psychiatry: A & O x 3, Mood & affect appropriate  Gastrointestinal:  Soft, Non-tender, + BS	  Skin: No rashes, No ecchymoses, No cyanosis	  Neurologic: Non-focal  Extremities: Normal range of motion, No clubbing, cyanosis or edema  Vascular: Peripheral pulses palpable 2+ bilaterally    MEDICATIONS  (STANDING):  ALBUTerol    90 MICROgram(s) HFA Inhaler 2 Puff(s) Inhalation every 6 hours  aspirin  chewable 81 milliGRAM(s) Oral daily  dorzolamide 2%/timolol 0.5% Ophthalmic Solution 1 Drop(s) Both EYES two times a day  dronedarone 400 milliGRAM(s) Oral two times a day  ferrous    sulfate 325 milliGRAM(s) Oral daily  heparin   Injectable 5000 Unit(s) SubCutaneous every 12 hours  hydrALAZINE 75 milliGRAM(s) Oral every 8 hours  influenza  Vaccine (HIGH DOSE) 0.7 milliLiter(s) IntraMuscular once  isosorbide   mononitrate ER Tablet (IMDUR) 30 milliGRAM(s) Oral daily  latanoprost 0.005% Ophthalmic Solution 1 Drop(s) Both EYES at bedtime  metoprolol tartrate 100 milliGRAM(s) Oral two times a day  pantoprazole    Tablet 40 milliGRAM(s) Oral before breakfast  simvastatin 40 milliGRAM(s) Oral at bedtime  sodium chloride 0.9%. 1000 milliLiter(s) (50 mL/Hr) IV Continuous <Continuous>      	  	  LABS:	 	                        12.4   6.60  )-----------( 143      ( 14 Dec 2021 07:45 )             36.7     12-14    140  |  103  |  34<H>  ----------------------------<  108<H>  3.5   |  31  |  2.14<H>    Ca    9.6      14 Dec 2021 07:45  Phos  3.1     12-14  Mg     2.4     12-14      proBNP: Serum Pro-Brain Natriuretic Peptide: 2753 pg/mL (12-10 @ 20:25)    Lipid Profile: Cholesterol 181  LDL --  HDL 62  TG 86  Ldl calc 102  Ratio --      TSH: Thyroid Stimulating Hormone, Serum: 1.36 uU/mL (12-12 @ 05:50)        ACC: [id number] EXAM:  XR CHEST PORTABLE URGENT 1V                          PROCEDURE DATE:  12/10/2021          INTERPRETATION:  Cough.    AP chest. Prior 7/5/2020.    IMPRESSION:    Left ICD. Heart magnified by projection. Deviation of the supraclavicular   trachea to the right may reflect intrathoracic thyroid or other   mediastinal mass/adenopathy. Correlate with chest CT. No consolidation or   effusion.    Result to ED via PACS at the time of this interpretation    --- End of Report ---            ROXY ROBBINS MD; Attending Radiologist  This document has been electronically signed. Dec 13 2021 10:17AM          
PGY-1 Progress Note discussed with attending    PAGER #: [[phone]] TILL 5:00 PM  PLEASE CONTACT ON CALL TEAM:  - On Call Team (Please refer to Madi) FROM 5:00 PM - 8:30PM  - Nightfloat Team FROM 8:30 -7:30 AM    INTERVAL HPI/OVERNIGHT EVENTS: no acute overnight events. SOB improving.       REVIEW OF SYSTEMS:  negative unless stated above    Vital Signs Last 24 Hrs  T(C): 36.7 (13 Dec 2021 07:18), Max: 36.7 (13 Dec 2021 07:18)  T(F): 98 (13 Dec 2021 07:18), Max: 98 (13 Dec 2021 07:18)  HR: 68 (13 Dec 2021 07:18) (60 - 68)  BP: 155/60 (13 Dec 2021 07:18) (133/68 - 191/63)  BP(mean): --  RR: 20 (13 Dec 2021 07:18) (18 - 20)  SpO2: 98% (13 Dec 2021 07:18) (97% - 100%)    PHYSICAL EXAMINATION:  GENERAL: NAD,   HEAD:  Atraumatic, Normocephalic  EYES:  conjunctiva and sclera clear  NECK: Supple, No JVD, Normal thyroid  CHEST/LUNG: Clear to auscultation. Clear to percussion bilaterally; No rales, rhonchi, wheezing, or rubs  HEART: Regular rate and rhythm; No murmurs, rubs, or gallops  ABDOMEN: Soft, Nontender, Nondistended; Bowel sounds present,    EXTREMITIES:  2+ Peripheral Pulses, No clubbing, cyanosis, or edema  SKIN: warm dry                        13.2   6.74  )-----------( 148      ( 13 Dec 2021 06:21 )             39.5     12-13    140  |  101  |  20<H>  ----------------------------<  102<H>  3.5   |  33<H>  |  1.88<H>    Ca    10.0      13 Dec 2021 06:21  Phos  3.5     12-13  Mg     2.3     12-13                CAPILLARY BLOOD GLUCOSE      RADIOLOGY & ADDITIONAL TESTS:                  
PGY-1 Progress Note discussed with attending    PAGER #: [[phone]] TILL 5:00 PM  PLEASE CONTACT ON CALL TEAM:  - On Call Team (Please refer to Madi) FROM 5:00 PM - 8:30PM  - Nightfloat Team FROM 8:30 -7:30 AM      INTERVAL HPI/OVERNIGHT EVENTS: No acute overnight events.       REVIEW OF SYSTEMS:  negative unless stated above     Vital Signs Last 24 Hrs  T(C): 36.7 (14 Dec 2021 11:30), Max: 36.9 (13 Dec 2021 20:09)  T(F): 98.1 (14 Dec 2021 11:30), Max: 98.4 (13 Dec 2021 20:09)  HR: 60 (14 Dec 2021 11:30) (60 - 66)  BP: 124/49 (14 Dec 2021 11:30) (101/47 - 161/61)  BP(mean): --  RR: 19 (14 Dec 2021 11:30) (17 - 20)  SpO2: 97% (14 Dec 2021 11:30) (92% - 98%)    PHYSICAL EXAMINATION:  GENERAL: NAD,   HEAD:  Atraumatic, Normocephalic  EYES:  conjunctiva and sclera clear  NECK: Supple, No JVD, Normal thyroid  CHEST/LUNG: Clear to auscultation. Clear to percussion bilaterally; No rales, rhonchi, wheezing, or rubs  HEART: Regular rate and rhythm; No murmurs, rubs, or gallops  ABDOMEN: Soft, Nontender, Nondistended; Bowel sounds present,    EXTREMITIES:  2+ Peripheral Pulses, No clubbing, cyanosis, or edema  SKIN: warm dry                          12.4   6.60  )-----------( 143      ( 14 Dec 2021 07:45 )             36.7     12-14    140  |  103  |  34<H>  ----------------------------<  108<H>  3.5   |  31  |  2.14<H>    Ca    9.6      14 Dec 2021 07:45  Phos  3.1     12-14  Mg     2.4     12-14                CAPILLARY BLOOD GLUCOSE      RADIOLOGY & ADDITIONAL TESTS:                  
PGY-1 Progress Note discussed with attending    PAGER #: [[phone]] TILL 5:00 PM  PLEASE CONTACT ON CALL TEAM:  - On Call Team (Please refer to Madi) FROM 5:00 PM - 8:30PM  - Nightfloat Team FROM 8:30 -7:30 AM        INTERVAL HPI/OVERNIGHT EVENTS: no acute overnight events. Patient states her SOB is improving.       REVIEW OF SYSTEMS:  CONSTITUTIONAL: No fever, weight loss, or fatigue  RESPIRATORY: No cough, wheezing, chills or hemoptysis; + shortness of breath  CARDIOVASCULAR: No chest pain, palpitations, dizziness, or leg swelling  GASTROINTESTINAL: No abdominal pain. No nausea, vomiting, or hematemesis; No diarrhea or constipation. No melena or hematochezia.  GENITOURINARY: No dysuria or hematuria, urinary frequency  NEUROLOGICAL: No headaches, memory loss, loss of strength, numbness, or tremors  SKIN: No itching, burning, rashes, or lesions     Vital Signs Last 24 Hrs  T(C): 36.7 (15 Dec 2021 07:59), Max: 36.8 (14 Dec 2021 16:04)  T(F): 98.1 (15 Dec 2021 07:59), Max: 98.2 (14 Dec 2021 16:04)  HR: 60 (15 Dec 2021 07:59) (57 - 71)  BP: 156/52 (15 Dec 2021 07:59) (124/49 - 165/45)  BP(mean): --  RR: 18 (15 Dec 2021 07:59) (17 - 19)  SpO2: 95% (15 Dec 2021 07:59) (95% - 98%)    PHYSICAL EXAMINATION:  GENERAL: NAD,   HEAD:  Atraumatic, Normocephalic  EYES:  conjunctiva and sclera clear  NECK: Supple, No JVD, Normal thyroid  CHEST/LUNG: Clear to auscultation. Clear to percussion bilaterally; No rales, rhonchi, wheezing, or rubs  HEART: Regular rate and rhythm; No murmurs, rubs, or gallops  ABDOMEN: Soft, Nontender, Nondistended; Bowel sounds present  NERVOUS SYSTEM:  Alert & Oriented X 2,    EXTREMITIES:  2+ Peripheral Pulses, No clubbing, cyanosis, or edema  SKIN: warm dry                          12.4   6.60  )-----------( 143      ( 14 Dec 2021 07:45 )             36.7     12-14    140  |  103  |  34<H>  ----------------------------<  108<H>  3.5   |  31  |  2.14<H>    Ca    9.6      14 Dec 2021 07:45  Phos  3.1     12-14  Mg     2.4     12-14                CAPILLARY BLOOD GLUCOSE      RADIOLOGY & ADDITIONAL TESTS:                  
Patient is a 89y old  Female who presents with a chief complaint of Cough, Fluid Overload (14 Dec 2021 08:42)  Awake, alert, laying in bed in NAD    INTERVAL HPI/OVERNIGHT EVENTS:      VITAL SIGNS:  T(F): 98.2 (12-14-21 @ 07:45)  HR: 61 (12-14-21 @ 09:11)  BP: 153/49 (12-14-21 @ 09:11)  RR: 18 (12-14-21 @ 07:45)  SpO2: 94% (12-14-21 @ 07:45)  Wt(kg): --  I&O's Detail          REVIEW OF SYSTEMS:    CONSTITUTIONAL:  No fevers, chills, sweats    HEENT:  Eyes:  No diplopia or blurred vision. ENT:  No earache, sore throat or runny nose.    CARDIOVASCULAR:  No pressure, squeezing, tightness, or heaviness about the chest; no palpitations.    RESPIRATORY:  Per HPI    GASTROINTESTINAL:  No abdominal pain, nausea, vomiting or diarrhea.    GENITOURINARY:  No dysuria, frequency or urgency.    NEUROLOGIC:  No paresthesias, fasciculations, seizures or weakness.    PSYCHIATRIC:  No disorder of thought or mood.      PHYSICAL EXAM:    Constitutional: Well developed and nourished  Eyes:Perrla  ENMT: normal  Neck:supple  Respiratory: good air entry  Cardiovascular: S1 S2 regular  Gastrointestinal: Soft, Non tender  Extremities: No edema  Vascular:normal  Neurological:Awake, alert,Ox3  Musculoskeletal:Normal      MEDICATIONS  (STANDING):  ALBUTerol    90 MICROgram(s) HFA Inhaler 2 Puff(s) Inhalation every 6 hours  aspirin  chewable 81 milliGRAM(s) Oral daily  dorzolamide 2%/timolol 0.5% Ophthalmic Solution 1 Drop(s) Both EYES two times a day  dronedarone 400 milliGRAM(s) Oral two times a day  ferrous    sulfate 325 milliGRAM(s) Oral daily  heparin   Injectable 5000 Unit(s) SubCutaneous every 12 hours  hydrALAZINE 75 milliGRAM(s) Oral every 8 hours  influenza  Vaccine (HIGH DOSE) 0.7 milliLiter(s) IntraMuscular once  isosorbide   mononitrate ER Tablet (IMDUR) 30 milliGRAM(s) Oral daily  latanoprost 0.005% Ophthalmic Solution 1 Drop(s) Both EYES at bedtime  metoprolol tartrate 100 milliGRAM(s) Oral two times a day  pantoprazole    Tablet 40 milliGRAM(s) Oral before breakfast  simvastatin 40 milliGRAM(s) Oral at bedtime  sodium chloride 0.9%. 1000 milliLiter(s) (50 mL/Hr) IV Continuous <Continuous>    MEDICATIONS  (PRN):  acetaminophen     Tablet .. 650 milliGRAM(s) Oral every 6 hours PRN Temp greater or equal to 38C (100.4F), Mild Pain (1 - 3)  melatonin 3 milliGRAM(s) Oral at bedtime PRN Insomnia      Allergies    No Known Allergies    Intolerances        LABS:                        12.4   6.60  )-----------( 143      ( 14 Dec 2021 07:45 )             36.7     12-14    140  |  103  |  34<H>  ----------------------------<  108<H>  3.5   |  31  |  2.14<H>    Ca    9.6      14 Dec 2021 07:45  Phos  3.1     12-14  Mg     2.4     12-14                CAPILLARY BLOOD GLUCOSE        pro-bnp 2753 12-10 @ 20:25     d-dimer --  12-10 @ 20:25      RADIOLOGY & ADDITIONAL TESTS:    CXR:  < from: Xray Chest 1 View- PORTABLE-Urgent (Xray Chest 1 View- PORTABLE-Urgent .) (12.10.21 @ 20:16) >  IMPRESSION:    Left ICD. Heart magnified by projection. Deviation of the supraclavicular   trachea to the right may reflect intrathoracic thyroid or other   mediastinal mass/adenopathy. Correlate with chest CT. No consolidation or   effusion.      < end of copied text >    Ct scan chest:    ekg;    echo:< from: Transthoracic Echocardiogram (12.11.21 @ 06:53) >  1. Mitral annular calcification. Moderate, eccentric mitral  regurgitation.  2. Calcified aortic valve with decreased opening. Peak  transaortic valve gradient equals 21 mm Hg, mean  transaortic valve gradient equals 10 mm Hg, estimated  aortic valve area equals 2 sqcm (by continuity equation),  consistent with mild aortic stenosis. Mild aortic  regurgitation.  3. Mildly dilated left atrium.  LA volume index = 41 cc/m2.  4. Moderate concentric left ventricular hypertrophy.  5. Endocardium not well visualized; grossly normal left  ventricular systolic function.  6. Grade I diastolic dysfunction (Impaired relaxation,  mild).  7. Normal right ventricular size and function.   A device  lead is visualized in the right heart.  8. RV systolic pressure is 41 mm Hg. Mild pulmonary  hypertension.    ------------------------------------------------------------------------    < end of copied text >  
Patient is a 89y old  Female who presents with a chief complaint of Cough, Fluid Overload (13 Dec 2021 08:00)  Awake, alert, comfortable in bed in NAD    INTERVAL HPI/OVERNIGHT EVENTS:      VITAL SIGNS:  T(F): 98 (12-13-21 @ 07:18)  HR: 68 (12-13-21 @ 07:18)  BP: 155/60 (12-13-21 @ 07:18)  RR: 20 (12-13-21 @ 07:18)  SpO2: 98% (12-13-21 @ 07:18)  Wt(kg): --  I&O's Detail          REVIEW OF SYSTEMS:    CONSTITUTIONAL:  No fevers, chills, sweats    HEENT:  Eyes:  No diplopia or blurred vision. ENT:  No earache, sore throat or runny nose.    CARDIOVASCULAR:  No pressure, squeezing, tightness, or heaviness about the chest; no palpitations.    RESPIRATORY:  Per HPI    GASTROINTESTINAL:  No abdominal pain, nausea, vomiting or diarrhea.    GENITOURINARY:  No dysuria, frequency or urgency.    NEUROLOGIC:  No paresthesias, fasciculations, seizures or weakness.    PSYCHIATRIC:  No disorder of thought or mood.      PHYSICAL EXAM:    Constitutional: Well developed and nourished  Eyes:Perrla  ENMT: normal  Neck:supple  Respiratory: good air entry  Cardiovascular: S1 S2 regular  Gastrointestinal: Soft, Non tender  Extremities: No edema  Vascular:normal  Neurological:Awake, alert,Ox3  Musculoskeletal:Normal      MEDICATIONS  (STANDING):  ALBUTerol    90 MICROgram(s) HFA Inhaler 2 Puff(s) Inhalation every 6 hours  aspirin  chewable 81 milliGRAM(s) Oral daily  dorzolamide 2%/timolol 0.5% Ophthalmic Solution 1 Drop(s) Both EYES two times a day  dronedarone 400 milliGRAM(s) Oral two times a day  ferrous    sulfate 325 milliGRAM(s) Oral daily  heparin   Injectable 5000 Unit(s) SubCutaneous every 12 hours  hydrALAZINE 75 milliGRAM(s) Oral every 8 hours  influenza  Vaccine (HIGH DOSE) 0.7 milliLiter(s) IntraMuscular once  isosorbide   mononitrate ER Tablet (IMDUR) 30 milliGRAM(s) Oral daily  latanoprost 0.005% Ophthalmic Solution 1 Drop(s) Both EYES at bedtime  metoprolol tartrate 100 milliGRAM(s) Oral two times a day  pantoprazole    Tablet 40 milliGRAM(s) Oral before breakfast  simvastatin 40 milliGRAM(s) Oral at bedtime    MEDICATIONS  (PRN):  acetaminophen     Tablet .. 650 milliGRAM(s) Oral every 6 hours PRN Temp greater or equal to 38C (100.4F), Mild Pain (1 - 3)  melatonin 3 milliGRAM(s) Oral at bedtime PRN Insomnia      Allergies    No Known Allergies    Intolerances        LABS:                        13.2   6.74  )-----------( 148      ( 13 Dec 2021 06:21 )             39.5     12-13    140  |  101  |  20<H>  ----------------------------<  102<H>  3.5   |  33<H>  |  1.88<H>    Ca    10.0      13 Dec 2021 06:21  Phos  3.5     12-13  Mg     2.3     12-13                CAPILLARY BLOOD GLUCOSE      POCT Blood Glucose.: 94 mg/dL (12 Dec 2021 20:48)    pro-bnp 2753 12-10 @ 20:25     d-dimer --  12-10 @ 20:25      RADIOLOGY & ADDITIONAL TESTS:    CXR:    Ct scan chest:    ekg;    echo:< from: Transthoracic Echocardiogram (12.11.21 @ 06:53) >  1. Mitral annular calcification. Moderate, eccentric mitral  regurgitation.  2. Calcified aortic valve with decreased opening. Peak  transaortic valve gradient equals 21 mm Hg, mean  transaortic valve gradient equals 10 mm Hg, estimated  aortic valve area equals 2 sqcm (by continuity equation),  consistent with mild aortic stenosis. Mild aortic  regurgitation.  3. Mildly dilated left atrium.  LA volume index = 41 cc/m2.  4. Moderate concentric left ventricular hypertrophy.  5. Endocardium not well visualized; grossly normal left  ventricular systolic function.  6. Grade I diastolic dysfunction (Impaired relaxation,  mild).  7. Normal right ventricular size and function.   A device  lead is visualized in the right heart.  8. RV systolic pressure is 41 mm Hg. Mild pulmonary  hypertension.    < end of copied text >  
Patient is a 89y old  Female who presents with a chief complaint of Cough, Fluid Overload (11 Dec 2021 10:25)  Patient is resting comfortable in bed in NAD. No cough or sob    INTERVAL HPI/OVERNIGHT EVENTS:      VITAL SIGNS:  T(F): 97.5 (12-12-21 @ 07:19)  HR: 60 (12-12-21 @ 07:19)  BP: 161/64 (12-12-21 @ 07:19)  RR: 18 (12-12-21 @ 07:19)  SpO2: 94% (12-12-21 @ 07:19)  Wt(kg): --  I&O's Detail    11 Dec 2021 07:01  -  12 Dec 2021 07:00  --------------------------------------------------------  IN:    Oral Fluid: 708 mL  Total IN: 708 mL    OUT:    Voided (mL): 900 mL  Total OUT: 900 mL    Total NET: -192 mL              REVIEW OF SYSTEMS:    CONSTITUTIONAL:  No fevers, chills, sweats    HEENT:  Eyes:  No diplopia or blurred vision. ENT:  No earache, sore throat or runny nose.    CARDIOVASCULAR:  No pressure, squeezing, tightness, or heaviness about the chest; no palpitations.    RESPIRATORY:  Per HPI    GASTROINTESTINAL:  No abdominal pain, nausea, vomiting or diarrhea.    GENITOURINARY:  No dysuria, frequency or urgency.    NEUROLOGIC:  No paresthesias, fasciculations, seizures or weakness.    PSYCHIATRIC:  No disorder of thought or mood.      PHYSICAL EXAM:    Constitutional: Well developed and nourished  Eyes:Perrla  ENMT: normal  Neck:supple  Respiratory: good air entry  Cardiovascular: S1 S2 regular  Gastrointestinal: Soft, Non tender  Extremities: No edema  Vascular:normal  Neurological:Asleep  Musculoskeletal:Normal      MEDICATIONS  (STANDING):  ALBUTerol    90 MICROgram(s) HFA Inhaler 2 Puff(s) Inhalation every 6 hours  aspirin  chewable 81 milliGRAM(s) Oral daily  dronedarone 400 milliGRAM(s) Oral two times a day  ferrous    sulfate 325 milliGRAM(s) Oral daily  furosemide   Injectable 20 milliGRAM(s) IV Push two times a day  heparin   Injectable 5000 Unit(s) SubCutaneous every 12 hours  hydrALAZINE 50 milliGRAM(s) Oral three times a day  influenza  Vaccine (HIGH DOSE) 0.7 milliLiter(s) IntraMuscular once  isosorbide   mononitrate ER Tablet (IMDUR) 30 milliGRAM(s) Oral daily  metoprolol tartrate 100 milliGRAM(s) Oral two times a day  pantoprazole    Tablet 40 milliGRAM(s) Oral before breakfast  potassium chloride   Powder 40 milliEquivalent(s) Oral every 2 hours  simvastatin 40 milliGRAM(s) Oral at bedtime    MEDICATIONS  (PRN):  acetaminophen     Tablet .. 650 milliGRAM(s) Oral every 6 hours PRN Temp greater or equal to 38C (100.4F), Mild Pain (1 - 3)  aluminum hydroxide/magnesium hydroxide/simethicone Suspension 30 milliLiter(s) Oral every 4 hours PRN Dyspepsia  melatonin 3 milliGRAM(s) Oral at bedtime PRN Insomnia      Allergies    No Known Allergies    Intolerances        LABS:                        12.5   6.01  )-----------( 149      ( 12 Dec 2021 05:50 )             36.5     12-12    139  |  99  |  17  ----------------------------<  105<H>  3.2<L>   |  35<H>  |  1.40<H>    Ca    10.0      12 Dec 2021 05:50    TPro  7.4  /  Alb  3.5  /  TBili  0.4  /  DBili  x   /  AST  22  /  ALT  25  /  AlkPhos  62  12-10    PT/INR - ( 11 Dec 2021 05:50 )   PT: 12.2 sec;   INR: 1.03 ratio         PTT - ( 11 Dec 2021 05:50 )  PTT:29.9 sec          CAPILLARY BLOOD GLUCOSE        pro-bnp 2753 12-10 @ 20:25     d-dimer --  12-10 @ 20:25      RADIOLOGY & ADDITIONAL TESTS:    CXR:    Ct scan chest:    ekg;    echo:
Patient is a 89y old  Female who presents with a chief complaint of Cough, Fluid Overload (15 Dec 2021 10:17)  Awake, alert, out of bed in NAD    INTERVAL HPI/OVERNIGHT EVENTS:      VITAL SIGNS:  T(F): 98.1 (12-15-21 @ 11:17)  HR: 60 (12-15-21 @ 11:17)  BP: 148/58 (12-15-21 @ 11:17)  RR: 18 (12-15-21 @ 11:17)  SpO2: 95% (12-15-21 @ 11:17)  Wt(kg): --  I&O's Detail    14 Dec 2021 07:01  -  15 Dec 2021 07:00  --------------------------------------------------------  IN:    Oral Fluid: 40 mL  Total IN: 40 mL    OUT:    Voided (mL): 680 mL  Total OUT: 680 mL    Total NET: -640 mL              REVIEW OF SYSTEMS:    CONSTITUTIONAL:  No fevers, chills, sweats    HEENT:  Eyes:  No diplopia or blurred vision. ENT:  No earache, sore throat or runny nose.    CARDIOVASCULAR:  No pressure, squeezing, tightness, or heaviness about the chest; no palpitations.    RESPIRATORY:  Per HPI    GASTROINTESTINAL:  No abdominal pain, nausea, vomiting or diarrhea.    GENITOURINARY:  No dysuria, frequency or urgency.    NEUROLOGIC:  No paresthesias, fasciculations, seizures or weakness.    PSYCHIATRIC:  No disorder of thought or mood.      PHYSICAL EXAM:    Constitutional: Well developed and nourished  Eyes:Perrla  ENMT: normal  Neck:supple  Respiratory: good air entry  Cardiovascular: S1 S2 regular  Gastrointestinal: Soft, Non tender  Extremities: No edema  Vascular:normal  Neurological:Awake, alert,Ox3  Musculoskeletal:Normal      MEDICATIONS  (STANDING):  ALBUTerol    90 MICROgram(s) HFA Inhaler 2 Puff(s) Inhalation every 6 hours  aspirin  chewable 81 milliGRAM(s) Oral daily  dorzolamide 2%/timolol 0.5% Ophthalmic Solution 1 Drop(s) Both EYES two times a day  dronedarone 400 milliGRAM(s) Oral two times a day  ferrous    sulfate 325 milliGRAM(s) Oral daily  heparin   Injectable 5000 Unit(s) SubCutaneous every 12 hours  hydrALAZINE 75 milliGRAM(s) Oral every 8 hours  influenza  Vaccine (HIGH DOSE) 0.7 milliLiter(s) IntraMuscular once  isosorbide   mononitrate ER Tablet (IMDUR) 30 milliGRAM(s) Oral daily  latanoprost 0.005% Ophthalmic Solution 1 Drop(s) Both EYES at bedtime  metoprolol tartrate 100 milliGRAM(s) Oral two times a day  pantoprazole    Tablet 40 milliGRAM(s) Oral before breakfast  simvastatin 40 milliGRAM(s) Oral at bedtime    MEDICATIONS  (PRN):  acetaminophen     Tablet .. 650 milliGRAM(s) Oral every 6 hours PRN Temp greater or equal to 38C (100.4F), Mild Pain (1 - 3)  melatonin 3 milliGRAM(s) Oral at bedtime PRN Insomnia      Allergies    No Known Allergies    Intolerances        LABS:                        12.0   5.51  )-----------( 149      ( 15 Dec 2021 09:55 )             36.2     12-15    139  |  103  |  33<H>  ----------------------------<  132<H>  3.9   |  31  |  1.70<H>    Ca    10.1      15 Dec 2021 09:55  Phos  2.9     12-15  Mg     2.6     12-15                CAPILLARY BLOOD GLUCOSE        pro-bnp 2753 12-10 @ 20:25     d-dimer --  12-10 @ 20:25      RADIOLOGY & ADDITIONAL TESTS:    CXR:  < from: Xray Chest 1 View- PORTABLE-Urgent (Xray Chest 1 View- PORTABLE-Urgent .) (12.10.21 @ 20:16) >  IMPRESSION:    Left ICD. Heart magnified by projection. Deviation of the supraclavicular   trachea to the right may reflect intrathoracic thyroid or other   mediastinal mass/adenopathy. Correlate with chest CT. No consolidation or   effusion.    Result to ED via PACS at the time of this interpretation      < end of copied text >    Ct scan chest:    ekg;    echo:
PGY-1 Progress Note discussed with attending    PAGER #: [[phone]] TILL 5:00 PM  PLEASE CONTACT ON CALL TEAM:  - On Call Team (Please refer to Madi) FROM 5:00 PM - 8:30PM  - Nightfloat Team FROM 8:30 -7:30 AM        INTERVAL HPI/OVERNIGHT EVENTS: overnight patient's BP was in the 210s. Was given Hydralazine 10mg and 5mg IV push.       REVIEW OF SYSTEMS:  CONSTITUTIONAL: No fever, weight loss, or fatigue  RESPIRATORY: No cough, wheezing, chills or hemoptysis; + shortness of breath  CARDIOVASCULAR: No chest pain, palpitations, dizziness, or leg swelling  GASTROINTESTINAL: No abdominal pain. No nausea, vomiting, or hematemesis; No diarrhea or constipation. No melena or hematochezia.  GENITOURINARY: No dysuria or hematuria, urinary frequency  NEUROLOGICAL: No headaches, memory loss, loss of strength, numbness, or tremors  SKIN: No itching, burning, rashes, or lesions     Vital Signs Last 24 Hrs  T(C): 36.4 (12 Dec 2021 07:19), Max: 36.9 (11 Dec 2021 20:41)  T(F): 97.5 (12 Dec 2021 07:19), Max: 98.5 (11 Dec 2021 20:41)  HR: 60 (12 Dec 2021 07:19) (60 - 89)  BP: 161/64 (12 Dec 2021 07:19) (149/50 - 210/67)  BP(mean): --  RR: 18 (12 Dec 2021 07:19) (18 - 18)  SpO2: 94% (12 Dec 2021 07:19) (93% - 98%)    PHYSICAL EXAMINATION:  GENERAL: NAD,   HEAD:  Atraumatic, Normocephalic  EYES:  conjunctiva and sclera clear  NECK: Supple, No JVD, Normal thyroid  CHEST/LUNG: Clear to auscultation. Clear to percussion bilaterally; No rales, rhonchi, wheezing, or rubs  HEART: Regular rate and rhythm; No murmurs, rubs, or gallops  ABDOMEN: Soft, Nontender, Nondistended; Bowel sounds present,    EXTREMITIES:  2+ Peripheral Pulses, No clubbing, cyanosis, or edema  SKIN: warm dry                          12.5   6.01  )-----------( 149      ( 12 Dec 2021 05:50 )             36.5     12-12    139  |  99  |  17  ----------------------------<  105<H>  3.2<L>   |  35<H>  |  1.40<H>    Ca    10.0      12 Dec 2021 05:50    TPro  7.4  /  Alb  3.5  /  TBili  0.4  /  DBili  x   /  AST  22  /  ALT  25  /  AlkPhos  62  12-10    LIVER FUNCTIONS - ( 10 Dec 2021 19:05 )  Alb: 3.5 g/dL / Pro: 7.4 g/dL / ALK PHOS: 62 U/L / ALT: 25 U/L DA / AST: 22 U/L / GGT: x               PT/INR - ( 11 Dec 2021 05:50 )   PT: 12.2 sec;   INR: 1.03 ratio         PTT - ( 11 Dec 2021 05:50 )  PTT:29.9 sec    CAPILLARY BLOOD GLUCOSE      RADIOLOGY & ADDITIONAL TESTS:

## 2021-12-29 PROCEDURE — 80053 COMPREHEN METABOLIC PANEL: CPT

## 2021-12-29 PROCEDURE — 80061 LIPID PANEL: CPT

## 2021-12-29 PROCEDURE — 76775 US EXAM ABDO BACK WALL LIM: CPT

## 2021-12-29 PROCEDURE — 71045 X-RAY EXAM CHEST 1 VIEW: CPT

## 2021-12-29 PROCEDURE — 82306 VITAMIN D 25 HYDROXY: CPT

## 2021-12-29 PROCEDURE — 82962 GLUCOSE BLOOD TEST: CPT

## 2021-12-29 PROCEDURE — 83935 ASSAY OF URINE OSMOLALITY: CPT

## 2021-12-29 PROCEDURE — 99285 EMERGENCY DEPT VISIT HI MDM: CPT | Mod: 25

## 2021-12-29 PROCEDURE — 85027 COMPLETE CBC AUTOMATED: CPT

## 2021-12-29 PROCEDURE — 84100 ASSAY OF PHOSPHORUS: CPT

## 2021-12-29 PROCEDURE — 36415 COLL VENOUS BLD VENIPUNCTURE: CPT

## 2021-12-29 PROCEDURE — 84443 ASSAY THYROID STIM HORMONE: CPT

## 2021-12-29 PROCEDURE — 83036 HEMOGLOBIN GLYCOSYLATED A1C: CPT

## 2021-12-29 PROCEDURE — 84300 ASSAY OF URINE SODIUM: CPT

## 2021-12-29 PROCEDURE — 83735 ASSAY OF MAGNESIUM: CPT

## 2021-12-29 PROCEDURE — 96374 THER/PROPH/DIAG INJ IV PUSH: CPT

## 2021-12-29 PROCEDURE — 87635 SARS-COV-2 COVID-19 AMP PRB: CPT

## 2021-12-29 PROCEDURE — 71250 CT THORAX DX C-: CPT

## 2021-12-29 PROCEDURE — 83880 ASSAY OF NATRIURETIC PEPTIDE: CPT

## 2021-12-29 PROCEDURE — 93005 ELECTROCARDIOGRAM TRACING: CPT

## 2021-12-29 PROCEDURE — 85610 PROTHROMBIN TIME: CPT

## 2021-12-29 PROCEDURE — 80048 BASIC METABOLIC PNL TOTAL CA: CPT

## 2021-12-29 PROCEDURE — 82570 ASSAY OF URINE CREATININE: CPT

## 2021-12-29 PROCEDURE — 84540 ASSAY OF URINE/UREA-N: CPT

## 2021-12-29 PROCEDURE — 97161 PT EVAL LOW COMPLEX 20 MIN: CPT

## 2021-12-29 PROCEDURE — 93306 TTE W/DOPPLER COMPLETE: CPT

## 2021-12-29 PROCEDURE — 85730 THROMBOPLASTIN TIME PARTIAL: CPT

## 2021-12-29 PROCEDURE — 94640 AIRWAY INHALATION TREATMENT: CPT

## 2021-12-29 PROCEDURE — 85025 COMPLETE CBC W/AUTO DIFF WBC: CPT

## 2021-12-29 PROCEDURE — 82607 VITAMIN B-12: CPT

## 2022-01-12 NOTE — ED ADULT TRIAGE NOTE - NSWEIGHTCALCTOOLDRUG_GEN_A_CORE
BREAK RN: Patient is a 76-year-old male, alert and oriented at present arrives as a notification was on floor of bathroom X 3 days, daughter with patient, pt lives alone and with altered mental status. Pt with multiple wounds to body. 5X4cm unstagable to R knee. 5X3cm stage III to L knee and quarter sized wound to L upper arm. Tachycardic on the monitor. USIV to R AC. 20 G to L hand. MD Wyman at bedside. juno RN at bedside. Will cont. to monitor.
 used

## 2022-03-06 ENCOUNTER — INPATIENT (INPATIENT)
Facility: HOSPITAL | Age: 87
LOS: 4 days | Discharge: HOME | DRG: 202 | End: 2022-03-11
Attending: INTERNAL MEDICINE | Admitting: INTERNAL MEDICINE
Payer: COMMERCIAL

## 2022-03-06 VITALS
HEART RATE: 64 BPM | OXYGEN SATURATION: 96 % | HEIGHT: 62 IN | WEIGHT: 139.99 LBS | DIASTOLIC BLOOD PRESSURE: 73 MMHG | SYSTOLIC BLOOD PRESSURE: 204 MMHG | RESPIRATION RATE: 18 BRPM | TEMPERATURE: 99 F

## 2022-03-06 DIAGNOSIS — Z95.0 PRESENCE OF CARDIAC PACEMAKER: Chronic | ICD-10-CM

## 2022-03-06 PROCEDURE — 99285 EMERGENCY DEPT VISIT HI MDM: CPT

## 2022-03-06 RX ORDER — IPRATROPIUM/ALBUTEROL SULFATE 18-103MCG
3 AEROSOL WITH ADAPTER (GRAM) INHALATION ONCE
Refills: 0 | Status: COMPLETED | OUTPATIENT
Start: 2022-03-06 | End: 2022-03-06

## 2022-03-07 DIAGNOSIS — R05.9 COUGH, UNSPECIFIED: ICD-10-CM

## 2022-03-07 DIAGNOSIS — Z29.9 ENCOUNTER FOR PROPHYLACTIC MEASURES, UNSPECIFIED: ICD-10-CM

## 2022-03-07 DIAGNOSIS — E78.5 HYPERLIPIDEMIA, UNSPECIFIED: ICD-10-CM

## 2022-03-07 DIAGNOSIS — J45.901 UNSPECIFIED ASTHMA WITH (ACUTE) EXACERBATION: ICD-10-CM

## 2022-03-07 DIAGNOSIS — I50.9 HEART FAILURE, UNSPECIFIED: ICD-10-CM

## 2022-03-07 DIAGNOSIS — I48.91 UNSPECIFIED ATRIAL FIBRILLATION: ICD-10-CM

## 2022-03-07 DIAGNOSIS — J20.9 ACUTE BRONCHITIS, UNSPECIFIED: ICD-10-CM

## 2022-03-07 DIAGNOSIS — I10 ESSENTIAL (PRIMARY) HYPERTENSION: ICD-10-CM

## 2022-03-07 DIAGNOSIS — I16.0 HYPERTENSIVE URGENCY: ICD-10-CM

## 2022-03-07 LAB
ALBUMIN SERPL ELPH-MCNC: 3.5 G/DL — SIGNIFICANT CHANGE UP (ref 3.5–5)
ALP SERPL-CCNC: 53 U/L — SIGNIFICANT CHANGE UP (ref 40–120)
ALT FLD-CCNC: 23 U/L DA — SIGNIFICANT CHANGE UP (ref 10–60)
ANION GAP SERPL CALC-SCNC: 7 MMOL/L — SIGNIFICANT CHANGE UP (ref 5–17)
AST SERPL-CCNC: 26 U/L — SIGNIFICANT CHANGE UP (ref 10–40)
BASOPHILS # BLD AUTO: 0.09 K/UL — SIGNIFICANT CHANGE UP (ref 0–0.2)
BASOPHILS NFR BLD AUTO: 1.4 % — SIGNIFICANT CHANGE UP (ref 0–2)
BILIRUB SERPL-MCNC: 0.4 MG/DL — SIGNIFICANT CHANGE UP (ref 0.2–1.2)
BUN SERPL-MCNC: 16 MG/DL — SIGNIFICANT CHANGE UP (ref 7–18)
CALCIUM SERPL-MCNC: 9.5 MG/DL — SIGNIFICANT CHANGE UP (ref 8.4–10.5)
CHLORIDE SERPL-SCNC: 102 MMOL/L — SIGNIFICANT CHANGE UP (ref 96–108)
CO2 SERPL-SCNC: 30 MMOL/L — SIGNIFICANT CHANGE UP (ref 22–31)
CREAT SERPL-MCNC: 1.21 MG/DL — SIGNIFICANT CHANGE UP (ref 0.5–1.3)
EGFR: 42 ML/MIN/1.73M2 — LOW
EOSINOPHIL # BLD AUTO: 1.07 K/UL — HIGH (ref 0–0.5)
EOSINOPHIL NFR BLD AUTO: 16.9 % — HIGH (ref 0–6)
GLUCOSE BLDC GLUCOMTR-MCNC: 140 MG/DL — HIGH (ref 70–99)
GLUCOSE BLDC GLUCOMTR-MCNC: 163 MG/DL — HIGH (ref 70–99)
GLUCOSE BLDC GLUCOMTR-MCNC: 169 MG/DL — HIGH (ref 70–99)
GLUCOSE BLDC GLUCOMTR-MCNC: 200 MG/DL — HIGH (ref 70–99)
GLUCOSE SERPL-MCNC: 109 MG/DL — HIGH (ref 70–99)
HCT VFR BLD CALC: 34.3 % — LOW (ref 34.5–45)
HGB BLD-MCNC: 11.4 G/DL — LOW (ref 11.5–15.5)
IMM GRANULOCYTES NFR BLD AUTO: 0.5 % — SIGNIFICANT CHANGE UP (ref 0–1.5)
LYMPHOCYTES # BLD AUTO: 1.13 K/UL — SIGNIFICANT CHANGE UP (ref 1–3.3)
LYMPHOCYTES # BLD AUTO: 17.9 % — SIGNIFICANT CHANGE UP (ref 13–44)
MAGNESIUM SERPL-MCNC: 2.4 MG/DL — SIGNIFICANT CHANGE UP (ref 1.6–2.6)
MCHC RBC-ENTMCNC: 32.6 PG — SIGNIFICANT CHANGE UP (ref 27–34)
MCHC RBC-ENTMCNC: 33.2 GM/DL — SIGNIFICANT CHANGE UP (ref 32–36)
MCV RBC AUTO: 98 FL — SIGNIFICANT CHANGE UP (ref 80–100)
MONOCYTES # BLD AUTO: 0.96 K/UL — HIGH (ref 0–0.9)
MONOCYTES NFR BLD AUTO: 15.2 % — HIGH (ref 2–14)
NEUTROPHILS # BLD AUTO: 3.04 K/UL — SIGNIFICANT CHANGE UP (ref 1.8–7.4)
NEUTROPHILS NFR BLD AUTO: 48.1 % — SIGNIFICANT CHANGE UP (ref 43–77)
NRBC # BLD: 0 /100 WBCS — SIGNIFICANT CHANGE UP (ref 0–0)
NT-PROBNP SERPL-SCNC: 4107 PG/ML — HIGH (ref 0–450)
PLATELET # BLD AUTO: 143 K/UL — LOW (ref 150–400)
POTASSIUM SERPL-MCNC: 4.2 MMOL/L — SIGNIFICANT CHANGE UP (ref 3.5–5.3)
POTASSIUM SERPL-SCNC: 4.2 MMOL/L — SIGNIFICANT CHANGE UP (ref 3.5–5.3)
PROCALCITONIN SERPL-MCNC: 0.05 NG/ML — SIGNIFICANT CHANGE UP (ref 0.02–0.1)
PROT SERPL-MCNC: 6.9 G/DL — SIGNIFICANT CHANGE UP (ref 6–8.3)
RAPID RVP RESULT: SIGNIFICANT CHANGE UP
RBC # BLD: 3.5 M/UL — LOW (ref 3.8–5.2)
RBC # FLD: 12.6 % — SIGNIFICANT CHANGE UP (ref 10.3–14.5)
SARS-COV-2 RNA SPEC QL NAA+PROBE: SIGNIFICANT CHANGE UP
SARS-COV-2 RNA SPEC QL NAA+PROBE: SIGNIFICANT CHANGE UP
SODIUM SERPL-SCNC: 139 MMOL/L — SIGNIFICANT CHANGE UP (ref 135–145)
TROPONIN I, HIGH SENSITIVITY RESULT: 17.9 NG/L — SIGNIFICANT CHANGE UP
WBC # BLD: 6.32 K/UL — SIGNIFICANT CHANGE UP (ref 3.8–10.5)
WBC # FLD AUTO: 6.32 K/UL — SIGNIFICANT CHANGE UP (ref 3.8–10.5)

## 2022-03-07 PROCEDURE — 71250 CT THORAX DX C-: CPT | Mod: 26

## 2022-03-07 PROCEDURE — 93010 ELECTROCARDIOGRAM REPORT: CPT

## 2022-03-07 PROCEDURE — 71045 X-RAY EXAM CHEST 1 VIEW: CPT | Mod: 26

## 2022-03-07 RX ORDER — LATANOPROST 0.05 MG/ML
1 SOLUTION/ DROPS OPHTHALMIC; TOPICAL AT BEDTIME
Refills: 0 | Status: DISCONTINUED | OUTPATIENT
Start: 2022-03-07 | End: 2022-03-11

## 2022-03-07 RX ORDER — ALBUTEROL 90 UG/1
2.5 AEROSOL, METERED ORAL ONCE
Refills: 0 | Status: DISCONTINUED | OUTPATIENT
Start: 2022-03-07 | End: 2022-03-07

## 2022-03-07 RX ORDER — DORZOLAMIDE HYDROCHLORIDE TIMOLOL MALEATE 20; 5 MG/ML; MG/ML
1 SOLUTION/ DROPS OPHTHALMIC
Refills: 0 | Status: DISCONTINUED | OUTPATIENT
Start: 2022-03-07 | End: 2022-03-11

## 2022-03-07 RX ORDER — METOPROLOL TARTRATE 50 MG
100 TABLET ORAL
Refills: 0 | Status: DISCONTINUED | OUTPATIENT
Start: 2022-03-07 | End: 2022-03-07

## 2022-03-07 RX ORDER — DRONEDARONE 400 MG/1
400 TABLET, FILM COATED ORAL
Refills: 0 | Status: DISCONTINUED | OUTPATIENT
Start: 2022-03-07 | End: 2022-03-11

## 2022-03-07 RX ORDER — IPRATROPIUM/ALBUTEROL SULFATE 18-103MCG
3 AEROSOL WITH ADAPTER (GRAM) INHALATION ONCE
Refills: 0 | Status: DISCONTINUED | OUTPATIENT
Start: 2022-03-07 | End: 2022-03-07

## 2022-03-07 RX ORDER — ALBUTEROL 90 UG/1
2 AEROSOL, METERED ORAL EVERY 6 HOURS
Refills: 0 | Status: DISCONTINUED | OUTPATIENT
Start: 2022-03-07 | End: 2022-03-09

## 2022-03-07 RX ORDER — ASPIRIN/CALCIUM CARB/MAGNESIUM 324 MG
81 TABLET ORAL DAILY
Refills: 0 | Status: DISCONTINUED | OUTPATIENT
Start: 2022-03-07 | End: 2022-03-11

## 2022-03-07 RX ORDER — FERROUS SULFATE 325(65) MG
325 TABLET ORAL DAILY
Refills: 0 | Status: DISCONTINUED | OUTPATIENT
Start: 2022-03-07 | End: 2022-03-11

## 2022-03-07 RX ORDER — ALBUTEROL 90 UG/1
2.5 AEROSOL, METERED ORAL ONCE
Refills: 0 | Status: COMPLETED | OUTPATIENT
Start: 2022-03-07 | End: 2022-03-07

## 2022-03-07 RX ORDER — HYDRALAZINE HCL 50 MG
100 TABLET ORAL ONCE
Refills: 0 | Status: COMPLETED | OUTPATIENT
Start: 2022-03-07 | End: 2022-03-07

## 2022-03-07 RX ORDER — SIMVASTATIN 20 MG/1
40 TABLET, FILM COATED ORAL AT BEDTIME
Refills: 0 | Status: DISCONTINUED | OUTPATIENT
Start: 2022-03-07 | End: 2022-03-11

## 2022-03-07 RX ORDER — METOPROLOL TARTRATE 50 MG
100 TABLET ORAL
Refills: 0 | Status: DISCONTINUED | OUTPATIENT
Start: 2022-03-07 | End: 2022-03-11

## 2022-03-07 RX ORDER — PANTOPRAZOLE SODIUM 20 MG/1
40 TABLET, DELAYED RELEASE ORAL
Refills: 0 | Status: DISCONTINUED | OUTPATIENT
Start: 2022-03-07 | End: 2022-03-11

## 2022-03-07 RX ORDER — INFLUENZA VIRUS VACCINE 15; 15; 15; 15 UG/.5ML; UG/.5ML; UG/.5ML; UG/.5ML
0.7 SUSPENSION INTRAMUSCULAR ONCE
Refills: 0 | Status: DISCONTINUED | OUTPATIENT
Start: 2022-03-07 | End: 2022-03-11

## 2022-03-07 RX ORDER — HYDRALAZINE HCL 50 MG
100 TABLET ORAL THREE TIMES A DAY
Refills: 0 | Status: DISCONTINUED | OUTPATIENT
Start: 2022-03-07 | End: 2022-03-09

## 2022-03-07 RX ORDER — INSULIN LISPRO 100/ML
VIAL (ML) SUBCUTANEOUS
Refills: 0 | Status: DISCONTINUED | OUTPATIENT
Start: 2022-03-07 | End: 2022-03-11

## 2022-03-07 RX ORDER — ISOSORBIDE MONONITRATE 60 MG/1
60 TABLET, EXTENDED RELEASE ORAL DAILY
Refills: 0 | Status: DISCONTINUED | OUTPATIENT
Start: 2022-03-07 | End: 2022-03-09

## 2022-03-07 RX ORDER — HEPARIN SODIUM 5000 [USP'U]/ML
5000 INJECTION INTRAVENOUS; SUBCUTANEOUS EVERY 12 HOURS
Refills: 0 | Status: DISCONTINUED | OUTPATIENT
Start: 2022-03-07 | End: 2022-03-11

## 2022-03-07 RX ORDER — FUROSEMIDE 40 MG
20 TABLET ORAL ONCE
Refills: 0 | Status: COMPLETED | OUTPATIENT
Start: 2022-03-07 | End: 2022-03-07

## 2022-03-07 RX ORDER — ISOSORBIDE MONONITRATE 60 MG/1
1 TABLET, EXTENDED RELEASE ORAL
Qty: 0 | Refills: 0 | DISCHARGE

## 2022-03-07 RX ADMIN — Medication 20 MILLIGRAM(S): at 06:24

## 2022-03-07 RX ADMIN — Medication 100 MILLIGRAM(S): at 21:31

## 2022-03-07 RX ADMIN — LATANOPROST 1 DROP(S): 0.05 SOLUTION/ DROPS OPHTHALMIC; TOPICAL at 21:32

## 2022-03-07 RX ADMIN — DORZOLAMIDE HYDROCHLORIDE TIMOLOL MALEATE 1 DROP(S): 20; 5 SOLUTION/ DROPS OPHTHALMIC at 21:31

## 2022-03-07 RX ADMIN — Medication 40 MILLIGRAM(S): at 22:52

## 2022-03-07 RX ADMIN — ALBUTEROL 2.5 MILLIGRAM(S): 90 AEROSOL, METERED ORAL at 03:21

## 2022-03-07 RX ADMIN — HEPARIN SODIUM 5000 UNIT(S): 5000 INJECTION INTRAVENOUS; SUBCUTANEOUS at 06:24

## 2022-03-07 RX ADMIN — Medication 325 MILLIGRAM(S): at 13:33

## 2022-03-07 RX ADMIN — Medication 125 MILLIGRAM(S): at 03:20

## 2022-03-07 RX ADMIN — Medication 1: at 19:27

## 2022-03-07 RX ADMIN — DORZOLAMIDE HYDROCHLORIDE TIMOLOL MALEATE 1 DROP(S): 20; 5 SOLUTION/ DROPS OPHTHALMIC at 06:24

## 2022-03-07 RX ADMIN — Medication 100 MILLIGRAM(S): at 06:24

## 2022-03-07 RX ADMIN — Medication 40 MILLIGRAM(S): at 13:31

## 2022-03-07 RX ADMIN — PANTOPRAZOLE SODIUM 40 MILLIGRAM(S): 20 TABLET, DELAYED RELEASE ORAL at 06:24

## 2022-03-07 RX ADMIN — DRONEDARONE 400 MILLIGRAM(S): 400 TABLET, FILM COATED ORAL at 06:24

## 2022-03-07 RX ADMIN — Medication 1: at 12:50

## 2022-03-07 RX ADMIN — ISOSORBIDE MONONITRATE 60 MILLIGRAM(S): 60 TABLET, EXTENDED RELEASE ORAL at 13:32

## 2022-03-07 RX ADMIN — HEPARIN SODIUM 5000 UNIT(S): 5000 INJECTION INTRAVENOUS; SUBCUTANEOUS at 21:31

## 2022-03-07 RX ADMIN — SIMVASTATIN 40 MILLIGRAM(S): 20 TABLET, FILM COATED ORAL at 21:30

## 2022-03-07 RX ADMIN — Medication 81 MILLIGRAM(S): at 13:32

## 2022-03-07 RX ADMIN — Medication 100 MILLIGRAM(S): at 13:32

## 2022-03-07 RX ADMIN — Medication 100 MILLIGRAM(S): at 03:20

## 2022-03-07 RX ADMIN — ALBUTEROL 2.5 MILLIGRAM(S): 90 AEROSOL, METERED ORAL at 14:42

## 2022-03-07 RX ADMIN — Medication 3 MILLILITER(S): at 00:46

## 2022-03-07 RX ADMIN — DRONEDARONE 400 MILLIGRAM(S): 400 TABLET, FILM COATED ORAL at 21:30

## 2022-03-07 NOTE — H&P ADULT - PROBLEM SELECTOR PLAN 2
- presented with 204/73, now 188/73 s/p 1 dose of hydralazine in the ED  - denies any HA, cp, visual changes or shortness of breath  - will resume home medication of Imdur, hydralazine and metoprolol with holding parameters   - DASH and fluid restricted diet  - Monitor BP

## 2022-03-07 NOTE — ED PROVIDER NOTE - PHYSICAL EXAMINATION
Mild distress. Heart regular rate and rhythm. Lungs have mild diffused expiratory wheezing. No pitting edema in lower extremities.

## 2022-03-07 NOTE — H&P ADULT - CONVERSATION DETAILS
Patient and her daughter were at bedside. Tanya Palencia states that she does not want resuscitation in the form of CPR for her mother. Patient is DNR/DNI.    MOLST is in the chart.

## 2022-03-07 NOTE — ED ADULT NURSE NOTE - ED STAT RN HANDOFF DETAILS 3
Patient admitted to medicine in no acute distress, assisted to 619 report given to Maricruz Munson . Patient to be transported to unit via stretcher family at bedside safety maintained.

## 2022-03-07 NOTE — CONSULT NOTE ADULT - PROBLEM SELECTOR RECOMMENDATION 9
most likely cause of cough  oxygen supp prn  Bronchodilators  Steroids  montelukast 10 mgs po Qhs  Pfts as OP

## 2022-03-07 NOTE — ED PROVIDER NOTE - OBJECTIVE STATEMENT
89 y/o female with a past medical history of hypertension, CHF and has a pacemaker in place presents with 3 days of cough and shortness of breath. Patient reports she was seen at [facility] for her cough, was told it was due to a viral illness and was discharged home. Her daughter gave her albuterol nebulizer treatment every 4-5 hours with last treatment given 45 minuted prior to presentation with reported cough and wheezing so she called EMS. Patient denies any fever, vomiting, diarrhea, chest pain and leg swelling. Patient reports that she does take Lasix 20mg every other day with last given in the last 24 hours. Patient is currently taking prescribed cough medication but is unable to recall the name. She denies any smoking currently and in the past and denies any history of asthma or COPD. NKDA.

## 2022-03-07 NOTE — CONSULT NOTE ADULT - SUBJECTIVE AND OBJECTIVE BOX
PULMONARY CONSULT NOTE      CLAUDETTE GARCES  MRN-[id number]    History of Present Illness:  Reason for Admission: Cough  History of Present Illness:   Patient is a 89 y/o female, from home, minimally ambulatory with assistance, with PMHx of HTN, HFpEF (Grade II DD),Parox Afib (not on AC due to GIB),CRI, Complete Heart Block s/p PPM, HLD, and Dementia who presented with complaints of cough for the last three days. Patient  is Upper sorbian speaking and is accompanied by her daughter who provides translation. Patient has non productive cough. Denies any fevers or chills. Daughter states that she was seen at [facility] for her cough, was told it was due to a viral illness and was discharged home. Her daughter gave her albuterol nebulizer treatment with no resolution of the cough or wheezing. Denies any leg swelling, cp or shortness of breath. Patient's daughter states she was sick with the cough and mother may have gotten something from her.         HISTORY OF PRESENT ILLNESS: As Above. Awake, alert, comfortable in bed in NAD    MEDICATIONS  (STANDING):  aspirin  chewable 81 milliGRAM(s) Oral daily  dorzolamide 2%/timolol 0.5% Ophthalmic Solution 1 Drop(s) Both EYES two times a day  dronedarone 400 milliGRAM(s) Oral two times a day  ferrous    sulfate 325 milliGRAM(s) Oral daily  heparin   Injectable 5000 Unit(s) SubCutaneous every 12 hours  hydrALAZINE 100 milliGRAM(s) Oral three times a day  insulin lispro (ADMELOG) corrective regimen sliding scale   SubCutaneous three times a day before meals  isosorbide   mononitrate ER Tablet (IMDUR) 60 milliGRAM(s) Oral daily  latanoprost 0.005% Ophthalmic Solution 1 Drop(s) Both EYES at bedtime  methylPREDNISolone sodium succinate Injectable 40 milliGRAM(s) IV Push every 8 hours  metoprolol tartrate 100 milliGRAM(s) Oral two times a day  pantoprazole    Tablet 40 milliGRAM(s) Oral before breakfast  simvastatin 40 milliGRAM(s) Oral at bedtime      MEDICATIONS  (PRN):  ALBUTerol    90 MICROgram(s) HFA Inhaler 2 Puff(s) Inhalation every 6 hours PRN Wheezing  benzonatate 100 milliGRAM(s) Oral three times a day PRN Cough      Allergies    No Known Allergies    Intolerances        PAST MEDICAL & SURGICAL HISTORY:  HTN (hypertension)    Hypercholesterolemia    Atrial fibrillation    History of complete heart block    Pulmonary HTN    CHF (congestive heart failure)    Artificial pacemaker        FAMILY HISTORY:  FH: hypertension    FH: coronary artery disease        SOCIAL HISTORY  Smoking History:     REVIEW OF SYSTEMS:    CONSTITUTIONAL:  No fevers, chills, sweats    HEENT:  Eyes:  No diplopia or blurred vision. ENT:  No earache, sore throat or runny nose.    CARDIOVASCULAR:  No pressure, squeezing, tightness, or heaviness about the chest; no palpitations.    RESPIRATORY:  Per HPI    GASTROINTESTINAL:  No abdominal pain, nausea, vomiting or diarrhea.    GENITOURINARY:  No dysuria, frequency or urgency.    NEUROLOGIC:  No paresthesias, fasciculations, seizures or weakness.    PSYCHIATRIC:  No disorder of thought or mood.    Vital Signs Last 24 Hrs  T(C): 36.6 (07 Mar 2022 11:22), Max: 37.2 (06 Mar 2022 23:46)  T(F): 97.9 (07 Mar 2022 11:22), Max: 99 (06 Mar 2022 23:46)  HR: 60 (07 Mar 2022 11:22) (60 - 64)  BP: 149/64 (07 Mar 2022 11:22) (148/73 - 204/73)  BP(mean): --  RR: 18 (07 Mar 2022 11:22) (18 - 18)  SpO2: 94% (07 Mar 2022 11:22) (94% - 96%)  I&O's Detail      PHYSICAL EXAMINATION:    GENERAL: The patient is a well-developed, well-nourished _____in no apparent distress.     HEENT: Head is normocephalic and atraumatic. Extraocular muscles are intact. Mucous membranes are moist.     NECK: Supple.     LUNGS: Clear to auscultation without wheezing, rales, or rhonchi. Respirations unlabored    HEART: Regular rate and rhythm without murmur.    ABDOMEN: Soft, nontender, and nondistended.  No hepatosplenomegaly is noted.    EXTREMITIES: Without any cyanosis, clubbing, rash, lesions or edema.    NEUROLOGIC: Grossly intact.      LABS:                        11.4   6.32  )-----------( 143      ( 07 Mar 2022 00:31 )             34.3     03-07    139  |  102  |  16  ----------------------------<  109<H>  4.2   |  30  |  1.21    Ca    9.5      07 Mar 2022 00:31  Mg     2.4     03-07    TPro  6.9  /  Alb  3.5  /  TBili  0.4  /  DBili  x   /  AST  26  /  ALT  23  /  AlkPhos  53  03-07                Serum Pro-Brain Natriuretic Peptide: 4107 pg/mL (03-07-22 @ 00:31)      Procalcitonin, Serum: 0.05 ng/mL (03-07-22 @ 09:44)      MICROBIOLOGY:    RADIOLOGY & ADDITIONAL STUDIES:    CXR:  < from: Xray Chest 1 View-PORTABLE IMMEDIATE (03.07.22 @ 00:50) >  IMPRESSION: No evidence for focal infiltrate or lobar consolidation.    < end of copied text >  < from: CT Chest No Cont (03.07.22 @ 10:12) >  IMPRESSION:    No pneumonia.      < end of copied text >    Ct scan chest;    ekg;    echo:

## 2022-03-07 NOTE — PROGRESS NOTE ADULT - SUBJECTIVE AND OBJECTIVE BOX
Patient is a 89y old  Female who presents with a chief complaint of Cough (07 Mar 2022 12:37)    INTERVAL HPI/OVERNIGHT EVENTS: No aucte events overnight    REVIEW OF SYSTEMS:  CONSTITUTIONAL: No fever, chills  ENMT:  No difficulty hearing, no change in vision  NECK: No pain or stiffness  RESPIRATORY: No cough, SOB  CARDIOVASCULAR: No chest pain, palpitations  GASTROINTESTINAL: No abdominal pain. No nausea, vomiting, or diarrhea  GENITOURINARY: No dysuria  NEUROLOGICAL: No HA  SKIN: No itching, burning, rashes, or lesions   LYMPH NODES: No enlarged glands  ENDOCRINE: No heat or cold intolerance; No hair loss  MUSCULOSKELETAL: No joint pain or swelling; No muscle, back, or extremity pain  PSYCHIATRIC: No depression, anxiety  HEME/LYMPH: No easy bruising, or bleeding gums    T(C): 36.5 (03-07-22 @ 15:47), Max: 37.2 (03-06-22 @ 23:46)  HR: 71 (03-07-22 @ 15:47) (60 - 71)  BP: 154/71 (03-07-22 @ 15:47) (148/73 - 204/73)  RR: 18 (03-07-22 @ 15:47) (18 - 18)  SpO2: 95% (03-07-22 @ 15:47) (94% - 96%)  Wt(kg): --Vital Signs Last 24 Hrs  T(C): 36.5 (07 Mar 2022 15:47), Max: 37.2 (06 Mar 2022 23:46)  T(F): 97.7 (07 Mar 2022 15:47), Max: 99 (06 Mar 2022 23:46)  HR: 71 (07 Mar 2022 15:47) (60 - 71)  BP: 154/71 (07 Mar 2022 15:47) (148/73 - 204/73)  BP(mean): --  RR: 18 (07 Mar 2022 15:47) (18 - 18)  SpO2: 95% (07 Mar 2022 15:47) (94% - 96%)    MEDICATIONS  (STANDING):  aspirin  chewable 81 milliGRAM(s) Oral daily  dorzolamide 2%/timolol 0.5% Ophthalmic Solution 1 Drop(s) Both EYES two times a day  dronedarone 400 milliGRAM(s) Oral two times a day  ferrous    sulfate 325 milliGRAM(s) Oral daily  heparin   Injectable 5000 Unit(s) SubCutaneous every 12 hours  hydrALAZINE 100 milliGRAM(s) Oral three times a day  insulin lispro (ADMELOG) corrective regimen sliding scale   SubCutaneous three times a day before meals  isosorbide   mononitrate ER Tablet (IMDUR) 60 milliGRAM(s) Oral daily  latanoprost 0.005% Ophthalmic Solution 1 Drop(s) Both EYES at bedtime  methylPREDNISolone sodium succinate Injectable 40 milliGRAM(s) IV Push every 8 hours  metoprolol tartrate 100 milliGRAM(s) Oral two times a day  pantoprazole    Tablet 40 milliGRAM(s) Oral before breakfast  simvastatin 40 milliGRAM(s) Oral at bedtime    MEDICATIONS  (PRN):  ALBUTerol    90 MICROgram(s) HFA Inhaler 2 Puff(s) Inhalation every 6 hours PRN Wheezing  benzonatate 100 milliGRAM(s) Oral three times a day PRN Cough      PHYSICAL EXAM:  GENERAL: NAD  EYES: clear conjunctiva; EOMI  ENMT: Moist mucous membranes  NECK: Supple, No JVD, Normal thyroid  CHEST/LUNG: Clear to auscultation bilaterally; No rales, rhonchi, wheezing, or rubs  HEART: S1, S2, Regular rate and rhythm  ABDOMEN: Soft, Nontender, Nondistended; Bowel sounds present  NEURO: Alert & Oriented X3  EXTREMITIES: BLE redness and mild edema   SKIN: No rashes or lesions    Consultant(s) Notes Reviewed:  [x ] YES  [ ] NO  Care Discussed with Consultants/Other Providers [ x] YES  [ ] NO    LABS:                        11.4   6.32  )-----------( 143      ( 07 Mar 2022 00:31 )             34.3     03-07    139  |  102  |  16  ----------------------------<  109<H>  4.2   |  30  |  1.21    Ca    9.5      07 Mar 2022 00:31  Mg     2.4     03-07    TPro  6.9  /  Alb  3.5  /  TBili  0.4  /  DBili  x   /  AST  26  /  ALT  23  /  AlkPhos  53  03-07      CAPILLARY BLOOD GLUCOSE      POCT Blood Glucose.: 163 mg/dL (07 Mar 2022 11:42)  POCT Blood Glucose.: 140 mg/dL (07 Mar 2022 07:50)    RADIOLOGY & ADDITIONAL TESTS:    Imaging Personally Reviewed:  [ x] YES  [ ] NO  < from: CT Chest No Cont (03.07.22 @ 10:12) >  ACC: [id number] EXAM:  CT CHEST                          PROCEDURE DATE:  03/07/2022          INTERPRETATION:  INDICATION: Pneumonia    TECHNIQUE: Volumetric images of the chest without intravenous contrast.   Maximum intensity projection images weregenerated.    COMPARISON: 12/15/2021.    FINDINGS:    LUNGS/AIRWAYS/PLEURA: No endobronchial lesion. No bronchiectasis. Mild   air trapping in both lungs. Stable mild subpleural scarring in the right   apex. Unremarkable pleura.    LYMPH NODES/MEDIASTINUM: Stable enlarged multinodular thyroid. No   enlarged lymph nodes.    HEART/VASCULATURE: Enlarged left atrium. No pericardial effusion. Leads   within the right atrium and right ventricle. Mildly calcified coronary   arteries and aorta. Normal caliber aorta and main pulmonary artery.    UPPER ABDOMEN: Stable too small to characterize hypodensity in the left   hepatic lobe. Partially included left kidney cyst.    BONES/SOFT TISSUES: Degenerative changes of the spine.      IMPRESSION:    No pneumonia.    --- End of Report ---            CHARBEL ANNA M.D., ATTENDING RADIOGIST  This document has been electronically signed. Mar  7 2022 11:27AM    < end of copied text >  < from: Xray Chest 1 View-PORTABLE IMMEDIATE (03.07.22 @ 00:50) >    ACC: [id number] EXAM:  XR CHEST PORTABLE IMMED 1V                          PROCEDURE DATE:  03/07/2022          INTERPRETATION:  INDICATION: shortness of breath    PRIORS: December 10, 2021    VIEWS: Portable AP radiography of the chest performed.    FINDINGS: Heart size appears within normal limits. Note is made of an   indwelling pacemaker. No hilar or superior mediastinal abnormalities are   identified. There is no evidence for focal infiltrate, lobar   consolidation or pulmonary edema. No pleural effusion or pneumothorax is   demonstrated. No mediastinal shift is noted. The visualized osseous   structures appear unremarkable.    IMPRESSION: No evidence for focal infiltrate or lobar consolidation.    --- End of Report ---            BOONE BUTLER; Attending Radiologist  This document has been electronically signed. Mar  7 2022  9:02AM    < end of copied text >     Patient is a 89y old  Female who presents with a chief complaint of Cough (07 Mar 2022 12:37)    INTERVAL HPI/OVERNIGHT EVENTS: No acute events overnight, daughter at bedside     REVIEW OF SYSTEMS: unable to assess due to pt's mental status    T(C): 36.5 (03-07-22 @ 15:47), Max: 37.2 (03-06-22 @ 23:46)  HR: 71 (03-07-22 @ 15:47) (60 - 71)  BP: 154/71 (03-07-22 @ 15:47) (148/73 - 204/73)  RR: 18 (03-07-22 @ 15:47) (18 - 18)  SpO2: 95% (03-07-22 @ 15:47) (94% - 96%)  Wt(kg): --Vital Signs Last 24 Hrs  T(C): 36.5 (07 Mar 2022 15:47), Max: 37.2 (06 Mar 2022 23:46)  T(F): 97.7 (07 Mar 2022 15:47), Max: 99 (06 Mar 2022 23:46)  HR: 71 (07 Mar 2022 15:47) (60 - 71)  BP: 154/71 (07 Mar 2022 15:47) (148/73 - 204/73)  BP(mean): --  RR: 18 (07 Mar 2022 15:47) (18 - 18)  SpO2: 95% (07 Mar 2022 15:47) (94% - 96%)    MEDICATIONS  (STANDING):  aspirin  chewable 81 milliGRAM(s) Oral daily  dorzolamide 2%/timolol 0.5% Ophthalmic Solution 1 Drop(s) Both EYES two times a day  dronedarone 400 milliGRAM(s) Oral two times a day  ferrous    sulfate 325 milliGRAM(s) Oral daily  heparin   Injectable 5000 Unit(s) SubCutaneous every 12 hours  hydrALAZINE 100 milliGRAM(s) Oral three times a day  insulin lispro (ADMELOG) corrective regimen sliding scale   SubCutaneous three times a day before meals  isosorbide   mononitrate ER Tablet (IMDUR) 60 milliGRAM(s) Oral daily  latanoprost 0.005% Ophthalmic Solution 1 Drop(s) Both EYES at bedtime  methylPREDNISolone sodium succinate Injectable 40 milliGRAM(s) IV Push every 8 hours  metoprolol tartrate 100 milliGRAM(s) Oral two times a day  pantoprazole    Tablet 40 milliGRAM(s) Oral before breakfast  simvastatin 40 milliGRAM(s) Oral at bedtime    MEDICATIONS  (PRN):  ALBUTerol    90 MICROgram(s) HFA Inhaler 2 Puff(s) Inhalation every 6 hours PRN Wheezing  benzonatate 100 milliGRAM(s) Oral three times a day PRN Cough      PHYSICAL EXAM:  GENERAL: NAD  EYES: clear conjunctiva; EOMI  ENMT: Moist mucous membranes  NECK: Supple, No JVD, Normal thyroid  CHEST/LUNG: Clear to auscultation bilaterally; No rales, rhonchi, wheezing, or rubs  HEART: S1, S2, Regular rate and rhythm  ABDOMEN: Soft, Nontender, Nondistended; Bowel sounds present  NEURO: Alert & Oriented X3  EXTREMITIES: BLE mild edema   SKIN: No rashes or lesions    Consultant(s) Notes Reviewed:  [x ] YES  [ ] NO  Care Discussed with Consultants/Other Providers [ x] YES  [ ] NO    LABS:                        11.4   6.32  )-----------( 143      ( 07 Mar 2022 00:31 )             34.3     03-07    139  |  102  |  16  ----------------------------<  109<H>  4.2   |  30  |  1.21    Ca    9.5      07 Mar 2022 00:31  Mg     2.4     03-07    TPro  6.9  /  Alb  3.5  /  TBili  0.4  /  DBili  x   /  AST  26  /  ALT  23  /  AlkPhos  53  03-07      CAPILLARY BLOOD GLUCOSE      POCT Blood Glucose.: 163 mg/dL (07 Mar 2022 11:42)  POCT Blood Glucose.: 140 mg/dL (07 Mar 2022 07:50)    RADIOLOGY & ADDITIONAL TESTS:    Imaging Personally Reviewed:  [ x] YES  [ ] NO  < from: CT Chest No Cont (03.07.22 @ 10:12) >  ACC: [id number] EXAM:  CT CHEST                          PROCEDURE DATE:  03/07/2022          INTERPRETATION:  INDICATION: Pneumonia    TECHNIQUE: Volumetric images of the chest without intravenous contrast.   Maximum intensity projection images weregenerated.    COMPARISON: 12/15/2021.    FINDINGS:    LUNGS/AIRWAYS/PLEURA: No endobronchial lesion. No bronchiectasis. Mild   air trapping in both lungs. Stable mild subpleural scarring in the right   apex. Unremarkable pleura.    LYMPH NODES/MEDIASTINUM: Stable enlarged multinodular thyroid. No   enlarged lymph nodes.    HEART/VASCULATURE: Enlarged left atrium. No pericardial effusion. Leads   within the right atrium and right ventricle. Mildly calcified coronary   arteries and aorta. Normal caliber aorta and main pulmonary artery.    UPPER ABDOMEN: Stable too small to characterize hypodensity in the left   hepatic lobe. Partially included left kidney cyst.    BONES/SOFT TISSUES: Degenerative changes of the spine.      IMPRESSION:    No pneumonia.    --- End of Report ---            CHARBEL ANNA M.D., ATTENDING RADIOGIST  This document has been electronically signed. Mar  7 2022 11:27AM    < end of copied text >  < from: Xray Chest 1 View-PORTABLE IMMEDIATE (03.07.22 @ 00:50) >    ACC: [id number] EXAM:  XR CHEST PORTABLE IMMED 1V                          PROCEDURE DATE:  03/07/2022          INTERPRETATION:  INDICATION: shortness of breath    PRIORS: December 10, 2021    VIEWS: Portable AP radiography of the chest performed.    FINDINGS: Heart size appears within normal limits. Note is made of an   indwelling pacemaker. No hilar or superior mediastinal abnormalities are   identified. There is no evidence for focal infiltrate, lobar   consolidation or pulmonary edema. No pleural effusion or pneumothorax is   demonstrated. No mediastinal shift is noted. The visualized osseous   structures appear unremarkable.    IMPRESSION: No evidence for focal infiltrate or lobar consolidation.    --- End of Report ---            BOONE BUTLER; Attending Radiologist  This document has been electronically signed. Mar  7 2022  9:02AM    < end of copied text >

## 2022-03-07 NOTE — H&P ADULT - NSHPSOCIALHISTORY_GEN_ALL_CORE
Patient lives with daughter. At baseline, patient needs help to perform her activities of daily living. Denies ETOH, illicit drug use or tobacco use.

## 2022-03-07 NOTE — H&P ADULT - ATTENDING COMMENTS
89 y/o female, from home, minimally ambulatory with assistance, with PMHx of HTN, HFpEF (Grade II DD),Parox Afib (not on AC due to GIB),CRI, Complete Heart Block s/p PPM, HLD, and Dementia who presented with complaints of cough for the last three days.  1.CT chest.  2.Pulm eval.  3.Acute bronchitis-abx,steroids.  4.PAF-multaq,asa,lipressor  5.HTN-cont bp medication.  6.Lipid d/o.  7.CRI-f/u lytes.  8.GI and DVT prophylaxis.

## 2022-03-07 NOTE — ED ADULT NURSE NOTE - ED STAT RN HANDOFF DETAILS 4
Patient admitted no longer going to [address] [place] [address]. Report give to Beth . Patient to be transported to floor via stretcher by transporter stable in no acute distress safety maintained.

## 2022-03-07 NOTE — H&P ADULT - ASSESSMENT
Patient is 89 y/o female, from home, minimally ambulatory with assistance, with PMHx of HTN, HFpEF (Grade II DD),Parox Afib (not on AC due to GIB),CRI, Complete Heart Block s/p PPM, HLD, and Dementia who presented with complaints of cough for the last three days. Upon evaluation patient saturating well on room air, with no respiratory distress noted. Patient noted to be wheezing in all lung fields, with trace edema on LE. Patient admitted for cough 2/2 bronchitis vs Acute on chronic CHF exacerbation.

## 2022-03-07 NOTE — H&P ADULT - PROBLEM SELECTOR PLAN 1
- patient presenting with cough, non productive for the last three days   - on exam noted to have expiratory wheezing and trace pitting edema   - DDX: Bronchitis vs Acute on Chronic CHF exacerbation   - RVP and COVID negative, BNP noted to be 4k   - cxr showing no infiltrates (pending official read)   - s/p 1 dose of solumedrol in ED   - continue with solumedrol q 8hrs  - albuterol and tessalon pearle for cough   - strict I/O   - daily weights  - will give 1 dose of lasix 20mg IV - patient presenting with cough, non productive for the last three days   - on exam noted to have expiratory wheezing and trace pitting edema   - DDX: Bronchitis vs Acute on Chronic CHF exacerbation   - RVP and COVID negative, BNP noted to be 4k   - cxr showing no infiltrates (pending official read)   - s/p 1 dose of solumedrol in ED   - continue with solumedrol q 8hrs  - albuterol and tessalon pearle for cough   - strict I/O   - daily weights  - will give 1 dose of lasix 20mg IV  - Pulmonology Dr. Solano consulted

## 2022-03-07 NOTE — ED PROVIDER NOTE - CLINICAL SUMMARY MEDICAL DECISION MAKING FREE TEXT BOX
89 y/o female with a past medical history of hypertension, CHF and has a pacemaker in place presents with 3 days of cough and shortness of breath. Will obtain EKG, labs, chest x-ray, given Duo nab and will reassess. 89 y/o female with a past medical history of hypertension, CHF and has a pacemaker in place presents with 3 days of cough and shortness of breath. Will obtain EKG, labs, chest x-ray, given Duoneb and will reassess.  ekg shows paced rhythm, trop wnl, proBNP >4K, CXR shows no focal infiltrate  discussed above with patient and daughter at bedside, on reeval mild persistent wheezing, will given 2nd neb and IV solumedrol for likely bronchitis, BP elevated given home dose of hydralazine. patient and family agree with plan for admission.

## 2022-03-07 NOTE — H&P ADULT - NSHPPHYSICALEXAM_GEN_ALL_CORE
PHYSICAL EXAM:  GENERAL: NAD, speaks in full sentences, no signs of respiratory distress, saturating well on room air   HEAD:  Atraumatic, Normocephalic  EYES: EOMI, PERRLA, conjunctiva and sclera clear  NECK: Supple, No JVD  CHEST/LUNG: Expiratory wheezes heard in anterior and posterior lung fields b/l No crackles; No accessory muscles used  HEART: Regular rate and rhythm; Grade III/VI systolic murmur in upper right sternal border   ABDOMEN: Soft, Nontender, Nondistended; Bowel sounds present; No guarding  EXTREMITIES:  2+ Peripheral Pulses, No cyanosis noted. Trace edema noted on Left lower extremity   PSYCH: AAOx3  NEUROLOGY: non-focal  SKIN: No rashes or lesions noted

## 2022-03-07 NOTE — ED ADULT NURSE NOTE - OBJECTIVE STATEMENT
Pt presents to the ED with c/o cough and SOB x 3 days. As per daughter, she gave albuterol treatments at home today without improvement. Pt denied fever, nausea, vomiting, or chest pain.

## 2022-03-07 NOTE — H&P ADULT - PROBLEM SELECTOR PLAN 3
- Has hx of Diastolic heart failure   - Echo done in 12/2021 showing EF of 55%, mild pulm htn, LVH and Grade 1 diastolic dysfunction  - has some trace edema on LE, BNP of 4K  - CXR normal (pending read)  - will give 20mg IV lasix as renal function WNL  - Daily weights  - Strict I/O  - Fluid restricted diet

## 2022-03-07 NOTE — ED PROVIDER NOTE - CONSTITUTIONAL, MLM
Mild distress. Well appearing, awake, alert, oriented to person, place, time/situation and in no apparent distress. normal...

## 2022-03-07 NOTE — ED ADULT NURSE NOTE - CHIEF COMPLAINT QUOTE
pt BIBA for compliant of cough and wheezing. As per EMS daughter gave a total of 5 albuterol treatments today the last one was given 30mins ago. pt has history of CHF, pacemaker and hypertension.

## 2022-03-07 NOTE — H&P ADULT - NSHPREVIEWOFSYSTEMS_GEN_ALL_CORE
CONSTITUTIONAL: No weakness or decreased appetite.  No fever, weight loss, or fatigue  EYES: No eye pain, visual disturbances, or discharge  ENT:  No difficulty hearing, tinnitus, vertigo; No sinus or throat pain  NECK: No pain or stiffness  RESPIRATORY: Has cough and wheezing, No chills or hemoptysis; No Shortness of Breath  CARDIOVASCULAR: No chest pain, palpitations, passing out, dizziness, or leg swelling  GASTROINTESTINAL: No abdominal or epigastric pain. No nausea, vomiting, or hematemesis; No diarrhea or constipation. No melena or hematochezia.  GENITOURINARY: No dysuria, frequency, hematuria, or incontinence  NEUROLOGICAL: No headaches, memory loss, loss of strength, numbness, or tremors  SKIN: No skin lesions   LYMPH Nodes: No enlarged glands  ENDOCRINE: No heat or cold intolerance; No hair loss  MUSCULOSKELETAL: No joint pain or swelling; No muscle, back, No extremity pain  PSYCHIATRIC: No depression, anxiety, mood swings, or difficulty sleeping  HEME/LYMPH: No easy bruising, or bleeding gums  ALLERGY AND IMMUNOLOGIC: No hives or eczema

## 2022-03-07 NOTE — ED ADULT NURSE NOTE - NSIMPLEMENTINTERV_GEN_ALL_ED
Implemented All Fall with Harm Risk Interventions:  [place] to call system. Call bell, personal items and telephone within reach. Instruct patient to call for assistance. Room bathroom lighting operational. Non-slip footwear when patient is off stretcher. Physically safe environment: no spills, clutter or unnecessary equipment. Stretcher in lowest position, wheels locked, appropriate side rails in place. Provide visual cue, wrist band, yellow gown, etc. Monitor gait and stability. Monitor for mental status changes and reorient to person, place, and time. Review medications for side effects contributing to fall risk. Reinforce activity limits and safety measures with patient and family. Provide visual clues: red socks.

## 2022-03-07 NOTE — PATIENT PROFILE ADULT - FALL HARM RISK - HARM RISK INTERVENTIONS

## 2022-03-07 NOTE — H&P ADULT - HISTORY OF PRESENT ILLNESS
Patient is a 89 y/o female, from home, minimally ambulatory with assistance, with PMHx of HTN, HFpEF (Grade II DD),Parox Afib (not on AC due to GIB),CRI, Complete Heart Block s/p PPM, HLD, and Dementia who presented with complaints of cough  Patient is a 89 y/o female, from home, minimally ambulatory with assistance, with PMHx of HTN, HFpEF (Grade II DD),Parox Afib (not on AC due to GIB),CRI, Complete Heart Block s/p PPM, HLD, and Dementia who presented with complaints of cough for the last three days. Patient  is Irish speaking and is accompanied by her daughter who provides translation. Patient has non productive cough. Denies any fevers or chills. Daughter states that she was seen at [facility] for her cough, was told it was due to a viral illness and was discharged home. Her daughter gave her albuterol nebulizer treatment with no resolution of the cough or wheezing. Denies any leg swelling, cp or shortness of breath. Patient's daughter states she was sick with the cough and mother may have gotten something from her.

## 2022-03-08 DIAGNOSIS — I10 ESSENTIAL (PRIMARY) HYPERTENSION: ICD-10-CM

## 2022-03-08 LAB
A1C WITH ESTIMATED AVERAGE GLUCOSE RESULT: 5.3 % — SIGNIFICANT CHANGE UP (ref 4–5.6)
ALBUMIN SERPL ELPH-MCNC: 3.3 G/DL — LOW (ref 3.5–5)
ALP SERPL-CCNC: 52 U/L — SIGNIFICANT CHANGE UP (ref 40–120)
ALT FLD-CCNC: 23 U/L DA — SIGNIFICANT CHANGE UP (ref 10–60)
ANION GAP SERPL CALC-SCNC: 5 MMOL/L — SIGNIFICANT CHANGE UP (ref 5–17)
AST SERPL-CCNC: 17 U/L — SIGNIFICANT CHANGE UP (ref 10–40)
BASOPHILS # BLD AUTO: 0.01 K/UL — SIGNIFICANT CHANGE UP (ref 0–0.2)
BASOPHILS NFR BLD AUTO: 0.1 % — SIGNIFICANT CHANGE UP (ref 0–2)
BILIRUB SERPL-MCNC: 0.4 MG/DL — SIGNIFICANT CHANGE UP (ref 0.2–1.2)
BUN SERPL-MCNC: 18 MG/DL — SIGNIFICANT CHANGE UP (ref 7–18)
CALCIUM SERPL-MCNC: 9.9 MG/DL — SIGNIFICANT CHANGE UP (ref 8.4–10.5)
CHLORIDE SERPL-SCNC: 107 MMOL/L — SIGNIFICANT CHANGE UP (ref 96–108)
CO2 SERPL-SCNC: 30 MMOL/L — SIGNIFICANT CHANGE UP (ref 22–31)
CREAT SERPL-MCNC: 1.1 MG/DL — SIGNIFICANT CHANGE UP (ref 0.5–1.3)
EGFR: 47 ML/MIN/1.73M2 — LOW
EOSINOPHIL # BLD AUTO: 0 K/UL — SIGNIFICANT CHANGE UP (ref 0–0.5)
EOSINOPHIL NFR BLD AUTO: 0 % — SIGNIFICANT CHANGE UP (ref 0–6)
ESTIMATED AVERAGE GLUCOSE: 105 MG/DL — SIGNIFICANT CHANGE UP (ref 68–114)
GLUCOSE BLDC GLUCOMTR-MCNC: 117 MG/DL — HIGH (ref 70–99)
GLUCOSE BLDC GLUCOMTR-MCNC: 137 MG/DL — HIGH (ref 70–99)
GLUCOSE BLDC GLUCOMTR-MCNC: 151 MG/DL — HIGH (ref 70–99)
GLUCOSE BLDC GLUCOMTR-MCNC: 153 MG/DL — HIGH (ref 70–99)
GLUCOSE SERPL-MCNC: 132 MG/DL — HIGH (ref 70–99)
HCT VFR BLD CALC: 35 % — SIGNIFICANT CHANGE UP (ref 34.5–45)
HGB BLD-MCNC: 11.7 G/DL — SIGNIFICANT CHANGE UP (ref 11.5–15.5)
IMM GRANULOCYTES NFR BLD AUTO: 0.6 % — SIGNIFICANT CHANGE UP (ref 0–1.5)
LYMPHOCYTES # BLD AUTO: 0.75 K/UL — LOW (ref 1–3.3)
LYMPHOCYTES # BLD AUTO: 9.7 % — LOW (ref 13–44)
MAGNESIUM SERPL-MCNC: 2.6 MG/DL — SIGNIFICANT CHANGE UP (ref 1.6–2.6)
MCHC RBC-ENTMCNC: 32.1 PG — SIGNIFICANT CHANGE UP (ref 27–34)
MCHC RBC-ENTMCNC: 33.4 GM/DL — SIGNIFICANT CHANGE UP (ref 32–36)
MCV RBC AUTO: 96.2 FL — SIGNIFICANT CHANGE UP (ref 80–100)
MONOCYTES # BLD AUTO: 0.31 K/UL — SIGNIFICANT CHANGE UP (ref 0–0.9)
MONOCYTES NFR BLD AUTO: 4 % — SIGNIFICANT CHANGE UP (ref 2–14)
NEUTROPHILS # BLD AUTO: 6.58 K/UL — SIGNIFICANT CHANGE UP (ref 1.8–7.4)
NEUTROPHILS NFR BLD AUTO: 85.6 % — HIGH (ref 43–77)
NRBC # BLD: 0 /100 WBCS — SIGNIFICANT CHANGE UP (ref 0–0)
PHOSPHATE SERPL-MCNC: 2.7 MG/DL — SIGNIFICANT CHANGE UP (ref 2.5–4.5)
PLATELET # BLD AUTO: 153 K/UL — SIGNIFICANT CHANGE UP (ref 150–400)
POTASSIUM SERPL-MCNC: 3.5 MMOL/L — SIGNIFICANT CHANGE UP (ref 3.5–5.3)
POTASSIUM SERPL-SCNC: 3.5 MMOL/L — SIGNIFICANT CHANGE UP (ref 3.5–5.3)
PROT SERPL-MCNC: 7.1 G/DL — SIGNIFICANT CHANGE UP (ref 6–8.3)
RBC # BLD: 3.64 M/UL — LOW (ref 3.8–5.2)
RBC # FLD: 12.2 % — SIGNIFICANT CHANGE UP (ref 10.3–14.5)
SODIUM SERPL-SCNC: 142 MMOL/L — SIGNIFICANT CHANGE UP (ref 135–145)
WBC # BLD: 7.7 K/UL — SIGNIFICANT CHANGE UP (ref 3.8–10.5)
WBC # FLD AUTO: 7.7 K/UL — SIGNIFICANT CHANGE UP (ref 3.8–10.5)

## 2022-03-08 RX ORDER — MONTELUKAST 4 MG/1
10 TABLET, CHEWABLE ORAL DAILY
Refills: 0 | Status: DISCONTINUED | OUTPATIENT
Start: 2022-03-08 | End: 2022-03-09

## 2022-03-08 RX ADMIN — Medication 100 MILLIGRAM(S): at 05:37

## 2022-03-08 RX ADMIN — Medication 81 MILLIGRAM(S): at 15:56

## 2022-03-08 RX ADMIN — DORZOLAMIDE HYDROCHLORIDE TIMOLOL MALEATE 1 DROP(S): 20; 5 SOLUTION/ DROPS OPHTHALMIC at 18:07

## 2022-03-08 RX ADMIN — PANTOPRAZOLE SODIUM 40 MILLIGRAM(S): 20 TABLET, DELAYED RELEASE ORAL at 06:03

## 2022-03-08 RX ADMIN — LATANOPROST 1 DROP(S): 0.05 SOLUTION/ DROPS OPHTHALMIC; TOPICAL at 21:11

## 2022-03-08 RX ADMIN — Medication 40 MILLIGRAM(S): at 21:09

## 2022-03-08 RX ADMIN — ALBUTEROL 2 PUFF(S): 90 AEROSOL, METERED ORAL at 21:11

## 2022-03-08 RX ADMIN — ISOSORBIDE MONONITRATE 60 MILLIGRAM(S): 60 TABLET, EXTENDED RELEASE ORAL at 14:29

## 2022-03-08 RX ADMIN — HEPARIN SODIUM 5000 UNIT(S): 5000 INJECTION INTRAVENOUS; SUBCUTANEOUS at 18:07

## 2022-03-08 RX ADMIN — DORZOLAMIDE HYDROCHLORIDE TIMOLOL MALEATE 1 DROP(S): 20; 5 SOLUTION/ DROPS OPHTHALMIC at 05:38

## 2022-03-08 RX ADMIN — DRONEDARONE 400 MILLIGRAM(S): 400 TABLET, FILM COATED ORAL at 18:06

## 2022-03-08 RX ADMIN — Medication 40 MILLIGRAM(S): at 14:30

## 2022-03-08 RX ADMIN — MONTELUKAST 10 MILLIGRAM(S): 4 TABLET, CHEWABLE ORAL at 14:29

## 2022-03-08 RX ADMIN — HEPARIN SODIUM 5000 UNIT(S): 5000 INJECTION INTRAVENOUS; SUBCUTANEOUS at 05:36

## 2022-03-08 RX ADMIN — Medication 40 MILLIGRAM(S): at 05:36

## 2022-03-08 RX ADMIN — SIMVASTATIN 40 MILLIGRAM(S): 20 TABLET, FILM COATED ORAL at 21:09

## 2022-03-08 RX ADMIN — DRONEDARONE 400 MILLIGRAM(S): 400 TABLET, FILM COATED ORAL at 05:37

## 2022-03-08 RX ADMIN — Medication 100 MILLIGRAM(S): at 18:07

## 2022-03-08 RX ADMIN — Medication 100 MILLIGRAM(S): at 14:29

## 2022-03-08 RX ADMIN — Medication 325 MILLIGRAM(S): at 14:28

## 2022-03-08 RX ADMIN — Medication 100 MILLIGRAM(S): at 21:09

## 2022-03-08 NOTE — PROGRESS NOTE ADULT - SUBJECTIVE AND OBJECTIVE BOX
NP Note discussed with Primary Attending.    Patient is a 89y old  Female who presents with a chief complaint of Cough (08 Mar 2022 11:17)      INTERVAL HPI/OVERNIGHT EVENTS: no new complaints    MEDICATIONS  (STANDING):  aspirin  chewable 81 milliGRAM(s) Oral daily  dorzolamide 2%/timolol 0.5% Ophthalmic Solution 1 Drop(s) Both EYES two times a day  dronedarone 400 milliGRAM(s) Oral two times a day  ferrous    sulfate 325 milliGRAM(s) Oral daily  heparin   Injectable 5000 Unit(s) SubCutaneous every 12 hours  hydrALAZINE 100 milliGRAM(s) Oral three times a day  influenza  Vaccine (HIGH DOSE) 0.7 milliLiter(s) IntraMuscular once  insulin lispro (ADMELOG) corrective regimen sliding scale   SubCutaneous three times a day before meals  isosorbide   mononitrate ER Tablet (IMDUR) 60 milliGRAM(s) Oral daily  latanoprost 0.005% Ophthalmic Solution 1 Drop(s) Both EYES at bedtime  methylPREDNISolone sodium succinate Injectable 40 milliGRAM(s) IV Push every 8 hours  metoprolol tartrate 100 milliGRAM(s) Oral two times a day  montelukast 10 milliGRAM(s) Oral daily  pantoprazole    Tablet 40 milliGRAM(s) Oral before breakfast  simvastatin 40 milliGRAM(s) Oral at bedtime    MEDICATIONS  (PRN):  ALBUTerol    90 MICROgram(s) HFA Inhaler 2 Puff(s) Inhalation every 6 hours PRN Wheezing  benzonatate 100 milliGRAM(s) Oral three times a day PRN Cough      __________________________________________________  REVIEW OF SYSTEMS:    CONSTITUTIONAL: No fever,   EYES: no acute visual disturbances  NECK: No pain or stiffness  RESPIRATORY: Wheezing b/l,  cough; No shortness of breath  CARDIOVASCULAR: No chest pain, no palpitations  GASTROINTESTINAL: No pain. No nausea or vomiting; No diarrhea   NEUROLOGICAL: No headache or numbness, no tremors  MUSCULOSKELETAL: No joint pain, no muscle pain  GENITOURINARY: no dysuria, no frequency, no hesitancy  PSYCHIATRY: no depression , no anxiety  ALL OTHER  ROS negative        Vital Signs Last 24 Hrs  T(C): 36.6 (08 Mar 2022 05:00), Max: 36.8 (07 Mar 2022 21:26)  T(F): 97.8 (08 Mar 2022 05:00), Max: 98.3 (07 Mar 2022 21:26)  HR: 94 (08 Mar 2022 05:00) (71 - 94)  BP: 159/67 (08 Mar 2022 09:08) (154/71 - 198/74)  BP(mean): --  RR: 18 (08 Mar 2022 05:00) (18 - 18)  SpO2: 98% (08 Mar 2022 05:00) (94% - 98%)    ________________________________________________  PHYSICAL EXAM:  GENERAL: NAD  HEENT: Normocephalic;  conjunctivae and sclerae clear; moist mucous membranes;   NECK : supple  CHEST/LUNG:Wheezing b/l to auscultation bilaterally with good air entry   HEART: S1 S2  regular; no murmurs, gallops or rubs  ABDOMEN: Soft, Nontender, Nondistended; Bowel sounds present  EXTREMITIES: no cyanosis; +1 LE edema; no calf tenderness  SKIN: warm and dry; no rash  NERVOUS SYSTEM:  Awake and alert; Oriented  to place, person and time ; no new deficits    _________________________________________________  LABS:                        11.7   7.70  )-----------( 153      ( 08 Mar 2022 08:36 )             35.0     03-08    142  |  107  |  18  ----------------------------<  132<H>  3.5   |  30  |  1.10    Ca    9.9      08 Mar 2022 08:36  Phos  2.7     03-08  Mg     2.6     03-08    TPro  7.1  /  Alb  3.3<L>  /  TBili  0.4  /  DBili  x   /  AST  17  /  ALT  23  /  AlkPhos  52  03-08        CAPILLARY BLOOD GLUCOSE      POCT Blood Glucose.: 153 mg/dL (08 Mar 2022 11:12)  POCT Blood Glucose.: 137 mg/dL (08 Mar 2022 07:40)  POCT Blood Glucose.: 169 mg/dL (07 Mar 2022 21:46)  POCT Blood Glucose.: 200 mg/dL (07 Mar 2022 19:08)        RADIOLOGY & ADDITIONAL TESTS:    ACC: [id number] EXAM:  CT CHEST                          PROCEDURE DATE:  03/07/2022          INTERPRETATION:  INDICATION: Pneumonia    TECHNIQUE: Volumetric images of the chest without intravenous contrast.   Maximum intensity projection images weregenerated.    COMPARISON: 12/15/2021.    FINDINGS:    LUNGS/AIRWAYS/PLEURA: No endobronchial lesion. No bronchiectasis. Mild   air trapping in both lungs. Stable mild subpleural scarring in the right   apex. Unremarkable pleura.    LYMPH NODES/MEDIASTINUM: Stable enlarged multinodular thyroid. No   enlarged lymph nodes.    HEART/VASCULATURE: Enlarged left atrium. No pericardial effusion. Leads   within the right atrium and right ventricle. Mildly calcified coronary   arteries and aorta. Normal caliber aorta and main pulmonary artery.    UPPER ABDOMEN: Stable too small to characterize hypodensity in the left   hepatic lobe. Partially included left kidney cyst.    BONES/SOFT TISSUES: Degenerative changes of the spine.      IMPRESSION:    No pneumonia.    --- End of Report ---            CHARBEL ANNA M.D., ATTENDING RADIOGIST  This document has been electronically signed. Mar  7 2022 11:27AM  ACC: [id number] EXAM:  XR CHEST PORTABLE IMMED 1V                          PROCEDURE DATE:  03/07/2022          INTERPRETATION:  INDICATION: shortness of breath    PRIORS: December 10, 2021    VIEWS: Portable AP radiography of the chest performed.    FINDINGS: Heart size appears within normal limits. Note is made of an   indwelling pacemaker. No hilar or superior mediastinal abnormalities are   identified. There is no evidence for focal infiltrate, lobar   consolidation or pulmonary edema. No pleural effusion or pneumothorax is   demonstrated. No mediastinal shift is noted. The visualized osseous   structures appear unremarkable.    IMPRESSION: No evidence for focal infiltrate or lobar consolidation.    --- End of Report ---      BOONE BUTLER; Attending Radiologist  This document has been electronically signed. Mar  7 2022  9:02AM    Imaging  Reviewed:  YES/NO    Consultant(s) Notes Reviewed:   YES/ No      Plan of care was discussed with patient and /or primary care giver; all questions and concerns were addressed

## 2022-03-08 NOTE — PROGRESS NOTE ADULT - SUBJECTIVE AND OBJECTIVE BOX
CARDIOLOGY/MEDICAL ATTENDING    CHIEF COMPLAINT:Patient is a 89y old  Female who presents with a chief complaint of Cough.Pt appears comfortable.    	  REVIEW OF SYSTEMS:  CONSTITUTIONAL: No fever, weight loss, or fatigue  EYES: No eye pain, visual disturbances, or discharge  ENT:  No difficulty hearing, tinnitus, vertigo; No sinus or throat pain  NECK: No pain or stiffness  RESPIRATORY: No cough, wheezing, chills or hemoptysis; No Shortness of Breath  CARDIOVASCULAR: No chest pain, palpitations, passing out, dizziness, or leg swelling  GASTROINTESTINAL: No abdominal or epigastric pain. No nausea, vomiting, or hematemesis; No diarrhea or constipation. No melena or hematochezia.  GENITOURINARY: No dysuria, frequency, hematuria, or incontinence  NEUROLOGICAL: No headaches, memory loss, loss of strength, numbness, or tremors  SKIN: No itching, burning, rashes, or lesions   LYMPH Nodes: No enlarged glands  ENDOCRINE: No heat or cold intolerance; No hair loss  MUSCULOSKELETAL: No joint pain or swelling; No muscle, back, or extremity pain  PSYCHIATRIC: No depression, anxiety, mood swings, or difficulty sleeping  HEME/LYMPH: No easy bruising, or bleeding gums  ALLERGY AND IMMUNOLOGIC: No hives or eczema	        PHYSICAL EXAM:  T(C): 36.6 (03-08-22 @ 05:00), Max: 36.8 (03-07-22 @ 21:26)  HR: 94 (03-08-22 @ 05:00) (60 - 94)  BP: 159/67 (03-08-22 @ 09:08) (149/64 - 198/74)  RR: 18 (03-08-22 @ 05:00) (18 - 18)  SpO2: 98% (03-08-22 @ 05:00) (94% - 98%)        Appearance: Normal	  HEENT:   Normal oral mucosa, PERRL, EOMI	  Lymphatic: No lymphadenopathy  Cardiovascular: Normal S1 S2, No JVD, No murmurs, No edema  Respiratory:B/L wheezing	  Psychiatry: A & O x 3, Mood & affect appropriate  Gastrointestinal:  Soft, Non-tender, + BS	  Skin: No rashes, No ecchymoses, No cyanosis	  Neurologic: Non-focal  Extremities: Normal range of motion, No clubbing, cyanosis or edema  Vascular: Peripheral pulses palpable 2+ bilaterally    MEDICATIONS  (STANDING):  aspirin  chewable 81 milliGRAM(s) Oral daily  dorzolamide 2%/timolol 0.5% Ophthalmic Solution 1 Drop(s) Both EYES two times a day  dronedarone 400 milliGRAM(s) Oral two times a day  ferrous    sulfate 325 milliGRAM(s) Oral daily  heparin   Injectable 5000 Unit(s) SubCutaneous every 12 hours  hydrALAZINE 100 milliGRAM(s) Oral three times a day  influenza  Vaccine (HIGH DOSE) 0.7 milliLiter(s) IntraMuscular once  insulin lispro (ADMELOG) corrective regimen sliding scale   SubCutaneous three times a day before meals  isosorbide   mononitrate ER Tablet (IMDUR) 60 milliGRAM(s) Oral daily  latanoprost 0.005% Ophthalmic Solution 1 Drop(s) Both EYES at bedtime  methylPREDNISolone sodium succinate Injectable 40 milliGRAM(s) IV Push every 8 hours  metoprolol tartrate 100 milliGRAM(s) Oral two times a day  pantoprazole    Tablet 40 milliGRAM(s) Oral before breakfast  simvastatin 40 milliGRAM(s) Oral at bedtime      	  	  LABS:	 	      Troponin I, High Sensitivity Result: 17.9 ng/L (03-07 @ 00:31)                            11.7   7.70  )-----------( 153      ( 08 Mar 2022 08:36 )             35.0     03-08    142  |  107  |  18  ----------------------------<  132<H>  3.5   |  30  |  1.10    Ca    9.9      08 Mar 2022 08:36  Phos  2.7     03-08  Mg     2.6     03-08    TPro  7.1  /  Alb  3.3<L>  /  TBili  0.4  /  DBili  x   /  AST  17  /  ALT  23  /  AlkPhos  52  03-08    proBNP: Serum Pro-Brain Natriuretic Peptide: 4107 pg/mL (03-07 @ 00:31)        	    ct< from: CT Chest No Cont (03.07.22 @ 10:12) >  ACC: [id number] EXAM:  CT CHEST                          PROCEDURE DATE:  03/07/2022          INTERPRETATION:  INDICATION: Pneumonia    TECHNIQUE: Volumetric images of the chest without intravenous contrast.   Maximum intensity projection images weregenerated.    COMPARISON: 12/15/2021.    FINDINGS:    LUNGS/AIRWAYS/PLEURA: No endobronchial lesion. No bronchiectasis. Mild   air trapping in both lungs. Stable mild subpleural scarring in the right   apex. Unremarkable pleura.    LYMPH NODES/MEDIASTINUM: Stable enlarged multinodular thyroid. No   enlarged lymph nodes.    HEART/VASCULATURE: Enlarged left atrium. No pericardial effusion. Leads   within the right atrium and right ventricle. Mildly calcified coronary   arteries and aorta. Normal caliber aorta and main pulmonary artery.    UPPER ABDOMEN: Stable too small to characterize hypodensity in the left   hepatic lobe. Partially included left kidney cyst.    BONES/SOFT TISSUES: Degenerative changes of the spine.      IMPRESSION:    No pneumonia.    --- End of Report ---            CHARBEL ANNA M.D., ATTENDING RADIOGIST  This document has been electronically signed. Mar  7 2022 11:27AM

## 2022-03-08 NOTE — PROGRESS NOTE ADULT - SUBJECTIVE AND OBJECTIVE BOX
Patient is a 89y old  Female who presents with a chief complaint of Cough (07 Mar 2022 18:08)  Awake, alert, comfortable in bed in NAD.. Doing well on RA    INTERVAL HPI/OVERNIGHT EVENTS:      VITAL SIGNS:  T(F): 97.8 (03-08-22 @ 05:00)  HR: 94 (03-08-22 @ 05:00)  BP: 159/67 (03-08-22 @ 09:08)  RR: 18 (03-08-22 @ 05:00)  SpO2: 98% (03-08-22 @ 05:00)  Wt(kg): --  I&O's Detail          REVIEW OF SYSTEMS:    CONSTITUTIONAL:  No fevers, chills, sweats    HEENT:  Eyes:  No diplopia or blurred vision. ENT:  No earache, sore throat or runny nose.    CARDIOVASCULAR:  No pressure, squeezing, tightness, or heaviness about the chest; no palpitations.    RESPIRATORY:  Per HPI    GASTROINTESTINAL:  No abdominal pain, nausea, vomiting or diarrhea.    GENITOURINARY:  No dysuria, frequency or urgency.    NEUROLOGIC:  No paresthesias, fasciculations, seizures or weakness.    PSYCHIATRIC:  No disorder of thought or mood.      PHYSICAL EXAM:    Constitutional: Well developed and nourished  Eyes:Perrla  ENMT: normal  Neck:supple  Respiratory: good air entry  Cardiovascular: S1 S2 regular  Gastrointestinal: Soft, Non tender  Extremities: No edema  Vascular:normal  Neurological:Awake, alert,Ox3  Musculoskeletal:Normal      MEDICATIONS  (STANDING):  aspirin  chewable 81 milliGRAM(s) Oral daily  dorzolamide 2%/timolol 0.5% Ophthalmic Solution 1 Drop(s) Both EYES two times a day  dronedarone 400 milliGRAM(s) Oral two times a day  ferrous    sulfate 325 milliGRAM(s) Oral daily  heparin   Injectable 5000 Unit(s) SubCutaneous every 12 hours  hydrALAZINE 100 milliGRAM(s) Oral three times a day  influenza  Vaccine (HIGH DOSE) 0.7 milliLiter(s) IntraMuscular once  insulin lispro (ADMELOG) corrective regimen sliding scale   SubCutaneous three times a day before meals  isosorbide   mononitrate ER Tablet (IMDUR) 60 milliGRAM(s) Oral daily  latanoprost 0.005% Ophthalmic Solution 1 Drop(s) Both EYES at bedtime  methylPREDNISolone sodium succinate Injectable 40 milliGRAM(s) IV Push every 8 hours  metoprolol tartrate 100 milliGRAM(s) Oral two times a day  pantoprazole    Tablet 40 milliGRAM(s) Oral before breakfast  simvastatin 40 milliGRAM(s) Oral at bedtime    MEDICATIONS  (PRN):  ALBUTerol    90 MICROgram(s) HFA Inhaler 2 Puff(s) Inhalation every 6 hours PRN Wheezing  benzonatate 100 milliGRAM(s) Oral three times a day PRN Cough      Allergies    No Known Allergies    Intolerances        LABS:                        11.7   7.70  )-----------( 153      ( 08 Mar 2022 08:36 )             35.0     03-08    142  |  107  |  18  ----------------------------<  132<H>  3.5   |  30  |  1.10    Ca    9.9      08 Mar 2022 08:36  Phos  2.7     03-08  Mg     2.6     03-08    TPro  7.1  /  Alb  3.3<L>  /  TBili  0.4  /  DBili  x   /  AST  17  /  ALT  23  /  AlkPhos  52  03-08              CAPILLARY BLOOD GLUCOSE      POCT Blood Glucose.: 137 mg/dL (08 Mar 2022 07:40)  POCT Blood Glucose.: 169 mg/dL (07 Mar 2022 21:46)  POCT Blood Glucose.: 200 mg/dL (07 Mar 2022 19:08)  POCT Blood Glucose.: 163 mg/dL (07 Mar 2022 11:42)    pro-bnp 4107 03-07 @ 00:31     d-dimer --  03-07 @ 00:31      RADIOLOGY & ADDITIONAL TESTS:    CXR:    Ct scan chest:    ekg;    echo:

## 2022-03-09 LAB
ALBUMIN SERPL ELPH-MCNC: 3.2 G/DL — LOW (ref 3.5–5)
ALP SERPL-CCNC: 51 U/L — SIGNIFICANT CHANGE UP (ref 40–120)
ALT FLD-CCNC: 23 U/L DA — SIGNIFICANT CHANGE UP (ref 10–60)
ANION GAP SERPL CALC-SCNC: 4 MMOL/L — LOW (ref 5–17)
AST SERPL-CCNC: 21 U/L — SIGNIFICANT CHANGE UP (ref 10–40)
BILIRUB SERPL-MCNC: 0.3 MG/DL — SIGNIFICANT CHANGE UP (ref 0.2–1.2)
BUN SERPL-MCNC: 23 MG/DL — HIGH (ref 7–18)
CALCIUM SERPL-MCNC: 9.3 MG/DL — SIGNIFICANT CHANGE UP (ref 8.4–10.5)
CHLORIDE SERPL-SCNC: 107 MMOL/L — SIGNIFICANT CHANGE UP (ref 96–108)
CO2 SERPL-SCNC: 31 MMOL/L — SIGNIFICANT CHANGE UP (ref 22–31)
CREAT SERPL-MCNC: 1.14 MG/DL — SIGNIFICANT CHANGE UP (ref 0.5–1.3)
EGFR: 45 ML/MIN/1.73M2 — LOW
GLUCOSE BLDC GLUCOMTR-MCNC: 127 MG/DL — HIGH (ref 70–99)
GLUCOSE SERPL-MCNC: 130 MG/DL — HIGH (ref 70–99)
HCT VFR BLD CALC: 35.2 % — SIGNIFICANT CHANGE UP (ref 34.5–45)
HGB BLD-MCNC: 12 G/DL — SIGNIFICANT CHANGE UP (ref 11.5–15.5)
MCHC RBC-ENTMCNC: 32.8 PG — SIGNIFICANT CHANGE UP (ref 27–34)
MCHC RBC-ENTMCNC: 34.1 GM/DL — SIGNIFICANT CHANGE UP (ref 32–36)
MCV RBC AUTO: 96.2 FL — SIGNIFICANT CHANGE UP (ref 80–100)
NRBC # BLD: 0 /100 WBCS — SIGNIFICANT CHANGE UP (ref 0–0)
PLATELET # BLD AUTO: 169 K/UL — SIGNIFICANT CHANGE UP (ref 150–400)
POTASSIUM SERPL-MCNC: 3.7 MMOL/L — SIGNIFICANT CHANGE UP (ref 3.5–5.3)
POTASSIUM SERPL-SCNC: 3.7 MMOL/L — SIGNIFICANT CHANGE UP (ref 3.5–5.3)
PROT SERPL-MCNC: 7.1 G/DL — SIGNIFICANT CHANGE UP (ref 6–8.3)
RBC # BLD: 3.66 M/UL — LOW (ref 3.8–5.2)
RBC # FLD: 12.8 % — SIGNIFICANT CHANGE UP (ref 10.3–14.5)
SODIUM SERPL-SCNC: 142 MMOL/L — SIGNIFICANT CHANGE UP (ref 135–145)
WBC # BLD: 10.77 K/UL — HIGH (ref 3.8–10.5)
WBC # FLD AUTO: 10.77 K/UL — HIGH (ref 3.8–10.5)

## 2022-03-09 RX ORDER — ISOSORBIDE MONONITRATE 60 MG/1
60 TABLET, EXTENDED RELEASE ORAL DAILY
Refills: 0 | Status: DISCONTINUED | OUTPATIENT
Start: 2022-03-09 | End: 2022-03-10

## 2022-03-09 RX ORDER — ALBUTEROL 90 UG/1
2.5 AEROSOL, METERED ORAL ONCE
Refills: 0 | Status: COMPLETED | OUTPATIENT
Start: 2022-03-09 | End: 2022-03-09

## 2022-03-09 RX ORDER — ISOSORBIDE MONONITRATE 60 MG/1
120 TABLET, EXTENDED RELEASE ORAL DAILY
Refills: 0 | Status: DISCONTINUED | OUTPATIENT
Start: 2022-03-10 | End: 2022-03-11

## 2022-03-09 RX ORDER — HYDRALAZINE HCL 50 MG
10 TABLET ORAL ONCE
Refills: 0 | Status: COMPLETED | OUTPATIENT
Start: 2022-03-09 | End: 2022-03-09

## 2022-03-09 RX ORDER — ALBUTEROL 90 UG/1
2.5 AEROSOL, METERED ORAL EVERY 6 HOURS
Refills: 0 | Status: DISCONTINUED | OUTPATIENT
Start: 2022-03-09 | End: 2022-03-11

## 2022-03-09 RX ORDER — MONTELUKAST 4 MG/1
10 TABLET, CHEWABLE ORAL AT BEDTIME
Refills: 0 | Status: DISCONTINUED | OUTPATIENT
Start: 2022-03-09 | End: 2022-03-11

## 2022-03-09 RX ORDER — HYDRALAZINE HCL 50 MG
100 TABLET ORAL
Refills: 0 | Status: DISCONTINUED | OUTPATIENT
Start: 2022-03-09 | End: 2022-03-11

## 2022-03-09 RX ADMIN — MONTELUKAST 10 MILLIGRAM(S): 4 TABLET, CHEWABLE ORAL at 21:17

## 2022-03-09 RX ADMIN — DRONEDARONE 400 MILLIGRAM(S): 400 TABLET, FILM COATED ORAL at 17:51

## 2022-03-09 RX ADMIN — DORZOLAMIDE HYDROCHLORIDE TIMOLOL MALEATE 1 DROP(S): 20; 5 SOLUTION/ DROPS OPHTHALMIC at 05:19

## 2022-03-09 RX ADMIN — LATANOPROST 1 DROP(S): 0.05 SOLUTION/ DROPS OPHTHALMIC; TOPICAL at 21:18

## 2022-03-09 RX ADMIN — ALBUTEROL 2.5 MILLIGRAM(S): 90 AEROSOL, METERED ORAL at 14:10

## 2022-03-09 RX ADMIN — SIMVASTATIN 40 MILLIGRAM(S): 20 TABLET, FILM COATED ORAL at 21:17

## 2022-03-09 RX ADMIN — DORZOLAMIDE HYDROCHLORIDE TIMOLOL MALEATE 1 DROP(S): 20; 5 SOLUTION/ DROPS OPHTHALMIC at 17:53

## 2022-03-09 RX ADMIN — Medication 81 MILLIGRAM(S): at 12:28

## 2022-03-09 RX ADMIN — Medication 100 MILLIGRAM(S): at 12:37

## 2022-03-09 RX ADMIN — ALBUTEROL 2.5 MILLIGRAM(S): 90 AEROSOL, METERED ORAL at 19:07

## 2022-03-09 RX ADMIN — ALBUTEROL 2 PUFF(S): 90 AEROSOL, METERED ORAL at 05:18

## 2022-03-09 RX ADMIN — ISOSORBIDE MONONITRATE 60 MILLIGRAM(S): 60 TABLET, EXTENDED RELEASE ORAL at 12:35

## 2022-03-09 RX ADMIN — Medication 100 MILLIGRAM(S): at 05:19

## 2022-03-09 RX ADMIN — Medication 40 MILLIGRAM(S): at 17:51

## 2022-03-09 RX ADMIN — DRONEDARONE 400 MILLIGRAM(S): 400 TABLET, FILM COATED ORAL at 05:19

## 2022-03-09 RX ADMIN — HEPARIN SODIUM 5000 UNIT(S): 5000 INJECTION INTRAVENOUS; SUBCUTANEOUS at 05:20

## 2022-03-09 RX ADMIN — PANTOPRAZOLE SODIUM 40 MILLIGRAM(S): 20 TABLET, DELAYED RELEASE ORAL at 07:22

## 2022-03-09 RX ADMIN — Medication 100 MILLIGRAM(S): at 17:51

## 2022-03-09 RX ADMIN — Medication 325 MILLIGRAM(S): at 12:28

## 2022-03-09 RX ADMIN — Medication 10 MILLIGRAM(S): at 22:00

## 2022-03-09 RX ADMIN — Medication 40 MILLIGRAM(S): at 05:20

## 2022-03-09 RX ADMIN — HEPARIN SODIUM 5000 UNIT(S): 5000 INJECTION INTRAVENOUS; SUBCUTANEOUS at 17:52

## 2022-03-09 RX ADMIN — Medication 100 MILLIGRAM(S): at 17:52

## 2022-03-09 RX ADMIN — ALBUTEROL 2.5 MILLIGRAM(S): 90 AEROSOL, METERED ORAL at 13:50

## 2022-03-09 NOTE — PROGRESS NOTE ADULT - SUBJECTIVE AND OBJECTIVE BOX
Patient is a 89y old  Female who presents with a chief complaint of Cough (08 Mar 2022 11:57)  Awake, alert, comfortable in bed in NAD. Unable to take meds properly due to lack of coordination.    INTERVAL HPI/OVERNIGHT EVENTS:      VITAL SIGNS:  T(F): 97.2 (03-09-22 @ 05:09)  HR: 72 (03-09-22 @ 07:20)  BP: 185/86 (03-09-22 @ 07:20)  RR: 18 (03-09-22 @ 07:20)  SpO2: 97% (03-09-22 @ 07:20)  Wt(kg): --  I&O's Detail    08 Mar 2022 07:01  -  09 Mar 2022 07:00  --------------------------------------------------------  IN:  Total IN: 0 mL    OUT:    Voided (mL): 0 mL  Total OUT: 0 mL    Total NET: 0 mL              REVIEW OF SYSTEMS:    CONSTITUTIONAL:  No fevers, chills, sweats    HEENT:  Eyes:  No diplopia or blurred vision. ENT:  No earache, sore throat or runny nose.    CARDIOVASCULAR:  No pressure, squeezing, tightness, or heaviness about the chest; no palpitations.    RESPIRATORY:  Per HPI    GASTROINTESTINAL:  No abdominal pain, nausea, vomiting or diarrhea.    GENITOURINARY:  No dysuria, frequency or urgency.    NEUROLOGIC:  No paresthesias, fasciculations, seizures or weakness.    PSYCHIATRIC:  No disorder of thought or mood.      PHYSICAL EXAM:    Constitutional: Well developed and nourished  Eyes:Perrla  ENMT: normal  Neck:supple  Respiratory: good air entry  Cardiovascular: S1 S2 regular  Gastrointestinal: Soft, Non tender  Extremities: No edema  Vascular:normal  Neurological:Awake, alert,Ox3  Musculoskeletal:Normal      MEDICATIONS  (STANDING):  aspirin  chewable 81 milliGRAM(s) Oral daily  dorzolamide 2%/timolol 0.5% Ophthalmic Solution 1 Drop(s) Both EYES two times a day  dronedarone 400 milliGRAM(s) Oral two times a day  ferrous    sulfate 325 milliGRAM(s) Oral daily  heparin   Injectable 5000 Unit(s) SubCutaneous every 12 hours  hydrALAZINE 100 milliGRAM(s) Oral three times a day  influenza  Vaccine (HIGH DOSE) 0.7 milliLiter(s) IntraMuscular once  insulin lispro (ADMELOG) corrective regimen sliding scale   SubCutaneous three times a day before meals  isosorbide   mononitrate ER Tablet (IMDUR) 60 milliGRAM(s) Oral daily  latanoprost 0.005% Ophthalmic Solution 1 Drop(s) Both EYES at bedtime  methylPREDNISolone sodium succinate Injectable 40 milliGRAM(s) IV Push every 8 hours  metoprolol tartrate 100 milliGRAM(s) Oral two times a day  montelukast 10 milliGRAM(s) Oral at bedtime  pantoprazole    Tablet 40 milliGRAM(s) Oral before breakfast  simvastatin 40 milliGRAM(s) Oral at bedtime    MEDICATIONS  (PRN):  ALBUTerol    90 MICROgram(s) HFA Inhaler 2 Puff(s) Inhalation every 6 hours PRN Wheezing  benzonatate 100 milliGRAM(s) Oral three times a day PRN Cough      Allergies    No Known Allergies    Intolerances        LABS:                        12.0   10.77 )-----------( 169      ( 09 Mar 2022 05:51 )             35.2     03-09    142  |  107  |  23<H>  ----------------------------<  130<H>  3.7   |  31  |  1.14    Ca    9.3      09 Mar 2022 05:51  Phos  2.7     03-08  Mg     2.6     03-08    TPro  7.1  /  Alb  3.2<L>  /  TBili  0.3  /  DBili  x   /  AST  21  /  ALT  23  /  AlkPhos  51  03-09              CAPILLARY BLOOD GLUCOSE      POCT Blood Glucose.: 127 mg/dL (09 Mar 2022 07:45)  POCT Blood Glucose.: 151 mg/dL (08 Mar 2022 21:38)  POCT Blood Glucose.: 117 mg/dL (08 Mar 2022 16:48)  POCT Blood Glucose.: 153 mg/dL (08 Mar 2022 11:12)    pro-bnp 4107 03-07 @ 00:31     d-dimer --  03-07 @ 00:31      RADIOLOGY & ADDITIONAL TESTS:    CXR:    Ct scan chest:    ekg;    echo:

## 2022-03-09 NOTE — PROGRESS NOTE ADULT - SUBJECTIVE AND OBJECTIVE BOX
CHIEF COMPLAINT:Patient is a 89y old  Female who presents with a chief complaint of Cough.Pt appears comfortable.    	  REVIEW OF SYSTEMS:  CONSTITUTIONAL: No fever, weight loss, or fatigue  EYES: No eye pain, visual disturbances, or discharge  ENT:  No difficulty hearing, tinnitus, vertigo; No sinus or throat pain  NECK: No pain or stiffness  RESPIRATORY: No cough, wheezing, chills or hemoptysis; + Shortness of Breath  CARDIOVASCULAR: No chest pain, palpitations, passing out, dizziness, or leg swelling  GASTROINTESTINAL: No abdominal or epigastric pain. No nausea, vomiting, or hematemesis; No diarrhea or constipation. No melena or hematochezia.  GENITOURINARY: No dysuria, frequency, hematuria, or incontinence  NEUROLOGICAL: No headaches, memory loss, loss of strength, numbness, or tremors  SKIN: No itching, burning, rashes, or lesions   LYMPH Nodes: No enlarged glands  ENDOCRINE: No heat or cold intolerance; No hair loss  MUSCULOSKELETAL: No joint pain or swelling; No muscle, back, or extremity pain  PSYCHIATRIC: No depression, anxiety, mood swings, or difficulty sleeping  HEME/LYMPH: No easy bruising, or bleeding gums  ALLERGY AND IMMUNOLOGIC: No hives or eczema	        PHYSICAL EXAM:  T(C): 36.2 (03-09-22 @ 05:09), Max: 36.7 (03-08-22 @ 13:37)  HR: 72 (03-09-22 @ 07:20) (60 - 75)  BP: 185/86 (03-09-22 @ 07:20) (185/86 - 207/76)  RR: 18 (03-09-22 @ 07:20) (18 - 18)  SpO2: 97% (03-09-22 @ 07:20) (94% - 100%)  Wt(kg): --  I&O's Summary    08 Mar 2022 07:01  -  09 Mar 2022 07:00  --------------------------------------------------------  IN: 0 mL / OUT: 0 mL / NET: 0 mL        Appearance: Normal	  HEENT:   Normal oral mucosa, PERRL, EOMI	  Lymphatic: No lymphadenopathy  Cardiovascular: Normal S1 S2, No JVD, No murmurs, No edema  Respiratory: B/L wheezing  Psychiatry: A & O x 3, Mood & affect appropriate  Gastrointestinal:  Soft, Non-tender, + BS	  Skin: No rashes, No ecchymoses, No cyanosis	  Neurologic: Non-focal  Extremities: Normal range of motion, No clubbing, cyanosis or edema  Vascular: Peripheral pulses palpable 2+ bilaterally    MEDICATIONS  (STANDING):  ALBUTerol    0.083% 2.5 milliGRAM(s) Nebulizer every 6 hours  aspirin  chewable 81 milliGRAM(s) Oral daily  dorzolamide 2%/timolol 0.5% Ophthalmic Solution 1 Drop(s) Both EYES two times a day  dronedarone 400 milliGRAM(s) Oral two times a day  ferrous    sulfate 325 milliGRAM(s) Oral daily  heparin   Injectable 5000 Unit(s) SubCutaneous every 12 hours  hydrALAZINE 100 milliGRAM(s) Oral three times a day  influenza  Vaccine (HIGH DOSE) 0.7 milliLiter(s) IntraMuscular once  insulin lispro (ADMELOG) corrective regimen sliding scale   SubCutaneous three times a day before meals  isosorbide   mononitrate ER Tablet (IMDUR) 60 milliGRAM(s) Oral daily  latanoprost 0.005% Ophthalmic Solution 1 Drop(s) Both EYES at bedtime  methylPREDNISolone sodium succinate Injectable 40 milliGRAM(s) IV Push every 12 hours  metoprolol tartrate 100 milliGRAM(s) Oral two times a day  montelukast 10 milliGRAM(s) Oral at bedtime  pantoprazole    Tablet 40 milliGRAM(s) Oral before breakfast  simvastatin 40 milliGRAM(s) Oral at bedtime      	  	  LABS:	 	      Troponin I, High Sensitivity Result: 17.9 ng/L (03-07 @ 00:31)                            12.0   10.77 )-----------( 169      ( 09 Mar 2022 05:51 )             35.2     03-09    142  |  107  |  23<H>  ----------------------------<  130<H>  3.7   |  31  |  1.14    Ca    9.3      09 Mar 2022 05:51  Phos  2.7     03-08  Mg     2.6     03-08    TPro  7.1  /  Alb  3.2<L>  /  TBili  0.3  /  DBili  x   /  AST  21  /  ALT  23  /  AlkPhos  51  03-09    proBNP: Serum Pro-Brain Natriuretic Peptide: 4107 pg/mL (03-07 @ 00:31)

## 2022-03-10 LAB
ALBUMIN SERPL ELPH-MCNC: 3.2 G/DL — LOW (ref 3.5–5)
ALP SERPL-CCNC: 51 U/L — SIGNIFICANT CHANGE UP (ref 40–120)
ALT FLD-CCNC: 26 U/L DA — SIGNIFICANT CHANGE UP (ref 10–60)
ANION GAP SERPL CALC-SCNC: 2 MMOL/L — LOW (ref 5–17)
AST SERPL-CCNC: 20 U/L — SIGNIFICANT CHANGE UP (ref 10–40)
BILIRUB SERPL-MCNC: 0.4 MG/DL — SIGNIFICANT CHANGE UP (ref 0.2–1.2)
BUN SERPL-MCNC: 24 MG/DL — HIGH (ref 7–18)
CALCIUM SERPL-MCNC: 9.7 MG/DL — SIGNIFICANT CHANGE UP (ref 8.4–10.5)
CHLORIDE SERPL-SCNC: 104 MMOL/L — SIGNIFICANT CHANGE UP (ref 96–108)
CO2 SERPL-SCNC: 32 MMOL/L — HIGH (ref 22–31)
CREAT SERPL-MCNC: 1.21 MG/DL — SIGNIFICANT CHANGE UP (ref 0.5–1.3)
EGFR: 42 ML/MIN/1.73M2 — LOW
GLUCOSE BLDC GLUCOMTR-MCNC: 118 MG/DL — HIGH (ref 70–99)
GLUCOSE SERPL-MCNC: 114 MG/DL — HIGH (ref 70–99)
HCT VFR BLD CALC: 36.9 % — SIGNIFICANT CHANGE UP (ref 34.5–45)
HGB BLD-MCNC: 12.5 G/DL — SIGNIFICANT CHANGE UP (ref 11.5–15.5)
MCHC RBC-ENTMCNC: 33 PG — SIGNIFICANT CHANGE UP (ref 27–34)
MCHC RBC-ENTMCNC: 33.9 GM/DL — SIGNIFICANT CHANGE UP (ref 32–36)
MCV RBC AUTO: 97.4 FL — SIGNIFICANT CHANGE UP (ref 80–100)
NRBC # BLD: 0 /100 WBCS — SIGNIFICANT CHANGE UP (ref 0–0)
PLATELET # BLD AUTO: 161 K/UL — SIGNIFICANT CHANGE UP (ref 150–400)
POTASSIUM SERPL-MCNC: 4 MMOL/L — SIGNIFICANT CHANGE UP (ref 3.5–5.3)
POTASSIUM SERPL-SCNC: 4 MMOL/L — SIGNIFICANT CHANGE UP (ref 3.5–5.3)
PROT SERPL-MCNC: 6.9 G/DL — SIGNIFICANT CHANGE UP (ref 6–8.3)
RBC # BLD: 3.79 M/UL — LOW (ref 3.8–5.2)
RBC # FLD: 12.5 % — SIGNIFICANT CHANGE UP (ref 10.3–14.5)
SODIUM SERPL-SCNC: 138 MMOL/L — SIGNIFICANT CHANGE UP (ref 135–145)
WBC # BLD: 10.27 K/UL — SIGNIFICANT CHANGE UP (ref 3.8–10.5)
WBC # FLD AUTO: 10.27 K/UL — SIGNIFICANT CHANGE UP (ref 3.8–10.5)

## 2022-03-10 RX ORDER — HYDRALAZINE HCL 50 MG
10 TABLET ORAL ONCE
Refills: 0 | Status: COMPLETED | OUTPATIENT
Start: 2022-03-10 | End: 2022-03-10

## 2022-03-10 RX ADMIN — Medication 100 MILLIGRAM(S): at 17:22

## 2022-03-10 RX ADMIN — DRONEDARONE 400 MILLIGRAM(S): 400 TABLET, FILM COATED ORAL at 05:00

## 2022-03-10 RX ADMIN — Medication 100 MILLIGRAM(S): at 12:22

## 2022-03-10 RX ADMIN — Medication 40 MILLIGRAM(S): at 12:20

## 2022-03-10 RX ADMIN — Medication 100 MILLIGRAM(S): at 04:41

## 2022-03-10 RX ADMIN — PANTOPRAZOLE SODIUM 40 MILLIGRAM(S): 20 TABLET, DELAYED RELEASE ORAL at 06:14

## 2022-03-10 RX ADMIN — Medication 100 MILLIGRAM(S): at 04:42

## 2022-03-10 RX ADMIN — Medication 40 MILLIGRAM(S): at 05:00

## 2022-03-10 RX ADMIN — ALBUTEROL 2.5 MILLIGRAM(S): 90 AEROSOL, METERED ORAL at 08:59

## 2022-03-10 RX ADMIN — ISOSORBIDE MONONITRATE 120 MILLIGRAM(S): 60 TABLET, EXTENDED RELEASE ORAL at 12:05

## 2022-03-10 RX ADMIN — DRONEDARONE 400 MILLIGRAM(S): 400 TABLET, FILM COATED ORAL at 17:23

## 2022-03-10 RX ADMIN — Medication 100 MILLIGRAM(S): at 17:23

## 2022-03-10 RX ADMIN — MONTELUKAST 10 MILLIGRAM(S): 4 TABLET, CHEWABLE ORAL at 22:15

## 2022-03-10 RX ADMIN — ALBUTEROL 2.5 MILLIGRAM(S): 90 AEROSOL, METERED ORAL at 15:52

## 2022-03-10 RX ADMIN — DORZOLAMIDE HYDROCHLORIDE TIMOLOL MALEATE 1 DROP(S): 20; 5 SOLUTION/ DROPS OPHTHALMIC at 17:24

## 2022-03-10 RX ADMIN — Medication 81 MILLIGRAM(S): at 12:06

## 2022-03-10 RX ADMIN — HEPARIN SODIUM 5000 UNIT(S): 5000 INJECTION INTRAVENOUS; SUBCUTANEOUS at 05:01

## 2022-03-10 RX ADMIN — DORZOLAMIDE HYDROCHLORIDE TIMOLOL MALEATE 1 DROP(S): 20; 5 SOLUTION/ DROPS OPHTHALMIC at 05:01

## 2022-03-10 RX ADMIN — LATANOPROST 1 DROP(S): 0.05 SOLUTION/ DROPS OPHTHALMIC; TOPICAL at 22:15

## 2022-03-10 RX ADMIN — SIMVASTATIN 40 MILLIGRAM(S): 20 TABLET, FILM COATED ORAL at 22:15

## 2022-03-10 RX ADMIN — HEPARIN SODIUM 5000 UNIT(S): 5000 INJECTION INTRAVENOUS; SUBCUTANEOUS at 17:22

## 2022-03-10 RX ADMIN — Medication 10 MILLIGRAM(S): at 06:37

## 2022-03-10 RX ADMIN — Medication 325 MILLIGRAM(S): at 12:06

## 2022-03-10 NOTE — PROGRESS NOTE ADULT - SUBJECTIVE AND OBJECTIVE BOX
NP Note discussed with Primary Attending.    Patient is a 89y old  Female who presents with a chief complaint of Cough (10 Mar 2022 11:43)      INTERVAL HPI/OVERNIGHT EVENTS: no new complaints    MEDICATIONS  (STANDING):  ALBUTerol    0.083% 2.5 milliGRAM(s) Nebulizer every 6 hours  aspirin  chewable 81 milliGRAM(s) Oral daily  dorzolamide 2%/timolol 0.5% Ophthalmic Solution 1 Drop(s) Both EYES two times a day  dronedarone 400 milliGRAM(s) Oral two times a day  ferrous    sulfate 325 milliGRAM(s) Oral daily  heparin   Injectable 5000 Unit(s) SubCutaneous every 12 hours  hydrALAZINE 100 milliGRAM(s) Oral <User Schedule>  influenza  Vaccine (HIGH DOSE) 0.7 milliLiter(s) IntraMuscular once  insulin lispro (ADMELOG) corrective regimen sliding scale   SubCutaneous three times a day before meals  isosorbide   mononitrate ER Tablet (IMDUR) 120 milliGRAM(s) Oral daily  latanoprost 0.005% Ophthalmic Solution 1 Drop(s) Both EYES at bedtime  methylPREDNISolone sodium succinate Injectable 40 milliGRAM(s) IV Push daily  metoprolol tartrate 100 milliGRAM(s) Oral two times a day  montelukast 10 milliGRAM(s) Oral at bedtime  pantoprazole    Tablet 40 milliGRAM(s) Oral before breakfast  simvastatin 40 milliGRAM(s) Oral at bedtime    MEDICATIONS  (PRN):  benzonatate 100 milliGRAM(s) Oral three times a day PRN Cough      __________________________________________________  REVIEW OF SYSTEMS:    CONSTITUTIONAL: No fever,   EYES: no acute visual disturbances  NECK: No pain or stiffness  RESPIRATORY: No cough; No shortness of breath  CARDIOVASCULAR: No chest pain, no palpitations  GASTROINTESTINAL: No pain. No nausea or vomiting; No diarrhea   NEUROLOGICAL: No headache or numbness, no tremors  MUSCULOSKELETAL: No joint pain, no muscle pain  GENITOURINARY: no dysuria, no frequency, no hesitancy  PSYCHIATRY: no depression , no anxiety  ALL OTHER  ROS negative        Vital Signs Last 24 Hrs  T(C): 36.3 (10 Mar 2022 14:13), Max: 36.4 (10 Mar 2022 04:34)  T(F): 97.4 (10 Mar 2022 14:13), Max: 97.6 (10 Mar 2022 04:34)  HR: 62 (10 Mar 2022 14:13) (60 - 63)  BP: 176/48 (10 Mar 2022 14:13) (176/48 - 216/89)  BP(mean): --  RR: 17 (10 Mar 2022 14:13) (16 - 18)  SpO2: 96% (10 Mar 2022 14:13) (95% - 96%)    ________________________________________________  PHYSICAL EXAM:  GENERAL: NAD  HEENT: Normocephalic;  conjunctivae and sclerae clear; moist mucous membranes;   NECK : supple  CHEST/LUNG: B/L wheezing to auscultation bilaterally with good air entry   HEART: Defibrillator S1 S2  regular; no murmurs, gallops or rubs  ABDOMEN: Soft, Nontender, Nondistended; Bowel sounds present  EXTREMITIES: no cyanosis; no edema; no calf tenderness  SKIN: warm and dry; no rash  NERVOUS SYSTEM:  Awake and alert; Oriented  to place, person disoriented to time ; no new deficits    _________________________________________________  LABS:                        12.5   10.27 )-----------( 161      ( 10 Mar 2022 06:15 )             36.9     03-10    138  |  104  |  24<H>  ----------------------------<  114<H>  4.0   |  32<H>  |  1.21    Ca    9.7      10 Mar 2022 06:15    TPro  6.9  /  Alb  3.2<L>  /  TBili  0.4  /  DBili  x   /  AST  20  /  ALT  26  /  AlkPhos  51  03-10        CAPILLARY BLOOD GLUCOSE      POCT Blood Glucose.: 118 mg/dL (10 Mar 2022 08:14)        RADIOLOGY & ADDITIONAL TESTS:    ACC: [id number] EXAM:  CT CHEST                          PROCEDURE DATE:  03/07/2022          INTERPRETATION:  INDICATION: Pneumonia    TECHNIQUE: Volumetric images of the chest without intravenous contrast.   Maximum intensity projection images weregenerated.    COMPARISON: 12/15/2021.    FINDINGS:    LUNGS/AIRWAYS/PLEURA: No endobronchial lesion. No bronchiectasis. Mild   air trapping in both lungs. Stable mild subpleural scarring in the right   apex. Unremarkable pleura.    LYMPH NODES/MEDIASTINUM: Stable enlarged multinodular thyroid. No   enlarged lymph nodes.    HEART/VASCULATURE: Enlarged left atrium. No pericardial effusion. Leads   within the right atrium and right ventricle. Mildly calcified coronary   arteries and aorta. Normal caliber aorta and main pulmonary artery.    UPPER ABDOMEN: Stable too small to characterize hypodensity in the left   hepatic lobe. Partially included left kidney cyst.    BONES/SOFT TISSUES: Degenerative changes of the spine.      IMPRESSION:    No pneumonia.    --- End of Report ---            CHARBEL ANNA M.D., ATTENDING RADIOGIST  This document has been electronically signed. Mar  7 2022 11:27AM    ACC: [id number] EXAM:  XR CHEST PORTABLE IMMED 1V                          PROCEDURE DATE:  03/07/2022          INTERPRETATION:  INDICATION: shortness of breath    PRIORS: December 10, 2021    VIEWS: Portable AP radiography of the chest performed.    FINDINGS: Heart size appears within normal limits. Note is made of an   indwelling pacemaker. No hilar or superior mediastinal abnormalities are   identified. There is no evidence for focal infiltrate, lobar   consolidation or pulmonary edema. No pleural effusion or pneumothorax is   demonstrated. No mediastinal shift is noted. The visualized osseous   structures appear unremarkable.    IMPRESSION: No evidence for focal infiltrate or lobar consolidation.    --- End of Report ---            BOONE BUTLER; Attending Radiologist  This document has been electronically signed. Mar  7 2022  9:02AM    Imaging  Reviewed:  YES/NO    Consultant(s) Notes Reviewed:   YES/ No      Plan of care was discussed with patient and /or primary care giver; all questions and concerns were addressed

## 2022-03-10 NOTE — CHART NOTE - NSCHARTNOTEFT_GEN_A_CORE
Patient is a 89y old  Female who presents with a chief complaint of Cough. Called by RN to notify that patient with elevated  systolic patient asymptomatic patient administered prescribed meds hydralazine and isosorbide. one hour later repeat BP shows a systolic of 203 systolic. patient given additional hydralazine 10 mg and will monitor BP for worsening condition.      Vital Signs Last 24 Hrs  T(C): 36.4 (10 Mar 2022 04:34), Max: 36.6 (09 Mar 2022 13:09)  T(F): 97.6 (10 Mar 2022 04:34), Max: 97.8 (09 Mar 2022 13:09)  HR: 60 (10 Mar 2022 04:34) (60 - 73)  BP: 203/78 (10 Mar 2022 06:20) (169/69 - 216/89)  BP(mean): --  RR: 18 (10 Mar 2022 04:34) (18 - 18)  SpO2: 96% (10 Mar 2022 04:34) (94% - 97%)      Labs:                          12.5   10.27 )-----------( 161      ( 10 Mar 2022 06:15 )             36.9     03-09    142  |  107  |  23<H>  ----------------------------<  130<H>  3.7   |  31  |  1.14    Ca    9.3      09 Mar 2022 05:51  Phos  2.7     03-08  Mg     2.6     03-08    TPro  7.1  /  Alb  3.2<L>  /  TBili  0.3  /  DBili  x   /  AST  21  /  ALT  23  /  AlkPhos  51  03-09        Radiology:    Physical Exam:  General: WN/WD NAD  Neurology: A&Ox3, nonfocal, HERNANDEZ x 4  Head:  Normocephalic, atraumatic  Respiratory: CTA B/L  CV: RRR, S1S2, no murmur  Abdominal: Soft, NT, ND no palpable mass  MSK: No edema, + peripheral pulses, FROM all 4 extremity    Assessment & Plan:  HPI:  Patient is a 89 y/o female, from home, minimally ambulatory with assistance, with PMHx of HTN, HFpEF (Grade II DD),Parox Afib (not on AC due to GIB),CRI, Complete Heart Block s/p PPM, HLD, and Dementia who presented with complaints of cough for the last three days. Patient  is Albanian speaking and is accompanied by her daughter who provides translation. Patient has non productive cough. Denies any fevers or chills. Daughter states that she was seen at [facility] for her cough, was told it was due to a viral illness and was discharged home. Her daughter gave her albuterol nebulizer treatment with no resolution of the cough or wheezing. Denies any leg swelling, cp or shortness of breath. Patient's daughter states she was sick with the cough and mother may have gotten something from her.  (07 Mar 2022 04:34)    >  >  >  >  I&O's Detail    08 Mar 2022 07:01  -  09 Mar 2022 07:00  --------------------------------------------------------  IN:  Total IN: 0 mL    OUT:    Voided (mL): 0 mL  Total OUT: 0 mL    Total NET: 0 mL            Follow up with Attending in AM.

## 2022-03-10 NOTE — PROGRESS NOTE ADULT - SUBJECTIVE AND OBJECTIVE BOX
Patient is a 89y old  Female who presents with a chief complaint of Cough (09 Mar 2022 12:34)  Awake, alert, comfortable in bed in NAD. Currently on RA    INTERVAL HPI/OVERNIGHT EVENTS:      VITAL SIGNS:  T(F): 97.6 (03-10-22 @ 04:34)  HR: 63 (03-10-22 @ 07:41)  BP: 178/77 (03-10-22 @ 07:41)  RR: 16 (03-10-22 @ 07:41)  SpO2: 96% (03-10-22 @ 07:41)  Wt(kg): --  I&O's Detail          REVIEW OF SYSTEMS:    CONSTITUTIONAL:  No fevers, chills, sweats    HEENT:  Eyes:  No diplopia or blurred vision. ENT:  No earache, sore throat or runny nose.    CARDIOVASCULAR:  No pressure, squeezing, tightness, or heaviness about the chest; no palpitations.    RESPIRATORY:  Per HPI    GASTROINTESTINAL:  No abdominal pain, nausea, vomiting or diarrhea.    GENITOURINARY:  No dysuria, frequency or urgency.    NEUROLOGIC:  No paresthesias, fasciculations, seizures or weakness.    PSYCHIATRIC:  No disorder of thought or mood.      PHYSICAL EXAM:    Constitutional: Well developed and nourished  Eyes:Perrla  ENMT: normal  Neck:supple  Respiratory: good air entry  Cardiovascular: S1 S2 regular  Gastrointestinal: Soft, Non tender  Extremities: No edema  Vascular:normal  Neurological:Awake, alert,Ox3  Musculoskeletal:Normal      MEDICATIONS  (STANDING):  ALBUTerol    0.083% 2.5 milliGRAM(s) Nebulizer every 6 hours  aspirin  chewable 81 milliGRAM(s) Oral daily  dorzolamide 2%/timolol 0.5% Ophthalmic Solution 1 Drop(s) Both EYES two times a day  dronedarone 400 milliGRAM(s) Oral two times a day  ferrous    sulfate 325 milliGRAM(s) Oral daily  heparin   Injectable 5000 Unit(s) SubCutaneous every 12 hours  hydrALAZINE 100 milliGRAM(s) Oral <User Schedule>  influenza  Vaccine (HIGH DOSE) 0.7 milliLiter(s) IntraMuscular once  insulin lispro (ADMELOG) corrective regimen sliding scale   SubCutaneous three times a day before meals  isosorbide   mononitrate ER Tablet (IMDUR) 120 milliGRAM(s) Oral daily  latanoprost 0.005% Ophthalmic Solution 1 Drop(s) Both EYES at bedtime  methylPREDNISolone sodium succinate Injectable 40 milliGRAM(s) IV Push daily  metoprolol tartrate 100 milliGRAM(s) Oral two times a day  montelukast 10 milliGRAM(s) Oral at bedtime  pantoprazole    Tablet 40 milliGRAM(s) Oral before breakfast  simvastatin 40 milliGRAM(s) Oral at bedtime    MEDICATIONS  (PRN):  benzonatate 100 milliGRAM(s) Oral three times a day PRN Cough      Allergies    No Known Allergies    Intolerances        LABS:                        12.5   10.27 )-----------( 161      ( 10 Mar 2022 06:15 )             36.9     03-10    138  |  104  |  24<H>  ----------------------------<  114<H>  4.0   |  32<H>  |  1.21    Ca    9.7      10 Mar 2022 06:15    TPro  6.9  /  Alb  3.2<L>  /  TBili  0.4  /  DBili  x   /  AST  20  /  ALT  26  /  AlkPhos  51  03-10              CAPILLARY BLOOD GLUCOSE      POCT Blood Glucose.: 118 mg/dL (10 Mar 2022 08:14)    pro-bnp 4107 03-07 @ 00:31     d-dimer --  03-07 @ 00:31      RADIOLOGY & ADDITIONAL TESTS:    CXR:  < from: Xray Chest 1 View-PORTABLE IMMEDIATE (03.07.22 @ 00:50) >  IMPRESSION: No evidence for focal infiltrate or lobar consolidation.    < end of copied text >    Ct scan chest:    ekg;    echo:

## 2022-03-10 NOTE — PROGRESS NOTE ADULT - SUBJECTIVE AND OBJECTIVE BOX
CHIEF COMPLAINT:Patient is a 89y old  Female who presents with a chief complaint of Cough.Pt appears comfortable.    	  REVIEW OF SYSTEMS:  CONSTITUTIONAL: No fever, weight loss, or fatigue  EYES: No eye pain, visual disturbances, or discharge  ENT:  No difficulty hearing, tinnitus, vertigo; No sinus or throat pain  NECK: No pain or stiffness  RESPIRATORY: No cough, wheezing, chills or hemoptysis; No Shortness of Breath  CARDIOVASCULAR: No chest pain, palpitations, passing out, dizziness, or leg swelling  GASTROINTESTINAL: No abdominal or epigastric pain. No nausea, vomiting, or hematemesis; No diarrhea or constipation. No melena or hematochezia.  GENITOURINARY: No dysuria, frequency, hematuria, or incontinence  NEUROLOGICAL: No headaches, memory loss, loss of strength, numbness, or tremors  SKIN: No itching, burning, rashes, or lesions   LYMPH Nodes: No enlarged glands  ENDOCRINE: No heat or cold intolerance; No hair loss  MUSCULOSKELETAL: No joint pain or swelling; No muscle, back, or extremity pain  PSYCHIATRIC: No depression, anxiety, mood swings, or difficulty sleeping  HEME/LYMPH: No easy bruising, or bleeding gums  ALLERGY AND IMMUNOLOGIC: No hives or eczema	        PHYSICAL EXAM:  T(C): 36.4 (03-10-22 @ 04:34), Max: 36.6 (03-09-22 @ 13:09)  HR: 63 (03-10-22 @ 07:41) (60 - 73)  BP: 178/77 (03-10-22 @ 07:41) (169/69 - 216/89)  RR: 16 (03-10-22 @ 07:41) (16 - 18)  SpO2: 96% (03-10-22 @ 07:41) (95% - 97%)        Appearance: Normal	  HEENT:   Normal oral mucosa, PERRL, EOMI	  Lymphatic: No lymphadenopathy  Cardiovascular: Normal S1 S2, No JVD, No murmurs, No edema  Respiratory: Lungs clear to auscultation	  Psychiatry: A & O x 3, Mood & affect appropriate  Gastrointestinal:  Soft, Non-tender, + BS	  Skin: No rashes, No ecchymoses, No cyanosis	  Neurologic: Non-focal  Extremities: Normal range of motion, No clubbing, cyanosis or edema  Vascular: Peripheral pulses palpable 2+ bilaterally    MEDICATIONS  (STANDING):  ALBUTerol    0.083% 2.5 milliGRAM(s) Nebulizer every 6 hours  aspirin  chewable 81 milliGRAM(s) Oral daily  dorzolamide 2%/timolol 0.5% Ophthalmic Solution 1 Drop(s) Both EYES two times a day  dronedarone 400 milliGRAM(s) Oral two times a day  ferrous    sulfate 325 milliGRAM(s) Oral daily  heparin   Injectable 5000 Unit(s) SubCutaneous every 12 hours  hydrALAZINE 100 milliGRAM(s) Oral <User Schedule>  influenza  Vaccine (HIGH DOSE) 0.7 milliLiter(s) IntraMuscular once  insulin lispro (ADMELOG) corrective regimen sliding scale   SubCutaneous three times a day before meals  isosorbide   mononitrate ER Tablet (IMDUR) 120 milliGRAM(s) Oral daily  latanoprost 0.005% Ophthalmic Solution 1 Drop(s) Both EYES at bedtime  methylPREDNISolone sodium succinate Injectable 40 milliGRAM(s) IV Push daily  metoprolol tartrate 100 milliGRAM(s) Oral two times a day  montelukast 10 milliGRAM(s) Oral at bedtime  pantoprazole    Tablet 40 milliGRAM(s) Oral before breakfast  simvastatin 40 milliGRAM(s) Oral at bedtime      	  	  LABS:	 	                          12.5   10.27 )-----------( 161      ( 10 Mar 2022 06:15 )             36.9     03-10    138  |  104  |  24<H>  ----------------------------<  114<H>  4.0   |  32<H>  |  1.21    Ca    9.7      10 Mar 2022 06:15    TPro  6.9  /  Alb  3.2<L>  /  TBili  0.4  /  DBili  x   /  AST  20  /  ALT  26  /  AlkPhos  51  03-10    proBNP: Serum Pro-Brain Natriuretic Peptide: 4107 pg/mL (03-07 @ 00:31)

## 2022-03-10 NOTE — PROGRESS NOTE ADULT - PROBLEM SELECTOR PLAN 5
presented with 204/73, now 188/73 s/p 1 dose of hydralazine in the ED  - denies any HA, cp, visual changes or shortness of breath  - c/w home medication of Imdur, hydralazine and metoprolol with holding parameters   - DASH and fluid restricted diet  - Monitor BP.
Presented with 204/73  - denies any HA, cp, visual changes or shortness of breath  - c/w home medication of Imdur, hydralazine and metoprolol with holding parameters   - DASH and fluid restricted diet  - Monitor BP.
presented with 204/73, now 188/73 s/p 1 dose of hydralazine in the ED  - denies any HA, cp, visual changes or shortness of breath  - c/w home medication of Imdur, hydralazine and metoprolol with holding parameters   - DASH and fluid restricted diet  - Monitor BP.
- continue with home medication simvastatin 40mg qd

## 2022-03-10 NOTE — PROGRESS NOTE ADULT - PROBLEM SELECTOR PLAN 6
- Heparin for DVT prophylaxis  - PPI
Heparin 5000U SC q 12h for DVT ppx.

## 2022-03-11 ENCOUNTER — TRANSCRIPTION ENCOUNTER (OUTPATIENT)
Age: 87
End: 2022-03-11

## 2022-03-11 VITALS
SYSTOLIC BLOOD PRESSURE: 171 MMHG | DIASTOLIC BLOOD PRESSURE: 65 MMHG | HEART RATE: 59 BPM | TEMPERATURE: 98 F | RESPIRATION RATE: 18 BRPM | OXYGEN SATURATION: 98 %

## 2022-03-11 LAB
ALBUMIN SERPL ELPH-MCNC: 3.3 G/DL — LOW (ref 3.5–5)
ALP SERPL-CCNC: 50 U/L — SIGNIFICANT CHANGE UP (ref 40–120)
ALT FLD-CCNC: 29 U/L DA — SIGNIFICANT CHANGE UP (ref 10–60)
ANION GAP SERPL CALC-SCNC: 5 MMOL/L — SIGNIFICANT CHANGE UP (ref 5–17)
AST SERPL-CCNC: 23 U/L — SIGNIFICANT CHANGE UP (ref 10–40)
BILIRUB SERPL-MCNC: 0.4 MG/DL — SIGNIFICANT CHANGE UP (ref 0.2–1.2)
BUN SERPL-MCNC: 26 MG/DL — HIGH (ref 7–18)
CALCIUM SERPL-MCNC: 9.2 MG/DL — SIGNIFICANT CHANGE UP (ref 8.4–10.5)
CHLORIDE SERPL-SCNC: 101 MMOL/L — SIGNIFICANT CHANGE UP (ref 96–108)
CO2 SERPL-SCNC: 30 MMOL/L — SIGNIFICANT CHANGE UP (ref 22–31)
CREAT SERPL-MCNC: 1.2 MG/DL — SIGNIFICANT CHANGE UP (ref 0.5–1.3)
EGFR: 42 ML/MIN/1.73M2 — LOW
GLUCOSE SERPL-MCNC: 98 MG/DL — SIGNIFICANT CHANGE UP (ref 70–99)
HCT VFR BLD CALC: 36.3 % — SIGNIFICANT CHANGE UP (ref 34.5–45)
HGB BLD-MCNC: 12.3 G/DL — SIGNIFICANT CHANGE UP (ref 11.5–15.5)
MCHC RBC-ENTMCNC: 32.5 PG — SIGNIFICANT CHANGE UP (ref 27–34)
MCHC RBC-ENTMCNC: 33.9 GM/DL — SIGNIFICANT CHANGE UP (ref 32–36)
MCV RBC AUTO: 96 FL — SIGNIFICANT CHANGE UP (ref 80–100)
NRBC # BLD: 0 /100 WBCS — SIGNIFICANT CHANGE UP (ref 0–0)
PLATELET # BLD AUTO: 200 K/UL — SIGNIFICANT CHANGE UP (ref 150–400)
POTASSIUM SERPL-MCNC: 3.8 MMOL/L — SIGNIFICANT CHANGE UP (ref 3.5–5.3)
POTASSIUM SERPL-SCNC: 3.8 MMOL/L — SIGNIFICANT CHANGE UP (ref 3.5–5.3)
PROT SERPL-MCNC: 6.8 G/DL — SIGNIFICANT CHANGE UP (ref 6–8.3)
RBC # BLD: 3.78 M/UL — LOW (ref 3.8–5.2)
RBC # FLD: 12.2 % — SIGNIFICANT CHANGE UP (ref 10.3–14.5)
SODIUM SERPL-SCNC: 136 MMOL/L — SIGNIFICANT CHANGE UP (ref 135–145)
WBC # BLD: 10.52 K/UL — HIGH (ref 3.8–10.5)
WBC # FLD AUTO: 10.52 K/UL — HIGH (ref 3.8–10.5)

## 2022-03-11 PROCEDURE — 71250 CT THORAX DX C-: CPT

## 2022-03-11 PROCEDURE — 83735 ASSAY OF MAGNESIUM: CPT

## 2022-03-11 PROCEDURE — 36415 COLL VENOUS BLD VENIPUNCTURE: CPT

## 2022-03-11 PROCEDURE — 94640 AIRWAY INHALATION TREATMENT: CPT

## 2022-03-11 PROCEDURE — 71045 X-RAY EXAM CHEST 1 VIEW: CPT

## 2022-03-11 PROCEDURE — 87635 SARS-COV-2 COVID-19 AMP PRB: CPT

## 2022-03-11 PROCEDURE — 93005 ELECTROCARDIOGRAM TRACING: CPT

## 2022-03-11 PROCEDURE — 99285 EMERGENCY DEPT VISIT HI MDM: CPT | Mod: 25

## 2022-03-11 PROCEDURE — 84484 ASSAY OF TROPONIN QUANT: CPT

## 2022-03-11 PROCEDURE — 84100 ASSAY OF PHOSPHORUS: CPT

## 2022-03-11 PROCEDURE — 80053 COMPREHEN METABOLIC PANEL: CPT

## 2022-03-11 PROCEDURE — 82962 GLUCOSE BLOOD TEST: CPT

## 2022-03-11 PROCEDURE — 85025 COMPLETE CBC W/AUTO DIFF WBC: CPT

## 2022-03-11 PROCEDURE — 0225U NFCT DS DNA&RNA 21 SARSCOV2: CPT

## 2022-03-11 PROCEDURE — 84145 PROCALCITONIN (PCT): CPT

## 2022-03-11 PROCEDURE — 85027 COMPLETE CBC AUTOMATED: CPT

## 2022-03-11 PROCEDURE — 83880 ASSAY OF NATRIURETIC PEPTIDE: CPT

## 2022-03-11 PROCEDURE — 83036 HEMOGLOBIN GLYCOSYLATED A1C: CPT

## 2022-03-11 RX ORDER — MONTELUKAST 4 MG/1
1 TABLET, CHEWABLE ORAL
Qty: 30 | Refills: 0
Start: 2022-03-11 | End: 2022-04-09

## 2022-03-11 RX ORDER — ISOSORBIDE MONONITRATE 60 MG/1
1 TABLET, EXTENDED RELEASE ORAL
Qty: 0 | Refills: 0 | DISCHARGE

## 2022-03-11 RX ORDER — FERROUS SULFATE 325(65) MG
1 TABLET ORAL
Qty: 0 | Refills: 0 | DISCHARGE
Start: 2022-03-11

## 2022-03-11 RX ORDER — ISOSORBIDE MONONITRATE 60 MG/1
1 TABLET, EXTENDED RELEASE ORAL
Qty: 0 | Refills: 0 | DISCHARGE
Start: 2022-03-11

## 2022-03-11 RX ORDER — METOPROLOL TARTRATE 50 MG
1 TABLET ORAL
Qty: 0 | Refills: 0 | DISCHARGE
Start: 2022-03-11

## 2022-03-11 RX ORDER — FUROSEMIDE 40 MG
1 TABLET ORAL
Qty: 0 | Refills: 0 | DISCHARGE

## 2022-03-11 RX ADMIN — DORZOLAMIDE HYDROCHLORIDE TIMOLOL MALEATE 1 DROP(S): 20; 5 SOLUTION/ DROPS OPHTHALMIC at 05:29

## 2022-03-11 RX ADMIN — HEPARIN SODIUM 5000 UNIT(S): 5000 INJECTION INTRAVENOUS; SUBCUTANEOUS at 05:28

## 2022-03-11 RX ADMIN — PANTOPRAZOLE SODIUM 40 MILLIGRAM(S): 20 TABLET, DELAYED RELEASE ORAL at 06:13

## 2022-03-11 RX ADMIN — Medication 40 MILLIGRAM(S): at 05:27

## 2022-03-11 RX ADMIN — ALBUTEROL 2.5 MILLIGRAM(S): 90 AEROSOL, METERED ORAL at 01:44

## 2022-03-11 RX ADMIN — Medication 100 MILLIGRAM(S): at 05:28

## 2022-03-11 RX ADMIN — ALBUTEROL 2.5 MILLIGRAM(S): 90 AEROSOL, METERED ORAL at 09:40

## 2022-03-11 RX ADMIN — DRONEDARONE 400 MILLIGRAM(S): 400 TABLET, FILM COATED ORAL at 05:28

## 2022-03-11 NOTE — PROGRESS NOTE ADULT - PROVIDER SPECIALTY LIST ADULT
Internal Medicine
Pulmonology
Internal Medicine
Pulmonology

## 2022-03-11 NOTE — DISCHARGE NOTE NURSING/CASE MANAGEMENT/SOCIAL WORK - PATIENT PORTAL LINK FT
You can access the FollowMyHealth Patient Portal offered by [facility] by registering at the following website: [email or web address]. By joining [facility]’s FollowMyHealth portal, you will also be able to view your health information using other applications (apps) compatible with our system.

## 2022-03-11 NOTE — DISCHARGE NOTE PROVIDER - HOSPITAL COURSE
Patient is 89 y/o female, from home, minimally ambulatory with assistance, with PMHx of HTN, HFpEF (Grade II DD),Parox Afib (not on AC due to GIB),CRI, Complete Heart Block s/p PPM, HLD, and Dementia who presented with complaints of cough for three days. Upon evaluation patient saturating well on room air, with no respiratory distress noted. Patient noted to be wheezing in all lung fields, with trace edema on LE. Patient admitted for cough 2/2 bronchitis vs Acute on chronic CHF exacerbation.   She was given one dose of Lasix IV, and Tessalon Pearle for cough.  CXR no effusion/infiltrate.  CT chest showed no pneumonia.Dr. Solano was consulted, recommends gave steroid taper for asthma exacerbation. PFT's as outpatient.  Dr. Harrell was consulted, B/P controlled, patient is stable for discharge home. Follow up with Dr. Harrell as an outpatient.

## 2022-03-11 NOTE — PROGRESS NOTE ADULT - ASSESSMENT
89 y/o female, from home, minimally ambulatory with assistance, with PMHx of HTN, HFpEF (Grade II DD),Parox Afib (not on AC due to GIB),CRI, Complete Heart Block s/p PPM, HLD, s/p COVID,AS and Dementia who presented with acute bronchitis/asthma exacerbation.  1.Acute bronchitis/asthma exacerbation-steroids,singulair.  2.Pulm f/u.  3.PAF-asa,multaq,lopressor.  4.HTN- Imdur,hydralazine.  5.Hx of GI Bleed-off AC.  6.PPI.  7.DVT prophylaxis.      
89 y/o female, from home, minimally ambulatory with assistance, with PMHx of HTN, HFpEF (Grade II DD),Parox Afib (not on AC due to GIB),CRI, Complete Heart Block s/p PPM, HLD, s/p COVID,AS and Dementia who presented with acute bronchitis/asthma exacerbation.  1.Acute bronchitis/asthma exacerbation-steroids,singulair.  2.Pulm f/u.  3.PAF-asa,multaq,lopressor.  4.HTN-inc Imdur 120mg qd,hydralazine.  5.Hx of GI Bleed-off AC.  6.PPI.  7.DVT prophylaxis.      
89 y/o female, from home, minimally ambulatory with assistance, with PMHx of HTN, HFpEF (Grade II DD),Parox Afib (not on AC due to GIB),CRI, Complete Heart Block s/p PPM, HLD, s/p COVID,AS and Dementia who presented with acute bronchitis/asthma exacerbation.  1.Acute bronchitis/asthma exacerbation-po prednisone bishnu,singulair.  2.Pulm f/u.  3.PAF-asa,multaq,lopressor.  4.HTN- Imdur,hydralazine.  5.Hx of GI Bleed-off AC.  6.PPI.  7.DVT prophylaxis.      
89 y/o female, from home, minimally ambulatory with assistance, with PMHx of HTN, HFpEF (Grade II DD),Parox Afib (not on AC due to GIB),CRI, Complete Heart Block s/p PPM, HLD, s/p COVID,AS and Dementia who presented with acute bronchitis/asthma exacerbation.  1.Acute bronchitis/asthma exacerbation-steroids,singulair.  2.Pulm f/u.  3.PAF-asa,multaq,lopressor.  4.HTN-Imdur,hydralazine.  5.Hx of GI Bleed-off AC.  6.PPI.  7.DVT prophylaxis.      
Patient is a 89 y/o female, from home, minimally ambulatory with assistance, with PMHx of HTN, HFpEF (Grade II DD),Parox Afib (not on AC due to GIB),CRI, Complete Heart Block s/p PPM, HLD, and Dementia who presented with complaints of cough for the last three days. Patient has non productive cough. Patient admitted to medicine for asthma exacerbation and was started on nebulizers and IV steroids. Chest X-ray and chest CT negative for pneumonia or infiltrates. Pulmonology and cardiology consulted, troponin negative, patient has G1DD on previous Echo, no fluid overload on this admission.
Patient is 89 y/o female, from home, minimally ambulatory with assistance, with PMHx of HTN, HFpEF (Grade II DD),Parox Afib (not on AC due to GIB),CRI, Complete Heart Block s/p PPM, HLD, and Dementia who presented with complaints of cough for the last three days. Upon evaluation patient saturating well on room air, with no respiratory distress noted. Patient noted to be wheezing in all lung fields, with trace edema on LE. Patient admitted for cough 2/2 bronchitis vs Acute on chronic CHF exacerbation. 
Patient is a 89 y/o female, from home, minimally ambulatory with assistance, with PMHx of HTN, HFpEF (Grade II DD),Parox Afib (not on AC due to GIB),CRI, Complete Heart Block s/p PPM, HLD, and Dementia who presented with complaints of cough for the last three days. Patient has non productive cough. Patient admitted to medicine for asthma exacerbation and was started on nebulizers and IV steroids. Chest X-ray and chest CT negative for pneumonia or infiltrates. Pulmonology and cardiology consulted, troponin negative, patient has G1DD on previous Echo, no fluid overload on this admission. Patient is not tolerating well MDI inhaler, will switch to Nebulized, approved by dr. Solano, pt will need to go to private room. Patient is using nebulizers at home.    3/10- pt seen and examined at bedside, solumedrol taper to 40mh IVP q d, c/w albuterol nebulizers, monitor respiratory status. Pt was hypertensive overnight SBP >200, given extra Hydralazine 10mg IVP, now improved /48, likely 2/2 IV steroids. C/W current medications, monitor VS.
Patient is a 89 y/o female, from home, minimally ambulatory with assistance, with PMHx of HTN, HFpEF (Grade II DD),Parox Afib (not on AC due to GIB),CRI, Complete Heart Block s/p PPM, HLD, and Dementia who presented with complaints of cough for the last three days. Patient has non productive cough. Patient admitted to medicine for asthma exacerbation and was started on nebulizers and IV steroids. Chest X-ray and chest CT negative for pneumonia or infiltrates. Pulmonology and cardiology consulted, troponin negative, patient has G1DD on previous Echo, no fluid overload on this admission. Patient is not tolerating well MDI inhaler, will switch to Nebulized, approved by dr. Solano, pt will need to go to private room. Patient is using nebulizers at home.

## 2022-03-11 NOTE — DISCHARGE NOTE NURSING/CASE MANAGEMENT/SOCIAL WORK - NSDCPEFALRISK_GEN_ALL_CORE
For information on Fall & Injury Prevention, visit: [email or web address] OR  [email or web address] OR  https://www.cdc.gov/steadi/patient.html

## 2022-03-11 NOTE — DISCHARGE NOTE PROVIDER - CARE PROVIDER_API CALL
Carol Harrell  INTERNAL MEDICINE  [address] [place] [address]  Phone: [phone]  Fax: [phone]  Follow Up Time: 2 weeks    Rafy Solano)  Internal Medicine; Pulmonary Disease  [address] [place] [address]  Phone: [phone]  Fax: [phone]  Follow Up Time: 2 weeks

## 2022-03-11 NOTE — PROGRESS NOTE ADULT - SUBJECTIVE AND OBJECTIVE BOX
Patient is a 89y old  Female who presents with a chief complaint of Cough (11 Mar 2022 10:50)  Awake, alert, comfortable out of bed in NAD. Clinically improved  INTERVAL HPI/OVERNIGHT EVENTS:      VITAL SIGNS:  T(F): 97.5 (03-11-22 @ 10:30)  HR: 59 (03-11-22 @ 10:30)  BP: 171/65 (03-11-22 @ 10:30)  RR: 18 (03-11-22 @ 10:30)  SpO2: 98% (03-11-22 @ 10:30)  Wt(kg): --  I&O's Detail          REVIEW OF SYSTEMS:    CONSTITUTIONAL:  No fevers, chills, sweats    HEENT:  Eyes:  No diplopia or blurred vision. ENT:  No earache, sore throat or runny nose.    CARDIOVASCULAR:  No pressure, squeezing, tightness, or heaviness about the chest; no palpitations.    RESPIRATORY:  Per HPI    GASTROINTESTINAL:  No abdominal pain, nausea, vomiting or diarrhea.    GENITOURINARY:  No dysuria, frequency or urgency.    NEUROLOGIC:  No paresthesias, fasciculations, seizures or weakness.    PSYCHIATRIC:  No disorder of thought or mood.      PHYSICAL EXAM:    Constitutional: Well developed and nourished  Eyes:Perrla  ENMT: normal  Neck:supple  Respiratory: good air entry  Cardiovascular: S1 S2 regular  Gastrointestinal: Soft, Non tender  Extremities: No edema  Vascular:normal  Neurological:Awake, alert,Ox3  Musculoskeletal:Normal      MEDICATIONS  (STANDING):  ALBUTerol    0.083% 2.5 milliGRAM(s) Nebulizer every 6 hours  aspirin  chewable 81 milliGRAM(s) Oral daily  dorzolamide 2%/timolol 0.5% Ophthalmic Solution 1 Drop(s) Both EYES two times a day  dronedarone 400 milliGRAM(s) Oral two times a day  ferrous    sulfate 325 milliGRAM(s) Oral daily  heparin   Injectable 5000 Unit(s) SubCutaneous every 12 hours  hydrALAZINE 100 milliGRAM(s) Oral <User Schedule>  influenza  Vaccine (HIGH DOSE) 0.7 milliLiter(s) IntraMuscular once  insulin lispro (ADMELOG) corrective regimen sliding scale   SubCutaneous three times a day before meals  isosorbide   mononitrate ER Tablet (IMDUR) 120 milliGRAM(s) Oral daily  latanoprost 0.005% Ophthalmic Solution 1 Drop(s) Both EYES at bedtime  metoprolol tartrate 100 milliGRAM(s) Oral two times a day  montelukast 10 milliGRAM(s) Oral at bedtime  pantoprazole    Tablet 40 milliGRAM(s) Oral before breakfast  predniSONE   Tablet 40 milliGRAM(s) Oral daily  simvastatin 40 milliGRAM(s) Oral at bedtime    MEDICATIONS  (PRN):  benzonatate 100 milliGRAM(s) Oral three times a day PRN Cough      Allergies    No Known Allergies    Intolerances        LABS:                        12.3   10.52 )-----------( 200      ( 11 Mar 2022 06:25 )             36.3     03-11    136  |  101  |  26<H>  ----------------------------<  98  3.8   |  30  |  1.20    Ca    9.2      11 Mar 2022 06:25    TPro  6.8  /  Alb  3.3<L>  /  TBili  0.4  /  DBili  x   /  AST  23  /  ALT  29  /  AlkPhos  50  03-11              CAPILLARY BLOOD GLUCOSE        pro-bnp 4107 03-07 @ 00:31     d-dimer --  03-07 @ 00:31      RADIOLOGY & ADDITIONAL TESTS:    CXR:    Ct scan chest:    ekg;    echo:

## 2022-03-11 NOTE — DISCHARGE NOTE PROVIDER - NSDCMRMEDTOKEN_GEN_ALL_CORE_FT
aspirin 81 mg oral tablet, chewable: 1 tab(s) orally once a day  benzonatate 100 mg oral capsule: 1 cap(s) orally 3 times a day, As needed, Cough  dorzolamide-timolol 2%-0.5% preservative-free ophthalmic solution: 1 drop(s) to each affected eye 2 times a day  ferrous sulfate 325 mg (65 mg elemental iron) oral tablet: 1 tab(s) orally once a day  hydrALAZINE 100 mg oral tablet: 1 tab(s) orally 3 times a day  ipratropium-albuterol 0.5 mg-2.5 mg/3 mLinhalation solution: 3 milliliter(s) inhaled every 6 hours  isosorbide mononitrate 120 mg oral tablet, extended release: 1 tab(s) orally once a day  latanoprost 0.005% ophthalmic solution: 1 drop(s) to each affected eye once a day (at bedtime)  metoprolol tartrate 100 mg oral tablet: 1 tab(s) orally 2 times a day  montelukast 10 mg oral tablet: 1 tab(s) orally once a day (at bedtime)  Multaq 400 mg oral tablet: 1 tab(s) orally 2 times a day  pantoprazole 40 mg oral delayed release tablet: 1 tab(s) orally once a day (before a meal)  predniSONE 10 mg oral tablet: Please taper dose as follow  4 tabs daily x 3 days (40mg x 3 days)  3 tabs daily x 3 days (30mg x 3 days)  2 tabs daily x 3 days (20mg x 3days)  1 tab daily x 3 days (10 mg x 3 days)  Then D/C  simvastatin 40 mg oral tablet: 1 tab(s) orally once a day (at bedtime)

## 2022-03-11 NOTE — PROGRESS NOTE ADULT - PROBLEM SELECTOR PLAN 1
Bronchodilators  Taper steroids  oxygen supp prn  montelukast 10 mgs po Qhs  Pfts as OP
Bronchodilators  steroids  oxygen supp prn  montelukast 10 mgs po Qhs  Pfts as OP
- p/w with cough, non productive for the last three days   - DDX: Bronchitis vs Acute on Chronic CHF exacerbation   - RVP and COVID negative, BNP noted to be 4k   - cxr showing no infiltrates (pending official read)   - s/p 1 dose of solumedrol in ED   - continue with solumedrol q 8hrs  - albuterol and tessalon pearle for cough   - strict I/O   - daily weights  - Pulmonology Dr. Solano consulted
Bronchodilators  steroids  oxygen supp prn  montelukast 10 mgs po Qhs  Pfts as OP
Bronchodilators  Taper steroids  switch to po  oxygen supp prn  montelukast 10 mgs po Qhs  Pfts as OP
Patient presenting with cough, non productive for the last three days   - b/l expiratory wheezing   - RVP and COVID negative, BNP noted to be 4k   - cxr showing no infiltrates   -CT chest negative for pneumonia  - continue with solumedrol q 8hrs  - albuterol nebulized approved by dr. Solano.   - tessalon pearle PRN for cough   - strict I/O   - daily weights  - Pulmonology Dr. Solano consulted.
Patient presenting with cough, non productive for the last three days   - b/l expiratory wheezing   - RVP and COVID negative, BNP noted to be 4k   - cxr showing no infiltrates   -CT chest negative for pneumonia  - continue with solumedrol q 8hrs  - c/w albuterol and  - tessalon pearle PRN for cough   - strict I/O   - daily weights  - Pulmonology Dr. Solano consulted.
Patient presenting with cough, non productive for the last three days   - b/l expiratory wheezing   - RVP and COVID negative, BNP noted to be 4k   - cxr showing no infiltrates   -CT chest negative for pneumonia  - titrate solumedrol 40mg IVP qd  - c/w albuterol nebulizer approved by dr. Solano.   - tessalon pearle PRN for cough   - strict I/O   - daily weights  - Pulmonology Dr. Solano consulted.

## 2022-03-11 NOTE — PROGRESS NOTE ADULT - PROBLEM SELECTOR PROBLEM 4
Hypertensive urgency
Hypertensive urgency
Atrial fibrillation
Hypertensive urgency
Hypertensive urgency
Atrial fibrillation

## 2022-03-11 NOTE — PROGRESS NOTE ADULT - PROBLEM SELECTOR PLAN 4
monitor BP  control BP  cont meds  Cardio follow up
monitor BP  control BP  cont meds  Cardio follow up
Pt with Hx of Afib  - not on anticoagulation due to GI Bleed  - continue with Multaq, lopressor and aspirin.  - monitor vs
Pt with Hx of Afib  - not on anticoagulation due to GI Bleed  - continue with Multaq, lopressor and aspirin.  -monitor vs
monitor BP  control BP  cont meds  Cardio follow up
monitor BP  control BP  cont meds  Cardio follow up
- not on anticoagulation due to GI Bleed  - continue with Multaq, lopressor and aspirin
Pt with Hx of Afib  - not on anticoagulation due to GI Bleed  - continue with Multaq, lopressor and aspirin.  -monitor vs

## 2022-03-11 NOTE — PROGRESS NOTE ADULT - PROBLEM SELECTOR PROBLEM 1
Acute asthma exacerbation
Cough
Acute asthma exacerbation

## 2022-03-11 NOTE — PROGRESS NOTE ADULT - SUBJECTIVE AND OBJECTIVE BOX
CARDIOLOGY/MEDICAL ATTENDING    CHIEF COMPLAINT:Patient is a 89y old  Female who presents with a chief complaint of Cough.Pt appears comfortable.    	  REVIEW OF SYSTEMS:  CONSTITUTIONAL: No fever, weight loss, or fatigue  EYES: No eye pain, visual disturbances, or discharge  ENT:  No difficulty hearing, tinnitus, vertigo; No sinus or throat pain  NECK: No pain or stiffness  RESPIRATORY: No cough, wheezing, chills or hemoptysis; No Shortness of Breath  CARDIOVASCULAR: No chest pain, palpitations, passing out, dizziness, or leg swelling  GASTROINTESTINAL: No abdominal or epigastric pain. No nausea, vomiting, or hematemesis; No diarrhea or constipation. No melena or hematochezia.  GENITOURINARY: No dysuria, frequency, hematuria, or incontinence  NEUROLOGICAL: No headaches, memory loss, loss of strength, numbness, or tremors  SKIN: No itching, burning, rashes, or lesions   LYMPH Nodes: No enlarged glands  ENDOCRINE: No heat or cold intolerance; No hair loss  MUSCULOSKELETAL: No joint pain or swelling; No muscle, back, or extremity pain  PSYCHIATRIC: No depression, anxiety, mood swings, or difficulty sleeping  HEME/LYMPH: No easy bruising, or bleeding gums  ALLERGY AND IMMUNOLOGIC: No hives or eczema	      PHYSICAL EXAM:  T(C): 36.4 (03-11-22 @ 10:30), Max: 36.7 (03-11-22 @ 05:16)  HR: 59 (03-11-22 @ 10:30) (59 - 62)  BP: 171/65 (03-11-22 @ 10:30) (157/61 - 193/80)  RR: 18 (03-11-22 @ 10:30) (16 - 18)  SpO2: 98% (03-11-22 @ 10:30) (94% - 98%)  Wt(kg): --  I&O's Summary      Appearance: Normal	  HEENT:   Normal oral mucosa, PERRL, EOMI	  Lymphatic: No lymphadenopathy  Cardiovascular: Normal S1 S2, No JVD, No murmurs, No edema  Respiratory: Lungs clear to auscultation	  Psychiatry: A & O x 3, Mood & affect appropriate  Gastrointestinal:  Soft, Non-tender, + BS	  Skin: No rashes, No ecchymoses, No cyanosis	  Neurologic: Non-focal  Extremities: Normal range of motion, No clubbing, cyanosis or edema  Vascular: Peripheral pulses palpable 2+ bilaterally    MEDICATIONS  (STANDING):  ALBUTerol    0.083% 2.5 milliGRAM(s) Nebulizer every 6 hours  aspirin  chewable 81 milliGRAM(s) Oral daily  dorzolamide 2%/timolol 0.5% Ophthalmic Solution 1 Drop(s) Both EYES two times a day  dronedarone 400 milliGRAM(s) Oral two times a day  ferrous    sulfate 325 milliGRAM(s) Oral daily  heparin   Injectable 5000 Unit(s) SubCutaneous every 12 hours  hydrALAZINE 100 milliGRAM(s) Oral <User Schedule>  influenza  Vaccine (HIGH DOSE) 0.7 milliLiter(s) IntraMuscular once  insulin lispro (ADMELOG) corrective regimen sliding scale   SubCutaneous three times a day before meals  isosorbide   mononitrate ER Tablet (IMDUR) 120 milliGRAM(s) Oral daily  latanoprost 0.005% Ophthalmic Solution 1 Drop(s) Both EYES at bedtime  metoprolol tartrate 100 milliGRAM(s) Oral two times a day  montelukast 10 milliGRAM(s) Oral at bedtime  pantoprazole    Tablet 40 milliGRAM(s) Oral before breakfast  predniSONE   Tablet 40 milliGRAM(s) Oral daily  simvastatin 40 milliGRAM(s) Oral at bedtime    	  	  LABS:	 	                       12.3   10.52 )-----------( 200      ( 11 Mar 2022 06:25 )             36.3     03-11    136  |  101  |  26<H>  ----------------------------<  98  3.8   |  30  |  1.20    Ca    9.2      11 Mar 2022 06:25    TPro  6.8  /  Alb  3.3<L>  /  TBili  0.4  /  DBili  x   /  AST  23  /  ALT  29  /  AlkPhos  50  03-11    proBNP: Serum Pro-Brain Natriuretic Peptide: 4107 pg/mL (03-07 @ 00:31)

## 2022-03-11 NOTE — PROGRESS NOTE ADULT - PROBLEM SELECTOR PROBLEM 2
Cough
Atrial fibrillation
Cough
Atrial fibrillation
Hypertensive urgency
Cough

## 2022-03-11 NOTE — PROGRESS NOTE ADULT - PROBLEM SELECTOR PLAN 2
Pt with non-productive cough x 3 days  - c/w Tessalon pearls PRN q8 h
cont meds  Cardio follow up
Pt with non-productive cough x 3 days  - c/w Tessalon pearls PRN q8 h
cont meds  Cardio follow up
cont meds  Cardio follow up
- presented with 204/73, now 188/73 s/p 1 dose of hydralazine in the ED  - denies any HA, cp, visual changes or shortness of breath  - will resume home medication of Imdur, hydralazine and metoprolol with holding parameters   - DASH and fluid restricted diet  - Monitor BP
cont meds  Cardio follow up
Pt with non-productive cough x 3 days  - c/w Tessalon pearls PRN q8 h

## 2022-03-11 NOTE — PROGRESS NOTE ADULT - PROBLEM SELECTOR PLAN 3
Pt with  hx of Diastolic heart failure   - Echo done in 12/2021 showing EF of 55%, mild pulm htn, LVH and Grade 1 diastolic dysfunction  - has some trace edema on LE, BNP of 4K  - CXR normal  - Daily weights  - Strict I/O  - Fluid restricted diet.
cont meds  Cardio follow up
Pt with  hx of Diastolic heart failure   - Echo done in 12/2021 showing EF of 55%, mild pulm htn, LVH and Grade 1 diastolic dysfunction  - has some trace edema on LE, BNP of 4K  - CXR normal  - Daily weights  - Strict I/O  - Fluid restricted diet.
cont meds  Cardio follow up
- Echo done in 12/2021 showing EF of 55%, mild pulm htn, LVH and Grade 1 diastolic dysfunction  - has some trace edema on LE, BNP of 4K  - CXR normal (pending read)  - S/p 20mg IV lasix as renal function WNL  - Daily weights  - Strict I/O  - C/w BB
Pt with  hx of Diastolic heart failure   - Echo done in 12/2021 showing EF of 55%, mild pulm htn, LVH and Grade 1 diastolic dysfunction  - has some trace edema on LE, BNP of 4K  - CXR normal  - Daily weights  - Strict I/O  - Fluid restricted diet.

## 2022-03-11 NOTE — DISCHARGE NOTE PROVIDER - NSDCCPCAREPLAN_GEN_ALL_CORE_FT
PRINCIPAL DISCHARGE DIAGNOSIS  Diagnosis: Acute asthma exacerbation  Assessment and Plan of Treatment: A steroid taper was sent to your pharmacy.  You should take the prednisone as prescribed (40mg x 3 days, then 30mg x 3 days then 20 mg x 3 days then 10 mg x 3 days).  Follow up with Dr. Solano as outpatient for pulmonary function testing.      SECONDARY DISCHARGE DIAGNOSES  Diagnosis: HTN (hypertension)  Assessment and Plan of Treatment: Take medicaiton as prescribed and monitor B/P at home.  Follow up with Dr. Harrell as outpatient.    Diagnosis: CHF, chronic  Assessment and Plan of Treatment: Take medication as prescribed.  Follow up with Dr. Harrell as outpatient.    Diagnosis: Atrial fibrillation  Assessment and Plan of Treatment: Take medication as prescribed.  Follow up with Dr. Harrell as outpatient.

## 2022-03-11 NOTE — PROGRESS NOTE ADULT - PROBLEM SELECTOR PROBLEM 3
CHF, chronic

## 2022-05-20 NOTE — H&P ADULT - PROBLEM/PLAN-7
DISPLAY PLAN FREE TEXT Pt reports she had recent cardiology eval with echo and stress test, preop cardiology eval and reports requested on chart.

## 2022-08-02 NOTE — PROGRESS NOTE ADULT - PROBLEM SELECTOR PROBLEM 4
History of complete heart block
No

## 2022-08-02 NOTE — DISCHARGE NOTE PROVIDER - NSDCHC_MEDRECSTATUS_GEN_ALL_CORE
Social Work Discharge Planning:  [facility] initial assessment on 8/1 by [facility] in [facility]. Patient plan remains home no needs anticipated. MADHU following.   Electronically signed by JENNIFER Miranda on 8/2/2022 at 9:16 AM Admission Reconciliation is Completed  Discharge Reconciliation is Completed

## 2022-08-30 NOTE — ED ADULT TRIAGE NOTE - INTERPRETER NAME
on the discharge service for the patient. I have reviewed and made amendments to the documentation where necessary.
cara

## 2022-10-27 NOTE — ED ADULT NURSE NOTE - NS ED PATIENT SAFETY CONCERN
[facility] Note     Patient Name: Liana Escalera  Encounter Date: 10/27/22     Have you been cared for by a [facility] in the last 3 years and, if so, which description applies to you? NO  I am considered a "new" patient and must complete all patient intake questions  I am FEMALE/of child-bearing potential     REVIEW OF SYSTEMS:  Have you recently had or currently have any of the following? · Recent fever or chills? No  · Any non-healing wound? No  · Are you pregnant or planning to become pregnant? No  · Are you currently or planning to be nursing or breast feeding? No   PAST MEDICAL HISTORY:  Have you personally ever had or currently have any of the following? If "YES," then please provide more detail  · Skin cancer (such as Melanoma, Basal Cell Carcinoma, Squamous Cell Carcinoma? No  · Tuberculosis, HIV/AIDS, Hepatitis B or C: No  · Systemic Immunosuppression such as Diabetes, Biologic or Immunotherapy, Chemotherapy, Organ Transplantation, Bone Marrow Transplantation No  · Radiation Treatment No   FAMILY HISTORY:  Any "first degree relatives" (parent, brother, sister, or child) with the following? • Skin Cancer, Pancreatic or Other Cancer? YES, skin cancer, unsure which (father, brother and sister)   PATIENT EXPERIENCE:    • Do you want the Dermatologist to perform a COMPLETE skin exam today including a clinical examination under the "bra and underwear" areas? Yes  • If necessary, do we have your permission to call and leave a detailed message on your Preferred Phone number that includes your specific medical information?   Yes      Allergies   Allergen Reactions   • Penicillin G Benzathine Other (See Comments)     Childhood reaction   • Penicillins Other (See Comments) and Hives     Childhood reaction  Childhood reaction  Childhood reaction        Current Outpatient Medications:   •  Biotin 1 MG CAPS, Take by mouth daily, Disp: , Rfl:   •  cholecalciferol (VITAMIN D3) 1,000 units tablet, Take 1 tablet by mouth daily, Disp: , Rfl:   •  cyanocobalamin (VITAMIN B-12) 1,000 mcg tablet, Take 1,000 mcg by mouth daily, Disp: , Rfl:   •  estradiol (ESTRACE) 0 1 mg/g vaginal cream, Insert 0 5 g into the vagina 2 (two) times a week, Disp: , Rfl:   •  Lysine 1000 MG TABS, Take 1 tablet by mouth daily, Disp: , Rfl:   •  Multiple Vitamins-Minerals (ZINC PO), Take by mouth prn , Disp: , Rfl:   •  Synthroid 75 MCG tablet, Take 1 tablet (75 mcg total) by mouth daily, Disp: 30 tablet, Rfl: 11  •  urea (Ureacin-20) 20 % cream, Apply topically as needed for dry skin, Disp: 85 g, Rfl: 0  •  ferrous sulfate 325 (65 Fe) mg tablet, Take 1 tablet by mouth daily prn, Disp: , Rfl:   •  folic acid (FOLVITE) 1 mg tablet, Take 1 tablet (1 mg total) by mouth daily (Patient taking differently: Take 1 mg by mouth daily prn), Disp: 30 tablet, Rfl: 0  •  magnesium oxide (MAG-OX) 400 mg, Take 1 tablet (400 mg total) by mouth daily for 30 doses, Disp: 30 tablet, Rfl: 0          • Whom besides the patient is providing clinical information about today's encounter?   o NO ADDITIONAL HISTORIAN (patient alone provided history)    Physical Exam and Assessment/Plan by Diagnosis:    SCREENING SKIN EXAM   Physical Exam:  • Anatomic Location Affected:  Trunk and extremities   • Morphological Description:  Normal skin appearance  • Pertinent Positives:  • Pertinent Negatives: Additional History of Present Condition:  Patient presents in office today for screening skin exam, spots of concern on nose and right arm   She noted "potential areas" and had a couple of things "freezed off "  She notes a strong FH of skin issues; [place]        Assessment and Plan:  Based on a thorough discussion of this condition and the management approach to it (including a comprehensive discussion of the known risks, side effects and potential benefits of treatment), the patient (family) agrees to implement the following specific plan:  • When outside we recommend using a wide brim hat, sunglasses, long sleeve and pants, sunscreen with SPF 54+ with reapplication every 2 hours, or SPF specific clothing  • Continue with routine skin exams   • Discussed Skin Medicinals as additional treatment for invisible pre-cancerous spots  • Will likely cause irritation  • Aquaphor Healing robby ad jessee  • Sun precautions      Fluorouracil: 5%  Calcipotriene: 0 005%  Vehicle: Cream       Scribe Attestation    I,:  Nubia Szymanski am acting as a scribe while in the presence of the attending physician :       I,:  Zohreh Collins MD personally performed the services described in this documentation    as scribed in my presence : No

## 2022-11-29 ENCOUNTER — INPATIENT (INPATIENT)
Facility: HOSPITAL | Age: 87
LOS: 6 days | Discharge: HOME | DRG: 202 | End: 2022-12-06
Attending: INTERNAL MEDICINE | Admitting: INTERNAL MEDICINE
Payer: COMMERCIAL

## 2022-11-29 VITALS
OXYGEN SATURATION: 95 % | RESPIRATION RATE: 17 BRPM | HEART RATE: 59 BPM | SYSTOLIC BLOOD PRESSURE: 191 MMHG | WEIGHT: 145.06 LBS | TEMPERATURE: 98 F | DIASTOLIC BLOOD PRESSURE: 74 MMHG

## 2022-11-29 DIAGNOSIS — Z95.0 PRESENCE OF CARDIAC PACEMAKER: Chronic | ICD-10-CM

## 2022-11-29 DIAGNOSIS — E78.00 PURE HYPERCHOLESTEROLEMIA, UNSPECIFIED: ICD-10-CM

## 2022-11-29 DIAGNOSIS — J45.901 UNSPECIFIED ASTHMA WITH (ACUTE) EXACERBATION: ICD-10-CM

## 2022-11-29 DIAGNOSIS — N17.9 ACUTE KIDNEY FAILURE, UNSPECIFIED: ICD-10-CM

## 2022-11-29 DIAGNOSIS — I50.9 HEART FAILURE, UNSPECIFIED: ICD-10-CM

## 2022-11-29 DIAGNOSIS — Z29.9 ENCOUNTER FOR PROPHYLACTIC MEASURES, UNSPECIFIED: ICD-10-CM

## 2022-11-29 DIAGNOSIS — I10 ESSENTIAL (PRIMARY) HYPERTENSION: ICD-10-CM

## 2022-11-29 DIAGNOSIS — I48.91 UNSPECIFIED ATRIAL FIBRILLATION: ICD-10-CM

## 2022-11-29 LAB
ALBUMIN SERPL ELPH-MCNC: 3.3 G/DL — LOW (ref 3.5–5)
ALP SERPL-CCNC: 49 U/L — SIGNIFICANT CHANGE UP (ref 40–120)
ALT FLD-CCNC: 26 U/L DA — SIGNIFICANT CHANGE UP (ref 10–60)
ANION GAP SERPL CALC-SCNC: 5 MMOL/L — SIGNIFICANT CHANGE UP (ref 5–17)
APPEARANCE UR: CLEAR — SIGNIFICANT CHANGE UP
APTT BLD: 30 SEC — SIGNIFICANT CHANGE UP (ref 27.5–35.5)
AST SERPL-CCNC: 36 U/L — SIGNIFICANT CHANGE UP (ref 10–40)
BASOPHILS # BLD AUTO: 0.08 K/UL — SIGNIFICANT CHANGE UP (ref 0–0.2)
BASOPHILS NFR BLD AUTO: 0.9 % — SIGNIFICANT CHANGE UP (ref 0–2)
BILIRUB SERPL-MCNC: 0.7 MG/DL — SIGNIFICANT CHANGE UP (ref 0.2–1.2)
BILIRUB UR-MCNC: NEGATIVE — SIGNIFICANT CHANGE UP
BUN SERPL-MCNC: 23 MG/DL — HIGH (ref 7–18)
CALCIUM SERPL-MCNC: 9.4 MG/DL — SIGNIFICANT CHANGE UP (ref 8.4–10.5)
CHLORIDE SERPL-SCNC: 102 MMOL/L — SIGNIFICANT CHANGE UP (ref 96–108)
CK SERPL-CCNC: 86 U/L — SIGNIFICANT CHANGE UP (ref 21–215)
CO2 SERPL-SCNC: 31 MMOL/L — SIGNIFICANT CHANGE UP (ref 22–31)
COLOR SPEC: YELLOW — SIGNIFICANT CHANGE UP
CREAT SERPL-MCNC: 1.53 MG/DL — HIGH (ref 0.5–1.3)
DIFF PNL FLD: NEGATIVE — SIGNIFICANT CHANGE UP
EGFR: 31 ML/MIN/1.73M2 — LOW
EOSINOPHIL # BLD AUTO: 0.37 K/UL — SIGNIFICANT CHANGE UP (ref 0–0.5)
EOSINOPHIL NFR BLD AUTO: 4.1 % — SIGNIFICANT CHANGE UP (ref 0–6)
GLUCOSE SERPL-MCNC: 117 MG/DL — HIGH (ref 70–99)
GLUCOSE UR QL: NEGATIVE — SIGNIFICANT CHANGE UP
HCT VFR BLD CALC: 35.8 % — SIGNIFICANT CHANGE UP (ref 34.5–45)
HGB BLD-MCNC: 11.6 G/DL — SIGNIFICANT CHANGE UP (ref 11.5–15.5)
IMM GRANULOCYTES NFR BLD AUTO: 0.7 % — SIGNIFICANT CHANGE UP (ref 0–0.9)
INR BLD: 1.01 RATIO — SIGNIFICANT CHANGE UP (ref 0.88–1.16)
KETONES UR-MCNC: NEGATIVE — SIGNIFICANT CHANGE UP
LEUKOCYTE ESTERASE UR-ACNC: NEGATIVE — SIGNIFICANT CHANGE UP
LYMPHOCYTES # BLD AUTO: 1.34 K/UL — SIGNIFICANT CHANGE UP (ref 1–3.3)
LYMPHOCYTES # BLD AUTO: 15 % — SIGNIFICANT CHANGE UP (ref 13–44)
MAGNESIUM SERPL-MCNC: 2.3 MG/DL — SIGNIFICANT CHANGE UP (ref 1.6–2.6)
MCHC RBC-ENTMCNC: 32.3 PG — SIGNIFICANT CHANGE UP (ref 27–34)
MCHC RBC-ENTMCNC: 32.4 GM/DL — SIGNIFICANT CHANGE UP (ref 32–36)
MCV RBC AUTO: 99.7 FL — SIGNIFICANT CHANGE UP (ref 80–100)
MONOCYTES # BLD AUTO: 0.7 K/UL — SIGNIFICANT CHANGE UP (ref 0–0.9)
MONOCYTES NFR BLD AUTO: 7.8 % — SIGNIFICANT CHANGE UP (ref 2–14)
NEUTROPHILS # BLD AUTO: 6.4 K/UL — SIGNIFICANT CHANGE UP (ref 1.8–7.4)
NEUTROPHILS NFR BLD AUTO: 71.5 % — SIGNIFICANT CHANGE UP (ref 43–77)
NITRITE UR-MCNC: NEGATIVE — SIGNIFICANT CHANGE UP
NRBC # BLD: 0 /100 WBCS — SIGNIFICANT CHANGE UP (ref 0–0)
NT-PROBNP SERPL-SCNC: 1873 PG/ML — HIGH (ref 0–450)
PH UR: 6.5 — SIGNIFICANT CHANGE UP (ref 5–8)
PLATELET # BLD AUTO: 167 K/UL — SIGNIFICANT CHANGE UP (ref 150–400)
POTASSIUM SERPL-MCNC: 4.5 MMOL/L — SIGNIFICANT CHANGE UP (ref 3.5–5.3)
POTASSIUM SERPL-SCNC: 4.5 MMOL/L — SIGNIFICANT CHANGE UP (ref 3.5–5.3)
PROT SERPL-MCNC: 6.9 G/DL — SIGNIFICANT CHANGE UP (ref 6–8.3)
PROT UR-MCNC: NEGATIVE — SIGNIFICANT CHANGE UP
PROTHROM AB SERPL-ACNC: 12 SEC — SIGNIFICANT CHANGE UP (ref 10.5–13.4)
RAPID RVP RESULT: SIGNIFICANT CHANGE UP
RBC # BLD: 3.59 M/UL — LOW (ref 3.8–5.2)
RBC # FLD: 12.5 % — SIGNIFICANT CHANGE UP (ref 10.3–14.5)
SARS-COV-2 RNA SPEC QL NAA+PROBE: SIGNIFICANT CHANGE UP
SODIUM SERPL-SCNC: 138 MMOL/L — SIGNIFICANT CHANGE UP (ref 135–145)
SP GR SPEC: 1.01 — SIGNIFICANT CHANGE UP (ref 1.01–1.02)
TROPONIN I, HIGH SENSITIVITY RESULT: 20 NG/L — SIGNIFICANT CHANGE UP
UROBILINOGEN FLD QL: NEGATIVE — SIGNIFICANT CHANGE UP
WBC # BLD: 8.95 K/UL — SIGNIFICANT CHANGE UP (ref 3.8–10.5)
WBC # FLD AUTO: 8.95 K/UL — SIGNIFICANT CHANGE UP (ref 3.8–10.5)

## 2022-11-29 PROCEDURE — 99285 EMERGENCY DEPT VISIT HI MDM: CPT

## 2022-11-29 PROCEDURE — 71045 X-RAY EXAM CHEST 1 VIEW: CPT | Mod: 26

## 2022-11-29 RX ORDER — ACETYLCYSTEINE 200 MG/ML
4 VIAL (ML) MISCELLANEOUS
Refills: 0 | Status: DISCONTINUED | OUTPATIENT
Start: 2022-11-29 | End: 2022-11-30

## 2022-11-29 RX ORDER — MONTELUKAST 4 MG/1
10 TABLET, CHEWABLE ORAL AT BEDTIME
Refills: 0 | Status: DISCONTINUED | OUTPATIENT
Start: 2022-11-29 | End: 2022-12-06

## 2022-11-29 RX ORDER — TIOTROPIUM BROMIDE 18 UG/1
2 CAPSULE ORAL; RESPIRATORY (INHALATION) DAILY
Refills: 0 | Status: DISCONTINUED | OUTPATIENT
Start: 2022-11-29 | End: 2022-12-06

## 2022-11-29 RX ORDER — ALBUTEROL 90 UG/1
2 AEROSOL, METERED ORAL EVERY 6 HOURS
Refills: 0 | Status: DISCONTINUED | OUTPATIENT
Start: 2022-11-29 | End: 2022-12-06

## 2022-11-29 RX ORDER — METOPROLOL TARTRATE 50 MG
100 TABLET ORAL
Refills: 0 | Status: DISCONTINUED | OUTPATIENT
Start: 2022-11-29 | End: 2022-12-06

## 2022-11-29 RX ORDER — DORZOLAMIDE HYDROCHLORIDE TIMOLOL MALEATE 20; 5 MG/ML; MG/ML
1 SOLUTION/ DROPS OPHTHALMIC
Refills: 0 | Status: DISCONTINUED | OUTPATIENT
Start: 2022-11-29 | End: 2022-12-06

## 2022-11-29 RX ORDER — DOXAZOSIN MESYLATE 4 MG
2 TABLET ORAL DAILY
Refills: 0 | Status: DISCONTINUED | OUTPATIENT
Start: 2022-11-29 | End: 2022-11-30

## 2022-11-29 RX ORDER — LATANOPROST 0.05 MG/ML
1 SOLUTION/ DROPS OPHTHALMIC; TOPICAL AT BEDTIME
Refills: 0 | Status: DISCONTINUED | OUTPATIENT
Start: 2022-11-29 | End: 2022-12-06

## 2022-11-29 RX ORDER — ACETAMINOPHEN 500 MG
650 TABLET ORAL EVERY 6 HOURS
Refills: 0 | Status: DISCONTINUED | OUTPATIENT
Start: 2022-11-29 | End: 2022-12-06

## 2022-11-29 RX ORDER — ASPIRIN/CALCIUM CARB/MAGNESIUM 324 MG
81 TABLET ORAL DAILY
Refills: 0 | Status: DISCONTINUED | OUTPATIENT
Start: 2022-11-29 | End: 2022-12-06

## 2022-11-29 RX ORDER — SIMVASTATIN 20 MG/1
40 TABLET, FILM COATED ORAL AT BEDTIME
Refills: 0 | Status: DISCONTINUED | OUTPATIENT
Start: 2022-11-29 | End: 2022-12-06

## 2022-11-29 RX ORDER — FERROUS SULFATE 325(65) MG
325 TABLET ORAL DAILY
Refills: 0 | Status: DISCONTINUED | OUTPATIENT
Start: 2022-11-29 | End: 2022-12-06

## 2022-11-29 RX ORDER — ALBUTEROL 90 UG/1
2 AEROSOL, METERED ORAL EVERY 6 HOURS
Refills: 0 | Status: DISCONTINUED | OUTPATIENT
Start: 2022-11-29 | End: 2022-11-29

## 2022-11-29 RX ORDER — HYDRALAZINE HCL 50 MG
100 TABLET ORAL THREE TIMES A DAY
Refills: 0 | Status: DISCONTINUED | OUTPATIENT
Start: 2022-11-29 | End: 2022-12-06

## 2022-11-29 RX ORDER — ISOSORBIDE MONONITRATE 60 MG/1
120 TABLET, EXTENDED RELEASE ORAL DAILY
Refills: 0 | Status: DISCONTINUED | OUTPATIENT
Start: 2022-11-29 | End: 2022-11-30

## 2022-11-29 RX ORDER — IPRATROPIUM/ALBUTEROL SULFATE 18-103MCG
3 AEROSOL WITH ADAPTER (GRAM) INHALATION EVERY 6 HOURS
Refills: 0 | Status: DISCONTINUED | OUTPATIENT
Start: 2022-11-29 | End: 2022-12-06

## 2022-11-29 RX ORDER — DOXAZOSIN MESYLATE 4 MG
1 TABLET ORAL
Qty: 0 | Refills: 0 | DISCHARGE

## 2022-11-29 RX ORDER — IPRATROPIUM/ALBUTEROL SULFATE 18-103MCG
3 AEROSOL WITH ADAPTER (GRAM) INHALATION
Refills: 0 | Status: COMPLETED | OUTPATIENT
Start: 2022-11-29 | End: 2022-11-29

## 2022-11-29 RX ORDER — HYDRALAZINE HCL 50 MG
1 TABLET ORAL
Qty: 0 | Refills: 0 | DISCHARGE

## 2022-11-29 RX ORDER — FUROSEMIDE 40 MG
1 TABLET ORAL
Qty: 0 | Refills: 0 | DISCHARGE

## 2022-11-29 RX ORDER — METOPROLOL TARTRATE 50 MG
100 TABLET ORAL ONCE
Refills: 0 | Status: COMPLETED | OUTPATIENT
Start: 2022-11-29 | End: 2022-11-29

## 2022-11-29 RX ORDER — DRONEDARONE 400 MG/1
400 TABLET, FILM COATED ORAL
Refills: 0 | Status: DISCONTINUED | OUTPATIENT
Start: 2022-11-29 | End: 2022-12-01

## 2022-11-29 RX ORDER — HEPARIN SODIUM 5000 [USP'U]/ML
5000 INJECTION INTRAVENOUS; SUBCUTANEOUS EVERY 8 HOURS
Refills: 0 | Status: DISCONTINUED | OUTPATIENT
Start: 2022-11-29 | End: 2022-12-02

## 2022-11-29 RX ORDER — PANTOPRAZOLE SODIUM 20 MG/1
40 TABLET, DELAYED RELEASE ORAL
Refills: 0 | Status: DISCONTINUED | OUTPATIENT
Start: 2022-11-29 | End: 2022-12-06

## 2022-11-29 RX ORDER — HYDRALAZINE HCL 50 MG
100 TABLET ORAL ONCE
Refills: 0 | Status: COMPLETED | OUTPATIENT
Start: 2022-11-29 | End: 2022-11-29

## 2022-11-29 RX ADMIN — Medication 100 MILLIGRAM(S): at 17:06

## 2022-11-29 RX ADMIN — HEPARIN SODIUM 5000 UNIT(S): 5000 INJECTION INTRAVENOUS; SUBCUTANEOUS at 22:25

## 2022-11-29 RX ADMIN — MONTELUKAST 10 MILLIGRAM(S): 4 TABLET, CHEWABLE ORAL at 22:25

## 2022-11-29 RX ADMIN — LATANOPROST 1 DROP(S): 0.05 SOLUTION/ DROPS OPHTHALMIC; TOPICAL at 23:00

## 2022-11-29 RX ADMIN — Medication 125 MILLIGRAM(S): at 15:15

## 2022-11-29 RX ADMIN — Medication 4 MILLILITER(S): at 23:55

## 2022-11-29 RX ADMIN — Medication 100 MILLIGRAM(S): at 22:25

## 2022-11-29 RX ADMIN — Medication 3 MILLILITER(S): at 17:07

## 2022-11-29 RX ADMIN — Medication 40 MILLIGRAM(S): at 22:25

## 2022-11-29 RX ADMIN — Medication 3 MILLILITER(S): at 17:20

## 2022-11-29 RX ADMIN — Medication 3 MILLILITER(S): at 15:15

## 2022-11-29 RX ADMIN — Medication 3 MILLILITER(S): at 22:26

## 2022-11-29 RX ADMIN — SIMVASTATIN 40 MILLIGRAM(S): 20 TABLET, FILM COATED ORAL at 22:25

## 2022-11-29 NOTE — ED PROVIDER NOTE - CLINICAL SUMMARY MEDICAL DECISION MAKING FREE TEXT BOX
21-Sep-2019 00:00 01-Oct-2019 11:03 20-Sep-2019 00:00 20-Sep-2019 02:27 22-Sep-2019 13:50 22-Sep-2019 14:22 27-Sep-2019 00:06 27-Sep-2019 13:00 27-Sep-2019 16:36 pt with asthma exac mostly 2/2 to viral infection.  Will get labs, give meds, admission

## 2022-11-29 NOTE — H&P ADULT - ATTENDING COMMENTS
89 y/o female, from home, minimally ambulatory with assistance, with PMHx of HTN, HFpEF (Grade II DD),Parox Afib (not on AC due to GIB),CRI, Complete Heart Block s/p PPM, HLD, Asthma, and Dementia coming to ED for cough and runny nose for the last 5 days.   1.Asthma steroids,MDI.  2.Pulm eval.  3.PAF-asa,b blocker,multaq.  4.CRI-f/u lytes.  5.Lipid d/o-statin.  6.GI and DVT prophylaxis.

## 2022-11-29 NOTE — ED PROVIDER NOTE - CARE PLAN
Principal Discharge DX:	Acute asthma exacerbation  Secondary Diagnosis:	CAMILLE (acute kidney injury)   1

## 2022-11-29 NOTE — ED ADULT NURSE NOTE - CHPI ED NUR SYMPTOMS NEG
no body aches/no chest pain/no chills/no diaphoresis/no fever/no headache/no hemoptysis/no shortness of breath

## 2022-11-29 NOTE — ED ADULT NURSE NOTE - NSIMPLEMENTINTERV_GEN_ALL_ED
Implemented All Fall with Harm Risk Interventions:  [place] to call system. Call bell, personal items and telephone within reach. Instruct patient to call for assistance. Room bathroom lighting operational. Non-slip footwear when patient is off stretcher. Physically safe environment: no spills, clutter or unnecessary equipment. Stretcher in lowest position, wheels locked, appropriate side rails in place. Provide visual cue, wrist band, yellow gown, etc. Monitor gait and stability. Monitor for mental status changes and reorient to person, place, and time. Review medications for side effects contributing to fall risk. Reinforce activity limits and safety measures with patient and family. Provide visual clues: red socks.

## 2022-11-29 NOTE — H&P ADULT - PROBLEM SELECTOR PLAN 1
s/p 125mg solumderol and duoneb tx x1  chest xray neg  c/w solumedrol 40mg IV TID  c/w duoneb  albuterol   c/w home meds  Dr. Solano pulerich consulted  Peak expiratory flow rate  spirometer   robitussin PRN  tylenol PRN fevers  Pt is on prednisone 5mg daily, on hold

## 2022-11-29 NOTE — ED PROVIDER NOTE - CONSTITUTIONAL MENTATION
awake/alert/oriented to person, place, time/situation knows her name, her daughter, where she is/awake/alert

## 2022-11-29 NOTE — ED ADULT NURSE NOTE - CAS EDP DISCH DISPOSITION ADMI
Advance Care Planning     General Advance Care Planning (ACP) Conversation    Date of Conversation: 4/21/2021  Conducted with:  Healthcare Decision Maker: Next of Kin by law (only applies in absence of above) (name) Romie Cheng Decision Maker:        Click here to complete [employer] including selection of the [employer] Relationship (ie \"Primary\")  Today we documented Decision Maker(s) consistent with Legal Next of Kin hierarchy.     Content/Action Overview:  Has NO ACP documents/care preferences - information provided, considering goals and options  Reviewed DNR/DNI and patient elects Full Code (Attempt Resuscitation)  treatment goals, benefit/burden of treatment options and resuscitation preferences  family is considering goals of care code status    Length of Voluntary ACP Conversation in minutes:  20 minutes    [address] [facility]

## 2022-11-29 NOTE — H&P ADULT - NSHPREVIEWOFSYSTEMS_GEN_ALL_CORE
She denies any fevers, chills, headaches, dizziness, chest pain, SOB, abd pain, n/v, diarrhea, constipation, hematuria, dysuria, numbness, and weakness.

## 2022-11-29 NOTE — H&P ADULT - ASSESSMENT
89 y/o female, from home, minimally ambulatory with assistance, with PMHx of HTN, HFpEF (Grade II DD),Parox Afib (not on AC due to GIB),CRI, Complete Heart Block s/p PPM, HLD, Asthma, and Dementia coming to ED for cough and runny nose for the last 5 days admitted for asthma exacerbation.

## 2022-11-29 NOTE — H&P ADULT - HISTORY OF PRESENT ILLNESS
89 y/o female, from home, minimally ambulatory with assistance, with PMHx of HTN, HFpEF (Grade II DD),Parox Afib (not on AC due to GIB),CRI, Complete Heart Block s/p PPM, HLD, and Dementia coming to E D 89 y/o female, from home, minimally ambulatory with assistance, with PMHx of HTN, HFpEF (Grade II DD),Parox Afib (not on AC due to GIB),CRI, Complete Heart Block s/p PPM, HLD, and Dementia coming to ED for cough and runny nose for the last 5 days. patients daughter at bedside states she has been sick for the last week and then her mother got sick a few days after. They tested for COVID and were negative. patient has been having dry cough associated with wheezing but no apparent respiratory distress per daughter. Daughter states that pateint usually receives Duoneb treatments every night and uses her inhaler everyday. She denies any fevers, chills, headaches, dizziness, chest pain, SOB, abd pain, n/v, diarrhea, constipation, hematuria, dysuria, numbness, and weakness.      In ED VS;  89 y/o female, from home, minimally ambulatory with assistance, with PMHx of HTN, HFpEF (Grade II DD),Parox Afib (not on AC due to GIB),CRI, Complete Heart Block s/p PPM, HLD, Asthma, and Dementia coming to ED for cough and runny nose for the last 5 days. patients daughter at bedside states she has been sick for the last week and then her mother got sick a few days after. They tested for COVID and were negative. patient has been having dry cough associated with wheezing but no apparent respiratory distress per daughter. Daughter states that pateint usually receives Duoneb treatments every night and uses her inhaler everyday. She denies any fevers, chills, headaches, dizziness, chest pain, SOB, abd pain, n/v, diarrhea, constipation, hematuria, dysuria, numbness, and weakness.      In ED VS: T 98, HR 59,/74, RR 17, spo2 95%  CXRAY Neg  Cr 1.53  s/p 100mg PO hydralazine, 100mg Metoprolol, 125mg IV solumderol and duonebs x1

## 2022-11-29 NOTE — ED PROVIDER NOTE - OBJECTIVE STATEMENT
89 y.o. female with mild dementia, as per daughter, pt with dry cough since last week, worse since last night, runny nose, coughing spells, wheezing, sob, no fever, chills, n/v, pt with good appetite,  Daughter noted pt with leg edema for past 2 weeks.  Pt's daughter who is taking care of pt developed a cold last week.  Pt's not vaccinated for COVID/flu

## 2022-11-30 LAB
ALBUMIN SERPL ELPH-MCNC: 3 G/DL — LOW (ref 3.5–5)
ALP SERPL-CCNC: 47 U/L — SIGNIFICANT CHANGE UP (ref 40–120)
ALT FLD-CCNC: 23 U/L DA — SIGNIFICANT CHANGE UP (ref 10–60)
ANION GAP SERPL CALC-SCNC: 9 MMOL/L — SIGNIFICANT CHANGE UP (ref 5–17)
AST SERPL-CCNC: 18 U/L — SIGNIFICANT CHANGE UP (ref 10–40)
BILIRUB SERPL-MCNC: 0.4 MG/DL — SIGNIFICANT CHANGE UP (ref 0.2–1.2)
BUN SERPL-MCNC: 24 MG/DL — HIGH (ref 7–18)
CALCIUM SERPL-MCNC: 9.3 MG/DL — SIGNIFICANT CHANGE UP (ref 8.4–10.5)
CHLORIDE SERPL-SCNC: 102 MMOL/L — SIGNIFICANT CHANGE UP (ref 96–108)
CO2 SERPL-SCNC: 29 MMOL/L — SIGNIFICANT CHANGE UP (ref 22–31)
CREAT ?TM UR-MCNC: 94 MG/DL — SIGNIFICANT CHANGE UP
CREAT SERPL-MCNC: 1.24 MG/DL — SIGNIFICANT CHANGE UP (ref 0.5–1.3)
CULTURE RESULTS: SIGNIFICANT CHANGE UP
EGFR: 40 ML/MIN/1.73M2 — LOW
GLUCOSE SERPL-MCNC: 154 MG/DL — HIGH (ref 70–99)
HCT VFR BLD CALC: 33.3 % — LOW (ref 34.5–45)
HGB BLD-MCNC: 11.1 G/DL — LOW (ref 11.5–15.5)
MAGNESIUM SERPL-MCNC: 2.5 MG/DL — SIGNIFICANT CHANGE UP (ref 1.6–2.6)
MCHC RBC-ENTMCNC: 32.6 PG — SIGNIFICANT CHANGE UP (ref 27–34)
MCHC RBC-ENTMCNC: 33.3 GM/DL — SIGNIFICANT CHANGE UP (ref 32–36)
MCV RBC AUTO: 97.7 FL — SIGNIFICANT CHANGE UP (ref 80–100)
MRSA PCR RESULT.: SIGNIFICANT CHANGE UP
NRBC # BLD: 0 /100 WBCS — SIGNIFICANT CHANGE UP (ref 0–0)
PHOSPHATE SERPL-MCNC: 2.5 MG/DL — SIGNIFICANT CHANGE UP (ref 2.5–4.5)
PLATELET # BLD AUTO: 153 K/UL — SIGNIFICANT CHANGE UP (ref 150–400)
POTASSIUM SERPL-MCNC: 3.5 MMOL/L — SIGNIFICANT CHANGE UP (ref 3.5–5.3)
POTASSIUM SERPL-SCNC: 3.5 MMOL/L — SIGNIFICANT CHANGE UP (ref 3.5–5.3)
PROT SERPL-MCNC: 6.3 G/DL — SIGNIFICANT CHANGE UP (ref 6–8.3)
RBC # BLD: 3.41 M/UL — LOW (ref 3.8–5.2)
RBC # FLD: 12.7 % — SIGNIFICANT CHANGE UP (ref 10.3–14.5)
S AUREUS DNA NOSE QL NAA+PROBE: SIGNIFICANT CHANGE UP
SODIUM SERPL-SCNC: 140 MMOL/L — SIGNIFICANT CHANGE UP (ref 135–145)
SODIUM UR-SCNC: 64 MMOL/L — SIGNIFICANT CHANGE UP
SPECIMEN SOURCE: SIGNIFICANT CHANGE UP
WBC # BLD: 4.8 K/UL — SIGNIFICANT CHANGE UP (ref 3.8–10.5)
WBC # FLD AUTO: 4.8 K/UL — SIGNIFICANT CHANGE UP (ref 3.8–10.5)

## 2022-11-30 RX ORDER — HYDRALAZINE HCL 50 MG
10 TABLET ORAL ONCE
Refills: 0 | Status: COMPLETED | OUTPATIENT
Start: 2022-11-30 | End: 2022-11-30

## 2022-11-30 RX ORDER — ISOSORBIDE MONONITRATE 60 MG/1
120 TABLET, EXTENDED RELEASE ORAL DAILY
Refills: 0 | Status: DISCONTINUED | OUTPATIENT
Start: 2022-11-30 | End: 2022-12-06

## 2022-11-30 RX ORDER — ACETYLCYSTEINE 200 MG/ML
4 VIAL (ML) MISCELLANEOUS EVERY 6 HOURS
Refills: 0 | Status: DISCONTINUED | OUTPATIENT
Start: 2022-11-30 | End: 2022-12-06

## 2022-11-30 RX ORDER — DOXAZOSIN MESYLATE 4 MG
4 TABLET ORAL AT BEDTIME
Refills: 0 | Status: DISCONTINUED | OUTPATIENT
Start: 2022-11-30 | End: 2022-12-06

## 2022-11-30 RX ORDER — INFLUENZA VIRUS VACCINE 15; 15; 15; 15 UG/.5ML; UG/.5ML; UG/.5ML; UG/.5ML
0.7 SUSPENSION INTRAMUSCULAR ONCE
Refills: 0 | Status: COMPLETED | OUTPATIENT
Start: 2022-11-30 | End: 2022-11-30

## 2022-11-30 RX ADMIN — HEPARIN SODIUM 5000 UNIT(S): 5000 INJECTION INTRAVENOUS; SUBCUTANEOUS at 13:14

## 2022-11-30 RX ADMIN — Medication 40 MILLIGRAM(S): at 13:14

## 2022-11-30 RX ADMIN — DORZOLAMIDE HYDROCHLORIDE TIMOLOL MALEATE 1 DROP(S): 20; 5 SOLUTION/ DROPS OPHTHALMIC at 17:52

## 2022-11-30 RX ADMIN — Medication 4 MILLILITER(S): at 15:39

## 2022-11-30 RX ADMIN — Medication 2 MILLIGRAM(S): at 06:42

## 2022-11-30 RX ADMIN — HEPARIN SODIUM 5000 UNIT(S): 5000 INJECTION INTRAVENOUS; SUBCUTANEOUS at 05:43

## 2022-11-30 RX ADMIN — Medication 325 MILLIGRAM(S): at 13:14

## 2022-11-30 RX ADMIN — MONTELUKAST 10 MILLIGRAM(S): 4 TABLET, CHEWABLE ORAL at 22:07

## 2022-11-30 RX ADMIN — Medication 4 MILLILITER(S): at 08:37

## 2022-11-30 RX ADMIN — Medication 40 MILLIGRAM(S): at 22:08

## 2022-11-30 RX ADMIN — Medication 10 MILLIGRAM(S): at 00:47

## 2022-11-30 RX ADMIN — LATANOPROST 1 DROP(S): 0.05 SOLUTION/ DROPS OPHTHALMIC; TOPICAL at 23:36

## 2022-11-30 RX ADMIN — DORZOLAMIDE HYDROCHLORIDE TIMOLOL MALEATE 1 DROP(S): 20; 5 SOLUTION/ DROPS OPHTHALMIC at 05:42

## 2022-11-30 RX ADMIN — Medication 100 MILLIGRAM(S): at 17:51

## 2022-11-30 RX ADMIN — Medication 100 MILLIGRAM(S): at 06:42

## 2022-11-30 RX ADMIN — HEPARIN SODIUM 5000 UNIT(S): 5000 INJECTION INTRAVENOUS; SUBCUTANEOUS at 22:08

## 2022-11-30 RX ADMIN — Medication 3 MILLILITER(S): at 15:39

## 2022-11-30 RX ADMIN — DRONEDARONE 400 MILLIGRAM(S): 400 TABLET, FILM COATED ORAL at 17:51

## 2022-11-30 RX ADMIN — Medication 3 MILLILITER(S): at 03:12

## 2022-11-30 RX ADMIN — Medication 4 MILLILITER(S): at 20:44

## 2022-11-30 RX ADMIN — PANTOPRAZOLE SODIUM 40 MILLIGRAM(S): 20 TABLET, DELAYED RELEASE ORAL at 05:43

## 2022-11-30 RX ADMIN — Medication 100 MILLIGRAM(S): at 05:42

## 2022-11-30 RX ADMIN — Medication 100 MILLIGRAM(S): at 22:07

## 2022-11-30 RX ADMIN — Medication 4 MILLIGRAM(S): at 22:07

## 2022-11-30 RX ADMIN — Medication 3 MILLILITER(S): at 20:44

## 2022-11-30 RX ADMIN — Medication 3 MILLILITER(S): at 08:12

## 2022-11-30 RX ADMIN — Medication 40 MILLIGRAM(S): at 05:42

## 2022-11-30 RX ADMIN — DRONEDARONE 400 MILLIGRAM(S): 400 TABLET, FILM COATED ORAL at 06:42

## 2022-11-30 RX ADMIN — Medication 81 MILLIGRAM(S): at 13:14

## 2022-11-30 RX ADMIN — Medication 4 MILLILITER(S): at 06:17

## 2022-11-30 RX ADMIN — SIMVASTATIN 40 MILLIGRAM(S): 20 TABLET, FILM COATED ORAL at 22:07

## 2022-11-30 NOTE — PROGRESS NOTE ADULT - SUBJECTIVE AND OBJECTIVE BOX
`Patient is a 89y old  Female who presents with a chief complaint of Asthma exacerbation (2022 12:17)      INTERVAL HPI/OVERNIGHT EVENTS: no overnight events    I&O's Summary    Vital Signs Last 24 Hrs  T(C): 36.8 (2022 12:54), Max: 36.8 (2022 12:54)  T(F): 98.2 (2022 12:54), Max: 98.2 (2022 12:54)  HR: 60 (2022 12:57) (60 - 86)  BP: 127/48 (2022 12:57) (112/51 - 220/84)  BP(mean): 107 (2022 23:31) (107 - 107)  RR: 20 (2022 12:54) (16 - 20)  SpO2: 94% (2022 12:54) (93% - 100%)    Parameters below as of 2022 12:54  Patient On (Oxygen Delivery Method): room air      PAST MEDICAL & SURGICAL HISTORY:  HTN (hypertension)      Hypercholesterolemia      Atrial fibrillation      History of complete heart block      Pulmonary HTN      CHF (congestive heart failure)      Artificial pacemaker          SOCIAL HISTORY  Alcohol:  Tobacco:  Illicit substance use:      FAMILY HISTORY:      LABS:                        11.1   4.80  )-----------( 153      ( 2022 07:11 )             33.3     11-30    140  |  102  |  24<H>  ----------------------------<  154<H>  3.5   |  29  |  1.24    Ca    9.3      2022 07:11  Phos  2.5     11-30  Mg     2.5     -30    TPro  6.3  /  Alb  3.0<L>  /  TBili  0.4  /  DBili  x   /  AST  18  /  ALT  23  /  AlkPhos  47  11-30    PT/INR - ( 2022 14:55 )   PT: 12.0 sec;   INR: 1.01 ratio         PTT - ( 2022 14:55 )  PTT:30.0 sec  Urinalysis Basic - ( 2022 14:55 )    Color: Yellow / Appearance: Clear / S.010 / pH: x  Gluc: x / Ketone: Negative  / Bili: Negative / Urobili: Negative   Blood: x / Protein: Negative / Nitrite: Negative   Leuk Esterase: Negative / RBC: x / WBC x   Sq Epi: x / Non Sq Epi: x / Bacteria: x      CAPILLARY BLOOD GLUCOSE      Urinalysis Basic - ( 2022 14:55 )    Color: Yellow / Appearance: Clear / S.010 / pH: x  Gluc: x / Ketone: Negative  / Bili: Negative / Urobili: Negative   Blood: x / Protein: Negative / Nitrite: Negative   Leuk Esterase: Negative / RBC: x / WBC x   Sq Epi: x / Non Sq Epi: x / Bacteria: x        MEDICATIONS  (STANDING):  acetylcysteine 20%  Inhalation 4 milliLiter(s) Inhalation every 6 hours  albuterol/ipratropium for Nebulization 3 milliLiter(s) Nebulizer every 6 hours  aspirin  chewable 81 milliGRAM(s) Oral daily  dorzolamide 2%/timolol 0.5% Ophthalmic Solution 1 Drop(s) Both EYES two times a day  doxazosin 4 milliGRAM(s) Oral at bedtime  dronedarone 400 milliGRAM(s) Oral two times a day  ferrous    sulfate 325 milliGRAM(s) Oral daily  heparin   Injectable 5000 Unit(s) SubCutaneous every 8 hours  hydrALAZINE 100 milliGRAM(s) Oral three times a day  isosorbide   mononitrate ER Tablet (IMDUR) 120 milliGRAM(s) Oral daily  latanoprost 0.005% Ophthalmic Solution 1 Drop(s) Both EYES at bedtime  methylPREDNISolone sodium succinate Injectable 40 milliGRAM(s) IV Push three times a day  metoprolol tartrate 100 milliGRAM(s) Oral two times a day  montelukast 10 milliGRAM(s) Oral at bedtime  pantoprazole    Tablet 40 milliGRAM(s) Oral before breakfast  simvastatin 40 milliGRAM(s) Oral at bedtime  tiotropium 2.5 MICROgram(s) Inhaler 2 Puff(s) Inhalation daily    MEDICATIONS  (PRN):  acetaminophen     Tablet .. 650 milliGRAM(s) Oral every 6 hours PRN Temp greater or equal to 38C (100.4F), Mild Pain (1 - 3)  albuterol    90 MICROgram(s) HFA Inhaler 2 Puff(s) Inhalation every 6 hours PRN Shortness of Breath and/or Wheezing  guaiFENesin Oral Liquid (Sugar-Free) 100 milliGRAM(s) Oral every 6 hours PRN Cough      REVIEW OF SYSTEMS:  CONSTITUTIONAL: No fever  EYES: No eye pain  ENMT:   No sinus or throat pain  NECK: No pain   RESPIRATORY: no cough, wheezing, chills or hemoptysis; No shortness of breath  CARDIOVASCULAR: No chest pain, palpitations  GASTROINTESTINAL: No abdominal pain. No nausea, vomiting, or hematemesis; No diarrhea or constipation. No melena or hematochezia.  GENITOURINARY: No dysuria, frequency, hematuria, or incontinence  NEUROLOGICAL: No headaches, memory loss, loss of strength, numbness, or tremors  SKIN: No itching, burning, rashes, or lesions   LYMPH NODES: No enlarged glands  ENDOCRINE: No heat or cold intolerance; No hair loss  MUSCULOSKELETAL: No joint pain or swelling; No muscle, back, or extremity pain  PSYCHIATRIC: No depression, anxiety, mood swings, or difficulty sleeping  HEME/LYMPH: No easy bruising, or bleeding gums  ALLERY AND IMMUNOLOGIC: No hives or eczema    RADIOLOGY & ADDITIONAL TESTS:    Imaging Personally Reviewed:  [ ] YES  [ ] NO    Consultant(s) Notes Reviewed:  [ ] YES  [ ] NO    PHYSICAL EXAM:  GENERAL: NAD, well-groomed, well-developed  HEAD:  Atraumatic, Normocephalic  EYES: EOMI, PERRLA, conjunctiva and sclera clear  ENMT: No tonsillar erythema, exudates, or enlargement; Moist mucous membranes, Good dentition, No lesions  NECK: Supple, No JVD, Normal thyroid  NERVOUS SYSTEM:  Alert & Oriented X3, Good concentration; Motor Strength 5/5 B/L upper and lower extremities; DTRs 2+ intact and symmetric  CHEST/LUNG: Clear to percussion bilaterally; No rales, rhonchi, wheezing, or rubs  HEART: Regular rate and rhythm; No murmurs, rubs, or gallops  ABDOMEN: Soft, Nontender, Nondistended; Bowel sounds present  EXTREMITIES:  2+ Peripheral Pulses, No clubbing, cyanosis, or edema  LYMPH: No lymphadenopathy noted  SKIN: No rashes or lesions    Care Collaborated Discussed with Consultants/Other Providers [ ] YES  [ ] NO `Patient is a 89y old  Female who presents with a chief complaint of Asthma exacerbation (2022 12:17)      INTERVAL HPI/OVERNIGHT EVENTS: no overnight events    I&O's Summary    Vital Signs Last 24 Hrs  T(C): 36.8 (2022 12:54), Max: 36.8 (2022 12:54)  T(F): 98.2 (2022 12:54), Max: 98.2 (2022 12:54)  HR: 60 (2022 12:57) (60 - 86)  BP: 127/48 (2022 12:57) (112/51 - 220/84)  BP(mean): 107 (2022 23:31) (107 - 107)  RR: 20 (2022 12:54) (16 - 20)  SpO2: 94% (2022 12:54) (93% - 100%)    Parameters below as of 2022 12:54  Patient On (Oxygen Delivery Method): room air      PAST MEDICAL & SURGICAL HISTORY:  HTN (hypertension)      Hypercholesterolemia      Atrial fibrillation      History of complete heart block      Pulmonary HTN      CHF (congestive heart failure)      Artificial pacemaker          SOCIAL HISTORY  Alcohol:  Tobacco:  Illicit substance use:      FAMILY HISTORY:      LABS:                        11.1   4.80  )-----------( 153      ( 2022 07:11 )             33.3     11-30    140  |  102  |  24<H>  ----------------------------<  154<H>  3.5   |  29  |  1.24    Ca    9.3      2022 07:11  Phos  2.5     11-30  Mg     2.5     -30    TPro  6.3  /  Alb  3.0<L>  /  TBili  0.4  /  DBili  x   /  AST  18  /  ALT  23  /  AlkPhos  47  11-30    PT/INR - ( 2022 14:55 )   PT: 12.0 sec;   INR: 1.01 ratio         PTT - ( 2022 14:55 )  PTT:30.0 sec  Urinalysis Basic - ( 2022 14:55 )    Color: Yellow / Appearance: Clear / S.010 / pH: x  Gluc: x / Ketone: Negative  / Bili: Negative / Urobili: Negative   Blood: x / Protein: Negative / Nitrite: Negative   Leuk Esterase: Negative / RBC: x / WBC x   Sq Epi: x / Non Sq Epi: x / Bacteria: x      CAPILLARY BLOOD GLUCOSE      Urinalysis Basic - ( 2022 14:55 )    Color: Yellow / Appearance: Clear / S.010 / pH: x  Gluc: x / Ketone: Negative  / Bili: Negative / Urobili: Negative   Blood: x / Protein: Negative / Nitrite: Negative   Leuk Esterase: Negative / RBC: x / WBC x   Sq Epi: x / Non Sq Epi: x / Bacteria: x        MEDICATIONS  (STANDING):  acetylcysteine 20%  Inhalation 4 milliLiter(s) Inhalation every 6 hours  albuterol/ipratropium for Nebulization 3 milliLiter(s) Nebulizer every 6 hours  aspirin  chewable 81 milliGRAM(s) Oral daily  dorzolamide 2%/timolol 0.5% Ophthalmic Solution 1 Drop(s) Both EYES two times a day  doxazosin 4 milliGRAM(s) Oral at bedtime  dronedarone 400 milliGRAM(s) Oral two times a day  ferrous    sulfate 325 milliGRAM(s) Oral daily  heparin   Injectable 5000 Unit(s) SubCutaneous every 8 hours  hydrALAZINE 100 milliGRAM(s) Oral three times a day  isosorbide   mononitrate ER Tablet (IMDUR) 120 milliGRAM(s) Oral daily  latanoprost 0.005% Ophthalmic Solution 1 Drop(s) Both EYES at bedtime  methylPREDNISolone sodium succinate Injectable 40 milliGRAM(s) IV Push three times a day  metoprolol tartrate 100 milliGRAM(s) Oral two times a day  montelukast 10 milliGRAM(s) Oral at bedtime  pantoprazole    Tablet 40 milliGRAM(s) Oral before breakfast  simvastatin 40 milliGRAM(s) Oral at bedtime  tiotropium 2.5 MICROgram(s) Inhaler 2 Puff(s) Inhalation daily    MEDICATIONS  (PRN):  acetaminophen     Tablet .. 650 milliGRAM(s) Oral every 6 hours PRN Temp greater or equal to 38C (100.4F), Mild Pain (1 - 3)  albuterol    90 MICROgram(s) HFA Inhaler 2 Puff(s) Inhalation every 6 hours PRN Shortness of Breath and/or Wheezing  guaiFENesin Oral Liquid (Sugar-Free) 100 milliGRAM(s) Oral every 6 hours PRN Cough      REVIEW OF SYSTEMS:  CONSTITUTIONAL: No fever  EYES: No eye pain  ENMT:   No sinus or throat pain  NECK: No pain   RESPIRATORY: no cough, wheezing, chills or hemoptysis; No shortness of breath  CARDIOVASCULAR: No chest pain, palpitations  GASTROINTESTINAL: No abdominal pain.   GENITOURINARY: No dysuria  NEUROLOGICAL: No headaches  SKIN: No rashes  MUSCULOSKELETAL: No joint pain     RADIOLOGY & ADDITIONAL TESTS:  < from: Xray Chest 1 View-PORTABLE IMMEDIATE (Xray Chest 1 View-PORTABLE IMMEDIATE .) (22 @ 15:55) >    ACC: [id number] EXAM:  XR CHEST PORTABLE IMMED 1V                          PROCEDURE DATE:  2022          INTERPRETATION:  INDICATION: Shortness of breath    COMPARISON: 3/7/2022    FINDINGS:  An AP chest radiograph shows clear lungs bilaterally. No infiltrates are   seen. There is no pneumothorax. There are no pleural effusions. There is   no hilar or mediastinal widening. Heart size is within normal limits for   the projection. An AV sequential pacemaker is present in situ, unchanged,   with no CHF. The aorta remains atherosclerotic. There are degenerative   changes of both shoulders once again.    IMPRESSION:  1. Clear lungs with no acute cardiopulmonary abnormalities.  2. AV sequential pacemaker in situ without CHF.    --- End of Report --      JIMMY OTERO MD; Attending Radiologist  This document has been electronically signed. 2022  4:01PM    < end of copied text >    Imaging Personally Reviewed:  [x ] YES  [ ] NO    Consultant(s) Notes Reviewed:  [x ] YES  [ ] NO    PHYSICAL EXAM:  GENERAL: NAD  HEAD:  Atraumatic, Normocephalic  EYES: conjunctiva and sclera clear  ENMT: Moist mucous membranes  NECK: Supple  NERVOUS SYSTEM:  Alert & Oriented X3, Good concentration ( [place] Lokesh [id number])  CHEST/LUNG: CTA bilaterally;  HEART: Regular rate and rhythm  ABDOMEN: Soft, Nontender, Nondistended; Bowel sounds present  EXTREMITIES:  2+ Peripheral Pulses  SKIN: No rashes     Care Collaborated Discussed with Consultants/Other Providers [ x] YES  [ ] NO

## 2022-11-30 NOTE — PATIENT PROFILE ADULT - NSPRONUTRITIONRISK_GEN_A_NUR
Foods suitable on a low-fodmap diet  fruitfruit vegetables herbs grain foods milk products other   banana, blueberry, boysenberry,  canteloupe, cranberry,  durian, grape,  grapefruit, honeydew  melon, kiwifruit, lemon, lime, mandarin, orange,  passionfruit, pawpaw,  raspberry, rhubarb,  rockmelon, star anise, strawberry, tangelo  Note: if fruit is dried, eat in  small quantities   alfalfa, artichoke,  bamboo shoots, bean shoots, bok yao, carrot, celery, choko, yao sum, endive, trista, green beans,  lettuce, olives, parsnip, potato, pumpkin, red  capsicum (bell pepper),  silver beet, spinach, summer squash (yellow), swede, sweet potato, taro, tomato, turnip, yam, zucchini   basil, chili, coriander,  trista, lemongrass,  marjoram, mint,  oregano, parsley,  rosemary, thyme   Cereals  gluten-free bread or  cereal products    bread  100% spelt bread    Rice    Oats    Polenta    Other  arrowroot, millet,  psyllium, quinoa,  sorgum, tapioca   milk  lactose-free milk,  oat milk*, rice milk,  soy milk*    *check for additives    cheeses  hard cheeses, and brie  and camembert  yoghurt  lactose-free varieties  ice-cream  substitutes  gelati, sorbet  butter substitutes  olive oil   sweeteners  sugar* (sucrose),  glucose, artificial  sweeteners not  ending in ‘-ol’  honey substitutes  quintanilla syrup*,  maple syrup*,  molasses, treacle    *small quant     Eliminate foods containing fodmaps  Excess fructose lactose fructans galactans polyols   fruit  apple, scottie, nashi, pear, tinned fruit in natural juice,  watermelon  sweeteners  fructose, high fructose  corn syrup  large total  fructose dose  concentrated fruit  sources, large serves  of fruit, dried fruit,  fruit juice  honey  corn syrup, fruisana milk  milk from cows, goats  or sheep, custard,  ice cream, yoghurt  cheeses  soft unripened cheeses  eg. cottage, cream,  mascarpone, ricotta vegetables  asparagus, beetroot, broccoli, brussels sprouts, cabbage,eggplant,  fennel, garlic,  mac, okra, onion (all), shallots, spring onion  cereals  wheat and rye, in large amounts eg. Bread, crackers, cookies, couscous, pasta  fruit  custard apple, persimmon,  watermelon  miscellaneous  chicory, dandelion, inulin legumes  baked beans,  chickpeas,  kidney beans,  lentils fruit  apple, apricot, avocado, blackberry, cherry, lychee, nashi, nectarine, peach, pear, plum, prune, watermelon  vegetables  cauliflower, green  capsicum (bell pepper), mushroom, sweet corn  sweeteners  sorbitol (420)  mannitol (421)  isomalt (953)  maltitol (965)  xylitol (967)      No indicators present

## 2022-11-30 NOTE — PROGRESS NOTE ADULT - SUBJECTIVE AND OBJECTIVE BOX
CHIEF COMPLAINT:Patient is a 89y old  Female who presents with a chief complaint of Asthma exacerbation.Pt appears comfortable.    	  REVIEW OF SYSTEMS:  CONSTITUTIONAL: No fever, weight loss, or fatigue  EYES: No eye pain, visual disturbances, or discharge  ENT:  No difficulty hearing, tinnitus, vertigo; No sinus or throat pain  NECK: No pain or stiffness  RESPIRATORY: No cough, wheezing, chills or hemoptysis; No Shortness of Breath  CARDIOVASCULAR: No chest pain, palpitations, passing out, dizziness, or leg swelling  GASTROINTESTINAL: No abdominal or epigastric pain. No nausea, vomiting, or hematemesis; No diarrhea or constipation. No melena or hematochezia.  GENITOURINARY: No dysuria, frequency, hematuria, or incontinence  NEUROLOGICAL: No headaches, memory loss, loss of strength, numbness, or tremors  SKIN: No itching, burning, rashes, or lesions   LYMPH Nodes: No enlarged glands  ENDOCRINE: No heat or cold intolerance; No hair loss  MUSCULOSKELETAL: No joint pain or swelling; No muscle, back, or extremity pain  PSYCHIATRIC: No depression, anxiety, mood swings, or difficulty sleeping  HEME/LYMPH: No easy bruising, or bleeding gums  ALLERGY AND IMMUNOLOGIC: No hives or eczema	        PHYSICAL EXAM:  T(C): 36.7 (11-30-22 @ 05:22), Max: 36.7 (11-29-22 @ 12:31)  HR: 86 (11-30-22 @ 05:22) (59 - 86)  BP: 176/77 (11-30-22 @ 05:22) (154/69 - 220/84)  RR: 20 (11-30-22 @ 05:22) (16 - 20)  SpO2: 93% (11-30-22 @ 05:22) (93% - 100%)  Wt(kg): --  I&O's Summary      Appearance: Normal	  HEENT:   Normal oral mucosa, PERRL, EOMI	  Lymphatic: No lymphadenopathy  Cardiovascular: Normal S1 S2, No JVD, No murmurs, No edema  Respiratory: B/L ronchi  Psychiatry: A & O x 3, Mood & affect appropriate  Gastrointestinal:  Soft, Non-tender, + BS	  Skin: No rashes, No ecchymoses, No cyanosis	  Neurologic: Non-focal  Extremities: Normal range of motion, No clubbing, cyanosis or edema  Vascular: Peripheral pulses palpable 2+ bilaterally    MEDICATIONS  (STANDING):  acetylcysteine 20%  Inhalation 4 milliLiter(s) Inhalation every 6 hours  albuterol/ipratropium for Nebulization 3 milliLiter(s) Nebulizer every 6 hours  aspirin  chewable 81 milliGRAM(s) Oral daily  dorzolamide 2%/timolol 0.5% Ophthalmic Solution 1 Drop(s) Both EYES two times a day  doxazosin 2 milliGRAM(s) Oral daily  dronedarone 400 milliGRAM(s) Oral two times a day  ferrous    sulfate 325 milliGRAM(s) Oral daily  heparin   Injectable 5000 Unit(s) SubCutaneous every 8 hours  hydrALAZINE 100 milliGRAM(s) Oral three times a day  isosorbide   mononitrate ER Tablet (IMDUR) 120 milliGRAM(s) Oral daily  latanoprost 0.005% Ophthalmic Solution 1 Drop(s) Both EYES at bedtime  methylPREDNISolone sodium succinate Injectable 40 milliGRAM(s) IV Push three times a day  metoprolol tartrate 100 milliGRAM(s) Oral two times a day  montelukast 10 milliGRAM(s) Oral at bedtime  pantoprazole    Tablet 40 milliGRAM(s) Oral before breakfast  simvastatin 40 milliGRAM(s) Oral at bedtime  tiotropium 2.5 MICROgram(s) Inhaler 2 Puff(s) Inhalation daily    	  	  LABS:	 	    CARDIAC MARKERS:  CARDIAC MARKERS ( 29 Nov 2022 14:55 )  x     / x     / 86 U/L / x     / x          Troponin I, High Sensitivity Result: 20.0 ng/L (11-29 @ 14:55)                            11.1   4.80  )-----------( 153      ( 30 Nov 2022 07:11 )             33.3     11-30    140  |  102  |  24<H>  ----------------------------<  154<H>  3.5   |  29  |  1.24    Ca    9.3      30 Nov 2022 07:11  Phos  2.5     11-30  Mg     2.5     11-30    TPro  6.3  /  Alb  3.0<L>  /  TBili  0.4  /  DBili  x   /  AST  18  /  ALT  23  /  AlkPhos  47  11-30    proBNP: Serum Pro-Brain Natriuretic Peptide: 1873 pg/mL (11-29 @ 14:55)    Lipid Profile:   HgA1c:   TSH:

## 2022-11-30 NOTE — PROGRESS NOTE ADULT - ASSESSMENT
89 y/o female, from home, minimally ambulatory with assistance, with PMHx of HTN, HFpEF (Grade II DD),Parox Afib (not on AC due to GIB),CRI, Complete Heart Block s/p PPM, HLD, Asthma, and Dementia coming to ED for cough and runny nose for the last 5 days admitted for asthma exacerbation. Cxr negative. s/p 125mg solumderol and duoneb tx x1. Dr. Solano pulm consulted. pt also noted to have CAMILLE,  Cr 1.53, f/u urine studies.

## 2022-11-30 NOTE — PROGRESS NOTE ADULT - PROBLEM SELECTOR PLAN 1
P/w cough , runny nose  rvp and covid negative  chest xray neg  c/w solumedrol 40mg IV TID  c/w duoneb  monitor SATs  Dr. Russ agarwal  Pt is on prednisone 5mg daily at home, on hold.

## 2022-11-30 NOTE — PROGRESS NOTE ADULT - PROBLEM SELECTOR PLAN 3
Uncontrolled  home meds metoprolol, hydralazine, doxazosin.  cardura inc 4 mg HS-->11/30/22  c/w home bp meds w/ parameters  monitor BP

## 2022-11-30 NOTE — CONSULT NOTE ADULT - SUBJECTIVE AND OBJECTIVE BOX
PULMONARY CONSULT NOTE      CLAUDETTE GARCES  [id number]    History of Present Illness:  Reason for Admission: Asthma exacerbation  History of Present Illness:   89 y/o female, from home, minimally ambulatory with assistance, with PMHx of HTN, HFpEF (Grade II DD),Parox Afib (not on AC due to GIB),CRI, Complete Heart Block s/p PPM, HLD, Asthma, and Dementia coming to ED for cough and runny nose for the last 5 days. patients daughter at bedside states she has been sick for the last week and then her mother got sick a few days after. They tested for COVID and were negative. patient has been having dry cough associated with wheezing but no apparent respiratory distress per daughter. Daughter states that pateint usually receives Duoneb treatments every night and uses her inhaler everyday. She denies any fevers, chills, headaches, dizziness, chest pain, SOB, abd pain, n/v, diarrhea, constipation, hematuria, dysuria, numbness, and weakness.        HISTORY OF PRESENT ILLNESS: As Above. Awake, alert, comfortable in bed in NAD    MEDICATIONS  (STANDING):  acetylcysteine 20%  Inhalation 4 milliLiter(s) Inhalation every 6 hours  albuterol/ipratropium for Nebulization 3 milliLiter(s) Nebulizer every 6 hours  aspirin  chewable 81 milliGRAM(s) Oral daily  dorzolamide 2%/timolol 0.5% Ophthalmic Solution 1 Drop(s) Both EYES two times a day  doxazosin 2 milliGRAM(s) Oral daily  dronedarone 400 milliGRAM(s) Oral two times a day  ferrous    sulfate 325 milliGRAM(s) Oral daily  heparin   Injectable 5000 Unit(s) SubCutaneous every 8 hours  hydrALAZINE 100 milliGRAM(s) Oral three times a day  isosorbide   mononitrate ER Tablet (IMDUR) 120 milliGRAM(s) Oral daily  latanoprost 0.005% Ophthalmic Solution 1 Drop(s) Both EYES at bedtime  methylPREDNISolone sodium succinate Injectable 40 milliGRAM(s) IV Push three times a day  metoprolol tartrate 100 milliGRAM(s) Oral two times a day  montelukast 10 milliGRAM(s) Oral at bedtime  pantoprazole    Tablet 40 milliGRAM(s) Oral before breakfast  simvastatin 40 milliGRAM(s) Oral at bedtime  tiotropium 2.5 MICROgram(s) Inhaler 2 Puff(s) Inhalation daily      MEDICATIONS  (PRN):  acetaminophen     Tablet .. 650 milliGRAM(s) Oral every 6 hours PRN Temp greater or equal to 38C (100.4F), Mild Pain (1 - 3)  albuterol    90 MICROgram(s) HFA Inhaler 2 Puff(s) Inhalation every 6 hours PRN Shortness of Breath and/or Wheezing  guaiFENesin Oral Liquid (Sugar-Free) 100 milliGRAM(s) Oral every 6 hours PRN Cough      Allergies    No Known Allergies    Intolerances        PAST MEDICAL & SURGICAL HISTORY:  HTN (hypertension)      Hypercholesterolemia      Atrial fibrillation      History of complete heart block      Pulmonary HTN      CHF (congestive heart failure)      Artificial pacemaker          FAMILY HISTORY:  FH: hypertension    FH: coronary artery disease        SOCIAL HISTORY  Smoking History:     REVIEW OF SYSTEMS:    CONSTITUTIONAL:  No fevers, chills, sweats    HEENT:  Eyes:  No diplopia or blurred vision. ENT:  No earache, sore throat or runny nose.    CARDIOVASCULAR:  No pressure, squeezing, tightness, or heaviness about the chest; no palpitations.    RESPIRATORY:  Per HPI    GASTROINTESTINAL:  No abdominal pain, nausea, vomiting or diarrhea.    GENITOURINARY:  No dysuria, frequency or urgency.    NEUROLOGIC:  No paresthesias, fasciculations, seizures or weakness.    PSYCHIATRIC:  No disorder of thought or mood.    Vital Signs Last 24 Hrs  T(C): 36.7 (2022 05:22), Max: 36.7 (2022 12:31)  T(F): 98.1 (2022 05:22), Max: 98.1 (2022 05:22)  HR: 86 (2022 05:22) (59 - 86)  BP: 176/77 (2022 05:22) (154/69 - 220/84)  BP(mean): 107 (2022 23:31) (107 - 107)  RR: 20 (2022 05:22) (16 - 20)  SpO2: 93% (2022 05:22) (93% - 100%)    Parameters below as of 2022 05:22  Patient On (Oxygen Delivery Method): room air      I&O's Detail      PHYSICAL EXAMINATION:    GENERAL: The patient is a well-developed, well-nourished _____in no apparent distress.     HEENT: Head is normocephalic and atraumatic. Extraocular muscles are intact. Mucous membranes are moist.     NECK: Supple.     LUNGS: B/L wheezes    HEART: Regular rate and rhythm without murmur.    ABDOMEN: Soft, nontender, and nondistended.  No hepatosplenomegaly is noted.    EXTREMITIES: Without any cyanosis, clubbing, rash, lesions or edema.    NEUROLOGIC: Grossly intact.      LABS:                        11.1   4.80  )-----------( 153      ( 2022 07:11 )             33.3         140  |  102  |  24<H>  ----------------------------<  154<H>  3.5   |  29  |  1.24    Ca    9.3      2022 07:11  Phos  2.5       Mg     2.5         TPro  6.3  /  Alb  3.0<L>  /  TBili  0.4  /  DBili  x   /  AST  18  /  ALT  23  /  AlkPhos  47  11-30    PT/INR - ( 2022 14:55 )   PT: 12.0 sec;   INR: 1.01 ratio         PTT - ( 2022 14:55 )  PTT:30.0 sec  Urinalysis Basic - ( 2022 14:55 )    Color: Yellow / Appearance: Clear / S.010 / pH: x  Gluc: x / Ketone: Negative  / Bili: Negative / Urobili: Negative   Blood: x / Protein: Negative / Nitrite: Negative   Leuk Esterase: Negative / RBC: x / WBC x   Sq Epi: x / Non Sq Epi: x / Bacteria: x        CARDIAC MARKERS ( 2022 14:55 )  x     / x     / 86 U/L / x     / x            Serum Pro-Brain Natriuretic Peptide: 1873 pg/mL (22 @ 14:55)          MICROBIOLOGY:    RADIOLOGY & ADDITIONAL STUDIES:    CXR:  < from: Xray Chest 1 View-PORTABLE IMMEDIATE (Xray Chest 1 View-PORTABLE IMMEDIATE .) (22 @ 15:55) >  IMPRESSION:  1. Clear lungs with no acute cardiopulmonary abnormalities.  2. AV sequential pacemaker in situ without CHF.    < end of copied text >    Ct scan chest;    ekg;    echo:

## 2022-11-30 NOTE — PATIENT PROFILE ADULT - FALL HARM RISK - HARM RISK INTERVENTIONS

## 2022-11-30 NOTE — PROGRESS NOTE ADULT - ASSESSMENT
89 y/o female, from home, minimally ambulatory with assistance, with PMHx of HTN, HFpEF (Grade II DD),Parox Afib (not on AC due to GIB),CRI, Complete Heart Block s/p PPM, HLD, Asthma, and Dementia coming to ED for cough and runny nose for the last 5 days.   1.Asthma steroids,MDI.  2.Pulm eval.  3.PAF-asa,b blocker,multaq.  4.CRI-f/u lytes.  5.Lipid d/o-statin.  6.HTN-inc cardura 4mg qhs, cont rest of bp medication.  7.GI and DVT prophylaxis.

## 2022-12-01 RX ORDER — LANOLIN ALCOHOL/MO/W.PET/CERES
5 CREAM (GRAM) TOPICAL AT BEDTIME
Refills: 0 | Status: DISCONTINUED | OUTPATIENT
Start: 2022-12-01 | End: 2022-12-06

## 2022-12-01 RX ORDER — RISPERIDONE 4 MG/1
0.25 TABLET ORAL
Refills: 0 | Status: DISCONTINUED | OUTPATIENT
Start: 2022-12-01 | End: 2022-12-06

## 2022-12-01 RX ORDER — OLANZAPINE 15 MG/1
2.5 TABLET, FILM COATED ORAL ONCE
Refills: 0 | Status: COMPLETED | OUTPATIENT
Start: 2022-12-01 | End: 2022-12-01

## 2022-12-01 RX ADMIN — Medication 100 MILLIGRAM(S): at 12:45

## 2022-12-01 RX ADMIN — Medication 325 MILLIGRAM(S): at 12:05

## 2022-12-01 RX ADMIN — OLANZAPINE 2.5 MILLIGRAM(S): 15 TABLET, FILM COATED ORAL at 09:26

## 2022-12-01 RX ADMIN — Medication 100 MILLIGRAM(S): at 21:34

## 2022-12-01 RX ADMIN — HEPARIN SODIUM 5000 UNIT(S): 5000 INJECTION INTRAVENOUS; SUBCUTANEOUS at 21:35

## 2022-12-01 RX ADMIN — SIMVASTATIN 40 MILLIGRAM(S): 20 TABLET, FILM COATED ORAL at 21:35

## 2022-12-01 RX ADMIN — HEPARIN SODIUM 5000 UNIT(S): 5000 INJECTION INTRAVENOUS; SUBCUTANEOUS at 14:27

## 2022-12-01 RX ADMIN — MONTELUKAST 10 MILLIGRAM(S): 4 TABLET, CHEWABLE ORAL at 21:35

## 2022-12-01 RX ADMIN — Medication 4 MILLILITER(S): at 09:02

## 2022-12-01 RX ADMIN — Medication 3 MILLILITER(S): at 20:20

## 2022-12-01 RX ADMIN — Medication 4 MILLIGRAM(S): at 21:35

## 2022-12-01 RX ADMIN — Medication 4 MILLILITER(S): at 15:31

## 2022-12-01 RX ADMIN — RISPERIDONE 0.25 MILLIGRAM(S): 4 TABLET ORAL at 17:16

## 2022-12-01 RX ADMIN — Medication 3 MILLILITER(S): at 15:32

## 2022-12-01 RX ADMIN — ISOSORBIDE MONONITRATE 120 MILLIGRAM(S): 60 TABLET, EXTENDED RELEASE ORAL at 12:06

## 2022-12-01 RX ADMIN — Medication 5 MILLIGRAM(S): at 21:35

## 2022-12-01 RX ADMIN — Medication 100 MILLIGRAM(S): at 12:46

## 2022-12-01 RX ADMIN — DORZOLAMIDE HYDROCHLORIDE TIMOLOL MALEATE 1 DROP(S): 20; 5 SOLUTION/ DROPS OPHTHALMIC at 17:17

## 2022-12-01 RX ADMIN — LATANOPROST 1 DROP(S): 0.05 SOLUTION/ DROPS OPHTHALMIC; TOPICAL at 21:34

## 2022-12-01 RX ADMIN — TIOTROPIUM BROMIDE 2 PUFF(S): 18 CAPSULE ORAL; RESPIRATORY (INHALATION) at 16:08

## 2022-12-01 RX ADMIN — Medication 81 MILLIGRAM(S): at 12:05

## 2022-12-01 RX ADMIN — Medication 4 MILLILITER(S): at 20:21

## 2022-12-01 RX ADMIN — Medication 100 MILLIGRAM(S): at 17:17

## 2022-12-01 RX ADMIN — Medication 3 MILLILITER(S): at 09:02

## 2022-12-01 NOTE — PROVIDER CONTACT NOTE (OTHER) - ASSESSMENT
Patient agitated, oriented to name, yelling, throwing blankets on the ground. Unable to obtain accurate vital signs. Patient refusing medications. Enhanced supervision at the bedside.

## 2022-12-01 NOTE — PROGRESS NOTE ADULT - SUBJECTIVE AND OBJECTIVE BOX
CHIEF COMPLAINT:Patient is a 89y old  Female who presents with a chief complaint of Asthma exacerbation.Pt confused this am.    	  REVIEW OF SYSTEMS:    [ x] Unable to obtain    PHYSICAL EXAM:  T(C): 36.3 (12-01-22 @ 05:15), Max: 36.8 (11-30-22 @ 12:54)  HR: 62 (12-01-22 @ 05:15) (60 - 78)  BP: 104/62 (12-01-22 @ 05:15) (104/62 - 188/90)  RR: 20 (12-01-22 @ 05:15) (20 - 20)  SpO2: 94% (12-01-22 @ 05:15) (94% - 97%)  Wt(kg): --  I&O's Summary      Appearance: Normal	  HEENT:   Normal oral mucosa, PERRL, EOMI	  Lymphatic: No lymphadenopathy  Cardiovascular: Normal S1 S2, No JVD, No murmurs, No edema  Respiratory: B/L ronchi	  Gastrointestinal:  Soft, Non-tender, + BS	  Skin: No rashes, No ecchymoses, No cyanosis	  Extremities: Normal range of motion, No clubbing, cyanosis or edema  Vascular: Peripheral pulses palpable 2+ bilaterally    MEDICATIONS  (STANDING):  acetylcysteine 20%  Inhalation 4 milliLiter(s) Inhalation every 6 hours  albuterol/ipratropium for Nebulization 3 milliLiter(s) Nebulizer every 6 hours  aspirin  chewable 81 milliGRAM(s) Oral daily  dorzolamide 2%/timolol 0.5% Ophthalmic Solution 1 Drop(s) Both EYES two times a day  doxazosin 4 milliGRAM(s) Oral at bedtime  ferrous    sulfate 325 milliGRAM(s) Oral daily  heparin   Injectable 5000 Unit(s) SubCutaneous every 8 hours  hydrALAZINE 100 milliGRAM(s) Oral three times a day  isosorbide   mononitrate ER Tablet (IMDUR) 120 milliGRAM(s) Oral daily  latanoprost 0.005% Ophthalmic Solution 1 Drop(s) Both EYES at bedtime  metoprolol tartrate 100 milliGRAM(s) Oral two times a day  montelukast 10 milliGRAM(s) Oral at bedtime  pantoprazole    Tablet 40 milliGRAM(s) Oral before breakfast  risperiDONE   Tablet 0.25 milliGRAM(s) Oral two times a day  simvastatin 40 milliGRAM(s) Oral at bedtime  tiotropium 2.5 MICROgram(s) Inhaler 2 Puff(s) Inhalation daily    	      LABS:	 	    CARDIAC MARKERS:  CARDIAC MARKERS ( 29 Nov 2022 14:55 )  x     / x     / 86 U/L / x     / x          Troponin I, High Sensitivity Result: 20.0 ng/L (11-29 @ 14:55)                            11.1   4.80  )-----------( 153      ( 30 Nov 2022 07:11 )             33.3     11-30    140  |  102  |  24<H>  ----------------------------<  154<H>  3.5   |  29  |  1.24    Ca    9.3      30 Nov 2022 07:11  Phos  2.5     11-30  Mg     2.5     11-30    TPro  6.3  /  Alb  3.0<L>  /  TBili  0.4  /  DBili  x   /  AST  18  /  ALT  23  /  AlkPhos  47  11-30    proBNP: Serum Pro-Brain Natriuretic Peptide: 1873 pg/mL (11-29 @ 14:55)    Lipid Profile:   HgA1c:   TSH:

## 2022-12-01 NOTE — PROGRESS NOTE ADULT - SUBJECTIVE AND OBJECTIVE BOX
`Patient is a 89y old  Female who presents with a chief complaint of Asthma exacerbation (30 Nov 2022 12:17)      INTERVAL HPI/OVERNIGHT EVENTS: no overnight events    MEDICATIONS  (STANDING):  acetylcysteine 20%  Inhalation 4 milliLiter(s) Inhalation every 6 hours  albuterol/ipratropium for Nebulization 3 milliLiter(s) Nebulizer every 6 hours  aspirin  chewable 81 milliGRAM(s) Oral daily  dorzolamide 2%/timolol 0.5% Ophthalmic Solution 1 Drop(s) Both EYES two times a day  doxazosin 4 milliGRAM(s) Oral at bedtime  ferrous    sulfate 325 milliGRAM(s) Oral daily  heparin   Injectable 5000 Unit(s) SubCutaneous every 8 hours  hydrALAZINE 100 milliGRAM(s) Oral three times a day  isosorbide   mononitrate ER Tablet (IMDUR) 120 milliGRAM(s) Oral daily  latanoprost 0.005% Ophthalmic Solution 1 Drop(s) Both EYES at bedtime  metoprolol tartrate 100 milliGRAM(s) Oral two times a day  montelukast 10 milliGRAM(s) Oral at bedtime  pantoprazole    Tablet 40 milliGRAM(s) Oral before breakfast  risperiDONE   Tablet 0.25 milliGRAM(s) Oral two times a day  simvastatin 40 milliGRAM(s) Oral at bedtime  tiotropium 2.5 MICROgram(s) Inhaler 2 Puff(s) Inhalation daily    MEDICATIONS  (PRN):  acetaminophen     Tablet .. 650 milliGRAM(s) Oral every 6 hours PRN Temp greater or equal to 38C (100.4F), Mild Pain (1 - 3)  albuterol    90 MICROgram(s) HFA Inhaler 2 Puff(s) Inhalation every 6 hours PRN Shortness of Breath and/or Wheezing  guaiFENesin Oral Liquid (Sugar-Free) 100 milliGRAM(s) Oral every 6 hours PRN Cough      REVIEW OF SYSTEMS:  CONSTITUTIONAL: No fever  EYES: No eye pain  ENMT:   No sinus or throat pain  NECK: No pain   RESPIRATORY: no cough, wheezing, chills or hemoptysis; No shortness of breath  CARDIOVASCULAR: No chest pain, palpitations  GASTROINTESTINAL: No abdominal pain.   GENITOURINARY: No dysuria  NEUROLOGICAL: No headaches  SKIN: No rashes  MUSCULOSKELETAL: No joint pain       Vital Signs Last 24 Hrs  T(C): 35.8 (01 Dec 2022 12:53), Max: 36.4 (30 Nov 2022 21:37)  T(F): 96.5 (01 Dec 2022 12:53), Max: 97.5 (30 Nov 2022 21:37)  HR: 61 (01 Dec 2022 14:14) (61 - 78)  BP: 163/67 (01 Dec 2022 14:14) (104/62 - 210/79)  BP(mean): --  RR: 18 (01 Dec 2022 12:53) (18 - 20)  SpO2: 96% (01 Dec 2022 12:53) (94% - 97%)    Parameters below as of 01 Dec 2022 12:53  Patient On (Oxygen Delivery Method): room air    PHYSICAL EXAM:  GENERAL: NAD  HEAD:  Atraumatic, Normocephalic  EYES: conjunctiva and sclera clear  ENMT: Moist mucous membranes  NECK: Supple  NERVOUS SYSTEM:  Alert & Oriented X3, Good concentration ( [place] [id number])  CHEST/LUNG: CTA bilaterally;  HEART: Regular rate and rhythm  ABDOMEN: Soft, Nontender, Nondistended; Bowel sounds present  EXTREMITIES:  2+ Peripheral Pulses  SKIN: No rashes                             11.1   4.80  )-----------( 153      ( 30 Nov 2022 07:11 )             33.3       11-30    140  |  102  |  24<H>  ----------------------------<  154<H>  3.5   |  29  |  1.24    Ca    9.3      30 Nov 2022 07:11  Phos  2.5     11-30  Mg     2.5     11-30    TPro  6.3  /  Alb  3.0<L>  /  TBili  0.4  /  DBili  x   /  AST  18  /  ALT  23  /  AlkPhos  47  11-30        < from: Xray Chest 1 View-PORTABLE IMMEDIATE (Xray Chest 1 View-PORTABLE IMMEDIATE .) (11.29.22 @ 15:55) >  IMPRESSION:  1. Clear lungs with no acute cardiopulmonary abnormalities.  2. AV sequential pacemaker in situ without CHF.    < end of copied text >  < from: CT Chest No Cont (03.07.22 @ 10:12) >  IMPRESSION:    No pneumonia.    < end of copied text >   `Patient is a 89y old  Female who presents with a chief complaint of Asthma exacerbation (30 Nov 2022 12:17)      INTERVAL HPI/OVERNIGHT EVENTS: no overnight events    MEDICATIONS  (STANDING):  acetylcysteine 20%  Inhalation 4 milliLiter(s) Inhalation every 6 hours  albuterol/ipratropium for Nebulization 3 milliLiter(s) Nebulizer every 6 hours  aspirin  chewable 81 milliGRAM(s) Oral daily  dorzolamide 2%/timolol 0.5% Ophthalmic Solution 1 Drop(s) Both EYES two times a day  doxazosin 4 milliGRAM(s) Oral at bedtime  ferrous    sulfate 325 milliGRAM(s) Oral daily  heparin   Injectable 5000 Unit(s) SubCutaneous every 8 hours  hydrALAZINE 100 milliGRAM(s) Oral three times a day  isosorbide   mononitrate ER Tablet (IMDUR) 120 milliGRAM(s) Oral daily  latanoprost 0.005% Ophthalmic Solution 1 Drop(s) Both EYES at bedtime  metoprolol tartrate 100 milliGRAM(s) Oral two times a day  montelukast 10 milliGRAM(s) Oral at bedtime  pantoprazole    Tablet 40 milliGRAM(s) Oral before breakfast  risperiDONE   Tablet 0.25 milliGRAM(s) Oral two times a day  simvastatin 40 milliGRAM(s) Oral at bedtime  tiotropium 2.5 MICROgram(s) Inhaler 2 Puff(s) Inhalation daily    MEDICATIONS  (PRN):  acetaminophen     Tablet .. 650 milliGRAM(s) Oral every 6 hours PRN Temp greater or equal to 38C (100.4F), Mild Pain (1 - 3)  albuterol    90 MICROgram(s) HFA Inhaler 2 Puff(s) Inhalation every 6 hours PRN Shortness of Breath and/or Wheezing  guaiFENesin Oral Liquid (Sugar-Free) 100 milliGRAM(s) Oral every 6 hours PRN Cough      REVIEW OF SYSTEMS:  Unable to asses due to confusion.      Vital Signs Last 24 Hrs  T(C): 35.8 (01 Dec 2022 12:53), Max: 36.4 (30 Nov 2022 21:37)  T(F): 96.5 (01 Dec 2022 12:53), Max: 97.5 (30 Nov 2022 21:37)  HR: 61 (01 Dec 2022 14:14) (61 - 78)  BP: 163/67 (01 Dec 2022 14:14) (104/62 - 210/79)  BP(mean): --  RR: 18 (01 Dec 2022 12:53) (18 - 20)  SpO2: 96% (01 Dec 2022 12:53) (94% - 97%)    Parameters below as of 01 Dec 2022 12:53  Patient On (Oxygen Delivery Method): room air      PHYSICAL EXAM:  GENERAL: No acute distress  HEAD:  Atraumatic, Normocephalic  EYES: conjunctiva and sclera clear.  PERRLA  ENMT: Moist mucous membranes  NECK: Supple  NERVOUS SYSTEM:  Alert & Oriented X3, Good concentration ( CHEST/LUNG: clear bilaterally.  No rales, wheezes or rhonchi  HEART: Regular rate and rhythm  ABDOMEN: Soft, Nontender, Nondistended; Bowel sounds present  EXTREMITIES:  2+ Peripheral Pulses  SKIN: No rashes                             11.1   4.80  )-----------( 153      ( 30 Nov 2022 07:11 )             33.3       11-30    140  |  102  |  24<H>  ----------------------------<  154<H>  3.5   |  29  |  1.24    Ca    9.3      30 Nov 2022 07:11  Phos  2.5     11-30  Mg     2.5     11-30    TPro  6.3  /  Alb  3.0<L>  /  TBili  0.4  /  DBili  x   /  AST  18  /  ALT  23  /  AlkPhos  47  11-30                             < from: Xray Chest 1 View-PORTABLE IMMEDIATE (Xray Chest 1 View-PORTABLE IMMEDIATE .) (11.29.22 @ 15:55) >  IMPRESSION:  1. Clear lungs with no acute cardiopulmonary abnormalities.  2. AV sequential pacemaker in situ without CHF.    < end of copied text >  < from: CT Chest No Cont (03.07.22 @ 10:12) >  IMPRESSION:    No pneumonia.    < end of copied text >   `Patient is a 89y old  Female who presents with a chief complaint of Asthma exacerbation (30 Nov 2022 12:17)      INTERVAL HPI/OVERNIGHT EVENTS: no overnight events    MEDICATIONS  (STANDING):  acetylcysteine 20%  Inhalation 4 milliLiter(s) Inhalation every 6 hours  albuterol/ipratropium for Nebulization 3 milliLiter(s) Nebulizer every 6 hours  aspirin  chewable 81 milliGRAM(s) Oral daily  dorzolamide 2%/timolol 0.5% Ophthalmic Solution 1 Drop(s) Both EYES two times a day  doxazosin 4 milliGRAM(s) Oral at bedtime  ferrous    sulfate 325 milliGRAM(s) Oral daily  heparin   Injectable 5000 Unit(s) SubCutaneous every 8 hours  hydrALAZINE 100 milliGRAM(s) Oral three times a day  isosorbide   mononitrate ER Tablet (IMDUR) 120 milliGRAM(s) Oral daily  latanoprost 0.005% Ophthalmic Solution 1 Drop(s) Both EYES at bedtime  metoprolol tartrate 100 milliGRAM(s) Oral two times a day  montelukast 10 milliGRAM(s) Oral at bedtime  pantoprazole    Tablet 40 milliGRAM(s) Oral before breakfast  risperiDONE   Tablet 0.25 milliGRAM(s) Oral two times a day  simvastatin 40 milliGRAM(s) Oral at bedtime  tiotropium 2.5 MICROgram(s) Inhaler 2 Puff(s) Inhalation daily    MEDICATIONS  (PRN):  acetaminophen     Tablet .. 650 milliGRAM(s) Oral every 6 hours PRN Temp greater or equal to 38C (100.4F), Mild Pain (1 - 3)  albuterol    90 MICROgram(s) HFA Inhaler 2 Puff(s) Inhalation every 6 hours PRN Shortness of Breath and/or Wheezing  guaiFENesin Oral Liquid (Sugar-Free) 100 milliGRAM(s) Oral every 6 hours PRN Cough      REVIEW OF SYSTEMS:  Unable to asses due to confusion.      Vital Signs Last 24 Hrs  T(C): 35.8 (01 Dec 2022 12:53), Max: 36.4 (30 Nov 2022 21:37)  T(F): 96.5 (01 Dec 2022 12:53), Max: 97.5 (30 Nov 2022 21:37)  HR: 61 (01 Dec 2022 14:14) (61 - 78)  BP: 163/67 (01 Dec 2022 14:14) (104/62 - 210/79)  BP(mean): --  RR: 18 (01 Dec 2022 12:53) (18 - 20)  SpO2: 96% (01 Dec 2022 12:53) (94% - 97%)    Parameters below as of 01 Dec 2022 12:53  Patient On (Oxygen Delivery Method): room air      PHYSICAL EXAM:  GENERAL: No acute distress  HEAD:  Atraumatic, Normocephalic  EYES: conjunctiva and sclera clear.  PERRLA  ENMT: Moist mucous membranes  NECK: Supple  NERVOUS SYSTEM:  disoriented   CHEST/LUNG: clear bilaterally.  No rales, wheezes or rhonchi  HEART: Regular rate and rhythm  ABDOMEN: Soft, Nontender, Nondistended; Bowel sounds present  EXTREMITIES:  2+ Peripheral Pulses  SKIN: No rashes                              11.1   4.80  )-----------( 153      ( 30 Nov 2022 07:11 )             33.3               11-30    140  |  102  |  24<H>  ----------------------------<  154<H>  3.5   |  29  |  1.24    Ca    9.3      30 Nov 2022 07:11  Phos  2.5     11-30  Mg     2.5     11-30    TPro  6.3  /  Alb  3.0<L>  /  TBili  0.4  /  DBili  x   /  AST  18  /  ALT  23  /  AlkPhos  47  11-30                             < from: Xray Chest 1 View-PORTABLE IMMEDIATE (Xray Chest 1 View-PORTABLE IMMEDIATE .) (11.29.22 @ 15:55) >  IMPRESSION:  1. Clear lungs with no acute cardiopulmonary abnormalities.  2. AV sequential pacemaker in situ without CHF.    < end of copied text >  < from: CT Chest No Cont (03.07.22 @ 10:12) >  IMPRESSION:    No pneumonia.    < end of copied text >   `Patient is a 89y old  Female who presents with a chief complaint of Asthma exacerbation (30 Nov 2022 12:17)      INTERVAL HPI/OVERNIGHT EVENTS: no overnight events    MEDICATIONS  (STANDING):  acetylcysteine 20%  Inhalation 4 milliLiter(s) Inhalation every 6 hours  albuterol/ipratropium for Nebulization 3 milliLiter(s) Nebulizer every 6 hours  aspirin  chewable 81 milliGRAM(s) Oral daily  dorzolamide 2%/timolol 0.5% Ophthalmic Solution 1 Drop(s) Both EYES two times a day  doxazosin 4 milliGRAM(s) Oral at bedtime  ferrous    sulfate 325 milliGRAM(s) Oral daily  heparin   Injectable 5000 Unit(s) SubCutaneous every 8 hours  hydrALAZINE 100 milliGRAM(s) Oral three times a day  isosorbide   mononitrate ER Tablet (IMDUR) 120 milliGRAM(s) Oral daily  latanoprost 0.005% Ophthalmic Solution 1 Drop(s) Both EYES at bedtime  metoprolol tartrate 100 milliGRAM(s) Oral two times a day  montelukast 10 milliGRAM(s) Oral at bedtime  pantoprazole    Tablet 40 milliGRAM(s) Oral before breakfast  risperiDONE   Tablet 0.25 milliGRAM(s) Oral two times a day  simvastatin 40 milliGRAM(s) Oral at bedtime  tiotropium 2.5 MICROgram(s) Inhaler 2 Puff(s) Inhalation daily    MEDICATIONS  (PRN):  acetaminophen     Tablet .. 650 milliGRAM(s) Oral every 6 hours PRN Temp greater or equal to 38C (100.4F), Mild Pain (1 - 3)  albuterol    90 MICROgram(s) HFA Inhaler 2 Puff(s) Inhalation every 6 hours PRN Shortness of Breath and/or Wheezing  guaiFENesin Oral Liquid (Sugar-Free) 100 milliGRAM(s) Oral every 6 hours PRN Cough      REVIEW OF SYSTEMS:  Unable to asses due to confusion.      Vital Signs Last 24 Hrs  T(C): 35.8 (01 Dec 2022 12:53), Max: 36.4 (30 Nov 2022 21:37)  T(F): 96.5 (01 Dec 2022 12:53), Max: 97.5 (30 Nov 2022 21:37)  HR: 61 (01 Dec 2022 14:14) (61 - 78)  BP: 163/67 (01 Dec 2022 14:14) (104/62 - 210/79)  BP(mean): --  RR: 18 (01 Dec 2022 12:53) (18 - 20)  SpO2: 96% (01 Dec 2022 12:53) (94% - 97%)    Parameters below as of 01 Dec 2022 12:53  Patient On (Oxygen Delivery Method): room air      PHYSICAL EXAM:  GENERAL: No acute distress  HEAD:  Atraumatic, Normocephalic  EYES: conjunctiva and sclera clear.  PERRLA  ENMT: Moist mucous membranes  NECK: Supple  NERVOUS SYSTEM:  disoriented this morning   CHEST/LUNG: clear bilaterally.  No rales, wheezes or rhonchi  HEART: Regular rate and rhythm  ABDOMEN: Soft, Nontender, Nondistended; Bowel sounds present  EXTREMITIES:  2+ Peripheral Pulses  SKIN: No rashes                              11.1   4.80  )-----------( 153      ( 30 Nov 2022 07:11 )             33.3               11-30    140  |  102  |  24<H>  ----------------------------<  154<H>  3.5   |  29  |  1.24    Ca    9.3      30 Nov 2022 07:11  Phos  2.5     11-30  Mg     2.5     11-30    TPro  6.3  /  Alb  3.0<L>  /  TBili  0.4  /  DBili  x   /  AST  18  /  ALT  23  /  AlkPhos  47  11-30                             < from: Xray Chest 1 View-PORTABLE IMMEDIATE (Xray Chest 1 View-PORTABLE IMMEDIATE .) (11.29.22 @ 15:55) >  IMPRESSION:  1. Clear lungs with no acute cardiopulmonary abnormalities.  2. AV sequential pacemaker in situ without CHF.    < end of copied text >  < from: CT Chest No Cont (03.07.22 @ 10:12) >  IMPRESSION:    No pneumonia.    < end of copied text >

## 2022-12-01 NOTE — PROGRESS NOTE ADULT - PROBLEM SELECTOR PLAN 6
no exacerbation  on lasix at home. hold lasix in setting of darcie.  c/w home meds: imdur -  Not currently in exacerbation  -  On lasix at home, on hold due to CAMIL[place]  -  Continue with Imdur

## 2022-12-01 NOTE — PROGRESS NOTE ADULT - ASSESSMENT
89 y/o female, from home, minimally ambulatory with assistance, with PMHx of HTN, HFpEF (Grade II DD),Parox Afib (not on AC due to GIB),CRI, Complete Heart Block s/p PPM, HLD, Asthma, and Dementia coming to ED for cough and runny nose for the last 5 days,Delirium.   1.Asthma steroids,MDI.  2.Pulm f/u.  3.PAF-asa,b blocker  4.CRI-f/u lytes.  5.Lipid d/o-statin.  6.HTN-cont bp medication.  7.GI and DVT prophylaxis.  8.Delirium-risperdol .25mg bid.

## 2022-12-01 NOTE — PROVIDER CONTACT NOTE (FALL NOTIFICATION) - ASSESSMENT
Pt bed alarm going off, stating she had to use the bathroom, trying to get out of bed. Pt assisted to the bathroom immediately with RN. Pt unable to follows directions to where the bathroom was. PCA took over and walked her to the bathroom. Pt urinated at that time on the floor. As the PCA was guiding patient into the bathroom, patient planted her feet on the floor, stopped walking, while the PCA was still moving forward, patient then fell forward with the PCA. PCA caught the patients fall and guided her down to the ground. Pt did not hit her head. Fall witnessed by RN at the time.

## 2022-12-01 NOTE — PROGRESS NOTE ADULT - PROBLEM SELECTOR PLAN 3
Uncontrolled  home meds metoprolol, hydralazine, doxazosin.  cardura inc 4 mg HS-->11/30/22  c/w home bp meds w/ parameters  monitor BP -  Uncontrolled  -  Continue with metoprolol, hydralazine and doxazosin.  -  Cardura increased to 4 mg HS on 11/30/22  -  Monitor BP routinely

## 2022-12-01 NOTE — PROGRESS NOTE ADULT - PROBLEM SELECTOR PLAN 5
not on AC d/t GI bleed in past  c/w multaq, metoprolol. -  Not on anticoagulation due to GI bleed in the past  -  Continue with Metoprolol

## 2022-12-01 NOTE — PROGRESS NOTE ADULT - PROBLEM SELECTOR PLAN 1
-   Patient presented w ith cough , runny nose  -   RVP and covid negative  -   chest xray negative  -   Steroids switched to PO today  -   Continue with  duoneb  -   Pulmonary:  Dr Solano -   Patient presented w ith cough , runny nose  -   RVP and covid negative  -   chest x-ray negative  -   Steroids switched to PO today  -   Continue with  duoneb PRN  -   Pulmonary:  Dr Solano

## 2022-12-01 NOTE — PROGRESS NOTE ADULT - ASSESSMENT
89 y/o female, from home, minimally ambulatory with assistance, with PMHx of HTN, HFpEF (Grade II DD),Parox Afib (not on AC due to GIB),CRI, Complete Heart Block s/p PPM, HLD, Asthma, and Dementia coming to ED for cough and runny nose for the last 5 days admitted for asthma exacerbation. Cxr negative. s/p 125mg solumderol and duoneb tx x1. Dr. Solaon pulm consulted. pt also noted to have CAMILLE,  Cr 1.53, f/u urine studies.   89 y/o female, from home, minimally ambulatory with assistance, with PMHx of HTN, HFpEF (Grade II DD),Parox Afib (not on AC due to GIB),CRI, Complete Heart Block s/p PPM, HLD, Asthma, and Dementia coming to ED for cough and runny nose for the last 5 days admitted for asthma exacerbation. Cxr negative. s/p 125mg solumderol and duoneb tx x1. Dr. Solano pulerich consulted.

## 2022-12-01 NOTE — CHART NOTE - NSCHARTNOTEFT_GEN_A_CORE
EVENT:     OBJECTIVE:  Vital Signs Last 24 Hrs  T(C): 36.4 (30 Nov 2022 21:37), Max: 36.8 (30 Nov 2022 12:54)  T(F): 97.5 (30 Nov 2022 21:37), Max: 98.2 (30 Nov 2022 12:54)  HR: 78 (30 Nov 2022 21:37) (60 - 86)  BP: 182/80 (01 Dec 2022 00:20) (112/51 - 188/90)  BP(mean): --  RR: 20 (30 Nov 2022 21:37) (20 - 20)  SpO2: 97% (30 Nov 2022 21:37) (93% - 97%)    Parameters below as of 30 Nov 2022 21:37  Patient On (Oxygen Delivery Method): room air        FOCUSED PHYSICAL EXAM:    LABS:                        11.1   4.80  )-----------( 153      ( 30 Nov 2022 07:11 )             33.3   CARDIAC MARKERS ( 29 Nov 2022 14:55 )  x     / x     / 86 U/L / x     / x        11-30    140  |  102  |  24<H>  ----------------------------<  154<H>  3.5   |  29  |  1.24    Ca    9.3      30 Nov 2022 07:11  Phos  2.5     11-30  Mg     2.5     11-30    TPro  6.3  /  Alb  3.0<L>  /  TBili  0.4  /  DBili  x   /  AST  18  /  ALT  23  /  AlkPhos  47  11-30      EKG:   IMGAGING:    ASSESSMENT:  HPI:  89 y/o female, from home, minimally ambulatory with assistance, with PMHx of HTN, HFpEF (Grade II DD),Parox Afib (not on AC due to GIB),CRI, Complete Heart Block s/p PPM, HLD, Asthma, and Dementia coming to ED for cough and runny nose for the last 5 days. patients daughter at bedside states she has been sick for the last week and then her mother got sick a few days after. They tested for COVID and were negative. patient has been having dry cough associated with wheezing but no apparent respiratory distress per daughter. Daughter states that pateint usually receives Duoneb treatments every night and uses her inhaler everyday. She denies any fevers, chills, headaches, dizziness, chest pain, SOB, abd pain, n/v, diarrhea, constipation, hematuria, dysuria, numbness, and weakness.      In ED VS: T 98, HR 59,/74, RR 17, spo2 95%  CXRAY Neg  Cr 1.53  s/p 100mg PO hydralazine, 100mg Metoprolol, 125mg IV solumderol and duonebs x1    (29 Nov 2022 19:28)      PLAN:     FOLLOW UP / RESULT:  [id number]    EVENT: Nurse reporting patient fell in the bathroom, witnessed, assisted onto sacrum    BRIEF HPI: 89 y/o female, from home, minimally ambulatory with assistance, with PMH of HTN, HFpEF (Grade II DD),Parox Afib (not on AC due to GIB),CRI, Complete Heart Block s/p PPM, HDL, Asthma, and Dementia coming to ED for cough and runny nose for the last 5 days admitted for asthma exacerbation. Cxr negative. s/p 125mg Solu-Medrol and Duoneb tx x1. Dr. Russ agarwal consulted. pt also noted to have CAMILLE,  Cr 1.53, f/u urine studies.    OBJECTIVE:  Vital Signs Last 24 Hrs  T(C): 36.4 (30 Nov 2022 21:37), Max: 36.8 (30 Nov 2022 12:54)  T(F): 97.5 (30 Nov 2022 21:37), Max: 98.2 (30 Nov 2022 12:54)  HR: 78 (30 Nov 2022 21:37) (60 - 86)  BP: 182/80 (01 Dec 2022 00:20) (112/51 - 188/90)  BP(mean): --  RR: 20 (30 Nov 2022 21:37) (20 - 20)  SpO2: 97% (30 Nov 2022 21:37) (93% - 97%)    Parameters below as of 30 Nov 2022 21:37  Patient On (Oxygen Delivery Method): room air    FOCUSED PHYSICAL EXAM:  NEURO: Agitated, oriented to name, shouting, through  denies pain  RESP: Increased with agitation, ROSA MARIA lungs, audible wheeze  CV: Unable, pt hitting out  EXT: Active ROM, unable to assess sacral area due to agitation    LABS:                        11.1   4.80  )-----------( 153      ( 30 Nov 2022 07:11 )             33.3   CARDIAC MARKERS ( 29 Nov 2022 14:55 )  x     / x     / 86 U/L / x     / x        11-30    140  |  102  |  24<H>  ----------------------------<  154<H>  3.5   |  29  |  1.24    Ca    9.3      30 Nov 2022 07:11  Phos  2.5     11-30  Mg     2.5     11-30    TPro  6.3  /  Alb  3.0<L>  /  TBili  0.4  /  DBili  x   /  AST  18  /  ALT  23  /  AlkPhos  47  11-30    PROBLEM: Agitation probably due to dementia  PLAN:   1. Enhanced observation  2. Cont acetylcysteine 20%  Inhalation 4 milliliter(s) Inhalation every 6 hours  3. Cont albuterol/ipratropium for Nebulization 3 milliliter(s) Nebulizer every 6 hours  4. Cont methylprednisolone sodium succinate Injectable 40 brian GRAM(s) IV Push three times a day  5. Tiotropium 2.5 MICRO gram(s) Inhaler 2 Puff(s) Inhalation daily  6. Cont albuterol    90 MICRO gram(s) HFA Inhaler 2 Puff(s) Inhalation every 6 hours PRN Shortness of Breath and/or Wheezing    FOLLOW UP / RESULT: effectiveness of med, assess pt when agreeable

## 2022-12-01 NOTE — PROGRESS NOTE ADULT - SUBJECTIVE AND OBJECTIVE BOX
Patient is a 89y old  Female who presents with a chief complaint of Asthma exacerbation (2022 13:25)    Patient is awake, alert, comfortable, laying in bed, not in acute distress, doing well on RA.     INTERVAL HPI/OVERNIGHT EVENTS:      VITAL SIGNS:  T(F): 97.4 (22 @ 05:15)  HR: 62 (22 @ 05:15)  BP: 104/62 (22 @ 05:15)  RR: 20 (22 @ 05:15)  SpO2: 94% (22 @ 05:15)  Wt(kg): --  I&O's Detail          REVIEW OF SYSTEMS:    CONSTITUTIONAL:  No fevers, chills, sweats    HEENT:  Eyes:  No diplopia or blurred vision. ENT:  No earache, sore throat or runny nose.    CARDIOVASCULAR:  No pressure, squeezing, tightness, or heaviness about the chest; no palpitations.    RESPIRATORY:  Per HPI    GASTROINTESTINAL:  No abdominal pain, nausea, vomiting or diarrhea.    GENITOURINARY:  No dysuria, frequency or urgency.    NEUROLOGIC:  No paresthesias, fasciculations, seizures or weakness.    PSYCHIATRIC:  No disorder of thought or mood.      PHYSICAL EXAM:    Constitutional: Well developed and nourished  Eyes:Perrla  ENMT: normal  Neck:supple  Respiratory: good air entry  Cardiovascular: S1 S2 regular  Gastrointestinal: Soft, Non tender  Extremities: No edema  Vascular:normal  Neurological:Awake, alert,Ox3  Musculoskeletal:Normal      MEDICATIONS  (STANDING):  acetylcysteine 20%  Inhalation 4 milliLiter(s) Inhalation every 6 hours  albuterol/ipratropium for Nebulization 3 milliLiter(s) Nebulizer every 6 hours  aspirin  chewable 81 milliGRAM(s) Oral daily  dorzolamide 2%/timolol 0.5% Ophthalmic Solution 1 Drop(s) Both EYES two times a day  doxazosin 4 milliGRAM(s) Oral at bedtime  dronedarone 400 milliGRAM(s) Oral two times a day  ferrous    sulfate 325 milliGRAM(s) Oral daily  heparin   Injectable 5000 Unit(s) SubCutaneous every 8 hours  hydrALAZINE 100 milliGRAM(s) Oral three times a day  isosorbide   mononitrate ER Tablet (IMDUR) 120 milliGRAM(s) Oral daily  latanoprost 0.005% Ophthalmic Solution 1 Drop(s) Both EYES at bedtime  methylPREDNISolone sodium succinate Injectable 40 milliGRAM(s) IV Push three times a day  metoprolol tartrate 100 milliGRAM(s) Oral two times a day  montelukast 10 milliGRAM(s) Oral at bedtime  pantoprazole    Tablet 40 milliGRAM(s) Oral before breakfast  simvastatin 40 milliGRAM(s) Oral at bedtime  tiotropium 2.5 MICROgram(s) Inhaler 2 Puff(s) Inhalation daily    MEDICATIONS  (PRN):  acetaminophen     Tablet .. 650 milliGRAM(s) Oral every 6 hours PRN Temp greater or equal to 38C (100.4F), Mild Pain (1 - 3)  albuterol    90 MICROgram(s) HFA Inhaler 2 Puff(s) Inhalation every 6 hours PRN Shortness of Breath and/or Wheezing  guaiFENesin Oral Liquid (Sugar-Free) 100 milliGRAM(s) Oral every 6 hours PRN Cough      Allergies    No Known Allergies    Intolerances        LABS:                        11.1   4.80  )-----------( 153      ( 2022 07:11 )             33.3     11-30    140  |  102  |  24<H>  ----------------------------<  154<H>  3.5   |  29  |  1.24    Ca    9.3      2022 07:11  Phos  2.5     -30  Mg     2.5     -    TPro  6.3  /  Alb  3.0<L>  /  TBili  0.4  /  DBili  x   /  AST  18  /  ALT  23  /  AlkPhos  47  11-30    PT/INR - ( 2022 14:55 )   PT: 12.0 sec;   INR: 1.01 ratio         PTT - ( 2022 14:55 )  PTT:30.0 sec  Urinalysis Basic - ( 2022 14:55 )    Color: Yellow / Appearance: Clear / S.010 / pH: x  Gluc: x / Ketone: Negative  / Bili: Negative / Urobili: Negative   Blood: x / Protein: Negative / Nitrite: Negative   Leuk Esterase: Negative / RBC: x / WBC x   Sq Epi: x / Non Sq Epi: x / Bacteria: x        CARDIAC MARKERS ( 2022 14:55 )  x     / x     / 86 U/L / x     / x          CAPILLARY BLOOD GLUCOSE        pro-bnp  @ 14:55     d-dimer --   @ 14:55      RADIOLOGY & ADDITIONAL TESTS:    CXR:  < from: Xray Chest 1 View-PORTABLE IMMEDIATE (Xray Chest 1 View-PORTABLE IMMEDIATE .) (22 @ 15:55) >  IMPRESSION:  1. Clear lungs with no acute cardiopulmonary abnormalities.  2. AV sequential pacemaker in situ without CHF.    --- End of Report ---    < end of copied text >      Ct scan chest:    ekg;    echo: Patient is a 89y old  Female who presents with a chief complaint of Asthma exacerbation (2022 13:25)    Patient is awake, alert, comfortable, laying in bed, not in acute distress, doing well on RA. Patient is mildly confused, but not agitated.,     INTERVAL HPI/OVERNIGHT EVENTS:      VITAL SIGNS:  T(F): 97.4 (22 @ 05:15)  HR: 62 (22 @ 05:15)  BP: 104/62 (22 @ 05:15)  RR: 20 (22 @ 05:15)  SpO2: 94% (22 @ 05:15)  Wt(kg): --  I&O's Detail          REVIEW OF SYSTEMS:    CONSTITUTIONAL:  No fevers, chills, sweats    HEENT:  Eyes:  No diplopia or blurred vision. ENT:  No earache, sore throat or runny nose.    CARDIOVASCULAR:  No pressure, squeezing, tightness, or heaviness about the chest; no palpitations.    RESPIRATORY:  Per HPI    GASTROINTESTINAL:  No abdominal pain, nausea, vomiting or diarrhea.    GENITOURINARY:  No dysuria, frequency or urgency.    NEUROLOGIC:  No paresthesias, fasciculations, seizures or weakness.    PSYCHIATRIC:  No disorder of thought or mood.      PHYSICAL EXAM:    Constitutional: Well developed and nourished  Eyes:Perrla  ENMT: normal  Neck:supple  Respiratory: good air entry  Cardiovascular: S1 S2 regular  Gastrointestinal: Soft, Non tender  Extremities: No edema  Vascular:normal  Neurological:Awake, alert, mildly confused, but not agitated  Musculoskeletal:Normal      MEDICATIONS  (STANDING):  acetylcysteine 20%  Inhalation 4 milliLiter(s) Inhalation every 6 hours  albuterol/ipratropium for Nebulization 3 milliLiter(s) Nebulizer every 6 hours  aspirin  chewable 81 milliGRAM(s) Oral daily  dorzolamide 2%/timolol 0.5% Ophthalmic Solution 1 Drop(s) Both EYES two times a day  doxazosin 4 milliGRAM(s) Oral at bedtime  dronedarone 400 milliGRAM(s) Oral two times a day  ferrous    sulfate 325 milliGRAM(s) Oral daily  heparin   Injectable 5000 Unit(s) SubCutaneous every 8 hours  hydrALAZINE 100 milliGRAM(s) Oral three times a day  isosorbide   mononitrate ER Tablet (IMDUR) 120 milliGRAM(s) Oral daily  latanoprost 0.005% Ophthalmic Solution 1 Drop(s) Both EYES at bedtime  methylPREDNISolone sodium succinate Injectable 40 milliGRAM(s) IV Push three times a day  metoprolol tartrate 100 milliGRAM(s) Oral two times a day  montelukast 10 milliGRAM(s) Oral at bedtime  pantoprazole    Tablet 40 milliGRAM(s) Oral before breakfast  simvastatin 40 milliGRAM(s) Oral at bedtime  tiotropium 2.5 MICROgram(s) Inhaler 2 Puff(s) Inhalation daily    MEDICATIONS  (PRN):  acetaminophen     Tablet .. 650 milliGRAM(s) Oral every 6 hours PRN Temp greater or equal to 38C (100.4F), Mild Pain (1 - 3)  albuterol    90 MICROgram(s) HFA Inhaler 2 Puff(s) Inhalation every 6 hours PRN Shortness of Breath and/or Wheezing  guaiFENesin Oral Liquid (Sugar-Free) 100 milliGRAM(s) Oral every 6 hours PRN Cough      Allergies    No Known Allergies    Intolerances        LABS:                        11.1   4.80  )-----------( 153      ( 2022 07:11 )             33.3     11-30    140  |  102  |  24<H>  ----------------------------<  154<H>  3.5   |  29  |  1.24    Ca    9.3      2022 07:11  Phos  2.5     -30  Mg     2.5         TPro  6.3  /  Alb  3.0<L>  /  TBili  0.4  /  DBili  x   /  AST  18  /  ALT  23  /  AlkPhos  47  11-30    PT/INR - ( 2022 14:55 )   PT: 12.0 sec;   INR: 1.01 ratio         PTT - ( 2022 14:55 )  PTT:30.0 sec  Urinalysis Basic - ( 2022 14:55 )    Color: Yellow / Appearance: Clear / S.010 / pH: x  Gluc: x / Ketone: Negative  / Bili: Negative / Urobili: Negative   Blood: x / Protein: Negative / Nitrite: Negative   Leuk Esterase: Negative / RBC: x / WBC x   Sq Epi: x / Non Sq Epi: x / Bacteria: x        CARDIAC MARKERS ( 2022 14:55 )  x     / x     / 86 U/L / x     / x          CAPILLARY BLOOD GLUCOSE        pro-bnp  @ 14:55     d-dimer --   @ 14:55      RADIOLOGY & ADDITIONAL TESTS:    CXR:  < from: Xray Chest 1 View-PORTABLE IMMEDIATE (Xray Chest 1 View-PORTABLE IMMEDIATE .) (22 @ 15:55) >  IMPRESSION:  1. Clear lungs with no acute cardiopulmonary abnormalities.  2. AV sequential pacemaker in situ without CHF.    --- End of Report ---    < end of copied text >      Ct scan chest:    ekg;    echo:

## 2022-12-02 DIAGNOSIS — R79.89 OTHER SPECIFIED ABNORMAL FINDINGS OF BLOOD CHEMISTRY: ICD-10-CM

## 2022-12-02 DIAGNOSIS — R41.82 ALTERED MENTAL STATUS, UNSPECIFIED: ICD-10-CM

## 2022-12-02 LAB
ALBUMIN SERPL ELPH-MCNC: 3.3 G/DL — LOW (ref 3.5–5)
ALP SERPL-CCNC: 50 U/L — SIGNIFICANT CHANGE UP (ref 40–120)
ALT FLD-CCNC: 28 U/L DA — SIGNIFICANT CHANGE UP (ref 10–60)
ANION GAP SERPL CALC-SCNC: 6 MMOL/L — SIGNIFICANT CHANGE UP (ref 5–17)
APPEARANCE UR: CLEAR — SIGNIFICANT CHANGE UP
AST SERPL-CCNC: 29 U/L — SIGNIFICANT CHANGE UP (ref 10–40)
BACTERIA # UR AUTO: ABNORMAL /HPF
BILIRUB SERPL-MCNC: 0.5 MG/DL — SIGNIFICANT CHANGE UP (ref 0.2–1.2)
BILIRUB UR-MCNC: NEGATIVE — SIGNIFICANT CHANGE UP
BUN SERPL-MCNC: 38 MG/DL — HIGH (ref 7–18)
CALCIUM SERPL-MCNC: 10.2 MG/DL — SIGNIFICANT CHANGE UP (ref 8.4–10.5)
CHLORIDE SERPL-SCNC: 107 MMOL/L — SIGNIFICANT CHANGE UP (ref 96–108)
CO2 SERPL-SCNC: 31 MMOL/L — SIGNIFICANT CHANGE UP (ref 22–31)
COLOR SPEC: YELLOW — SIGNIFICANT CHANGE UP
CREAT SERPL-MCNC: 1.62 MG/DL — HIGH (ref 0.5–1.3)
D DIMER BLD IA.RAPID-MCNC: 1022 NG/ML DDU — HIGH
DIFF PNL FLD: NEGATIVE — SIGNIFICANT CHANGE UP
EGFR: 29 ML/MIN/1.73M2 — LOW
EPI CELLS # UR: SIGNIFICANT CHANGE UP /HPF
GLUCOSE SERPL-MCNC: 110 MG/DL — HIGH (ref 70–99)
GLUCOSE UR QL: NEGATIVE — SIGNIFICANT CHANGE UP
HCT VFR BLD CALC: 38 % — SIGNIFICANT CHANGE UP (ref 34.5–45)
HGB BLD-MCNC: 12.4 G/DL — SIGNIFICANT CHANGE UP (ref 11.5–15.5)
HYALINE CASTS # UR AUTO: ABNORMAL /LPF
KETONES UR-MCNC: NEGATIVE — SIGNIFICANT CHANGE UP
LEUKOCYTE ESTERASE UR-ACNC: NEGATIVE — SIGNIFICANT CHANGE UP
MAGNESIUM SERPL-MCNC: 2.4 MG/DL — SIGNIFICANT CHANGE UP (ref 1.6–2.6)
MCHC RBC-ENTMCNC: 32.5 PG — SIGNIFICANT CHANGE UP (ref 27–34)
MCHC RBC-ENTMCNC: 32.6 GM/DL — SIGNIFICANT CHANGE UP (ref 32–36)
MCV RBC AUTO: 99.5 FL — SIGNIFICANT CHANGE UP (ref 80–100)
NITRITE UR-MCNC: NEGATIVE — SIGNIFICANT CHANGE UP
NRBC # BLD: 0 /100 WBCS — SIGNIFICANT CHANGE UP (ref 0–0)
PH UR: 5 — SIGNIFICANT CHANGE UP (ref 5–8)
PHOSPHATE SERPL-MCNC: 2.6 MG/DL — SIGNIFICANT CHANGE UP (ref 2.5–4.5)
PLATELET # BLD AUTO: 143 K/UL — LOW (ref 150–400)
POTASSIUM SERPL-MCNC: 4 MMOL/L — SIGNIFICANT CHANGE UP (ref 3.5–5.3)
POTASSIUM SERPL-SCNC: 4 MMOL/L — SIGNIFICANT CHANGE UP (ref 3.5–5.3)
PROT SERPL-MCNC: 6.7 G/DL — SIGNIFICANT CHANGE UP (ref 6–8.3)
PROT UR-MCNC: 30 MG/DL
RBC # BLD: 3.82 M/UL — SIGNIFICANT CHANGE UP (ref 3.8–5.2)
RBC # FLD: 12.8 % — SIGNIFICANT CHANGE UP (ref 10.3–14.5)
RBC CASTS # UR COMP ASSIST: SIGNIFICANT CHANGE UP /HPF (ref 0–2)
SODIUM SERPL-SCNC: 144 MMOL/L — SIGNIFICANT CHANGE UP (ref 135–145)
SP GR SPEC: 1.01 — SIGNIFICANT CHANGE UP (ref 1.01–1.02)
UROBILINOGEN FLD QL: NEGATIVE — SIGNIFICANT CHANGE UP
WBC # BLD: 13.87 K/UL — HIGH (ref 3.8–10.5)
WBC # FLD AUTO: 13.87 K/UL — HIGH (ref 3.8–10.5)
WBC UR QL: SIGNIFICANT CHANGE UP /HPF (ref 0–5)

## 2022-12-02 PROCEDURE — 71045 X-RAY EXAM CHEST 1 VIEW: CPT | Mod: 26

## 2022-12-02 PROCEDURE — 93970 EXTREMITY STUDY: CPT | Mod: 26

## 2022-12-02 PROCEDURE — 70450 CT HEAD/BRAIN W/O DYE: CPT | Mod: 26

## 2022-12-02 PROCEDURE — 71250 CT THORAX DX C-: CPT | Mod: 26

## 2022-12-02 RX ORDER — HEPARIN SODIUM 5000 [USP'U]/ML
5000 INJECTION INTRAVENOUS; SUBCUTANEOUS EVERY 12 HOURS
Refills: 0 | Status: DISCONTINUED | OUTPATIENT
Start: 2022-12-02 | End: 2022-12-06

## 2022-12-02 RX ORDER — METRONIDAZOLE 500 MG
500 TABLET ORAL ONCE
Refills: 0 | Status: COMPLETED | OUTPATIENT
Start: 2022-12-02 | End: 2022-12-03

## 2022-12-02 RX ORDER — CEFTRIAXONE 500 MG/1
1000 INJECTION, POWDER, FOR SOLUTION INTRAMUSCULAR; INTRAVENOUS EVERY 24 HOURS
Refills: 0 | Status: DISCONTINUED | OUTPATIENT
Start: 2022-12-02 | End: 2022-12-06

## 2022-12-02 RX ORDER — METRONIDAZOLE 500 MG
500 TABLET ORAL EVERY 8 HOURS
Refills: 0 | Status: DISCONTINUED | OUTPATIENT
Start: 2022-12-03 | End: 2022-12-06

## 2022-12-02 RX ORDER — METRONIDAZOLE 500 MG
TABLET ORAL
Refills: 0 | Status: DISCONTINUED | OUTPATIENT
Start: 2022-12-03 | End: 2022-12-06

## 2022-12-02 RX ORDER — ACETAMINOPHEN 500 MG
650 TABLET ORAL ONCE
Refills: 0 | Status: COMPLETED | OUTPATIENT
Start: 2022-12-02 | End: 2022-12-02

## 2022-12-02 RX ADMIN — Medication 4 MILLILITER(S): at 03:09

## 2022-12-02 RX ADMIN — Medication 40 MILLIGRAM(S): at 04:34

## 2022-12-02 RX ADMIN — Medication 3 MILLILITER(S): at 03:09

## 2022-12-02 RX ADMIN — Medication 100 MILLIGRAM(S): at 04:20

## 2022-12-02 RX ADMIN — DORZOLAMIDE HYDROCHLORIDE TIMOLOL MALEATE 1 DROP(S): 20; 5 SOLUTION/ DROPS OPHTHALMIC at 19:07

## 2022-12-02 RX ADMIN — LATANOPROST 1 DROP(S): 0.05 SOLUTION/ DROPS OPHTHALMIC; TOPICAL at 22:51

## 2022-12-02 RX ADMIN — TIOTROPIUM BROMIDE 2 PUFF(S): 18 CAPSULE ORAL; RESPIRATORY (INHALATION) at 19:07

## 2022-12-02 RX ADMIN — CEFTRIAXONE 100 MILLIGRAM(S): 500 INJECTION, POWDER, FOR SOLUTION INTRAMUSCULAR; INTRAVENOUS at 23:51

## 2022-12-02 RX ADMIN — Medication 650 MILLIGRAM(S): at 19:13

## 2022-12-02 RX ADMIN — Medication 4 MILLILITER(S): at 20:33

## 2022-12-02 RX ADMIN — Medication 3 MILLILITER(S): at 20:15

## 2022-12-02 RX ADMIN — Medication 650 MILLIGRAM(S): at 19:18

## 2022-12-02 RX ADMIN — Medication 4 MILLILITER(S): at 09:13

## 2022-12-02 RX ADMIN — HEPARIN SODIUM 5000 UNIT(S): 5000 INJECTION INTRAVENOUS; SUBCUTANEOUS at 19:07

## 2022-12-02 RX ADMIN — PANTOPRAZOLE SODIUM 40 MILLIGRAM(S): 20 TABLET, DELAYED RELEASE ORAL at 06:34

## 2022-12-02 RX ADMIN — DORZOLAMIDE HYDROCHLORIDE TIMOLOL MALEATE 1 DROP(S): 20; 5 SOLUTION/ DROPS OPHTHALMIC at 06:33

## 2022-12-02 RX ADMIN — Medication 4 MILLILITER(S): at 15:01

## 2022-12-02 RX ADMIN — Medication 100 MILLIGRAM(S): at 04:18

## 2022-12-02 RX ADMIN — Medication 3 MILLILITER(S): at 15:04

## 2022-12-02 RX ADMIN — HEPARIN SODIUM 5000 UNIT(S): 5000 INJECTION INTRAVENOUS; SUBCUTANEOUS at 05:37

## 2022-12-02 RX ADMIN — Medication 3 MILLILITER(S): at 09:12

## 2022-12-02 NOTE — PROGRESS NOTE ADULT - SUBJECTIVE AND OBJECTIVE BOX
Patient is a 89y old  Female who presents with a chief complaint of Asthma exacerbation (01 Dec 2022 14:17)    Patient is asleep, comfortable, laying in bed, not in acute distress with supplemental O2 via NC. Per family, patient is confused, but not agitated.     INTERVAL HPI/OVERNIGHT EVENTS:      VITAL SIGNS:  T(F): 97.9 (12-02-22 @ 03:55)  HR: 84 (12-02-22 @ 09:19)  BP: 171/52 (12-02-22 @ 09:19)  RR: 20 (12-02-22 @ 03:55)  SpO2: 92% (12-02-22 @ 03:55)  Wt(kg): --  I&O's Detail          REVIEW OF SYSTEMS:    CONSTITUTIONAL:  No fevers, chills, sweats    HEENT:  Eyes:  No diplopia or blurred vision. ENT:  No earache, sore throat or runny nose.    CARDIOVASCULAR:  No pressure, squeezing, tightness, or heaviness about the chest; no palpitations.    RESPIRATORY:  Per HPI    GASTROINTESTINAL:  No abdominal pain, nausea, vomiting or diarrhea.    GENITOURINARY:  No dysuria, frequency or urgency.    NEUROLOGIC:  No paresthesias, fasciculations, seizures or weakness.    PSYCHIATRIC:  No disorder of thought or mood.      PHYSICAL EXAM:    Constitutional: Well developed and nourished  Eyes:Perrla  ENMT: normal  Neck:supple  Respiratory: + wheezing  Cardiovascular: S1 S2 regular  Gastrointestinal: Soft, Non tender  Extremities: No edema  Vascular:normal  Neurological: Asleep  Musculoskeletal:Normal      MEDICATIONS  (STANDING):  acetylcysteine 20%  Inhalation 4 milliLiter(s) Inhalation every 6 hours  albuterol/ipratropium for Nebulization 3 milliLiter(s) Nebulizer every 6 hours  aspirin  chewable 81 milliGRAM(s) Oral daily  dorzolamide 2%/timolol 0.5% Ophthalmic Solution 1 Drop(s) Both EYES two times a day  doxazosin 4 milliGRAM(s) Oral at bedtime  ferrous    sulfate 325 milliGRAM(s) Oral daily  heparin   Injectable 5000 Unit(s) SubCutaneous every 8 hours  hydrALAZINE 100 milliGRAM(s) Oral three times a day  isosorbide   mononitrate ER Tablet (IMDUR) 120 milliGRAM(s) Oral daily  latanoprost 0.005% Ophthalmic Solution 1 Drop(s) Both EYES at bedtime  melatonin 5 milliGRAM(s) Oral at bedtime  metoprolol tartrate 100 milliGRAM(s) Oral two times a day  montelukast 10 milliGRAM(s) Oral at bedtime  pantoprazole    Tablet 40 milliGRAM(s) Oral before breakfast  predniSONE   Tablet 40 milliGRAM(s) Oral daily  risperiDONE   Tablet 0.25 milliGRAM(s) Oral two times a day  simvastatin 40 milliGRAM(s) Oral at bedtime  tiotropium 2.5 MICROgram(s) Inhaler 2 Puff(s) Inhalation daily    MEDICATIONS  (PRN):  acetaminophen     Tablet .. 650 milliGRAM(s) Oral every 6 hours PRN Temp greater or equal to 38C (100.4F), Mild Pain (1 - 3)  albuterol    90 MICROgram(s) HFA Inhaler 2 Puff(s) Inhalation every 6 hours PRN Shortness of Breath and/or Wheezing  guaiFENesin Oral Liquid (Sugar-Free) 100 milliGRAM(s) Oral every 6 hours PRN Cough      Allergies    No Known Allergies    Intolerances        LABS:                        12.4   13.87 )-----------( 143      ( 02 Dec 2022 06:59 )             38.0     12-02    144  |  107  |  38<H>  ----------------------------<  110<H>  4.0   |  31  |  1.62<H>    Ca    10.2      02 Dec 2022 06:59  Phos  2.6     12-02  Mg     2.4     12-02    TPro  6.7  /  Alb  3.3<L>  /  TBili  0.5  /  DBili  x   /  AST  29  /  ALT  28  /  AlkPhos  50  12-02              CAPILLARY BLOOD GLUCOSE        pro-bnp 1873 11-29 @ 14:55     d-dimer --  11-29 @ 14:55      RADIOLOGY & ADDITIONAL TESTS:    CXR:    Ct scan chest:    ekg;    echo: Patient is a 89y old  Female who presents with a chief complaint of Asthma exacerbation (01 Dec 2022 14:17)    Patient is asleep, comfortable, laying in bed, not in acute distress with supplemental O2 via NC. Per family, patient is confused, but not agitated when awake     INTERVAL HPI/OVERNIGHT EVENTS:      VITAL SIGNS:  T(F): 97.9 (12-02-22 @ 03:55)  HR: 84 (12-02-22 @ 09:19)  BP: 171/52 (12-02-22 @ 09:19)  RR: 20 (12-02-22 @ 03:55)  SpO2: 92% (12-02-22 @ 03:55)  Wt(kg): --  I&O's Detail          REVIEW OF SYSTEMS:    CONSTITUTIONAL:  No fevers, chills, sweats    HEENT:  Eyes:  No diplopia or blurred vision. ENT:  No earache, sore throat or runny nose.    CARDIOVASCULAR:  No pressure, squeezing, tightness, or heaviness about the chest; no palpitations.    RESPIRATORY:  Per HPI    GASTROINTESTINAL:  No abdominal pain, nausea, vomiting or diarrhea.    GENITOURINARY:  No dysuria, frequency or urgency.    NEUROLOGIC:  No paresthesias, fasciculations, seizures or weakness.    PSYCHIATRIC:  No disorder of thought or mood.      PHYSICAL EXAM:    Constitutional: Well developed and nourished  Eyes:Perrla  ENMT: normal  Neck:supple  Respiratory: + wheezing  Cardiovascular: S1 S2 regular  Gastrointestinal: Soft, Non tender  Extremities: No edema  Vascular:normal  Neurological: Asleep  Musculoskeletal:Normal      MEDICATIONS  (STANDING):  acetylcysteine 20%  Inhalation 4 milliLiter(s) Inhalation every 6 hours  albuterol/ipratropium for Nebulization 3 milliLiter(s) Nebulizer every 6 hours  aspirin  chewable 81 milliGRAM(s) Oral daily  dorzolamide 2%/timolol 0.5% Ophthalmic Solution 1 Drop(s) Both EYES two times a day  doxazosin 4 milliGRAM(s) Oral at bedtime  ferrous    sulfate 325 milliGRAM(s) Oral daily  heparin   Injectable 5000 Unit(s) SubCutaneous every 8 hours  hydrALAZINE 100 milliGRAM(s) Oral three times a day  isosorbide   mononitrate ER Tablet (IMDUR) 120 milliGRAM(s) Oral daily  latanoprost 0.005% Ophthalmic Solution 1 Drop(s) Both EYES at bedtime  melatonin 5 milliGRAM(s) Oral at bedtime  metoprolol tartrate 100 milliGRAM(s) Oral two times a day  montelukast 10 milliGRAM(s) Oral at bedtime  pantoprazole    Tablet 40 milliGRAM(s) Oral before breakfast  predniSONE   Tablet 40 milliGRAM(s) Oral daily  risperiDONE   Tablet 0.25 milliGRAM(s) Oral two times a day  simvastatin 40 milliGRAM(s) Oral at bedtime  tiotropium 2.5 MICROgram(s) Inhaler 2 Puff(s) Inhalation daily    MEDICATIONS  (PRN):  acetaminophen     Tablet .. 650 milliGRAM(s) Oral every 6 hours PRN Temp greater or equal to 38C (100.4F), Mild Pain (1 - 3)  albuterol    90 MICROgram(s) HFA Inhaler 2 Puff(s) Inhalation every 6 hours PRN Shortness of Breath and/or Wheezing  guaiFENesin Oral Liquid (Sugar-Free) 100 milliGRAM(s) Oral every 6 hours PRN Cough      Allergies    No Known Allergies    Intolerances        LABS:                        12.4   13.87 )-----------( 143      ( 02 Dec 2022 06:59 )             38.0     12-02    144  |  107  |  38<H>  ----------------------------<  110<H>  4.0   |  31  |  1.62<H>    Ca    10.2      02 Dec 2022 06:59  Phos  2.6     12-02  Mg     2.4     12-02    TPro  6.7  /  Alb  3.3<L>  /  TBili  0.5  /  DBili  x   /  AST  29  /  ALT  28  /  AlkPhos  50  12-02              CAPILLARY BLOOD GLUCOSE        pro-bnp 1873 11-29 @ 14:55     d-dimer --  11-29 @ 14:55      RADIOLOGY & ADDITIONAL TESTS:    CXR:  < from: Xray Chest 1 View-PORTABLE IMMEDIATE (Xray Chest 1 View-PORTABLE IMMEDIATE .) (11.29.22 @ 15:55) >  IMPRESSION:  1. Clear lungs with no acute cardiopulmonary abnormalities.  2. AV sequential pacemaker in situ without CHF.    --- End of Report ---    < end of copied text >    Ct scan chest:    ekg;    echo:

## 2022-12-02 NOTE — PROGRESS NOTE ADULT - PROBLEM SELECTOR PLAN 6
-  Not currently in exacerbation  -  On lasix at home, on hold due to CAMILLE  -  Continue with Imdur

## 2022-12-02 NOTE — PROGRESS NOTE ADULT - PROBLEM SELECTOR PLAN 3
-  Uncontrolled  -  Continue with metoprolol, hydralazine and doxazosin.  -  Cardura increased to 4 mg HS on 11/30/22  -  Monitor BP routinely

## 2022-12-02 NOTE — PROGRESS NOTE ADULT - SUBJECTIVE AND OBJECTIVE BOX
CHIEF COMPLAINT:Patient is a 89y old  Female who presents with a chief complaint of Asthma exacerbation .Pt is lethargic.    	  REVIEW OF SYSTEMS:    [x ] Unable to obtain    PHYSICAL EXAM:  T(C): 35.9 (12-02-22 @ 13:00), Max: 36.6 (12-02-22 @ 03:55)  HR: 73 (12-02-22 @ 13:00) (61 - 101)  BP: 104/38 (12-02-22 @ 13:00) (104/38 - 195/112)  RR: 18 (12-02-22 @ 13:00) (18 - 20)  SpO2: 96% (12-02-22 @ 13:00) (92% - 96%)  Wt(kg): --  I&O's Summary      Appearance: Normal	  HEENT:   Normal oral mucosa, PERRL, EOMI	  Lymphatic: No lymphadenopathy  Cardiovascular: Normal S1 S2, No JVD, No murmurs, No edema  Respiratory: Lungs clear to auscultation	  GaExtremities: Normal range of motion, No clubbing, cyanosis or edema  Vascular: Peripheral pulses palpable 2+ bilaterally    MEDICATIONS  (STANDING):  acetylcysteine 20%  Inhalation 4 milliLiter(s) Inhalation every 6 hours  albuterol/ipratropium for Nebulization 3 milliLiter(s) Nebulizer every 6 hours  aspirin  chewable 81 milliGRAM(s) Oral daily  dorzolamide 2%/timolol 0.5% Ophthalmic Solution 1 Drop(s) Both EYES two times a day  doxazosin 4 milliGRAM(s) Oral at bedtime  ferrous    sulfate 325 milliGRAM(s) Oral daily  heparin   Injectable 5000 Unit(s) SubCutaneous every 12 hours  hydrALAZINE 100 milliGRAM(s) Oral three times a day  isosorbide   mononitrate ER Tablet (IMDUR) 120 milliGRAM(s) Oral daily  latanoprost 0.005% Ophthalmic Solution 1 Drop(s) Both EYES at bedtime  melatonin 5 milliGRAM(s) Oral at bedtime  metoprolol tartrate 100 milliGRAM(s) Oral two times a day  montelukast 10 milliGRAM(s) Oral at bedtime  pantoprazole    Tablet 40 milliGRAM(s) Oral before breakfast  predniSONE   Tablet 40 milliGRAM(s) Oral daily  risperiDONE   Tablet 0.25 milliGRAM(s) Oral two times a day  simvastatin 40 milliGRAM(s) Oral at bedtime  tiotropium 2.5 MICROgram(s) Inhaler 2 Puff(s) Inhalation daily    	  	  LABS:	 	    Troponin I, High Sensitivity Result: 20.0 ng/L (11-29 @ 14:55)                            12.4   13.87 )-----------( 143      ( 02 Dec 2022 06:59 )             38.0     12-02    144  |  107  |  38<H>  ----------------------------<  110<H>  4.0   |  31  |  1.62<H>    Ca    10.2      02 Dec 2022 06:59  Phos  2.6     12-02  Mg     2.4     12-02    TPro  6.7  /  Alb  3.3<L>  /  TBili  0.5  /  DBili  x   /  AST  29  /  ALT  28  /  AlkPhos  50  12-02    proBNP: Serum Pro-Brain Natriuretic Peptide: 1873 pg/mL (11-29 @ 14:55)

## 2022-12-02 NOTE — PROGRESS NOTE ADULT - PROBLEM SELECTOR PLAN 1
-   Patient presented with cough , runny nose  -   RVP and covid negative  -   chest x-ray negative  -   Steroids switched to PO 12/1  -   Continue with  duoneb PRN  -   Pulmonary:  Dr Solano -   Patient presented with cough , runny nose  -   RVP and covid negative  -   chest x-ray negative  -   Steroids switched to PO 12/1  -   Continue with  duoneb PRN  -   Pulmonary:  Dr Solano  -  Patient hypoxic overnight  -  CXR with new infiltrates  -  low grade temp of 100.3  -  increased 02 requirements overnight  -  CT chest pending  -  ID Dr Peterson consulted

## 2022-12-02 NOTE — PROGRESS NOTE ADULT - PROBLEM SELECTOR PLAN 8
-  D-dimer  1022  -  02 requirement increasing  -  US bilateral lower extremity completed -  pending official read  -  Unable to do CTA due to AK  -  Unable to do VQ scan due to mental status

## 2022-12-02 NOTE — CHART NOTE - NSCHARTNOTEFT_GEN_A_CORE
EVENT:   12/2/22, 4:30am, called by RN re: patient  had dyspnea. Assessed at bedside. SOB. Expiratory wheezing and upper chest crackles - on auscultation. BP - 195/112, HR - 101, RR - 20, O2sat - 92% on 6L N/C,  T - 97.9F. Respiratory treatment - given by respiratory therapist. Morning BP meds - given.    HPI:     89 y/o female, from home, minimally ambulatory with assistance, with PMH of HTN, HFpEF (Grade II DD),Parox Afib (not on AC due to GIB),CRI, Complete Heart Block s/p PPM, HLD, Asthma, and Dementia coming to ED for cough and runny nose for the last 5 days. patients daughter at bedside states she has been sick for the last week and then her mother got sick a few days after. They tested for COVID19 and were negative. patient has been having dry cough associated with wheezing but no apparent respiratory distress per daughter. Daughter states that patient usually receives Duoneb treatments every night and uses her inhaler everyday. She denies any fevers, chills, headaches, dizziness, chest pain, SOB, abd pain, n/v, diarrhea, constipation, hematuria, dysuria, numbness, and weakness.    OBJECTIVE:  Vital Signs Last 24 Hrs  T(C): 36.6 (02 Dec 2022 03:55), Max: 36.6 (02 Dec 2022 03:55)  T(F): 97.9 (02 Dec 2022 03:55), Max: 97.9 (02 Dec 2022 03:55)  HR: 101 (02 Dec 2022 03:55) (61 - 101)  BP: 195/112 (02 Dec 2022 03:55) (104/62 - 210/79)  BP(mean): --  RR: 20 (02 Dec 2022 03:55) (18 - 20)  SpO2: 92% (02 Dec 2022 03:55) (92% - 96%)    Parameters below as of 02 Dec 2022 03:55  Patient On (Oxygen Delivery Method): nasal cannula  O2 Flow (L/min): 6      FOCUSED PHYSICAL EXAM:    LABS:                        11.1   4.80  )-----------( 153      ( 30 Nov 2022 07:11 )             33.3     11-30    140  |  102  |  24<H>  ----------------------------<  154<H>  3.5   |  29  |  1.24    Ca    9.3      30 Nov 2022 07:11  Phos  2.5     11-30  Mg     2.5     11-30    TPro  6.3  /  Alb  3.0<L>  /  TBili  0.4  /  DBili  x   /  AST  18  /  ALT  23  /  AlkPhos  47      PLAN:   - Chest x ray - urgent (abnormal breath sound),   - Arterial ABG,   - HOB - elevated.      FOLLOW UP / RESULT:   - Re-evaluate patient respiratory status and VS,   - Continue med. treatment and supportive care.

## 2022-12-02 NOTE — PROGRESS NOTE ADULT - PROBLEM SELECTOR PROBLEM 3
Past Medical History:   Diagnosis Date    Hyperlipidemia     Hypertension        Past Surgical History:   Procedure Laterality Date    INCISION AND DRAINAGE OF ABSCESS N/A 12/13/2021    Procedure: INCISION AND DRAINAGE, ABSCESS PERINEAL REGION;  Surgeon: Harish Cazares MD;  Location: [facility];  Service: General;  Laterality: N/A;  perirectal       Review of patient's allergies indicates:  No Known Allergies  Current Facility-Administered Medications   Medication Frequency    0.45% NaCl infusion Continuous    acetaminophen tablet 500 mg Q6H PRN    clindamycin in D5W 900 mg/50 mL IVPB 900 mg Q8H    DAPTOmycin (CUBICIN) 500 mg in sodium chloride 0.9% IVPB Q48H    dextrose 50% injection 12.5 g PRN    fentaNYL 2500 mcg in D5W 250 mL infusion premix (titrating) (conc: 10 mcg/mL) Continuous    glucagon (human recombinant) injection 1 mg PRN    insulin aspart U-100 injection 1-10 Units Q6H PRN    meropenem (MERREM) 500 mg in sodium chloride 0.9 % 100 mL IVPB (MB+) Q12H    metoprolol tartrate (LOPRESSOR) tablet 25 mg BID    pantoprazole injection 40 mg Daily    propofol (DIPRIVAN) 10 mg/mL infusion Continuous     Family History     Problem Relation (Age of Onset)    Cancer Father    Diabetes Father    Hypertension Mother, Father        Tobacco Use    Smoking status: Current Every Day Smoker     Packs/day: 0.50     Types: Cigarettes    Smokeless tobacco: Never Used   Substance and Sexual Activity    Alcohol use: Not Currently    Drug use: Never    Sexual activity: Yes     Review of Systems   Unable to perform ROS: Intubated     Objective:     Vital Signs (Most Recent):  Temp: 99.7 °F (37.6 °C) (12/14/21 1515)  Pulse: (!) 117 (12/14/21 1615)  Resp: (!) 23 (12/14/21 1615)  BP: (!) 155/65 (12/14/21 1615)  SpO2: 97 % (12/14/21 1615)  O2 Device (Oxygen Therapy): ventilator (12/14/21 1700) Vital Signs (24h Range):  Temp:  [98.5 °F (36.9 °C)-101.7 °F (38.7 °C)] 99.7 °F (37.6 °C)  Pulse:  [] 117  Resp:   [12-39] 23  SpO2:  [92 %-100 %] 97 %  BP: (114-194)/(56-85) 155/65     Weight: 94.1 kg (207 lb 7.3 oz) (12/14/21 0349)  Body mass index is 28.93 kg/m².  Body surface area is 2.17 meters squared.    I/O last 3 completed shifts:  In: 2200 [IV Piggyback:2200]  Out: 250 [Urine:250]    Physical Exam  Vitals reviewed.   Constitutional:       Appearance: He is ill-appearing.   HENT:      Head:      Comments: Old wound to the right temporal area     Mouth/Throat:      Mouth: Mucous membranes are dry.   Cardiovascular:      Rate and Rhythm: Regular rhythm.   Pulmonary:      Effort: Pulmonary effort is normal.      Breath sounds: Normal breath sounds.      Comments: ventilated  Abdominal:      Palpations: Abdomen is soft.      Comments: Abdominal wound vac         Significant Labs:  BMP:   Recent Labs   Lab 12/14/21  1528   *   *   K 4.3   *   CO2 15*   BUN 78*   CREATININE 7.36*   CALCIUM 6.6*     CBC:   Recent Labs   Lab 12/14/21  1333   WBC 8.45   RBC 3.92*   HGB 11.7*   HCT 33.4*      MCV 85.2   MCH 29.8   MCHC 35.0       Significant Imaging:  Labs: Reviewed   CHF (congestive heart failure)

## 2022-12-02 NOTE — PROGRESS NOTE ADULT - PROBLEM SELECTOR PLAN 7
likely steroids related vs worsening [id number]  R/o urosepsis  check UA  Taper steroids  Consider CT head  check electrolytes, TFTs, B12 vit likely steroids related vs worsening dementia  R/o urosepsis  check UA  Taper steroids  Consider CT head  check electrolytes, TFTs, B12 vit

## 2022-12-02 NOTE — PROGRESS NOTE ADULT - ASSESSMENT
89 y/o female, from home, minimally ambulatory with assistance, with PMHx of HTN, HFpEF (Grade II DD),Parox Afib (not on AC due to GIB),CRI, Complete Heart Block s/p PPM, HLD, Asthma, and Dementia coming to ED for cough and runny nose for the last 5 days admitted for Asthma is a lung disease that makes breathing difficult. Chronic inflammation and reactions to triggers narrow the airways in the lungs. Asthma can become life-threatening if it is not managed. . Dr. Russ agarwal consulted. 89 y/o female, from home, minimally ambulatory with assistance, with PMHx of HTN, HFpEF (Grade II DD),Parox Afib (not on AC due to GIB),CRI, Complete Heart Block s/p PPM, HLD, Asthma, and Dementia coming to ED for cough and runny nose for the last 5 days admitted for Asthma is a lung disease that makes breathing difficult. Chronic inflammation and reactions to triggers narrow the airways in the lungs. Asthma can become life-threatening if it is not managed. . Dr. Solano pulerich consulted.  12/2  - low grade fever, increasing 02 requirements, new infiltrates on x-ray  -  ID consulted

## 2022-12-02 NOTE — CHART NOTE - NSCHARTNOTEFT_GEN_A_CORE
The patient is seen and examined at the bed side. The chart has been reviewed and Abx adjusted.    -Full  consult  to follow      Thank you  Dr. Peterson

## 2022-12-02 NOTE — PROGRESS NOTE ADULT - SUBJECTIVE AND OBJECTIVE BOX
`Patient is a 89y old  Female who presents with a chief complaint of Asthma exacerbation (30 Nov 2022 12:17)      INTERVAL HPI/OVERNIGHT EVENTS: no overnight events    MEDICATIONS  (STANDING):  acetylcysteine 20%  Inhalation 4 milliLiter(s) Inhalation every 6 hours  albuterol/ipratropium for Nebulization 3 milliLiter(s) Nebulizer every 6 hours  aspirin  chewable 81 milliGRAM(s) Oral daily  dorzolamide 2%/timolol 0.5% Ophthalmic Solution 1 Drop(s) Both EYES two times a day  doxazosin 4 milliGRAM(s) Oral at bedtime  ferrous    sulfate 325 milliGRAM(s) Oral daily  heparin   Injectable 5000 Unit(s) SubCutaneous every 8 hours  hydrALAZINE 100 milliGRAM(s) Oral three times a day  isosorbide   mononitrate ER Tablet (IMDUR) 120 milliGRAM(s) Oral daily  latanoprost 0.005% Ophthalmic Solution 1 Drop(s) Both EYES at bedtime  metoprolol tartrate 100 milliGRAM(s) Oral two times a day  montelukast 10 milliGRAM(s) Oral at bedtime  pantoprazole    Tablet 40 milliGRAM(s) Oral before breakfast  risperiDONE   Tablet 0.25 milliGRAM(s) Oral two times a day  simvastatin 40 milliGRAM(s) Oral at bedtime  tiotropium 2.5 MICROgram(s) Inhaler 2 Puff(s) Inhalation daily    MEDICATIONS  (PRN):  acetaminophen     Tablet .. 650 milliGRAM(s) Oral every 6 hours PRN Temp greater or equal to 38C (100.4F), Mild Pain (1 - 3)  albuterol    90 MICROgram(s) HFA Inhaler 2 Puff(s) Inhalation every 6 hours PRN Shortness of Breath and/or Wheezing  guaiFENesin Oral Liquid (Sugar-Free) 100 milliGRAM(s) Oral every 6 hours PRN Cough      REVIEW OF SYSTEMS:  Unable to asses due to confusion.      Vital Signs Last 24 Hrs  T(C): 37.9 (02 Dec 2022 14:34), Max: 37.9 (02 Dec 2022 14:34)  T(F): 100.3 (02 Dec 2022 14:34), Max: 100.3 (02 Dec 2022 14:34)  HR: 73 (02 Dec 2022 13:00) (68 - 101)  BP: 104/38 (02 Dec 2022 13:00) (104/38 - 195/112)  BP(mean): --  RR: 18 (02 Dec 2022 13:00) (18 - 20)  SpO2: 96% (02 Dec 2022 13:00) (92% - 96%)    Parameters below as of 02 Dec 2022 13:00  Patient On (Oxygen Delivery Method): nasal cannula        PHYSICAL EXAM:  GENERAL: No acute distress  HEAD:  Atraumatic, Normocephalic  EYES: conjunctiva and sclera clear.  PERRLA  ENMT: Moist mucous membranes  NECK: Supple  NERVOUS SYSTEM:  disoriented this morning  CHEST/LUNG:  rhonchi  HEART: Regular rate and rhythm  ABDOMEN: Soft, Nontender, Nondistended; Bowel sounds present  EXTREMITIES:  2+ Peripheral Pulses  SKIN: No rashes                                         12.4   13.87 )-----------( 143      ( 02 Dec 2022 06:59 )             38.0       12-02    144  |  107  |  38<H>  ----------------------------<  110<H>  4.0   |  31  |  1.62<H>    Ca    10.2      02 Dec 2022 06:59  Phos  2.6     12-02  Mg     2.4     12-02    TPro  6.7  /  Alb  3.3<L>  /  TBili  0.5  /  DBili  x   /  AST  29  /  ALT  28  /  AlkPhos  50  12-02                                        < from: Xray Chest 1 View-PORTABLE IMMEDIATE (Xray Chest 1 View-PORTABLE IMMEDIATE .) (11.29.22 @ 15:55) >  IMPRESSION:  1. Clear lungs with no acute cardiopulmonary abnormalities.  2. AV sequential pacemaker in situ without CHF.    < end of copied text >  < from: CT Chest No Cont (03.07.22 @ 10:12) >  IMPRESSION:    No pneumonia.    < end of copied text >   `Patient is a 89y old  Female who presents with a chief complaint of Asthma exacerbation (30 Nov 2022 12:17)      INTERVAL HPI/OVERNIGHT EVENTS: no overnight events      MEDICATIONS  (STANDING):  acetylcysteine 20%  Inhalation 4 milliLiter(s) Inhalation every 6 hours  albuterol/ipratropium for Nebulization 3 milliLiter(s) Nebulizer every 6 hours  aspirin  chewable 81 milliGRAM(s) Oral daily  dorzolamide 2%/timolol 0.5% Ophthalmic Solution 1 Drop(s) Both EYES two times a day  doxazosin 4 milliGRAM(s) Oral at bedtime  ferrous    sulfate 325 milliGRAM(s) Oral daily  heparin   Injectable 5000 Unit(s) SubCutaneous every 8 hours  hydrALAZINE 100 milliGRAM(s) Oral three times a day  isosorbide   mononitrate ER Tablet (IMDUR) 120 milliGRAM(s) Oral daily  latanoprost 0.005% Ophthalmic Solution 1 Drop(s) Both EYES at bedtime  metoprolol tartrate 100 milliGRAM(s) Oral two times a day  montelukast 10 milliGRAM(s) Oral at bedtime  pantoprazole    Tablet 40 milliGRAM(s) Oral before breakfast  risperiDONE   Tablet 0.25 milliGRAM(s) Oral two times a day  simvastatin 40 milliGRAM(s) Oral at bedtime  tiotropium 2.5 MICROgram(s) Inhaler 2 Puff(s) Inhalation daily    MEDICATIONS  (PRN):  acetaminophen     Tablet .. 650 milliGRAM(s) Oral every 6 hours PRN Temp greater or equal to 38C (100.4F), Mild Pain (1 - 3)  albuterol    90 MICROgram(s) HFA Inhaler 2 Puff(s) Inhalation every 6 hours PRN Shortness of Breath and/or Wheezing  guaiFENesin Oral Liquid (Sugar-Free) 100 milliGRAM(s) Oral every 6 hours PRN Cough      REVIEW OF SYSTEMS:  Unable to asses due to lethargy      Vital Signs Last 24 Hrs  T(C): 37.9 (02 Dec 2022 14:34), Max: 37.9 (02 Dec 2022 14:34)  T(F): 100.3 (02 Dec 2022 14:34), Max: 100.3 (02 Dec 2022 14:34)  HR: 73 (02 Dec 2022 13:00) (68 - 101)  BP: 104/38 (02 Dec 2022 13:00) (104/38 - 195/112)  BP(mean): --  RR: 18 (02 Dec 2022 13:00) (18 - 20)  SpO2: 96% (02 Dec 2022 13:00) (92% - 96%)    Parameters below as of 02 Dec 2022 13:00  Patient On (Oxygen Delivery Method): nasal cannula        PHYSICAL EXAM:  GENERAL: No acute distress  HEAD:  Atraumatic, Normocephalic  EYES: conjunctiva and sclera clear.  PERRLA  ENMT: Moist mucous membranes  NECK: Supple  NERVOUS SYSTEM:  disoriented this morning  CHEST/LUNG:  rhonchi  HEART: Regular rate and rhythm  ABDOMEN: Soft, Nontender, Nondistended; Bowel sounds present  EXTREMITIES:  2+ Peripheral Pulses  SKIN: No rashes                                         12.4   13.87 )-----------( 143      ( 02 Dec 2022 06:59 )             38.0       12-02    144  |  107  |  38<H>  ----------------------------<  110<H>  4.0   |  31  |  1.62<H>    Ca    10.2      02 Dec 2022 06:59  Phos  2.6     12-02  Mg     2.4     12-02    TPro  6.7  /  Alb  3.3<L>  /  TBili  0.5  /  DBili  x   /  AST  29  /  ALT  28  /  AlkPhos  50  12-02                        < from: Xray Chest 1 View- PORTABLE-Urgent (Xray Chest 1 View- PORTABLE-Urgent .) (12.02.22 @ 04:52) >  IMPRESSION:  Perihilar alveolar infiltrates, less likely pulmonary venous congestion,   new    < end of copied text >             < from: Xray Chest 1 View-PORTABLE IMMEDIATE (Xray Chest 1 View-PORTABLE IMMEDIATE .) (11.29.22 @ 15:55) >  IMPRESSION:  1. Clear lungs with no acute cardiopulmonary abnormalities.  2. AV sequential pacemaker in situ without CHF.    < end of copied text >  < from: CT Chest No Cont (03.07.22 @ 10:12) >  IMPRESSION:    No pneumonia.    < end of copied text >

## 2022-12-02 NOTE — PROGRESS NOTE ADULT - ASSESSMENT
89 y/o female, from home, minimally ambulatory with assistance, with PMHx of HTN, HFpEF (Grade II DD),Parox Afib (not on AC due to GIB),CRI, Complete Heart Block s/p PPM, HLD, Asthma, and Dementia coming to ED for cough and runny nose for the last 5 days,Delirium.   1.Asthma steroids,MDI.CT chest w/o contrast.  2.Pulm f/u.  3.PAF-asa,b blocker  4.CRI-f/u lytes.  5.Lipid d/o-statin.  6.HTN-cont bp medication.  7.GI and DVT prophylaxis.  8.Delirium-risperdol .25mg bid.CT head

## 2022-12-03 ENCOUNTER — TRANSCRIPTION ENCOUNTER (OUTPATIENT)
Age: 87
End: 2022-12-03

## 2022-12-03 DIAGNOSIS — J69.0 PNEUMONITIS DUE TO INHALATION OF FOOD AND VOMIT: ICD-10-CM

## 2022-12-03 LAB
ANION GAP SERPL CALC-SCNC: 6 MMOL/L — SIGNIFICANT CHANGE UP (ref 5–17)
BUN SERPL-MCNC: 36 MG/DL — HIGH (ref 7–18)
CALCIUM SERPL-MCNC: 9.4 MG/DL — SIGNIFICANT CHANGE UP (ref 8.4–10.5)
CHLORIDE SERPL-SCNC: 109 MMOL/L — HIGH (ref 96–108)
CO2 SERPL-SCNC: 29 MMOL/L — SIGNIFICANT CHANGE UP (ref 22–31)
CREAT SERPL-MCNC: 1.32 MG/DL — HIGH (ref 0.5–1.3)
EGFR: 37 ML/MIN/1.73M2 — LOW
GLUCOSE SERPL-MCNC: 163 MG/DL — HIGH (ref 70–99)
HCT VFR BLD CALC: 34.6 % — SIGNIFICANT CHANGE UP (ref 34.5–45)
HGB BLD-MCNC: 11.2 G/DL — LOW (ref 11.5–15.5)
MCHC RBC-ENTMCNC: 32.4 GM/DL — SIGNIFICANT CHANGE UP (ref 32–36)
MCHC RBC-ENTMCNC: 32.5 PG — SIGNIFICANT CHANGE UP (ref 27–34)
MCV RBC AUTO: 100.3 FL — HIGH (ref 80–100)
NRBC # BLD: 0 /100 WBCS — SIGNIFICANT CHANGE UP (ref 0–0)
PLATELET # BLD AUTO: 127 K/UL — LOW (ref 150–400)
POTASSIUM SERPL-MCNC: 3.7 MMOL/L — SIGNIFICANT CHANGE UP (ref 3.5–5.3)
POTASSIUM SERPL-SCNC: 3.7 MMOL/L — SIGNIFICANT CHANGE UP (ref 3.5–5.3)
RBC # BLD: 3.45 M/UL — LOW (ref 3.8–5.2)
RBC # FLD: 12.9 % — SIGNIFICANT CHANGE UP (ref 10.3–14.5)
SODIUM SERPL-SCNC: 144 MMOL/L — SIGNIFICANT CHANGE UP (ref 135–145)
WBC # BLD: 8.87 K/UL — SIGNIFICANT CHANGE UP (ref 3.8–10.5)
WBC # FLD AUTO: 8.87 K/UL — SIGNIFICANT CHANGE UP (ref 3.8–10.5)

## 2022-12-03 RX ORDER — FUROSEMIDE 40 MG
40 TABLET ORAL ONCE
Refills: 0 | Status: COMPLETED | OUTPATIENT
Start: 2022-12-03 | End: 2022-12-03

## 2022-12-03 RX ADMIN — Medication 5 MILLIGRAM(S): at 21:40

## 2022-12-03 RX ADMIN — Medication 100 MILLIGRAM(S): at 06:15

## 2022-12-03 RX ADMIN — HEPARIN SODIUM 5000 UNIT(S): 5000 INJECTION INTRAVENOUS; SUBCUTANEOUS at 16:35

## 2022-12-03 RX ADMIN — Medication 3 MILLILITER(S): at 15:09

## 2022-12-03 RX ADMIN — Medication 3 MILLILITER(S): at 05:19

## 2022-12-03 RX ADMIN — Medication 4 MILLILITER(S): at 15:10

## 2022-12-03 RX ADMIN — Medication 100 MILLIGRAM(S): at 21:39

## 2022-12-03 RX ADMIN — CEFTRIAXONE 100 MILLIGRAM(S): 500 INJECTION, POWDER, FOR SOLUTION INTRAMUSCULAR; INTRAVENOUS at 21:39

## 2022-12-03 RX ADMIN — Medication 100 MILLIGRAM(S): at 00:45

## 2022-12-03 RX ADMIN — Medication 3 MILLILITER(S): at 21:06

## 2022-12-03 RX ADMIN — HEPARIN SODIUM 5000 UNIT(S): 5000 INJECTION INTRAVENOUS; SUBCUTANEOUS at 05:33

## 2022-12-03 RX ADMIN — Medication 100 MILLIGRAM(S): at 13:42

## 2022-12-03 RX ADMIN — Medication 4 MILLIGRAM(S): at 21:40

## 2022-12-03 RX ADMIN — Medication 100 MILLIGRAM(S): at 05:26

## 2022-12-03 RX ADMIN — Medication 4 MILLILITER(S): at 21:07

## 2022-12-03 RX ADMIN — Medication 4 MILLILITER(S): at 09:01

## 2022-12-03 RX ADMIN — MONTELUKAST 10 MILLIGRAM(S): 4 TABLET, CHEWABLE ORAL at 21:40

## 2022-12-03 RX ADMIN — Medication 81 MILLIGRAM(S): at 12:14

## 2022-12-03 RX ADMIN — DORZOLAMIDE HYDROCHLORIDE TIMOLOL MALEATE 1 DROP(S): 20; 5 SOLUTION/ DROPS OPHTHALMIC at 07:18

## 2022-12-03 RX ADMIN — Medication 100 MILLIGRAM(S): at 21:41

## 2022-12-03 RX ADMIN — SIMVASTATIN 40 MILLIGRAM(S): 20 TABLET, FILM COATED ORAL at 21:40

## 2022-12-03 RX ADMIN — PANTOPRAZOLE SODIUM 40 MILLIGRAM(S): 20 TABLET, DELAYED RELEASE ORAL at 06:14

## 2022-12-03 RX ADMIN — Medication 325 MILLIGRAM(S): at 12:14

## 2022-12-03 RX ADMIN — Medication 3 MILLILITER(S): at 09:01

## 2022-12-03 RX ADMIN — Medication 100 MILLIGRAM(S): at 05:23

## 2022-12-03 RX ADMIN — Medication 100 MILLIGRAM(S): at 21:40

## 2022-12-03 RX ADMIN — Medication 650 MILLIGRAM(S): at 22:03

## 2022-12-03 RX ADMIN — Medication 100 MILLIGRAM(S): at 12:14

## 2022-12-03 RX ADMIN — ISOSORBIDE MONONITRATE 120 MILLIGRAM(S): 60 TABLET, EXTENDED RELEASE ORAL at 12:14

## 2022-12-03 RX ADMIN — DORZOLAMIDE HYDROCHLORIDE TIMOLOL MALEATE 1 DROP(S): 20; 5 SOLUTION/ DROPS OPHTHALMIC at 16:36

## 2022-12-03 RX ADMIN — RISPERIDONE 0.25 MILLIGRAM(S): 4 TABLET ORAL at 16:35

## 2022-12-03 RX ADMIN — Medication 100 MILLIGRAM(S): at 16:35

## 2022-12-03 RX ADMIN — Medication 40 MILLIGRAM(S): at 13:42

## 2022-12-03 RX ADMIN — Medication 40 MILLIGRAM(S): at 05:24

## 2022-12-03 RX ADMIN — LATANOPROST 1 DROP(S): 0.05 SOLUTION/ DROPS OPHTHALMIC; TOPICAL at 21:40

## 2022-12-03 RX ADMIN — Medication 650 MILLIGRAM(S): at 22:33

## 2022-12-03 RX ADMIN — Medication 4 MILLILITER(S): at 05:18

## 2022-12-03 NOTE — DISCHARGE NOTE PROVIDER - CARE PROVIDER_API CALL
Carol Harrell)  Internal Medicine  [address] [place] [address]  Phone: [phone]  Fax: [phone]  Established Patient  Follow Up Time: 2 weeks

## 2022-12-03 NOTE — CONSULT NOTE ADULT - CONSULT REQUESTED DATE/TIME
03-Dec-2022 14:35
[FreeTextEntry6] : 17 year old female presents today cough, congestion, sinus pressure, and sore throat for about 1 week. Patent has rapid covid test and it was negative. Patient temp 99 in office\par Patient  on Advil cold and sinus
30-Nov-2022 11:48

## 2022-12-03 NOTE — PROGRESS NOTE ADULT - PROBLEM SELECTOR PROBLEM 8
Discharge instructions reviewed with parents.  Questions answered and understanding verbalized.  Hugs tag removed    AMS (altered mental status)

## 2022-12-03 NOTE — PROGRESS NOTE ADULT - SUBJECTIVE AND OBJECTIVE BOX
Patient is a 89y old  Female who presents with a chief complaint of Asthma exacerbation (02 Dec 2022 17:13)  Awake, alert, comfortable in bed in NAD. Remains confused but not agitated.    INTERVAL HPI/OVERNIGHT EVENTS:      VITAL SIGNS:  T(F): 97.2 (22 @ 06:07)  HR: 56 (22 @ 06:07)  BP: 169/60 (22 @ 06:07)  RR: 18 (22 @ 06:07)  SpO2: 98% (22 @ 06:07)  Wt(kg): --  I&O's Detail          REVIEW OF SYSTEMS:    CONSTITUTIONAL:  No fevers, chills, sweats    HEENT:  Eyes:  No diplopia or blurred vision. ENT:  No earache, sore throat or runny nose.    CARDIOVASCULAR:  No pressure, squeezing, tightness, or heaviness about the chest; no palpitations.    RESPIRATORY:  Per HPI    GASTROINTESTINAL:  No abdominal pain, nausea, vomiting or diarrhea.    GENITOURINARY:  No dysuria, frequency or urgency.    NEUROLOGIC:  No paresthesias, fasciculations, seizures or weakness.    PSYCHIATRIC:  No disorder of thought or mood.      PHYSICAL EXAM:    Constitutional: Well developed and nourished  Eyes:Perrla  ENMT: normal  Neck:supple  Respiratory: good air entry  Cardiovascular: S1 S2 regular  Gastrointestinal: Soft, Non tender  Extremities: No edema  Vascular:normal  Neurological:Awake, alert,Ox3  Musculoskeletal:Normal      MEDICATIONS  (STANDING):  acetylcysteine 20%  Inhalation 4 milliLiter(s) Inhalation every 6 hours  albuterol/ipratropium for Nebulization 3 milliLiter(s) Nebulizer every 6 hours  aspirin  chewable 81 milliGRAM(s) Oral daily  cefTRIAXone   IVPB 1000 milliGRAM(s) IV Intermittent every 24 hours  dorzolamide 2%/timolol 0.5% Ophthalmic Solution 1 Drop(s) Both EYES two times a day  doxazosin 4 milliGRAM(s) Oral at bedtime  ferrous    sulfate 325 milliGRAM(s) Oral daily  heparin   Injectable 5000 Unit(s) SubCutaneous every 12 hours  hydrALAZINE 100 milliGRAM(s) Oral three times a day  isosorbide   mononitrate ER Tablet (IMDUR) 120 milliGRAM(s) Oral daily  latanoprost 0.005% Ophthalmic Solution 1 Drop(s) Both EYES at bedtime  melatonin 5 milliGRAM(s) Oral at bedtime  metoprolol tartrate 100 milliGRAM(s) Oral two times a day  metroNIDAZOLE  IVPB      metroNIDAZOLE  IVPB 500 milliGRAM(s) IV Intermittent every 8 hours  montelukast 10 milliGRAM(s) Oral at bedtime  pantoprazole    Tablet 40 milliGRAM(s) Oral before breakfast  predniSONE   Tablet 40 milliGRAM(s) Oral daily  risperiDONE   Tablet 0.25 milliGRAM(s) Oral two times a day  simvastatin 40 milliGRAM(s) Oral at bedtime  tiotropium 2.5 MICROgram(s) Inhaler 2 Puff(s) Inhalation daily    MEDICATIONS  (PRN):  acetaminophen     Tablet .. 650 milliGRAM(s) Oral every 6 hours PRN Temp greater or equal to 38C (100.4F), Mild Pain (1 - 3)  albuterol    90 MICROgram(s) HFA Inhaler 2 Puff(s) Inhalation every 6 hours PRN Shortness of Breath and/or Wheezing  guaiFENesin Oral Liquid (Sugar-Free) 100 milliGRAM(s) Oral every 6 hours PRN Cough      Allergies    No Known Allergies    Intolerances        LABS:                        11.2   8.87  )-----------( 127      ( 03 Dec 2022 08:09 )             34.6     12-03    144  |  109<H>  |  36<H>  ----------------------------<  163<H>  3.7   |  29  |  1.32<H>    Ca    9.4      03 Dec 2022 08:09  Phos  2.6     12-02  Mg     2.4     12-    TPro  6.7  /  Alb  3.3<L>  /  TBili  0.5  /  DBili  x   /  AST  29  /  ALT  28  /  AlkPhos  50  12-02      Urinalysis Basic - ( 02 Dec 2022 15:02 )    Color: Yellow / Appearance: Clear / S.015 / pH: x  Gluc: x / Ketone: Negative  / Bili: Negative / Urobili: Negative   Blood: x / Protein: 30 mg/dL / Nitrite: Negative   Leuk Esterase: Negative / RBC: 0-2 /HPF / WBC 0-2 /HPF   Sq Epi: x / Non Sq Epi: Few /HPF / Bacteria: Trace /HPF            CAPILLARY BLOOD GLUCOSE        pro-bnp --  @ 10:17     d-dimer 1022   @ 10:17  pro-bnp 1873  @ 14:55     d-dimer --   @ 14:55      RADIOLOGY & ADDITIONAL TESTS:    CXR:  < from: Xray Chest 1 View- PORTABLE-Urgent (Xray Chest 1 View- PORTABLE-Urgent .) (22 @ 04:52) >  IMPRESSION:  Perihilar alveolar infiltrates, less likely pulmonary venous congestion,   new    < end of copied text >    Ct scan chest:    ekg;    echo: Patient is a 89y old  Female who presents with a chief complaint of Asthma exacerbation (02 Dec 2022 17:13)  Awake, alert, comfortable in bed in NAD. Remains confused but not agitated.    INTERVAL HPI/OVERNIGHT EVENTS:      VITAL SIGNS:  T(F): 97.2 (22 @ 06:07)  HR: 56 (22 @ 06:07)  BP: 169/60 (22 @ 06:07)  RR: 18 (22 @ 06:07)  SpO2: 98% (22 @ 06:07)  Wt(kg): --  I&O's Detail          REVIEW OF SYSTEMS:    CONSTITUTIONAL:  No fevers, chills, sweats    HEENT:  Eyes:  No diplopia or blurred vision. ENT:  No earache, sore throat or runny nose.    CARDIOVASCULAR:  No pressure, squeezing, tightness, or heaviness about the chest; no palpitations.    RESPIRATORY:  Per HPI    GASTROINTESTINAL:  No abdominal pain, nausea, vomiting or diarrhea.    GENITOURINARY:  No dysuria, frequency or urgency.    NEUROLOGIC:  No paresthesias, fasciculations, seizures or weakness.    PSYCHIATRIC:  No disorder of thought or mood.      PHYSICAL EXAM:    Constitutional: Well developed and nourished  Eyes:Perrla  ENMT: normal  Neck:supple  Respiratory: good air entry  Cardiovascular: S1 S2 regular  Gastrointestinal: Soft, Non tender  Extremities: No edema  Vascular:normal  Neurological:Awake, alert,Ox3  Musculoskeletal:Normal      MEDICATIONS  (STANDING):  acetylcysteine 20%  Inhalation 4 milliLiter(s) Inhalation every 6 hours  albuterol/ipratropium for Nebulization 3 milliLiter(s) Nebulizer every 6 hours  aspirin  chewable 81 milliGRAM(s) Oral daily  cefTRIAXone   IVPB 1000 milliGRAM(s) IV Intermittent every 24 hours  dorzolamide 2%/timolol 0.5% Ophthalmic Solution 1 Drop(s) Both EYES two times a day  doxazosin 4 milliGRAM(s) Oral at bedtime  ferrous    sulfate 325 milliGRAM(s) Oral daily  heparin   Injectable 5000 Unit(s) SubCutaneous every 12 hours  hydrALAZINE 100 milliGRAM(s) Oral three times a day  isosorbide   mononitrate ER Tablet (IMDUR) 120 milliGRAM(s) Oral daily  latanoprost 0.005% Ophthalmic Solution 1 Drop(s) Both EYES at bedtime  melatonin 5 milliGRAM(s) Oral at bedtime  metoprolol tartrate 100 milliGRAM(s) Oral two times a day  metroNIDAZOLE  IVPB      metroNIDAZOLE  IVPB 500 milliGRAM(s) IV Intermittent every 8 hours  montelukast 10 milliGRAM(s) Oral at bedtime  pantoprazole    Tablet 40 milliGRAM(s) Oral before breakfast  predniSONE   Tablet 40 milliGRAM(s) Oral daily  risperiDONE   Tablet 0.25 milliGRAM(s) Oral two times a day  simvastatin 40 milliGRAM(s) Oral at bedtime  tiotropium 2.5 MICROgram(s) Inhaler 2 Puff(s) Inhalation daily    MEDICATIONS  (PRN):  acetaminophen     Tablet .. 650 milliGRAM(s) Oral every 6 hours PRN Temp greater or equal to 38C (100.4F), Mild Pain (1 - 3)  albuterol    90 MICROgram(s) HFA Inhaler 2 Puff(s) Inhalation every 6 hours PRN Shortness of Breath and/or Wheezing  guaiFENesin Oral Liquid (Sugar-Free) 100 milliGRAM(s) Oral every 6 hours PRN Cough      Allergies    No Known Allergies    Intolerances        LABS:                        11.2   8.87  )-----------( 127      ( 03 Dec 2022 08:09 )             34.6     12-03    144  |  109<H>  |  36<H>  ----------------------------<  163<H>  3.7   |  29  |  1.32<H>    Ca    9.4      03 Dec 2022 08:09  Phos  2.6     12-02  Mg     2.4     12-    TPro  6.7  /  Alb  3.3<L>  /  TBili  0.5  /  DBili  x   /  AST  29  /  ALT  28  /  AlkPhos  50  12-02      Urinalysis Basic - ( 02 Dec 2022 15:02 )    Color: Yellow / Appearance: Clear / S.015 / pH: x  Gluc: x / Ketone: Negative  / Bili: Negative / Urobili: Negative   Blood: x / Protein: 30 mg/dL / Nitrite: Negative   Leuk Esterase: Negative / RBC: 0-2 /HPF / WBC 0-2 /HPF   Sq Epi: x / Non Sq Epi: Few /HPF / Bacteria: Trace /HPF            CAPILLARY BLOOD GLUCOSE        pro-bnp --  @ 10:17     d-dimer 1022   @ 10:17  pro-bnp 1873  @ 14:55     d-dimer --   @ 14:55      RADIOLOGY & ADDITIONAL TESTS:    CXR:  < from: Xray Chest 1 View- PORTABLE-Urgent (Xray Chest 1 View- PORTABLE-Urgent .) (22 @ 04:52) >  IMPRESSION:  Perihilar alveolar infiltrates, less likely pulmonary venous congestion,   new    < end of copied text >    Ct scan chest:  < from: CT Chest No Cont (22 @ 18:52) >  IMPRESSION:    Pulmonary edema and small pleural effusions.    < end of copied text >    ekg;    echo:

## 2022-12-03 NOTE — PROGRESS NOTE ADULT - PROBLEM SELECTOR PLAN 8
likely steroids related vs worsening dementia  R/o urosepsis  check UA  Taper steroids  Consider CT head  check electrolytes, TFTs, B12 vit

## 2022-12-03 NOTE — PROGRESS NOTE ADULT - ASSESSMENT
89 y/o female, from home, minimally ambulatory with assistance, with PMHx of HTN, HFpEF (Grade II DD),Parox Afib (not on AC due to GIB),CRI, Complete Heart Block s/p PPM, HLD, Asthma, and Dementia coming to ED for cough and runny nose for the last 5 days,Delirium,acute diastolic HF.   1.Asthma -MDI.ABX.  2.Pulm f/u.  3.PAF-asa,b blocker  4.CRI-f/u lytes.  5.Lipid d/o-statin.  6.HTN-cont bp medication.  7.GI and DVT prophylaxis.  8.Delirium-risperdol .25mg bid.  9.Acute diastolic HF-IV lasix.

## 2022-12-03 NOTE — CONSULT NOTE ADULT - SUBJECTIVE AND OBJECTIVE BOX
Patient is a 89y old  Female who presents with a chief complaint of Asthma exacerbation (03 Dec 2022 12:47)          REVIEW OF SYSTEMS: Total of twelve systems have been reviewed with patient and found to be negative unless mentioned in HPI      PAST MEDICAL & SURGICAL HISTORY:  HTN (hypertension)  Hypercholesterolemia  Atrial fibrillation  History of complete heart block  Pulmonary HTN  CHF (congestive heart failure)  Artificial pacemaker        SOCIAL HISTORY  Alcohol: Does not drink  Tobacco: Does not smoke  Illicit substance use: None      FAMILY HISTORY: Non contributory to the present illness        ALLERGIES: No Known Allergies        Vital Signs Last 24 Hrs  T(C): 37.1 (03 Dec 2022 12:04), Max: 37.3 (02 Dec 2022 18:26)  T(F): 98.7 (03 Dec 2022 12:04), Max: 99.1 (02 Dec 2022 18:26)  HR: 75 (03 Dec 2022 12:04) (56 - 78)  BP: 136/55 (03 Dec 2022 12:04) (129/56 - 169/60)  BP(mean): --  RR: 18 (03 Dec 2022 12:04) (18 - 19)  SpO2: 95% (03 Dec 2022 12:04) (95% - 99%)    Parameters below as of 03 Dec 2022 12:04  Patient On (Oxygen Delivery Method): nasal cannula  O2 Flow (L/min): 2      PHYSICAL EXAM:  GENERAL: Not in distress   CHEST/LUNG:  Aire ntry bilaterally  HEART: s1 and s2 present  ABDOMEN:  Nontender and  Nondistended  EXTREMITIES: No pedal  edema  CNS: Awake and Alert      LABS:                        11.2   8.87  )-----------( 127      ( 03 Dec 2022 08:09 )             34.6       12-03    144  |  109<H>  |  36<H>  ----------------------------<  163<H>  3.7   |  29  |  1.32<H>    Ca    9.4      03 Dec 2022 08:09  Phos  2.6     12-02  Mg     2.4     12-    TPro  6.7  /  Alb  3.3<L>  /  TBili  0.5  /  DBili  x   /  AST  29  /  ALT  28  /  AlkPhos  50  12-02          Urinalysis Basic - ( 02 Dec 2022 15:02 )  Color: Yellow / Appearance: Clear / S.015 / pH: x  Gluc: x / Ketone: Negative  / Bili: Negative / Urobili: Negative   Blood: x / Protein: 30 mg/dL / Nitrite: Negative   Leuk Esterase: Negative / RBC: 0-2 /HPF / WBC 0-2 /HPF   Sq Epi: x / Non Sq Epi: Few /HPF / Bacteria: Trace /HPF        MEDICATIONS  (STANDING):  acetylcysteine 20%  Inhalation 4 milliLiter(s) Inhalation every 6 hours  albuterol/ipratropium for Nebulization 3 milliLiter(s) Nebulizer every 6 hours  aspirin  chewable 81 milliGRAM(s) Oral daily  cefTRIAXone   IVPB 1000 milliGRAM(s) IV Intermittent every 24 hours  dorzolamide 2%/timolol 0.5% Ophthalmic Solution 1 Drop(s) Both EYES two times a day  doxazosin 4 milliGRAM(s) Oral at bedtime  ferrous    sulfate 325 milliGRAM(s) Oral daily  heparin   Injectable 5000 Unit(s) SubCutaneous every 12 hours  hydrALAZINE 100 milliGRAM(s) Oral three times a day  isosorbide   mononitrate ER Tablet (IMDUR) 120 milliGRAM(s) Oral daily  latanoprost 0.005% Ophthalmic Solution 1 Drop(s) Both EYES at bedtime  melatonin 5 milliGRAM(s) Oral at bedtime  metoprolol tartrate 100 milliGRAM(s) Oral two times a day  metroNIDAZOLE  IVPB      metroNIDAZOLE  IVPB 500 milliGRAM(s) IV Intermittent every 8 hours  montelukast 10 milliGRAM(s) Oral at bedtime  pantoprazole    Tablet 40 milliGRAM(s) Oral before breakfast  predniSONE   Tablet 40 milliGRAM(s) Oral daily  risperiDONE   Tablet 0.25 milliGRAM(s) Oral two times a day  simvastatin 40 milliGRAM(s) Oral at bedtime  tiotropium 2.5 MICROgram(s) Inhaler 2 Puff(s) Inhalation daily    MEDICATIONS  (PRN):  acetaminophen     Tablet .. 650 milliGRAM(s) Oral every 6 hours PRN Temp greater or equal to 38C (100.4F), Mild Pain (1 - 3)  albuterol    90 MICROgram(s) HFA Inhaler 2 Puff(s) Inhalation every 6 hours PRN Shortness of Breath and/or Wheezing  guaiFENesin Oral Liquid (Sugar-Free) 100 milliGRAM(s) Oral every 6 hours PRN Cough          RADIOLOGY & ADDITIONAL TESTS:               Patient is a 89y old  Female from home, minimally ambulatory with assistance, with PMHx of HTN, HFpEF (Grade II DD),Parox Afib (not on AC due to GIB),CRI, Complete Heart Block s/p PPM, HLD, Asthma, and Dementia , brought in to the ER on 22 for evaluation of  cough, wheezing, and runny nose for the last 5 days. Patient's daughter at bedside states that they tested for COVID and were negative. During work up she found to have Aspiration pneumonia, Hence, The ID consult requested to assist with antibiotic management.         REVIEW OF SYSTEMS: Total of twelve systems have been reviewed and found to be negative unless mentioned in HPI      PAST MEDICAL & SURGICAL HISTORY:  HTN (hypertension)  Hypercholesterolemia  Atrial fibrillation  History of complete heart block  Pulmonary HTN  CHF (congestive heart failure)  Artificial pacemaker        SOCIAL HISTORY  Alcohol: Does not drink  Tobacco: Does not smoke  Illicit substance use: None      FAMILY HISTORY: Non contributory to the present illness        ALLERGIES: No Known Allergies        Vital Signs Last 24 Hrs  T(C): 37.1 (03 Dec 2022 12:04), Max: 37.3 (02 Dec 2022 18:26)  T(F): 98.7 (03 Dec 2022 12:04), Max: 99.1 (02 Dec 2022 18:26)  HR: 75 (03 Dec 2022 12:04) (56 - 78)  BP: 136/55 (03 Dec 2022 12:04) (129/56 - 169/60)  BP(mean): --  RR: 18 (03 Dec 2022 12:04) (18 - 19)  SpO2: 95% (03 Dec 2022 12:04) (95% - 99%)    Parameters below as of 03 Dec 2022 12:04  Patient On (Oxygen Delivery Method): nasal cannula  O2 Flow (L/min): 2      PHYSICAL EXAM:  GENERAL: Not in distress, on oxygen via NC  CHEST/LUNG:  Not using accessory muscles   HEART: s1 and s2 present  ABDOMEN:  Nontender and  Nondistended  EXTREMITIES: No pedal  edema  CNS: Awake and Alert      LABS:                        11.2   8.87  )-----------( 127      ( 03 Dec 2022 08:09 )             34.6       12-    144  |  109<H>  |  36<H>  ----------------------------<  163<H>  3.7   |  29  |  1.32<H>    Ca    9.4      03 Dec 2022 08:09  Phos  2.6     12-  Mg     2.4         TPro  6.7  /  Alb  3.3<L>  /  TBili  0.5  /  DBili  x   /  AST  29  /  ALT  28  /  AlkPhos  50  12          Urinalysis Basic - ( 02 Dec 2022 15:02 )  Color: Yellow / Appearance: Clear / S.015 / pH: x  Gluc: x / Ketone: Negative  / Bili: Negative / Urobili: Negative   Blood: x / Protein: 30 mg/dL / Nitrite: Negative   Leuk Esterase: Negative / RBC: 0-2 /HPF / WBC 0-2 /HPF   Sq Epi: x / Non Sq Epi: Few /HPF / Bacteria: Trace /HPF        MEDICATIONS  (STANDING):  acetylcysteine 20%  Inhalation 4 milliLiter(s) Inhalation every 6 hours  albuterol/ipratropium for Nebulization 3 milliLiter(s) Nebulizer every 6 hours  aspirin  chewable 81 milliGRAM(s) Oral daily  cefTRIAXone   IVPB 1000 milliGRAM(s) IV Intermittent every 24 hours  dorzolamide 2%/timolol 0.5% Ophthalmic Solution 1 Drop(s) Both EYES two times a day  doxazosin 4 milliGRAM(s) Oral at bedtime  ferrous    sulfate 325 milliGRAM(s) Oral daily  heparin   Injectable 5000 Unit(s) SubCutaneous every 12 hours  hydrALAZINE 100 milliGRAM(s) Oral three times a day  isosorbide   mononitrate ER Tablet (IMDUR) 120 milliGRAM(s) Oral daily  latanoprost 0.005% Ophthalmic Solution 1 Drop(s) Both EYES at bedtime  melatonin 5 milliGRAM(s) Oral at bedtime  metoprolol tartrate 100 milliGRAM(s) Oral two times a day  metroNIDAZOLE  IVPB      metroNIDAZOLE  IVPB 500 milliGRAM(s) IV Intermittent every 8 hours  montelukast 10 milliGRAM(s) Oral at bedtime  pantoprazole    Tablet 40 milliGRAM(s) Oral before breakfast  predniSONE   Tablet 40 milliGRAM(s) Oral daily  risperiDONE   Tablet 0.25 milliGRAM(s) Oral two times a day  simvastatin 40 milliGRAM(s) Oral at bedtime  tiotropium 2.5 MICROgram(s) Inhaler 2 Puff(s) Inhalation daily    MEDICATIONS  (PRN):  acetaminophen     Tablet .. 650 milliGRAM(s) Oral every 6 hours PRN Temp greater or equal to 38C (100.4F), Mild Pain (1 - 3)  albuterol    90 MICROgram(s) HFA Inhaler 2 Puff(s) Inhalation every 6 hours PRN Shortness of Breath and/or Wheezing  guaiFENesin Oral Liquid (Sugar-Free) 100 milliGRAM(s) Oral every 6 hours PRN Cough          RADIOLOGY & ADDITIONAL TESTS:

## 2022-12-03 NOTE — DISCHARGE NOTE PROVIDER - NSDCCPCAREPLAN_GEN_ALL_CORE_FT
PRINCIPAL DISCHARGE DIAGNOSIS  Diagnosis: Acute asthma exacerbation  Assessment and Plan of Treatment: An asthma exacerbation occurs when there is a narrowing of the airway.  You may be at risk if you are bothered by air pollution, certain medicines, cold, dry air, smoke, or strong odors. An asthma exacerbation may make it hard for you to breathe and may become life-threatening.  DISCHARGE INSTRUCTIONS:  Call your local emergency number (911 in the ) if:  You have chest pain.  You have severe shortness of breath or trouble breathing.  Return to the emergency department if:  You cough or spit up blood.  You are short of breath.  You have blue fingernails or toenails.  Your heartbeat is fast or not even.  Call your doctor or pulmonologist if:  You have a fever.  You have a cough that will not go away.  Your wheezing worsens.  Bronchodilators help expand your airway for easier breathing. Some of these medicines may help prevent future spasms.  Inhaled steroids help reduce swelling in your airway and soothe your breathing. These are used for long-term control.  Anticholinergics help relax and open your airway.  Take your medicine as directed. Contact your healthcare provider if you think your medicine is not helping.  Prevent bronchospasms:  Avoid triggers.   Keep your immune system healthy. Try to avoid people who are sick. Ask your healthcare provider about vaccines you may need. Vaccines help prevent certain infections that can cause breathing problems.   Follow up with your doctor or pulmonologist as directed.      SECONDARY DISCHARGE DIAGNOSES  Diagnosis: Pneumonia, aspiration  Assessment and Plan of Treatment: While in the hospital  you developed fevers, and you needed oxygen, you had a chest xray which showed new infiltrates. you were treated with IV antibiotics and your ocndition improved.   Aspiration pneumonia is a lung infection that develops after you aspirate (inhale) food, liquid, or vomit into your lungs. The muscles that help you swallow can become weakened by age, illness, or disease.  DISCHARGE INSTRUCTIONS:  Seek care immediately if:  You have chest pain.  You are confused or cannot think clearly.  You have more trouble breathing or your breathing seems faster than normal.  Call your doctor or pulmonologist if:  You have a fever.  Antibiotics treat pneumonia caused by bacteria.  Steroids may help open your air passages so you can breathe easier.   Prevent aspiration pneumonia:  Ask about vaccines you may need. A pneumonia vaccine can help lower your risk for pneumonia. The vaccine may be recommended every 5 years, starting at age 65.   Prevent the spread of germs. Wash your hands often with soap and water.  Sit up while you eat. Take small bites, eat slowly, and swallow with your chin down.  Follow up with your doctor or pulmonologist as directed:    Diagnosis: HTN (hypertension)  Assessment and Plan of Treatment: You have a history of high blood pressure. High blood pressure is a condition that puts you at risk for heart attack, stroke and kidney disease. Please continue to take your medications as prescribed. You can also help control your blood pressure by maintaining a healthy weight, eating a diet low in fat and rich in fruits and vegetables, reduce the amount of salt in your diet. Also, reduce alcohol and try to include some form of physical activity daily for at least 30 mins. Follow up with your medical doctor to establish long term blood pressure treatment goals.  Notify your doctor if you have any of the following symptoms:   Dizziness, Lightheadedness, Blurry vision, Headache, Chest pain, Shortness of breath      Diagnosis: Hypercholesterolemia  Assessment and Plan of Treatment: You have a history of hyperlipidemia, which is when you have too much cholesterol in your blood. High amounts of cholesterol in your blood can put you at higher risks for heart attck, strokes and other health problems. Follow up with PCP for treatment goals, continue medication as prescribed, have liver function testing every 3 months as anti lipid medications can cause liver irritation, eat low fat meals, avoid red meat, butter, fried foods and cheese. Get daily exercise.      Diagnosis: CHF (congestive heart failure)  Assessment and Plan of Treatment: Weigh yourself daily.  If you gain 3lbs in 3 days, or 5lbs in a week call your Health Care Provider.  Do not eat or drink foods containing more than 2000mg of salt (sodium) in your diet every day.  Call your Health Care Provider if you have any swelling or increased swelling in your feet, ankles, and/or stomach.  Take all of your medication as directed.  If you become dizzy call your Health Care Provider.      Diagnosis: Atrial fibrillation  Assessment and Plan of Treatment: Atrial fibrillation is the most common heart rhythm problem & has the risk of stroke & heart attack  It helps if you control your blood pressure, not drink more than 1-2 alcohol drinks per day, cut down on caffeine, getting treatment for over active thyroid gland, & getting exercise  Call your doctor if you feel your heart racing or beating unusually, chest tightness or pain, lightheaded, faint, shortness of breath especially with exercise  It is important to take your heart medication as prescribed  You may be on anticoagulation which is very important to take as directed - you may need blood work to monitor drug levels      Diagnosis: CAMILLE (acute kidney injury)  Assessment and Plan of Treatment: You presented with Elevated Creatinine. YOu were treated with IV fluids and your creatinine improved. your kidney injury is likely multifactorial due to dehydration , Hypertension and diabetes.  You need to follow up with your Primary Care Physician in 1 week    Diagnosis: Delirium  Assessment and Plan of Treatment: Acute delirium is temporary confusion and change in consciousness. You may have trouble remembering, listening, or doing things you usually do. Acute delirium may be caused by an illness, injury, surgery, medicine, or alcohol or drug use.  DISCHARGE INSTRUCTIONS:  Call your local emergency number (919 in the [place]) if:  You want to harm yourself or others.  Seek immediate care if:  You cannot eat or drink, and you feel weak or dizzy.  Call your doctor if:  You have new or worsening trouble remembering.  You have new or worsening trouble sleeping.  Self-care:  Talk to those around you when you feel lonely or sad. Ask for help when you forget the time, place, or names of people around you.  Keep your home or room quiet and comfortable. Surround yourself with familiar objects. Keep a calendar and clock nearby to remind you of the date and time. Keep pictures of your family and friends nearby. This will help you stay aware of yourself and the area around you. It may also help you feel safe and calm.  Take all of your medicines as directed.   Eat healthy foods.   Drink liquids as directed. Liquids will help prevent dehydration and constipation.  Follow up with your healthcare provider as directed

## 2022-12-03 NOTE — CONSULT NOTE ADULT - ASSESSMENT
Patient is a 89y old  Female from home, minimally ambulatory with assistance, with PMHx of HTN, HFpEF (Grade II DD),Parox Afib (not on AC due to GIB),CRI, Complete Heart Block s/p PPM, HLD, Asthma, and Dementia , brought in to the ER on 11/29/22 for evaluation of  cough, wheezing, and runny nose for the last 5 days. Patient's daughter at bedside states that they tested for COVID and were negative. During work up she found to have Aspiration pneumonia, Hence, The ID consult requested to assist with antibiotic management.       # Aspiration pneumonia    would recommend:    1. Ceftriaxone 1 g daily in the setting of kidney insufficiency   2. Flagyl for anaerobic coverage   3. Aspiration precaution  4. Supplemental oxygenation and bronchodilator as needed  5. Monitor kidney function     d/w Patient's daughter at the bed side    will follow the patient with you and make further recommendation based on the clinical course and Lab results  Thank you for the opportunity to participate in Ms. GARCES's care    Attending Attestation:    Spent more than 65 minutes on total encounter, more than 50 % of the visit was spent counseling and/or coordinating care by the Attending physician.

## 2022-12-03 NOTE — DISCHARGE NOTE PROVIDER - NSDCACTIVITY_GEN_ALL_CORE
No restrictions Infliximab Counseling:  I discussed with the patient the risks of infliximab including but not limited to myelosuppression, immunosuppression, autoimmune hepatitis, demyelinating diseases, lymphoma, and serious infections.  The patient understands that monitoring is required including a PPD at baseline and must alert us or the primary physician if symptoms of infection or other concerning signs are noted.

## 2022-12-03 NOTE — DISCHARGE NOTE PROVIDER - NSDCMRMEDTOKEN_GEN_ALL_CORE_FT
acetylcysteine 20% inhalation solution: 4 milliliter(s) inhaled 4 times a day  aspirin 81 mg oral tablet, chewable: 1 tab(s) orally once a day  dorzolamide-timolol 2%-0.5% preservative-free ophthalmic solution: 1 drop(s) to each affected eye 2 times a day  doxazosin 2 mg oral tablet: 1 tab(s) orally once a day  ferrous sulfate 325 mg (65 mg elemental iron) oral tablet: 1 tab(s) orally once a day  hydrALAZINE 100 mg oral tablet: 1 tab(s) orally 3 times a day  ipratropium-albuterol 0.5 mg-2.5 mg/3 mLinhalation solution: 3 milliliter(s) inhaled every 6 hours  isosorbide mononitrate 120 mg oral tablet, extended release: 1 tab(s) orally once a day  Lasix 20 mg oral tablet: 1 tab(s) orally once a day  latanoprost 0.005% ophthalmic solution: 1 drop(s) to each affected eye once a day (at bedtime)  metoprolol tartrate 100 mg oral tablet: 1 tab(s) orally 2 times a day  montelukast 10 mg oral tablet: 1 tab(s) orally once a day (at bedtime)  Multaq 400 mg oral tablet: 1 tab(s) orally 2 times a day  Multiple Vitamins oral tablet, dispersible:   predniSONE 5 mg oral tablet: 1 tab(s) orally once a day  simvastatin 40 mg oral tablet: 1 tab(s) orally once a day (at bedtime)   acetylcysteine 20% inhalation solution: 4 milliliter(s) inhaled every 6 hours  albuterol 90 mcg/inh inhalation aerosol: 2 puff(s) inhaled every 6 hours, As needed, Shortness of Breath and/or Wheezing  amoxicillin-clavulanate 875 mg-125 mg oral tablet: 1 tab(s) orally every 12 hours final dose of treatment   aspirin 81 mg oral tablet, chewable: 1 tab(s) orally once a day  dorzolamide-timolol 2%-0.5% preservative-free ophthalmic solution: 1 drop(s) to each affected eye 2 times a day  doxazosin 2 mg oral tablet: 1 tab(s) orally once a day  ferrous sulfate 325 mg (65 mg elemental iron) oral tablet: 1 tab(s) orally once a day  hydrALAZINE 100 mg oral tablet: 1 tab(s) orally 3 times a day  ipratropium-albuterol 0.5 mg-2.5 mg/3 mL inhalation solution: 3 milliliter(s) inhaled every 6 hours  isosorbide mononitrate 120 mg oral tablet, extended release: 1 tab(s) orally once a day  Lasix 20 mg oral tablet: 1 tab(s) orally once a day  latanoprost 0.005% ophthalmic solution: 1 drop(s) to each affected eye once a day (at bedtime)  metoprolol tartrate 100 mg oral tablet: 1 tab(s) orally 2 times a day  montelukast 10 mg oral tablet: 1 tab(s) orally once a day (at bedtime)  Multiple Vitamins oral tablet, dispersible:   predniSONE 10 mg oral tablet: Taper as follows:  3 tab(s) orally once a day x 2 days  2 tab(s) orally once a day x 2 days  1 tab(s) orally once a day x 2 days  risperiDONE 0.25 mg oral tablet: 1 tab(s) orally once (at bedtime)   simvastatin 40 mg oral tablet: 1 tab(s) orally once a day (at bedtime)  tiotropium 2.5 mcg/inh inhalation aerosol: 2 puff(s) inhaled once a day

## 2022-12-03 NOTE — DISCHARGE NOTE PROVIDER - HOSPITAL COURSE
89 y/o female, from home, minimally ambulatory with assistance, with PMHx of HTN, HFpEF (Grade II DD),Parox Afib (not on AC due to GIB),CRI, Complete Heart Block s/p PPM, HLD, Asthma, and Dementia coming to ED for cough and runny nose for the last 5 days. Admitted for Asthma exacerbation. Dr. Solano pulerich consulted recommends: c/w Bronchodilators, PO steroids, montelukast 10 mgs po Qhs. on 12/2/22 hospital course c/b fever and incresing oxygen requirements. Cxr noted with new infiltrates, Id Dr Peterson ocnsulted reccommends:        89 y/o female, from home, minimally ambulatory with assistance, with PMHx of HTN, HFpEF (Grade II DD),Parox Afib (not on AC due to GIB),CRI, Complete Heart Block s/p PPM, HLD, Asthma, and Dementia coming to ED for cough and runny nose for the last 5 days. rvp and covid negative, cxr negative. Admitted for Asthma exacerbation. Dr. Solano pulerich consulted recommends: Bronchodilators, PO steroids, montelukast 10 mgs po Qhs. on 12/2/22 hospital course c/b fever and incresing oxygen requirements. Cxr noted with new infiltrates, ID Dr Peterson consulted recommends, abx for suspected aspiration pneumonia.  Ct chest pulmonary edema and small pleural effusion, s/p IV lasix.       Incomplete 12/3/22............      Please note that this a brief summary of hospital course please refer to daily progress notes and consult notes for full course and events       89 y/o female, from home, minimally ambulatory with assistance, with PMHx of HTN, HFpEF (Grade II DD),Parox Afib (not on AC due to GIB),CRI, Complete Heart Block s/p PPM, HLD, Asthma, and Dementia coming to ED for cough and runny nose for the last 5 days. rvp and covid negative, cxr negative. Admitted for Asthma exacerbation. Dr. Solano pulerich consulted recommends: Bronchodilators, PO steroids, montelukast 10 mgs po Qhs. on 12/2/22 hospital course c/b fever and incresing oxygen requirements. CXR noted with new infiltrates, ID Dr Peterson consulted recommends, abx for suspected aspiration pneumonia.  CT chest pulmonary edema and small pleural effusion, s/p IV lasix.         Please note that this a brief summary of hospital course please refer to daily progress notes and consult notes for full course and events

## 2022-12-03 NOTE — PROGRESS NOTE ADULT - SUBJECTIVE AND OBJECTIVE BOX
CHIEF COMPLAINT:Patient is a 89y old  Female who presents with a chief complaint of Asthma exacerbation .Pt more awake and alert.    	  REVIEW OF SYSTEMS:  	  [x ] Unable to obtain    PHYSICAL EXAM:  T(C): 37.1 (12-03-22 @ 12:04), Max: 37.9 (12-02-22 @ 14:34)  HR: 75 (12-03-22 @ 12:04) (56 - 78)  BP: 136/55 (12-03-22 @ 12:04) (104/38 - 169/60)  RR: 18 (12-03-22 @ 12:04) (18 - 19)  SpO2: 95% (12-03-22 @ 12:04) (95% - 99%)  Wt(kg): --  I&O's Summary      Appearance: Normal	  HEENT:   Normal oral mucosa, PERRL, EOMI	  Lymphatic: No lymphadenopathy  Cardiovascular: Normal S1 S2, No JVD, No murmurs, No edema  Respiratory: Dec BS at bases    Gastrointestinal:  Soft, Non-tender, + BS	  Skin: No rashes, No ecchymoses, No cyanosis	  Neurologic: Non-focal  Extremities: Normal range of motion, No clubbing, cyanosis or edema  Vascular: Peripheral pulses palpable 2+ bilaterally    MEDICATIONS  (STANDING):  acetylcysteine 20%  Inhalation 4 milliLiter(s) Inhalation every 6 hours  albuterol/ipratropium for Nebulization 3 milliLiter(s) Nebulizer every 6 hours  aspirin  chewable 81 milliGRAM(s) Oral daily  cefTRIAXone   IVPB 1000 milliGRAM(s) IV Intermittent every 24 hours  dorzolamide 2%/timolol 0.5% Ophthalmic Solution 1 Drop(s) Both EYES two times a day  doxazosin 4 milliGRAM(s) Oral at bedtime  ferrous    sulfate 325 milliGRAM(s) Oral daily  furosemide   Injectable 40 milliGRAM(s) IV Push once  heparin   Injectable 5000 Unit(s) SubCutaneous every 12 hours  hydrALAZINE 100 milliGRAM(s) Oral three times a day  isosorbide   mononitrate ER Tablet (IMDUR) 120 milliGRAM(s) Oral daily  latanoprost 0.005% Ophthalmic Solution 1 Drop(s) Both EYES at bedtime  melatonin 5 milliGRAM(s) Oral at bedtime  metoprolol tartrate 100 milliGRAM(s) Oral two times a day  metroNIDAZOLE  IVPB      metroNIDAZOLE  IVPB 500 milliGRAM(s) IV Intermittent every 8 hours  montelukast 10 milliGRAM(s) Oral at bedtime  pantoprazole    Tablet 40 milliGRAM(s) Oral before breakfast  predniSONE   Tablet 40 milliGRAM(s) Oral daily  risperiDONE   Tablet 0.25 milliGRAM(s) Oral two times a day  simvastatin 40 milliGRAM(s) Oral at bedtime  tiotropium 2.5 MICROgram(s) Inhaler 2 Puff(s) Inhalation daily      LABS:	 	                          11.2   8.87  )-----------( 127      ( 03 Dec 2022 08:09 )             34.6     12-03    144  |  109<H>  |  36<H>  ----------------------------<  163<H>  3.7   |  29  |  1.32<H>    Ca    9.4      03 Dec 2022 08:09  Phos  2.6     12-02  Mg     2.4     12-02    TPro  6.7  /  Alb  3.3<L>  /  TBili  0.5  /  DBili  x   /  AST  29  /  ALT  28  /  AlkPhos  50  12-02    proBNP: Serum Pro-Brain Natriuretic Peptide: 1873 pg/mL (11-29 @ 14:55)    doppler-no dvt.    < from: CT Head No Cont (12.02.22 @ 18:52) >  ACC: [id number] EXAM:  CT BRAIN                          PROCEDURE DATE:  12/02/2022          INTERPRETATION:  EXAM: CT head without contrast    INDICATION: Syncope, head trauma altered mental status TECHNIQUE: CT   examination of the head performed without contrast. Multiple axial images   obtained from skull base to vertex. Sagittal/coronal reformatted images   obtained from axial data set. Images reviewed in soft tissue and bone   windows.      COMPARISON: None available.    FINDINGS:    Ventricles and sulci are mildly proportionately prominent likely related   to mild parenchymal volume loss. No hydrocephalus. No extra-axial fluid   collection identified.    No acute intracranial hemorrhage identified. There is mild subcortical   and periventricular white matter attenuation nonspecific but favored to   reflect sequela of mild chronic microvascular ischemia. Gray-white   differentiation is preserved without CT evidence for acute transcortical   infarction. There is no significant midline shift, mass effect or   herniation.    Sellar and suprasellar region are unremarkable in appearance.    Visualized paranasal sinuses and mastoid air cells are well aerated. No   significant cerebellar tonsillar herniation.    No displaced calvarialfractures are identified. No suspicious expansile   or destructive calvarial lesion identified. No significant scalp soft   tissue swelling identified.          IMPRESSION:    No acute intracranial hemorrhage, hydrocephalus or extra-axial fluid   collection.  Mild parenchymal volume loss and mild chronic microvascular ischemic   changes.  No CT evidence for acute transcortical infarction. Please note that MR   imaging is a more sensitive imaging modality for detection of acute   infarction and may be obtained as clinically warranted.    If clinicians have any questions/need for clarification regarding this   report, Dr. Mackenzie Polanco can be best reached via the patient secure   hospital email system    --- End of Report ---            MACKENZIE DEL VALLE MD; Attending Radiologist  This document has been electronically signed. Dec  2 2022  7:29PM    < end of copied text >  < from: CT Chest No Cont (12.02.22 @ 18:52) >  ACC: [id number] EXAM:  CT CHEST                          PROCEDURE DATE:  12/02/2022          INTERPRETATION:  INDICATION: Shortness of breath    TECHNIQUE: Volumetric images of the chest without intravenous contrast.   Maximum intensity projection images were generated.    COMPARISON: CT chest 3/7/2022.    FINDINGS:    LUNGS/AIRWAYS/PLEURA: No endobronchial lesion. Groundglass and   interlobular septal thickening is in both lungs, as well as some mosaic   attenuation likely from air trapping. Small pleural effusions. Associated   passive atelectasis in the lower lobes.    LYMPH NODES/MEDIASTINUM: No enlarged lymph nodes. Enlarged multinodular   thyroid.    HEART/VASCULATURE: Dilated left atrium. No pericardial effusion. Coronary   artery calcifications. Normal caliber aorta. Calcified aortic valve.   Appropriate course of dual-chamber pacemaker.    UPPER ABDOMEN: Unchanged too small to characterize hypodensity in the   left hepatic lobe. Left renal cyst.    BONES/SOFT TISSUES: No acute or suspicious abnormality.      IMPRESSION:    Pulmonary edema and small pleural effusions.        CHARBEL ANNA M.D., ATTENDING RADIOGIST  This document has been electronically signed. Dec  2 2022  7:12PM

## 2022-12-03 NOTE — PROGRESS NOTE ADULT - PROBLEM SELECTOR PLAN 4
cont meds  Echo  Cardio follow-up.  lasix trial in view of CXR findings cont meds  Echo  Cardio follow-up.  lasix trial in view of CT findings

## 2022-12-04 LAB
ANION GAP SERPL CALC-SCNC: 8 MMOL/L — SIGNIFICANT CHANGE UP (ref 5–17)
BUN SERPL-MCNC: 34 MG/DL — HIGH (ref 7–18)
CALCIUM SERPL-MCNC: 9.3 MG/DL — SIGNIFICANT CHANGE UP (ref 8.4–10.5)
CHLORIDE SERPL-SCNC: 107 MMOL/L — SIGNIFICANT CHANGE UP (ref 96–108)
CO2 SERPL-SCNC: 32 MMOL/L — HIGH (ref 22–31)
CREAT SERPL-MCNC: 1.33 MG/DL — HIGH (ref 0.5–1.3)
EGFR: 37 ML/MIN/1.73M2 — LOW
GLUCOSE SERPL-MCNC: 134 MG/DL — HIGH (ref 70–99)
HCT VFR BLD CALC: 32.9 % — LOW (ref 34.5–45)
HGB BLD-MCNC: 10.5 G/DL — LOW (ref 11.5–15.5)
MCHC RBC-ENTMCNC: 31.9 GM/DL — LOW (ref 32–36)
MCHC RBC-ENTMCNC: 32.1 PG — SIGNIFICANT CHANGE UP (ref 27–34)
MCV RBC AUTO: 100.6 FL — HIGH (ref 80–100)
NRBC # BLD: 0 /100 WBCS — SIGNIFICANT CHANGE UP (ref 0–0)
PLATELET # BLD AUTO: 122 K/UL — LOW (ref 150–400)
POTASSIUM SERPL-MCNC: 3.3 MMOL/L — LOW (ref 3.5–5.3)
POTASSIUM SERPL-SCNC: 3.3 MMOL/L — LOW (ref 3.5–5.3)
RBC # BLD: 3.27 M/UL — LOW (ref 3.8–5.2)
RBC # FLD: 13 % — SIGNIFICANT CHANGE UP (ref 10.3–14.5)
SODIUM SERPL-SCNC: 147 MMOL/L — HIGH (ref 135–145)
WBC # BLD: 6.36 K/UL — SIGNIFICANT CHANGE UP (ref 3.8–10.5)
WBC # FLD AUTO: 6.36 K/UL — SIGNIFICANT CHANGE UP (ref 3.8–10.5)

## 2022-12-04 RX ORDER — POTASSIUM CHLORIDE 20 MEQ
20 PACKET (EA) ORAL ONCE
Refills: 0 | Status: COMPLETED | OUTPATIENT
Start: 2022-12-04 | End: 2022-12-04

## 2022-12-04 RX ADMIN — Medication 3 MILLILITER(S): at 20:16

## 2022-12-04 RX ADMIN — RISPERIDONE 0.25 MILLIGRAM(S): 4 TABLET ORAL at 05:34

## 2022-12-04 RX ADMIN — Medication 100 MILLIGRAM(S): at 05:34

## 2022-12-04 RX ADMIN — LATANOPROST 1 DROP(S): 0.05 SOLUTION/ DROPS OPHTHALMIC; TOPICAL at 21:33

## 2022-12-04 RX ADMIN — Medication 4 MILLILITER(S): at 20:16

## 2022-12-04 RX ADMIN — Medication 4 MILLILITER(S): at 08:52

## 2022-12-04 RX ADMIN — Medication 3 MILLILITER(S): at 08:52

## 2022-12-04 RX ADMIN — TIOTROPIUM BROMIDE 2 PUFF(S): 18 CAPSULE ORAL; RESPIRATORY (INHALATION) at 13:00

## 2022-12-04 RX ADMIN — Medication 100 MILLIGRAM(S): at 14:08

## 2022-12-04 RX ADMIN — Medication 4 MILLILITER(S): at 03:37

## 2022-12-04 RX ADMIN — DORZOLAMIDE HYDROCHLORIDE TIMOLOL MALEATE 1 DROP(S): 20; 5 SOLUTION/ DROPS OPHTHALMIC at 17:33

## 2022-12-04 RX ADMIN — Medication 3 MILLILITER(S): at 15:55

## 2022-12-04 RX ADMIN — Medication 40 MILLIGRAM(S): at 05:34

## 2022-12-04 RX ADMIN — Medication 325 MILLIGRAM(S): at 11:12

## 2022-12-04 RX ADMIN — SIMVASTATIN 40 MILLIGRAM(S): 20 TABLET, FILM COATED ORAL at 21:31

## 2022-12-04 RX ADMIN — Medication 3 MILLILITER(S): at 03:37

## 2022-12-04 RX ADMIN — Medication 81 MILLIGRAM(S): at 11:12

## 2022-12-04 RX ADMIN — Medication 4 MILLILITER(S): at 15:55

## 2022-12-04 RX ADMIN — HEPARIN SODIUM 5000 UNIT(S): 5000 INJECTION INTRAVENOUS; SUBCUTANEOUS at 05:33

## 2022-12-04 RX ADMIN — Medication 100 MILLIGRAM(S): at 05:24

## 2022-12-04 RX ADMIN — CEFTRIAXONE 100 MILLIGRAM(S): 500 INJECTION, POWDER, FOR SOLUTION INTRAMUSCULAR; INTRAVENOUS at 21:32

## 2022-12-04 RX ADMIN — Medication 4 MILLIGRAM(S): at 21:32

## 2022-12-04 RX ADMIN — DORZOLAMIDE HYDROCHLORIDE TIMOLOL MALEATE 1 DROP(S): 20; 5 SOLUTION/ DROPS OPHTHALMIC at 05:33

## 2022-12-04 RX ADMIN — Medication 100 MILLIGRAM(S): at 21:32

## 2022-12-04 RX ADMIN — Medication 5 MILLIGRAM(S): at 21:32

## 2022-12-04 RX ADMIN — MONTELUKAST 10 MILLIGRAM(S): 4 TABLET, CHEWABLE ORAL at 21:32

## 2022-12-04 RX ADMIN — ISOSORBIDE MONONITRATE 120 MILLIGRAM(S): 60 TABLET, EXTENDED RELEASE ORAL at 13:00

## 2022-12-04 RX ADMIN — Medication 100 MILLIGRAM(S): at 21:33

## 2022-12-04 RX ADMIN — Medication 100 MILLIGRAM(S): at 17:33

## 2022-12-04 RX ADMIN — HEPARIN SODIUM 5000 UNIT(S): 5000 INJECTION INTRAVENOUS; SUBCUTANEOUS at 17:33

## 2022-12-04 RX ADMIN — PANTOPRAZOLE SODIUM 40 MILLIGRAM(S): 20 TABLET, DELAYED RELEASE ORAL at 06:36

## 2022-12-04 RX ADMIN — Medication 20 MILLIEQUIVALENT(S): at 10:40

## 2022-12-04 NOTE — PROGRESS NOTE ADULT - SUBJECTIVE AND OBJECTIVE BOX
Patient is a 89y old  Female who presents with a chief complaint of Asthma exacerbation (03 Dec 2022 14:35)  Awake, alert, comfortable out of bed in NAD. Doing well on oxygen supp via NC    INTERVAL HPI/OVERNIGHT EVENTS:      VITAL SIGNS:  T(F): 98 (22 @ 05:11)  HR: 78 (22 @ 10:48)  BP: 148/58 (22 @ 10:48)  RR: 18 (22 @ 06:20)  SpO2: 97% (22 @ 06:20)  Wt(kg): --  I&O's Detail          REVIEW OF SYSTEMS:    CONSTITUTIONAL:  No fevers, chills, sweats    HEENT:  Eyes:  No diplopia or blurred vision. ENT:  No earache, sore throat or runny nose.    CARDIOVASCULAR:  No pressure, squeezing, tightness, or heaviness about the chest; no palpitations.    RESPIRATORY:  Per HPI    GASTROINTESTINAL:  No abdominal pain, nausea, vomiting or diarrhea.    GENITOURINARY:  No dysuria, frequency or urgency.    NEUROLOGIC:  No paresthesias, fasciculations, seizures or weakness.    PSYCHIATRIC:  No disorder of thought or mood.      PHYSICAL EXAM:    Constitutional: Well developed and nourished  Eyes:Perrla  ENMT: normal  Neck:supple  Respiratory: good air entry  Cardiovascular: S1 S2 regular  Gastrointestinal: Soft, Non tender  Extremities: No edema  Vascular:normal  Neurological:Awake, alert,Ox3  Musculoskeletal:Normal      MEDICATIONS  (STANDING):  acetylcysteine 20%  Inhalation 4 milliLiter(s) Inhalation every 6 hours  albuterol/ipratropium for Nebulization 3 milliLiter(s) Nebulizer every 6 hours  aspirin  chewable 81 milliGRAM(s) Oral daily  cefTRIAXone   IVPB 1000 milliGRAM(s) IV Intermittent every 24 hours  dorzolamide 2%/timolol 0.5% Ophthalmic Solution 1 Drop(s) Both EYES two times a day  doxazosin 4 milliGRAM(s) Oral at bedtime  ferrous    sulfate 325 milliGRAM(s) Oral daily  heparin   Injectable 5000 Unit(s) SubCutaneous every 12 hours  hydrALAZINE 100 milliGRAM(s) Oral three times a day  isosorbide   mononitrate ER Tablet (IMDUR) 120 milliGRAM(s) Oral daily  latanoprost 0.005% Ophthalmic Solution 1 Drop(s) Both EYES at bedtime  melatonin 5 milliGRAM(s) Oral at bedtime  metoprolol tartrate 100 milliGRAM(s) Oral two times a day  metroNIDAZOLE  IVPB      metroNIDAZOLE  IVPB 500 milliGRAM(s) IV Intermittent every 8 hours  montelukast 10 milliGRAM(s) Oral at bedtime  pantoprazole    Tablet 40 milliGRAM(s) Oral before breakfast  predniSONE   Tablet 40 milliGRAM(s) Oral daily  risperiDONE   Tablet 0.25 milliGRAM(s) Oral two times a day  simvastatin 40 milliGRAM(s) Oral at bedtime  tiotropium 2.5 MICROgram(s) Inhaler 2 Puff(s) Inhalation daily    MEDICATIONS  (PRN):  acetaminophen     Tablet .. 650 milliGRAM(s) Oral every 6 hours PRN Temp greater or equal to 38C (100.4F), Mild Pain (1 - 3)  albuterol    90 MICROgram(s) HFA Inhaler 2 Puff(s) Inhalation every 6 hours PRN Shortness of Breath and/or Wheezing  guaiFENesin Oral Liquid (Sugar-Free) 100 milliGRAM(s) Oral every 6 hours PRN Cough      Allergies    No Known Allergies    Intolerances        LABS:                        10.5   6.36  )-----------( 122      ( 04 Dec 2022 07:37 )             32.9     12-04    147<H>  |  107  |  34<H>  ----------------------------<  134<H>  3.3<L>   |  32<H>  |  1.33<H>    Ca    9.3      04 Dec 2022 07:37        Urinalysis Basic - ( 02 Dec 2022 15:02 )    Color: Yellow / Appearance: Clear / S.015 / pH: x  Gluc: x / Ketone: Negative  / Bili: Negative / Urobili: Negative   Blood: x / Protein: 30 mg/dL / Nitrite: Negative   Leuk Esterase: Negative / RBC: 0-2 /HPF / WBC 0-2 /HPF   Sq Epi: x / Non Sq Epi: Few /HPF / Bacteria: Trace /HPF            CAPILLARY BLOOD GLUCOSE        pro-bnp --  @ 10:17     d-dimer 102 @ 10:17  pro-bnp  @ 14:55     d-dimer --   @ 14:55      RADIOLOGY & ADDITIONAL TESTS:    CXR:    Ct scan chest:    ekg;    echo:

## 2022-12-04 NOTE — PROGRESS NOTE ADULT - SUBJECTIVE AND OBJECTIVE BOX
CHIEF COMPLAINT:Patient is a 89y old  Female who presents with a chief complaint of Asthma exacerbation.Pt appears comfortable.    	  REVIEW OF SYSTEMS:  CONSTITUTIONAL: No fever, weight loss, or fatigue  EYES: No eye pain, visual disturbances, or discharge  ENT:  No difficulty hearing, tinnitus, vertigo; No sinus or throat pain  NECK: No pain or stiffness  RESPIRATORY: No cough, wheezing, chills or hemoptysis; No Shortness of Breath  CARDIOVASCULAR: No chest pain, palpitations, passing out, dizziness, or leg swelling  GASTROINTESTINAL: No abdominal or epigastric pain. No nausea, vomiting, or hematemesis; No diarrhea or constipation. No melena or hematochezia.  GENITOURINARY: No dysuria, frequency, hematuria, or incontinence  NEUROLOGICAL: No headaches, memory loss, loss of strength, numbness, or tremors  SKIN: No itching, burning, rashes, or lesions   LYMPH Nodes: No enlarged glands  ENDOCRINE: No heat or cold intolerance; No hair loss  MUSCULOSKELETAL: No joint pain or swelling; No muscle, back, or extremity pain  PSYCHIATRIC: No depression, anxiety, mood swings, or difficulty sleeping  HEME/LYMPH: No easy bruising, or bleeding gums  ALLERGY AND IMMUNOLOGIC: No hives or eczema	        PHYSICAL EXAM:  T(C): 36.7 (12-04-22 @ 05:11), Max: 37.2 (12-03-22 @ 21:19)  HR: 78 (12-04-22 @ 10:48) (60 - 78)  BP: 148/58 (12-04-22 @ 10:48) (127/37 - 182/69)  RR: 18 (12-04-22 @ 06:20) (18 - 18)  SpO2: 97% (12-04-22 @ 06:20) (96% - 98%)  Wt(kg): --  I&O's Summary      Appearance: Normal	  HEENT:   Normal oral mucosa, PERRL, EOMI	  Lymphatic: No lymphadenopathy  Cardiovascular: Normal S1 S2, No JVD, No murmurs, No edema  Respiratory: B/L ronchi  Psychiatry: A & O x 3, Mood & affect appropriate  Gastrointestinal:  Soft, Non-tender, + BS	  Skin: No rashes, No ecchymoses, No cyanosis	  Neurologic: Non-focal  Extremities: Normal range of motion, No clubbing, cyanosis or edema  Vascular: Peripheral pulses palpable 2+ bilaterally    MEDICATIONS  (STANDING):  acetylcysteine 20%  Inhalation 4 milliLiter(s) Inhalation every 6 hours  albuterol/ipratropium for Nebulization 3 milliLiter(s) Nebulizer every 6 hours  aspirin  chewable 81 milliGRAM(s) Oral daily  cefTRIAXone   IVPB 1000 milliGRAM(s) IV Intermittent every 24 hours  dorzolamide 2%/timolol 0.5% Ophthalmic Solution 1 Drop(s) Both EYES two times a day  doxazosin 4 milliGRAM(s) Oral at bedtime  ferrous    sulfate 325 milliGRAM(s) Oral daily  heparin   Injectable 5000 Unit(s) SubCutaneous every 12 hours  hydrALAZINE 100 milliGRAM(s) Oral three times a day  isosorbide   mononitrate ER Tablet (IMDUR) 120 milliGRAM(s) Oral daily  latanoprost 0.005% Ophthalmic Solution 1 Drop(s) Both EYES at bedtime  melatonin 5 milliGRAM(s) Oral at bedtime  metoprolol tartrate 100 milliGRAM(s) Oral two times a day  metroNIDAZOLE  IVPB      metroNIDAZOLE  IVPB 500 milliGRAM(s) IV Intermittent every 8 hours  montelukast 10 milliGRAM(s) Oral at bedtime  pantoprazole    Tablet 40 milliGRAM(s) Oral before breakfast  predniSONE   Tablet 40 milliGRAM(s) Oral daily  risperiDONE   Tablet 0.25 milliGRAM(s) Oral two times a day  simvastatin 40 milliGRAM(s) Oral at bedtime  tiotropium 2.5 MICROgram(s) Inhaler 2 Puff(s) Inhalation daily      	  LABS:	 	                        10.5   6.36  )-----------( 122      ( 04 Dec 2022 07:37 )             32.9     12-04    147<H>  |  107  |  34<H>  ----------------------------<  134<H>  3.3<L>   |  32<H>  |  1.33<H>    Ca    9.3      04 Dec 2022 07:37      proBNP: Serum Pro-Brain Natriuretic Peptide: 1873 pg/mL (11-29 @ 14:55)    Lipid Profile:   HgA1c:   TSH:

## 2022-12-04 NOTE — CHART NOTE - NSCHARTNOTEFT_GEN_A_CORE
Met at bedside with  pt's emergency contact/ daughter Meena  and updated on pt's clinical condition and plan of care.   All questions and concerns addressed.     Mary Low, NP Medicine  [phone]

## 2022-12-04 NOTE — PROGRESS NOTE ADULT - SUBJECTIVE AND OBJECTIVE BOX
Patient is seen and examined at the bed side, is afebrile.  She is doing much better , appears very comfortable.       REVIEW OF SYSTEMS: All other review systems are negative      ALLERGIES: No Known Allergies      Vital Signs Last 24 Hrs  T(C): 36.7 (04 Dec 2022 20:32), Max: 36.7 (04 Dec 2022 05:11)  T(F): 98.1 (04 Dec 2022 20:32), Max: 98.1 (04 Dec 2022 20:32)  HR: 74 (04 Dec 2022 20:32) (60 - 84)  BP: 168/68 (04 Dec 2022 20:32) (127/37 - 175/55)  BP(mean): 69 (04 Dec 2022 14:04) (67 - 69)  RR: 17 (04 Dec 2022 20:32) (16 - 18)  SpO2: 97% (04 Dec 2022 20:32) (96% - 97%)    Parameters below as of 04 Dec 2022 20:32  Patient On (Oxygen Delivery Method): nasal cannula  O2 Flow (L/min): 2        PHYSICAL EXAM:  GENERAL: Not in distress, on oxygen via NC  CHEST/LUNG:  Not using accessory muscles   HEART: s1 and s2 present  ABDOMEN:  Nontender and  Nondistended  EXTREMITIES: No pedal  edema  CNS: Awake and Alert      LABS:                        10.5   6.36  )-----------( 122      ( 04 Dec 2022 07:37 )             32.9                           11.2   8.87  )-----------( 127      ( 03 Dec 2022 08:09 )             34.6         12-04    147<H>  |  107  |  34<H>  ----------------------------<  134<H>  3.3<L>   |  32<H>  |  1.33<H>    Ca    9.3      04 Dec 2022 07:37      12-03    144  |  109<H>  |  36<H>  ----------------------------<  163<H>  3.7   |  29  |  1.32<H>    Ca    9.4      03 Dec 2022 08:09  Phos  2.6     12-  Mg     2.4         TPro  6.7  /  Alb  3.3<L>  /  TBili  0.5  /  DBili  x   /  AST  29  /  ALT  28  /  AlkPhos  50            Urinalysis Basic - ( 02 Dec 2022 15:02 )  Color: Yellow / Appearance: Clear / S.015 / pH: x  Gluc: x / Ketone: Negative  / Bili: Negative / Urobili: Negative   Blood: x / Protein: 30 mg/dL / Nitrite: Negative   Leuk Esterase: Negative / RBC: 0-2 /HPF / WBC 0-2 /HPF   Sq Epi: x / Non Sq Epi: Few /HPF / Bacteria: Trace /HPF        MEDICATIONS  (STANDING):    acetylcysteine 20%  Inhalation 4 milliLiter(s) Inhalation every 6 hours  albuterol/ipratropium for Nebulization 3 milliLiter(s) Nebulizer every 6 hours  aspirin  chewable 81 milliGRAM(s) Oral daily  cefTRIAXone   IVPB 1000 milliGRAM(s) IV Intermittent every 24 hours  dorzolamide 2%/timolol 0.5% Ophthalmic Solution 1 Drop(s) Both EYES two times a day  doxazosin 4 milliGRAM(s) Oral at bedtime  ferrous    sulfate 325 milliGRAM(s) Oral daily  heparin   Injectable 5000 Unit(s) SubCutaneous every 12 hours  hydrALAZINE 100 milliGRAM(s) Oral three times a day  isosorbide   mononitrate ER Tablet (IMDUR) 120 milliGRAM(s) Oral daily  latanoprost 0.005% Ophthalmic Solution 1 Drop(s) Both EYES at bedtime  melatonin 5 milliGRAM(s) Oral at bedtime  metoprolol tartrate 100 milliGRAM(s) Oral two times a day  metroNIDAZOLE  IVPB      metroNIDAZOLE  IVPB 500 milliGRAM(s) IV Intermittent every 8 hours  montelukast 10 milliGRAM(s) Oral at bedtime  pantoprazole    Tablet 40 milliGRAM(s) Oral before breakfast  predniSONE   Tablet 40 milliGRAM(s) Oral daily  risperiDONE   Tablet 0.25 milliGRAM(s) Oral two times a day  simvastatin 40 milliGRAM(s) Oral at bedtime  tiotropium 2.5 MICROgram(s) Inhaler 2 Puff(s) Inhalation daily      RADIOLOGY & ADDITIONAL TESTS:

## 2022-12-04 NOTE — PROGRESS NOTE ADULT - ASSESSMENT
89 y/o female, from home, minimally ambulatory with assistance, with PMHx of HTN, HFpEF (Grade II DD),Parox Afib (not on AC due to GIB),CRI, Complete Heart Block s/p PPM, HLD, Asthma, and Dementia coming to ED for cough and runny nose for the last 5 days,Delirium,acute diastolic HF.   1.Asthma -MDI.ABX.  2.Pulm f/u.  3.PAF-asa,b blocker  4.CRI-f/u lytes.  5.Lipid d/o-statin.  6.HTN-cont bp medication.  7.GI and DVT prophylaxis.  8.Delirium-risperdol .25mg bid.  9.Acute diastolic HF-s/p iv lasix.  10.Replace k+.

## 2022-12-04 NOTE — PROGRESS NOTE ADULT - ASSESSMENT
Patient is a 89y old  Female from home, minimally ambulatory with assistance, with PMHx of HTN, HFpEF (Grade II DD),Parox Afib (not on AC due to GIB),CRI, Complete Heart Block s/p PPM, HLD, Asthma, and Dementia , brought in to the ER on 11/29/22 for evaluation of  cough, wheezing, and runny nose for the last 5 days. Patient's daughter at bedside states that they tested for COVID and were negative. During work up she found to have Aspiration pneumonia, Hence, The ID consult requested to assist with antibiotic management.     # Aspiration pneumonia    would recommend:    1. Ceftriaxone and Flagyl until 12/8/22, may change to oral on discharge   2. Wean off oxygen as tolerated   3. Aspiration precaution  4. Bronchodilator as needed  5. Monitor kidney function     Attending Attestation:    Spent more than 45 minutes on total encounter, more than 50 % of the visit was spent counseling and/or coordinating care by the Attending physician.

## 2022-12-05 DIAGNOSIS — Z02.9 ENCOUNTER FOR ADMINISTRATIVE EXAMINATIONS, UNSPECIFIED: ICD-10-CM

## 2022-12-05 LAB
ANION GAP SERPL CALC-SCNC: 6 MMOL/L — SIGNIFICANT CHANGE UP (ref 5–17)
BUN SERPL-MCNC: 33 MG/DL — HIGH (ref 7–18)
CALCIUM SERPL-MCNC: 9.4 MG/DL — SIGNIFICANT CHANGE UP (ref 8.4–10.5)
CHLORIDE SERPL-SCNC: 108 MMOL/L — SIGNIFICANT CHANGE UP (ref 96–108)
CO2 SERPL-SCNC: 31 MMOL/L — SIGNIFICANT CHANGE UP (ref 22–31)
CREAT SERPL-MCNC: 1.14 MG/DL — SIGNIFICANT CHANGE UP (ref 0.5–1.3)
EGFR: 45 ML/MIN/1.73M2 — LOW
GLUCOSE SERPL-MCNC: 126 MG/DL — HIGH (ref 70–99)
HCT VFR BLD CALC: 30.8 % — LOW (ref 34.5–45)
HGB BLD-MCNC: 9.9 G/DL — LOW (ref 11.5–15.5)
MCHC RBC-ENTMCNC: 32.1 GM/DL — SIGNIFICANT CHANGE UP (ref 32–36)
MCHC RBC-ENTMCNC: 32.4 PG — SIGNIFICANT CHANGE UP (ref 27–34)
MCV RBC AUTO: 100.7 FL — HIGH (ref 80–100)
NRBC # BLD: 0 /100 WBCS — SIGNIFICANT CHANGE UP (ref 0–0)
PLATELET # BLD AUTO: 127 K/UL — LOW (ref 150–400)
POTASSIUM SERPL-MCNC: 3.7 MMOL/L — SIGNIFICANT CHANGE UP (ref 3.5–5.3)
POTASSIUM SERPL-SCNC: 3.7 MMOL/L — SIGNIFICANT CHANGE UP (ref 3.5–5.3)
RBC # BLD: 3.06 M/UL — LOW (ref 3.8–5.2)
RBC # FLD: 13 % — SIGNIFICANT CHANGE UP (ref 10.3–14.5)
SODIUM SERPL-SCNC: 145 MMOL/L — SIGNIFICANT CHANGE UP (ref 135–145)
WBC # BLD: 7.39 K/UL — SIGNIFICANT CHANGE UP (ref 3.8–10.5)
WBC # FLD AUTO: 7.39 K/UL — SIGNIFICANT CHANGE UP (ref 3.8–10.5)

## 2022-12-05 RX ORDER — HYDRALAZINE HCL 50 MG
10 TABLET ORAL ONCE
Refills: 0 | Status: COMPLETED | OUTPATIENT
Start: 2022-12-05 | End: 2022-12-05

## 2022-12-05 RX ADMIN — DORZOLAMIDE HYDROCHLORIDE TIMOLOL MALEATE 1 DROP(S): 20; 5 SOLUTION/ DROPS OPHTHALMIC at 05:25

## 2022-12-05 RX ADMIN — Medication 100 MILLIGRAM(S): at 15:16

## 2022-12-05 RX ADMIN — Medication 4 MILLIGRAM(S): at 21:21

## 2022-12-05 RX ADMIN — Medication 325 MILLIGRAM(S): at 11:50

## 2022-12-05 RX ADMIN — PANTOPRAZOLE SODIUM 40 MILLIGRAM(S): 20 TABLET, DELAYED RELEASE ORAL at 05:26

## 2022-12-05 RX ADMIN — Medication 40 MILLIGRAM(S): at 05:25

## 2022-12-05 RX ADMIN — Medication 100 MILLIGRAM(S): at 05:25

## 2022-12-05 RX ADMIN — TIOTROPIUM BROMIDE 2 PUFF(S): 18 CAPSULE ORAL; RESPIRATORY (INHALATION) at 11:50

## 2022-12-05 RX ADMIN — HEPARIN SODIUM 5000 UNIT(S): 5000 INJECTION INTRAVENOUS; SUBCUTANEOUS at 05:24

## 2022-12-05 RX ADMIN — SIMVASTATIN 40 MILLIGRAM(S): 20 TABLET, FILM COATED ORAL at 22:48

## 2022-12-05 RX ADMIN — Medication 3 MILLILITER(S): at 02:47

## 2022-12-05 RX ADMIN — Medication 4 MILLILITER(S): at 15:25

## 2022-12-05 RX ADMIN — RISPERIDONE 0.25 MILLIGRAM(S): 4 TABLET ORAL at 17:39

## 2022-12-05 RX ADMIN — Medication 10 MILLIGRAM(S): at 23:30

## 2022-12-05 RX ADMIN — Medication 100 MILLIGRAM(S): at 22:50

## 2022-12-05 RX ADMIN — Medication 3 MILLILITER(S): at 15:25

## 2022-12-05 RX ADMIN — Medication 4 MILLILITER(S): at 20:54

## 2022-12-05 RX ADMIN — Medication 100 MILLIGRAM(S): at 17:39

## 2022-12-05 RX ADMIN — DORZOLAMIDE HYDROCHLORIDE TIMOLOL MALEATE 1 DROP(S): 20; 5 SOLUTION/ DROPS OPHTHALMIC at 17:40

## 2022-12-05 RX ADMIN — Medication 81 MILLIGRAM(S): at 11:49

## 2022-12-05 RX ADMIN — Medication 100 MILLIGRAM(S): at 21:22

## 2022-12-05 RX ADMIN — Medication 5 MILLIGRAM(S): at 22:51

## 2022-12-05 RX ADMIN — Medication 4 MILLILITER(S): at 08:40

## 2022-12-05 RX ADMIN — Medication 4 MILLILITER(S): at 02:47

## 2022-12-05 RX ADMIN — RISPERIDONE 0.25 MILLIGRAM(S): 4 TABLET ORAL at 05:25

## 2022-12-05 RX ADMIN — Medication 3 MILLILITER(S): at 08:40

## 2022-12-05 RX ADMIN — CEFTRIAXONE 100 MILLIGRAM(S): 500 INJECTION, POWDER, FOR SOLUTION INTRAMUSCULAR; INTRAVENOUS at 23:29

## 2022-12-05 RX ADMIN — Medication 100 MILLIGRAM(S): at 05:24

## 2022-12-05 RX ADMIN — LATANOPROST 1 DROP(S): 0.05 SOLUTION/ DROPS OPHTHALMIC; TOPICAL at 22:51

## 2022-12-05 RX ADMIN — MONTELUKAST 10 MILLIGRAM(S): 4 TABLET, CHEWABLE ORAL at 21:22

## 2022-12-05 RX ADMIN — Medication 3 MILLILITER(S): at 20:53

## 2022-12-05 RX ADMIN — HEPARIN SODIUM 5000 UNIT(S): 5000 INJECTION INTRAVENOUS; SUBCUTANEOUS at 17:40

## 2022-12-05 NOTE — PROGRESS NOTE ADULT - PROBLEM SELECTOR PLAN 3
- Uncontrolled  - C/w Metoprolol, Hydralazine, Imdur & Doxazosin  - Continue to monitor BMP - Normalized today  - Continue to monitor renal fxn  - Monitor BMP daily - D-dimer elevated, 1022.  CTA not done d/t CAMILLE.  Unable to do VQ scan due to mental status  - 12/2 LE US negative for DVT

## 2022-12-05 NOTE — PROGRESS NOTE ADULT - SUBJECTIVE AND OBJECTIVE BOX
NP Note discussed with  primary attending    Patient is a 89y old  Female who presents with a chief complaint of Asthma exacerbation (04 Dec 2022 15:03)      INTERVAL HPI/OVERNIGHT EVENTS: Pt reports, "Ok."  Denies HA, SOB, CP and abdominal pain.  -Gerardo # [id number].    MEDICATIONS  (STANDING):  acetylcysteine 20%  Inhalation 4 milliLiter(s) Inhalation every 6 hours  albuterol/ipratropium for Nebulization 3 milliLiter(s) Nebulizer every 6 hours  aspirin  chewable 81 milliGRAM(s) Oral daily  cefTRIAXone   IVPB 1000 milliGRAM(s) IV Intermittent every 24 hours  dorzolamide 2%/timolol 0.5% Ophthalmic Solution 1 Drop(s) Both EYES two times a day  doxazosin 4 milliGRAM(s) Oral at bedtime  ferrous    sulfate 325 milliGRAM(s) Oral daily  heparin   Injectable 5000 Unit(s) SubCutaneous every 12 hours  hydrALAZINE 100 milliGRAM(s) Oral three times a day  isosorbide   mononitrate ER Tablet (IMDUR) 120 milliGRAM(s) Oral daily  latanoprost 0.005% Ophthalmic Solution 1 Drop(s) Both EYES at bedtime  melatonin 5 milliGRAM(s) Oral at bedtime  metoprolol tartrate 100 milliGRAM(s) Oral two times a day  metroNIDAZOLE  IVPB      metroNIDAZOLE  IVPB 500 milliGRAM(s) IV Intermittent every 8 hours  montelukast 10 milliGRAM(s) Oral at bedtime  pantoprazole    Tablet 40 milliGRAM(s) Oral before breakfast  predniSONE   Tablet 40 milliGRAM(s) Oral daily  risperiDONE   Tablet 0.25 milliGRAM(s) Oral two times a day  simvastatin 40 milliGRAM(s) Oral at bedtime  tiotropium 2.5 MICROgram(s) Inhaler 2 Puff(s) Inhalation daily    MEDICATIONS  (PRN):  acetaminophen     Tablet .. 650 milliGRAM(s) Oral every 6 hours PRN Temp greater or equal to 38C (100.4F), Mild Pain (1 - 3)  albuterol    90 MICROgram(s) HFA Inhaler 2 Puff(s) Inhalation every 6 hours PRN Shortness of Breath and/or Wheezing  guaiFENesin Oral Liquid (Sugar-Free) 100 milliGRAM(s) Oral every 6 hours PRN Cough      __________________________________________________  REVIEW OF SYSTEMS:    CONSTITUTIONAL: No fever  EYES: No acute visual disturbances  NECK: No pain or stiffness  RESPIRATORY: No cough; No shortness of breath  CARDIOVASCULAR: No chest pain, no palpitations  GASTROINTESTINAL: No pain. No nausea or vomiting.  No diarrhea   NEUROLOGICAL: No headache or numbness, no tremors  MUSCULOSKELETAL: No joint pain, no muscle pain  GENITOURINARY: No dysuria, no frequency, no hesitancy  PSYCHIATRY: No depression , no anxiety  ALL OTHER  ROS negative        Vital Signs Last 24 Hrs  T(C): 36.9 (05 Dec 2022 05:27), Max: 36.9 (05 Dec 2022 05:27)  T(F): 98.4 (05 Dec 2022 05:27), Max: 98.4 (05 Dec 2022 05:27)  HR: 77 (05 Dec 2022 05:27) (74 - 84)  BP: 170/68 (05 Dec 2022 05:27) (136/45 - 170/68)  BP(mean): 69 (04 Dec 2022 14:04) (69 - 69)  RR: 17 (05 Dec 2022 05:27) (16 - 17)  SpO2: 98% (05 Dec 2022 05:27) (97% - 98%)    Parameters below as of 05 Dec 2022 05:27  Patient On (Oxygen Delivery Method): nasal cannula  O2 Flow (L/min): 2      ________________________________________________  PHYSICAL EXAM:  GENERAL: NAD, Obese   HEENT: Normocephalic;  conjunctivae and sclerae clear; moist mucous membranes   NECK : Supple  CHEST/LUNG: Clear to auscultation bilaterally with fair air entry   HEART: S1 S2  regular; no murmurs, gallops or rubs  ABDOMEN: Soft, Nontender, Nondistended; Bowel sounds present x 4 quad  EXTREMITIES: No cyanosis; no edema; no calf tenderness  SKIN: Warm and dry; no rash  NERVOUS SYSTEM:  Awake and alert; Oriented to person and place, not time; no new deficits  _________________________________________________  LABS:                        9.9    7.39  )-----------( 127      ( 05 Dec 2022 07:05 )             30.8     12-05    145  |  108  |  33<H>  ----------------------------<  126<H>  3.7   |  31  |  1.14    Ca    9.4      05 Dec 2022 07:05          CAPILLARY BLOOD GLUCOSE            RADIOLOGY & ADDITIONAL TESTS:    Imaging Personally Reviewed:  YES    Consultant(s) Notes Reviewed:   YES    Care Discussed with Consultants :     Plan of care was discussed with patient and /or primary care giver; all questions and concerns were addressed and care was aligned with patient's wishes.

## 2022-12-05 NOTE — PROGRESS NOTE ADULT - PROBLEM SELECTOR PLAN 1
- P/w cough , runny nose  - RVP and covid negative  - 11/29 CXR negative.  Repeat CXR 12/2 showed with new infiltrates  - Steroids switched to PO 12/1  - 12/2 S/p CT chest showed pulmonary edema and small pleural effusions  - C/w Doneb PRN  - Pulm-Dr. Solano consulted, appreciated   - ID-Dr Peterson consulted, appreciated - Currently on Ceftriaxone & Flagyl  - Pulm-Dr. Solano consulted, appreciated   - ID-Dr Peterson consulted, appreciated

## 2022-12-05 NOTE — PROGRESS NOTE ADULT - ASSESSMENT
89 year old, female, from home, minimally ambulatory with assistance, with PMH of HTN, HFpEF (Grade II DD),Parox Afib (not on AC due to GIB),CRI, Complete Heart Block s/p PPM, HLD, Asthma, and Dementia.   Presented to the ED for cough and runny nose for the last 5 days.  Admitted  for Asthma Exacerbation.

## 2022-12-05 NOTE — PROGRESS NOTE ADULT - PROBLEM SELECTOR PLAN 6
- H/o CHF on Lasix at home, on hold due to CAMILLE  - Stable, not currently in exacerbation  - Continue with Imdur - Not on anticoagulation due to GI bleed in the past  - C/w Metoprolol - C/w Simvastatin

## 2022-12-05 NOTE — PROGRESS NOTE ADULT - PROBLEM SELECTOR PLAN 8
-  D-dimer  1022  -  02 requirement increasing  -  US bilateral lower extremity completed -  pending official read  -  Unable to do CTA due to AK  -  Unable to do VQ scan due to mental status - C/w Heparin for DVT ppx   - C/w Protonix for GI ppx - H/o CHF on Lasix at home, on hold due to CAMILLE  - Stable, not currently in exacerbation  - Continue with Imdur

## 2022-12-05 NOTE — PROGRESS NOTE ADULT - PROBLEM SELECTOR PLAN 10
- 12/5 Resting oxygen saturation obtained and documented.  Pt does not qualify for home oxygen  - PT pending-f/u recommendations.  Pt reportedly fell during admission.

## 2022-12-05 NOTE — PROGRESS NOTE ADULT - SUBJECTIVE AND OBJECTIVE BOX
Patient is a 89y old  Female who presents with a chief complaint of Asthma exacerbation (04 Dec 2022 15:03)    Patient is awake, alert, comfortable, laying in bed, not in acute distress, doing well with supplemental O2 via NC.     INTERVAL HPI/OVERNIGHT EVENTS:      VITAL SIGNS:  T(F): 98.4 (12-05-22 @ 05:27)  HR: 77 (12-05-22 @ 05:27)  BP: 170/68 (12-05-22 @ 05:27)  RR: 17 (12-05-22 @ 05:27)  SpO2: 98% (12-05-22 @ 05:27)  Wt(kg): --  I&O's Detail          REVIEW OF SYSTEMS:    CONSTITUTIONAL:  No fevers, chills, sweats    HEENT:  Eyes:  No diplopia or blurred vision. ENT:  No earache, sore throat or runny nose.    CARDIOVASCULAR:  No pressure, squeezing, tightness, or heaviness about the chest; no palpitations.    RESPIRATORY:  Per HPI    GASTROINTESTINAL:  No abdominal pain, nausea, vomiting or diarrhea.    GENITOURINARY:  No dysuria, frequency or urgency.    NEUROLOGIC:  No paresthesias, fasciculations, seizures or weakness.    PSYCHIATRIC:  No disorder of thought or mood.      PHYSICAL EXAM:    Constitutional: Well developed and nourished  Eyes:Perrla  ENMT: normal  Neck:supple  Respiratory: + Rales  Cardiovascular: S1 S2 regular  Gastrointestinal: Soft, Non tender  Extremities: No edema  Vascular:normal  Neurological:Awake, alert,Ox3  Musculoskeletal:Normal      MEDICATIONS  (STANDING):  acetylcysteine 20%  Inhalation 4 milliLiter(s) Inhalation every 6 hours  albuterol/ipratropium for Nebulization 3 milliLiter(s) Nebulizer every 6 hours  aspirin  chewable 81 milliGRAM(s) Oral daily  cefTRIAXone   IVPB 1000 milliGRAM(s) IV Intermittent every 24 hours  dorzolamide 2%/timolol 0.5% Ophthalmic Solution 1 Drop(s) Both EYES two times a day  doxazosin 4 milliGRAM(s) Oral at bedtime  ferrous    sulfate 325 milliGRAM(s) Oral daily  heparin   Injectable 5000 Unit(s) SubCutaneous every 12 hours  hydrALAZINE 100 milliGRAM(s) Oral three times a day  isosorbide   mononitrate ER Tablet (IMDUR) 120 milliGRAM(s) Oral daily  latanoprost 0.005% Ophthalmic Solution 1 Drop(s) Both EYES at bedtime  melatonin 5 milliGRAM(s) Oral at bedtime  metoprolol tartrate 100 milliGRAM(s) Oral two times a day  metroNIDAZOLE  IVPB      metroNIDAZOLE  IVPB 500 milliGRAM(s) IV Intermittent every 8 hours  montelukast 10 milliGRAM(s) Oral at bedtime  pantoprazole    Tablet 40 milliGRAM(s) Oral before breakfast  predniSONE   Tablet 40 milliGRAM(s) Oral daily  risperiDONE   Tablet 0.25 milliGRAM(s) Oral two times a day  simvastatin 40 milliGRAM(s) Oral at bedtime  tiotropium 2.5 MICROgram(s) Inhaler 2 Puff(s) Inhalation daily    MEDICATIONS  (PRN):  acetaminophen     Tablet .. 650 milliGRAM(s) Oral every 6 hours PRN Temp greater or equal to 38C (100.4F), Mild Pain (1 - 3)  albuterol    90 MICROgram(s) HFA Inhaler 2 Puff(s) Inhalation every 6 hours PRN Shortness of Breath and/or Wheezing  guaiFENesin Oral Liquid (Sugar-Free) 100 milliGRAM(s) Oral every 6 hours PRN Cough      Allergies    No Known Allergies    Intolerances        LABS:                        9.9    7.39  )-----------( 127      ( 05 Dec 2022 07:05 )             30.8     12-05    145  |  108  |  33<H>  ----------------------------<  126<H>  3.7   |  31  |  1.14    Ca    9.4      05 Dec 2022 07:05          Culture - Urine (11.29.22 @ 14:55)   Specimen Source: Clean Catch Clean Catch (Midstream)   Culture Results:   <10,000 CFU/mL Normal Urogenital Rosa Maria           CAPILLARY BLOOD GLUCOSE        pro-bnp -- 12-02 @ 10:17     d-dimer 1022  12-02 @ 10:17  pro-bnp 1873 11-29 @ 14:55     d-dimer --  11-29 @ 14:55      RADIOLOGY & ADDITIONAL TESTS:    CXR:    Ct scan chest:  < from: CT Chest No Cont (12.02.22 @ 18:52) >  IMPRESSION:    Pulmonary edema and small pleural effusions.    --- End of Report ---    < end of copied text >      ekg;    echo: Patient is a 89y old  Female who presents with a chief complaint of Asthma exacerbation (04 Dec 2022 15:03)    Patient is awake, alert, comfortable, out of  bed to chair, not in acute distress, doing well with supplemental O2 via NC.     INTERVAL HPI/OVERNIGHT EVENTS:      VITAL SIGNS:  T(F): 98.4 (12-05-22 @ 05:27)  HR: 77 (12-05-22 @ 05:27)  BP: 170/68 (12-05-22 @ 05:27)  RR: 17 (12-05-22 @ 05:27)  SpO2: 98% (12-05-22 @ 05:27)  Wt(kg): --  I&O's Detail          REVIEW OF SYSTEMS:    CONSTITUTIONAL:  No fevers, chills, sweats    HEENT:  Eyes:  No diplopia or blurred vision. ENT:  No earache, sore throat or runny nose.    CARDIOVASCULAR:  No pressure, squeezing, tightness, or heaviness about the chest; no palpitations.    RESPIRATORY:  Per HPI    GASTROINTESTINAL:  No abdominal pain, nausea, vomiting or diarrhea.    GENITOURINARY:  No dysuria, frequency or urgency.    NEUROLOGIC:  No paresthesias, fasciculations, seizures or weakness.    PSYCHIATRIC:  No disorder of thought or mood.      PHYSICAL EXAM:    Constitutional: Well developed and nourished  Eyes:Perrla  ENMT: normal  Neck:supple  Respiratory: + Rales  Cardiovascular: S1 S2 regular  Gastrointestinal: Soft, Non tender  Extremities: No edema  Vascular:normal  Neurological:Awake, alert  Musculoskeletal:Normal      MEDICATIONS  (STANDING):  acetylcysteine 20%  Inhalation 4 milliLiter(s) Inhalation every 6 hours  albuterol/ipratropium for Nebulization 3 milliLiter(s) Nebulizer every 6 hours  aspirin  chewable 81 milliGRAM(s) Oral daily  cefTRIAXone   IVPB 1000 milliGRAM(s) IV Intermittent every 24 hours  dorzolamide 2%/timolol 0.5% Ophthalmic Solution 1 Drop(s) Both EYES two times a day  doxazosin 4 milliGRAM(s) Oral at bedtime  ferrous    sulfate 325 milliGRAM(s) Oral daily  heparin   Injectable 5000 Unit(s) SubCutaneous every 12 hours  hydrALAZINE 100 milliGRAM(s) Oral three times a day  isosorbide   mononitrate ER Tablet (IMDUR) 120 milliGRAM(s) Oral daily  latanoprost 0.005% Ophthalmic Solution 1 Drop(s) Both EYES at bedtime  melatonin 5 milliGRAM(s) Oral at bedtime  metoprolol tartrate 100 milliGRAM(s) Oral two times a day  metroNIDAZOLE  IVPB      metroNIDAZOLE  IVPB 500 milliGRAM(s) IV Intermittent every 8 hours  montelukast 10 milliGRAM(s) Oral at bedtime  pantoprazole    Tablet 40 milliGRAM(s) Oral before breakfast  predniSONE   Tablet 40 milliGRAM(s) Oral daily  risperiDONE   Tablet 0.25 milliGRAM(s) Oral two times a day  simvastatin 40 milliGRAM(s) Oral at bedtime  tiotropium 2.5 MICROgram(s) Inhaler 2 Puff(s) Inhalation daily    MEDICATIONS  (PRN):  acetaminophen     Tablet .. 650 milliGRAM(s) Oral every 6 hours PRN Temp greater or equal to 38C (100.4F), Mild Pain (1 - 3)  albuterol    90 MICROgram(s) HFA Inhaler 2 Puff(s) Inhalation every 6 hours PRN Shortness of Breath and/or Wheezing  guaiFENesin Oral Liquid (Sugar-Free) 100 milliGRAM(s) Oral every 6 hours PRN Cough      Allergies    No Known Allergies    Intolerances        LABS:                        9.9    7.39  )-----------( 127      ( 05 Dec 2022 07:05 )             30.8     12-05    145  |  108  |  33<H>  ----------------------------<  126<H>  3.7   |  31  |  1.14    Ca    9.4      05 Dec 2022 07:05          Culture - Urine (11.29.22 @ 14:55)   Specimen Source: Clean Catch Clean Catch (Midstream)   Culture Results:   <10,000 CFU/mL Normal Urogenital Rosa Maria           CAPILLARY BLOOD GLUCOSE        pro-bnp -- 12-02 @ 10:17     d-dimer 1022  12-02 @ 10:17  pro-bnp 1873 11-29 @ 14:55     d-dimer --  11-29 @ 14:55      RADIOLOGY & ADDITIONAL TESTS:    CXR:  < from: Xray Chest 1 View- PORTABLE-Urgent (Xray Chest 1 View- PORTABLE-Urgent .) (12.02.22 @ 04:52) >  IMPRESSION:  Perihilar alveolar infiltrates, less likely pulmonary venous congestion,   new    < end of copied text >    Ct scan chest:  < from: CT Chest No Cont (12.02.22 @ 18:52) >  IMPRESSION:    Pulmonary edema and small pleural effusions.    --- End of Report ---    < end of copied text >      ekg;    echo:

## 2022-12-05 NOTE — PROGRESS NOTE ADULT - PROBLEM SELECTOR PLAN 4
- C/w Simvastatin - Uncontrolled  - C/w Metoprolol, Hydralazine, Imdur & Doxazosin  - Continue to monitor BMP - Normalized today  - Continue to monitor renal fxn  - Monitor BMP daily

## 2022-12-05 NOTE — PROGRESS NOTE ADULT - ASSESSMENT
89 y/o female, from home, minimally ambulatory with assistance, with PMHx of HTN, HFpEF (Grade II DD),Parox Afib (not on AC due to GIB),CRI, Complete Heart Block s/p PPM, HLD, Asthma, and Dementia coming to ED for cough and runny nose for the last 5 days,Delirium,acute diastolic HF.   1.Asthma -MDI.ABX.  2.Pulm f/u.  3.PAF-asa,b blocker  4.CRI-f/u lytes.  5.Lipid d/o-statin.  6.HTN-cont bp medication.  7.GI and DVT prophylaxis.  8.Delirium-risperdol .25mg bid.  9.Acute diastolic HF-s/p iv lasix.

## 2022-12-05 NOTE — PROGRESS NOTE ADULT - PROBLEM SELECTOR PLAN 5
- Not on anticoagulation due to GI bleed in the past  - C/w Metoprolol - C/w Simvastatin - Uncontrolled  - C/w Metoprolol, Hydralazine, Imdur & Doxazosin  - Continue to monitor BMP

## 2022-12-05 NOTE — PROGRESS NOTE ADULT - ASSESSMENT
Patient is a 89y old  Female from home, minimally ambulatory with assistance, with PMHx of HTN, HFpEF (Grade II DD),Parox Afib (not on AC due to GIB),CRI, Complete Heart Block s/p PPM, HLD, Asthma, and Dementia , brought in to the ER on 11/29/22 for evaluation of  cough, wheezing, and runny nose for the last 5 days. Patient's daughter at bedside states that they tested for COVID and were negative. During work up she found to have Aspiration pneumonia, Hence, The ID consult requested to assist with antibiotic management.     # Aspiration pneumonia    would recommend:    1. Please continue weaning off oxygen as tolerated   2. Ceftriaxone and Flagyl until 12/8/22, may change to oral Augmentin 875 mg q 12hours if discharge before 12/8  3. Aspiration precaution  4. Bronchodilator as needed  5. Monitor kidney function     Attending Attestation:    Spent more than 35 minutes on total encounter, more than 50 % of the visit was spent counseling and/or coordinating care by the Attending physician.

## 2022-12-05 NOTE — PROGRESS NOTE ADULT - NUTRITIONAL ASSESSMENT
Diet, Regular:   DASH/TLC {Sodium & Cholesterol Restricted} (11-29-22 @ 19:52) [Active]

## 2022-12-05 NOTE — PROGRESS NOTE ADULT - PROBLEM SELECTOR PLAN 9
-  D-dimer  1022  -  02 requirement increasing  -  US bilateral lower extremity completed -  pending official read  -  Unable to do CTA due to AK  -  Unable to do VQ scan due to mental status - C/w Heparin for DVT ppx   - C/w Protonix for GI ppx

## 2022-12-05 NOTE — PROGRESS NOTE ADULT - PROBLEM SELECTOR PLAN 2
- Normalized today  - Continue to monitor renal fxn  - Monitor BMP daily - P/w cough , runny nose  - RVP and covid negative  - 11/29 CXR negative.  Repeat CXR 12/2 showed with new infiltrates  - Steroids switched to PO 12/1  - 12/2 S/p CT chest showed pulmonary edema and small pleural effusions  - C/w Doneb PRN  - Pulm-Dr. Solano consulted, appreciated   - ID-Dr Peterson consulted, appreciated

## 2022-12-05 NOTE — PROGRESS NOTE ADULT - SUBJECTIVE AND OBJECTIVE BOX
Patient is seen and examined at the bed side, is afebrile.  She is doing better, walk with Nursing staff without oxygen.       REVIEW OF SYSTEMS: All other review systems are negative      ALLERGIES: No Known Allergies        Vital Signs Last 24 Hrs  T(C): 36.3 (05 Dec 2022 11:46), Max: 36.9 (05 Dec 2022 05:27)  T(F): 97.3 (05 Dec 2022 11:46), Max: 98.4 (05 Dec 2022 05:27)  HR: 88 (05 Dec 2022 17:36) (75 - 88)  BP: 164/60 (05 Dec 2022 17:36) (134/46 - 170/68)  BP(mean): --  RR: 17 (05 Dec 2022 11:46) (17 - 17)  SpO2: 95% (05 Dec 2022 17:36) (92% - 98%)    Parameters below as of 05 Dec 2022 17:36  Patient On (Oxygen Delivery Method): room air      PHYSICAL EXAM:  GENERAL: Not in distress, on oxygen via NC  CHEST/LUNG:  Not using accessory muscles   HEART: s1 and s2 present  ABDOMEN:  Nontender and  Nondistended  EXTREMITIES: No pedal  edema  CNS: Awake and Alert      LABS:                        9.9    7.39  )-----------( 127      ( 05 Dec 2022 07:05 )             30.8                           10.5   6.36  )-----------( 122      ( 04 Dec 2022 07:37 )             32.9                  12-05    145  |  108  |  33<H>  ----------------------------<  126<H>  3.7   |  31  |  1.14    Ca    9.4      05 Dec 2022 07:05      12-04    147<H>  |  107  |  34<H>  ----------------------------<  134<H>  3.3<L>   |  32<H>  |  1.33<H>    Ca    9.3      04 Dec 2022 07:37    TPro  6.7  /  Alb  3.3<L>  /  TBili  0.5  /  DBili  x   /  AST  29  /  ALT  28  /  AlkPhos  50  12-02        Urinalysis Basic - ( 02 Dec 2022 15:02 )  Color: Yellow / Appearance: Clear / S.015 / pH: x  Gluc: x / Ketone: Negative  / Bili: Negative / Urobili: Negative   Blood: x / Protein: 30 mg/dL / Nitrite: Negative   Leuk Esterase: Negative / RBC: 0-2 /HPF / WBC 0-2 /HPF   Sq Epi: x / Non Sq Epi: Few /HPF / Bacteria: Trace /HPF        MEDICATIONS  (STANDING):    acetylcysteine 20%  Inhalation 4 milliLiter(s) Inhalation every 6 hours  albuterol/ipratropium for Nebulization 3 milliLiter(s) Nebulizer every 6 hours  aspirin  chewable 81 milliGRAM(s) Oral daily  cefTRIAXone   IVPB 1000 milliGRAM(s) IV Intermittent every 24 hours  dorzolamide 2%/timolol 0.5% Ophthalmic Solution 1 Drop(s) Both EYES two times a day  doxazosin 4 milliGRAM(s) Oral at bedtime  ferrous    sulfate 325 milliGRAM(s) Oral daily  heparin   Injectable 5000 Unit(s) SubCutaneous every 12 hours  hydrALAZINE 100 milliGRAM(s) Oral three times a day  isosorbide   mononitrate ER Tablet (IMDUR) 120 milliGRAM(s) Oral daily  latanoprost 0.005% Ophthalmic Solution 1 Drop(s) Both EYES at bedtime  melatonin 5 milliGRAM(s) Oral at bedtime  metoprolol tartrate 100 milliGRAM(s) Oral two times a day  metroNIDAZOLE  IVPB      metroNIDAZOLE  IVPB 500 milliGRAM(s) IV Intermittent every 8 hours  montelukast 10 milliGRAM(s) Oral at bedtime  pantoprazole    Tablet 40 milliGRAM(s) Oral before breakfast  predniSONE   Tablet 40 milliGRAM(s) Oral daily  risperiDONE   Tablet 0.25 milliGRAM(s) Oral two times a day  simvastatin 40 milliGRAM(s) Oral at bedtime  tiotropium 2.5 MICROgram(s) Inhaler 2 Puff(s) Inhalation daily        RADIOLOGY & ADDITIONAL TESTS:    22: CT Chest No Cont (22 @ 18:52) Pulmonary edema and small pleural effusions.      22: Xray Chest 1 View- PORTABLE-Urgent (Xray Chest 1 View- PORTABLE-Urgent .) (22 @ 04:52) Perihilar alveolar infiltrates, less likely pulmonary venous congestion,   new        MICROBIOLOGY DATA:    MRSA/MSSA PCR (22 @ 08:15)   MRSA PCR Result.: NotDete:   Respiratory Viral Panel with COVID-19 by TAYLA (22 @ 15:40)   Rapid RVP Result: NotDete   SARS-CoV-2: NotDetec:

## 2022-12-05 NOTE — PROGRESS NOTE ADULT - SUBJECTIVE AND OBJECTIVE BOX
CHIEF COMPLAINT:Patient is a 89y old  Female who presents with a chief complaint of Asthma exacerbation.Pt appears comfortable.    	  REVIEW OF SYSTEMS:  CONSTITUTIONAL: No fever, weight loss, or fatigue  EYES: No eye pain, visual disturbances, or discharge  ENT:  No difficulty hearing, tinnitus, vertigo; No sinus or throat pain  NECK: No pain or stiffness  RESPIRATORY: No cough, wheezing, chills or hemoptysis; No Shortness of Breath  CARDIOVASCULAR: No chest pain, palpitations, passing out, dizziness, or leg swelling  GASTROINTESTINAL: No abdominal or epigastric pain. No nausea, vomiting, or hematemesis; No diarrhea or constipation. No melena or hematochezia.  GENITOURINARY: No dysuria, frequency, hematuria, or incontinence  NEUROLOGICAL: No headaches, memory loss, loss of strength, numbness, or tremors  SKIN: No itching, burning, rashes, or lesions   LYMPH Nodes: No enlarged glands  ENDOCRINE: No heat or cold intolerance; No hair loss  MUSCULOSKELETAL: No joint pain or swelling; No muscle, back, or extremity pain  PSYCHIATRIC: No depression, anxiety, mood swings, or difficulty sleeping  HEME/LYMPH: No easy bruising, or bleeding gums  ALLERGY AND IMMUNOLOGIC: No hives or eczema	      PHYSICAL EXAM:  T(C): 36.9 (12-05-22 @ 05:27), Max: 36.9 (12-05-22 @ 05:27)  HR: 77 (12-05-22 @ 05:27) (74 - 84)  BP: 170/68 (12-05-22 @ 05:27) (136/45 - 170/68)  RR: 17 (12-05-22 @ 05:27) (16 - 17)  SpO2: 98% (12-05-22 @ 05:27) (97% - 98%)        Appearance: Normal	  HEENT:   Normal oral mucosa, PERRL, EOMI	  Lymphatic: No lymphadenopathy  Cardiovascular: Normal S1 S2, No JVD, No murmurs, No edema  Respiratory: B/L ronchi  Psychiatry: A & O x 3, Mood & affect appropriate  Gastrointestinal:  Soft, Non-tender, + BS	  Skin: No rashes, No ecchymoses, No cyanosis	  Neurologic: Non-focal  Extremities: Normal range of motion, No clubbing, cyanosis or edema  Vascular: Peripheral pulses palpable 2+ bilaterally    MEDICATIONS  (STANDING):  acetylcysteine 20%  Inhalation 4 milliLiter(s) Inhalation every 6 hours  albuterol/ipratropium for Nebulization 3 milliLiter(s) Nebulizer every 6 hours  aspirin  chewable 81 milliGRAM(s) Oral daily  cefTRIAXone   IVPB 1000 milliGRAM(s) IV Intermittent every 24 hours  dorzolamide 2%/timolol 0.5% Ophthalmic Solution 1 Drop(s) Both EYES two times a day  doxazosin 4 milliGRAM(s) Oral at bedtime  ferrous    sulfate 325 milliGRAM(s) Oral daily  heparin   Injectable 5000 Unit(s) SubCutaneous every 12 hours  hydrALAZINE 100 milliGRAM(s) Oral three times a day  isosorbide   mononitrate ER Tablet (IMDUR) 120 milliGRAM(s) Oral daily  latanoprost 0.005% Ophthalmic Solution 1 Drop(s) Both EYES at bedtime  melatonin 5 milliGRAM(s) Oral at bedtime  metoprolol tartrate 100 milliGRAM(s) Oral two times a day  metroNIDAZOLE  IVPB      metroNIDAZOLE  IVPB 500 milliGRAM(s) IV Intermittent every 8 hours  montelukast 10 milliGRAM(s) Oral at bedtime  pantoprazole    Tablet 40 milliGRAM(s) Oral before breakfast  predniSONE   Tablet 40 milliGRAM(s) Oral daily  risperiDONE   Tablet 0.25 milliGRAM(s) Oral two times a day  simvastatin 40 milliGRAM(s) Oral at bedtime  tiotropium 2.5 MICROgram(s) Inhaler 2 Puff(s) Inhalation daily      LABS:	 	                        9.9    7.39  )-----------( 127      ( 05 Dec 2022 07:05 )             30.8     12-05    145  |  108  |  33<H>  ----------------------------<  126<H>  3.7   |  31  |  1.14    Ca    9.4      05 Dec 2022 07:05      proBNP: Serum Pro-Brain Natriuretic Peptide: 1873 pg/mL (11-29 @ 14:55)

## 2022-12-05 NOTE — PROGRESS NOTE ADULT - PROBLEM SELECTOR PLAN 7
- C/w Heparin for DVT ppx   - C/w Protonix for GI ppx - H/o CHF on Lasix at home, on hold due to CAMIL[place]  - Stable, not currently in exacerbation  - Continue with Imdur - Not on anticoagulation due to GI bleed in the past  - C/w Metoprolol

## 2022-12-05 NOTE — PROGRESS NOTE ADULT - PROBLEM SELECTOR PLAN 2
cultures noted  aspiration precautions  antibiotics to cover for anaerobes  monitor CBC and TEMP  oxygen supp prn

## 2022-12-06 ENCOUNTER — TRANSCRIPTION ENCOUNTER (OUTPATIENT)
Age: 87
End: 2022-12-06

## 2022-12-06 VITALS — HEART RATE: 69 BPM | SYSTOLIC BLOOD PRESSURE: 162 MMHG | DIASTOLIC BLOOD PRESSURE: 57 MMHG

## 2022-12-06 DIAGNOSIS — I27.20 PULMONARY HYPERTENSION, UNSPECIFIED: ICD-10-CM

## 2022-12-06 LAB
ANION GAP SERPL CALC-SCNC: 8 MMOL/L — SIGNIFICANT CHANGE UP (ref 5–17)
BUN SERPL-MCNC: 30 MG/DL — HIGH (ref 7–18)
CALCIUM SERPL-MCNC: 9.5 MG/DL — SIGNIFICANT CHANGE UP (ref 8.4–10.5)
CHLORIDE SERPL-SCNC: 107 MMOL/L — SIGNIFICANT CHANGE UP (ref 96–108)
CO2 SERPL-SCNC: 29 MMOL/L — SIGNIFICANT CHANGE UP (ref 22–31)
CREAT SERPL-MCNC: 1.06 MG/DL — SIGNIFICANT CHANGE UP (ref 0.5–1.3)
EGFR: 49 ML/MIN/1.73M2 — LOW
GLUCOSE SERPL-MCNC: 116 MG/DL — HIGH (ref 70–99)
HCT VFR BLD CALC: 32.5 % — LOW (ref 34.5–45)
HGB BLD-MCNC: 10.5 G/DL — LOW (ref 11.5–15.5)
MCHC RBC-ENTMCNC: 32.3 GM/DL — SIGNIFICANT CHANGE UP (ref 32–36)
MCHC RBC-ENTMCNC: 32.3 PG — SIGNIFICANT CHANGE UP (ref 27–34)
MCV RBC AUTO: 100 FL — SIGNIFICANT CHANGE UP (ref 80–100)
NRBC # BLD: 0 /100 WBCS — SIGNIFICANT CHANGE UP (ref 0–0)
PLATELET # BLD AUTO: 137 K/UL — LOW (ref 150–400)
POTASSIUM SERPL-MCNC: 4 MMOL/L — SIGNIFICANT CHANGE UP (ref 3.5–5.3)
POTASSIUM SERPL-SCNC: 4 MMOL/L — SIGNIFICANT CHANGE UP (ref 3.5–5.3)
RBC # BLD: 3.25 M/UL — LOW (ref 3.8–5.2)
RBC # FLD: 12.9 % — SIGNIFICANT CHANGE UP (ref 10.3–14.5)
SODIUM SERPL-SCNC: 144 MMOL/L — SIGNIFICANT CHANGE UP (ref 135–145)
WBC # BLD: 6.88 K/UL — SIGNIFICANT CHANGE UP (ref 3.8–10.5)
WBC # FLD AUTO: 6.88 K/UL — SIGNIFICANT CHANGE UP (ref 3.8–10.5)

## 2022-12-06 PROCEDURE — 85730 THROMBOPLASTIN TIME PARTIAL: CPT

## 2022-12-06 PROCEDURE — 87641 MR-STAPH DNA AMP PROBE: CPT

## 2022-12-06 PROCEDURE — 71045 X-RAY EXAM CHEST 1 VIEW: CPT

## 2022-12-06 PROCEDURE — 84100 ASSAY OF PHOSPHORUS: CPT

## 2022-12-06 PROCEDURE — 0225U NFCT DS DNA&RNA 21 SARSCOV2: CPT

## 2022-12-06 PROCEDURE — 85027 COMPLETE CBC AUTOMATED: CPT

## 2022-12-06 PROCEDURE — 97161 PT EVAL LOW COMPLEX 20 MIN: CPT

## 2022-12-06 PROCEDURE — 82550 ASSAY OF CK (CPK): CPT

## 2022-12-06 PROCEDURE — 84484 ASSAY OF TROPONIN QUANT: CPT

## 2022-12-06 PROCEDURE — 71250 CT THORAX DX C-: CPT

## 2022-12-06 PROCEDURE — 94640 AIRWAY INHALATION TREATMENT: CPT

## 2022-12-06 PROCEDURE — 87086 URINE CULTURE/COLONY COUNT: CPT

## 2022-12-06 PROCEDURE — 80053 COMPREHEN METABOLIC PANEL: CPT

## 2022-12-06 PROCEDURE — 81001 URINALYSIS AUTO W/SCOPE: CPT

## 2022-12-06 PROCEDURE — 84300 ASSAY OF URINE SODIUM: CPT

## 2022-12-06 PROCEDURE — 87640 STAPH A DNA AMP PROBE: CPT

## 2022-12-06 PROCEDURE — 83735 ASSAY OF MAGNESIUM: CPT

## 2022-12-06 PROCEDURE — 71045 X-RAY EXAM CHEST 1 VIEW: CPT | Mod: 26

## 2022-12-06 PROCEDURE — 96374 THER/PROPH/DIAG INJ IV PUSH: CPT

## 2022-12-06 PROCEDURE — 83880 ASSAY OF NATRIURETIC PEPTIDE: CPT

## 2022-12-06 PROCEDURE — 82570 ASSAY OF URINE CREATININE: CPT

## 2022-12-06 PROCEDURE — 70450 CT HEAD/BRAIN W/O DYE: CPT

## 2022-12-06 PROCEDURE — 85610 PROTHROMBIN TIME: CPT

## 2022-12-06 PROCEDURE — 93970 EXTREMITY STUDY: CPT

## 2022-12-06 PROCEDURE — 85379 FIBRIN DEGRADATION QUANT: CPT

## 2022-12-06 PROCEDURE — 85025 COMPLETE CBC W/AUTO DIFF WBC: CPT

## 2022-12-06 PROCEDURE — 36415 COLL VENOUS BLD VENIPUNCTURE: CPT

## 2022-12-06 PROCEDURE — 81003 URINALYSIS AUTO W/O SCOPE: CPT

## 2022-12-06 PROCEDURE — 99285 EMERGENCY DEPT VISIT HI MDM: CPT | Mod: 25

## 2022-12-06 PROCEDURE — 80048 BASIC METABOLIC PNL TOTAL CA: CPT

## 2022-12-06 PROCEDURE — 93005 ELECTROCARDIOGRAM TRACING: CPT

## 2022-12-06 RX ORDER — IPRATROPIUM/ALBUTEROL SULFATE 18-103MCG
3 AEROSOL WITH ADAPTER (GRAM) INHALATION
Refills: 0 | DISCHARGE
Start: 2022-12-06

## 2022-12-06 RX ORDER — POLYETHYLENE GLYCOL 3350 17 G/17G
17 POWDER, FOR SOLUTION ORAL ONCE
Refills: 0 | Status: COMPLETED | OUTPATIENT
Start: 2022-12-06 | End: 2022-12-06

## 2022-12-06 RX ORDER — DRONEDARONE 400 MG/1
1 TABLET, FILM COATED ORAL
Qty: 0 | Refills: 0 | DISCHARGE

## 2022-12-06 RX ORDER — SIMVASTATIN 20 MG/1
1 TABLET, FILM COATED ORAL
Qty: 0 | Refills: 0 | DISCHARGE
Start: 2022-12-06

## 2022-12-06 RX ORDER — FERROUS SULFATE 325(65) MG
1 TABLET ORAL
Qty: 0 | Refills: 0 | DISCHARGE
Start: 2022-12-06

## 2022-12-06 RX ORDER — MONTELUKAST 4 MG/1
1 TABLET, CHEWABLE ORAL
Qty: 0 | Refills: 0 | DISCHARGE
Start: 2022-12-06

## 2022-12-06 RX ORDER — RISPERIDONE 4 MG/1
1 TABLET ORAL
Qty: 30 | Refills: 0
Start: 2022-12-06 | End: 2023-01-04

## 2022-12-06 RX ORDER — ACETYLCYSTEINE 200 MG/ML
4 VIAL (ML) MISCELLANEOUS
Qty: 0 | Refills: 0 | DISCHARGE
Start: 2022-12-06

## 2022-12-06 RX ORDER — ALBUTEROL 90 UG/1
2 AEROSOL, METERED ORAL
Qty: 240 | Refills: 0
Start: 2022-12-06 | End: 2023-01-04

## 2022-12-06 RX ORDER — ACETYLCYSTEINE 200 MG/ML
4 VIAL (ML) MISCELLANEOUS
Qty: 0 | Refills: 0 | DISCHARGE

## 2022-12-06 RX ORDER — ASPIRIN/CALCIUM CARB/MAGNESIUM 324 MG
1 TABLET ORAL
Qty: 0 | Refills: 0 | DISCHARGE
Start: 2022-12-06

## 2022-12-06 RX ORDER — TIOTROPIUM BROMIDE 18 UG/1
2 CAPSULE ORAL; RESPIRATORY (INHALATION)
Qty: 60 | Refills: 0
Start: 2022-12-06 | End: 2023-01-04

## 2022-12-06 RX ORDER — DORZOLAMIDE HYDROCHLORIDE TIMOLOL MALEATE 20; 5 MG/ML; MG/ML
1 SOLUTION/ DROPS OPHTHALMIC
Qty: 0 | Refills: 0 | DISCHARGE
Start: 2022-12-06

## 2022-12-06 RX ADMIN — Medication 4 MILLILITER(S): at 15:48

## 2022-12-06 RX ADMIN — Medication 3 MILLILITER(S): at 15:49

## 2022-12-06 RX ADMIN — Medication 81 MILLIGRAM(S): at 11:54

## 2022-12-06 RX ADMIN — Medication 40 MILLIGRAM(S): at 05:21

## 2022-12-06 RX ADMIN — Medication 100 MILLIGRAM(S): at 05:20

## 2022-12-06 RX ADMIN — Medication 3 MILLILITER(S): at 08:47

## 2022-12-06 RX ADMIN — Medication 100 MILLIGRAM(S): at 05:21

## 2022-12-06 RX ADMIN — DORZOLAMIDE HYDROCHLORIDE TIMOLOL MALEATE 1 DROP(S): 20; 5 SOLUTION/ DROPS OPHTHALMIC at 05:20

## 2022-12-06 RX ADMIN — Medication 100 MILLIGRAM(S): at 13:10

## 2022-12-06 RX ADMIN — POLYETHYLENE GLYCOL 3350 17 GRAM(S): 17 POWDER, FOR SOLUTION ORAL at 13:58

## 2022-12-06 RX ADMIN — Medication 100 MILLIGRAM(S): at 05:22

## 2022-12-06 RX ADMIN — HEPARIN SODIUM 5000 UNIT(S): 5000 INJECTION INTRAVENOUS; SUBCUTANEOUS at 05:21

## 2022-12-06 RX ADMIN — RISPERIDONE 0.25 MILLIGRAM(S): 4 TABLET ORAL at 05:21

## 2022-12-06 RX ADMIN — Medication 4 MILLILITER(S): at 08:48

## 2022-12-06 RX ADMIN — Medication 325 MILLIGRAM(S): at 11:53

## 2022-12-06 RX ADMIN — PANTOPRAZOLE SODIUM 40 MILLIGRAM(S): 20 TABLET, DELAYED RELEASE ORAL at 05:23

## 2022-12-06 RX ADMIN — Medication 100 MILLIGRAM(S): at 13:13

## 2022-12-06 RX ADMIN — Medication 3 MILLILITER(S): at 03:55

## 2022-12-06 RX ADMIN — ISOSORBIDE MONONITRATE 120 MILLIGRAM(S): 60 TABLET, EXTENDED RELEASE ORAL at 11:53

## 2022-12-06 RX ADMIN — Medication 4 MILLILITER(S): at 04:02

## 2022-12-06 NOTE — DISCHARGE NOTE NURSING/CASE MANAGEMENT/SOCIAL WORK - PATIENT PORTAL LINK FT
You can access the FollowMyHealth Patient Portal offered by [facility] by registering at the following website: [email or web address]. By joining [facility]’s FollowMyHealth portal, you will also be able to view your health information using other applications (apps) compatible with our system.

## 2022-12-06 NOTE — PROGRESS NOTE ADULT - PROVIDER SPECIALTY LIST ADULT
Infectious Disease
Internal Medicine
Cardiology
Internal Medicine
Pulmonology
Internal Medicine
Pulmonology
Internal Medicine

## 2022-12-06 NOTE — PROGRESS NOTE ADULT - ASSESSMENT
89 y/o female, from home, minimally ambulatory with assistance, with PMHx of HTN, HFpEF (Grade II DD),Parox Afib (not on AC due to GIB),CRI, Complete Heart Block s/p PPM, HLD, Asthma, and Dementia coming to ED for cough and runny nose for the last 5 days,Delirium,acute diastolic HF.   1.Asthma -MDI.ABX.Prednisone bishnu.  2.Pulm f/u.  3.PAF-asa,b blocker  4.CRI-f/u lytes.  5.Lipid d/o-statin.  6.HTN-cont bp medication.  7.GI and DVT prophylaxis.  8.Delirium-risperdol .25mg bid.  9.Acute diastolic HF-po lasix.

## 2022-12-06 NOTE — PROGRESS NOTE ADULT - SUBJECTIVE AND OBJECTIVE BOX
CHIEF COMPLAINT:Patient is a 89y old  Female who presents with a chief complaint of Asthma exacerbation.Pt appears comfortable.    	  REVIEW OF SYSTEMS:  CONSTITUTIONAL: No fever, weight loss, or fatigue  EYES: No eye pain, visual disturbances, or discharge  ENT:  No difficulty hearing, tinnitus, vertigo; No sinus or throat pain  NECK: No pain or stiffness  RESPIRATORY: No cough, wheezing, chills or hemoptysis; No Shortness of Breath  CARDIOVASCULAR: No chest pain, palpitations, passing out, dizziness, or leg swelling  GASTROINTESTINAL: No abdominal or epigastric pain. No nausea, vomiting, or hematemesis; No diarrhea or constipation. No melena or hematochezia.  GENITOURINARY: No dysuria, frequency, hematuria, or incontinence  NEUROLOGICAL: No headaches, memory loss, loss of strength, numbness, or tremors  SKIN: No itching, burning, rashes, or lesions   LYMPH Nodes: No enlarged glands  ENDOCRINE: No heat or cold intolerance; No hair loss  MUSCULOSKELETAL: No joint pain or swelling; No muscle, back, or extremity pain  PSYCHIATRIC: No depression, anxiety, mood swings, or difficulty sleeping  HEME/LYMPH: No easy bruising, or bleeding gums  ALLERGY AND IMMUNOLOGIC: No hives or eczema	      PHYSICAL EXAM:  T(C): 36.2 (12-06-22 @ 05:07), Max: 36.4 (12-05-22 @ 21:41)  HR: 64 (12-06-22 @ 11:50) (62 - 88)  BP: 185/71 (12-06-22 @ 11:50) (164/60 - 210/86)  RR: 19 (12-06-22 @ 05:07) (16 - 19)  SpO2: 99% (12-06-22 @ 11:50) (92% - 100%)  Wt(kg): --  I&O's Summary      Appearance: Normal	  HEENT:   Normal oral mucosa, PERRL, EOMI	  Lymphatic: No lymphadenopathy  Cardiovascular: Normal S1 S2, No JVD, No murmurs, No edema  Respiratory: Lungs clear to auscultation	  Psychiatry: A & O x 3, Mood & affect appropriate  Gastrointestinal:  Soft, Non-tender, + BS	  Skin: No rashes, No ecchymoses, No cyanosis	  Neurologic: Non-focal  Extremities: Normal range of motion, No clubbing, cyanosis or edema  Vascular: Peripheral pulses palpable 2+ bilaterally    MEDICATIONS  (STANDING):  acetylcysteine 20%  Inhalation 4 milliLiter(s) Inhalation every 6 hours  albuterol/ipratropium for Nebulization 3 milliLiter(s) Nebulizer every 6 hours  aspirin  chewable 81 milliGRAM(s) Oral daily  cefTRIAXone   IVPB 1000 milliGRAM(s) IV Intermittent every 24 hours  dorzolamide 2%/timolol 0.5% Ophthalmic Solution 1 Drop(s) Both EYES two times a day  doxazosin 4 milliGRAM(s) Oral at bedtime  ferrous    sulfate 325 milliGRAM(s) Oral daily  heparin   Injectable 5000 Unit(s) SubCutaneous every 12 hours  hydrALAZINE 100 milliGRAM(s) Oral three times a day  isosorbide   mononitrate ER Tablet (IMDUR) 120 milliGRAM(s) Oral daily  latanoprost 0.005% Ophthalmic Solution 1 Drop(s) Both EYES at bedtime  melatonin 5 milliGRAM(s) Oral at bedtime  metoprolol tartrate 100 milliGRAM(s) Oral two times a day  metroNIDAZOLE  IVPB      metroNIDAZOLE  IVPB 500 milliGRAM(s) IV Intermittent every 8 hours  montelukast 10 milliGRAM(s) Oral at bedtime  pantoprazole    Tablet 40 milliGRAM(s) Oral before breakfast  predniSONE   Tablet 40 milliGRAM(s) Oral daily  risperiDONE   Tablet 0.25 milliGRAM(s) Oral two times a day  simvastatin 40 milliGRAM(s) Oral at bedtime  tiotropium 2.5 MICROgram(s) Inhaler 2 Puff(s) Inhalation daily      	  LABS:	 	                       10.5   6.88  )-----------( 137      ( 06 Dec 2022 06:34 )             32.5     12-06    144  |  107  |  30<H>  ----------------------------<  116<H>  4.0   |  29  |  1.06    Ca    9.5      06 Dec 2022 06:34      proBNP: Serum Pro-Brain Natriuretic Peptide: 1873 pg/mL (11-29 @ 14:55)

## 2022-12-06 NOTE — DISCHARGE NOTE NURSING/CASE MANAGEMENT/SOCIAL WORK - NSDCPEFALRISK_GEN_ALL_CORE
For information on Fall & Injury Prevention, visit: [email or web address] OR  [email or web address] OR  https://www.cdc.gov/steadi/patient.html

## 2022-12-06 NOTE — DIETITIAN INITIAL EVALUATION ADULT - PERTINENT LABORATORY DATA
12-06    144  |  107  |  30<H>  ----------------------------<  116<H>  4.0   |  29  |  1.06    Ca    9.5      06 Dec 2022 06:34    A1C with Estimated Average Glucose Result: 5.3 % (03-08-22 @ 13:27)  A1C with Estimated Average Glucose Result: 5.2 % (12-11-21 @ 11:12)

## 2022-12-06 NOTE — PROGRESS NOTE ADULT - REASON FOR ADMISSION
Asthma exacerbation

## 2022-12-06 NOTE — DIETITIAN INITIAL EVALUATION ADULT - PERTINENT MEDS FT
MEDICATIONS  (STANDING):  acetylcysteine 20%  Inhalation 4 milliLiter(s) Inhalation every 6 hours  albuterol/ipratropium for Nebulization 3 milliLiter(s) Nebulizer every 6 hours  aspirin  chewable 81 milliGRAM(s) Oral daily  cefTRIAXone   IVPB 1000 milliGRAM(s) IV Intermittent every 24 hours  dorzolamide 2%/timolol 0.5% Ophthalmic Solution 1 Drop(s) Both EYES two times a day  doxazosin 4 milliGRAM(s) Oral at bedtime  ferrous    sulfate 325 milliGRAM(s) Oral daily  heparin   Injectable 5000 Unit(s) SubCutaneous every 12 hours  hydrALAZINE 100 milliGRAM(s) Oral three times a day  isosorbide   mononitrate ER Tablet (IMDUR) 120 milliGRAM(s) Oral daily  latanoprost 0.005% Ophthalmic Solution 1 Drop(s) Both EYES at bedtime  melatonin 5 milliGRAM(s) Oral at bedtime  metoprolol tartrate 100 milliGRAM(s) Oral two times a day  metroNIDAZOLE  IVPB      metroNIDAZOLE  IVPB 500 milliGRAM(s) IV Intermittent every 8 hours  montelukast 10 milliGRAM(s) Oral at bedtime  pantoprazole    Tablet 40 milliGRAM(s) Oral before breakfast  predniSONE   Tablet 40 milliGRAM(s) Oral daily  risperiDONE   Tablet 0.25 milliGRAM(s) Oral two times a day  simvastatin 40 milliGRAM(s) Oral at bedtime  tiotropium 2.5 MICROgram(s) Inhaler 2 Puff(s) Inhalation daily    MEDICATIONS  (PRN):  acetaminophen     Tablet .. 650 milliGRAM(s) Oral every 6 hours PRN Temp greater or equal to 38C (100.4F), Mild Pain (1 - 3)  albuterol    90 MICROgram(s) HFA Inhaler 2 Puff(s) Inhalation every 6 hours PRN Shortness of Breath and/or Wheezing  guaiFENesin Oral Liquid (Sugar-Free) 100 milliGRAM(s) Oral every 6 hours PRN Cough

## 2022-12-06 NOTE — DIETITIAN INITIAL EVALUATION ADULT - OTHER INFO
Pt visited. Pt OOB to chair. Pt is alert and verbal Croatian speaking. D/W Daughter ( Tila) @ bed side. Per Daughter  Pt with Good appetite . Pt Lives with her daughter. NKFA Reported. Po tolerated. No chewing or swallowing difficulty Reported. Pt is able to feed self with tray set up. Per daughter Ht 5 feet,  lb. Labs noted.

## 2022-12-06 NOTE — PROGRESS NOTE ADULT - SUBJECTIVE AND OBJECTIVE BOX
Patient is a 89y old  Female who presents with a chief complaint of Asthma exacerbation (05 Dec 2022 15:46)  Awake, alert, comfortable out of bed in NAD. Still with occasional cough and mild chest congestion    INTERVAL HPI/OVERNIGHT EVENTS:      VITAL SIGNS:  T(F): 97.2 (12-06-22 @ 05:07)  HR: 62 (12-06-22 @ 06:55)  BP: 186/53 (12-06-22 @ 06:55)  RR: 19 (12-06-22 @ 05:07)  SpO2: 95% (12-06-22 @ 10:00)  Wt(kg): --  I&O's Detail          REVIEW OF SYSTEMS:    CONSTITUTIONAL:  No fevers, chills, sweats    HEENT:  Eyes:  No diplopia or blurred vision. ENT:  No earache, sore throat or runny nose.    CARDIOVASCULAR:  No pressure, squeezing, tightness, or heaviness about the chest; no palpitations.    RESPIRATORY:  Per HPI    GASTROINTESTINAL:  No abdominal pain, nausea, vomiting or diarrhea.    GENITOURINARY:  No dysuria, frequency or urgency.    NEUROLOGIC:  No paresthesias, fasciculations, seizures or weakness.    PSYCHIATRIC:  No disorder of thought or mood.      PHYSICAL EXAM:    Constitutional: Well developed and nourished  Eyes:Perrla  ENMT: normal  Neck:supple  Respiratory: Occasional rhonchi post  Cardiovascular: S1 S2 regular  Gastrointestinal: Soft, Non tender  Extremities: No edema  Vascular:normal  Neurological:Awake, alert,Ox3  Musculoskeletal:Normal      MEDICATIONS  (STANDING):  acetylcysteine 20%  Inhalation 4 milliLiter(s) Inhalation every 6 hours  albuterol/ipratropium for Nebulization 3 milliLiter(s) Nebulizer every 6 hours  aspirin  chewable 81 milliGRAM(s) Oral daily  cefTRIAXone   IVPB 1000 milliGRAM(s) IV Intermittent every 24 hours  dorzolamide 2%/timolol 0.5% Ophthalmic Solution 1 Drop(s) Both EYES two times a day  doxazosin 4 milliGRAM(s) Oral at bedtime  ferrous    sulfate 325 milliGRAM(s) Oral daily  heparin   Injectable 5000 Unit(s) SubCutaneous every 12 hours  hydrALAZINE 100 milliGRAM(s) Oral three times a day  isosorbide   mononitrate ER Tablet (IMDUR) 120 milliGRAM(s) Oral daily  latanoprost 0.005% Ophthalmic Solution 1 Drop(s) Both EYES at bedtime  melatonin 5 milliGRAM(s) Oral at bedtime  metoprolol tartrate 100 milliGRAM(s) Oral two times a day  metroNIDAZOLE  IVPB      metroNIDAZOLE  IVPB 500 milliGRAM(s) IV Intermittent every 8 hours  montelukast 10 milliGRAM(s) Oral at bedtime  pantoprazole    Tablet 40 milliGRAM(s) Oral before breakfast  predniSONE   Tablet 40 milliGRAM(s) Oral daily  risperiDONE   Tablet 0.25 milliGRAM(s) Oral two times a day  simvastatin 40 milliGRAM(s) Oral at bedtime  tiotropium 2.5 MICROgram(s) Inhaler 2 Puff(s) Inhalation daily    MEDICATIONS  (PRN):  acetaminophen     Tablet .. 650 milliGRAM(s) Oral every 6 hours PRN Temp greater or equal to 38C (100.4F), Mild Pain (1 - 3)  albuterol    90 MICROgram(s) HFA Inhaler 2 Puff(s) Inhalation every 6 hours PRN Shortness of Breath and/or Wheezing  guaiFENesin Oral Liquid (Sugar-Free) 100 milliGRAM(s) Oral every 6 hours PRN Cough      Allergies    No Known Allergies    Intolerances        LABS:                        10.5   6.88  )-----------( 137      ( 06 Dec 2022 06:34 )             32.5     12-06    144  |  107  |  30<H>  ----------------------------<  116<H>  4.0   |  29  |  1.06    Ca    9.5      06 Dec 2022 06:34                CAPILLARY BLOOD GLUCOSE        pro-bnp -- 12-02 @ 10:17     d-dimer 1022  12-02 @ 10:17  pro-bnp 1873 11-29 @ 14:55     d-dimer --  11-29 @ 14:55      RADIOLOGY & ADDITIONAL TESTS:    CXR:    Ct scan chest:    ekg;    echo:

## 2022-12-06 NOTE — PHYSICAL THERAPY INITIAL EVALUATION ADULT - GENERAL OBSERVATIONS, REHAB EVAL
89y female patient, in care of daughter, refer for PT assessment, oob chair with pca assistance, tolerating gait and transfer with rw and PT assistance

## 2023-03-24 ENCOUNTER — INPATIENT (INPATIENT)
Facility: HOSPITAL | Age: 88
LOS: 1 days | Discharge: HOME | DRG: 194 | End: 2023-03-26
Attending: INTERNAL MEDICINE | Admitting: INTERNAL MEDICINE
Payer: COMMERCIAL

## 2023-03-24 VITALS
DIASTOLIC BLOOD PRESSURE: 70 MMHG | HEART RATE: 66 BPM | SYSTOLIC BLOOD PRESSURE: 126 MMHG | HEIGHT: 60 IN | OXYGEN SATURATION: 95 % | WEIGHT: 139.99 LBS | RESPIRATION RATE: 20 BRPM | TEMPERATURE: 100 F

## 2023-03-24 DIAGNOSIS — J10.1 INFLUENZA DUE TO OTHER IDENTIFIED INFLUENZA VIRUS WITH OTHER RESPIRATORY MANIFESTATIONS: ICD-10-CM

## 2023-03-24 DIAGNOSIS — I50.9 HEART FAILURE, UNSPECIFIED: ICD-10-CM

## 2023-03-24 DIAGNOSIS — Z95.0 PRESENCE OF CARDIAC PACEMAKER: Chronic | ICD-10-CM

## 2023-03-24 DIAGNOSIS — Z29.9 ENCOUNTER FOR PROPHYLACTIC MEASURES, UNSPECIFIED: ICD-10-CM

## 2023-03-24 DIAGNOSIS — I10 ESSENTIAL (PRIMARY) HYPERTENSION: ICD-10-CM

## 2023-03-24 DIAGNOSIS — I48.91 UNSPECIFIED ATRIAL FIBRILLATION: ICD-10-CM

## 2023-03-24 LAB
ALBUMIN SERPL ELPH-MCNC: 3.1 G/DL — LOW (ref 3.5–5)
ALP SERPL-CCNC: 55 U/L — SIGNIFICANT CHANGE UP (ref 40–120)
ALT FLD-CCNC: 22 U/L DA — SIGNIFICANT CHANGE UP (ref 10–60)
ANION GAP SERPL CALC-SCNC: 6 MMOL/L — SIGNIFICANT CHANGE UP (ref 5–17)
APTT BLD: 32.4 SEC — SIGNIFICANT CHANGE UP (ref 27.5–35.5)
AST SERPL-CCNC: 28 U/L — SIGNIFICANT CHANGE UP (ref 10–40)
BASOPHILS # BLD AUTO: 0.06 K/UL — SIGNIFICANT CHANGE UP (ref 0–0.2)
BASOPHILS NFR BLD AUTO: 1 % — SIGNIFICANT CHANGE UP (ref 0–2)
BILIRUB SERPL-MCNC: 0.3 MG/DL — SIGNIFICANT CHANGE UP (ref 0.2–1.2)
BUN SERPL-MCNC: 23 MG/DL — HIGH (ref 7–18)
CALCIUM SERPL-MCNC: 9.5 MG/DL — SIGNIFICANT CHANGE UP (ref 8.4–10.5)
CHLORIDE SERPL-SCNC: 103 MMOL/L — SIGNIFICANT CHANGE UP (ref 96–108)
CK MB BLD-MCNC: <3.4 % — SIGNIFICANT CHANGE UP (ref 0–3.5)
CK MB CFR SERPL CALC: <1 NG/ML — SIGNIFICANT CHANGE UP (ref 0–3.6)
CK SERPL-CCNC: 29 U/L — SIGNIFICANT CHANGE UP (ref 21–215)
CO2 SERPL-SCNC: 27 MMOL/L — SIGNIFICANT CHANGE UP (ref 22–31)
CREAT SERPL-MCNC: 1.43 MG/DL — HIGH (ref 0.5–1.3)
EGFR: 34 ML/MIN/1.73M2 — LOW
EOSINOPHIL # BLD AUTO: 0.58 K/UL — HIGH (ref 0–0.5)
EOSINOPHIL NFR BLD AUTO: 9.8 % — HIGH (ref 0–6)
FLUBV RNA SPEC QL NAA+PROBE: DETECTED
GLUCOSE SERPL-MCNC: 118 MG/DL — HIGH (ref 70–99)
HCT VFR BLD CALC: 31.6 % — LOW (ref 34.5–45)
HGB BLD-MCNC: 10.5 G/DL — LOW (ref 11.5–15.5)
IMM GRANULOCYTES NFR BLD AUTO: 0.5 % — SIGNIFICANT CHANGE UP (ref 0–0.9)
INR BLD: 1.11 RATIO — SIGNIFICANT CHANGE UP (ref 0.88–1.16)
LYMPHOCYTES # BLD AUTO: 0.59 K/UL — LOW (ref 1–3.3)
LYMPHOCYTES # BLD AUTO: 10 % — LOW (ref 13–44)
MCHC RBC-ENTMCNC: 31.8 PG — SIGNIFICANT CHANGE UP (ref 27–34)
MCHC RBC-ENTMCNC: 33.2 GM/DL — SIGNIFICANT CHANGE UP (ref 32–36)
MCV RBC AUTO: 95.8 FL — SIGNIFICANT CHANGE UP (ref 80–100)
MONOCYTES # BLD AUTO: 0.9 K/UL — SIGNIFICANT CHANGE UP (ref 0–0.9)
MONOCYTES NFR BLD AUTO: 15.3 % — HIGH (ref 2–14)
NEUTROPHILS # BLD AUTO: 3.74 K/UL — SIGNIFICANT CHANGE UP (ref 1.8–7.4)
NEUTROPHILS NFR BLD AUTO: 63.4 % — SIGNIFICANT CHANGE UP (ref 43–77)
NRBC # BLD: 0 /100 WBCS — SIGNIFICANT CHANGE UP (ref 0–0)
NT-PROBNP SERPL-SCNC: 2980 PG/ML — HIGH (ref 0–450)
PLATELET # BLD AUTO: 162 K/UL — SIGNIFICANT CHANGE UP (ref 150–400)
POTASSIUM SERPL-MCNC: 3.8 MMOL/L — SIGNIFICANT CHANGE UP (ref 3.5–5.3)
POTASSIUM SERPL-SCNC: 3.8 MMOL/L — SIGNIFICANT CHANGE UP (ref 3.5–5.3)
PROT SERPL-MCNC: 6.7 G/DL — SIGNIFICANT CHANGE UP (ref 6–8.3)
PROTHROM AB SERPL-ACNC: 13.2 SEC — SIGNIFICANT CHANGE UP (ref 10.5–13.4)
RAPID RVP RESULT: DETECTED
RBC # BLD: 3.3 M/UL — LOW (ref 3.8–5.2)
RBC # FLD: 12.6 % — SIGNIFICANT CHANGE UP (ref 10.3–14.5)
SARS-COV-2 RNA SPEC QL NAA+PROBE: SIGNIFICANT CHANGE UP
SODIUM SERPL-SCNC: 136 MMOL/L — SIGNIFICANT CHANGE UP (ref 135–145)
TROPONIN I, HIGH SENSITIVITY RESULT: 16.2 NG/L — SIGNIFICANT CHANGE UP
WBC # BLD: 5.9 K/UL — SIGNIFICANT CHANGE UP (ref 3.8–10.5)
WBC # FLD AUTO: 5.9 K/UL — SIGNIFICANT CHANGE UP (ref 3.8–10.5)

## 2023-03-24 PROCEDURE — 71045 X-RAY EXAM CHEST 1 VIEW: CPT | Mod: 26

## 2023-03-24 PROCEDURE — 99285 EMERGENCY DEPT VISIT HI MDM: CPT

## 2023-03-24 RX ORDER — HEPARIN SODIUM 5000 [USP'U]/ML
5000 INJECTION INTRAVENOUS; SUBCUTANEOUS EVERY 8 HOURS
Refills: 0 | Status: DISCONTINUED | OUTPATIENT
Start: 2023-03-24 | End: 2023-03-26

## 2023-03-24 RX ORDER — IPRATROPIUM/ALBUTEROL SULFATE 18-103MCG
3 AEROSOL WITH ADAPTER (GRAM) INHALATION ONCE
Refills: 0 | Status: COMPLETED | OUTPATIENT
Start: 2023-03-24 | End: 2023-03-24

## 2023-03-24 RX ADMIN — Medication 3 MILLILITER(S): at 16:47

## 2023-03-24 NOTE — ED PROVIDER NOTE - NS ED ROS FT
Positive: Cough, shortness of breath, wheezing  Negative: Fever, chills, congestion, rhinorrhea, hemoptysis, chest pain, nausea, vomiting, diarrhea, diarrhea, hematuria, rash

## 2023-03-24 NOTE — H&P ADULT - ATTENDING COMMENTS
89-year-old female with past medical history of dementia, asthma, HLD, CHF, HTN,CRI,GI Bleed, s/p PPM,PAF no past surgical history presents with 2-day fever and cough. Daughter at bedside states that pt developed fatigue, worsening SOB, and dry cough for three days,Influenza +.  1.Influenza +-tamiflu,ID eval.  2.ASthma-Pulm eval,steroid,MDI.  3.PAF-asa,multaq,lopressor.  4.HTN-cont bp medication.  5.CRI-f/u lytes.  6.GI bleed-off AC.  7.GI and DVT prophylaxis.

## 2023-03-24 NOTE — ED PROVIDER NOTE - PROGRESS NOTE DETAILS
MD Calli: cxr shows no focal consolidation. trop negative, Cr 1.4 (baseline unknown), bnp 2000s (baseline unknown). influenza B +. spoke with pts daughter and explained pt appears clinically stable with no sob only trace pitting edema not grossly fluid overloaded on exam or cxr. however daughter states she feels unsafe with pt going home since she cannot watch her the whole time overnight and pt has trouble breathing overnight when she cannot be watched. spoke with pts pmd Dr. Harrell who agrees with admission to her service will admit MD Calli: d/w mar

## 2023-03-24 NOTE — ED PROVIDER NOTE - CLINICAL SUMMARY MEDICAL DECISION MAKING FREE TEXT BOX
89-year-old female with past medical history of dementia, asthma, HLD, CHF, HTN, CHB (status post pacemaker), no past surgical history presents with 2-day history of nonproductive cough, shortness of breath, and wheezing.  On exam, vital signs within normal limits, left lower lobe crackles, trace pitting edema.  Exam otherwise unremarkable, patient comfortable appearing.    DDx: Asthma exacerbation versus CHF exacerbation versus ACS versus pneumonia versus viral illness versus COVID versus flu    Plan:  –EKG: Pending, charge RN aware of other  –Labs, troponin, BNP  –Chest x-ray  –RVP  –DuoNebs  –Spoke with patient's cardiologist who states that if patient is clinically stable for discharge, patient will be able to follow-up with them outpatient  –Reassess

## 2023-03-24 NOTE — ED ADULT TRIAGE NOTE - ESI TRIAGE ACUITY LEVEL, MLM
PT Re-Evaluation     Today's date: 2019  Patient name: Alexandru Nino  :   MRN: [id number]  Referring provider: Eric Winter MD  Dx:   Encounter Diagnoses   Name Primary?  Spinal stenosis of lumbar region without neurogenic claudication Yes    Abnormality of gait            Subjective:  Back is stiff in the AM - moving helps - but by the end of the day my spine hurts    Objective:  See flow sheet below    Assessment:  Continue with current program as he is appropriately challenged  Plan: Continue with POC    Precautions: hx of cervical fusion, COPD,  History of falls       Daily Treatment Diary     Assessment           Eval/Reval             FOTO     **     **   POC Signed             HEP Issued              Manuals                          Exercise Diary                           Treadmill 10 min 10 min  4 min 12 min          Bike 10 min 12 min 12 min          RDL's with KB 10#  15x x x          Bridges with DB 10#  2x10 x x           crunch with DB 10#  2x10 x x          Goblet squats 10#  2x10 x x          Seated triceps push ups 2x10 x x                                                                                                                  Modalities                            X= performed                Rosi Muniz, PT  2019,10:19 AM 3

## 2023-03-24 NOTE — H&P ADULT - HISTORY OF PRESENT ILLNESS
89-year-old female with past medical history of dementia, asthma, HLD, CHF, HTN, CHF (status post pacemaker), no past surgical history presents with 2-day fever and cough. Daughter at bedside states that pt developed fatigue, worsening SOB, and dry cough for three days. Then this morning, patient had generalized weakness and had decrease PO intake. Denies any chills, vomiting, chest pain, abdominal pain, or change in bowel habits.  89-year-old female with past medical history of dementia, asthma, HLD, CHF, HTN, CHF,CRI,GI Bleed, s/p PPM  no past surgical history presents with 2-day fever and cough. Daughter at bedside states that pt developed fatigue, worsening SOB, and dry cough for three days. Then this morning, patient had generalized weakness and had decrease PO intake. Denies any chills, vomiting, chest pain, abdominal pain, or change in bowel habits.

## 2023-03-24 NOTE — ED PROVIDER NOTE - WR ORDER NAME 1
Indication  ========    Consultation. Abnormal Serum Screening. Fetal anatomy survey    History  ======    Risk Factors  History risk factors: Obesity  Details: BMI = 45.2    Pregnancy History  ==============    Maternal Lab Tests  Test: Quad/ Penta Screen  Result of other maternal screening test: Positive DSR 1:30    Maternal Assessment  =================    BP syst 119 mmHg  BP diast 55 mmHg    Method  ======    2D Color Doppler, Voluson S8, Transabdominal ultrasound examination. View: Suboptimal view: limited by maternal body habitus. Suboptimal  view: limited by fetal position    Pregnancy  =========    Santiago pregnancy. Number of fetuses: 1    Dating  ======    LMP on: 1/7/2020  Cycle: regular cycle, regular cycle  GA by LMP 20 w + 6 d  MARY by LMP: 10/13/2020  Ultrasound examination on: 6/1/2020  GA by U/S based upon: AC, BPD, Femur, HC  GA by U/S 19 w + 2 d  MARY by U/S: 10/24/2020  Assigned: based on ultrasound (CRL), selected on 04/3/2020  Assigned GA 19 w + 4 d  Assigned MARY: 10/22/2020  Pregnancy length 280 d    General Evaluation  ==============    Cardiac activity present.  bpm.  Fetal movements visualized.  Presentation breech.  Placenta Placental site: anterior.  Umbilical cord Cord vessels: 3 vessel cord. Insertion site: normal insertion.  Amniotic fluid Amount of AF: normal amount.    Fetal Biometry  ============    Standard  BPD 43.3 mm  19w 1d                Hadlock    OFD 59.6 mm  20w 5d                Teo    .1 mm  19w 2d                Hadlock    Cerebellum tr 20.4 mm  20w 1d                Paula    Nuchal fold 3.8 mm  .2 mm  18w 4d                Hadlock    Femur 32.0 mm  20w 0d                Hadlock    Humerus 31.1 mm  20w 2d                Teo    HC / AC 1.26     g    EFW (lb) 0 lb  EFW (oz) 10 oz  EFW by: Hadlock (BPD-HC-AC-FL)  Extended   8.1 mm  CM 5.1 mm    Head / Face / Neck  Cephalic index 0.73    Extremities / Bony Struc  FL / BPD 0.74    FL /  AC 0.24    Other Structures   bpm    Fetal Anatomy  ============    Cranium: normal  Lateral ventricles: normal  Choroid plexus: normal  Midline falx: normal  Cavum septi pellucidi: normal  Cerebellum: normal  Cisterna magna: normal  Head / Neck  Vermis: normal  Neck: normal  Nuchal fold: normal  Lips: suboptimal  Profile: normal  Nose: suboptimal  RVOT view: suboptimal  LVOT view: normal  Heart / Thorax  4-chamber view: 4-chamber suboptimal, septum suboptimal  Situs: situs solitus (normal)  Aortic arch view: normal  Ductal arch view: suboptimal  SVC: suboptimal  IVC: suboptimal  3-vessel view: suboptimal  3-vessel-trachea view: suboptimal  Rt lung: normal  Lt lung: normal  Diaphragm: normal  Cord insertion: suboptimal  Stomach: normal  Kidneys: normal  Bladder: normal  Genitals: suboptimal  Abdomen  Liver: normal  Bowel: normal  Rt renal artery: normal  Lt renal artery: normal  Cervical spine: normal  Thoracic spine: normal  Lumbar spine: normal  Sacral spine: normal  Rt arm: normal  Lt arm: normal  Rt leg: suboptimal  Lt leg: suboptimal  Rt foot: visualized  Lt foot: visualized  Position of hands: normal  Position of feet: suboptimal  Gender: uncertain  Wants to know gender: yes    Maternal Structures  ===============    Uterus / Cervix  Uterus: Normal  Cervical length 43.2 mm  Ovaries / Tubes / Adnexa  Rt ovary: Normal  Lt ovary: Normal    Consultation  ==========    Type: abnormal quad screen  The patient is a 29-year-old  who will be 30 at delivery who presents for consultation for an abnormal quad screen with an increased  risk of Down syndrome of 1 in 30. The patient was low risk for fetal trisomy 18 and open spine defects. The analytes were normal. Patient is  currently at 19 and 4 weeks gestation based on first trimester ultrasound.    Past medical history: Morbid obesity with pre pregnancy BMI 47.8 and current BMI near 45, per chart history of herpes- patient did not reveal  this, history of  depression per patient -she does talk with a counselor- denies any suicidal or homicidal ideations and is not currently on any  medications for this    past surgical history  x2    allergies: sulfa    family history of birth defects: Her son has autism this was with a different father, she indicates he has had an extensive workup but does not  believe he has had a genetic evaluation, she will find out if has had fragile X testing    social history: Denies smoking and alcohol use, she is weaning marijuana use    medications: Prenatal vitamins when she remembers them    past OB history: She had a postdates induction with her 1st pregnancy and then a  when she was not dilating, 2nd  was a  repeat  at term    glucose screen initially was negative at 79  negative hgb electrophoresis  CF negative    A/P:  1. IUP at 19 and 4 weeks    2. Positive screen for Trisomy 21:Today the patient was counseled on the relationship between maternal age and aneuploidy. The patient was  counseled about the increased risk of Down Syndrome noted on her aneuploidy screening. The patient's analytes were not abnormal.    We discussed that Down Syndrome, also known as Trisomy 21 is a condition where an individual has three copies of chromosome 21, rather  than having two copies. Down syndrome has an incidence of approximately 1 in 660 newborns. We briefly discussed the potential physical and  mental findings associated with this condition, as well as developmental delay.    We discussed the limitations of ultrasound in diagnosing Down Syndrome, specifically that ultrasound only detects 50-60% of fetuses with  Down Syndrome. We discussed amniocentesis as being a diagnostic test and reviewed the risks, benefits, alternatives, and limitations of this  procedure. I quoted the patient a 1 in 1000 procedure related risk of fetal loss with genetic amniocentesis. I also discussed with the patient the  option of NIPT  (Materni T-21) along with the sensitivity and specificity of the test. We discussed specifically that NIPT is another screening  test. Per ACOG, Because cell free DNA is a screening test with the potential for false-positive and false-negative results, such testing should  not be used as a substitute for diagnostic testing. We discussed the higher potential for no call NIPT with maternal obesity. The patient  declines amniocentesis and elected to proceed with NIPT. We also discussed implications and recommendations if positive or negative NIPT.    3. primary OB is managing other maternal medical conditions. The patient was advised of the potential risks with obesity. Recommend patient  take baby aspirin 81 mg daily. Recommend primary OB repeat gestational diabetes screening between 24 and 28 weeks of gestation. Patient  will need growth ultrasounds in the 3rd trimester timing of which should be determined based on follow-up ultrasound. Primary OB should begin   fetal surveillance with weekly NST/MVP at 32 weeks gestation. Delivery is recommended at 39 weeks gestation or earlier if indicated.  Primary OB to address maternal history of herpes and to be sure that the patient has appropriate follow-up for her depression. Additionally,  recommend cessation of THC. Risks THC reviewed. Primary OB should also confirm if patient has been offered fragile X testing particularly if the patient's son has  not been assessed for this condition. The patient was advised about the potential recurrence risk for autism and that is difficult to given exact  assessment without knowing the etiology. However, her offspring are at increased risk. It is a different father of the baby this pregnancy. The  patient was also advised of the limitations of ultrasound in detecting conditions such as autism. Primary OB should also verify if patient has  had weight loss. Her pre pregnancy BMI was markedly different from her initial OB  visit. Recommend primary OB address.    30 min was spent face-to-face time with greater than half that time spent in counseling and coordination of care.              Impression  =========    Santiago live intrauterine pregnancy.  Biometry agrees with the clinical dating.  Normal amniotic fluid volume by qualitative assessment.  Some of the fetal anatomy was suboptimally visualized. The fetal anatomy that was seen appeared normal.  Placenta is anterior without previa.  Transabdominal cervical length is normal.    Recommendation  ==============    NIPT ordered today.  Declined amniocentesis.  Follow up ultrasound in 6 weeks for growth/suboptimal anatomy.  Primary ob should address Fragile X testing.  Encourage THC cessation.  Repeat glucose screen at 24 to 28 weeks by primary ob.  Primary ob should address if patient has had weight loss (prepregnancy weight very different in computer than initial ob weight).  Primary ob to confirm patient has follow up for depression.  Third trimester growth scans.  Primary ob to begin weekly  fetal surveillance at 32 weeks due to BMI.  Baby asa daily.  Delivery at 39 weeks or earlier if indicated.  See note above.     Xray Chest 1 View- PORTABLE-Urgent

## 2023-03-24 NOTE — H&P ADULT - NSHPLABSRESULTS_GEN_ALL_CORE
LABS:                        10.5   5.90  )-----------( 162      ( 24 Mar 2023 15:35 )             31.6     03-24    136  |  103  |  23<H>  ----------------------------<  118<H>  3.8   |  27  |  1.43<H>    Ca    9.5      24 Mar 2023 15:35    TPro  6.7  /  Alb  3.1<L>  /  TBili  0.3  /  DBili  x   /  AST  28  /  ALT  22  /  AlkPhos  55  03-24    PT/INR - ( 24 Mar 2023 15:35 )   PT: 13.2 sec;   INR: 1.11 ratio         PTT - ( 24 Mar 2023 15:35 )  PTT:32.4 sec    LIVER FUNCTIONS - ( 24 Mar 2023 15:35 )  Alb: 3.1 g/dL / Pro: 6.7 g/dL / ALK PHOS: 55 U/L / ALT: 22 U/L DA / AST: 28 U/L / GGT: x

## 2023-03-24 NOTE — ED PROVIDER NOTE - PHYSICAL EXAMINATION
GENERAL: Well appearing, well nourished, awake, alert and in NAD  ENMT: Airway patent  EYES: conjunctiva clear  CARDIAC: Regular rate, regular rhythm.  Heart sounds S1, S2, no S3, S4. No murmurs, rubs or gallops.  RESPIRATORY: MILD CRACKLES IN LLL, breath sounds otherwise clear with no wheezes/ronchi/stridor; pt breathing and speaking comfortably with no increased WOB, no accessory mm. use, no intercostal retractions, no nasal flaring  GI: abdomen soft, non-distended, non-tender, no rebound or guarding.  SKIN: warm and dry, no rashes  PSYCH: awake, alert, calm and cooperative   legs: trace pitting edema bilat   A&Ox1 with

## 2023-03-24 NOTE — ED ADULT NURSE NOTE - ED STAT RN HANDOFF DETAILS
patient on droplet isolation for flu, ambulated with assistance with no SOB, no acute distress. daughter stated "I gave my mom home BP medications, metoprolol, hydralazine, isosorbide, and simvastatin. MAR made aware. Endorsed to YOVANA Mckinney

## 2023-03-24 NOTE — H&P ADULT - ASSESSMENT
89-year-old female with past medical history of dementia, asthma, HLD, CHF, HTN, CHF (status post pacemaker), no past surgical history presents with 2-day fever and cough. Patient was found to have influenza Admitted for further management

## 2023-03-24 NOTE — H&P ADULT - PROBLEM SELECTOR PLAN 5
IMPROVE VTE Individual Risk Assessment          RISK                                                          Points  [  ] Previous VTE                                                3  [  ] Thrombophilia                                             2  [  ] Lower limb paralysis                                   2        (unable to hold up >15 seconds)    [  ] Current Cancer                                             2         (within 6 months)  [x  ] Immobilization > 24 hrs                              1  [  ] ICU/CCU stay > 24 hours                             1  [ x ] Age > 60                                                         1    IMPROVE VTE Score:         [   2      ]      Heparin SubQ

## 2023-03-24 NOTE — ED PROVIDER NOTE - OBJECTIVE STATEMENT
89-year-old female with past medical history of dementia, asthma, HLD, CHF, HTN, CHB (status post pacemaker), no past surgical history presents with 2-day history of nonproductive cough, shortness of breath, and wheezing.  Patient presents with daughter who states that patient is normally ambulatory with assist, and ANO x2.  Patient takes Lasix 20 mg every other day.  No fever, chills, congestion, dysuria, abdominal pain, nausea, vomiting, diarrhea.  Patient has baseline leg edema that is unchanged.    Patient has no history of DVT or PE.  Patient has no hormone use.  She is due for a CTA of her chest patient has no recent travel, surgeries, or hospitalizations.     used, HPI mainly obtained from daughter at bedside who speaks English

## 2023-03-24 NOTE — H&P ADULT - PROBLEM SELECTOR PLAN 1
p/w fever and cough for three days  Afebrile, hemodynamically stable  no WBC  bnp 2900  CXR clear  Will start tamiflu

## 2023-03-25 DIAGNOSIS — J45.909 UNSPECIFIED ASTHMA, UNCOMPLICATED: ICD-10-CM

## 2023-03-25 LAB
ALBUMIN SERPL ELPH-MCNC: 3 G/DL — LOW (ref 3.5–5)
ALP SERPL-CCNC: 47 U/L — SIGNIFICANT CHANGE UP (ref 40–120)
ALT FLD-CCNC: 20 U/L DA — SIGNIFICANT CHANGE UP (ref 10–60)
ANION GAP SERPL CALC-SCNC: 5 MMOL/L — SIGNIFICANT CHANGE UP (ref 5–17)
AST SERPL-CCNC: 26 U/L — SIGNIFICANT CHANGE UP (ref 10–40)
BASOPHILS # BLD AUTO: 0.04 K/UL — SIGNIFICANT CHANGE UP (ref 0–0.2)
BASOPHILS NFR BLD AUTO: 0.9 % — SIGNIFICANT CHANGE UP (ref 0–2)
BILIRUB SERPL-MCNC: 0.3 MG/DL — SIGNIFICANT CHANGE UP (ref 0.2–1.2)
BUN SERPL-MCNC: 19 MG/DL — HIGH (ref 7–18)
CALCIUM SERPL-MCNC: 9.6 MG/DL — SIGNIFICANT CHANGE UP (ref 8.4–10.5)
CHLORIDE SERPL-SCNC: 105 MMOL/L — SIGNIFICANT CHANGE UP (ref 96–108)
CO2 SERPL-SCNC: 31 MMOL/L — SIGNIFICANT CHANGE UP (ref 22–31)
CREAT SERPL-MCNC: 1.31 MG/DL — HIGH (ref 0.5–1.3)
EGFR: 38 ML/MIN/1.73M2 — LOW
EOSINOPHIL # BLD AUTO: 0.19 K/UL — SIGNIFICANT CHANGE UP (ref 0–0.5)
EOSINOPHIL NFR BLD AUTO: 4 % — SIGNIFICANT CHANGE UP (ref 0–6)
GLUCOSE SERPL-MCNC: 120 MG/DL — HIGH (ref 70–99)
HCT VFR BLD CALC: 29.6 % — LOW (ref 34.5–45)
HGB BLD-MCNC: 10 G/DL — LOW (ref 11.5–15.5)
IMM GRANULOCYTES NFR BLD AUTO: 0.4 % — SIGNIFICANT CHANGE UP (ref 0–0.9)
LYMPHOCYTES # BLD AUTO: 0.61 K/UL — LOW (ref 1–3.3)
LYMPHOCYTES # BLD AUTO: 13 % — SIGNIFICANT CHANGE UP (ref 13–44)
MAGNESIUM SERPL-MCNC: 2.1 MG/DL — SIGNIFICANT CHANGE UP (ref 1.6–2.6)
MCHC RBC-ENTMCNC: 31.9 PG — SIGNIFICANT CHANGE UP (ref 27–34)
MCHC RBC-ENTMCNC: 33.8 GM/DL — SIGNIFICANT CHANGE UP (ref 32–36)
MCV RBC AUTO: 94.6 FL — SIGNIFICANT CHANGE UP (ref 80–100)
MONOCYTES # BLD AUTO: 1.03 K/UL — HIGH (ref 0–0.9)
MONOCYTES NFR BLD AUTO: 21.9 % — HIGH (ref 2–14)
NEUTROPHILS # BLD AUTO: 2.81 K/UL — SIGNIFICANT CHANGE UP (ref 1.8–7.4)
NEUTROPHILS NFR BLD AUTO: 59.8 % — SIGNIFICANT CHANGE UP (ref 43–77)
NRBC # BLD: 0 /100 WBCS — SIGNIFICANT CHANGE UP (ref 0–0)
PHOSPHATE SERPL-MCNC: 2.4 MG/DL — LOW (ref 2.5–4.5)
PLATELET # BLD AUTO: 148 K/UL — LOW (ref 150–400)
POTASSIUM SERPL-MCNC: 3.3 MMOL/L — LOW (ref 3.5–5.3)
POTASSIUM SERPL-SCNC: 3.3 MMOL/L — LOW (ref 3.5–5.3)
PROT SERPL-MCNC: 6.4 G/DL — SIGNIFICANT CHANGE UP (ref 6–8.3)
RBC # BLD: 3.13 M/UL — LOW (ref 3.8–5.2)
RBC # FLD: 13 % — SIGNIFICANT CHANGE UP (ref 10.3–14.5)
SODIUM SERPL-SCNC: 141 MMOL/L — SIGNIFICANT CHANGE UP (ref 135–145)
WBC # BLD: 4.7 K/UL — SIGNIFICANT CHANGE UP (ref 3.8–10.5)
WBC # FLD AUTO: 4.7 K/UL — SIGNIFICANT CHANGE UP (ref 3.8–10.5)

## 2023-03-25 RX ORDER — HYDRALAZINE HCL 50 MG
50 TABLET ORAL EVERY 8 HOURS
Refills: 0 | Status: DISCONTINUED | OUTPATIENT
Start: 2023-03-25 | End: 2023-03-26

## 2023-03-25 RX ORDER — MULTIVIT-MIN/FERROUS GLUCONATE 9 MG/15 ML
1 LIQUID (ML) ORAL DAILY
Refills: 0 | Status: DISCONTINUED | OUTPATIENT
Start: 2023-03-25 | End: 2023-03-26

## 2023-03-25 RX ORDER — METOPROLOL TARTRATE 50 MG
100 TABLET ORAL EVERY 12 HOURS
Refills: 0 | Status: DISCONTINUED | OUTPATIENT
Start: 2023-03-25 | End: 2023-03-26

## 2023-03-25 RX ORDER — PANTOPRAZOLE SODIUM 20 MG/1
40 TABLET, DELAYED RELEASE ORAL
Refills: 0 | Status: DISCONTINUED | OUTPATIENT
Start: 2023-03-25 | End: 2023-03-26

## 2023-03-25 RX ORDER — DRONEDARONE 400 MG/1
400 TABLET, FILM COATED ORAL
Refills: 0 | Status: DISCONTINUED | OUTPATIENT
Start: 2023-03-25 | End: 2023-03-26

## 2023-03-25 RX ORDER — SIMVASTATIN 20 MG/1
20 TABLET, FILM COATED ORAL AT BEDTIME
Refills: 0 | Status: DISCONTINUED | OUTPATIENT
Start: 2023-03-25 | End: 2023-03-26

## 2023-03-25 RX ORDER — MIRTAZAPINE 45 MG/1
7.5 TABLET, ORALLY DISINTEGRATING ORAL AT BEDTIME
Refills: 0 | Status: DISCONTINUED | OUTPATIENT
Start: 2023-03-25 | End: 2023-03-26

## 2023-03-25 RX ORDER — DOXAZOSIN MESYLATE 4 MG
2 TABLET ORAL AT BEDTIME
Refills: 0 | Status: DISCONTINUED | OUTPATIENT
Start: 2023-03-25 | End: 2023-03-26

## 2023-03-25 RX ORDER — ALBUTEROL 90 UG/1
2.5 AEROSOL, METERED ORAL EVERY 4 HOURS
Refills: 0 | Status: DISCONTINUED | OUTPATIENT
Start: 2023-03-24 | End: 2023-03-26

## 2023-03-25 RX ORDER — MONTELUKAST 4 MG/1
10 TABLET, CHEWABLE ORAL DAILY
Refills: 0 | Status: DISCONTINUED | OUTPATIENT
Start: 2023-03-25 | End: 2023-03-26

## 2023-03-25 RX ORDER — ASPIRIN/CALCIUM CARB/MAGNESIUM 324 MG
81 TABLET ORAL DAILY
Refills: 0 | Status: DISCONTINUED | OUTPATIENT
Start: 2023-03-25 | End: 2023-03-26

## 2023-03-25 RX ORDER — FUROSEMIDE 40 MG
40 TABLET ORAL DAILY
Refills: 0 | Status: DISCONTINUED | OUTPATIENT
Start: 2023-03-25 | End: 2023-03-26

## 2023-03-25 RX ORDER — ISOSORBIDE MONONITRATE 60 MG/1
120 TABLET, EXTENDED RELEASE ORAL DAILY
Refills: 0 | Status: DISCONTINUED | OUTPATIENT
Start: 2023-03-25 | End: 2023-03-26

## 2023-03-25 RX ADMIN — HEPARIN SODIUM 5000 UNIT(S): 5000 INJECTION INTRAVENOUS; SUBCUTANEOUS at 06:15

## 2023-03-25 RX ADMIN — HEPARIN SODIUM 5000 UNIT(S): 5000 INJECTION INTRAVENOUS; SUBCUTANEOUS at 14:23

## 2023-03-25 RX ADMIN — Medication 100 MILLIGRAM(S): at 14:13

## 2023-03-25 RX ADMIN — ALBUTEROL 2.5 MILLIGRAM(S): 90 AEROSOL, METERED ORAL at 15:15

## 2023-03-25 RX ADMIN — Medication 1 TABLET(S): at 17:18

## 2023-03-25 RX ADMIN — Medication 50 MILLIGRAM(S): at 17:20

## 2023-03-25 RX ADMIN — Medication 40 MILLIGRAM(S): at 22:02

## 2023-03-25 RX ADMIN — Medication 40 MILLIGRAM(S): at 14:17

## 2023-03-25 RX ADMIN — Medication 2 MILLIGRAM(S): at 18:03

## 2023-03-25 RX ADMIN — Medication 75 MILLIGRAM(S): at 17:17

## 2023-03-25 RX ADMIN — ALBUTEROL 2.5 MILLIGRAM(S): 90 AEROSOL, METERED ORAL at 23:14

## 2023-03-25 RX ADMIN — DRONEDARONE 400 MILLIGRAM(S): 400 TABLET, FILM COATED ORAL at 17:20

## 2023-03-25 RX ADMIN — SIMVASTATIN 20 MILLIGRAM(S): 20 TABLET, FILM COATED ORAL at 22:02

## 2023-03-25 RX ADMIN — Medication 75 MILLIGRAM(S): at 06:15

## 2023-03-25 RX ADMIN — ISOSORBIDE MONONITRATE 120 MILLIGRAM(S): 60 TABLET, EXTENDED RELEASE ORAL at 14:13

## 2023-03-25 RX ADMIN — HEPARIN SODIUM 5000 UNIT(S): 5000 INJECTION INTRAVENOUS; SUBCUTANEOUS at 22:02

## 2023-03-25 NOTE — PATIENT PROFILE ADULT - FALL HARM RISK - HARM RISK INTERVENTIONS
Assistance with ambulation/Assistance OOB with selected safe patient handling equipment/Communicate Risk of Fall with Harm to all staff/Discuss with provider need for PT consult/Monitor gait and stability/Provide patient with walking aids - walker, cane, crutches/Reinforce activity limits and safety measures with patient and family/Review medications for side effects contributing to fall risk/Sit up slowly, dangle for a short time, stand at bedside before walking/Tailored Fall Risk Interventions/Toileting schedule using arm’s reach rule for commode and bathroom/Visual Cue: Yellow wristband and red socks/Bed in lowest position, wheels locked, appropriate side rails in place/Call bell, personal items and telephone in reach/Instruct patient to call for assistance before getting out of bed or chair/Non-slip footwear when patient is out of bed/[place] to call system/Physically safe environment - no spills, clutter or unnecessary equipment/Purposeful Proactive Rounding/Room/bathroom lighting operational, light cord in reach

## 2023-03-25 NOTE — CONSULT NOTE ADULT - SUBJECTIVE AND OBJECTIVE BOX
PULMONARY CONSULT NOTE      CLAUDETTE GARCES  MRN-[id number]    History of Present Illness:  History of Present Illness:   89-year-old female with past medical history of dementia, asthma, HLD, CHF, HTN, CHF (status post pacemaker), no past surgical history presents with 2-day fever and cough. Daughter at bedside states that pt developed fatigue, worsening SOB, and dry cough for three days. Then this morning, patient had generalized weakness and had decrease PO intake. Denies any chills, vom    HISTORY OF PRESENT ILLNESS: As Above. Awake, alert, comfortable out of bed in NAD    MEDICATIONS  (STANDING):  heparin   Injectable 5000 Unit(s) SubCutaneous every 8 hours  methylPREDNISolone sodium succinate Injectable 40 milliGRAM(s) IV Push every 8 hours  oseltamivir 75 milliGRAM(s) Oral two times a day      MEDICATIONS  (PRN):      Allergies    No Known Allergies    Intolerances        PAST MEDICAL & SURGICAL HISTORY:  HTN (hypertension)      Hypercholesterolemia      Atrial fibrillation      History of complete heart block      Pulmonary HTN      CHF (congestive heart failure)      Artificial pacemaker          FAMILY HISTORY:  FH: hypertension    FH: coronary artery disease        SOCIAL HISTORY  Smoking History:     REVIEW OF SYSTEMS:    CONSTITUTIONAL:  No fevers, chills, sweats    HEENT:  Eyes:  No diplopia or blurred vision. ENT:  No earache, sore throat or runny nose.    CARDIOVASCULAR:  No pressure, squeezing, tightness, or heaviness about the chest; no palpitations.    RESPIRATORY:  Per HPI    GASTROINTESTINAL:  No abdominal pain, nausea, vomiting or diarrhea.    GENITOURINARY:  No dysuria, frequency or urgency.    NEUROLOGIC:  No paresthesias, fasciculations, seizures or weakness.    PSYCHIATRIC:  No disorder of thought or mood.    Vital Signs Last 24 Hrs  T(C): 36.8 (24 Mar 2023 19:08), Max: 37.7 (24 Mar 2023 14:23)  T(F): 98.2 (24 Mar 2023 19:08), Max: 99.9 (24 Mar 2023 14:23)  HR: 68 (24 Mar 2023 19:08) (66 - 68)  BP: 184/67 (24 Mar 2023 19:08) (126/70 - 184/67)  BP(mean): --  RR: 18 (24 Mar 2023 19:08) (18 - 20)  SpO2: 93% (24 Mar 2023 19:08) (93% - 95%)    Parameters below as of 24 Mar 2023 19:08  Patient On (Oxygen Delivery Method): room air      I&O's Detail      PHYSICAL EXAMINATION:    GENERAL: The patient is a well-developed, well-nourished _____in no apparent distress.     HEENT: Head is normocephalic and atraumatic. Extraocular muscles are intact. Mucous membranes are moist.     NECK: Supple.     LUNGS: Wheezing B/L    HEART: Regular rate and rhythm without murmur.    ABDOMEN: Soft, nontender, and nondistended.  No hepatosplenomegaly is noted.    EXTREMITIES: Without any cyanosis, clubbing, rash, lesions or edema.    NEUROLOGIC: Grossly intact.      LABS:                        10.0   4.70  )-----------( 148      ( 25 Mar 2023 06:31 )             29.6     03-25    141  |  105  |  19<H>  ----------------------------<  120<H>  3.3<L>   |  31  |  1.31<H>    Ca    9.6      25 Mar 2023 06:31  Phos  2.4     03-25  Mg     2.1     03-25    TPro  6.4  /  Alb  3.0<L>  /  TBili  0.3  /  DBili  x   /  AST  26  /  ALT  20  /  AlkPhos  47  03-25    PT/INR - ( 24 Mar 2023 15:35 )   PT: 13.2 sec;   INR: 1.11 ratio         PTT - ( 24 Mar 2023 15:35 )  PTT:32.4 sec      CARDIAC MARKERS ( 24 Mar 2023 15:35 )  x     / x     / 29 U/L / x     / <1.0 ng/mL                MICROBIOLOGY:  Influenza B (RapRVP): Detected (03.24.23 @ 15:35)   RADIOLOGY & ADDITIONAL STUDIES:    CXR:  < from: Xray Chest 1 View- PORTABLE-Urgent (Xray Chest 1 View- PORTABLE-Urgent .) (03.24.23 @ 16:46) >  IMPRESSION:   No radiographic evidence of active chest disease.    < end of copied text >    Ct scan chest;    ekg;    echo:

## 2023-03-26 ENCOUNTER — TRANSCRIPTION ENCOUNTER (OUTPATIENT)
Age: 88
End: 2023-03-26

## 2023-03-26 VITALS
RESPIRATION RATE: 20 BRPM | TEMPERATURE: 99 F | HEART RATE: 64 BPM | SYSTOLIC BLOOD PRESSURE: 168 MMHG | OXYGEN SATURATION: 95 % | DIASTOLIC BLOOD PRESSURE: 66 MMHG

## 2023-03-26 PROCEDURE — 85730 THROMBOPLASTIN TIME PARTIAL: CPT

## 2023-03-26 PROCEDURE — 84100 ASSAY OF PHOSPHORUS: CPT

## 2023-03-26 PROCEDURE — 83735 ASSAY OF MAGNESIUM: CPT

## 2023-03-26 PROCEDURE — 82553 CREATINE MB FRACTION: CPT

## 2023-03-26 PROCEDURE — 83880 ASSAY OF NATRIURETIC PEPTIDE: CPT

## 2023-03-26 PROCEDURE — 94640 AIRWAY INHALATION TREATMENT: CPT

## 2023-03-26 PROCEDURE — 36415 COLL VENOUS BLD VENIPUNCTURE: CPT

## 2023-03-26 PROCEDURE — 80053 COMPREHEN METABOLIC PANEL: CPT

## 2023-03-26 PROCEDURE — 85025 COMPLETE CBC W/AUTO DIFF WBC: CPT

## 2023-03-26 PROCEDURE — 93005 ELECTROCARDIOGRAM TRACING: CPT

## 2023-03-26 PROCEDURE — 99285 EMERGENCY DEPT VISIT HI MDM: CPT

## 2023-03-26 PROCEDURE — 84484 ASSAY OF TROPONIN QUANT: CPT

## 2023-03-26 PROCEDURE — 85610 PROTHROMBIN TIME: CPT

## 2023-03-26 PROCEDURE — 82550 ASSAY OF CK (CPK): CPT

## 2023-03-26 PROCEDURE — 0225U NFCT DS DNA&RNA 21 SARSCOV2: CPT

## 2023-03-26 PROCEDURE — 71045 X-RAY EXAM CHEST 1 VIEW: CPT

## 2023-03-26 RX ORDER — ASPIRIN/CALCIUM CARB/MAGNESIUM 324 MG
81 TABLET ORAL DAILY
Refills: 0 | Status: DISCONTINUED | OUTPATIENT
Start: 2023-03-26 | End: 2023-03-26

## 2023-03-26 RX ORDER — HEPARIN SODIUM 5000 [USP'U]/ML
5000 INJECTION INTRAVENOUS; SUBCUTANEOUS EVERY 12 HOURS
Refills: 0 | Status: DISCONTINUED | OUTPATIENT
Start: 2023-03-26 | End: 2023-03-26

## 2023-03-26 RX ORDER — METOPROLOL TARTRATE 50 MG
1 TABLET ORAL
Qty: 0 | Refills: 0 | DISCHARGE
Start: 2023-03-26

## 2023-03-26 RX ORDER — HYDRALAZINE HCL 50 MG
100 TABLET ORAL THREE TIMES A DAY
Refills: 0 | Status: DISCONTINUED | OUTPATIENT
Start: 2023-03-26 | End: 2023-03-26

## 2023-03-26 RX ORDER — POTASSIUM CHLORIDE 20 MEQ
40 PACKET (EA) ORAL ONCE
Refills: 0 | Status: COMPLETED | OUTPATIENT
Start: 2023-03-26 | End: 2023-03-26

## 2023-03-26 RX ORDER — HYDRALAZINE HCL 50 MG
1 TABLET ORAL
Qty: 0 | Refills: 0 | DISCHARGE
Start: 2023-03-26

## 2023-03-26 RX ORDER — ISOSORBIDE MONONITRATE 60 MG/1
1 TABLET, EXTENDED RELEASE ORAL
Qty: 0 | Refills: 0 | DISCHARGE
Start: 2023-03-26

## 2023-03-26 RX ORDER — MONTELUKAST 4 MG/1
1 TABLET, CHEWABLE ORAL
Qty: 0 | Refills: 0 | DISCHARGE
Start: 2023-03-26

## 2023-03-26 RX ORDER — SIMVASTATIN 20 MG/1
1 TABLET, FILM COATED ORAL
Qty: 0 | Refills: 0 | DISCHARGE
Start: 2023-03-26

## 2023-03-26 RX ORDER — DRONEDARONE 400 MG/1
1 TABLET, FILM COATED ORAL
Qty: 0 | Refills: 0 | DISCHARGE
Start: 2023-03-26

## 2023-03-26 RX ORDER — DOXAZOSIN MESYLATE 4 MG
1 TABLET ORAL
Qty: 0 | Refills: 0 | DISCHARGE
Start: 2023-03-26

## 2023-03-26 RX ORDER — FUROSEMIDE 40 MG
1 TABLET ORAL
Qty: 0 | Refills: 0 | DISCHARGE
Start: 2023-03-26

## 2023-03-26 RX ORDER — ASPIRIN/CALCIUM CARB/MAGNESIUM 324 MG
1 TABLET ORAL
Qty: 0 | Refills: 0 | DISCHARGE
Start: 2023-03-26

## 2023-03-26 RX ADMIN — Medication 1 TABLET(S): at 09:22

## 2023-03-26 RX ADMIN — Medication 100 MILLIGRAM(S): at 05:19

## 2023-03-26 RX ADMIN — Medication 100 MILLIGRAM(S): at 05:31

## 2023-03-26 RX ADMIN — Medication 75 MILLIGRAM(S): at 05:19

## 2023-03-26 RX ADMIN — ALBUTEROL 2.5 MILLIGRAM(S): 90 AEROSOL, METERED ORAL at 09:24

## 2023-03-26 RX ADMIN — ISOSORBIDE MONONITRATE 120 MILLIGRAM(S): 60 TABLET, EXTENDED RELEASE ORAL at 09:22

## 2023-03-26 RX ADMIN — Medication 100 MILLIGRAM(S): at 13:11

## 2023-03-26 RX ADMIN — DRONEDARONE 400 MILLIGRAM(S): 400 TABLET, FILM COATED ORAL at 05:19

## 2023-03-26 RX ADMIN — PANTOPRAZOLE SODIUM 40 MILLIGRAM(S): 20 TABLET, DELAYED RELEASE ORAL at 06:53

## 2023-03-26 RX ADMIN — Medication 40 MILLIGRAM(S): at 05:19

## 2023-03-26 RX ADMIN — Medication 40 MILLIEQUIVALENT(S): at 13:10

## 2023-03-26 RX ADMIN — Medication 81 MILLIGRAM(S): at 09:23

## 2023-03-26 NOTE — DISCHARGE NOTE NURSING/CASE MANAGEMENT/SOCIAL WORK - NSDCPEFALRISK_GEN_ALL_CORE
For information on Fall & Injury Prevention, visit: [email or web address] OR  [email or web address] OR  https://www.cdc.gov/steadi/patient.html

## 2023-03-26 NOTE — DISCHARGE NOTE PROVIDER - NSDCMRMEDTOKEN_GEN_ALL_CORE_FT
albuterol 90 mcg/inh inhalation aerosol: 2 puff(s) inhaled every 6 hours, As needed, Shortness of Breath and/or Wheezing  aspirin 81 mg oral tablet, chewable: 1 tab(s) orally once a day  dorzolamide-timolol 2%-0.5% preservative-free ophthalmic solution: 1 drop(s) to each affected eye 2 times a day  doxazosin 2 mg oral tablet: 1 tab(s) orally once a day (at bedtime)  dronedarone 400 mg oral tablet: 1 tab(s) orally 2 times a day  ferrous sulfate 325 mg (65 mg elemental iron) oral tablet: 1 tab(s) orally once a day  furosemide 40 mg oral tablet: 1 tab(s) orally once a day  hydrALAZINE 100 mg oral tablet: 1 tab(s) orally 3 times a day  ipratropium-albuterol 0.5 mg-2.5 mg/3 mL inhalation solution: 3 milliliter(s) inhaled every 6 hours  isosorbide mononitrate 120 mg oral tablet, extended release: 1 tab(s) orally once a day  latanoprost 0.005% ophthalmic solution: 1 drop(s) to each affected eye once a day (at bedtime)  metoprolol tartrate 100 mg oral tablet: 1 tab(s) orally every 12 hours  montelukast 10 mg oral tablet: 1 tab(s) orally once a day  Multiple Vitamins oral tablet, dispersible:   risperiDONE 0.25 mg oral tablet: 1 tab(s) orally once (at bedtime)   simvastatin 20 mg oral tablet: 1 tab(s) orally once a day (at bedtime)  tiotropium 2.5 mcg/inh inhalation aerosol: 2 puff(s) inhaled once a day

## 2023-03-26 NOTE — DISCHARGE NOTE NURSING/CASE MANAGEMENT/SOCIAL WORK - PATIENT PORTAL LINK FT
You can access the FollowMyHealth Patient Portal offered by [facility] by registering at the following website: [email or web address]. By joining [employer]’s FollowMyHealth portal, you will also be able to view your health information using other applications (apps) compatible with our system.

## 2023-03-26 NOTE — CHART NOTE - NSCHARTNOTEFT_GEN_A_CORE
EVENT: Daughter at bedside  requesting mother be placed on Hydralazine 100mg three times a day as given prior to admission    BRIEF HPI: 89-year-old female with past medical history of dementia, asthma, HLD, CHF, HTN,CRI,GI Bleed, s/p PPM,PAF no past surgical history presents with 2-day fever and cough. Daughter at bedside states that pt developed fatigue, worsening SOB, and dry cough for three days,Influenza +.      Vital Signs Last 24 Hrs  T(C): 37 (03-26-23 @ 04:50), Max: 37 (03-26-23 @ 04:50)  T(F): 98.6 (03-26-23 @ 04:50), Max: 98.6 (03-26-23 @ 04:50)  HR: 67 (03-26-23 @ 04:50) (63 - 76)  BP: 192/78 (03-26-23 @ 04:50) (155/69 - 192/78)  BP(mean): --  RR: 18 (03-26-23 @ 04:50) (17 - 18)  SpO2: 98% (03-26-23 @ 04:50) (98% - 100%)    Home Medications:  acetylcysteine 20% inhalation solution: 4 milliliter(s) inhaled every 6 hours (06 Dec 2022 13:15)  aspirin 81 mg oral tablet, chewable: 1 tab(s) orally once a day (06 Dec 2022 13:15)  dorzolamide-timolol 2%-0.5% preservative-free ophthalmic solution: 1 drop(s) to each affected eye 2 times a day (06 Dec 2022 13:15)  doxazosin 2 mg oral tablet: 1 tab(s) orally once a day (29 Nov 2022 19:28)  ferrous sulfate 325 mg (65 mg elemental iron) oral tablet: 1 tab(s) orally once a day (06 Dec 2022 13:15)  hydrALAZINE 100 mg oral tablet: 1 tab(s) orally 3 times a day (29 Nov 2022 19:28)  ipratropium-albuterol 0.5 mg-2.5 mg/3 mL inhalation solution: 3 milliliter(s) inhaled every 6 hours (06 Dec 2022 13:15)  isosorbide mononitrate 120 mg oral tablet, extended release: 1 tab(s) orally once a day (29 Nov 2022 19:28)  Lasix 20 mg oral tablet: 1 tab(s) orally once a day (29 Nov 2022 19:28)  latanoprost 0.005% ophthalmic solution: 1 drop(s) to each affected eye once a day (at bedtime) (29 Nov 2022 19:28)  metoprolol tartrate 100 mg oral tablet: 1 tab(s) orally 2 times a day (29 Nov 2022 19:28)  montelukast 10 mg oral tablet: 1 tab(s) orally once a day (at bedtime) (06 Dec 2022 13:15)  Multiple Vitamins oral tablet, dispersible:  (29 Nov 2022 19:28)  simvastatin 40 mg oral tablet: 1 tab(s) orally once a day (at bedtime) (06 Dec 2022 13:15)    FOLLOW UP: effectiveness of med

## 2023-03-26 NOTE — PROGRESS NOTE ADULT - SUBJECTIVE AND OBJECTIVE BOX
CHIEF COMPLAINT:Patient is a 89y old  Female who presents with a chief complaint of Influenza. Pt feeling better.    	  REVIEW OF SYSTEMS:  CONSTITUTIONAL: No fever, weight loss, or fatigue  EYES: No eye pain, visual disturbances, or discharge  ENT:  No difficulty hearing, tinnitus, vertigo; No sinus or throat pain  NECK: No pain or stiffness  RESPIRATORY: No cough, wheezing, chills or hemoptysis; No Shortness of Breath  CARDIOVASCULAR: No chest pain, palpitations, passing out, dizziness, or leg swelling  GASTROINTESTINAL: No abdominal or epigastric pain. No nausea, vomiting, or hematemesis; No diarrhea or constipation. No melena or hematochezia.  GENITOURINARY: No dysuria, frequency, hematuria, or incontinence  NEUROLOGICAL: No headaches, memory loss, loss of strength, numbness, or tremors  SKIN: No itching, burning, rashes, or lesions   LYMPH Nodes: No enlarged glands  ENDOCRINE: No heat or cold intolerance; No hair loss  MUSCULOSKELETAL: No joint pain or swelling; No muscle, back, or extremity pain  PSYCHIATRIC: No depression, anxiety, mood swings, or difficulty sleeping  HEME/LYMPH: No easy bruising, or bleeding gums  ALLERGY AND IMMUNOLOGIC: No hives or eczema	        PHYSICAL EXAM:  T(C): 36.8 (03-24-23 @ 19:08), Max: 37.7 (03-24-23 @ 14:23)  HR: 68 (03-24-23 @ 19:08) (66 - 68)  BP: 184/67 (03-24-23 @ 19:08) (126/70 - 184/67)  RR: 18 (03-24-23 @ 19:08) (18 - 20)  SpO2: 93% (03-24-23 @ 19:08) (93% - 95%)  Wt(kg): --  I&O's Summary      Appearance: Normal	  HEENT:   Normal oral mucosa, PERRL, EOMI	  Lymphatic: No lymphadenopathy  Cardiovascular: Normal S1 S2, No JVD, No murmurs, No edema  Respiratory: B/L ronchi  Psychiatry: A & O x 3, Mood & affect appropriate  Gastrointestinal:  Soft, Non-tender, + BS	  Skin: No rashes, No ecchymoses, No cyanosis	  Neurologic: Non-focal  Extremities: Normal range of motion, No clubbing, cyanosis or edema  Vascular: Peripheral pulses palpable 2+ bilaterally    MEDICATIONS  (STANDING):  heparin   Injectable 5000 Unit(s) SubCutaneous every 8 hours  methylPREDNISolone sodium succinate Injectable 40 milliGRAM(s) IV Push every 8 hours  oseltamivir 75 milliGRAM(s) Oral two times a day        LABS:	 	    CARDIAC MARKERS:  CARDIAC MARKERS ( 24 Mar 2023 15:35 )  x     / x     / 29 U/L / x     / <1.0 ng/mL      Troponin I, High Sensitivity Result: 16.2 ng/L (03-24 @ 15:35)                            10.0   4.70  )-----------( 148      ( 25 Mar 2023 06:31 )             29.6     03-25    141  |  105  |  19<H>  ----------------------------<  120<H>  3.3<L>   |  31  |  1.31<H>    Ca    9.6      25 Mar 2023 06:31  Phos  2.4     03-25  Mg     2.1     03-25    TPro  6.4  /  Alb  3.0<L>  /  TBili  0.3  /  DBili  x   /  AST  26  /  ALT  20  /  AlkPhos  47  03-25    proBNP:   Lipid Profile:   HgA1c:   TSH:     	        
  CHIEF COMPLAINT:Patient is a 89y old  Female who presents with a chief complaint of SOB.Pt feeling better.    	  REVIEW OF SYSTEMS:  CONSTITUTIONAL: No fever, weight loss, or fatigue  EYES: No eye pain, visual disturbances, or discharge  ENT:  No difficulty hearing, tinnitus, vertigo; No sinus or throat pain  NECK: No pain or stiffness  RESPIRATORY: No cough, wheezing, chills or hemoptysis; No Shortness of Breath  CARDIOVASCULAR: No chest pain, palpitations, passing out, dizziness, or leg swelling  GASTROINTESTINAL: No abdominal or epigastric pain. No nausea, vomiting, or hematemesis; No diarrhea or constipation. No melena or hematochezia.  GENITOURINARY: No dysuria, frequency, hematuria, or incontinence  NEUROLOGICAL: No headaches, memory loss, loss of strength, numbness, or tremors  SKIN: No itching, burning, rashes, or lesions   LYMPH Nodes: No enlarged glands  ENDOCRINE: No heat or cold intolerance; No hair loss  MUSCULOSKELETAL: No joint pain or swelling; No muscle, back, or extremity pain  PSYCHIATRIC: No depression, anxiety, mood swings, or difficulty sleeping  HEME/LYMPH: No easy bruising, or bleeding gums  ALLERGY AND IMMUNOLOGIC: No hives or eczema	      PHYSICAL EXAM:  T(C): 37 (03-26-23 @ 04:50), Max: 37 (03-26-23 @ 04:50)  HR: 68 (03-26-23 @ 11:43) (63 - 76)  BP: 134/68 (03-26-23 @ 11:43) (134/68 - 192/78)  RR: 18 (03-26-23 @ 06:53) (17 - 18)  SpO2: 95% (03-26-23 @ 06:53) (95% - 100%)  Wt(kg): --  I&O's Summary      Appearance: Normal	  HEENT:   Normal oral mucosa, PERRL, EOMI	  Lymphatic: No lymphadenopathy  Cardiovascular: Normal S1 S2, No JVD, No murmurs, No edema  Respiratory: B/L ronchi  Psychiatry: A & O x 3, Mood & affect appropriate  Gastrointestinal:  Soft, Non-tender, + BS	  Skin: No rashes, No ecchymoses, No cyanosis	  Neurologic: Non-focal  Extremities: Normal range of motion, No clubbing, cyanosis or edema  Vascular: Peripheral pulses palpable 2+ bilaterally    MEDICATIONS  (STANDING):  aspirin  chewable 81 milliGRAM(s) Oral daily  doxazosin 2 milliGRAM(s) Oral at bedtime  dronedarone 400 milliGRAM(s) Oral two times a day  furosemide    Tablet 40 milliGRAM(s) Oral daily  heparin   Injectable 5000 Unit(s) SubCutaneous every 12 hours  hydrALAZINE 100 milliGRAM(s) Oral three times a day  isosorbide   mononitrate ER Tablet (IMDUR) 120 milliGRAM(s) Oral daily  methylPREDNISolone sodium succinate Injectable 40 milliGRAM(s) IV Push every 8 hours  metoprolol tartrate 100 milliGRAM(s) Oral every 12 hours  mirtazapine 7.5 milliGRAM(s) Oral at bedtime  montelukast 10 milliGRAM(s) Oral daily  multivitamin/minerals 1 Tablet(s) Oral daily  pantoprazole    Tablet 40 milliGRAM(s) Oral before breakfast  potassium chloride    Tablet ER 40 milliEquivalent(s) Oral once  simvastatin 20 milliGRAM(s) Oral at bedtime      LABS:	 	      CARDIAC MARKERS ( 24 Mar 2023 15:35 )  x     / x     / 29 U/L / x     / <1.0 ng/mL      Troponin I, High Sensitivity Result: 16.2 ng/L (03-24 @ 15:35)                            10.0   4.70  )-----------( 148      ( 25 Mar 2023 06:31 )             29.6     03-25    141  |  105  |  19<H>  ----------------------------<  120<H>  3.3<L>   |  31  |  1.31<H>    Ca    9.6      25 Mar 2023 06:31  Phos  2.4     03-25  Mg     2.1     03-25    TPro  6.4  /  Alb  3.0<L>  /  TBili  0.3  /  DBili  x   /  AST  26  /  ALT  20  /  AlkPhos  47  03-25    	        
Patient is a 89y old  Female who presents with a chief complaint of Influenza (25 Mar 2023 12:41)  Awake, alert, comfortable in bed in NAD. Confused but not agitated. Not wheezing or coughing    INTERVAL HPI/OVERNIGHT EVENTS:      VITAL SIGNS:  T(F): 98.6 (03-26-23 @ 04:50)  HR: 67 (03-26-23 @ 06:53)  BP: 158/51 (03-26-23 @ 06:53)  RR: 18 (03-26-23 @ 06:53)  SpO2: 95% (03-26-23 @ 06:53)  Wt(kg): --  I&O's Detail          REVIEW OF SYSTEMS:    CONSTITUTIONAL:  No fevers, chills, sweats    HEENT:  Eyes:  No diplopia or blurred vision. ENT:  No earache, sore throat or runny nose.    CARDIOVASCULAR:  No pressure, squeezing, tightness, or heaviness about the chest; no palpitations.    RESPIRATORY:  Per HPI    GASTROINTESTINAL:  No abdominal pain, nausea, vomiting or diarrhea.    GENITOURINARY:  No dysuria, frequency or urgency.    NEUROLOGIC:  No paresthesias, fasciculations, seizures or weakness.    PSYCHIATRIC:  No disorder of thought or mood.      PHYSICAL EXAM:    Constitutional: Well developed and nourished  Eyes:Perrla  ENMT: normal  Neck:supple  Respiratory: good air entry  Cardiovascular: S1 S2 regular  Gastrointestinal: Soft, Non tender  Extremities: No edema  Vascular:normal  Neurological:Awake, alert  Musculoskeletal:Normal      MEDICATIONS  (STANDING):  aspirin  chewable 81 milliGRAM(s) Oral daily  doxazosin 2 milliGRAM(s) Oral at bedtime  dronedarone 400 milliGRAM(s) Oral two times a day  furosemide    Tablet 40 milliGRAM(s) Oral daily  heparin   Injectable 5000 Unit(s) SubCutaneous every 8 hours  hydrALAZINE 100 milliGRAM(s) Oral three times a day  isosorbide   mononitrate ER Tablet (IMDUR) 120 milliGRAM(s) Oral daily  methylPREDNISolone sodium succinate Injectable 40 milliGRAM(s) IV Push every 8 hours  metoprolol tartrate 100 milliGRAM(s) Oral every 12 hours  mirtazapine 7.5 milliGRAM(s) Oral at bedtime  montelukast 10 milliGRAM(s) Oral daily  multivitamin/minerals 1 Tablet(s) Oral daily  oseltamivir 75 milliGRAM(s) Oral two times a day  pantoprazole    Tablet 40 milliGRAM(s) Oral before breakfast  simvastatin 20 milliGRAM(s) Oral at bedtime    MEDICATIONS  (PRN):  albuterol    0.083% 2.5 milliGRAM(s) Nebulizer every 4 hours PRN Shortness of Breath and/or Wheezing      Allergies    No Known Allergies    Intolerances        LABS:                        10.0   4.70  )-----------( 148      ( 25 Mar 2023 06:31 )             29.6     03-25    141  |  105  |  19<H>  ----------------------------<  120<H>  3.3<L>   |  31  |  1.31<H>    Ca    9.6      25 Mar 2023 06:31  Phos  2.4     03-25  Mg     2.1     03-25    TPro  6.4  /  Alb  3.0<L>  /  TBili  0.3  /  DBili  x   /  AST  26  /  ALT  20  /  AlkPhos  47  03-25    PT/INR - ( 24 Mar 2023 15:35 )   PT: 13.2 sec;   INR: 1.11 ratio         PTT - ( 24 Mar 2023 15:35 )  PTT:32.4 sec      CARDIAC MARKERS ( 24 Mar 2023 15:35 )  x     / x     / 29 U/L / x     / <1.0 ng/mL      CAPILLARY BLOOD GLUCOSE            RADIOLOGY & ADDITIONAL TESTS:    CXR:    Ct scan chest:    ekg;    echo:

## 2023-03-26 NOTE — DISCHARGE NOTE PROVIDER - HOSPITAL COURSE
89-year-old female with past medical history of dementia, asthma, HLD, CHF, HTN, CHF,CRI,GI Bleed, s/p PPM  no past surgical history presents with 2-day fever and cough. Daughter at bedside states that pt developed fatigue, worsening SOB, and dry cough for three days. Then this morning, patient had generalized weakness and had decrease PO intake. Denies any chills, vomiting, chest pain, abdominal pain, or change in bowel habits.     In the ED: Temp 99.9, HR 66, /70, RR 20, Pulseox 95% on room air. Lungs with mild crackles, no wheezes/rhonchi. She was admitted for further workup of asthma exac, poss PNA, CHF exac.    Hospital course by problem:    #Influenza A  RVP pos for Flu A  Started on tamiflu  [ ] Needs 2 more days of tamiflu  Started on prednisone  Discharged on taper (daughter stated she would not give it to her)    #CHF  Not in exac  Cont on lasix 20mg every other day    Please note this is only a summary, refer to the full chart for further details. 89-year-old female with past medical history of dementia, asthma, HLD, CHF, HTN, CHF,CRI,GI Bleed, s/p PPM  no past surgical history presents with 2-day fever and cough. Daughter at bedside states that pt developed fatigue, worsening SOB, and dry cough for three days. Then this morning, patient had generalized weakness and had decrease PO intake. Denies any chills, vomiting, chest pain, abdominal pain, or change in bowel habits.     In the ED: Temp 99.9, HR 66, /70, RR 20, Pulseox 95% on room air. Lungs with mild crackles, no wheezes/rhonchi. She was admitted for further workup of asthma exac, poss PNA, CHF exac.    Hospital course by problem:    #Influenza A  RVP pos for Flu A  Started on tamiflu  [ ] Needs 2 more days of tamiflu  Started on prednisone  Discharged on taper (daughter stated she would not give it to her)    #CHF  Not in exac  Cont on lasix 20mg every other day    Tamiflu and prednisone taper were called into her [facility] pharmacy and verified by pharmacist to be in stock.    Please note this is only a summary, refer to the full chart for further details.

## 2023-03-26 NOTE — DISCHARGE NOTE PROVIDER - CARE PROVIDER_API CALL
Carol Harrell)  Internal Medicine  [address] [place] [address]  Phone: [phone]  Fax: [phone]  Follow Up Time: 2 weeks

## 2023-03-26 NOTE — DISCHARGE NOTE PROVIDER - NSDCQMAMI_CARD_ALL_CORE
Velcade injection given without difficulties. Bandaid applied. Patient instructed to stay in the clinic for 15 minutes. Patient verbalized understanding and will notify nurse with any complaints.Tolerated injection well. Discharged home with next appointment scheduled.   No

## 2023-03-26 NOTE — PROGRESS NOTE ADULT - ASSESSMENT
89-year-old female with past medical history of dementia, asthma, HLD, CHF, HTN,CRI,GI Bleed, s/p PPM,PAF no past surgical history presents with 2-day fever and cough. Daughter at bedside states that pt developed fatigue, worsening SOB, and dry cough for three days,Influenza +.  1.Influenza +-tamiflu,  2.ASthma-Pulm f/u,steroid,MDI.  3.PAF-asa,multaq,lopressor.  4.HTN-cont bp medication.  5.CRI-f/u lytes.  6.GI bleed-off AC.  7.GI and DVT prophylaxis.  8.Replace k+.  9.D/C home and f/u as outpatient.

## 2023-03-29 NOTE — ED ADULT TRIAGE NOTE - BMI (KG/M2)
Oral phase was unremarkable with adequate containment, processing and clearance. Laryngeal movement palpated; appeared brisk and timely. No clinical overt s/s of aspiration appreciated. 29.3

## 2023-06-07 NOTE — DISCHARGE NOTE PROVIDER - NS AS DC PROVIDER CONTACT Y/N MULTI
Quality 110: Preventive Care And Screening: Influenza Immunization: Influenza Immunization previously received during influenza season
Quality 226: Preventive Care And Screening: Tobacco Use: Screening And Cessation Intervention: Patient screened for tobacco use and is an ex/non-smoker
Quality 130: Documentation Of Current Medications In The Medical Record: Current Medications Documented
Detail Level: Detailed
Quality 431: Preventive Care And Screening: Unhealthy Alcohol Use - Screening: Patient not identified as an unhealthy alcohol user when screened for unhealthy alcohol use using a systematic screening method
Yes

## 2023-06-15 ENCOUNTER — EMERGENCY (EMERGENCY)
Facility: HOSPITAL | Age: 88
LOS: 1 days | Discharge: HOME | End: 2023-06-15
Attending: STUDENT IN AN ORGANIZED HEALTH CARE EDUCATION/TRAINING PROGRAM
Payer: COMMERCIAL

## 2023-06-15 VITALS
DIASTOLIC BLOOD PRESSURE: 69 MMHG | TEMPERATURE: 98 F | WEIGHT: 139.99 LBS | OXYGEN SATURATION: 97 % | HEART RATE: 62 BPM | SYSTOLIC BLOOD PRESSURE: 157 MMHG | RESPIRATION RATE: 17 BRPM | HEIGHT: 63 IN

## 2023-06-15 VITALS — HEART RATE: 65 BPM

## 2023-06-15 DIAGNOSIS — Z95.0 PRESENCE OF CARDIAC PACEMAKER: Chronic | ICD-10-CM

## 2023-06-15 LAB
ALBUMIN SERPL ELPH-MCNC: 2.9 G/DL — LOW (ref 3.5–5)
ALP SERPL-CCNC: 49 U/L — SIGNIFICANT CHANGE UP (ref 40–120)
ALT FLD-CCNC: 19 U/L DA — SIGNIFICANT CHANGE UP (ref 10–60)
ANION GAP SERPL CALC-SCNC: 6 MMOL/L — SIGNIFICANT CHANGE UP (ref 5–17)
APPEARANCE UR: CLEAR — SIGNIFICANT CHANGE UP
APTT BLD: 30.7 SEC — SIGNIFICANT CHANGE UP (ref 27.5–35.5)
AST SERPL-CCNC: 20 U/L — SIGNIFICANT CHANGE UP (ref 10–40)
BASOPHILS # BLD AUTO: 0.05 K/UL — SIGNIFICANT CHANGE UP (ref 0–0.2)
BASOPHILS NFR BLD AUTO: 0.8 % — SIGNIFICANT CHANGE UP (ref 0–2)
BILIRUB SERPL-MCNC: 0.2 MG/DL — SIGNIFICANT CHANGE UP (ref 0.2–1.2)
BILIRUB UR-MCNC: NEGATIVE — SIGNIFICANT CHANGE UP
BUN SERPL-MCNC: 18 MG/DL — SIGNIFICANT CHANGE UP (ref 7–18)
CALCIUM SERPL-MCNC: 9.3 MG/DL — SIGNIFICANT CHANGE UP (ref 8.4–10.5)
CHLORIDE SERPL-SCNC: 104 MMOL/L — SIGNIFICANT CHANGE UP (ref 96–108)
CO2 SERPL-SCNC: 30 MMOL/L — SIGNIFICANT CHANGE UP (ref 22–31)
COLOR SPEC: YELLOW — SIGNIFICANT CHANGE UP
CREAT SERPL-MCNC: 1.38 MG/DL — HIGH (ref 0.5–1.3)
DIFF PNL FLD: NEGATIVE — SIGNIFICANT CHANGE UP
EGFR: 35 ML/MIN/1.73M2 — LOW
EOSINOPHIL # BLD AUTO: 0.71 K/UL — HIGH (ref 0–0.5)
EOSINOPHIL NFR BLD AUTO: 12 % — HIGH (ref 0–6)
GLUCOSE SERPL-MCNC: 145 MG/DL — HIGH (ref 70–99)
GLUCOSE UR QL: NEGATIVE — SIGNIFICANT CHANGE UP
HCT VFR BLD CALC: 31.3 % — LOW (ref 34.5–45)
HGB BLD-MCNC: 10.1 G/DL — LOW (ref 11.5–15.5)
IMM GRANULOCYTES NFR BLD AUTO: 0.2 % — SIGNIFICANT CHANGE UP (ref 0–0.9)
INR BLD: 1.02 RATIO — SIGNIFICANT CHANGE UP (ref 0.88–1.16)
KETONES UR-MCNC: NEGATIVE — SIGNIFICANT CHANGE UP
LACTATE SERPL-SCNC: 2.1 MMOL/L — HIGH (ref 0.7–2)
LEUKOCYTE ESTERASE UR-ACNC: NEGATIVE — SIGNIFICANT CHANGE UP
LIDOCAIN IGE QN: 183 U/L — SIGNIFICANT CHANGE UP (ref 73–393)
LYMPHOCYTES # BLD AUTO: 1.06 K/UL — SIGNIFICANT CHANGE UP (ref 1–3.3)
LYMPHOCYTES # BLD AUTO: 17.8 % — SIGNIFICANT CHANGE UP (ref 13–44)
MCHC RBC-ENTMCNC: 31.9 PG — SIGNIFICANT CHANGE UP (ref 27–34)
MCHC RBC-ENTMCNC: 32.3 GM/DL — SIGNIFICANT CHANGE UP (ref 32–36)
MCV RBC AUTO: 98.7 FL — SIGNIFICANT CHANGE UP (ref 80–100)
MONOCYTES # BLD AUTO: 0.71 K/UL — SIGNIFICANT CHANGE UP (ref 0–0.9)
MONOCYTES NFR BLD AUTO: 12 % — SIGNIFICANT CHANGE UP (ref 2–14)
NEUTROPHILS # BLD AUTO: 3.4 K/UL — SIGNIFICANT CHANGE UP (ref 1.8–7.4)
NEUTROPHILS NFR BLD AUTO: 57.2 % — SIGNIFICANT CHANGE UP (ref 43–77)
NITRITE UR-MCNC: NEGATIVE — SIGNIFICANT CHANGE UP
NRBC # BLD: 0 /100 WBCS — SIGNIFICANT CHANGE UP (ref 0–0)
PH UR: 5 — SIGNIFICANT CHANGE UP (ref 5–8)
PLATELET # BLD AUTO: 157 K/UL — SIGNIFICANT CHANGE UP (ref 150–400)
POTASSIUM SERPL-MCNC: 3.9 MMOL/L — SIGNIFICANT CHANGE UP (ref 3.5–5.3)
POTASSIUM SERPL-SCNC: 3.9 MMOL/L — SIGNIFICANT CHANGE UP (ref 3.5–5.3)
PROT SERPL-MCNC: 6.6 G/DL — SIGNIFICANT CHANGE UP (ref 6–8.3)
PROT UR-MCNC: NEGATIVE — SIGNIFICANT CHANGE UP
PROTHROM AB SERPL-ACNC: 12.2 SEC — SIGNIFICANT CHANGE UP (ref 10.5–13.4)
RBC # BLD: 3.17 M/UL — LOW (ref 3.8–5.2)
RBC # FLD: 13.2 % — SIGNIFICANT CHANGE UP (ref 10.3–14.5)
SODIUM SERPL-SCNC: 140 MMOL/L — SIGNIFICANT CHANGE UP (ref 135–145)
SP GR SPEC: 1.01 — SIGNIFICANT CHANGE UP (ref 1.01–1.02)
UROBILINOGEN FLD QL: NEGATIVE — SIGNIFICANT CHANGE UP
WBC # BLD: 5.94 K/UL — SIGNIFICANT CHANGE UP (ref 3.8–10.5)
WBC # FLD AUTO: 5.94 K/UL — SIGNIFICANT CHANGE UP (ref 3.8–10.5)

## 2023-06-15 PROCEDURE — 85610 PROTHROMBIN TIME: CPT

## 2023-06-15 PROCEDURE — 70450 CT HEAD/BRAIN W/O DYE: CPT | Mod: MA

## 2023-06-15 PROCEDURE — 71260 CT THORAX DX C+: CPT | Mod: 26,MA

## 2023-06-15 PROCEDURE — 99285 EMERGENCY DEPT VISIT HI MDM: CPT | Mod: 25

## 2023-06-15 PROCEDURE — 99285 EMERGENCY DEPT VISIT HI MDM: CPT

## 2023-06-15 PROCEDURE — 72125 CT NECK SPINE W/O DYE: CPT | Mod: MA

## 2023-06-15 PROCEDURE — 80053 COMPREHEN METABOLIC PANEL: CPT

## 2023-06-15 PROCEDURE — 85025 COMPLETE CBC W/AUTO DIFF WBC: CPT

## 2023-06-15 PROCEDURE — 73080 X-RAY EXAM OF ELBOW: CPT

## 2023-06-15 PROCEDURE — 93010 ELECTROCARDIOGRAM REPORT: CPT

## 2023-06-15 PROCEDURE — 74177 CT ABD & PELVIS W/CONTRAST: CPT | Mod: MA

## 2023-06-15 PROCEDURE — 81003 URINALYSIS AUTO W/O SCOPE: CPT

## 2023-06-15 PROCEDURE — 73090 X-RAY EXAM OF FOREARM: CPT | Mod: 26,RT

## 2023-06-15 PROCEDURE — 93005 ELECTROCARDIOGRAM TRACING: CPT

## 2023-06-15 PROCEDURE — 36415 COLL VENOUS BLD VENIPUNCTURE: CPT

## 2023-06-15 PROCEDURE — 73060 X-RAY EXAM OF HUMERUS: CPT | Mod: 26,LT

## 2023-06-15 PROCEDURE — 83605 ASSAY OF LACTIC ACID: CPT

## 2023-06-15 PROCEDURE — 74177 CT ABD & PELVIS W/CONTRAST: CPT | Mod: 26,MA

## 2023-06-15 PROCEDURE — 73060 X-RAY EXAM OF HUMERUS: CPT

## 2023-06-15 PROCEDURE — 85730 THROMBOPLASTIN TIME PARTIAL: CPT

## 2023-06-15 PROCEDURE — 29125 APPL SHORT ARM SPLINT STATIC: CPT | Mod: RT

## 2023-06-15 PROCEDURE — 73080 X-RAY EXAM OF ELBOW: CPT | Mod: 26,RT

## 2023-06-15 PROCEDURE — 82962 GLUCOSE BLOOD TEST: CPT

## 2023-06-15 PROCEDURE — 83690 ASSAY OF LIPASE: CPT

## 2023-06-15 PROCEDURE — 73130 X-RAY EXAM OF HAND: CPT | Mod: 26,RT

## 2023-06-15 PROCEDURE — 71260 CT THORAX DX C+: CPT | Mod: MA

## 2023-06-15 PROCEDURE — 70450 CT HEAD/BRAIN W/O DYE: CPT | Mod: 26,MA

## 2023-06-15 PROCEDURE — 73130 X-RAY EXAM OF HAND: CPT

## 2023-06-15 PROCEDURE — 73090 X-RAY EXAM OF FOREARM: CPT

## 2023-06-15 PROCEDURE — 72125 CT NECK SPINE W/O DYE: CPT | Mod: 26,MA

## 2023-06-15 NOTE — ED PROVIDER NOTE - NSFOLLOWUPCLINICS_GEN_ALL_ED_FT
[facility]  Orthopedics  [address] [place] [address]  Phone: [phone]  Fax: [phone]

## 2023-06-15 NOTE — ED ADULT NURSE NOTE - OBJECTIVE STATEMENT
Pt s/p fall today. Pt noted with bruising to right forehead, bruising and swelling to right hand. Pt's daughter at bedside, denies LOC.

## 2023-06-15 NOTE — ED PROVIDER NOTE - PHYSICAL EXAMINATION
Manisha BUTLER:  VITALS: Initial triage and subsequent vitals have been reviewed by me.  GEN APPEARANCE: Alert, cooperative. Non-toxic appearing. Well appearing. NAD.  HEAD: Atraumatic, normocephalic   EYES: PERRLa, EOMI, vision grossly intact.   EARS: Gross hearing intact.   NOSE: No nasal discharge, no external evidence of epistaxis.   NECK: Supple  CV: RRR, S1S2, no c/r/m/g. No cyanosis. Extremities warm, well perfused. Cap refill <2 seconds. No bruits.   LUNGS: CTAB. No wheezing. No rales. No rhonchi. No diminished breath sounds.   ABDOMEN: Soft, NTND. No guarding or rebound. No masses.   MSK/EXT: Obvious diffuse bruising to multiple digits 2 through 5 of the right hand no wrist tenderness +2 pulses, abrasion to right elbow abrasion to left humerus.Spine appears normal, no spine point tenderness. No CVA ttp. Normal muscular development. Pelvis stable. No obvious joint or bony deformity, no peripheral edema.   NEURO: Alert, follows commands. Weight bearing normal. Speech normal. Sensation and motor normal x4 extremities.   SKIN: Hematoma to right forehead area Normal color for race, warm, dry and intact. No evidence of rash.  PSYCH: Normal mood and affect.

## 2023-06-15 NOTE — ED ADULT NURSE NOTE - NSFALLHARMRISKINTERV_ED_ALL_ED
Communicate risk of Fall with Harm to all staff, patient, and family/Monitor gait and stability/Provide patient with walking aids/Provide visual cue: red socks, yellow wristband, yellow gown, etc/Reinforce activity limits and safety measures with patient and family/Bed in lowest position, wheels locked, appropriate side rails in place/Call bell, personal items and telephone in reach/Instruct patient to call for assistance before getting out of bed/chair/stretcher/Non-slip footwear applied when patient is off stretcher/[place] to call system/Physically safe environment - no spills, clutter or unnecessary equipment/Purposeful Proactive Rounding/Room/bathroom lighting operational, light cord in reach

## 2023-06-15 NOTE — ED PROVIDER NOTE - CLINICAL SUMMARY MEDICAL DECISION MAKING FREE TEXT BOX
Manisha BUTLER: Exam vital signs stable nontoxic-appearing physical exam as above, DDx concern for traumatic injury secondary to patient's likely mechanical fall as patient was walking with daughter and patient fell forward, will get ED trauma set get x-rays of affected extremities will reassess and dispo accordingly after.  Low concern for acute infectious or cardiogenic etiology as it seems mechanical.  No fever.

## 2023-06-15 NOTE — ED ADULT TRIAGE NOTE - CHIEF COMPLAINT QUOTE
s/p tripped and fell at home , c/o Rt hand swelling, pain and bruised  Noted Rt forehead swelling and bruise  No LOC , witnessed by daughter

## 2023-06-15 NOTE — ED PROVIDER NOTE - PROGRESS NOTE DETAILS
Manisha BUTLER:   Patient is ambulatory in ED, noted labs and imaging results, concern for multiple fractures to the right fingers, discussed case with primary care doctor, had at risk length benefits versus alternative discussion with daughter, daughter was offered admission for her mother, she declined she is comfortable taking her parent, she feels this would be best for her, right upper extremity was splinted, strict return precautions were discussed, Manisha BUTLER:   Patient is ambulatory in ED, noted labs and imaging results, concern for multiple fractures to the right fingers daughter in agreement to not attempt aggressive manipulation or reduction if mild displaced given age and likely non op management, discussed case with primary care doctor, had at risk length benefits versus alternative discussion with daughter, daughter was offered admission for her mother, she declined she is comfortable taking her parent, she feels this would be best for her, right upper extremity was splinted, strict return precautions were discussed, Manisha BUTLER:   Patient is ambulatory in ED, noted labs and imaging results, concern for multiple fractures to the right fingers daughter in agreement to not attempt aggressive manipulation or reduction of mild displaced finger fx given age and likely non op management, discussed case with primary care doctor, had at risk length benefits versus alternative discussion with daughter, daughter was offered admission for her mother, she declined, she is comfortable taking her mother home, she feels this would be best for her, right upper extremity was splinted, strict return precautions were discussed,

## 2023-06-15 NOTE — ED PROVIDER NOTE - OBJECTIVE STATEMENT
Manisha BUTLER: 89F dementia, asthma, HLD, CHF, HTN, CHF,CRI,GI Bleed, s/p PPM presents with a cc of fall w daiughter w headstrike no LOC ambulaotry sin event daughter notes injiury to L hand and forearm and R elbow no other injuries per pt. daughter reports thinks slipped while walking in bathroom. Manisha BUTLER: 89F dementia, asthma, HLD, CHF, HTN, CHF,CRI,GI Bleed, s/p PPM presents with a cc of fall w daughter w head strike no LOC ambulatory since event daughter notes injury to R hand and forearm and R elbow, L humerus no other injuries per pt. daughter reports thinks slipped while walking in bathroom. ambulatory w assistance at home per daughter, no other injuries no other complaints. No AC.

## 2023-06-15 NOTE — ED PROVIDER NOTE - NSFOLLOWUPINSTRUCTIONS_ED_ALL_ED_FT
Thank you for visiting our Emergency Department, it has been a pleasure taking part in your healthcare.  Please read and follow all of your discharge instructions. These instructions contain important information regarding your Emergency Department visit and future medical care.     Your discharge diagnosis is: head injury  Please take all discharge medications as indicated below:  Take Motrin/Tylenol for pain as needed, please follow instructions on manufacturers label. If you have any questions please consult a pharmacist or your PMD.  Please follow up with your Primary Care Doctor (PMD) within x48 hours.  Bring and show your PMD all documents and results you were given during your ED visit.  If you do not have a primary care doctor please call [phone] to establish primary care.  Please follow up with orthopedics within x48 hours.  A list of providers for follow up has been given to you.  A copy of resulted labs, imaging, and findings have been provided to you.   You can also access all of your results through the [facility].  If you have questions about your results, please call the Emergency Department.  During your visit and at time of discharge, you had a detailed discussion with your provider regarding your diagnosis, care management and discharge planning.  Topics that were discussed included but were not limited to: return precautions, follow up visits with existing or new providers, new prescriptions and/or medication changes, wound and/or splint/cast care, incidental laboratory/radiology findings, or other care   aspects specific to your diagnosis and treatment. You have been given the opportunity to have your questions answered. At this time you have been deemed stable and fit for discharge.  Return precautions to the Emergency Department include but are not limited to: unrelenting nausea, vomiting, fever, chills, chest pain, shortness of breath, dizziness, chest or abdominal pain, worsening back pain, syncope, blood in urine or stool, headache that doesn't resolve, numbness or tingling, loss of sensation, loss of motor function, or any other concerning symptoms.    Please bring all ED Documents you were given during your stay to your PMD.   They contain important information for you and your PMD, including incidental lab/radiology findings that your PMD should be aware of.

## 2023-06-16 NOTE — ED PROCEDURE NOTE - PROCEDURE ADDITIONAL DETAILS
pt w mutiple prox phal fx, gave non sugar tong, volar and ulnar slab splint for immobility pt to follow up w ortho.

## 2023-07-17 NOTE — ED ADULT TRIAGE NOTE - CHIEF COMPLAINT QUOTE
PAST MEDICAL HISTORY:  Feeding by G-tube     Hydrocephalus     Lennox-Gastaut syndrome     Spastic quadriplegia      pt BIBA for compliant of cough and wheezing. As per EMS daughter gave a total of 5 albuterol treatments today the last one was given 30mins ago. pt has history of CHF, pacemaker and hypertension.

## 2023-08-22 PROBLEM — Z00.00 ENCOUNTER FOR PREVENTIVE HEALTH EXAMINATION: Status: ACTIVE | Noted: 2023-08-22

## 2023-09-15 NOTE — DISCHARGE NOTE PROVIDER - NSDCCPCAREPLAN_GEN_ALL_CORE_FT
PRINCIPAL DISCHARGE DIAGNOSIS  Diagnosis: Influenza B  Assessment and Plan of Treatment: You came in with shortness of breath.  You tested positive for Influenza B and were started on tamiflu.  Continue taking tamiflu for 2 more days.  You were also started on steroids and recommended to continue a prednisone taper.  Take as directed.  Continue inhalers and nebulizers as needed for your asthma.    On day of discharge your oxygen level was stable without the need for supplemental oxygen.     Libtayo Counseling- I discussed with the patient the risks of Libtayo including but not limited to nausea, vomiting, diarrhea, and bone or muscle pain.  The patient verbalized understanding of the proper use and possible adverse effects of Libtayo.  All of the patient's questions and concerns were addressed.

## 2023-09-29 ENCOUNTER — APPOINTMENT (OUTPATIENT)
Dept: ELECTROPHYSIOLOGY | Facility: CLINIC | Age: 88
End: 2023-09-29

## 2023-10-12 ENCOUNTER — APPOINTMENT (OUTPATIENT)
Dept: ELECTROPHYSIOLOGY | Facility: CLINIC | Age: 88
End: 2023-10-12

## 2023-11-01 ENCOUNTER — APPOINTMENT (OUTPATIENT)
Dept: ELECTROPHYSIOLOGY | Facility: CLINIC | Age: 88
End: 2023-11-01

## 2023-12-01 ENCOUNTER — APPOINTMENT (OUTPATIENT)
Dept: ELECTROPHYSIOLOGY | Facility: CLINIC | Age: 88
End: 2023-12-01

## 2023-12-19 NOTE — ED ADULT NURSE NOTE - NS ED NURSE LEVEL OF CONSCIOUSNESS MENTAL STATUS
Please review and assist 
Spoke to pt, she is going to make an appt with ENT, she has the referral from Dr. Lima.  
Awake/Alert

## 2023-12-23 NOTE — ED PROVIDER NOTE - MEDICAL DECISION MAKING DETAILS
1 No DVT or fracture on diagnostics. BP now 190/74, pt is well appearing and able to ambulate with cane. Rx Tylenol and f/u with ortho-contact provided via care coordinator. Pt is well appearing, stable for discharge and follow up with medical doctor. Pt educated on care and need for follow up. Discussed anticipatory guidance and return precautions. Questions answered. I had a detailed discussion with the patient and/or guardian regarding the historical points, exam findings, and any diagnostic results supporting the discharge diagnosis.

## 2024-01-02 ENCOUNTER — APPOINTMENT (OUTPATIENT)
Dept: ELECTROPHYSIOLOGY | Facility: CLINIC | Age: 89
End: 2024-01-02

## 2024-02-05 ENCOUNTER — TRANSCRIPTION ENCOUNTER (OUTPATIENT)
Age: 89
End: 2024-02-05

## 2024-02-05 ENCOUNTER — INPATIENT (INPATIENT)
Facility: HOSPITAL | Age: 89
LOS: 1 days | Discharge: HOME | DRG: 258 | End: 2024-02-07
Attending: INTERNAL MEDICINE | Admitting: INTERNAL MEDICINE
Payer: COMMERCIAL

## 2024-02-05 VITALS
WEIGHT: 139.99 LBS | SYSTOLIC BLOOD PRESSURE: 134 MMHG | OXYGEN SATURATION: 97 % | TEMPERATURE: 98 F | RESPIRATION RATE: 18 BRPM | HEIGHT: 61 IN | DIASTOLIC BLOOD PRESSURE: 61 MMHG | HEART RATE: 64 BPM

## 2024-02-05 DIAGNOSIS — I50.33 ACUTE ON CHRONIC DIASTOLIC (CONGESTIVE) HEART FAILURE: ICD-10-CM

## 2024-02-05 DIAGNOSIS — Z95.0 PRESENCE OF CARDIAC PACEMAKER: Chronic | ICD-10-CM

## 2024-02-05 DIAGNOSIS — E78.5 HYPERLIPIDEMIA, UNSPECIFIED: ICD-10-CM

## 2024-02-05 DIAGNOSIS — I10 ESSENTIAL (PRIMARY) HYPERTENSION: ICD-10-CM

## 2024-02-05 DIAGNOSIS — I25.10 ATHEROSCLEROTIC HEART DISEASE OF NATIVE CORONARY ARTERY WITHOUT ANGINA PECTORIS: ICD-10-CM

## 2024-02-05 DIAGNOSIS — I50.9 HEART FAILURE, UNSPECIFIED: ICD-10-CM

## 2024-02-05 DIAGNOSIS — N18.9 CHRONIC KIDNEY DISEASE, UNSPECIFIED: ICD-10-CM

## 2024-02-05 DIAGNOSIS — Z95.0 PRESENCE OF CARDIAC PACEMAKER: ICD-10-CM

## 2024-02-05 DIAGNOSIS — Z29.9 ENCOUNTER FOR PROPHYLACTIC MEASURES, UNSPECIFIED: ICD-10-CM

## 2024-02-05 LAB
ALBUMIN SERPL ELPH-MCNC: 3.1 G/DL — LOW (ref 3.5–5)
ALP SERPL-CCNC: 53 U/L — SIGNIFICANT CHANGE UP (ref 40–120)
ALT FLD-CCNC: 23 U/L DA — SIGNIFICANT CHANGE UP (ref 10–60)
ANION GAP SERPL CALC-SCNC: 4 MMOL/L — LOW (ref 5–17)
AST SERPL-CCNC: 27 U/L — SIGNIFICANT CHANGE UP (ref 10–40)
BASOPHILS # BLD AUTO: 0.07 K/UL — SIGNIFICANT CHANGE UP (ref 0–0.2)
BASOPHILS NFR BLD AUTO: 1.2 % — SIGNIFICANT CHANGE UP (ref 0–2)
BILIRUB SERPL-MCNC: 0.3 MG/DL — SIGNIFICANT CHANGE UP (ref 0.2–1.2)
BUN SERPL-MCNC: 22 MG/DL — HIGH (ref 7–18)
CALCIUM SERPL-MCNC: 9.4 MG/DL — SIGNIFICANT CHANGE UP (ref 8.4–10.5)
CHLORIDE SERPL-SCNC: 106 MMOL/L — SIGNIFICANT CHANGE UP (ref 96–108)
CO2 SERPL-SCNC: 29 MMOL/L — SIGNIFICANT CHANGE UP (ref 22–31)
CREAT SERPL-MCNC: 1.57 MG/DL — HIGH (ref 0.5–1.3)
EGFR: 30 ML/MIN/1.73M2 — LOW
EOSINOPHIL # BLD AUTO: 0.6 K/UL — HIGH (ref 0–0.5)
EOSINOPHIL NFR BLD AUTO: 10.3 % — HIGH (ref 0–6)
FLUAV AG NPH QL: SIGNIFICANT CHANGE UP
FLUBV AG NPH QL: SIGNIFICANT CHANGE UP
GLUCOSE SERPL-MCNC: 99 MG/DL — SIGNIFICANT CHANGE UP (ref 70–99)
HCT VFR BLD CALC: 32.6 % — LOW (ref 34.5–45)
HGB BLD-MCNC: 10.9 G/DL — LOW (ref 11.5–15.5)
IMM GRANULOCYTES NFR BLD AUTO: 0.2 % — SIGNIFICANT CHANGE UP (ref 0–0.9)
LYMPHOCYTES # BLD AUTO: 1.4 K/UL — SIGNIFICANT CHANGE UP (ref 1–3.3)
LYMPHOCYTES # BLD AUTO: 24.1 % — SIGNIFICANT CHANGE UP (ref 13–44)
MAGNESIUM SERPL-MCNC: 2.3 MG/DL — SIGNIFICANT CHANGE UP (ref 1.6–2.6)
MCHC RBC-ENTMCNC: 33.4 GM/DL — SIGNIFICANT CHANGE UP (ref 32–36)
MCHC RBC-ENTMCNC: 33.5 PG — SIGNIFICANT CHANGE UP (ref 27–34)
MCV RBC AUTO: 100.3 FL — HIGH (ref 80–100)
MONOCYTES # BLD AUTO: 0.75 K/UL — SIGNIFICANT CHANGE UP (ref 0–0.9)
MONOCYTES NFR BLD AUTO: 12.9 % — SIGNIFICANT CHANGE UP (ref 2–14)
NEUTROPHILS # BLD AUTO: 2.97 K/UL — SIGNIFICANT CHANGE UP (ref 1.8–7.4)
NEUTROPHILS NFR BLD AUTO: 51.3 % — SIGNIFICANT CHANGE UP (ref 43–77)
NRBC # BLD: 0 /100 WBCS — SIGNIFICANT CHANGE UP (ref 0–0)
NT-PROBNP SERPL-SCNC: 1289 PG/ML — HIGH (ref 0–450)
PLATELET # BLD AUTO: 137 K/UL — LOW (ref 150–400)
POTASSIUM SERPL-MCNC: 3.9 MMOL/L — SIGNIFICANT CHANGE UP (ref 3.5–5.3)
POTASSIUM SERPL-SCNC: 3.9 MMOL/L — SIGNIFICANT CHANGE UP (ref 3.5–5.3)
PROT SERPL-MCNC: 6.5 G/DL — SIGNIFICANT CHANGE UP (ref 6–8.3)
RBC # BLD: 3.25 M/UL — LOW (ref 3.8–5.2)
RBC # FLD: 12.5 % — SIGNIFICANT CHANGE UP (ref 10.3–14.5)
SARS-COV-2 RNA SPEC QL NAA+PROBE: SIGNIFICANT CHANGE UP
SODIUM SERPL-SCNC: 139 MMOL/L — SIGNIFICANT CHANGE UP (ref 135–145)
TROPONIN I, HIGH SENSITIVITY RESULT: 13.6 NG/L — SIGNIFICANT CHANGE UP
WBC # BLD: 5.8 K/UL — SIGNIFICANT CHANGE UP (ref 3.8–10.5)
WBC # FLD AUTO: 5.8 K/UL — SIGNIFICANT CHANGE UP (ref 3.8–10.5)

## 2024-02-05 PROCEDURE — 99285 EMERGENCY DEPT VISIT HI MDM: CPT

## 2024-02-05 PROCEDURE — 71045 X-RAY EXAM CHEST 1 VIEW: CPT | Mod: 26

## 2024-02-05 RX ORDER — LATANOPROST 0.05 MG/ML
1 SOLUTION/ DROPS OPHTHALMIC; TOPICAL AT BEDTIME
Refills: 0 | Status: DISCONTINUED | OUTPATIENT
Start: 2024-02-05 | End: 2024-02-07

## 2024-02-05 RX ORDER — ISOSORBIDE MONONITRATE 60 MG/1
120 TABLET, EXTENDED RELEASE ORAL DAILY
Refills: 0 | Status: DISCONTINUED | OUTPATIENT
Start: 2024-02-06 | End: 2024-02-06

## 2024-02-05 RX ORDER — SIMVASTATIN 20 MG/1
40 TABLET, FILM COATED ORAL AT BEDTIME
Refills: 0 | Status: DISCONTINUED | OUTPATIENT
Start: 2024-02-05 | End: 2024-02-07

## 2024-02-05 RX ORDER — SIMVASTATIN 20 MG/1
1 TABLET, FILM COATED ORAL
Refills: 0 | DISCHARGE

## 2024-02-05 RX ORDER — DRONEDARONE 400 MG/1
400 TABLET, FILM COATED ORAL
Refills: 0 | Status: DISCONTINUED | OUTPATIENT
Start: 2024-02-05 | End: 2024-02-07

## 2024-02-05 RX ORDER — FUROSEMIDE 40 MG
1 TABLET ORAL
Refills: 0 | DISCHARGE

## 2024-02-05 RX ORDER — DORZOLAMIDE HYDROCHLORIDE TIMOLOL MALEATE 20; 5 MG/ML; MG/ML
1 SOLUTION/ DROPS OPHTHALMIC
Refills: 0 | Status: DISCONTINUED | OUTPATIENT
Start: 2024-02-05 | End: 2024-02-07

## 2024-02-05 RX ORDER — FERROUS SULFATE 325(65) MG
325 TABLET ORAL DAILY
Refills: 0 | Status: DISCONTINUED | OUTPATIENT
Start: 2024-02-05 | End: 2024-02-07

## 2024-02-05 RX ORDER — METOPROLOL TARTRATE 50 MG
50 TABLET ORAL EVERY 12 HOURS
Refills: 0 | Status: DISCONTINUED | OUTPATIENT
Start: 2024-02-06 | End: 2024-02-07

## 2024-02-05 RX ORDER — HYDRALAZINE HCL 50 MG
50 TABLET ORAL THREE TIMES A DAY
Refills: 0 | Status: DISCONTINUED | OUTPATIENT
Start: 2024-02-05 | End: 2024-02-07

## 2024-02-05 RX ORDER — FUROSEMIDE 40 MG
40 TABLET ORAL ONCE
Refills: 0 | Status: COMPLETED | OUTPATIENT
Start: 2024-02-05 | End: 2024-02-05

## 2024-02-05 RX ORDER — MONTELUKAST 4 MG/1
10 TABLET, CHEWABLE ORAL DAILY
Refills: 0 | Status: DISCONTINUED | OUTPATIENT
Start: 2024-02-05 | End: 2024-02-07

## 2024-02-05 RX ORDER — ASPIRIN/CALCIUM CARB/MAGNESIUM 324 MG
81 TABLET ORAL DAILY
Refills: 0 | Status: DISCONTINUED | OUTPATIENT
Start: 2024-02-06 | End: 2024-02-07

## 2024-02-05 RX ORDER — FUROSEMIDE 40 MG
20 TABLET ORAL DAILY
Refills: 0 | Status: DISCONTINUED | OUTPATIENT
Start: 2024-02-06 | End: 2024-02-07

## 2024-02-05 RX ADMIN — Medication 50 MILLIGRAM(S): at 22:28

## 2024-02-05 RX ADMIN — SIMVASTATIN 40 MILLIGRAM(S): 20 TABLET, FILM COATED ORAL at 22:28

## 2024-02-05 RX ADMIN — LATANOPROST 1 DROP(S): 0.05 SOLUTION/ DROPS OPHTHALMIC; TOPICAL at 22:28

## 2024-02-05 RX ADMIN — Medication 40 MILLIGRAM(S): at 17:47

## 2024-02-05 NOTE — H&P ADULT - ASSESSMENT
A 89 year old female, from home, minimally ambulatory with assistance, with  Dementia, AAOx2-3 at baseline, with PMHx of HTN, HFpEF (Grade II DD),Parox Afib (not on AC due to GIB),CRI, Complete Heart Block s/p PPM, HLD, and Asthma, poor historian, was brought into the ED due to dyspnea. Admitted to telemetry for Acute Diastolic HF.

## 2024-02-05 NOTE — H&P ADULT - PROBLEM SELECTOR PLAN 2
Patient was scheduled for PPM generator change   EP Dr Kilgore consulted   NPO after midnight   Plan for generator change tomorrow

## 2024-02-05 NOTE — ED ADULT NURSE REASSESSMENT NOTE - NS ED NURSE REASSESS COMMENT FT1
6 PM PT ADMITTED TO TELEMETRY WITH DX OF CHF EXACERBATION. CARDIAC MONITOR IN PLACE. PACED RHYTHM. MEDICATED AS PER MD ORDERED

## 2024-02-05 NOTE — ED PROVIDER NOTE - NS_EDPROVIDERDISPOUSERTYPE_ED_A_ED
Impression: Dry eye syndrome of bilateral lacrimal glands: H04.123. Plan: Discussed diagnosis in detail with patient. Dry eye accounts for the patient's symptoms. Dry eye is a chronic condition and does not have a cure and will need artificial tears for maintenance. There is no evidence of permanent changes to the cornea. Recommend Systane Balance or Refresh Repair OU QID longterm. Monitor for changes. Attending Attestation (For Attendings USE Only)...

## 2024-02-05 NOTE — ED PROVIDER NOTE - OBJECTIVE STATEMENT
89-year-old female [place]-speaking for further services offered patient preferred to use daughter at bedside.  Patient with a past medical history of hypertension, hyperlipidemia, asthma, atrial fibrillation, CHF, history of heart block with pacemaker placement presents with cough and shortness of breath.  As per daughter symptoms has been for the past week however worsened over the past few days.  Last night the patient was unable to sleep secondary to symptoms prompting the daughter to bring her for evaluation today.  No fever no chest pain.  Patient is on Lasix every other day was last given yesterday.  Daughter denies lower extremity swelling

## 2024-02-05 NOTE — PATIENT PROFILE ADULT - FALL HARM RISK - HARM RISK INTERVENTIONS
Assistance with ambulation/Assistance OOB with selected safe patient handling equipment/Communicate Risk of Fall with Harm to all staff/Discuss with provider need for PT consult/Monitor gait and stability/Provide patient with walking aids - walker, cane, crutches/Reinforce activity limits and safety measures with patient and family/Review medications for side effects contributing to fall risk/Sit up slowly, dangle for a short time, stand at bedside before walking/Tailored Fall Risk Interventions/Toileting schedule using arm’s reach rule for commode and bathroom/Visual Cue: Yellow wristband and red socks/Bed in lowest position, wheels locked, appropriate side rails in place/Call bell, personal items and telephone in reach/Instruct patient to call for assistance before getting out of bed or chair/Non-slip footwear when patient is out of bed/[place] to call system/Physically safe environment - no spills, clutter or unnecessary equipment/Purposeful Proactive Rounding/Room/bathroom lighting operational, light cord in reach

## 2024-02-05 NOTE — ED ADULT NURSE NOTE - OBJECTIVE STATEMENT
PT A&O X 3. DAUGHTER REPORTS PT HAS COUGH, DIFFICULTY BREATHING X1 WEEK. DENIES FEVER, CHILLS, N/V OR CHEST PAIN. + RALES IN RIGHT MIDDLE LOBE. SKIN INTACT. NO SWELLING OF EXTREMITIES NOTED. SEEN AND EVALUATED BY DR. CUENCA. EKG DONE AND PRESENTED TO MD FOR EVALUATION. HL# 18G INSERTED IN RIGHT AC. BLOOD DRAWN AND SENT TO LAB AS ORDERED

## 2024-02-05 NOTE — H&P ADULT - NSHPPHYSICALEXAM_GEN_ALL_CORE
PHYSICAL EXAMINATION:  GENERAL: NAD, sitting comfortable in bed   HEAD:  Atraumatic, Normocephalic  EYES:  Conjunctiva and sclera clear, pupils are equal, round, and reactive to light and accommodation.  NECK: Supple, No JVD, trachea is midline, no evidence of thyroid enlargement, no lymphadenopathy or tenderness.  CHEST/LUNG: Crackles noted on the right lower zone, minimal crackles on the left lower zone, no wheezing, or rubs  HEART: Regular rate and rhythm; No murmurs, rubs, or gallops  ABDOMEN: Soft, Nontender, Nondistended; Bowel sounds present  NERVOUS SYSTEM:  Alert & Oriented X2; No obvious focal sensory or motor deficits are noted; Recent and remote memory is fair, Appropriate mood and affect.  EXTREMITIES:  2+ Peripheral Pulses, No clubbing, cyanosis, trace pitting edema on lower extremity bilaterally   SKIN: Warm, dry, and well perfused; Good turgor; No lesions, nodules or rashes are noted.     Vital Signs Last 24 Hrs  T(C): 36.6 (05 Feb 2024 14:30), Max: 36.6 (05 Feb 2024 14:30)  T(F): 97.9 (05 Feb 2024 14:30), Max: 97.9 (05 Feb 2024 14:30)  HR: 64 (05 Feb 2024 14:30) (64 - 64)  BP: 134/61 (05 Feb 2024 14:30) (134/61 - 134/61)  BP(mean): --  RR: 18 (05 Feb 2024 14:30) (18 - 18)  SpO2: 97% (05 Feb 2024 14:30) (97% - 97%)    Parameters below as of 05 Feb 2024 14:30  Patient On (Oxygen Delivery Method): room air
no

## 2024-02-05 NOTE — H&P ADULT - HISTORY OF PRESENT ILLNESS
A 89 year old female, from home, minimally ambulatory with assistance, with  Dementia, AAOx2-3 at baseline, with PMHx of HTN, HFpEF (Grade II DD),Parox Afib (not on AC due to GIB),CRI, Complete Heart Block s/p PPM, HLD, and Asthma, poor historian, was brought into the ED due to dyspnea at rest associated with dry cough and nasal congestion for over the last 3 to 4 days. No chest pain, palpitations, fever, chills, dizziness, or syncope. Denies recent sick contacts or travel. No orthopnea or leg swelling. Patient does not have any other concerns.  A 89 year old female, from home, minimally ambulatory with assistance, with  Dementia, AAOx2-3 at baseline, with PMHx of HTN, HFpEF (Grade II DD),Parox Afib (not on AC due to GIB),CRI, Complete Heart Block s/p PPM, HLD, and Asthma, poor historian, was brought into the ED due to dyspnea at rest associated with dry cough and nasal congestion for over the last 3 to 4 days. No chest pain, palpitations, fever, chills, dizziness, or syncope. Denies recent sick contacts or travel. No orthopnea or leg swelling. Patient does not have any other concerns. Her PPM is at [facility] and patient was to have generator change as outpatient.She is pacer dependant.

## 2024-02-05 NOTE — H&P ADULT - ATTENDING COMMENTS
A 89 year old female, from home, minimally ambulatory with assistance, with  Dementia, AAOx2-3 at baseline, with PMHx of HTN, HFpEF (Grade II DD),Parox Afib (not on AC due to GIB),CRI, Complete Heart Block s/p PPM, HLD, and Asthma, poor historian, was brought into the ED due to dyspnea at rest associated with dry cough and nasal congestion for over the last 3 to 4 days,acute diastolic HF.  1.Tele monitoring.  2.Interrogate PPM at [facility], will need generator change,since pacer dependant. EP meseret Witt,[facility].  3.Acute diastolic HF-IV lasix.  4.PAF-asa,multaq,lopressor.  5.HTN-cont  bp medication.  6.CRI-f/u lytes.  7.Hx of GI bleed-no AC .  8.Asthma-singulair.  9.GI and DVT prophylaxis.

## 2024-02-05 NOTE — H&P ADULT - PROBLEM SELECTOR PLAN 1
s/p Lasix in the ED   Patient significantly improving    Will restart Lasix 20 mg PO w/ parameters for now . Monitor electrolytes  Continue Multaq 400 mg BID  Elevated BNP.   Remote Tele.   Daily Weights   I & Os.   Vitals q4hr   DASH/TLC diet.1500 mL Fluid restriction    Avoid NSAIDs or thiazolidinediones. Dietitian consult

## 2024-02-05 NOTE — ED ADULT NURSE NOTE - ED STAT RN HANDOFF DETAILS
PT ADMITTED TO TELE. CARDIAC MONITOR IN PLACE. PACED RHYTHM. AWAITING TELE BED. LEFT IN STABLE CONDITION. ENDORSED TO INCOMING [facility]

## 2024-02-05 NOTE — H&P ADULT - PROBLEM SELECTOR PLAN 3
Will continue Metoprolol, Hydralazine, and Imdur with parameters  BP target <130/90 mmHg  DASH/TLC diet  Adjust medications as needed to meet target.

## 2024-02-05 NOTE — ED ADULT NURSE NOTE - NSFALLUNIVINTERV_ED_ALL_ED
Bed/Stretcher in lowest position, wheels locked, appropriate side rails in place/Call bell, personal items and telephone in reach/Instruct patient to call for assistance before getting out of bed/chair/stretcher/Non-slip footwear applied when patient is off stretcher/[place] to call system/Physically safe environment - no spills, clutter or unnecessary equipment/Purposeful proactive rounding/Room/bathroom lighting operational, light cord in reach

## 2024-02-05 NOTE — ED PROVIDER NOTE - CLINICAL SUMMARY MEDICAL DECISION MAKING FREE TEXT BOX
89-year-old female history of asthma, CHF, A-fib, hypertension, hyperlipidemia presents to ED with worsening shortness of breath and cough.  Concern for CHF exacerbation versus pneumonia versus COVID versus flu.  Will check labs, chest x-ray, viral swab reassess

## 2024-02-06 ENCOUNTER — TRANSCRIPTION ENCOUNTER (OUTPATIENT)
Age: 89
End: 2024-02-06

## 2024-02-06 LAB
ALBUMIN SERPL ELPH-MCNC: 3.2 G/DL — LOW (ref 3.5–5)
ALP SERPL-CCNC: 52 U/L — SIGNIFICANT CHANGE UP (ref 40–120)
ALT FLD-CCNC: 24 U/L DA — SIGNIFICANT CHANGE UP (ref 10–60)
ANION GAP SERPL CALC-SCNC: 4 MMOL/L — LOW (ref 5–17)
APTT BLD: 32.3 SEC — SIGNIFICANT CHANGE UP (ref 24.5–35.6)
AST SERPL-CCNC: 23 U/L — SIGNIFICANT CHANGE UP (ref 10–40)
BASOPHILS # BLD AUTO: 0.05 K/UL — SIGNIFICANT CHANGE UP (ref 0–0.2)
BASOPHILS NFR BLD AUTO: 0.9 % — SIGNIFICANT CHANGE UP (ref 0–2)
BILIRUB SERPL-MCNC: 0.4 MG/DL — SIGNIFICANT CHANGE UP (ref 0.2–1.2)
BUN SERPL-MCNC: 20 MG/DL — HIGH (ref 7–18)
CALCIUM SERPL-MCNC: 9.5 MG/DL — SIGNIFICANT CHANGE UP (ref 8.4–10.5)
CHLORIDE SERPL-SCNC: 106 MMOL/L — SIGNIFICANT CHANGE UP (ref 96–108)
CO2 SERPL-SCNC: 33 MMOL/L — HIGH (ref 22–31)
CREAT SERPL-MCNC: 1.32 MG/DL — HIGH (ref 0.5–1.3)
EGFR: 37 ML/MIN/1.73M2 — LOW
EOSINOPHIL # BLD AUTO: 0.5 K/UL — SIGNIFICANT CHANGE UP (ref 0–0.5)
EOSINOPHIL NFR BLD AUTO: 9.1 % — HIGH (ref 0–6)
GLUCOSE SERPL-MCNC: 98 MG/DL — SIGNIFICANT CHANGE UP (ref 70–99)
HCT VFR BLD CALC: 31.9 % — LOW (ref 34.5–45)
HGB BLD-MCNC: 10.9 G/DL — LOW (ref 11.5–15.5)
IMM GRANULOCYTES NFR BLD AUTO: 0.2 % — SIGNIFICANT CHANGE UP (ref 0–0.9)
INR BLD: 1.04 RATIO — SIGNIFICANT CHANGE UP (ref 0.85–1.18)
LYMPHOCYTES # BLD AUTO: 1.47 K/UL — SIGNIFICANT CHANGE UP (ref 1–3.3)
LYMPHOCYTES # BLD AUTO: 26.9 % — SIGNIFICANT CHANGE UP (ref 13–44)
MAGNESIUM SERPL-MCNC: 2.2 MG/DL — SIGNIFICANT CHANGE UP (ref 1.6–2.6)
MCHC RBC-ENTMCNC: 33.9 PG — SIGNIFICANT CHANGE UP (ref 27–34)
MCHC RBC-ENTMCNC: 34.2 GM/DL — SIGNIFICANT CHANGE UP (ref 32–36)
MCV RBC AUTO: 99.1 FL — SIGNIFICANT CHANGE UP (ref 80–100)
MONOCYTES # BLD AUTO: 0.63 K/UL — SIGNIFICANT CHANGE UP (ref 0–0.9)
MONOCYTES NFR BLD AUTO: 11.5 % — SIGNIFICANT CHANGE UP (ref 2–14)
NEUTROPHILS # BLD AUTO: 2.81 K/UL — SIGNIFICANT CHANGE UP (ref 1.8–7.4)
NEUTROPHILS NFR BLD AUTO: 51.4 % — SIGNIFICANT CHANGE UP (ref 43–77)
NRBC # BLD: 0 /100 WBCS — SIGNIFICANT CHANGE UP (ref 0–0)
PHOSPHATE SERPL-MCNC: 2.9 MG/DL — SIGNIFICANT CHANGE UP (ref 2.5–4.5)
PLATELET # BLD AUTO: 127 K/UL — LOW (ref 150–400)
POTASSIUM SERPL-MCNC: 3.3 MMOL/L — LOW (ref 3.5–5.3)
POTASSIUM SERPL-SCNC: 3.3 MMOL/L — LOW (ref 3.5–5.3)
PROT SERPL-MCNC: 6.5 G/DL — SIGNIFICANT CHANGE UP (ref 6–8.3)
PROTHROM AB SERPL-ACNC: 11.8 SEC — SIGNIFICANT CHANGE UP (ref 9.5–13)
RBC # BLD: 3.22 M/UL — LOW (ref 3.8–5.2)
RBC # FLD: 12.2 % — SIGNIFICANT CHANGE UP (ref 10.3–14.5)
SODIUM SERPL-SCNC: 143 MMOL/L — SIGNIFICANT CHANGE UP (ref 135–145)
WBC # BLD: 5.47 K/UL — SIGNIFICANT CHANGE UP (ref 3.8–10.5)
WBC # FLD AUTO: 5.47 K/UL — SIGNIFICANT CHANGE UP (ref 3.8–10.5)

## 2024-02-06 PROCEDURE — 99233 SBSQ HOSP IP/OBS HIGH 50: CPT | Mod: 25,57

## 2024-02-06 PROCEDURE — 33229 REMV&REPLC PM GEN MULT LEADS: CPT

## 2024-02-06 RX ORDER — ACETAMINOPHEN 500 MG
1000 TABLET ORAL ONCE
Refills: 0 | Status: DISCONTINUED | OUTPATIENT
Start: 2024-02-06 | End: 2024-02-06

## 2024-02-06 RX ORDER — NEOMYCIN SULF/POLYMYXIN B SULF 40-200K/ML
1 VIAL (ML) IRRIGATION
Refills: 0 | Status: DISCONTINUED | OUTPATIENT
Start: 2024-02-06 | End: 2024-02-07

## 2024-02-06 RX ORDER — POTASSIUM CHLORIDE 20 MEQ
40 PACKET (EA) ORAL ONCE
Refills: 0 | Status: DISCONTINUED | OUTPATIENT
Start: 2024-02-06 | End: 2024-02-06

## 2024-02-06 RX ORDER — HYDROMORPHONE HYDROCHLORIDE 2 MG/ML
0.5 INJECTION INTRAMUSCULAR; INTRAVENOUS; SUBCUTANEOUS
Refills: 0 | Status: DISCONTINUED | OUTPATIENT
Start: 2024-02-06 | End: 2024-02-06

## 2024-02-06 RX ORDER — POTASSIUM CHLORIDE 20 MEQ
40 PACKET (EA) ORAL EVERY 4 HOURS
Refills: 0 | Status: COMPLETED | OUTPATIENT
Start: 2024-02-06 | End: 2024-02-06

## 2024-02-06 RX ORDER — IPRATROPIUM/ALBUTEROL SULFATE 18-103MCG
3 AEROSOL WITH ADAPTER (GRAM) INHALATION EVERY 6 HOURS
Refills: 0 | Status: DISCONTINUED | OUTPATIENT
Start: 2024-02-06 | End: 2024-02-07

## 2024-02-06 RX ORDER — ISOSORBIDE MONONITRATE 60 MG/1
120 TABLET, EXTENDED RELEASE ORAL DAILY
Refills: 0 | Status: DISCONTINUED | OUTPATIENT
Start: 2024-02-06 | End: 2024-02-07

## 2024-02-06 RX ORDER — FENTANYL CITRATE 50 UG/ML
25 INJECTION INTRAVENOUS
Refills: 0 | Status: DISCONTINUED | OUTPATIENT
Start: 2024-02-06 | End: 2024-02-06

## 2024-02-06 RX ADMIN — Medication 3 MILLILITER(S): at 20:05

## 2024-02-06 RX ADMIN — Medication 50 MILLIGRAM(S): at 21:16

## 2024-02-06 RX ADMIN — Medication 50 MILLIGRAM(S): at 17:53

## 2024-02-06 RX ADMIN — Medication 81 MILLIGRAM(S): at 12:34

## 2024-02-06 RX ADMIN — Medication 3 MILLILITER(S): at 15:25

## 2024-02-06 RX ADMIN — DRONEDARONE 400 MILLIGRAM(S): 400 TABLET, FILM COATED ORAL at 05:51

## 2024-02-06 RX ADMIN — Medication 50 MILLIGRAM(S): at 05:50

## 2024-02-06 RX ADMIN — Medication 1 TABLET(S): at 12:34

## 2024-02-06 RX ADMIN — LATANOPROST 1 DROP(S): 0.05 SOLUTION/ DROPS OPHTHALMIC; TOPICAL at 21:17

## 2024-02-06 RX ADMIN — DRONEDARONE 400 MILLIGRAM(S): 400 TABLET, FILM COATED ORAL at 17:52

## 2024-02-06 RX ADMIN — Medication 325 MILLIGRAM(S): at 12:34

## 2024-02-06 RX ADMIN — SIMVASTATIN 40 MILLIGRAM(S): 20 TABLET, FILM COATED ORAL at 21:16

## 2024-02-06 RX ADMIN — DORZOLAMIDE HYDROCHLORIDE TIMOLOL MALEATE 1 DROP(S): 20; 5 SOLUTION/ DROPS OPHTHALMIC at 05:51

## 2024-02-06 RX ADMIN — Medication 20 MILLIGRAM(S): at 05:51

## 2024-02-06 RX ADMIN — Medication 40 MILLIEQUIVALENT(S): at 17:52

## 2024-02-06 RX ADMIN — MONTELUKAST 10 MILLIGRAM(S): 4 TABLET, CHEWABLE ORAL at 21:16

## 2024-02-06 RX ADMIN — ISOSORBIDE MONONITRATE 120 MILLIGRAM(S): 60 TABLET, EXTENDED RELEASE ORAL at 12:59

## 2024-02-06 RX ADMIN — DORZOLAMIDE HYDROCHLORIDE TIMOLOL MALEATE 1 DROP(S): 20; 5 SOLUTION/ DROPS OPHTHALMIC at 17:52

## 2024-02-06 RX ADMIN — Medication 50 MILLIGRAM(S): at 14:22

## 2024-02-06 RX ADMIN — Medication 40 MILLIEQUIVALENT(S): at 21:16

## 2024-02-06 RX ADMIN — Medication 50 MILLIGRAM(S): at 05:57

## 2024-02-06 NOTE — DISCHARGE NOTE PROVIDER - HOSPITAL COURSE
89 year old female, from home, minimally ambulatory with assistance, with  Dementia, AAOx2-3 at baseline, with PMHx of HTN, HFpEF (Grade II DD),Parox Afib (not on AC due to GIB),CRI, Complete Heart Block s/p PPM, HLD, and Asthma, poor historian, was brought into the ED due to dyspnea at rest associated with dry cough and nasal congestion for over the last 3 to 4 days. Admitted to telemetry for Acute Diastolic HF. pt strated on IV lasix for diuresis and EP Dr. Kilgore was consulted as Patient was scheduled for PPM generator change .      Case discussed with attending, pt medically stable for d/c. Please note that this a brief summary of hospital course please refer to daily progress notes and consult notes for full course and events   89 year old female, from home, minimally ambulatory with assistance, with  Dementia, AAOx2-3 at baseline, with PMHx of HTN, HFpEF (Grade II DD),Parox Afib (not on AC due to GIB),CRI, Complete Heart Block s/p PPM, HLD, and Asthma, poor historian, was brought into the ED due to dyspnea at rest associated with dry cough and nasal congestion for over the last 3 to 4 days. Admitted to telemetry for Acute Diastolic HF. pt strated on IV lasix for diuresis and EP Dr. Kilgore was consulted   as Patient was scheduled for PPM generator change .  PPM generator was changed on 2/6     Case discussed with attending, pt medically stable for d/c. Please note that this a brief summary of hospital course please refer to daily progress notes and consult notes for full course and events

## 2024-02-06 NOTE — DISCHARGE NOTE PROVIDER - CARE PROVIDER_API CALL
Carol Harrell  Cardiology  [address] [address]  Phone: [phone]  Fax: [phone]  Follow Up Time:

## 2024-02-06 NOTE — CONSULT NOTE ADULT - ASSESSMENT
89 year old female with Dementia, AAOx2-3 at baseline, with PMHx of HTN, HFpEF (Grade II DD), Parox Afib (not on AC due to GIB), CRI, SND and Complete Heart Block s/p PPM in 2018, HLD, and Asthma who presented to the ED with dyspnea. Her pacemaker was noted to be at [facility].     Discussed with the daughter who makes decisions for her. Agreeable to proceed with generator change. Risk and benefits including but not limited to infection, bleeding, hematoma, lead damage requiring revision, and death were discussed.

## 2024-02-06 NOTE — DISCHARGE NOTE PROVIDER - NSDCFUSCHEDAPPT_GEN_ALL_CORE_FT
[facility] Physician [facility] [phone]  Scheduled Appointment: 02/22/2024

## 2024-02-06 NOTE — DISCHARGE NOTE PROVIDER - NSDCCPCAREPLAN_GEN_ALL_CORE_FT
PRINCIPAL DISCHARGE DIAGNOSIS  Diagnosis: CHF exacerbation  Assessment and Plan of Treatment: You presenetd to the hospital with increased shortness of breath and cough. You were admitted to telemetry for heart failure exacerbation. You were treated with IV lasix (diuretic) and followed by a cardiologist. Please continue to weigh yourself daily.  If you gain 3lbs in 3 days, or 5lbs in a week call your Health Care Provider.  Do not eat or drink foods containing more than 2000mg of salt (sodium) in your diet every day.  Call your Health Care Provider if you have any swelling or increased swelling in your feet, ankles, and/or stomach.  Take all of your medication as directed.  If you become dizzy call your Health Care Provider.        SECONDARY DISCHARGE DIAGNOSES  Diagnosis: HTN (hypertension)  Assessment and Plan of Treatment: You have a history of high blood pressure. High blood pressure is a condition that puts you at risk for heart attack, stroke and kidney disease. Please continue to take your medications as prescribed. You can also help control your blood pressure by maintaining a healthy weight, eating a diet low in fat and rich in fruits and vegetables, reduce the amount of salt in your diet. Also, reduce alcohol and try to include some form of physical activity daily for at least 30 mins. Follow up with your medical doctor to establish long term blood pressure treatment goals.  Notify your doctor if you have any of the following symptoms:   Dizziness, Lightheadedness, Blurry vision, Headache, Chest pain, Shortness of breath      Diagnosis: Chronic kidney disease (CKD)  Assessment and Plan of Treatment: You have a history of chronic kidney disease. Pelase avoid taking (NSAIDs) - (ex: Ibuprofen, Advil, Celebrex, Naprosyn)  Avoid taking any nephrotoxic agents (can harm kidneys) - Intravenous contrast for diagnostic testing, combination cold medications.  Have all medications adjusted for your renal function by your Health Care Provider.  Blood pressure control is important.  Take all medication as prescribed.      Diagnosis: HLD (hyperlipidemia)  Assessment and Plan of Treatment: You have a history of hyperlipidemia, which is when you have too much cholesterol in your blood. High amounts of cholesterol in your blood can put you at higher risks for heart attck, strokes and other health problems. Follow up with PCP for treatment goals, continue medication as prescribed, have liver function testing every 3 months as anti lipid medications can cause liver irritation, eat low fat meals, avoid red meat, butter, fried foods and cheese. Get daily exercise.      Diagnosis: Cardiac pacemaker  Assessment and Plan of Treatment: You were seen by an EP doctor for a pacemaker generator change. Your pacemaker was changed withoutcomplications. Please ocntinue to follow- up outpatient with your cardiologist for further care.    Diagnosis: Chronic atrial fibrillation  Assessment and Plan of Treatment: Atrial fibrillation is the most common heart rhythm problem & has the risk of stroke & heart attack  It helps if you control your blood pressure, not drink more than 1-2 alcohol drinks per day, cut down on caffeine, getting treatment for over active thyroid gland, & getting exercise  Call your doctor if you feel your heart racing or beating unusually, chest tightness or pain, lightheaded, faint, shortness of breath especially with exercise  It is important to take your heart medication as prescribed.    Diagnosis: Asthma  Assessment and Plan of Treatment: You have a history of asthma. Please take medicines as directed by your caregiver. Control your home environment in the following ways to help prevent asthma attacks:  Do not smoke. Do not stay in places where others are smoking.  Wash hands frequently to prevent infection.  Talk to your caregiver about an action plan for managing asthma attacks. An action plan can help minimize or stop the attack without having to seek medical care.  Always have a plan prepared for seeking medical attention. This should include contacting your caregiver and in the case of a severe attack, calling your local emergency services.  SEEK MEDICAL CARE IF:  You have wheezing, shortness of breath, or a cough even if taking medicine to prevent attacks.   You have thickening of sputum.   Your sputum changes from clear or white to yellow, green, gray, or bloody.   You have any problems that may be related to the medicines you are taking (such as a rash, itching, swelling, or trouble breathing).  You are using a reliever medicine more than 2–3 times per week.     PRINCIPAL DISCHARGE DIAGNOSIS  Diagnosis: CHF exacerbation  Assessment and Plan of Treatment: You presenetd to the hospital with increased shortness of breath and cough. You were admitted to telemetry for heart failure exacerbation. You were treated with IV lasix (diuretic) and followed by a cardiologist. Please continue to weigh yourself daily.  If you gain 3lbs in 3 days, or 5lbs in a week call your Health Care Provider.  Do not eat or drink foods containing more than 2000mg of salt (sodium) in your diet every day.  Call your Health Care Provider if you have any swelling or increased swelling in your feet, ankles, and/or stomach.  Take all of your medication as directed.  If you become dizzy call your Health Care Provider.        SECONDARY DISCHARGE DIAGNOSES  Diagnosis: Cardiac pacemaker  Assessment and Plan of Treatment: You were seen by an EP doctor for a pacemaker generator change. Your pacemaker was changed withoutcomplications. Please ocntinue to follow- up outpatient with your cardiologist for further care.    Diagnosis: HTN (hypertension)  Assessment and Plan of Treatment: You have a history of high blood pressure. High blood pressure is a condition that puts you at risk for heart attack, stroke and kidney disease. Please continue to take your medications as prescribed. You can also help control your blood pressure by maintaining a healthy weight, eating a diet low in fat and rich in fruits and vegetables, reduce the amount of salt in your diet. Also, reduce alcohol and try to include some form of physical activity daily for at least 30 mins. Follow up with your medical doctor to establish long term blood pressure treatment goals.  Notify your doctor if you have any of the following symptoms:   Dizziness, Lightheadedness, Blurry vision, Headache, Chest pain, Shortness of breath      Diagnosis: HLD (hyperlipidemia)  Assessment and Plan of Treatment: You have a history of hyperlipidemia, which is when you have too much cholesterol in your blood. High amounts of cholesterol in your blood can put you at higher risks for heart attck, strokes and other health problems. Follow up with PCP for treatment goals, continue medication as prescribed, have liver function testing every 3 months as anti lipid medications can cause liver irritation, eat low fat meals, avoid red meat, butter, fried foods and cheese. Get daily exercise.      Diagnosis: Chronic kidney disease (CKD)  Assessment and Plan of Treatment: You have a history of chronic kidney disease. Pelase avoid taking (NSAIDs) - (ex: Ibuprofen, Advil, Celebrex, Naprosyn)  Avoid taking any nephrotoxic agents (can harm kidneys) - Intravenous contrast for diagnostic testing, combination cold medications.  Have all medications adjusted for your renal function by your Health Care Provider.  Blood pressure control is important.  Take all medication as prescribed.      Diagnosis: Chronic atrial fibrillation  Assessment and Plan of Treatment: Atrial fibrillation is the most common heart rhythm problem & has the risk of stroke & heart attack  It helps if you control your blood pressure, not drink more than 1-2 alcohol drinks per day, cut down on caffeine, getting treatment for over active thyroid gland, & getting exercise  Call your doctor if you feel your heart racing or beating unusually, chest tightness or pain, lightheaded, faint, shortness of breath especially with exercise  It is important to take your heart medication as prescribed.    Diagnosis: Asthma  Assessment and Plan of Treatment: You have a history of asthma. Please take medicines as directed by your caregiver. Control your home environment in the following ways to help prevent asthma attacks:  Do not smoke. Do not stay in places where others are smoking.  Wash hands frequently to prevent infection.  Talk to your caregiver about an action plan for managing asthma attacks. An action plan can help minimize or stop the attack without having to seek medical care.  Always have a plan prepared for seeking medical attention. This should include contacting your caregiver and in the case of a severe attack, calling your local emergency services.  SEEK MEDICAL CARE IF:  You have wheezing, shortness of breath, or a cough even if taking medicine to prevent attacks.   You have thickening of sputum.   Your sputum changes from clear or white to yellow, green, gray, or bloody.   You have any problems that may be related to the medicines you are taking (such as a rash, itching, swelling, or trouble breathing).  You are using a reliever medicine more than 2–3 times per week.

## 2024-02-06 NOTE — CONSULT NOTE ADULT - SUBJECTIVE AND OBJECTIVE BOX
CHIEF COMPLAINT: PPM at [facility]    HISTORY OF PRESENT ILLNESS:  89 year old female with Dementia, AAOx2-3 at baseline, with PMHx of HTN, HFpEF (Grade II DD), Parox Afib (not on AC due to GIB), CRI, SND and Complete Heart Block s/p PPM in 2018, HLD, and Asthma who presented to the ED with dyspnea and was noted to be at [facility]. She currently feels better without any complaints.      PAST MEDICAL & SURGICAL HISTORY:  HTN (hypertension)  Hypercholesterolemia  Atrial fibrillation  History of complete heart block  Pulmonary HTN  CHF (congestive heart failure)  Artificial pacemaker        PERTINENT DIAGNOSTIC TESTING:    [x] Echocardiogram: 12/11/21  1. Mitral annular calcification. Moderate, eccentric mitral  regurgitation.  2. Calcified aortic valve with decreased opening. Peak  transaortic valve gradient equals 21 mm Hg, mean  transaortic valve gradient equals 10 mm Hg, estimated  aortic valve area equals 2 sqcm (by continuity equation),  consistent with mild aortic stenosis. Mild aortic  regurgitation.  3. Mildly dilated left atrium.  LA volume index = 41 cc/m2.  4. Moderate concentric left ventricular hypertrophy.  5. Endocardium not well visualized; grossly normal left  ventricular systolic function.  6. Grade I diastolic dysfunction (Impaired relaxation,  mild).  7. Normal right ventricular size and function.   A device  lead is visualized in the right heart.  8. RV systolic pressure is 41 mm Hg. Mild pulmonary  hypertension.      Allergies  No Known Allergies  Intolerances    	    MEDICATIONS:  dronedarone 400 milliGRAM(s) Oral two times a day  furosemide    Tablet 20 milliGRAM(s) Oral daily  hydrALAZINE 50 milliGRAM(s) Oral three times a day  isosorbide   mononitrate ER Tablet (IMDUR) 120 milliGRAM(s) Oral daily  metoprolol tartrate 50 milliGRAM(s) Oral every 12 hours  albuterol/ipratropium for Nebulization 3 milliLiter(s) Nebulizer every 6 hours  montelukast 10 milliGRAM(s) Oral daily  simvastatin 40 milliGRAM(s) Oral at bedtime  aspirin  chewable 81 milliGRAM(s) Oral daily  dorzolamide 2%/timolol 0.5% Ophthalmic Solution 1 Drop(s) Both EYES two times a day  ferrous    sulfate 325 milliGRAM(s) Oral daily  latanoprost 0.005% Ophthalmic Solution 1 Drop(s) Both EYES at bedtime  multivitamin 1 Tablet(s) Oral daily  neomycin/polymyxin B G.U. Continuous Irrigation 1 milliLiter(s) Continuous Irrigation <Continuous>  potassium chloride    Tablet ER 40 milliEquivalent(s) Oral once      FAMILY HISTORY:  FH: hypertension    FH: coronary artery disease        SOCIAL HISTORY:    [x] Non-smoker  [ ] Smoker  [ ] Alcohol        REVIEW OF SYSTEMS:    CONSTITUTIONAL: No fever, weight loss, or fatigue  EYES: No eye pain, visual disturbances, or discharge  ENMT:  No difficulty hearing, tinnitus, vertigo; No sinus or throat pain  NECK: No pain or stiffness  BREASTS: No pain, masses, or nipple discharge  RESPIRATORY: No cough, wheezing, chills or hemoptysis  CARDIOVASCULAR: No chest pain, palpitations, dizziness, or leg swelling  GASTROINTESTINAL: No abdominal or epigastric pain. No nausea, vomiting, or hematemesis; No diarrhea or constipation. No melena or hematochezia.  GENITOURINARY: No dysuria, frequency, hematuria, or incontinence  NEUROLOGICAL: No headaches, memory loss, loss of strength, numbness, or tremors  SKIN: No itching, burning, rashes, or lesions   LYMPH Nodes: No enlarged glands  ENDOCRINE: No heat or cold intolerance; No hair loss  MUSCULOSKELETAL: No joint pain or swelling; No muscle, back, or extremity pain  PSYCHIATRIC: No depression, anxiety, mood swings, or difficulty sleeping  HEME/LYMPH: No easy bruising, or bleeding gums  ALLERY AND IMMUNOLOGIC: No hives or eczema	    [x] All others negative	  [ ] Unable to obtain    PHYSICAL EXAM:  T(C): 36.4 (02-06-24 @ 11:55), Max: 36.8 (02-06-24 @ 08:00)  HR: 64 (02-06-24 @ 11:55) (53 - 64)  BP: 166/52 (02-06-24 @ 11:55) (134/61 - 183/58)  RR: 18 (02-06-24 @ 11:55) (14 - 19)  SpO2: 96% (02-06-24 @ 11:55) (95% - 99%)  Wt(kg): --  I&O's Summary      TELEMETRY: AV paced	      Appearance: Normal	  HEENT:   Normal oral mucosa, PERRL, EOMI	  Cardiovascular: Normal S1 S2, No JVD, No murmurs, No edema  Respiratory: Lungs clear to auscultation	  Gastrointestinal:  Soft, Non-tender, + BS	  Neurologic: A&O x 2, Non-focal  Extremities: Normal range of motion, No clubbing, cyanosis or edema  Vascular: Peripheral pulses palpable 2+ bilaterally                            10.9   5.47  )-----------( 127      ( 06 Feb 2024 05:47 )             31.9     02-06    143  |  106  |  20<H>  ----------------------------<  98  3.3<L>   |  33<H>  |  1.32<H>    Ca    9.5      06 Feb 2024 05:47  Phos  2.9     02-06  Mg     2.2     02-06    TPro  6.5  /  Alb  3.2<L>  /  TBili  0.4  /  DBili  x   /  AST  23  /  ALT  24  /  AlkPhos  52  02-06    proBNP:   Lipid Profile:   HgA1c:   TSH:

## 2024-02-06 NOTE — DISCHARGE NOTE PROVIDER - NSDCMRMEDTOKEN_GEN_ALL_CORE_FT
albuterol 90 mcg/inh inhalation aerosol: 2 puff(s) inhaled every 6 hours, As needed, Shortness of Breath and/or Wheezing  aspirin 81 mg oral tablet, chewable: 1 tab(s) orally once a day  dorzolamide-timolol 2%-0.5% preservative-free ophthalmic solution: 1 drop(s) to each affected eye 2 times a day  dronedarone 400 mg oral tablet: 1 tab(s) orally 2 times a day  ferrous sulfate 325 mg (65 mg elemental iron) oral tablet: 1 tab(s) orally once a day  furosemide 20 mg oral tablet: 1 tab(s) orally every other day  hydrALAZINE 100 mg oral tablet: 1 tab(s) orally 3 times a day  ipratropium-albuterol 0.5 mg-2.5 mg/3 mL inhalation solution: 3 milliliter(s) inhaled every 6 hours as needed for  shortness of breath and/or wheezing  isosorbide mononitrate 120 mg oral tablet, extended release: 1 tab(s) orally once a day  latanoprost 0.005% ophthalmic solution: 1 drop(s) to each affected eye once a day (at bedtime)  metoprolol tartrate 100 mg oral tablet: 1 tab(s) orally every 12 hours  montelukast 10 mg oral tablet: 1 tab(s) orally once a day  Multiple Vitamins oral tablet, dispersible:   simvastatin 40 mg oral tablet: 1 tab(s) orally once a day (at bedtime)

## 2024-02-07 ENCOUNTER — TRANSCRIPTION ENCOUNTER (OUTPATIENT)
Age: 89
End: 2024-02-07

## 2024-02-07 VITALS — WEIGHT: 139.55 LBS

## 2024-02-07 LAB
ANION GAP SERPL CALC-SCNC: 4 MMOL/L — LOW (ref 5–17)
BUN SERPL-MCNC: 26 MG/DL — HIGH (ref 7–18)
CALCIUM SERPL-MCNC: 9.4 MG/DL — SIGNIFICANT CHANGE UP (ref 8.4–10.5)
CHLORIDE SERPL-SCNC: 111 MMOL/L — HIGH (ref 96–108)
CO2 SERPL-SCNC: 29 MMOL/L — SIGNIFICANT CHANGE UP (ref 22–31)
CREAT SERPL-MCNC: 1.63 MG/DL — HIGH (ref 0.5–1.3)
EGFR: 29 ML/MIN/1.73M2 — LOW
GLUCOSE SERPL-MCNC: 112 MG/DL — HIGH (ref 70–99)
HCT VFR BLD CALC: 32.2 % — LOW (ref 34.5–45)
HGB BLD-MCNC: 10.4 G/DL — LOW (ref 11.5–15.5)
MCHC RBC-ENTMCNC: 32.3 GM/DL — SIGNIFICANT CHANGE UP (ref 32–36)
MCHC RBC-ENTMCNC: 32.5 PG — SIGNIFICANT CHANGE UP (ref 27–34)
MCV RBC AUTO: 100.6 FL — HIGH (ref 80–100)
NRBC # BLD: 0 /100 WBCS — SIGNIFICANT CHANGE UP (ref 0–0)
PLATELET # BLD AUTO: 130 K/UL — LOW (ref 150–400)
POTASSIUM SERPL-MCNC: 4.4 MMOL/L — SIGNIFICANT CHANGE UP (ref 3.5–5.3)
POTASSIUM SERPL-SCNC: 4.4 MMOL/L — SIGNIFICANT CHANGE UP (ref 3.5–5.3)
RBC # BLD: 3.2 M/UL — LOW (ref 3.8–5.2)
RBC # FLD: 12.7 % — SIGNIFICANT CHANGE UP (ref 10.3–14.5)
SODIUM SERPL-SCNC: 144 MMOL/L — SIGNIFICANT CHANGE UP (ref 135–145)
WBC # BLD: 6.14 K/UL — SIGNIFICANT CHANGE UP (ref 3.8–10.5)
WBC # FLD AUTO: 6.14 K/UL — SIGNIFICANT CHANGE UP (ref 3.8–10.5)

## 2024-02-07 PROCEDURE — 94640 AIRWAY INHALATION TREATMENT: CPT

## 2024-02-07 PROCEDURE — 87637 SARSCOV2&INF A&B&RSV AMP PRB: CPT

## 2024-02-07 PROCEDURE — 33228 REMV&REPLC PM GEN DUAL LEAD: CPT

## 2024-02-07 PROCEDURE — 99285 EMERGENCY DEPT VISIT HI MDM: CPT

## 2024-02-07 PROCEDURE — 75743 ARTERY X-RAYS LUNGS: CPT

## 2024-02-07 PROCEDURE — 84484 ASSAY OF TROPONIN QUANT: CPT

## 2024-02-07 PROCEDURE — 71045 X-RAY EXAM CHEST 1 VIEW: CPT

## 2024-02-07 PROCEDURE — 80048 BASIC METABOLIC PNL TOTAL CA: CPT

## 2024-02-07 PROCEDURE — 85027 COMPLETE CBC AUTOMATED: CPT

## 2024-02-07 PROCEDURE — 85025 COMPLETE CBC W/AUTO DIFF WBC: CPT

## 2024-02-07 PROCEDURE — 80053 COMPREHEN METABOLIC PANEL: CPT

## 2024-02-07 PROCEDURE — 96374 THER/PROPH/DIAG INJ IV PUSH: CPT

## 2024-02-07 PROCEDURE — 85610 PROTHROMBIN TIME: CPT

## 2024-02-07 PROCEDURE — 83880 ASSAY OF NATRIURETIC PEPTIDE: CPT

## 2024-02-07 PROCEDURE — C1785: CPT

## 2024-02-07 PROCEDURE — 85730 THROMBOPLASTIN TIME PARTIAL: CPT

## 2024-02-07 PROCEDURE — 84100 ASSAY OF PHOSPHORUS: CPT

## 2024-02-07 PROCEDURE — 83735 ASSAY OF MAGNESIUM: CPT

## 2024-02-07 PROCEDURE — 36415 COLL VENOUS BLD VENIPUNCTURE: CPT

## 2024-02-07 RX ORDER — CEPHALEXIN 500 MG
500 CAPSULE ORAL ONCE
Refills: 0 | Status: COMPLETED | OUTPATIENT
Start: 2024-02-07 | End: 2024-02-07

## 2024-02-07 RX ORDER — CEPHALEXIN 500 MG
500 CAPSULE ORAL EVERY 12 HOURS
Refills: 0 | Status: DISCONTINUED | OUTPATIENT
Start: 2024-02-07 | End: 2024-02-07

## 2024-02-07 RX ORDER — CEPHALEXIN 500 MG
500 CAPSULE ORAL DAILY
Refills: 0 | Status: DISCONTINUED | OUTPATIENT
Start: 2024-02-07 | End: 2024-02-07

## 2024-02-07 RX ADMIN — Medication 3 MILLILITER(S): at 09:09

## 2024-02-07 RX ADMIN — DORZOLAMIDE HYDROCHLORIDE TIMOLOL MALEATE 1 DROP(S): 20; 5 SOLUTION/ DROPS OPHTHALMIC at 07:16

## 2024-02-07 RX ADMIN — DRONEDARONE 400 MILLIGRAM(S): 400 TABLET, FILM COATED ORAL at 07:16

## 2024-02-07 RX ADMIN — Medication 500 MILLIGRAM(S): at 10:00

## 2024-02-07 NOTE — DISCHARGE NOTE NURSING/CASE MANAGEMENT/SOCIAL WORK - NSDCPEFALRISK_GEN_ALL_CORE
For information on Fall & Injury Prevention, visit: [email or web address] OR  [email or web address] OR  https://www.cdc.gov/steadi/patient.html

## 2024-02-07 NOTE — PROGRESS NOTE ADULT - SUBJECTIVE AND OBJECTIVE BOX
Date of Service 02-06-24 @ 08:57    CHIEF COMPLAINT:Patient is a 89y old  Female who presents with a chief complaint of Acute Diastolic HF .Pt is feeling better.    	  REVIEW OF SYSTEMS:  CONSTITUTIONAL: No fever, weight loss, or fatigue  EYES: No eye pain, visual disturbances, or discharge  ENT:  No difficulty hearing, tinnitus, vertigo; No sinus or throat pain  NECK: No pain or stiffness  RESPIRATORY: No cough, wheezing, chills or hemoptysis; No Shortness of Breath  CARDIOVASCULAR: No chest pain, palpitations, passing out, dizziness, or leg swelling  GASTROINTESTINAL: No abdominal or epigastric pain. No nausea, vomiting, or hematemesis; No diarrhea or constipation. No melena or hematochezia.  GENITOURINARY: No dysuria, frequency, hematuria, or incontinence  NEUROLOGICAL: No headaches, memory loss, loss of strength, numbness, or tremors  SKIN: No itching, burning, rashes, or lesions   LYMPH Nodes: No enlarged glands  ENDOCRINE: No heat or cold intolerance; No hair loss  MUSCULOSKELETAL: No joint pain or swelling; No muscle, back, or extremity pain  PSYCHIATRIC: No depression, anxiety, mood swings, or difficulty sleeping  HEME/LYMPH: No easy bruising, or bleeding gums  ALLERGY AND IMMUNOLOGIC: No hives or eczema	      PHYSICAL EXAM:  T(C): 36.8 (02-06-24 @ 08:00), Max: 36.8 (02-06-24 @ 08:00)  HR: 61 (02-06-24 @ 08:00) (53 - 64)  BP: 183/58 (02-06-24 @ 08:00) (134/61 - 183/58)  RR: 18 (02-06-24 @ 08:00) (16 - 18)  SpO2: 98% (02-06-24 @ 08:00) (96% - 98%)  Wt(kg): --  I&O's Summary      Appearance: Normal	  HEENT:   Normal oral mucosa, PERRL, EOMI	  Lymphatic: No lymphadenopathy  Cardiovascular: Normal S1 S2, No JVD, No murmurs, No edema  Respiratory: Lungs clear to auscultation	  Psychiatry: A & O x 3, Mood & affect appropriate  Gastrointestinal:  Soft, Non-tender, + BS	  Skin: No rashes, No ecchymoses, No cyanosis	  Neurologic: Non-focal  Extremities: Normal range of motion, No clubbing, cyanosis or edema  Vascular: Peripheral pulses palpable 2+ bilaterally    MEDICATIONS  (STANDING):  albuterol/ipratropium for Nebulization 3 milliLiter(s) Nebulizer every 6 hours  aspirin  chewable 81 milliGRAM(s) Oral daily  dorzolamide 2%/timolol 0.5% Ophthalmic Solution 1 Drop(s) Both EYES two times a day  dronedarone 400 milliGRAM(s) Oral two times a day  ferrous    sulfate 325 milliGRAM(s) Oral daily  furosemide    Tablet 20 milliGRAM(s) Oral daily  hydrALAZINE 50 milliGRAM(s) Oral three times a day  isosorbide   mononitrate ER Tablet (IMDUR) 120 milliGRAM(s) Oral daily  latanoprost 0.005% Ophthalmic Solution 1 Drop(s) Both EYES at bedtime  metoprolol tartrate 50 milliGRAM(s) Oral every 12 hours  montelukast 10 milliGRAM(s) Oral daily  multivitamin 1 Tablet(s) Oral daily  neomycin/polymyxin B G.U. Continuous Irrigation 1 milliLiter(s) (41 mL/Hr) Continuous Irrigation <Continuous>  simvastatin 40 milliGRAM(s) Oral at bedtime      TELEMETRY: 	paced      LABS:	 	          Troponin I, High Sensitivity Result: 13.6 ng/L (02-05 @ 15:25)                            10.9   5.47  )-----------( 127      ( 06 Feb 2024 05:47 )             31.9     02-06    143  |  106  |  20<H>  ----------------------------<  98  3.3<L>   |  33<H>  |  1.32<H>    Ca    9.5      06 Feb 2024 05:47  Phos  2.9     02-06  Mg     2.2     02-06    TPro  6.5  /  Alb  3.2<L>  /  TBili  0.4  /  DBili  x   /  AST  23  /  ALT  24  /  AlkPhos  52  02-06    proBNP:   Lipid Profile:   HgA1c:   TSH:     	        
Date of Service 02-07-24 @ 09:14    CHIEF COMPLAINT:Patient is a 89y old  Female who presents with a chief complaint of Heart failure.Pt appears comfortable.        	  REVIEW OF SYSTEMS:  CONSTITUTIONAL: No fever, weight loss, or fatigue  EYES: No eye pain, visual disturbances, or discharge  ENT:  No difficulty hearing, tinnitus, vertigo; No sinus or throat pain  NECK: No pain or stiffness  RESPIRATORY: No cough, wheezing, chills or hemoptysis; No Shortness of Breath  CARDIOVASCULAR: No chest pain, palpitations, passing out, dizziness, or leg swelling  GASTROINTESTINAL: No abdominal or epigastric pain. No nausea, vomiting, or hematemesis; No diarrhea or constipation. No melena or hematochezia.  GENITOURINARY: No dysuria, frequency, hematuria, or incontinence  NEUROLOGICAL: No headaches, memory loss, loss of strength, numbness, or tremors  SKIN: No itching, burning, rashes, or lesions   LYMPH Nodes: No enlarged glands  ENDOCRINE: No heat or cold intolerance; No hair loss  MUSCULOSKELETAL: No joint pain or swelling; No muscle, back, or extremity pain  PSYCHIATRIC: No depression, anxiety, mood swings, or difficulty sleeping  HEME/LYMPH: No easy bruising, or bleeding gums  ALLERGY AND IMMUNOLOGIC: No hives or eczema	        PHYSICAL EXAM:  T(C): 36.5 (02-07-24 @ 08:15), Max: 37.5 (02-06-24 @ 23:40)  HR: 67 (02-07-24 @ 08:15) (60 - 68)  BP: 157/61 (02-07-24 @ 08:15) (122/48 - 166/52)  RR: 19 (02-07-24 @ 08:15) (14 - 19)  SpO2: 94% (02-07-24 @ 08:15) (94% - 99%)  Wt(kg): --  I&O's Summary      Appearance: Normal	  HEENT:   Normal oral mucosa, PERRL, EOMI	  Lymphatic: No lymphadenopathy  Cardiovascular: Normal S1 S2, No JVD, No murmurs, No edema  Respiratory: Lungs clear to auscultation	  Psychiatry: A & O x 3, Mood & affect appropriate  Gastrointestinal:  Soft, Non-tender, + BS	  Skin: No rashes, No ecchymoses, No cyanosis	  Neurologic: Non-focal  Extremities: Normal range of motion, No clubbing, cyanosis or edema  Vascular: Peripheral pulses palpable 2+ bilaterally    MEDICATIONS  (STANDING):  albuterol/ipratropium for Nebulization 3 milliLiter(s) Nebulizer every 6 hours  aspirin  chewable 81 milliGRAM(s) Oral daily  dorzolamide 2%/timolol 0.5% Ophthalmic Solution 1 Drop(s) Both EYES two times a day  dronedarone 400 milliGRAM(s) Oral two times a day  ferrous    sulfate 325 milliGRAM(s) Oral daily  furosemide    Tablet 20 milliGRAM(s) Oral daily  hydrALAZINE 50 milliGRAM(s) Oral three times a day  isosorbide   mononitrate ER Tablet (IMDUR) 120 milliGRAM(s) Oral daily  latanoprost 0.005% Ophthalmic Solution 1 Drop(s) Both EYES at bedtime  metoprolol tartrate 50 milliGRAM(s) Oral every 12 hours  montelukast 10 milliGRAM(s) Oral daily  multivitamin 1 Tablet(s) Oral daily  neomycin/polymyxin B G.U. Continuous Irrigation 1 milliLiter(s) (41 mL/Hr) Continuous Irrigation <Continuous>  simvastatin 40 milliGRAM(s) Oral at bedtime      TELEMETRY: 	    ECG:  	  RADIOLOGY:  OTHER: 	  	  LABS:	 	    CARDIAC MARKERS:      Troponin I, High Sensitivity Result: 13.6 ng/L (02-05 @ 15:25)                            10.4   6.14  )-----------( 130      ( 07 Feb 2024 06:24 )             32.2     02-07    144  |  111<H>  |  26<H>  ----------------------------<  112<H>  4.4   |  29  |  1.63<H>    Ca    9.4      07 Feb 2024 06:24  Phos  2.9     02-06  Mg     2.2     02-06    TPro  6.5  /  Alb  3.2<L>  /  TBili  0.4  /  DBili  x   /  AST  23  /  ALT  24  /  AlkPhos  52  02-06    proBNP:   Lipid Profile:   HgA1c:   TSH:

## 2024-02-07 NOTE — DISCHARGE NOTE NURSING/CASE MANAGEMENT/SOCIAL WORK - PATIENT PORTAL LINK FT
You can access the FollowMyHealth Patient Portal offered by [facility] by registering at the following website: [email or web address]. By joining [employer]’s FollowMyHealth portal, you will also be able to view your health information using other applications (apps) compatible with our system.

## 2024-02-07 NOTE — PROGRESS NOTE ADULT - ASSESSMENT
A 89 year old female, from home, minimally ambulatory with assistance, with  Dementia, AAOx2-3 at baseline, with PMHx of HTN, HFpEF (Grade II DD),Parox Afib (not on AC due to GIB),CRI, Complete Heart Block s/p PPM, HLD, and Asthma, poor historian, was brought into the ED due to dyspnea at rest associated with dry cough and nasal congestion for over the last 3 to 4 days,acute diastolic HF,PPM at [facility].  1.SC Tele monitoring.  2.PPM at [facility],s/p generator change.  3.Acute diastolic HF-po lasix.  4.PAF-asa,multaq,lopressor.  5.HTN-cont  bp medication.  6.CRI-f/u lytes.  7.Hx of GI bleed-no AC .  8.Asthma-singulair.  9.GI and DVT prophylaxis.  10.D/C home and f/u as outpatient 2 weeks.
A 89 year old female, from home, minimally ambulatory with assistance, with  Dementia, AAOx2-3 at baseline, with PMHx of HTN, HFpEF (Grade II DD),Parox Afib (not on AC due to GIB),CRI, Complete Heart Block s/p PPM, HLD, and Asthma, poor historian, was brought into the ED due to dyspnea at rest associated with dry cough and nasal congestion for over the last 3 to 4 days,acute diastolic HF,PPM at [facility].  1.Tele monitoring.  2.PPM at [facility], generator change,since pacer dependant.D/W Paula Witt.  3.Acute diastolic HF-IV lasix.  4.PAF-asa,multaq,lopressor.  5.HTN-cont  bp medication.  6.CRI-f/u lytes.  7.Hx of GI bleed-no AC .  8.Asthma-singulair.  9.GI and DVT prophylaxis.

## 2024-02-07 NOTE — DIETITIAN INITIAL EVALUATION ADULT - PROBLEM SELECTOR PLAN 1
s/p Lasix in the ED   Patient significantly improving    Will restart Lasix 20 mg PO w/ parameters for now . Monitor electrolytes  Continue Multaq 400 mg BID  Elevated BNP.   Remote Tele.   Daily Weights   I & Os.   Vitals q4hr   DASH/TLC diet.1500 mL Fluid restriction    Avoid NSAIDs or thiazolidinediones. Dietitian consult
None

## 2024-02-07 NOTE — DIETITIAN INITIAL EVALUATION ADULT - PERTINENT LABORATORY DATA
02-07    144  |  111<H>  |  26<H>  ----------------------------<  112<H>  4.4   |  29  |  1.63<H>    Ca    9.4      07 Feb 2024 06:24  Phos  2.9     02-06  Mg     2.2     02-06    TPro  6.5  /  Alb  3.2<L>  /  TBili  0.4  /  DBili  x   /  AST  23  /  ALT  24  /  AlkPhos  52  02-06

## 2024-02-07 NOTE — DIETITIAN INITIAL EVALUATION ADULT - OTHER INFO
Pt lives home with family PTA, alert, confused with dementia, daughter at bedside well-communicated; Reported pt on puree food at home, usually eating well, trying to following low Na diet, on discharge planning today in the morning, no further nutrition related concerns at present; Tolerating food from home per nursing

## 2024-02-07 NOTE — DIETITIAN INITIAL EVALUATION ADULT - FEEDING ASSISTANCE
Provide food choices within diet Rx as available/updated; Nursing to continue feeding assistance and encouragement no

## 2024-02-07 NOTE — CHART NOTE - NSCHARTNOTEFT_GEN_A_CORE
Pt s/p ABBOTT PPM generator change yesterday  Tolerated procedure well.     Post-op _PPM__ instructions have been verbally explained and given to the patient's daughter Jewels on 2/6/2024 at bedside. Patient was also given home monitor, booklet and ID card. Patient's daughter expressed understanding and all questions were answered. A copy of the instruction is located in the patients chart   Patient is scheduled for an appointment on_ February 22, 2024 at 2:40p at [facility] device clinic.  Daughter instructed re: no scrubbing the incision site for 2 weeks, no lotion, ointment, powder or direct sunlight to the incision site for 2 weeks   No lifting more than 5lb or strenuous activity such as golfing, tennis or swimming for 6-8 weeks  No swimming pool, Jacuzzi for 6-8 weeks.   Patient should carry her ID card for metal detectors   Patient can shower. Pat the area dry  Keep Cellular phone 6 inches away  from the device  Patient 's daughter was instructed to call [phone] if the following occurs:      - fever with temperature > 101F      - swelling, drainage or bleeding at the site incision.

## 2024-02-07 NOTE — DIETITIAN INITIAL EVALUATION ADULT - PERTINENT MEDS FT
MEDICATIONS  (STANDING):  albuterol/ipratropium for Nebulization 3 milliLiter(s) Nebulizer every 6 hours  aspirin  chewable 81 milliGRAM(s) Oral daily  dorzolamide 2%/timolol 0.5% Ophthalmic Solution 1 Drop(s) Both EYES two times a day  dronedarone 400 milliGRAM(s) Oral two times a day  ferrous    sulfate 325 milliGRAM(s) Oral daily  furosemide    Tablet 20 milliGRAM(s) Oral daily  hydrALAZINE 50 milliGRAM(s) Oral three times a day  isosorbide   mononitrate ER Tablet (IMDUR) 120 milliGRAM(s) Oral daily  latanoprost 0.005% Ophthalmic Solution 1 Drop(s) Both EYES at bedtime  metoprolol tartrate 50 milliGRAM(s) Oral every 12 hours  montelukast 10 milliGRAM(s) Oral daily  multivitamin 1 Tablet(s) Oral daily  neomycin/polymyxin B G.U. Continuous Irrigation 1 milliLiter(s) (41 mL/Hr) Continuous Irrigation <Continuous>  simvastatin 40 milliGRAM(s) Oral at bedtime    MEDICATIONS  (PRN):

## 2024-02-07 NOTE — DIETITIAN INITIAL EVALUATION ADULT - NSFNSGIIOFT_GEN_A_CORE
KBS=396 lb   Wts in [facility] EMR reviewed, a bit fluctuated, may due to scale/fluid variance, diuretic Rx

## 2024-02-07 NOTE — DIETITIAN INITIAL EVALUATION ADULT - FACTORS AFF FOOD INTAKE
advanced age with acute on chronic comorbidities including CHF, dementia/change in mental status/difficulty chewing/difficulty with food procurement/preparation/Christian/ethnic/cultural/personal food preferences

## 2024-02-22 ENCOUNTER — APPOINTMENT (OUTPATIENT)
Dept: ELECTROPHYSIOLOGY | Facility: CLINIC | Age: 89
End: 2024-02-22

## 2024-04-03 NOTE — ED POST DISCHARGE NOTE - DETAILS
Name: Anthony Gallegos ADMIT: 3/25/2024   : 1944  PCP: Provider, No Known    MRN: [id number] LOS: 9 days   AGE/SEX: 80 y.o. male  ROOM: [facility]/     Subjective   Subjective     No events overnight. He gives me the thumbs up to my question of whether he's doing ok. He tries to communicate with me otherwise, but I am not really  able to understand him.       Objective   Objective   Vital Signs  Temp:  [97.9 °F (36.6 °C)-98.8 °F (37.1 °C)] 97.9 °F (36.6 °C)  Heart Rate:  [75-82] 77  Resp:  [18-20] 20  BP: (110-119)/(55-70) 119/58  SpO2:  [95 %-100 %] 95 %  on   ;   Device (Oxygen Therapy): room air  Body mass index is 22.43 kg/m².  Physical Exam  Constitutional:       General: He is not in acute distress.     Appearance: He is ill-appearing. He is not toxic-appearing.   Cardiovascular:      Rate and Rhythm: Normal rate and regular rhythm.      Heart sounds: Normal heart sounds.   Pulmonary:      Effort: Pulmonary effort is normal.      Breath sounds: Normal breath sounds.   Abdominal:      General: Bowel sounds are normal.      Palpations: Abdomen is soft.      Comments: PEG, abdominal binder   Musculoskeletal:         General: No tenderness.      Right lower leg: No edema.      Left lower leg: No edema.   Neurological:      Mental Status: He is alert.      Comments: Largely nonverbal, but follows commands          Results Review     I reviewed the patient's new clinical results.  Results from last 7 days   Lab Units 24  0320 24  0349 24  0305   WBC 10*3/mm3 7.05 7.75 10.93* 10.45   HEMOGLOBIN g/dL 10.1* 10.3* 11.1* 11.1*   PLATELETS 10*3/mm3 327 383 422 467*     Results from last 7 days   Lab Units 24  0406 24  1319 24  0320 24  0355 24  0349   SODIUM mmol/L 144  --  146*  --  149* 149*   POTASSIUM mmol/L 3.6 4.3 3.4* 3.9 3.4* 3.8   CHLORIDE mmol/L 112*  --  113*  --  115* 116*   CO2 mmol/L 26.0  --  25.3  --  26.1 24.2   BUN mg/dL  34*  --  33*  --  33* 27*   CREATININE mg/dL 0.97  --  0.99  --  0.97 0.99   GLUCOSE mg/dL 111*  --  124*  --  102* 96   Estimated Creatinine Clearance: 69.9 mL/min (by C-G formula based on SCr of 0.97 mg/dL).  Results from last 7 days   Lab Units 04/03/24  0406 04/02/24  0320 04/01/24  0355 03/31/24  0349 03/30/24  0305 03/29/24  0345   ALBUMIN g/dL 2.3* 2.3* 2.4* 2.3*   < > 2.5*   BILIRUBIN mg/dL  --   --   --   --   --  0.7   ALK PHOS U/L  --   --   --   --   --  84   AST (SGOT) U/L  --   --   --   --   --  25   ALT (SGPT) U/L  --   --   --   --   --  10    < > = values in this interval not displayed.     Results from last 7 days   Lab Units 04/03/24 0406 04/02/24 0320 04/01/24 0355 03/31/24  0349   CALCIUM mg/dL 8.2* 8.5* 8.1* 8.2*   ALBUMIN g/dL 2.3* 2.3* 2.4* 2.3*   MAGNESIUM mg/dL 1.7 1.9 1.9 2.1   PHOSPHORUS mg/dL 3.0 2.3* 2.2* 2.6           COVID19   Date Value Ref Range Status   03/25/2024 Not Detected Not Detected - Ref. Range Final     Glucose   Date/Time Value Ref Range Status   04/03/2024 0724 101 70 - 130 mg/dL Final   04/03/2024 0635 86 70 - 130 mg/dL Final   04/03/2024 0037 100 70 - 130 mg/dL Final   04/02/2024 1133 106 70 - 130 mg/dL Final   04/02/2024 0631 123 70 - 130 mg/dL Final   04/02/2024 0014 94 70 - 130 mg/dL Final   04/01/2024 1737 98 70 - 130 mg/dL Final       MRI Brain Without Contrast  Narrative: BRAIN MRI WITHOUT CONTRAST     HISTORY: Mental status change, unknown cause; R63.8-Other symptoms and  signs concerning food and fluid intake; N17.9-Acute kidney failure,  unspecified; R52-Pain, unspecified; R13.10-Dysphagia, unspecified;  E86.0-Dehydration; R79.89-Other specified abnormal findings of blood  chemistry; R62.7-Adult failure to thrive; R53.1-Weakness; R63.4-Abnormal  weight loss; N39.0-Urinary tract infection, site not specifi     COMPARISON: March 25, 2024.     FINDINGS:  Multiplanar images of the head were obtained without  gadolinium. No areas of restricted diffusion are  Spoke with patient's daughter for follow-up, daughter reports patient is doing well has minimal pain at home, she has an orthopedic follow-up this week, appreciative of call.  Return precautions were discussed daughter reported understanding. seen to suggest acute  infarct. There is atrophy. There is extensive periventricular and deep  white matter microangiopathic change. There is no midline shift or mass  effect. Intracranial flow voids appear intact. Old lacunar infarcts are  noted within the left basal ganglia. No abnormality is seen on gradient  echo imaging. There may be some mucosal thickening within the ethmoid  sinuses.     Impression: 1. No acute intracranial abnormality.     This report was finalized on 3/29/2024 9:26 PM by Dr. Kirsten Peña M.D on Workstation: [id number]       Scheduled Medications  bisacodyl, 10 mg, Rectal, Once  enoxaparin, 40 mg, Subcutaneous, Q24H  lansoprazole, 30 mg, Per G Tube, Q AM  Menthol-Zinc Oxide, 1 Application, Topical, Q12H  polyethylene glycol, 17 g, Per G Tube, Daily  senna-docusate sodium, 2 tablet, Per G Tube, Daily  terazosin, 1 mg, Per G Tube, Nightly    Infusions   Diet  NPO Diet NPO Type: Strict NPO       Assessment/Plan     Active Hospital Problems    Diagnosis  POA   • **UTI (urinary tract infection) [N39.0]  Yes   • Encephalopathy [G93.40]  Yes   • Decreased oral intake [R63.8]  Yes   • Dysphagia [R13.10]  Yes   • MARTITA (acute kidney injury) [N17.9]  Yes   • Severe malnutrition [E43]  Yes   • Hypertension [I10]  Yes      Resolved Hospital Problems   No resolved problems to display.       80 y.o. male admitted with UTI (urinary tract infection).    Patient is a 79 y.o. male with a history of HTN, BPH pyelonephritis/UTI, who presented to [facility] from facility for failure to thrive, not eating or drinking much, weight loss, decreased mobility.     ESBL UTI  -completed a course of ertapenem at the direction of ID    Gross hematuria/acute urinary retention  -felt to be secondary to UTI  -hematuria is resolved  -a1 blocker  -will attempt voiding trial just prior to discharge per urology recommendations     MARTITA/bilateral hydronephrosis  -Creatinine 2.5 on admission, up from 0.66 on  01/29/2024  -CT scan showed mild-to-moderate bilateral hydronephrosis   -Status post Mariscal catheter placement 03/26/2024 per urology with resolution of MARTITA    Hypernatremia  -improved with increased free water flushes     Encephalopathy  -Likely secondary to UTI/dehydration/malnutrition  -No history of dementia per daughter, at baseline oriented x 3  -CT head with no acute findings  -Follow-up MRI brain 03/29/2024 showed old stroke but no acute findings  -Improved, alert, largely nonverbal      Failure to thrive/malnutrition/decreased p.o. intake  -speech therapy evaluated, underwent VFSS 03/28/2024, found to have diffuse to be profound generalized pharyngeal weakness, silent aspiration.  Strict n.p.o. recommended  -Palliative care evaluated for goals of care discussion, family wanted to proceed with PEG tube placement.  -Status post PEG tube placement 03/29  -Continue tube feeds, tolerating     Constipation  -CT scan showed moderate gaseous distention of the colon with moderate amount of stool in the right colon and rectum.  -Patient refused suppository previously  -Status post Fleet enema 03/29, small BM afterwards  -Had multiple bowel movements overnight 03/30-03/31     Hypertension  -well controlled     Anemia of chronic disease  -Hemoglobin 10.1 on last check    GERD  -ppi    Pharyngeal secretions  -rapid strep is negative, culture pending  -ENT thinks this is non-infectious and recommends oral care  -chloraseptic spray prn    Lovenox 40 mg SC daily for DVT prophylaxis.  Full code.  Discussed with patient and nursing staff.  Anticipate discharge to SNU facility once arrangements have been made.  Ok to transfer to med/surg      Gerson Townsend MD  [facility]  04/03/24  13:20 EDT    I wore protective equipment throughout this patient encounter including a face mask, gloves and protective eyewear.  Hand hygiene was performed before donning protective equipment and after removal when leaving  the room.

## 2024-04-29 NOTE — CONSULT NOTE ADULT - CONSULT REQUESTED DATE/TIME
Message routed to Dr. Sánchez to review and advise.    Please advise on results and f/u recommendations for EGD 4/23/2024     16-Apr-2019 12:25

## 2024-05-16 NOTE — DISCHARGE NOTE PROVIDER - PROVIDER RX CONTACT NUMBER
35-year-old male with longstanding soft tissue masses  1 in flexor surface of left forearm, smooth, mobile, 1.5 cm, nontender, non fluctuant  2Nd lesion over right triceps, smooth, mobile, nontender, non fluctuant    Impression/plan   Benign-appearing soft tissue lesions   Discussed with patient, no apparent compelling reasons for excision at this time.  Patient wishes for watchful waiting    
[phone]

## 2024-05-17 NOTE — ED ADULT TRIAGE NOTE - ACCOMPANIED BY
Immediate family member
Headache    A headache is pain or discomfort felt around the head or neck area. The specific cause of a headache may not be found as there are many types including tension headaches, migraine headaches, and cluster headaches. Watch your condition for any changes. Things you can do to manage your pain include taking over the counter and prescription medications as instructed by your health care provider, lying down in a dark quiet room, limiting stress, getting regular sleep, and refraining from alcohol and tobacco products.    SEEK IMMEDIATE MEDICAL CARE IF YOU HAVE ANY OF THE FOLLOWING SYMPTOMS: fever, vomiting, stiff neck, loss of vision, problems with speech, muscle weakness, loss of balance, trouble walking, passing out, or confusion.

## 2024-06-11 NOTE — PROGRESS NOTE ADULT - SUBJECTIVE AND OBJECTIVE BOX
PGY 1 Note discussed with supervising resident and primary attending    Patient is a 89y old  Female who presents with a chief complaint of SOB (21 Mar 2019 10:35)      INTERVAL HPI/OVERNIGHT EVENTS: No acute events overnight, remains afebrile; HD stable, H/H stable, WBC WNL    MEDICATIONS  (STANDING):  ALBUTerol/ipratropium for Nebulization 3 milliLiter(s) Nebulizer every 6 hours  aspirin  chewable 81 milliGRAM(s) Oral daily  dronedarone 400 milliGRAM(s) Oral two times a day  ergocalciferol 59234 Unit(s) Oral <User Schedule>  furosemide   Injectable 40 milliGRAM(s) IV Push daily  hydrALAZINE 50 milliGRAM(s) Oral every 8 hours  losartan 25 milliGRAM(s) Oral daily  metoprolol tartrate 100 milliGRAM(s) Oral two times a day  simvastatin 40 milliGRAM(s) Oral at bedtime    MEDICATIONS  (PRN):      __________________________________________________  REVIEW OF SYSTEMS:    CONSTITUTIONAL: No fever,   EYES: no acute visual disturbances  NECK: No pain or stiffness  RESPIRATORY: No cough; No shortness of breath  CARDIOVASCULAR: No chest pain, no palpitations  GASTROINTESTINAL: No pain. No nausea or vomiting; No diarrhea   NEUROLOGICAL: No headache or numbness, no tremors  MUSCULOSKELETAL: No joint pain, no muscle pain  GENITOURINARY: no dysuria, no frequency, no hesitancy      Vital Signs Last 24 Hrs  T(C): 36.2 (21 Mar 2019 05:21), Max: 37.1 (20 Mar 2019 16:24)  T(F): 97.1 (21 Mar 2019 05:21), Max: 98.7 (20 Mar 2019 16:24)  HR: 66 (21 Mar 2019 12:38) (65 - 74)  BP: 167/58 (21 Mar 2019 12:38) (144/55 - 175/76)  BP(mean): 82 (21 Mar 2019 03:33) (82 - 82)  RR: 16 (21 Mar 2019 12:38) (16 - 18)  SpO2: 97% (21 Mar 2019 12:38) (97% - 100%)    ________________________________________________  PHYSICAL EXAM:    GENERAL: NAD  HEENT: Normocephalic;  conjunctivae and sclerae clear; moist mucous membranes;   NECK : supple  CHEST/LUNG: Clear to auscultation bilaterally with good air entry   HEART: S1 S2  regular; no murmurs, gallops or rubs  ABDOMEN: Soft, Nontender, Nondistended; Bowel sounds present  EXTREMITIES: no cyanosis; no edema; no calf tenderness  SKIN: warm and dry; no rash  NERVOUS SYSTEM:  Awake and alert; no new deficits    _________________________________________________  LABS:                        11.8   6.01  )-----------( 147      ( 20 Mar 2019 07:52 )             35.9     03-20    138  |  106  |  16  ----------------------------<  125<H>  4.0   |  26  |  0.98    Ca    9.0      20 Mar 2019 07:52  Phos  3.3     -20  Mg     2.3     -20    TPro  7.7  /  Alb  3.5  /  TBili  0.6  /  DBili  x   /  AST  34  /  ALT  28  /  AlkPhos  76  03-19    PT/INR - ( 19 Mar 2019 13:33 )   PT: 12.2 sec;   INR: 1.10 ratio         PTT - ( 19 Mar 2019 13:33 )  PTT:29.8 sec  Urinalysis Basic - ( 20 Mar 2019 16:25 )    Color: Yellow / Appearance: Clear / S.010 / pH: x  Gluc: x / Ketone: Negative  / Bili: Negative / Urobili: Negative   Blood: x / Protein: Negative / Nitrite: Negative   Leuk Esterase: Trace / RBC: 0-2 /HPF / WBC 0-2 /HPF   Sq Epi: x / Non Sq Epi: Occasional /HPF / Bacteria: Few /HPF      CAPILLARY BLOOD GLUCOSE            RADIOLOGY & ADDITIONAL TESTS:    Imaging Personally Reviewed:  YES    Consultant(s) Notes Reviewed:   YES    Care Discussed with Consultants :     Plan of care was discussed with patient and /or primary care giver; all questions and concerns were addressed and care was aligned with patient's wishes. PGY 1 Note discussed with supervising resident and primary attending    Patient is a 89y old  Female who presents with a chief complaint of SOB (21 Mar 2019 10:35)      INTERVAL HPI/OVERNIGHT EVENTS: No acute events overnight, remains afebrile; HD stable, H/H stable, WBC WNL  All infectious workup returned negative UA, BCx, RVP -ve  Patient remains afebrile with leukocytosis  Patient reports no new medical problems  Patient is breathing comfortably on NC O2 2L      MEDICATIONS  (STANDING):  ALBUTerol/ipratropium for Nebulization 3 milliLiter(s) Nebulizer every 6 hours  aspirin  chewable 81 milliGRAM(s) Oral daily  dronedarone 400 milliGRAM(s) Oral two times a day  ergocalciferol 51910 Unit(s) Oral <User Schedule>  furosemide   Injectable 40 milliGRAM(s) IV Push daily  hydrALAZINE 50 milliGRAM(s) Oral every 8 hours  losartan 25 milliGRAM(s) Oral daily  metoprolol tartrate 100 milliGRAM(s) Oral two times a day  simvastatin 40 milliGRAM(s) Oral at bedtime    MEDICATIONS  (PRN):      __________________________________________________  REVIEW OF SYSTEMS:    CONSTITUTIONAL: No fever,   EYES: no acute visual disturbances  NECK: No pain or stiffness  RESPIRATORY: No cough; shortness of breath, improving  CARDIOVASCULAR: No chest pain, no palpitations  GASTROINTESTINAL: No pain. No nausea or vomiting; No diarrhea   NEUROLOGICAL: No headache or numbness, no tremors  MUSCULOSKELETAL: No joint pain, no muscle pain  GENITOURINARY: no dysuria, no frequency, no hesitancy      Vital Signs Last 24 Hrs  T(C): 36.2 (21 Mar 2019 05:21), Max: 37.1 (20 Mar 2019 16:24)  T(F): 97.1 (21 Mar 2019 05:21), Max: 98.7 (20 Mar 2019 16:24)  HR: 66 (21 Mar 2019 12:38) (65 - 74)  BP: 167/58 (21 Mar 2019 12:38) (144/55 - 175/76)  BP(mean): 82 (21 Mar 2019 03:33) (82 - 82)  RR: 16 (21 Mar 2019 12:38) (16 - 18)  SpO2: 97% (21 Mar 2019 12:38) (97% - 100%)    ________________________________________________  PHYSICAL EXAM:    GENERAL: NAD  HEENT: Normocephalic;  conjunctivae and sclerae clear; moist mucous membranes;   NECK : supple  CHEST/LUNG: Clear to auscultation bilaterally with decreased air entry   HEART: S1 S2  regular; no murmurs, gallops or rubs  ABDOMEN: Soft, Nontender, Nondistended; Bowel sounds present  EXTREMITIES: no cyanosis; no edema; no calf tenderness  SKIN: warm and dry; no rash  NERVOUS SYSTEM:  Awake and alert; no new deficits    _________________________________________________  LABS:                        11.8   6.01  )-----------( 147      ( 20 Mar 2019 07:52 )             35.9     03-20    138  |  106  |  16  ----------------------------<  125<H>  4.0   |  26  |  0.98    Ca    9.0      20 Mar 2019 07:52  Phos  3.3     03-20  Mg     2.3     03-20    TPro  7.7  /  Alb  3.5  /  TBili  0.6  /  DBili  x   /  AST  34  /  ALT  28  /  AlkPhos  76  03-19    PT/INR - ( 19 Mar 2019 13:33 )   PT: 12.2 sec;   INR: 1.10 ratio         PTT - ( 19 Mar 2019 13:33 )  PTT:29.8 sec  Urinalysis Basic - ( 20 Mar 2019 16:25 )    Color: Yellow / Appearance: Clear / S.010 / pH: x  Gluc: x / Ketone: Negative  / Bili: Negative / Urobili: Negative   Blood: x / Protein: Negative / Nitrite: Negative   Leuk Esterase: Trace / RBC: 0-2 /HPF / WBC 0-2 /HPF   Sq Epi: x / Non Sq Epi: Occasional /HPF / Bacteria: Few /HPF      CAPILLARY BLOOD GLUCOSE            RADIOLOGY & ADDITIONAL TESTS:    Imaging Personally Reviewed:  YES    Consultant(s) Notes Reviewed:   YES    Care Discussed with Consultants :     Plan of care was discussed with patient and /or primary care giver; all questions and concerns were addressed and care was aligned with patient's wishes. No

## 2024-11-16 ENCOUNTER — INPATIENT (INPATIENT)
Facility: HOSPITAL | Age: 88
LOS: 2 days | Discharge: HOME | DRG: 293 | End: 2024-11-19
Attending: INTERNAL MEDICINE | Admitting: INTERNAL MEDICINE
Payer: COMMERCIAL

## 2024-11-16 VITALS
RESPIRATION RATE: 18 BRPM | DIASTOLIC BLOOD PRESSURE: 82 MMHG | OXYGEN SATURATION: 97 % | SYSTOLIC BLOOD PRESSURE: 168 MMHG | HEART RATE: 62 BPM | TEMPERATURE: 99 F | WEIGHT: 123.9 LBS

## 2024-11-16 DIAGNOSIS — Z29.9 ENCOUNTER FOR PROPHYLACTIC MEASURES, UNSPECIFIED: ICD-10-CM

## 2024-11-16 DIAGNOSIS — I50.9 HEART FAILURE, UNSPECIFIED: ICD-10-CM

## 2024-11-16 DIAGNOSIS — I10 ESSENTIAL (PRIMARY) HYPERTENSION: ICD-10-CM

## 2024-11-16 DIAGNOSIS — D69.6 THROMBOCYTOPENIA, UNSPECIFIED: ICD-10-CM

## 2024-11-16 DIAGNOSIS — I48.91 UNSPECIFIED ATRIAL FIBRILLATION: ICD-10-CM

## 2024-11-16 DIAGNOSIS — E78.5 HYPERLIPIDEMIA, UNSPECIFIED: ICD-10-CM

## 2024-11-16 DIAGNOSIS — I50.33 ACUTE ON CHRONIC DIASTOLIC (CONGESTIVE) HEART FAILURE: ICD-10-CM

## 2024-11-16 DIAGNOSIS — N18.9 CHRONIC KIDNEY DISEASE, UNSPECIFIED: ICD-10-CM

## 2024-11-16 DIAGNOSIS — Z95.0 PRESENCE OF CARDIAC PACEMAKER: Chronic | ICD-10-CM

## 2024-11-16 DIAGNOSIS — Z86.79 PERSONAL HISTORY OF OTHER DISEASES OF THE CIRCULATORY SYSTEM: ICD-10-CM

## 2024-11-16 DIAGNOSIS — R06.02 SHORTNESS OF BREATH: ICD-10-CM

## 2024-11-16 LAB
ALBUMIN SERPL ELPH-MCNC: 3.4 G/DL — LOW (ref 3.5–5)
ALP SERPL-CCNC: 52 U/L — SIGNIFICANT CHANGE UP (ref 40–120)
ALT FLD-CCNC: 25 U/L DA — SIGNIFICANT CHANGE UP (ref 10–60)
ANION GAP SERPL CALC-SCNC: 1 MMOL/L — LOW (ref 5–17)
APPEARANCE UR: CLEAR — SIGNIFICANT CHANGE UP
APTT BLD: 33 SEC — SIGNIFICANT CHANGE UP (ref 24.5–35.6)
AST SERPL-CCNC: 39 U/L — SIGNIFICANT CHANGE UP (ref 10–40)
BACTERIA # UR AUTO: ABNORMAL /HPF
BASOPHILS # BLD AUTO: 0.06 K/UL — SIGNIFICANT CHANGE UP (ref 0–0.2)
BASOPHILS NFR BLD AUTO: 1 % — SIGNIFICANT CHANGE UP (ref 0–2)
BILIRUB SERPL-MCNC: 0.4 MG/DL — SIGNIFICANT CHANGE UP (ref 0.2–1.2)
BILIRUB UR-MCNC: NEGATIVE — SIGNIFICANT CHANGE UP
BUN SERPL-MCNC: 30 MG/DL — HIGH (ref 7–18)
CALCIUM SERPL-MCNC: 9.7 MG/DL — SIGNIFICANT CHANGE UP (ref 8.4–10.5)
CHLORIDE SERPL-SCNC: 106 MMOL/L — SIGNIFICANT CHANGE UP (ref 96–108)
CO2 SERPL-SCNC: 30 MMOL/L — SIGNIFICANT CHANGE UP (ref 22–31)
COLOR SPEC: YELLOW — SIGNIFICANT CHANGE UP
CREAT SERPL-MCNC: 1.62 MG/DL — HIGH (ref 0.5–1.3)
D DIMER BLD IA.RAPID-MCNC: 967 NG/ML DDU — HIGH
DIFF PNL FLD: NEGATIVE — SIGNIFICANT CHANGE UP
EGFR: 29 ML/MIN/1.73M2 — LOW
EOSINOPHIL # BLD AUTO: 0.72 K/UL — HIGH (ref 0–0.5)
EOSINOPHIL NFR BLD AUTO: 11.9 % — HIGH (ref 0–6)
EPI CELLS # UR: PRESENT
FLUAV AG NPH QL: SIGNIFICANT CHANGE UP
FLUBV AG NPH QL: SIGNIFICANT CHANGE UP
GLUCOSE SERPL-MCNC: 107 MG/DL — HIGH (ref 70–99)
GLUCOSE UR QL: NEGATIVE MG/DL — SIGNIFICANT CHANGE UP
HCT VFR BLD CALC: 33.7 % — LOW (ref 34.5–45)
HGB BLD-MCNC: 11.5 G/DL — SIGNIFICANT CHANGE UP (ref 11.5–15.5)
IMM GRANULOCYTES NFR BLD AUTO: 0.5 % — SIGNIFICANT CHANGE UP (ref 0–0.9)
INR BLD: 0.99 RATIO — SIGNIFICANT CHANGE UP (ref 0.85–1.16)
KETONES UR-MCNC: NEGATIVE MG/DL — SIGNIFICANT CHANGE UP
LACTATE SERPL-SCNC: 1.1 MMOL/L — SIGNIFICANT CHANGE UP (ref 0.7–2)
LEUKOCYTE ESTERASE UR-ACNC: NEGATIVE — SIGNIFICANT CHANGE UP
LYMPHOCYTES # BLD AUTO: 1.26 K/UL — SIGNIFICANT CHANGE UP (ref 1–3.3)
LYMPHOCYTES # BLD AUTO: 20.8 % — SIGNIFICANT CHANGE UP (ref 13–44)
MCHC RBC-ENTMCNC: 34.1 G/DL — SIGNIFICANT CHANGE UP (ref 32–36)
MCHC RBC-ENTMCNC: 34.2 PG — HIGH (ref 27–34)
MCV RBC AUTO: 100.3 FL — HIGH (ref 80–100)
MONOCYTES # BLD AUTO: 0.51 K/UL — SIGNIFICANT CHANGE UP (ref 0–0.9)
MONOCYTES NFR BLD AUTO: 8.4 % — SIGNIFICANT CHANGE UP (ref 2–14)
NEUTROPHILS # BLD AUTO: 3.47 K/UL — SIGNIFICANT CHANGE UP (ref 1.8–7.4)
NEUTROPHILS NFR BLD AUTO: 57.4 % — SIGNIFICANT CHANGE UP (ref 43–77)
NITRITE UR-MCNC: NEGATIVE — SIGNIFICANT CHANGE UP
NRBC # BLD: 0 /100 WBCS — SIGNIFICANT CHANGE UP (ref 0–0)
NT-PROBNP SERPL-SCNC: 2372 PG/ML — HIGH (ref 0–450)
PH UR: 7 — SIGNIFICANT CHANGE UP (ref 5–8)
PLATELET # BLD AUTO: 99 K/UL — LOW (ref 150–400)
POTASSIUM SERPL-MCNC: 5.5 MMOL/L — HIGH (ref 3.5–5.3)
POTASSIUM SERPL-SCNC: 5.5 MMOL/L — HIGH (ref 3.5–5.3)
PROT SERPL-MCNC: 7.2 G/DL — SIGNIFICANT CHANGE UP (ref 6–8.3)
PROT UR-MCNC: ABNORMAL MG/DL
PROTHROM AB SERPL-ACNC: 11.6 SEC — SIGNIFICANT CHANGE UP (ref 9.9–13.4)
RBC # BLD: 3.36 M/UL — LOW (ref 3.8–5.2)
RBC # FLD: 13 % — SIGNIFICANT CHANGE UP (ref 10.3–14.5)
RBC CASTS # UR COMP ASSIST: 2 /HPF — SIGNIFICANT CHANGE UP (ref 0–4)
RSV RNA NPH QL NAA+NON-PROBE: SIGNIFICANT CHANGE UP
SARS-COV-2 RNA SPEC QL NAA+PROBE: SIGNIFICANT CHANGE UP
SODIUM SERPL-SCNC: 137 MMOL/L — SIGNIFICANT CHANGE UP (ref 135–145)
SP GR SPEC: 1.02 — SIGNIFICANT CHANGE UP (ref 1–1.03)
TROPONIN I, HIGH SENSITIVITY RESULT: 11.8 NG/L — SIGNIFICANT CHANGE UP
UROBILINOGEN FLD QL: 0.2 MG/DL — SIGNIFICANT CHANGE UP (ref 0.2–1)
WBC # BLD: 6.05 K/UL — SIGNIFICANT CHANGE UP (ref 3.8–10.5)
WBC # FLD AUTO: 6.05 K/UL — SIGNIFICANT CHANGE UP (ref 3.8–10.5)
WBC UR QL: 2 /HPF — SIGNIFICANT CHANGE UP (ref 0–5)

## 2024-11-16 PROCEDURE — 99285 EMERGENCY DEPT VISIT HI MDM: CPT

## 2024-11-16 PROCEDURE — 71045 X-RAY EXAM CHEST 1 VIEW: CPT | Mod: 26

## 2024-11-16 PROCEDURE — 71275 CT ANGIOGRAPHY CHEST: CPT | Mod: 26

## 2024-11-16 RX ORDER — FUROSEMIDE 40 MG/1
40 TABLET ORAL ONCE
Refills: 0 | Status: COMPLETED | OUTPATIENT
Start: 2024-11-16 | End: 2024-11-16

## 2024-11-16 RX ORDER — DRONEDARONE 400 MG/1
400 TABLET, FILM COATED ORAL
Refills: 0 | Status: DISCONTINUED | OUTPATIENT
Start: 2024-11-17 | End: 2024-11-19

## 2024-11-16 RX ORDER — TIMOLOL 0.5 %
1 DROPS OPHTHALMIC (EYE)
Refills: 0 | Status: DISCONTINUED | OUTPATIENT
Start: 2024-11-16 | End: 2024-11-19

## 2024-11-16 RX ORDER — DOXAZOSIN MESYLATE 1 MG
1 TABLET ORAL
Refills: 0 | DISCHARGE

## 2024-11-16 RX ORDER — METOPROLOL TARTRATE 100 MG/1
100 TABLET, FILM COATED ORAL EVERY 12 HOURS
Refills: 0 | Status: DISCONTINUED | OUTPATIENT
Start: 2024-11-17 | End: 2024-11-19

## 2024-11-16 RX ORDER — MONTELUKAST SODIUM 10 MG/1
10 TABLET ORAL AT BEDTIME
Refills: 0 | Status: DISCONTINUED | OUTPATIENT
Start: 2024-11-17 | End: 2024-11-19

## 2024-11-16 RX ORDER — HEPARIN SODIUM,PORCINE 1000/ML
5000 VIAL (ML) INJECTION EVERY 12 HOURS
Refills: 0 | Status: DISCONTINUED | OUTPATIENT
Start: 2024-11-16 | End: 2024-11-19

## 2024-11-16 RX ORDER — ISOSORBIDE MONONITRATE 10 MG
120 TABLET ORAL DAILY
Refills: 0 | Status: DISCONTINUED | OUTPATIENT
Start: 2024-11-17 | End: 2024-11-19

## 2024-11-16 RX ORDER — DOXAZOSIN MESYLATE 1 MG
2 TABLET ORAL AT BEDTIME
Refills: 0 | Status: DISCONTINUED | OUTPATIENT
Start: 2024-11-17 | End: 2024-11-19

## 2024-11-16 RX ORDER — HYDRALAZINE HYDROCHLORIDE 10 MG/1
100 TABLET ORAL THREE TIMES A DAY
Refills: 0 | Status: DISCONTINUED | OUTPATIENT
Start: 2024-11-16 | End: 2024-11-19

## 2024-11-16 RX ORDER — IPRATROPIUM BROMIDE AND ALBUTEROL SULFATE 2.5; .5 MG/3ML; MG/3ML
3 SOLUTION RESPIRATORY (INHALATION) EVERY 6 HOURS
Refills: 0 | Status: DISCONTINUED | OUTPATIENT
Start: 2024-11-16 | End: 2024-11-19

## 2024-11-16 RX ADMIN — FUROSEMIDE 40 MILLIGRAM(S): 40 TABLET ORAL at 16:57

## 2024-11-16 NOTE — PATIENT PROFILE ADULT - FALL HARM RISK - HARM RISK INTERVENTIONS
[facility] encounter  HEMATOLOGY/ONCOLOGY OFFICE VISIT    CHIEF COMPLAINT:   Blood Disorder (Iron deficiency/)      HISTORY OF PRESENT ILLNESS:  Sumi Roque is a 31 year old female who presents for evaluation of iron deficiency.  She is currently on oral iron.  She received IV iron in the past, but did not tolerate the third dose - shortness of breath, globus pallidus sensation 2 days following infusion.  She was in the emergency department received steroids. She now has anxiety regarding infusions.    She is also taking folic acid daily.  She has been nauseated the past few days.  No emesis.  She is on omeprazole - prescribed by GI.  Omeprazole can impair absorption of folic acid.  She is currently on oral contraceptives, with plan to stop them in the near future.  She is worried about menstrual cycles once stopping.    No abnormal bleeding.  No nose or gum bleeds.  No blood in stool or urine.        PAST HISTORIES:  Past medical history, surgical history, family history, and social history were reviewed and updated.    MEDICATIONS / ALLERGIES:  Medications and allergies were reviewed and updated.    Review of systems:  All other systems are reviewed and are negative except as documented in the history of present illness.  Psychiatric ROS: Negative for change in affect, anxiety, depression, insomnia, mentation or sleep disturbance.    PHYSICAL EXAM:  Vital Signs:   Oncology Encounter Vitals [03/12/24 1153]   ONC OP Encounter Vitals Group      /77      Heart Rate 94      Resp 18      Temp 98.1 °F (36.7 °C)      Temp src Oral      SpO2 98 %      Weight 155 lb (70.3 kg)      Height 5' 2\" (1.575 m)      Pain Score  0      Pain Location       Pain Education?       BSA (Calculated - m2) - [employer] 1.72      BSA (Calculated - sq m) 1.75      BMI (Calculated) 28.35     ECOG Performance Status:   ECOG [03/12/24 1154]   ECOG Performance Status 0     General: The patient is alert, well-developed,  well-nourished, no distress.  Skin: Warm, normal color, normal texture, normal turgor and without rash.  Head: Normocephalic, atraumatic.  Neck: Supple with no significant adenopathy.  Eyes: Normal conjunctivae and sclerae. Pupils equal, round.  Cardiovascular: Regular rate and rhythm, no murmurs, gallops or rubs.  Respiratory: Normal respiratory effort. Clear to auscultation. No wheezes, rales, or rhonchi.  Extremities: No clubbing, no cyanosis, no edema. Normal muscle tone and development bilaterally.  Lymph: No cervical, supraclavicular lymphadenopathy.  Neurologic: Motor strength normal. Coordination normal. No tremor noted.  Psychiatric: Cooperative. Appropriate mood and affect. Normal judgment.    DATA REVIEWED:  Laboratory Data:  Recent Labs   Lab 03/02/24  1057   WBC 7.4   RBC 4.98   HGB 14.2   HCT 43.3   MCV 86.9      Absolute Neutrophils 3.6   Absolute Lymphocytes 3.2   Absolute Monocytes 0.5   Absolute Eosinophils  0.1   Absolute Basophils 0.1     Recent Labs   Lab 03/02/24  1057 02/02/24  0932   Glucose 86 97   Sodium 139 140   Potassium 4.3 4.2   Chloride 108 109   Carbon Dioxide 23 25   BUN 14 13   Creatinine 0.77 0.71   Calcium 9.7 9.4   Magnesium  --  2.2   Protein, Total 7.0 7.2   Albumin 3.6 3.8   GOT/AST 11 9   Alkaline Phosphatase 79 69   GPT 14 19     Recent Labs   Lab 03/02/24  1057   Anion Gap 12   Globulin 3.4   Bilirubin, Total 0.6       Imaging Studies:  No new imaging studies.    ASSESSMENT AND PLAN:  Iron deficiency.  Patient tolerated several doses of Venofer but then had infusion reaction 2 days following 3rd dose of IV iron. Unclear if true allergic reaction, but managing as though this was. She feels that she may have had to rapid of a push which led to this side effect.  Lab work from 3/2/24 reviewed.  Iron levels have improved.  CBC normal.  Continue oral iron.    Folate deficiency - continues.  Continue oral folic acid daily.  Recommend discontinuation of omeprazole, as it  can interfere with absorption of folic acid.  Will repeat labs in 3 months.           Labs (cbc, iron/TIBC, ferritin, folate) in 3 months.  Labs (cbc, cmp, iron/TIBC, ferritin, folate, B12) and MD visit in 6 months.  The patient indicated understanding of the diagnosis and agreed with the plan of care. The patient is encouraged to call between now and next visit with any problems, questions or concerns that arise.  Supervising physician Dr. Avi Yuen, PAISIDRA     Assistance with ambulation/Assistance OOB with selected safe patient handling equipment/Communicate Risk of Fall with Harm to all staff/Reinforce activity limits and safety measures with patient and family/Review medications for side effects contributing to fall risk/Sit up slowly, dangle for a short time, stand at bedside before walking/Tailored Fall Risk Interventions/Toileting schedule using arm’s reach rule for commode and bathroom/Visual Cue: Yellow wristband and red socks/Bed in lowest position, wheels locked, appropriate side rails in place/Call bell, personal items and telephone in reach/Instruct patient to call for assistance before getting out of bed or chair/Non-slip footwear when patient is out of bed/[place] to call system/Physically safe environment - no spills, clutter or unnecessary equipment/Purposeful Proactive Rounding/Room/bathroom lighting operational, light cord in reach

## 2024-11-16 NOTE — ED ADULT TRIAGE NOTE - CHIEF COMPLAINT QUOTE
she's been wheezing, coughing and out of breath on and off for 3 - 4 days and noted this morning after voiding it has pinkish discharge per daughter

## 2024-11-16 NOTE — H&P ADULT - PROBLEM SELECTOR PLAN 2
-p/w 4 days of SOB, dry cough, dyspnea  -CXR with mild increased pulmonary markings  - D dimer elev to 967, CTA Chest with no acute findings (PE neg)  - 97% o2 on RA   - BNP 2372 (higher than baseline)  - s/p Lasix 40 mg IV x1 in ED   -pt takes Lasix 20 mg PO every other day at home    -hold Lasix at this time iso recent contrast use from CTA

## 2024-11-16 NOTE — H&P ADULT - PROBLEM SELECTOR PLAN 7
noted Plts 99  no signs of bleeding or petechia on exam  some level of thrombocytopenia present at baseline

## 2024-11-16 NOTE — ED PROVIDER NOTE - PHYSICAL EXAMINATION
Gen: no acute distress  Head: normocephalic, atraumatic  Lung: CTAB, no respiratory distress, no wheezing, rales, rhonchi  CV: normal s1/s2, rrr,   Abd: soft, non-tender, non-distended  MSK:  full range of motion in all 4 extremities  Neuro: No focal neurologic deficits

## 2024-11-16 NOTE — ED PROVIDER NOTE - PROGRESS NOTE DETAILS
JK - Lungs are clear, no peripheral edema however given BNP 2300 and patient blood pressure in the 200s over 70s, will give IV diuresis, admit to medicine for acute on chronic heart failure.  D-dimer elevated, CT angio pending.  Vitals are stable rest comfortably.  Patient family in agreement with plan.

## 2024-11-16 NOTE — ED ADULT NURSE NOTE - NSFALLHARMRISKINTERV_ED_ALL_ED
Assistance OOB with selected safe patient handling equipment if applicable/Communicate risk of Fall with Harm to all staff, patient, and family/Monitor gait and stability/Monitor for mental status changes and reorient to person, place, and time, as needed/Move patient closer to nursing station/within visual sight of ED staff/Provide patient with walking aids/Provide visual cue: red socks, yellow wristband, yellow gown, etc/Reinforce activity limits and safety measures with patient and family/Toileting schedule using arm’s reach rule for commode and bathroom/Use of alarms - bed, stretcher, chair and/or video monitoring/Bed in lowest position, wheels locked, appropriate side rails in place/Call bell, personal items and telephone in reach/Instruct patient to call for assistance before getting out of bed/chair/stretcher/Non-slip footwear applied when patient is off stretcher/[place] to call system/Physically safe environment - no spills, clutter or unnecessary equipment/Purposeful Proactive Rounding/Room/bathroom lighting operational, light cord in reach

## 2024-11-16 NOTE — ED ADULT NURSE NOTE - OBJECTIVE STATEMENT
Pt presents to ED for cough, shortness of breath and wheezing x 3 days. Pt's daughter reports pt noted with pink colored discharge when voiding.

## 2024-11-16 NOTE — H&P ADULT - PROBLEM SELECTOR PLAN 5
c/w home meds with parameters- Hydralazine 100 mg TID, Lopressor 100 mg BID, Imdur 120 mg BI, Cardura 2 mg qd

## 2024-11-16 NOTE — ED PROVIDER NOTE - EKG #1 DATE/TIME
16-Nov-2024 16:22 Bactrim Pregnancy And Lactation Text: This medication is Pregnancy Category D and is known to cause fetal risk.  It is also excreted in breast milk.

## 2024-11-16 NOTE — H&P ADULT - ATTENDING COMMENTS
89 year-old female from home, minimally ambulatory with assistance, with past medical history of dementia,  hypertension, paroxysmal A-fib not on anticoagulation due to GI bleed, CHB s/p PPM, HLD, asthma  presenting with wheezing, coughing, shortness of breath for approximately 3 to 4 days,acute asthma exacerbation,CAMILLE on CRI.  1.Tele monitoring,  2.Asthma exacerbation-nebs,steroid,Pulm eval.  3.CAMILLE on CRI-no further diuretics, Renal eval.  4.PAF-asa,multaq,lopressor.No AC due to GI bleed in past.  5.HTN-cont bp medication.  6.Interrogate PPM.  7.Lipid d/o-statin.  8.GI and DVT prophylaxis.  9.Check FLP,a1c,tft's.

## 2024-11-16 NOTE — H&P ADULT - HISTORY OF PRESENT ILLNESS
Patient is a 89 year-old female with past medical history of dementia,  hypertension, heart failure with preserved ejection fraction, paroxysmal A-fib not on anticoagulation due to GI bleed, CHB s/p PPM, HLD, asthma  presenting with wheezing, coughing, shortness of breath for approximately 3 to 4 days.  As per patient and children at bedside, patient has been found to be wheezing, coughing which has been dry, and appear to be short of breath for approximately 4 days.  Denies any fevers or chest pain.  Patient daughter noted that patient had some pink urethral discharge this morning while voiding.  Patient denies any abdominal pain or back pain.  Patient currently at baseline, oriented to person, place, time but not situation.  Moving all extremities equally.  Vital signs stable. Patient is a 89 year-old female with past medical history of dementia,  hypertension, heart failure with preserved ejection fraction, paroxysmal A-fib not on anticoagulation due to GI bleed, CHB s/p PPM, HLD, asthma  presenting with wheezing, coughing, shortness of breath for approximately 3 to 4 days.  As per patient and children at bedside, patient has been found to be wheezing, coughing which has been dry, and appear to be short of breath for approximately 4 days.  Denies any fevers or chest pain. Patient denies any abdominal pain or back pain.  Patient currently at baseline, oriented to person, place, time but not situation.  Moving all extremities equally.  Vital signs stable. Patient is a 89 year-old female from home, minimally ambulatory with assistance, with past medical history of dementia,  hypertension, heart failure with preserved ejection fraction, paroxysmal A-fib not on anticoagulation due to GI bleed, CHB s/p PPM, HLD, asthma  presenting with wheezing, coughing, shortness of breath for approximately 3 to 4 days. As per daughter at bedside, patient has been found to be wheezing, coughing which has been dry and non productive, and short of breath for approximately 4 days.  Denies any fevers or chest pain.. Patient denies any abdominal pain or back pain.  Denies any recent travel or sick contacts. Denies any changes in medications. Patient currently at baseline, oriented to person, place, time but not situation. Appears comfortable, breathing well on RA.  Admitted to medicine for SOB.      Patient is a 89 year-old female from home, minimally ambulatory with assistance, with past medical history of dementia,  hypertension, paroxysmal A-fib not on anticoagulation due to GI bleed, CHB s/p PPM, HLD, asthma  presenting with wheezing, coughing, shortness of breath for approximately 3 to 4 days. As per daughter at bedside, patient has been found to be wheezing, coughing which has been dry and non productive, and short of breath for approximately 4 days.  Denies any fevers or chest pain.. Patient denies any abdominal pain or back pain.  Denies any recent travel or sick contacts. Denies any changes in medications. Patient currently at baseline, oriented to person, place, time but not situation. Appears comfortable, breathing well on RA.  Admitted to medicine for SOB.

## 2024-11-16 NOTE — H&P ADULT - ASSESSMENT
Patient is a 89 year-old female with past medical history of dementia,  hypertension, heart failure with preserved ejection fraction, paroxysmal A-fib not on anticoagulation due to GI bleed, CHB s/p PPM, HLD, asthma  presenting with wheezing, coughing, shortness of breath for approximately 3 to 4 days.  As per patient and children at bedside, patient has been found to be wheezing, coughing which has been dry, and appear to be short of breath for approximately 4 days.  Denies any fevers or chest pain.  Patient daughter noted that patient had some pink urethral discharge this morning while voiding.  Patient denies any abdominal pain or back pain.  Patient currently at baseline, oriented to person, place, time but not situation.  Moving all extremities equally.  Vital signs stable.    s/p lasix 40 mg  iv x1, BNP 2372  f/u cta, d dimer 967  K 5.5  kidney fx aound baseline per last dc  thrombocytopenia- 99  UA contam Patient is a 89 year-old female from home, minimally ambulatory with assistance, with past medical history of dementia,  hypertension, heart failure with preserved ejection fraction, paroxysmal A-fib not on anticoagulation due to GI bleed, CHB s/p PPM, HLD, asthma  presenting with wheezing, coughing, shortness of breath for approximately 3 to 4 days.  As per patient and children at bedside, patient has been found to be wheezing, coughing which has been dry, and appear to be short of breath for approximately 4 days.  Denies any fevers or chest pain.  Patient daughter noted that patient had some pink urethral discharge this morning while voiding.  Patient denies any abdominal pain or back pain.  Patient currently at baseline, oriented to person, place, time but not situation.  Moving all extremities equally.  Vital signs stable. Admitted to medicine for SOB.    Patient is a 89 year-old female from home, minimally ambulatory with assistance, with past medical history of dementia,  hypertension, heart failure with preserved ejection fraction, paroxysmal A-fib not on anticoagulation due to GI bleed, CHB s/p PPM, HLD, asthma  presenting with wheezing, coughing, shortness of breath for approximately 3 to 4 days. As per daughter at bedside, patient has been found to be wheezing, coughing which has been dry and non productive, and short of breath for approximately 4 days.  Denies any fevers or chest pain.. Patient denies any abdominal pain or back pain.  Patient currently at baseline, oriented to person, place, time but not situation. Appears comfortable, breathing well on RA.  Admitted to medicine for SOB.

## 2024-11-16 NOTE — H&P ADULT - NSHPPHYSICALEXAM_GEN_ALL_CORE
PHYSICAL EXAM:  GENERAL: NAD, no signs of respiratory distress  HEAD:  Atraumatic, Normocephalic  EYES: EOMI, PERRLA, conjunctiva and sclera clear  NECK: Supple, No JVD  CHEST/LUNG: Clear to auscultation bilaterally; No wheeze; No crackles; No accessory muscles used. + mild congestion noted  HEART: Regular rate and rhythm; No murmurs;   ABDOMEN: Soft, Nontender, Nondistended; Bowel sounds present; No guarding  EXTREMITIES:  2+ Peripheral Pulses, No cyanosis or edema  PSYCH: AAOx1  NEUROLOGY: non-focal  SKIN: No rashes or lesions    Vital Signs Last 24 Hrs  T(C): 36.6 (16 Nov 2024 19:51), Max: 37 (16 Nov 2024 14:47)  T(F): 97.8 (16 Nov 2024 19:51), Max: 98.6 (16 Nov 2024 14:47)  HR: 76 (16 Nov 2024 20:57) (62 - 76)  BP: 162/55 (16 Nov 2024 20:57) (162/55 - 193/67)  BP(mean): --  RR: 18 (16 Nov 2024 20:57) (17 - 18)  SpO2: 95% (16 Nov 2024 20:57) (95% - 97%)    Parameters below as of 16 Nov 2024 20:57  Patient On (Oxygen Delivery Method): room air

## 2024-11-16 NOTE — H&P ADULT - PROBLEM SELECTOR PLAN 1
-p/w 4 days of SOB, dry cough, dyspnea  -RVP neg  -CXR with low suspicion for PNA  -CTA Chest unremarkable (no PE noted)    -supportive care- Duonebs q 6  -c/w home med Montelukast 10 mg daily

## 2024-11-16 NOTE — ED PROVIDER NOTE - CLINICAL SUMMARY MEDICAL DECISION MAKING FREE TEXT BOX
Patient is a 89-year-old female with past medical history of dementia,  hypertension, heart failure with preserved ejection fraction, paroxysmal A-fib not on anticoagulation due to GI bleed, CHB s/p PPM, HLD, asthma  presenting with wheezing, coughing, shortness of breath for approximately 3 to 4 days. VSS lungs ctab.  -   Will check labs, rule out electrolyte abnormalities, viral swab chest x-ray to evaluate for upper respiratory infection versus ammonia, BNP to eval for heart failure, troponin EKG to assess for ACS versus arrhythmia, lactate to rule out end organ dysfunction, UA to rule out urinary tract infection, reassess

## 2024-11-16 NOTE — ED PROVIDER NOTE - OBJECTIVE STATEMENT
Patient is a 89-year-old female with past medical history of dementia,  hypertension, heart failure with preserved ejection fraction, paroxysmal A-fib not on anticoagulation due to GI bleed, CHB s/p PPM, HLD, asthma  presenting with wheezing, coughing, shortness of breath for approximately 3 to 4 days.  As per patient and children at bedside, patient has been found to be wheezing, coughing which has been dry, and appear to be short of breath for approximately 4 days.  Denies any fevers or chest pain.  Patient daughter noted that patient had some pink urethral discharge this morning while voiding.  Patient denies any abdominal pain or back pain.  Patient currently at baseline, oriented to person, place, time but not situation.  Moving all extremities equally.  Vital signs stable.

## 2024-11-17 DIAGNOSIS — J45.901 UNSPECIFIED ASTHMA WITH (ACUTE) EXACERBATION: ICD-10-CM

## 2024-11-17 LAB
ANION GAP SERPL CALC-SCNC: 4 MMOL/L — LOW (ref 5–17)
BUN SERPL-MCNC: 28 MG/DL — HIGH (ref 7–18)
CALCIUM SERPL-MCNC: 9.6 MG/DL — SIGNIFICANT CHANGE UP (ref 8.4–10.5)
CHLORIDE SERPL-SCNC: 104 MMOL/L — SIGNIFICANT CHANGE UP (ref 96–108)
CO2 SERPL-SCNC: 32 MMOL/L — HIGH (ref 22–31)
CREAT ?TM UR-MCNC: 163 MG/DL — SIGNIFICANT CHANGE UP
CREAT SERPL-MCNC: 1.34 MG/DL — HIGH (ref 0.5–1.3)
EGFR: 36 ML/MIN/1.73M2 — LOW
GLUCOSE SERPL-MCNC: 107 MG/DL — HIGH (ref 70–99)
HCT VFR BLD CALC: 33.5 % — LOW (ref 34.5–45)
HGB BLD-MCNC: 11.3 G/DL — LOW (ref 11.5–15.5)
MCHC RBC-ENTMCNC: 32.9 PG — SIGNIFICANT CHANGE UP (ref 27–34)
MCHC RBC-ENTMCNC: 33.7 G/DL — SIGNIFICANT CHANGE UP (ref 32–36)
MCV RBC AUTO: 97.7 FL — SIGNIFICANT CHANGE UP (ref 80–100)
NRBC # BLD: 0 /100 WBCS — SIGNIFICANT CHANGE UP (ref 0–0)
PLATELET # BLD AUTO: 102 K/UL — LOW (ref 150–400)
POTASSIUM SERPL-MCNC: 3.4 MMOL/L — LOW (ref 3.5–5.3)
POTASSIUM SERPL-SCNC: 3.4 MMOL/L — LOW (ref 3.5–5.3)
POTASSIUM UR-SCNC: 70 MMOL/L — SIGNIFICANT CHANGE UP
PROT ?TM UR-MCNC: 62 MG/DL — HIGH (ref 0–12)
PROT/CREAT UR-RTO: 0.4 RATIO — HIGH (ref 0–0.2)
RBC # BLD: 3.43 M/UL — LOW (ref 3.8–5.2)
RBC # FLD: 13 % — SIGNIFICANT CHANGE UP (ref 10.3–14.5)
SODIUM SERPL-SCNC: 140 MMOL/L — SIGNIFICANT CHANGE UP (ref 135–145)
SODIUM UR-SCNC: 50 MMOL/L — SIGNIFICANT CHANGE UP
WBC # BLD: 5.56 K/UL — SIGNIFICANT CHANGE UP (ref 3.8–10.5)
WBC # FLD AUTO: 5.56 K/UL — SIGNIFICANT CHANGE UP (ref 3.8–10.5)

## 2024-11-17 RX ORDER — POTASSIUM CHLORIDE 600 MG/1
20 TABLET, EXTENDED RELEASE ORAL
Refills: 0 | Status: COMPLETED | OUTPATIENT
Start: 2024-11-17 | End: 2024-11-17

## 2024-11-17 RX ORDER — HALOPERIDOL 2 MG
1 TABLET ORAL ONCE
Refills: 0 | Status: COMPLETED | OUTPATIENT
Start: 2024-11-17 | End: 2024-11-17

## 2024-11-17 RX ORDER — METHYLPREDNISOLONE SOD SUCC 125 MG
40 VIAL (EA) INJECTION EVERY 12 HOURS
Refills: 0 | Status: DISCONTINUED | OUTPATIENT
Start: 2024-11-17 | End: 2024-11-18

## 2024-11-17 RX ORDER — RISPERIDONE 4 MG/1
0.5 TABLET ORAL ONCE
Refills: 0 | Status: COMPLETED | OUTPATIENT
Start: 2024-11-17 | End: 2024-11-17

## 2024-11-17 RX ADMIN — RISPERIDONE 0.5 MILLIGRAM(S): 4 TABLET ORAL at 20:15

## 2024-11-17 RX ADMIN — HYDRALAZINE HYDROCHLORIDE 100 MILLIGRAM(S): 10 TABLET ORAL at 05:12

## 2024-11-17 RX ADMIN — IPRATROPIUM BROMIDE AND ALBUTEROL SULFATE 3 MILLILITER(S): 2.5; .5 SOLUTION RESPIRATORY (INHALATION) at 03:25

## 2024-11-17 RX ADMIN — Medication 1 MILLIGRAM(S): at 18:52

## 2024-11-17 RX ADMIN — Medication 1 DROP(S): at 05:14

## 2024-11-17 RX ADMIN — MONTELUKAST SODIUM 10 MILLIGRAM(S): 10 TABLET ORAL at 23:19

## 2024-11-17 RX ADMIN — METOPROLOL TARTRATE 100 MILLIGRAM(S): 100 TABLET, FILM COATED ORAL at 05:14

## 2024-11-17 RX ADMIN — Medication 2 MILLIGRAM(S): at 23:19

## 2024-11-17 RX ADMIN — Medication 5000 UNIT(S): at 18:56

## 2024-11-17 RX ADMIN — Medication 20 MILLIGRAM(S): at 23:19

## 2024-11-17 RX ADMIN — IPRATROPIUM BROMIDE AND ALBUTEROL SULFATE 3 MILLILITER(S): 2.5; .5 SOLUTION RESPIRATORY (INHALATION) at 15:58

## 2024-11-17 RX ADMIN — HYDRALAZINE HYDROCHLORIDE 100 MILLIGRAM(S): 10 TABLET ORAL at 16:40

## 2024-11-17 RX ADMIN — POTASSIUM CHLORIDE 20 MILLIEQUIVALENT(S): 600 TABLET, EXTENDED RELEASE ORAL at 11:16

## 2024-11-17 RX ADMIN — IPRATROPIUM BROMIDE AND ALBUTEROL SULFATE 3 MILLILITER(S): 2.5; .5 SOLUTION RESPIRATORY (INHALATION) at 20:16

## 2024-11-17 RX ADMIN — Medication 40 MILLIGRAM(S): at 10:53

## 2024-11-17 RX ADMIN — HYDRALAZINE HYDROCHLORIDE 100 MILLIGRAM(S): 10 TABLET ORAL at 23:19

## 2024-11-17 RX ADMIN — IPRATROPIUM BROMIDE AND ALBUTEROL SULFATE 3 MILLILITER(S): 2.5; .5 SOLUTION RESPIRATORY (INHALATION) at 09:06

## 2024-11-17 RX ADMIN — DRONEDARONE 400 MILLIGRAM(S): 400 TABLET, FILM COATED ORAL at 05:14

## 2024-11-17 RX ADMIN — Medication 81 MILLIGRAM(S): at 11:16

## 2024-11-17 RX ADMIN — METOPROLOL TARTRATE 100 MILLIGRAM(S): 100 TABLET, FILM COATED ORAL at 18:52

## 2024-11-17 RX ADMIN — Medication 5000 UNIT(S): at 05:12

## 2024-11-17 NOTE — CONSULT NOTE ADULT - PROBLEM SELECTOR RECOMMENDATION 9
R/o ACS protocol  Cardio eval  Tele monitoring  Nitrates po  Hydralazine 100 mgs po TID  Lasix IV prn

## 2024-11-17 NOTE — CONSULT NOTE ADULT - SUBJECTIVE AND OBJECTIVE BOX
Chief complain/HPI      History of Present Illness:   Patient is a 89 year-old female from home, minimally ambulatory with assistance, with past medical history of dementia,  hypertension, paroxysmal A-fib not on anticoagulation due to GI bleed, CHB s/p PPM, HLD, asthma  presenting with wheezing, coughing, shortness of breath for approximately 3 to 4 days with no improvement.  History of CKD , baseline creatinine 1.3  History of HFpEF diastolic dysfunction and pulmonry hypertension.  History of COPD.       < from: US Renal (12.15.21 @ 11:55) >  FINDINGS:    Right kidney: 9.8 cm. No renal mass, hydronephrosis or calculi. 1.0 cm   right renal cyst.    Left kidney: 9.9 cm. No renal mass, hydronephrosis or calculi. 2.1 cm   left renal cyst.    Urinary bladder: Grossly unremarkable.    IMPRESSION:    No hydronephrosis.    Bilateral renal cysts.    < end of copied text >        .     Home Medications:   * Patient Currently Takes Medications as of 16-Nov-2024 22:23 documented in Structured Notes  · 	hydrALAZINE 100 mg oral tablet: Last Dose Taken:  , 1 tab(s) orally 3 times a day  · 	montelukast 10 mg oral tablet: Last Dose Taken:  , 1 tab(s) orally once a day  · 	metoprolol tartrate 100 mg oral tablet: Last Dose Taken:  , 1 tab(s) orally every 12 hours  · 	dronedarone 400 mg oral tablet: Last Dose Taken:  , 1 tab(s) orally 2 times a day  · 	isosorbide mononitrate 120 mg oral tablet, extended release: Last Dose Taken:  , 1 tab(s) orally 2 times a day  · 	albuterol 90 mcg/inh inhalation aerosol: Last Dose Taken:  , 2 puff(s) inhaled every 6 hours, As needed, Shortness of Breath and/or Wheezing  · 	Cardura 2 mg oral tablet: Last Dose Taken:  , 1 tab(s) orally once a day (at bedtime)  · 	furosemide 20 mg oral tablet: Last Dose Taken:  , 1 tab(s) orally every other day  · 	simvastatin 40 mg oral tablet: Last Dose Taken:  , 1 tab(s) orally once a day (at bedtime)      PAST MEDICAL & SURGICAL HISTORY:  HTN (hypertension)      Hypercholesterolemia      Atrial fibrillation      History of complete heart block      Pulmonary HTN      CHF (congestive heart failure), DD      Artificial pacemaker          Social History:  · Substance use	No  · Social History (marital status, living situation, occupation, and sexual history)	lives at home, cared for by daughter    Tobacco Screening:  · Core Measure Site	Yes  · Has the patient used tobacco in the past 30 days?	No    Risk Assessment:   Present on Admission:  Deep Venous Thrombosis	no  Pulmonary Embolus	no    HIV Screening:  · In accordance with [place] State law, we offer every patient who comes to our ED an HIV test. Would you like to be tested today?	Opt out    Hepatitis C Test Questions:  · In accordance with [facility] Law, we offer every patient a Hepatitis C test. Would you like to be tested today?	Opt out    Hospital Medications:   MEDICATIONS  (STANDING):  albuterol/ipratropium for Nebulization 3 milliLiter(s) Nebulizer every 6 hours  aspirin  chewable 81 milliGRAM(s) Oral daily  atorvastatin 20 milliGRAM(s) Oral at bedtime  doxazosin 2 milliGRAM(s) Oral at bedtime  dronedarone 400 milliGRAM(s) Oral two times a day  heparin   Injectable 5000 Unit(s) SubCutaneous every 12 hours  hydrALAZINE 100 milliGRAM(s) Oral three times a day  isosorbide   mononitrate ER Tablet (IMDUR) 120 milliGRAM(s) Oral daily  methylPREDNISolone sodium succinate Injectable 40 milliGRAM(s) IV Push every 12 hours  metoprolol tartrate 100 milliGRAM(s) Oral every 12 hours  montelukast 10 milliGRAM(s) Oral at bedtime  potassium chloride    Tablet ER 20 milliEquivalent(s) Oral every 2 hours  timolol 0.25% Solution 1 Drop(s) Both EYES two times a day    MEDICATIONS  (PRN):      Allergies    No Known Allergies    Intolerances                              11.3   5.56  )-----------( 102      ( 17 Nov 2024 08:35 )             33.5     11-17    140  |  104  |  28[H]  ----------------------------<  107[H]  3.4[L]   |  32[H]  |  1.34[H]    Ca    9.6      17 Nov 2024 08:35    TPro  7.2  /  Alb  3.4[L]  /  TBili  0.4  /  DBili  x   /  AST  39  /  ALT  25  /  AlkPhos  52  11-16    PT/INR - ( 16 Nov 2024 15:49 )   PT: 11.6 sec;   INR: 0.99 ratio         PTT - ( 16 Nov 2024 15:49 )  PTT:33.0 sec      Urinalysis with Rflx Culture (11.16.24 @ 15:49)   Urine Appearance: Clear  Color: Yellow  Specific Gravity: 1.021  pH Urine: 7.0  Protein, Urine: Trace mg/dL  Glucose Qualitative, Urine: Negative mg/dL  Ketone - Urine: Negative mg/dL  Blood, Urine: Negative  Bilirubin: Negative  Urobilinogen: 0.2 mg/dL  Leukocyte Esterase Concentration: Negative  Nitrite: NegativeUrine Microscopic-Add On (NC) (11.16.24 @ 15:49)   Squamous Epithelial Cells: Present  Red Blood Cell - Urine: 2 /HPF  White Blood Cell - Urine: 2 /HPF  Bacteria: Few /HPF            RADIOLOGY & ADDITIONAL STUDIES:  < from: Xray Chest 1 View- PORTABLE-Urgent (Xray Chest 1 View- PORTABLE-Urgent .) (11.16.24 @ 15:35) >  FINDINGS:    Single frontal view of the chest demonstrates the lungs to be clear. The   cardiomediastinal silhouette is unremarkable. No acute osseous   abnormalities. Left-sided pacemaker. Please refer to the subsequent chest   CT for further details.    IMPRESSION: No acute cardiopulmonary disease process.      < end of copied text >  < from: Xray Chest 1 View- PORTABLE-Urgent (Xray Chest 1 View- PORTABLE-Urgent .) (11.16.24 @ 15:35) >  Single frontal view of the chest demonstrates the lungs to be clear. The   cardiomediastinal silhouette is unremarkable. No acute osseous   abnormalities. Left-sided pacemaker. Please refer to the subsequent chest   CT for further details.    IMPRESSION: No acute cardiopulmonary disease process.    < end of copied text >        FAMILY HISTORY:  FH: hypertension    FH: coronary artery disease        REVIEW OF SYSTEMS: as per the daughter   CONSTITUTIONAL: No malaise, No fatigue, No fevers or chills, well developed, no diaphoresis    RESPIRATORY:  No shortness of breath and cough at this time.   CARDIOVASCULAR: No chest pain or palpitations. No edema  GASTROINTESTINAL: No abdominal or epigastric pain. No nausea, vomiting, or hematemesis; No diarrhea or constipation. No melena or hematochezia.  GENITOURINARY: No dysuria, frequency, foamy urine, urinary urgency, incontinence or hematuria  NEUROLOGICAL: No numbness or weakness, No tremor  SKIN: No itching, burning, rashes, or lesions     VITALS:  Vital Signs Last 24 Hrs  T(C): 36.6 (17 Nov 2024 11:24), Max: 37 (16 Nov 2024 14:47)  T(F): 97.9 (17 Nov 2024 11:24), Max: 98.6 (16 Nov 2024 14:47)  HR: 62 (17 Nov 2024 11:24) (59 - 76)  BP: 140/43 (17 Nov 2024 11:24) (129/40 - 193/67)  BP(mean): --  RR: 17 (17 Nov 2024 11:24) (17 - 18)  SpO2: 97% (17 Nov 2024 11:24) (95% - 97%)    Parameters below as of 17 Nov 2024 11:24  Patient On (Oxygen Delivery Method): room air        11-16 @ 07:01  -  11-17 @ 07:00  --------------------------------------------------------  IN: 0 mL / OUT: 600 mL / NET: -600 mL        Weight (kg): 56.2 (11-16 @ 14:47)    PHYSICAL EXAM:  Constitutional: NAD  HEENT: anicteric sclera, oropharynx clear, MMM  Neck: No JVD  Respiratory: good air entrance B/L, no wheezes, rales or rhonchi  Cardiovascular: S1, S2, RRR, no pericardial rub, no murmur  Gastrointestinal: BS+, soft, no tenderness, no distension, no bruit  Pelvis: bladder non-distended, no CVA tenderness  Extremities: No cyanosis or clubbing. No peripheral edema  Neurological: A/O x 3, no focal deficits                      
PULMONARY CONSULT NOTE      CLAUDETTE GARCES  MRN-[id number]    History of Present Illness:  History of Present Illness:   Patient is a 89 year-old female from home, minimally ambulatory with assistance, with past medical history of dementia,  hypertension, paroxysmal A-fib not on anticoagulation due to GI bleed, CHB s/p PPM, HLD, asthma  presenting with wheezing, coughing, shortness of breath for approximately 3 to 4 days. As per daughter at bedside, patient has been found to be wheezing, coughing which has been dry and non productive, and short of breath for approximately 4 days.  Denies any fevers or chest pain.. Patient denies any abdominal pain or back pain.  Denies any recent travel or sick contacts. Denies any changes in medications. Patient currently at baseline, oriented to person, place, time but not situation. Appears comfortable, breathing well on RA.  Admitted to medicine for SOB.         HISTORY OF PRESENT ILLNESS: As Above. Awake, alert, comfortable in bed in NAD    MEDICATIONS  (STANDING):  albuterol/ipratropium for Nebulization 3 milliLiter(s) Nebulizer every 6 hours  atorvastatin 20 milliGRAM(s) Oral at bedtime  doxazosin 2 milliGRAM(s) Oral at bedtime  dronedarone 400 milliGRAM(s) Oral two times a day  heparin   Injectable 5000 Unit(s) SubCutaneous every 12 hours  hydrALAZINE 100 milliGRAM(s) Oral three times a day  isosorbide   mononitrate ER Tablet (IMDUR) 120 milliGRAM(s) Oral daily  methylPREDNISolone sodium succinate Injectable 40 milliGRAM(s) IV Push every 12 hours  metoprolol tartrate 100 milliGRAM(s) Oral every 12 hours  montelukast 10 milliGRAM(s) Oral at bedtime  timolol 0.25% Solution 1 Drop(s) Both EYES two times a day      MEDICATIONS  (PRN):      Allergies    No Known Allergies    Intolerances        PAST MEDICAL & SURGICAL HISTORY:  HTN (hypertension)      Hypercholesterolemia      Atrial fibrillation      History of complete heart block      Pulmonary HTN      CHF (congestive heart failure)      Artificial pacemaker          FAMILY HISTORY:  FH: hypertension    FH: coronary artery disease        SOCIAL HISTORY  Smoking History:     REVIEW OF SYSTEMS:    CONSTITUTIONAL:  No fevers, chills, sweats    HEENT:  Eyes:  No diplopia or blurred vision. ENT:  No earache, sore throat or runny nose.    CARDIOVASCULAR:  No pressure, squeezing, tightness, or heaviness about the chest; no palpitations.    RESPIRATORY:  Per HPI    GASTROINTESTINAL:  No abdominal pain, nausea, vomiting or diarrhea.    GENITOURINARY:  No dysuria, frequency or urgency.    NEUROLOGIC:  No paresthesias, fasciculations, seizures or weakness.    PSYCHIATRIC:  No disorder of thought or mood.    Vital Signs Last 24 Hrs  T(C): 36.5 (17 Nov 2024 08:34), Max: 37 (16 Nov 2024 14:47)  T(F): 97.7 (17 Nov 2024 08:34), Max: 98.6 (16 Nov 2024 14:47)  HR: 59 (17 Nov 2024 08:34) (59 - 76)  BP: 154/50 (17 Nov 2024 08:34) (129/40 - 193/67)  BP(mean): --  RR: 18 (17 Nov 2024 08:34) (17 - 18)  SpO2: 95% (17 Nov 2024 08:34) (95% - 97%)    Parameters below as of 17 Nov 2024 08:34  Patient On (Oxygen Delivery Method): room air      I&O's Detail    16 Nov 2024 07:01  -  17 Nov 2024 07:00  --------------------------------------------------------  IN:  Total IN: 0 mL    OUT:    Voided (mL): 600 mL  Total OUT: 600 mL    Total NET: -600 mL          PHYSICAL EXAMINATION:    GENERAL: The patient is a well-developed, well-nourished _____in no apparent distress.     HEENT: Head is normocephalic and atraumatic. Extraocular muscles are intact. Mucous membranes are moist.     NECK: Supple.     LUNGS: Fine wheezes B/L    HEART: Regular rate and rhythm without murmur.    ABDOMEN: Soft, nontender, and nondistended.  No hepatosplenomegaly is noted.    EXTREMITIES: Without any cyanosis, clubbing, rash, lesions or edema.    NEUROLOGIC: Grossly intact.      LABS:                        11.3   5.56  )-----------( 102      ( 17 Nov 2024 08:35 )             33.5     11-17    140  |  104  |  28[H]  ----------------------------<  107[H]  3.4[L]   |  32[H]  |  1.34[H]    Ca    9.6      17 Nov 2024 08:35    TPro  7.2  /  Alb  3.4[L]  /  TBili  0.4  /  DBili  x   /  AST  39  /  ALT  25  /  AlkPhos  52  11-16    PT/INR - ( 16 Nov 2024 15:49 )   PT: 11.6 sec;   INR: 0.99 ratio         PTT - ( 16 Nov 2024 15:49 )  PTT:33.0 sec  Urinalysis Basic - ( 17 Nov 2024 08:35 )    Color: x / Appearance: x / SG: x / pH: x  Gluc: 107 mg/dL / Ketone: x  / Bili: x / Urobili: x   Blood: x / Protein: x / Nitrite: x   Leuk Esterase: x / RBC: x / WBC x   Sq Epi: x / Non Sq Epi: x / Bacteria: x            D-Dimer Assay, Quantitative: 967 ng/mL DDU (11-16-24 @ 15:49)      Lactate, Blood: 1.1 mmol/L (11-16-24 @ 15:49)        MICROBIOLOGY:    RADIOLOGY & ADDITIONAL STUDIES:  < from: CT Angio Chest PE Protocol w/ IV Cont (11.16.24 @ 20:43) >  IMPRESSION:  No pulmonary embolism    < end of copied text >    CXR:    Ct scan chest;    ekg;    echo:

## 2024-11-17 NOTE — CONSULT NOTE ADULT - ASSESSMENT
89 years old with CKD stage 3 B with trace proteinuria, admitted for exacerbation of COPD and Diastolic HF .  CAMILLE due to all of the above , deterioration of renal function  associated with cardiac decompensation due to COPD.   Renal function improved and back to baseline. No need for IV diuretics at this time.    Mild Hypokalemia due to diuretics replaced with KCL .    Follow urine sodium, creatinine , K  and protein creatinine ratio. Magnesium level.  Vitamin D , PO4 and PTH.

## 2024-11-18 LAB
24R-OH-CALCIDIOL SERPL-MCNC: 41.8 NG/ML — SIGNIFICANT CHANGE UP (ref 30–80)
ALBUMIN SERPL ELPH-MCNC: 3.7 G/DL — SIGNIFICANT CHANGE UP (ref 3.5–5)
ALP SERPL-CCNC: 55 U/L — SIGNIFICANT CHANGE UP (ref 40–120)
ALT FLD-CCNC: 20 U/L DA — SIGNIFICANT CHANGE UP (ref 10–60)
ANION GAP SERPL CALC-SCNC: 5 MMOL/L — SIGNIFICANT CHANGE UP (ref 5–17)
AST SERPL-CCNC: 23 U/L — SIGNIFICANT CHANGE UP (ref 10–40)
BILIRUB SERPL-MCNC: 0.4 MG/DL — SIGNIFICANT CHANGE UP (ref 0.2–1.2)
BUN SERPL-MCNC: 34 MG/DL — HIGH (ref 7–18)
CALCIUM SERPL-MCNC: 10.1 MG/DL — SIGNIFICANT CHANGE UP (ref 8.4–10.5)
CHLORIDE SERPL-SCNC: 106 MMOL/L — SIGNIFICANT CHANGE UP (ref 96–108)
CHOLEST SERPL-MCNC: 148 MG/DL — SIGNIFICANT CHANGE UP
CO2 SERPL-SCNC: 30 MMOL/L — SIGNIFICANT CHANGE UP (ref 22–31)
CREAT SERPL-MCNC: 1.32 MG/DL — HIGH (ref 0.5–1.3)
EGFR: 37 ML/MIN/1.73M2 — LOW
GLUCOSE SERPL-MCNC: 95 MG/DL — SIGNIFICANT CHANGE UP (ref 70–99)
HCT VFR BLD CALC: 35.9 % — SIGNIFICANT CHANGE UP (ref 34.5–45)
HDLC SERPL-MCNC: 76 MG/DL — SIGNIFICANT CHANGE UP
HGB BLD-MCNC: 12.3 G/DL — SIGNIFICANT CHANGE UP (ref 11.5–15.5)
LIPID PNL WITH DIRECT LDL SERPL: 64 MG/DL — SIGNIFICANT CHANGE UP
MAGNESIUM SERPL-MCNC: 2.4 MG/DL — SIGNIFICANT CHANGE UP (ref 1.6–2.6)
MCHC RBC-ENTMCNC: 33.2 PG — SIGNIFICANT CHANGE UP (ref 27–34)
MCHC RBC-ENTMCNC: 34.3 G/DL — SIGNIFICANT CHANGE UP (ref 32–36)
MCV RBC AUTO: 97 FL — SIGNIFICANT CHANGE UP (ref 80–100)
NON HDL CHOLESTEROL: 72 MG/DL — SIGNIFICANT CHANGE UP
NRBC # BLD: 0 /100 WBCS — SIGNIFICANT CHANGE UP (ref 0–0)
OSMOLALITY UR: 918 MOSM/KG — SIGNIFICANT CHANGE UP (ref 50–1200)
PHOSPHATE SERPL-MCNC: 2.9 MG/DL — SIGNIFICANT CHANGE UP (ref 2.5–4.5)
PLATELET # BLD AUTO: 108 K/UL — LOW (ref 150–400)
POTASSIUM SERPL-MCNC: 4.1 MMOL/L — SIGNIFICANT CHANGE UP (ref 3.5–5.3)
POTASSIUM SERPL-SCNC: 4.1 MMOL/L — SIGNIFICANT CHANGE UP (ref 3.5–5.3)
PROT SERPL-MCNC: 7.3 G/DL — SIGNIFICANT CHANGE UP (ref 6–8.3)
PTH-INTACT FLD-MCNC: 70 PG/ML — HIGH (ref 15–65)
RBC # BLD: 3.7 M/UL — LOW (ref 3.8–5.2)
RBC # FLD: 13 % — SIGNIFICANT CHANGE UP (ref 10.3–14.5)
SODIUM SERPL-SCNC: 141 MMOL/L — SIGNIFICANT CHANGE UP (ref 135–145)
TRIGL SERPL-MCNC: 42 MG/DL — SIGNIFICANT CHANGE UP
TSH SERPL-MCNC: 1.45 UU/ML — SIGNIFICANT CHANGE UP (ref 0.34–4.82)
WBC # BLD: 7.57 K/UL — SIGNIFICANT CHANGE UP (ref 3.8–10.5)
WBC # FLD AUTO: 7.57 K/UL — SIGNIFICANT CHANGE UP (ref 3.8–10.5)

## 2024-11-18 PROCEDURE — 93288 INTERROG EVL PM/LDLS PM IP: CPT | Mod: 26

## 2024-11-18 RX ORDER — RISPERIDONE 4 MG/1
0.25 TABLET ORAL EVERY 8 HOURS
Refills: 0 | Status: DISCONTINUED | OUTPATIENT
Start: 2024-11-18 | End: 2024-11-19

## 2024-11-18 RX ORDER — IPRATROPIUM BROMIDE AND ALBUTEROL SULFATE 2.5; .5 MG/3ML; MG/3ML
3 SOLUTION RESPIRATORY (INHALATION) ONCE
Refills: 0 | Status: COMPLETED | OUTPATIENT
Start: 2024-11-18 | End: 2024-11-18

## 2024-11-18 RX ORDER — RISPERIDONE 4 MG/1
0.5 TABLET ORAL AT BEDTIME
Refills: 0 | Status: DISCONTINUED | OUTPATIENT
Start: 2024-11-18 | End: 2024-11-19

## 2024-11-18 RX ORDER — METHYLPREDNISOLONE SOD SUCC 125 MG
40 VIAL (EA) INJECTION ONCE
Refills: 0 | Status: COMPLETED | OUTPATIENT
Start: 2024-11-18 | End: 2024-11-18

## 2024-11-18 RX ADMIN — MONTELUKAST SODIUM 10 MILLIGRAM(S): 10 TABLET ORAL at 21:11

## 2024-11-18 RX ADMIN — METOPROLOL TARTRATE 100 MILLIGRAM(S): 100 TABLET, FILM COATED ORAL at 05:16

## 2024-11-18 RX ADMIN — Medication 1 DROP(S): at 05:16

## 2024-11-18 RX ADMIN — IPRATROPIUM BROMIDE AND ALBUTEROL SULFATE 3 MILLILITER(S): 2.5; .5 SOLUTION RESPIRATORY (INHALATION) at 20:21

## 2024-11-18 RX ADMIN — IPRATROPIUM BROMIDE AND ALBUTEROL SULFATE 3 MILLILITER(S): 2.5; .5 SOLUTION RESPIRATORY (INHALATION) at 08:26

## 2024-11-18 RX ADMIN — Medication 81 MILLIGRAM(S): at 11:28

## 2024-11-18 RX ADMIN — METOPROLOL TARTRATE 100 MILLIGRAM(S): 100 TABLET, FILM COATED ORAL at 18:41

## 2024-11-18 RX ADMIN — DRONEDARONE 400 MILLIGRAM(S): 400 TABLET, FILM COATED ORAL at 18:41

## 2024-11-18 RX ADMIN — RISPERIDONE 0.25 MILLIGRAM(S): 4 TABLET ORAL at 11:28

## 2024-11-18 RX ADMIN — Medication 40 MILLIGRAM(S): at 10:06

## 2024-11-18 RX ADMIN — HYDRALAZINE HYDROCHLORIDE 100 MILLIGRAM(S): 10 TABLET ORAL at 16:38

## 2024-11-18 RX ADMIN — HYDRALAZINE HYDROCHLORIDE 100 MILLIGRAM(S): 10 TABLET ORAL at 05:16

## 2024-11-18 RX ADMIN — RISPERIDONE 0.5 MILLIGRAM(S): 4 TABLET ORAL at 21:11

## 2024-11-18 RX ADMIN — Medication 20 MILLIGRAM(S): at 21:11

## 2024-11-18 RX ADMIN — Medication 2 MILLIGRAM(S): at 21:11

## 2024-11-18 RX ADMIN — Medication 5000 UNIT(S): at 05:17

## 2024-11-18 RX ADMIN — IPRATROPIUM BROMIDE AND ALBUTEROL SULFATE 3 MILLILITER(S): 2.5; .5 SOLUTION RESPIRATORY (INHALATION) at 06:37

## 2024-11-18 RX ADMIN — DRONEDARONE 400 MILLIGRAM(S): 400 TABLET, FILM COATED ORAL at 05:16

## 2024-11-18 RX ADMIN — HYDRALAZINE HYDROCHLORIDE 100 MILLIGRAM(S): 10 TABLET ORAL at 21:12

## 2024-11-18 RX ADMIN — IPRATROPIUM BROMIDE AND ALBUTEROL SULFATE 3 MILLILITER(S): 2.5; .5 SOLUTION RESPIRATORY (INHALATION) at 12:56

## 2024-11-18 RX ADMIN — Medication 120 MILLIGRAM(S): at 11:28

## 2024-11-18 NOTE — PROGRESS NOTE ADULT - PROBLEM SELECTOR PLAN 3
hx of afib not on AC due to hx of GI bleed  c/w home med Multaq 400 mg BID  monitor on tele hx of afib not on AC due to hx of GI bleed  c/w home med Multaq 400 mg BID  c/w metoprolol 100 mg bid   HR controlled

## 2024-11-18 NOTE — PROGRESS NOTE ADULT - PROBLEM SELECTOR PLAN 2
-p/w 4 days of SOB, dry cough, dyspnea  -CXR with mild increased pulmonary markings  - D dimer elev to 967, CTA Chest with no acute findings (PE neg)  - 97% o2 on RA   - BNP 2372 (higher than baseline)  - s/p Lasix 40 mg IV x1 in ED   -pt takes Lasix 20 mg PO every other day at home    -hold Lasix at this time iso recent contrast use from CTA -p/w 4 days of SOB, dry cough, dyspnea  -CXR lungs clear   - D dimer elev to 967, CTA Chest with no acute findings (PE neg)  - 97% o2 on RA   - BNP 2372 (higher than baseline)  - s/p Lasix 40 mg IV x1 in ED   - pt takes Lasix 20 mg PO every other day at home  - hold lasix for now no signs of fluid overload   - tolerating room air, consider resuming lasix on dc

## 2024-11-18 NOTE — PROGRESS NOTE ADULT - ASSESSMENT
89 year-old female from home, minimally ambulatory with assistance, with past medical history of dementia,  hypertension, paroxysmal A-fib not on anticoagulation due to GI bleed, CHB s/p PPM, HLD, asthma  presenting with wheezing, coughing, shortness of breath for approximately 3 to 4 days,acute asthma exacerbation,CAMILLE on CRI,Delirium.  1.Tele monitoring,  2.Asthma exacerbation-nebs,steroid,Pulm f/u.  3.CAMILLE on CRI-no further diuretics, Renal eval.  4.PAF-asa,multaq,lopressor.No AC due to GI bleed in past.  5.HTN-cont bp medication.  6.Interrogate PPM.  7.Lipid d/o-statin.  8.Delirium-risperidone.  9.GI and DVT prophylaxis.

## 2024-11-18 NOTE — PROGRESS NOTE ADULT - PROBLEM SELECTOR PLAN 1
-p/w 4 days of SOB, dry cough, dyspnea  -RVP neg  -CXR with low suspicion for PNA  -CTA Chest unremarkable (no PE noted)    -supportive care- Duonebs q 6  -c/w home med Montelukast 10 mg daily -p/w 4 days of SOB, dry cough, dyspnea  -RVP neg  -CXR clear   -CTA Chest unremarkable (no PE)  -c/w home med Montelukast 10 mg daily  -supportive care- Duonebs q6h  -taper steroids down to solumedrol 40 mg qd

## 2024-11-18 NOTE — PROGRESS NOTE ADULT - PROBLEM SELECTOR PLAN 7
noted Plts 99  no signs of bleeding or petechia on exam  some level of thrombocytopenia present at baseline noted Plts 99  slowly improving PLT now 108k  no signs of bleeding or petechia on exam  some level of thrombocytopenia present at baseline  currently on aspirin 81 mg qd

## 2024-11-18 NOTE — PROGRESS NOTE ADULT - PROBLEM SELECTOR PLAN 5
c/w home meds with parameters- Hydralazine 100 mg TID, Lopressor 100 mg BID, Imdur 120 mg BI, Cardura 2 mg qd BP still 140-180s systolic possibly due to agitation and steroids   c/w home meds with parameters- Hydralazine 100 mg TID, Lopressor 100 mg BID, Imdur 120 mg qd, Cardura 2 mg qd  monitor BP

## 2024-11-18 NOTE — PROGRESS NOTE ADULT - PROBLEM SELECTOR PLAN 6
BUN/CR: 30/1.62  GFR- 29    similar to prev baseline  continue to monitor BUN/CR: 30/1.62  GFR- 29  stage 3 ckd   creatinine slowly improving now 1.3   similar to prev baseline  continue to monitor

## 2024-11-18 NOTE — PROGRESS NOTE ADULT - ASSESSMENT
89 year-old female from home, minimally ambulatory with assistance, with past medical history of dementia,  hypertension, heart failure with preserved ejection fraction, paroxysmal A-fib not on anticoagulation due to GI bleed, Complete Heart Block s/p PPM, HLD, asthma  presenting with wheezing, coughing, shortness of breath for approximately 3 to 4 days.  Admitted to medicine for acute asthma exacerbation. During hospital stay, pt noted with worsening delirium. Pending further improvement. Solu-medrol was tapered to daily. CT Angio chest was negative PE.    89 year-old female from home, minimally ambulatory with assistance, with past medical history of dementia,  hypertension, heart failure with preserved ejection fraction, paroxysmal A-fib not on anticoagulation due to GI bleed, Complete Heart Block s/p PPM, HLD, asthma  presenting with wheezing, coughing, shortness of breath for approximately 3 to 4 days.  Admitted to medicine for acute asthma exacerbation. During hospital stay, pt noted with worsening delirium. Pending further improvement. Solu-medrol was tapered to daily. CT Angio chest was negative PE. Awaiting pacemaker interrogation.

## 2024-11-19 ENCOUNTER — TRANSCRIPTION ENCOUNTER (OUTPATIENT)
Age: 89
End: 2024-11-19

## 2024-11-19 ENCOUNTER — RESULT REVIEW (OUTPATIENT)
Age: 89
End: 2024-11-19

## 2024-11-19 VITALS
DIASTOLIC BLOOD PRESSURE: 50 MMHG | HEART RATE: 62 BPM | RESPIRATION RATE: 19 BRPM | OXYGEN SATURATION: 97 % | TEMPERATURE: 98 F | SYSTOLIC BLOOD PRESSURE: 165 MMHG

## 2024-11-19 LAB
ALBUMIN SERPL ELPH-MCNC: 3.2 G/DL — LOW (ref 3.5–5)
ALP SERPL-CCNC: 51 U/L — SIGNIFICANT CHANGE UP (ref 40–120)
ALT FLD-CCNC: 21 U/L DA — SIGNIFICANT CHANGE UP (ref 10–60)
ANION GAP SERPL CALC-SCNC: 4 MMOL/L — LOW (ref 5–17)
AST SERPL-CCNC: 23 U/L — SIGNIFICANT CHANGE UP (ref 10–40)
BILIRUB SERPL-MCNC: 0.4 MG/DL — SIGNIFICANT CHANGE UP (ref 0.2–1.2)
BUN SERPL-MCNC: 33 MG/DL — HIGH (ref 7–18)
CALCIUM SERPL-MCNC: 9.8 MG/DL — SIGNIFICANT CHANGE UP (ref 8.4–10.5)
CHLORIDE SERPL-SCNC: 109 MMOL/L — HIGH (ref 96–108)
CO2 SERPL-SCNC: 29 MMOL/L — SIGNIFICANT CHANGE UP (ref 22–31)
CREAT SERPL-MCNC: 1.27 MG/DL — SIGNIFICANT CHANGE UP (ref 0.5–1.3)
EGFR: 39 ML/MIN/1.73M2 — LOW
GLUCOSE SERPL-MCNC: 92 MG/DL — SIGNIFICANT CHANGE UP (ref 70–99)
HCT VFR BLD CALC: 33 % — LOW (ref 34.5–45)
HGB BLD-MCNC: 11.1 G/DL — LOW (ref 11.5–15.5)
MAGNESIUM SERPL-MCNC: 2.5 MG/DL — SIGNIFICANT CHANGE UP (ref 1.6–2.6)
MCHC RBC-ENTMCNC: 33.2 PG — SIGNIFICANT CHANGE UP (ref 27–34)
MCHC RBC-ENTMCNC: 33.6 G/DL — SIGNIFICANT CHANGE UP (ref 32–36)
MCV RBC AUTO: 98.8 FL — SIGNIFICANT CHANGE UP (ref 80–100)
NRBC # BLD: 0 /100 WBCS — SIGNIFICANT CHANGE UP (ref 0–0)
PHOSPHATE SERPL-MCNC: 2.8 MG/DL — SIGNIFICANT CHANGE UP (ref 2.5–4.5)
PLATELET # BLD AUTO: 91 K/UL — LOW (ref 150–400)
POTASSIUM SERPL-MCNC: 4.1 MMOL/L — SIGNIFICANT CHANGE UP (ref 3.5–5.3)
POTASSIUM SERPL-SCNC: 4.1 MMOL/L — SIGNIFICANT CHANGE UP (ref 3.5–5.3)
PROT SERPL-MCNC: 6.5 G/DL — SIGNIFICANT CHANGE UP (ref 6–8.3)
RBC # BLD: 3.34 M/UL — LOW (ref 3.8–5.2)
RBC # FLD: 13.4 % — SIGNIFICANT CHANGE UP (ref 10.3–14.5)
SODIUM SERPL-SCNC: 142 MMOL/L — SIGNIFICANT CHANGE UP (ref 135–145)
WBC # BLD: 7.91 K/UL — SIGNIFICANT CHANGE UP (ref 3.8–10.5)
WBC # FLD AUTO: 7.91 K/UL — SIGNIFICANT CHANGE UP (ref 3.8–10.5)

## 2024-11-19 RX ORDER — TIMOLOL 0.5 %
1 DROPS OPHTHALMIC (EYE)
Qty: 1 | Refills: 0
Start: 2024-11-19 | End: 2024-12-18

## 2024-11-19 RX ORDER — PREDNISONE 20 MG/1
2 TABLET ORAL
Qty: 16 | Refills: 0
Start: 2024-11-19 | End: 2024-11-26

## 2024-11-19 RX ORDER — PREDNISONE 20 MG/1
40 TABLET ORAL DAILY
Refills: 0 | Status: DISCONTINUED | OUTPATIENT
Start: 2024-11-19 | End: 2024-11-19

## 2024-11-19 RX ORDER — RISPERIDONE 4 MG/1
1 TABLET ORAL
Qty: 30 | Refills: 0
Start: 2024-11-19 | End: 2024-12-18

## 2024-11-19 RX ADMIN — DRONEDARONE 400 MILLIGRAM(S): 400 TABLET, FILM COATED ORAL at 05:38

## 2024-11-19 RX ADMIN — Medication 5000 UNIT(S): at 05:38

## 2024-11-19 RX ADMIN — IPRATROPIUM BROMIDE AND ALBUTEROL SULFATE 3 MILLILITER(S): 2.5; .5 SOLUTION RESPIRATORY (INHALATION) at 08:38

## 2024-11-19 RX ADMIN — METOPROLOL TARTRATE 100 MILLIGRAM(S): 100 TABLET, FILM COATED ORAL at 03:25

## 2024-11-19 RX ADMIN — Medication 1 DROP(S): at 05:37

## 2024-11-19 RX ADMIN — IPRATROPIUM BROMIDE AND ALBUTEROL SULFATE 3 MILLILITER(S): 2.5; .5 SOLUTION RESPIRATORY (INHALATION) at 14:21

## 2024-11-19 RX ADMIN — HYDRALAZINE HYDROCHLORIDE 100 MILLIGRAM(S): 10 TABLET ORAL at 03:25

## 2024-11-19 NOTE — DISCHARGE NOTE PROVIDER - NSDCCPCAREPLAN_GEN_ALL_CORE_FT
PRINCIPAL DISCHARGE DIAGNOSIS  Diagnosis: Acute asthma exacerbation  Assessment and Plan of Treatment: You were treated with Steroid taper.  Please continue albuterol, and go to Pulomonologist and PCP in 1 week for further recommendations on inhalers and medical management.      SECONDARY DISCHARGE DIAGNOSES  Diagnosis: Acute on chronic diastolic congestive heart failure  Assessment and Plan of Treatment: Continue Lasix    Diagnosis: Hyperlipidemia  Assessment and Plan of Treatment: Continue simvastain    Diagnosis: Atrial fibrillation  Assessment and Plan of Treatment: Continue dronedarone    Diagnosis: History of complete heart block  Assessment and Plan of Treatment: Pacemaker was interrogated. Follow up with PCP and Cardiology in 1 week for further medical management.    Diagnosis: Dementia  Assessment and Plan of Treatment: Continue Remeron. Follow up with PCP in 1week for further medication and medical management.    Diagnosis: Thrombocytopenia  Assessment and Plan of Treatment: Follow up with PCP in 1 week for further medical management.    Diagnosis: Hypertension  Assessment and Plan of Treatment: Continue Furosemide, Hydralazine, Isosorbide mononitrate       PRINCIPAL DISCHARGE DIAGNOSIS  Diagnosis: Acute asthma exacerbation  Assessment and Plan of Treatment: You were treated with Steroid taper.  Please continue albuterol, and go to Pulomonologist and PCP in 1 week for further recommendations on inhalers and medical management.      SECONDARY DISCHARGE DIAGNOSES  Diagnosis: Acute on chronic diastolic congestive heart failure  Assessment and Plan of Treatment: Continue Lasix    Diagnosis: Hyperlipidemia  Assessment and Plan of Treatment: Continue simvastain    Diagnosis: Atrial fibrillation  Assessment and Plan of Treatment: Continue dronedarone    Diagnosis: History of complete heart block  Assessment and Plan of Treatment: Pacemaker was interrogated. Follow up with PCP and Cardiology in 1 week for further medical management.    Diagnosis: Dementia  Assessment and Plan of Treatment: Continue Remeron, HOLD if drowsy during the day. Follow up with PCP in 1week for further medication and medical management.    Diagnosis: Thrombocytopenia  Assessment and Plan of Treatment: Follow up with PCP in 1 week for further medical management.    Diagnosis: Hypertension  Assessment and Plan of Treatment: Continue Furosemide, Hydralazine, Isosorbide mononitrate

## 2024-11-19 NOTE — DISCHARGE NOTE PROVIDER - HOSPITAL COURSE
89 year-old female from home, minimally ambulatory with assistance, with past medical history of dementia,  hypertension, heart failure with preserved ejection fraction, paroxysmal A-fib not on anticoagulation due to GI bleed, Complete Heart Block s/p PPM, HLD, asthma  presenting with wheezing, coughing, shortness of breath for approximately 3 to 4 days.  Admitted to medicine for acute asthma exacerbation. During hospital stay, pt noted with worsening delirium. Solu-medrol was tapered to daily. CT Angio chest was negative PE. Pacemaker interrogated and TTE performed. CXR lungs clear. D dimer elev to 967, CTA Chest with no acute findings (PE neg). Improved SPO2 97% o2 on RA.BNP 2372 (higher than baseline).    Please note that this a brief summary of hospital course please refer to daily progress notes and consult notes for full course and events. Patient seen and examined at bedside, discussed with medical attending. Patient medically cleared for discharge to home with wheelchair and outpatient cardiology and pulmonology follow up.

## 2024-11-19 NOTE — PROGRESS NOTE ADULT - ASSESSMENT
89 years old with CKD stage 3 B with trace proteinuria, admitted for exacerbation of COPD and Diastolic HF . Protein creatinine ratio 0.4 mg , due to nephrosclerosis   CAMILLE due to all of the above , deterioration of renal function  associated with cardiac decompensation due to COPD.   Renal function improved and back to baseline.   Mild Hypokalemia due to diuretics replaced with KCL . Repeat K in normal range.   PTH , vitamin d and PO4 in normal range.

## 2024-11-19 NOTE — DISCHARGE NOTE NURSING/CASE MANAGEMENT/SOCIAL WORK - PATIENT PORTAL LINK FT
You can access the FollowMyHealth Patient Portal offered by [facility] by registering at the following website: [email or web address]. By joining [facility]’s FollowMyHealth portal, you will also be able to view your health information using other applications (apps) compatible with our system.

## 2024-11-19 NOTE — PROGRESS NOTE ADULT - PROVIDER SPECIALTY LIST ADULT
Internal Medicine
Nephrology
Pulmonology
Pulmonology
Internal Medicine

## 2024-11-19 NOTE — DISCHARGE NOTE NURSING/CASE MANAGEMENT/SOCIAL WORK - NSDCPEFALRISK_GEN_ALL_CORE
For information on Fall & Injury Prevention, visit: [email or web address] OR  [email or web address] OR  https://www.cdc.gov/steadi/patient.html

## 2024-11-19 NOTE — DISCHARGE NOTE NURSING/CASE MANAGEMENT/SOCIAL WORK - FINANCIAL ASSISTANCE
[facility] provides services at a reduced cost to those who are determined to be eligible through [facility]’s financial assistance program. Information regarding [facility]’s financial assistance program can be found by going to [email or web address] or by calling [phone].

## 2024-11-19 NOTE — PROGRESS NOTE ADULT - ASSESSMENT
89 year-old female from home, minimally ambulatory with assistance, with past medical history of dementia,  hypertension, paroxysmal A-fib not on anticoagulation due to GI bleed, CHB s/p PPM, HLD, asthma  presenting with wheezing, coughing, shortness of breath for approximately 3 to 4 days,acute asthma exacerbation,CAMILLE on CRI,Delirium.  1.D/C Tele monitoring,  2.Asthma exacerbation-nebs,s/p steroid,Pulm f/u.  3.CAMILLE on CRI-no further diuretics.  4.PAF-asa,multaq,lopressor.No AC due to GI bleed in past.  5.HTN-cont bp medication.  6.Interrogate PPM q 3mo.  7.Lipid d/o-statin.  8.Delirium-risperidone.  9.GI and DVT prophylaxis.  10.D/C home and f/u as outpatient.

## 2024-11-19 NOTE — CHART NOTE - NSCHARTNOTEFT_GEN_A_CORE
ELECTROPHYSIOLOGY    Device : Assurity MRI [id number] ([id number])  SN#  [id number]  Implant date: 2024	     Estimated battery longevity:  9.0-9.5 years  Voltage: 3.01V    Mode: DDDR    Lower rate/UTR: 60/110bpm    Leads:    RA    Sensin.6 mV    Impedance:  350 ohms    Threshold: 0.75V @ 0.4ms    Pulse Amplitude/ Pulse width: 1.75V @0.4 ms     RV    Sensin.6 mV    Impedance:  510 ohms    Threshold:  0.875 V @ 0.4ms    Pulse amplitude / Pulse width:  1.125V @0.4 ms    Counters (Since 2024)  A Paced  85%  V Paced  >99%  AT/AF  0%      Episodes: None  Changes made: None    Conclusion: Normal device function.
Wheelchair is being ordered for home use as patient cannot accomplish MRADLs in a reasonable timeframe and is at greater risk of morbidity or mortality. A car walker will not sufficiently resolve the issue. Patient is unable to safely ambulate 100 feet with assistive device. The patient can use the wheelchair in the home safely. The wheelchair will improve the patient's ability to perform MRADLs. The patient can and will use wheelchair in home and community daily. Patient's home provides adequate space to allow them to maneuver between rooms to complete ADLs.
89 year-old female from home, minimally ambulatory with assistance, with past medical history of dementia,  hypertension, heart failure with preserved ejection fraction, paroxysmal A-fib not on anticoagulation due to GI bleed, CHB s/p PPM, HLD, asthma  presenting with wheezing, coughing, shortness of breath for approximately 3 to 4 days. As per daughter at bedside, patient has been found to be wheezing, coughing which has been dry and non productive, and short of breath for approximately 4 days.  Denies any fevers or chest pain.. Patient denies any abdominal pain or back pain.  Patient currently at baseline, oriented to person, place, time but not situation. Appears comfortable, breathing well on RA.  Admitted to medicine for SOB.     Pt noted to be confused this evening likely due to delirium    - haldol 1mg IV  - 0.5mg risperidone at bedtime  - son at bedside, discussed the plan  - discussed with Dr Harrell

## 2024-11-19 NOTE — DISCHARGE NOTE PROVIDER - NSDCMRMEDTOKEN_GEN_ALL_CORE_FT
albuterol 90 mcg/inh inhalation aerosol: 2 puff(s) inhaled every 6 hours, As needed, Shortness of Breath and/or Wheezing  aspirin 81 mg oral tablet, chewable: 1 tab(s) orally once a day  Cardura 2 mg oral tablet: 1 tab(s) orally once a day (at bedtime)  dronedarone 400 mg oral tablet: 1 tab(s) orally 2 times a day  furosemide 20 mg oral tablet: 1 tab(s) orally every other day  hydrALAZINE 100 mg oral tablet: 1 tab(s) orally 3 times a day  isosorbide mononitrate 120 mg oral tablet, extended release: 1 tab(s) orally 2 times a day  metoprolol tartrate 100 mg oral tablet: 1 tab(s) orally every 12 hours  montelukast 10 mg oral tablet: 1 tab(s) orally once a day  risperiDONE 0.5 mg oral tablet: 1 tab(s) orally once a day (at bedtime)  simvastatin 40 mg oral tablet: 1 tab(s) orally once a day (at bedtime)  timolol maleate 0.25% ophthalmic solution: 1 drop(s) to each affected eye 2 times a day

## 2024-11-19 NOTE — PROGRESS NOTE ADULT - SUBJECTIVE AND OBJECTIVE BOX
Date of Service 11-17-24 @ 09:46    CHIEF COMPLAINT:Patient is a 89y old  Female who presents with a chief complaint of sob,cough,wheezing.Pt feeling better today.  	  REVIEW OF SYSTEMS:  CONSTITUTIONAL: No fever, weight loss, or fatigue  EYES: No eye pain, visual disturbances, or discharge  ENT:  No difficulty hearing, tinnitus, vertigo; No sinus or throat pain  NECK: No pain or stiffness  RESPIRATORY: No cough, wheezing, chills or hemoptysis; No Shortness of Breath  CARDIOVASCULAR: No chest pain, palpitations, passing out, dizziness, or leg swelling  GASTROINTESTINAL: No abdominal or epigastric pain. No nausea, vomiting, or hematemesis; No diarrhea or constipation. No melena or hematochezia.  GENITOURINARY: No dysuria, frequency, hematuria, or incontinence  NEUROLOGICAL: No headaches, memory loss, loss of strength, numbness, or tremors  SKIN: No itching, burning, rashes, or lesions   LYMPH Nodes: No enlarged glands  ENDOCRINE: No heat or cold intolerance; No hair loss  MUSCULOSKELETAL: No joint pain or swelling; No muscle, back, or extremity pain  PSYCHIATRIC: No depression, anxiety, mood swings, or difficulty sleeping  HEME/LYMPH: No easy bruising, or bleeding gums  ALLERGY AND IMMUNOLOGIC: No hives or eczema	      PHYSICAL EXAM:  T(C): 36.5 (11-17-24 @ 08:34), Max: 37 (11-16-24 @ 14:47)  HR: 59 (11-17-24 @ 08:34) (59 - 76)  BP: 154/50 (11-17-24 @ 08:34) (129/40 - 193/67)  RR: 18 (11-17-24 @ 08:34) (17 - 18)  SpO2: 95% (11-17-24 @ 08:34) (95% - 97%)  Wt(kg): --  I&O's Summary    16 Nov 2024 07:01  -  17 Nov 2024 07:00  --------------------------------------------------------  IN: 0 mL / OUT: 600 mL / NET: -600 mL        Appearance: Normal	  HEENT:   Normal oral mucosa, PERRL, EOMI	  Lymphatic: No lymphadenopathy  Cardiovascular: Normal S1 S2, No JVD, No murmurs, No edema  Respiratory: Lungs clear to auscultation	  Psychiatry: A & O x 3, Mood & affect appropriate  Gastrointestinal:  Soft, Non-tender, + BS	  Skin: No rashes, No ecchymoses, No cyanosis	  Neurologic: Non-focal  Extremities: Normal range of motion, No clubbing, cyanosis or edema  Vascular: Peripheral pulses palpable 2+ bilaterally    MEDICATIONS  (STANDING):  albuterol/ipratropium for Nebulization 3 milliLiter(s) Nebulizer every 6 hours  atorvastatin 20 milliGRAM(s) Oral at bedtime  doxazosin 2 milliGRAM(s) Oral at bedtime  dronedarone 400 milliGRAM(s) Oral two times a day  heparin   Injectable 5000 Unit(s) SubCutaneous every 12 hours  hydrALAZINE 100 milliGRAM(s) Oral three times a day  isosorbide   mononitrate ER Tablet (IMDUR) 120 milliGRAM(s) Oral daily  methylPREDNISolone sodium succinate Injectable 40 milliGRAM(s) IV Push every 12 hours  metoprolol tartrate 100 milliGRAM(s) Oral every 12 hours  montelukast 10 milliGRAM(s) Oral at bedtime  timolol 0.25% Solution 1 Drop(s) Both EYES two times a day      TELEMETRY: 	av paced    	  	  LABS:	 	      Troponin I, High Sensitivity Result: 11.8 ng/L (11-16 @ 15:49)                            11.3   5.56  )-----------( 102      ( 17 Nov 2024 08:35 )             33.5     11-17    140  |  104  |  28[H]  ----------------------------<  107[H]  3.4[L]   |  32[H]  |  1.34[H]    Ca    9.6      17 Nov 2024 08:35    TPro  7.2  /  Alb  3.4[L]  /  TBili  0.4  /  DBili  x   /  AST  39  /  ALT  25  /  AlkPhos  52  11-16    < from: Xray Chest 1 View- PORTABLE-Urgent (Xray Chest 1 View- PORTABLE-Urgent .) (11.16.24 @ 15:35) >  ACC: [id number] EXAM:  XR CHEST PORTABLE URGENT 1V   ORDERED BY: ERNESTO LILLY     PROCEDURE DATE:  11/16/2024          INTERPRETATION:  TECHNIQUE: Single portable view of the chest.    COMPARISON:  2/5/2024    CLINICAL HISTORY: r.o pna    FINDINGS:    Single frontal view of the chest demonstrates the lungs to be clear. The   cardiomediastinal silhouette is unremarkable. No acute osseous   abnormalities. Left-sided pacemaker. Please refer to the subsequent chest   CT for further details.    IMPRESSION: No acute cardiopulmonary disease process.    --- End of Report ---            REBECCA MARIEE MD; Attending Radiologist  This document has been electronically signed. Nov 16 2024  8:51PM    < end of copied text >  < from: CT Angio Chest PE Protocol w/ IV Cont (11.16.24 @ 20:43) >  ACC: [id number] EXAM:  CT ANGIO CHEST PULM ART [facility]   ORDERED BY: ERNESTO LILLY     PROCEDURE DATE:  11/16/2024          INTERPRETATION:  EXAMINATION: CT ANGIO CHEST PULMONARY ARTERY    CLINICAL INDICATION: Dyspnea    TECHNIQUE: CTA of the chest was performed after the administration of 90   cc administered of Omnipaque 350, 10 cc discarded.  MIP images were   reconstructed.    COMPARISON: Multiple CT, most recent 6/15/2023.    FINDINGS:    PULMONARY EMBOLISM: No pulmonary embolism.    AIRWAYS AND LUNGS: Scattered bronchial secretions  The lungs are clear.    MEDIASTINUM AND PLEURA: There are no enlarged mediastinal, hilar or   axillary lymph nodes. 3 cm left thyroid nodule There is no pleural   effusion. There is no pneumothorax.    HEART AND VESSELS: There is cardiomegaly.   There are atherosclerotic   calcifications of the aorta and coronary arteries.  There is no   pericardial effusion.  Left-sided pacemaker    UPPER ABDOMEN: Images of the upper abdomen demonstrate left hepatic cyst.    BONES AND SOFT TISSUES: There are mild degenerative changes of the spine.    The soft tissues are unremarkable.    IMPRESSION:  No pulmonary embolism    --- End of Report ---            CARLOS VASQUES MD; Attending Radiologist  This document has beenelectronically signed. Nov 16 2024  8:59PM    < end of copied text >    	        
Problem List:  CKD stage 3 B   CAIMLLE     PAST MEDICAL & SURGICAL HISTORY:  HTN (hypertension)      Hypercholesterolemia      Atrial fibrillation      History of complete heart block      Pulmonary HTN      CHF (congestive heart failure)      Artificial pacemaker          No Known Allergies      MEDICATIONS  (STANDING):  albuterol/ipratropium for Nebulization 3 milliLiter(s) Nebulizer every 6 hours  aspirin  chewable 81 milliGRAM(s) Oral daily  atorvastatin 20 milliGRAM(s) Oral at bedtime  doxazosin 2 milliGRAM(s) Oral at bedtime  dronedarone 400 milliGRAM(s) Oral two times a day  heparin   Injectable 5000 Unit(s) SubCutaneous every 12 hours  hydrALAZINE 100 milliGRAM(s) Oral three times a day  isosorbide   mononitrate ER Tablet (IMDUR) 120 milliGRAM(s) Oral daily  metoprolol tartrate 100 milliGRAM(s) Oral every 12 hours  montelukast 10 milliGRAM(s) Oral at bedtime  risperiDONE   Tablet 0.5 milliGRAM(s) Oral at bedtime  timolol 0.25% Solution 1 Drop(s) Both EYES two times a day    MEDICATIONS  (PRN):  risperiDONE   Tablet 0.25 milliGRAM(s) Oral every 8 hours PRN agitation                            11.1   7.91  )-----------( 91       ( 19 Nov 2024 07:14 )             33.0     11-19    142  |  109[H]  |  33[H]  ----------------------------<  92  4.1   |  29  |  1.27    Ca    9.8      19 Nov 2024 07:14  Phos  2.8     11-19  Mg     2.5     11-19    TPro  6.5  /  Alb  3.2[L]  /  TBili  0.4  /  DBili  x   /  AST  23  /  ALT  21  /  AlkPhos  51  11-19        Potassium, Random Urine: 70 mmol/L (11-17 @ 23:39)  Osmolality, Random Urine: 918 mosm/Kg (11-17 @ 23:39)  Sodium, Random Urine: 50 mmol/L (11-17 @ 23:39)  Creatinine, Random Urine: 163 mg/dL (11-17 @ 23:39)  Protein/Creatinine Ratio Calculation: 0.4 Ratio (11-17 @ 23:39)      REVIEW OF SYSTEMS:  General: no fever no chills, no weight loss.  EYES/ENT: No visual changes;  No vertigo, no headache.  NECK: No pain or stiffness  RESPIRATORY: No cough, wheezing, hemoptysis; No shortness of breath  CARDIOVASCULAR: No chest pain or palpitations. No Edema  GASTROINTESTINAL: No abdominal or epigastric pain. No nausea, vomiting. No diarrhea or constipation. No melena.  GENITOURINARY: No dysuria, frequency, foamy urine, urinary urgency, incontinence or hematuria          VITALS:  T(F): 97.9 (11-19-24 @ 11:15), Max: 99.9 (11-18-24 @ 16:23)  HR: 62 (11-19-24 @ 11:15)  BP: 165/50 (11-19-24 @ 11:15)  RR: 19 (11-19-24 @ 11:15)  SpO2: 97% (11-19-24 @ 11:15)  Wt(kg): --      PHYSICAL EXAM:  Constitutional: well developed, no diaphoresis, no distress.  Neck: No JVD, no carotid bruit, supple, no adenopathy  Respiratory: Good air entrance B/L, no wheezes, rales or rhonchi  Cardiovascular: S1, S2, RRR, no pericardial rub, no murmur  Abdomen: BS+, soft, no tenderness, no bruit  Pelvis: bladder nondistended  Extremities: No cyanosis or clubbing. No peripheral edema.   dementia    
Date of Service 11-18-24 @ 10:23    CHIEF COMPLAINT:Patient is a 89y old  Female who presents with a chief complaint of Asthma.Pt agitated overnight.  	  REVIEW OF SYSTEMS:  [x ] Unable to obtain    PHYSICAL EXAM:  T(C): 36.8 (11-18-24 @ 08:34), Max: 37.1 (11-18-24 @ 04:40)  HR: 75 (11-18-24 @ 08:34) (62 - 89)  BP: 169/67 (11-18-24 @ 08:34) (120/88 - 184/79)  RR: 18 (11-18-24 @ 08:34) (17 - 18)  SpO2: 98% (11-18-24 @ 08:34) (95% - 98%)  Wt(kg): --  I&O's Summary    17 Nov 2024 07:01  -  18 Nov 2024 07:00  --------------------------------------------------------  IN: 0 mL / OUT: 400 mL / NET: -400 mL        Appearance: Normal	  HEENT:   Normal oral mucosa, PERRL, EOMI	  Lymphatic: No lymphadenopathy  Cardiovascular: Normal S1 S2, No JVD, No murmurs, No edema  Respiratory: b/l ronchi  Gastrointestinal:  Soft, Non-tender, + BS	  Skin: No rashes, No ecchymoses, No cyanosis	  Extremities: Normal range of motion, No clubbing, cyanosis or edema  Vascular: Peripheral pulses palpable 2+ bilaterally    MEDICATIONS  (STANDING):  albuterol/ipratropium for Nebulization 3 milliLiter(s) Nebulizer every 6 hours  aspirin  chewable 81 milliGRAM(s) Oral daily  atorvastatin 20 milliGRAM(s) Oral at bedtime  doxazosin 2 milliGRAM(s) Oral at bedtime  dronedarone 400 milliGRAM(s) Oral two times a day  heparin   Injectable 5000 Unit(s) SubCutaneous every 12 hours  hydrALAZINE 100 milliGRAM(s) Oral three times a day  isosorbide   mononitrate ER Tablet (IMDUR) 120 milliGRAM(s) Oral daily  metoprolol tartrate 100 milliGRAM(s) Oral every 12 hours  montelukast 10 milliGRAM(s) Oral at bedtime  risperiDONE   Tablet 0.5 milliGRAM(s) Oral at bedtime  timolol 0.25% Solution 1 Drop(s) Both EYES two times a day      	  	  LABS:	 	      Troponin I, High Sensitivity Result: 11.8 ng/L (11-16 @ 15:49)                            12.3   7.57  )-----------( 108      ( 18 Nov 2024 07:40 )             35.9     11-18    141  |  106  |  34[H]  ----------------------------<  95  4.1   |  30  |  1.32[H]    Ca    10.1      18 Nov 2024 07:40  Phos  2.9     11-18  Mg     2.4     11-18    TPro  7.3  /  Alb  3.7  /  TBili  0.4  /  DBili  x   /  AST  23  /  ALT  20  /  AlkPhos  55  11-18      Lipid Profile: Cholesterol 148  HDL 76  TG 42  Ldl calc 64    TSH: Thyroid Stimulating Hormone, Serum: 1.45 uU/mL (11-18 @ 07:40)      	        
Date of Service 24 @ 09:12    CHIEF COMPLAINT:Patient is a 89y old  Female who presents with a chief complaint of Acute Asthma Exacerbation + CAMILLE .Pt appears comfortable.    	  REVIEW OF SYSTEMS:  unable to obtain      PHYSICAL EXAM:  T(C): 36.6 (24 @ 07:20), Max: 37.7 (24 @ 16:23)  HR: 75 (24 @ 07:20) (65 - 88)  BP: 175/67 (24 @ 07:20) (136/57 - 193/67)  RR: 19 (24 @ 07:20) (18 - 20)  SpO2: 97% (24 @ 07:20) (94% - 99%)  Wt(kg): --  I&O's Summary      Appearance: Normal	  HEENT:   Normal oral mucosa, PERRL, EOMI	  Lymphatic: No lymphadenopathy  Cardiovascular: Normal S1 S2, No JVD, No murmurs, No edema  Respiratory: Lungs clear to auscultation	  Gastrointestinal:  Soft, Non-tender, + BS	  Skin: No rashes, No ecchymoses, No cyanosis	  Neurologic: Non-focal  Extremities: Normal range of motion, No clubbing, cyanosis or edema  Vascular: Peripheral pulses palpable 2+ bilaterally    MEDICATIONS  (STANDING):  albuterol/ipratropium for Nebulization 3 milliLiter(s) Nebulizer every 6 hours  aspirin  chewable 81 milliGRAM(s) Oral daily  atorvastatin 20 milliGRAM(s) Oral at bedtime  doxazosin 2 milliGRAM(s) Oral at bedtime  dronedarone 400 milliGRAM(s) Oral two times a day  heparin   Injectable 5000 Unit(s) SubCutaneous every 12 hours  hydrALAZINE 100 milliGRAM(s) Oral three times a day  isosorbide   mononitrate ER Tablet (IMDUR) 120 milliGRAM(s) Oral daily  metoprolol tartrate 100 milliGRAM(s) Oral every 12 hours  montelukast 10 milliGRAM(s) Oral at bedtime  risperiDONE   Tablet 0.5 milliGRAM(s) Oral at bedtime  timolol 0.25% Solution 1 Drop(s) Both EYES two times a day      TELEMETRY: av paced	    	  	  LABS:	 	    Troponin I, High Sensitivity Result: 11.8 ng/L (11-16 @ 15:49)                            11.1   7.91  )-----------( x        ( 2024 07:14 )             33.0         142  |  109[H]  |  33[H]  ----------------------------<  92  4.1   |  29  |  1.27    Ca    9.8      2024 07:14  Phos  2.8       Mg     2.5         TPro  6.5  /  Alb  3.2[L]  /  TBili  0.4  /  DBili  x   /  AST  23  /  ALT  21  /  AlkPhos  51      proBNP:   Lipid Profile: Cholesterol 148  LDL --  HDL 76  TG 42  Ldl calc 64  Ratio --    HgA1c:   TSH: Thyroid Stimulating Hormone, Serum: 1.45 uU/mL ( @ 07:40)      	    · Note Type	Event Note  PPM Interrogation      ELECTROPHYSIOLOGY    Device : Assurity MRI 2272 ([id number])  SN#  [id number]  Implant date: 2024	     Estimated battery longevity:  9.0-9.5 years  Voltage: 3.01V    Mode: DDDR    Lower rate/UTR: 60/110bpm    Leads:    RA    Sensin.6 mV    Impedance:  350 ohms    Threshold: 0.75V @ 0.4ms    Pulse Amplitude/ Pulse width: 1.75V @0.4 ms     RV    Sensin.6 mV    Impedance:  510 ohms    Threshold:  0.875 V @ 0.4ms    Pulse amplitude / Pulse width:  1.125V @0.4 ms    Counters (Since 2024)  A Paced  85%  V Paced  >99%  AT/AF  0%      Episodes: None  Changes made: None    Conclusion: Normal device function.      Electronic Signatures:  Katlyn Haro (CHANO)  (Signed 2024 15:33)  	Authored: Note Type, Note        
Patient is a 89y old  Female who presents with a chief complaint of Asthma exacerbation (18 Nov 2024 10:23)    OVERNIGHT EVENTS: no acute changes.     Pt is still lethargic and delirious from recent haldol + risperidone.   Pt remains bedridden but tolerating PO intake with assistance.   on telemetry - paced rate 60-70s bpm.     REVIEW OF SYSTEMS:  unable to assess due to mental status    T(C): 37.4 (11-18-24 @ 11:42), Max: 37.4 (11-18-24 @ 11:42)  HR: 85 (11-18-24 @ 11:42) (67 - 89)  BP: 165/50 (11-18-24 @ 11:42) (120/88 - 184/79)  RR: 18 (11-18-24 @ 11:42) (17 - 18)  SpO2: 99% (11-18-24 @ 11:42) (95% - 99%)  Wt(kg): --Vital Signs Last 24 Hrs  T(C): 37.4 (18 Nov 2024 11:42), Max: 37.4 (18 Nov 2024 11:42)  T(F): 99.3 (18 Nov 2024 11:42), Max: 99.3 (18 Nov 2024 11:42)  HR: 85 (18 Nov 2024 11:42) (67 - 89)  BP: 165/50 (18 Nov 2024 11:42) (120/88 - 184/79)  BP(mean): 92 (17 Nov 2024 16:35) (92 - 92)  RR: 18 (18 Nov 2024 11:42) (17 - 18)  SpO2: 99% (18 Nov 2024 11:42) (95% - 99%)    Parameters below as of 18 Nov 2024 11:42  Patient On (Oxygen Delivery Method): room air        MEDICATIONS  (STANDING):  albuterol/ipratropium for Nebulization 3 milliLiter(s) Nebulizer every 6 hours  aspirin  chewable 81 milliGRAM(s) Oral daily  atorvastatin 20 milliGRAM(s) Oral at bedtime  doxazosin 2 milliGRAM(s) Oral at bedtime  dronedarone 400 milliGRAM(s) Oral two times a day  heparin   Injectable 5000 Unit(s) SubCutaneous every 12 hours  hydrALAZINE 100 milliGRAM(s) Oral three times a day  isosorbide   mononitrate ER Tablet (IMDUR) 120 milliGRAM(s) Oral daily  metoprolol tartrate 100 milliGRAM(s) Oral every 12 hours  montelukast 10 milliGRAM(s) Oral at bedtime  risperiDONE   Tablet 0.5 milliGRAM(s) Oral at bedtime  timolol 0.25% Solution 1 Drop(s) Both EYES two times a day    MEDICATIONS  (PRN):  risperiDONE   Tablet 0.25 milliGRAM(s) Oral every 8 hours PRN agitation      PHYSICAL EXAM:  GENERAL: NAD  EYES: clear conjunctiva  ENMT: Moist mucous membranes  NECK: Supple, No JVD, Normal thyroid  CHEST/LUNG: +mild wheezing bilaterally  HEART: S1, S2, Regular rate and rhythm  ABDOMEN: Soft, Nontender, Nondistended; Bowel sounds present  NEURO: confused   EXTREMITIES: No LE edema, no calf tenderness  LYMPH: No lymphadenopathy noted  SKIN: No rashes or lesions    Consultant(s) Notes Reviewed:  [x ] YES  [ ] NO  Care Discussed with Consultants/Other Providers [ x] YES  [ ] NO    LABS:                        12.3   7.57  )-----------( 108      ( 18 Nov 2024 07:40 )             35.9     11-18    141  |  106  |  34[H]  ----------------------------<  95  4.1   |  30  |  1.32[H]    Ca    10.1      18 Nov 2024 07:40  Phos  2.9     11-18  Mg     2.4     11-18    TPro  7.3  /  Alb  3.7  /  TBili  0.4  /  DBili  x   /  AST  23  /  ALT  20  /  AlkPhos  55  11-18    PT/INR - ( 16 Nov 2024 15:49 )   PT: 11.6 sec;   INR: 0.99 ratio         PTT - ( 16 Nov 2024 15:49 )  PTT:33.0 sec  CAPILLARY BLOOD GLUCOSE            Urinalysis Basic - ( 18 Nov 2024 07:40 )    Color: x / Appearance: x / SG: x / pH: x  Gluc: 95 mg/dL / Ketone: x  / Bili: x / Urobili: x   Blood: x / Protein: x / Nitrite: x   Leuk Esterase: x / RBC: x / WBC x   Sq Epi: x / Non Sq Epi: x / Bacteria: x        RADIOLOGY & ADDITIONAL TESTS:  < from: Xray Chest 1 View- PORTABLE-Urgent (Xray Chest 1 View- PORTABLE-Urgent .) (11.16.24 @ 15:35) >    ACC: [id number] EXAM:  XR CHEST PORTABLE URGENT 1V   ORDERED BY: ERNESTO LILLY     PROCEDURE DATE:  11/16/2024          INTERPRETATION:  TECHNIQUE: Single portable view of the chest.    COMPARISON:  2/5/2024    CLINICAL HISTORY: r.o pna    FINDINGS:    Single frontal view of the chest demonstrates the lungs to be clear. The   cardiomediastinal silhouette is unremarkable. No acute osseous   abnormalities. Left-sided pacemaker. Please refer to the subsequent chest   CT for further details.    IMPRESSION: No acute cardiopulmonary disease process.    --- End of Report ---            REBECCA MARIEE MD; Attending Radiologist  This document has been electronically signed. Nov 16 2024  8:51PM    < end of copied text >  < from: CT Angio Chest PE Protocol w/ IV Cont (11.16.24 @ 20:43) >    ACC: [id number] EXAM:  CT ANGIO CHEST PULM ART [facility]   ORDERED BY: ERNESTO CALLEAN     PROCEDURE DATE:  11/16/2024          INTERPRETATION:  EXAMINATION: CT ANGIO CHEST PULMONARY ARTERY    CLINICAL INDICATION: Dyspnea    TECHNIQUE: CTA of the chest was performed after the administration of 90   cc administered of Omnipaque 350, 10 cc discarded.  MIP images were   reconstructed.    COMPARISON: Multiple CT, most recent 6/15/2023.    FINDINGS:    PULMONARY EMBOLISM: No pulmonary embolism.    AIRWAYS AND LUNGS: Scattered bronchial secretions  The lungs are clear.    MEDIASTINUM AND PLEURA: There are no enlarged mediastinal, hilar or   axillary lymph nodes. 3 cm left thyroid nodule There is no pleural   effusion. There is no pneumothorax.    HEART AND VESSELS: There is cardiomegaly.   There are atherosclerotic   calcifications of the aorta and coronary arteries.  There is no   pericardial effusion.  Left-sided pacemaker    UPPER ABDOMEN: Images of the upper abdomen demonstrate left hepatic cyst.    BONES AND SOFT TISSUES: There are mild degenerative changes of the spine.    The soft tissues are unremarkable.    IMPRESSION:  No pulmonary embolism    --- End of Report ---            CARLOS VASQUES MD; Attending Radiologist  This document has beenelectronically signed. Nov 16 2024  8:59PM    < end of copied text >    Imaging Personally Reviewed:  [ ] YES  [ ] NO  
Patient is a 89y old  Female who presents with a chief complaint of SOB (17 Nov 2024 09:47)  Awake, alert, laying in bed, confused but not agitated in NAD    INTERVAL HPI/OVERNIGHT EVENTS:      VITAL SIGNS:  T(F): 98.2 (11-18-24 @ 08:34)  HR: 75 (11-18-24 @ 08:34)  BP: 169/67 (11-18-24 @ 08:34)  RR: 18 (11-18-24 @ 08:34)  SpO2: 98% (11-18-24 @ 08:34)  Wt(kg): --  I&O's Detail    17 Nov 2024 07:01  -  18 Nov 2024 07:00  --------------------------------------------------------  IN:  Total IN: 0 mL    OUT:    Voided (mL): 400 mL  Total OUT: 400 mL    Total NET: -400 mL              REVIEW OF SYSTEMS:    CONSTITUTIONAL:  No fevers, chills, sweats    HEENT:  Eyes:  No diplopia or blurred vision. ENT:  No earache, sore throat or runny nose.    CARDIOVASCULAR:  No pressure, squeezing, tightness, or heaviness about the chest; no palpitations.    RESPIRATORY:  Per HPI    GASTROINTESTINAL:  No abdominal pain, nausea, vomiting or diarrhea.    GENITOURINARY:  No dysuria, frequency or urgency.    NEUROLOGIC:  No paresthesias, fasciculations, seizures or weakness.    PSYCHIATRIC:  No disorder of thought or mood.      PHYSICAL EXAM:    Constitutional: Well developed and nourished  Eyes:Perrla  ENMT: normal  Neck:supple  Respiratory: good air entry  Cardiovascular: S1 S2 regular  Gastrointestinal: Soft, Non tender  Extremities: No edema  Vascular:normal  Neurological:Awake, alert,Ox3  Musculoskeletal:Normal      MEDICATIONS  (STANDING):  albuterol/ipratropium for Nebulization 3 milliLiter(s) Nebulizer every 6 hours  aspirin  chewable 81 milliGRAM(s) Oral daily  atorvastatin 20 milliGRAM(s) Oral at bedtime  doxazosin 2 milliGRAM(s) Oral at bedtime  dronedarone 400 milliGRAM(s) Oral two times a day  heparin   Injectable 5000 Unit(s) SubCutaneous every 12 hours  hydrALAZINE 100 milliGRAM(s) Oral three times a day  isosorbide   mononitrate ER Tablet (IMDUR) 120 milliGRAM(s) Oral daily  methylPREDNISolone sodium succinate Injectable 40 milliGRAM(s) IV Push once  metoprolol tartrate 100 milliGRAM(s) Oral every 12 hours  montelukast 10 milliGRAM(s) Oral at bedtime  timolol 0.25% Solution 1 Drop(s) Both EYES two times a day    MEDICATIONS  (PRN):      Allergies    No Known Allergies    Intolerances        LABS:                        12.3   7.57  )-----------( 108      ( 18 Nov 2024 07:40 )             35.9     11-18    141  |  106  |  34[H]  ----------------------------<  95  4.1   |  30  |  1.32[H]    Ca    10.1      18 Nov 2024 07:40  Phos  2.9     11-18  Mg     2.4     11-18    TPro  7.3  /  Alb  3.7  /  TBili  0.4  /  DBili  x   /  AST  23  /  ALT  20  /  AlkPhos  55  11-18    PT/INR - ( 16 Nov 2024 15:49 )   PT: 11.6 sec;   INR: 0.99 ratio         PTT - ( 16 Nov 2024 15:49 )  PTT:33.0 sec  Urinalysis Basic - ( 18 Nov 2024 07:40 )    Color: x / Appearance: x / SG: x / pH: x  Gluc: 95 mg/dL / Ketone: x  / Bili: x / Urobili: x   Blood: x / Protein: x / Nitrite: x   Leuk Esterase: x / RBC: x / WBC x   Sq Epi: x / Non Sq Epi: x / Bacteria: x            CAPILLARY BLOOD GLUCOSE        pro-bnp -- 11-16 @ 15:49     d-dimer 967  11-16 @ 15:49      RADIOLOGY & ADDITIONAL TESTS:    CXR:    Ct scan chest:    ekg;    echo:
Patient is a 89y old  Female who presents with a chief complaint of Asthma exacerbation (19 Nov 2024 09:07)  Awake,  not alert, comfortable in bed in NAD. Doing well on RA    INTERVAL HPI/OVERNIGHT EVENTS:      VITAL SIGNS:  T(F): 97.9 (11-19-24 @ 07:20)  HR: 75 (11-19-24 @ 07:20)  BP: 175/67 (11-19-24 @ 07:20)  RR: 19 (11-19-24 @ 07:20)  SpO2: 97% (11-19-24 @ 07:20)  Wt(kg): --  I&O's Detail          REVIEW OF SYSTEMS:    CONSTITUTIONAL:  No fevers, chills, sweats    HEENT:  Eyes:  No diplopia or blurred vision. ENT:  No earache, sore throat or runny nose.    CARDIOVASCULAR:  No pressure, squeezing, tightness, or heaviness about the chest; no palpitations.    RESPIRATORY:  Per HPI    GASTROINTESTINAL:  No abdominal pain, nausea, vomiting or diarrhea.    GENITOURINARY:  No dysuria, frequency or urgency.    NEUROLOGIC:  No paresthesias, fasciculations, seizures or weakness.    PSYCHIATRIC:  No disorder of thought or mood.      PHYSICAL EXAM:    Constitutional: Well developed and nourished  Eyes:Perrla  ENMT: normal  Neck:supple  Respiratory: good air entry  Cardiovascular: S1 S2 regular  Gastrointestinal: Soft, Non tender  Extremities: No edema  Vascular:normal  Neurological:Awake, alert,Ox3  Musculoskeletal:Normal      MEDICATIONS  (STANDING):  albuterol/ipratropium for Nebulization 3 milliLiter(s) Nebulizer every 6 hours  aspirin  chewable 81 milliGRAM(s) Oral daily  atorvastatin 20 milliGRAM(s) Oral at bedtime  doxazosin 2 milliGRAM(s) Oral at bedtime  dronedarone 400 milliGRAM(s) Oral two times a day  heparin   Injectable 5000 Unit(s) SubCutaneous every 12 hours  hydrALAZINE 100 milliGRAM(s) Oral three times a day  isosorbide   mononitrate ER Tablet (IMDUR) 120 milliGRAM(s) Oral daily  metoprolol tartrate 100 milliGRAM(s) Oral every 12 hours  montelukast 10 milliGRAM(s) Oral at bedtime  risperiDONE   Tablet 0.5 milliGRAM(s) Oral at bedtime  timolol 0.25% Solution 1 Drop(s) Both EYES two times a day    MEDICATIONS  (PRN):  risperiDONE   Tablet 0.25 milliGRAM(s) Oral every 8 hours PRN agitation      Allergies    No Known Allergies    Intolerances        LABS:                        11.1   7.91  )-----------( 91       ( 19 Nov 2024 07:14 )             33.0     11-19    142  |  109[H]  |  33[H]  ----------------------------<  92  4.1   |  29  |  1.27    Ca    9.8      19 Nov 2024 07:14  Phos  2.8     11-19  Mg     2.5     11-19    TPro  6.5  /  Alb  3.2[L]  /  TBili  0.4  /  DBili  x   /  AST  23  /  ALT  21  /  AlkPhos  51  11-19      Urinalysis Basic - ( 19 Nov 2024 07:14 )    Color: x / Appearance: x / SG: x / pH: x  Gluc: 92 mg/dL / Ketone: x  / Bili: x / Urobili: x   Blood: x / Protein: x / Nitrite: x   Leuk Esterase: x / RBC: x / WBC x   Sq Epi: x / Non Sq Epi: x / Bacteria: x            CAPILLARY BLOOD GLUCOSE        pro-bnp -- 11-16 @ 15:49     d-dimer 967  11-16 @ 15:49      RADIOLOGY & ADDITIONAL TESTS:    CXR:    Ct scan chest:    ekg;    echo:

## 2024-11-19 NOTE — DISCHARGE NOTE PROVIDER - CARE PROVIDER_API CALL
Carol Harrell  Cardiology  [address] [place] [address]  Phone: [phone]  Fax: [phone]  Follow Up Time:

## 2024-11-19 NOTE — PROGRESS NOTE ADULT - REASON FOR ADMISSION
Acute Asthma Exacerbation + CAMILLE
Asthma exacerbation
Asthma
Asthma

## 2024-11-19 NOTE — PROGRESS NOTE ADULT - PROBLEM SELECTOR PROBLEM 2
Acute asthma exacerbation
Acute asthma exacerbation
Acute on chronic diastolic congestive heart failure
Cong[place]

## 2024-11-26 PROCEDURE — 85027 COMPLETE CBC AUTOMATED: CPT

## 2024-11-26 PROCEDURE — 85730 THROMBOPLASTIN TIME PARTIAL: CPT

## 2024-11-26 PROCEDURE — 85025 COMPLETE CBC W/AUTO DIFF WBC: CPT

## 2024-11-26 PROCEDURE — 93306 TTE W/DOPPLER COMPLETE: CPT

## 2024-11-26 PROCEDURE — 82570 ASSAY OF URINE CREATININE: CPT

## 2024-11-26 PROCEDURE — 82306 VITAMIN D 25 HYDROXY: CPT

## 2024-11-26 PROCEDURE — 84443 ASSAY THYROID STIM HORMONE: CPT

## 2024-11-26 PROCEDURE — 83970 ASSAY OF PARATHORMONE: CPT

## 2024-11-26 PROCEDURE — 83935 ASSAY OF URINE OSMOLALITY: CPT

## 2024-11-26 PROCEDURE — 80053 COMPREHEN METABOLIC PANEL: CPT

## 2024-11-26 PROCEDURE — 83605 ASSAY OF LACTIC ACID: CPT

## 2024-11-26 PROCEDURE — 87637 SARSCOV2&INF A&B&RSV AMP PRB: CPT

## 2024-11-26 PROCEDURE — 96374 THER/PROPH/DIAG INJ IV PUSH: CPT

## 2024-11-26 PROCEDURE — 84133 ASSAY OF URINE POTASSIUM: CPT

## 2024-11-26 PROCEDURE — 71045 X-RAY EXAM CHEST 1 VIEW: CPT

## 2024-11-26 PROCEDURE — 94640 AIRWAY INHALATION TREATMENT: CPT

## 2024-11-26 PROCEDURE — 99285 EMERGENCY DEPT VISIT HI MDM: CPT

## 2024-11-26 PROCEDURE — 84100 ASSAY OF PHOSPHORUS: CPT

## 2024-11-26 PROCEDURE — 93005 ELECTROCARDIOGRAM TRACING: CPT

## 2024-11-26 PROCEDURE — 85610 PROTHROMBIN TIME: CPT

## 2024-11-26 PROCEDURE — 36415 COLL VENOUS BLD VENIPUNCTURE: CPT

## 2024-11-26 PROCEDURE — 81001 URINALYSIS AUTO W/SCOPE: CPT

## 2024-11-26 PROCEDURE — 84156 ASSAY OF PROTEIN URINE: CPT

## 2024-11-26 PROCEDURE — 83880 ASSAY OF NATRIURETIC PEPTIDE: CPT

## 2024-11-26 PROCEDURE — 71275 CT ANGIOGRAPHY CHEST: CPT | Mod: MC

## 2024-11-26 PROCEDURE — 80048 BASIC METABOLIC PNL TOTAL CA: CPT

## 2024-11-26 PROCEDURE — 84484 ASSAY OF TROPONIN QUANT: CPT

## 2024-11-26 PROCEDURE — 84300 ASSAY OF URINE SODIUM: CPT

## 2024-11-26 PROCEDURE — 80061 LIPID PANEL: CPT

## 2024-11-26 PROCEDURE — 85379 FIBRIN DEGRADATION QUANT: CPT

## 2024-11-26 PROCEDURE — 83735 ASSAY OF MAGNESIUM: CPT

## 2024-11-26 PROCEDURE — 0241U: CPT

## 2024-12-04 NOTE — ED PROVIDER NOTE - CARE PLAN
PAROXYSMAL ATRIAL FIBRILLATION Principal Discharge DX:	Congestive heart failure, unspecified HF chronicity, unspecified heart failure type

## 2024-12-19 NOTE — H&P ADULT - PROBLEM/PLAN-2
Continuity of Care Document



                          Created on: 2024





STEVEN HARMON

External Reference #: [id number]

: 2016

Sex: Female



Demographics





                                        Address             [address]



[place]  [address]

 

                                        Home Phone          [phone]

 

                                        Mobile Phone        [phone]

 

                                        Email Address       [email or web address]

[email or web address]

 

                                        Preferred Language  English

 

                                        Marital Status      Unknown

 

                                        Orthodoxy Affiliation Unknown

 

                                        Race                Unknown

 

                                        Additional Race(s)  Black or  Rachel

rican

 

                                        Ethnic Group        Not  or Lati

no





Author





                                        Name                Unknown

 

                                        Address             [address]

[place]  [address]

 

                                        [facility]

[employer]

 

                                        Address             [address]

[place]  [address]

 

                                        Phone               [phone]





Support





                          Name         Relationship Address      Phone

 

                                JO-ANN BUENO               [address]

[place]  [address]                     Unavailable

 

                                JO-ANN BUENO               [address]

[place]  [address]                        Unavailable

 

                                JO-ANN BUENO               [address] 

[place]  [address]                     Unavailable





Care Team Providers





                                Care Team Member Name Role            Phone

 

                                Jitendra Thakur Primary Care Physician [phone]

[phone]

 

                                Fernie Campbell MD    Attending Clinician [phone]

[phone]

 

                                Doctor Unassigned, No Name Attending Clinician U

jaclyn Guillaume MD, Albaro Attending Clinician [phone]

 

                                Tommie Goins MD Attending Clinician [phone]



 

                                TOMMIE GOINS Attending Clinician Unavailable

 

                                CHAZ AMBROSE  Attending Clinician Unavailable

 

                                Pilar Morales MD Attending Clinician [phone]

 

                                Chaz Ambrose MD Attending Clinician [phone]

 

                                SARAH FUENTES Attending Clinician UnavailSarah James MD Attending Clinician [phone]

 

                                Lab, Adc Fam Pob I Attending Clinician UnavailErica Godoy Attending Clinician [phone]

[phone]

 

                                ERICA BASILIO Attending Clinician UnavailAlbina Dixon Attending Clinician [phone]







Payers





                    Payer Name Policy Type Policy Number Effective Date Expirati

on Date Source

 

                                                    [employer]                         [id number]           2018 

00:00:00                                            







Problems





                                                    Condition 

Name                                    Condition 

Details                                 Condition 

Category                  Status                    Onset 

Date                                    Resolution 

Date                                    Last 

Treatment 

Date                                    Treating 

Clinician                 Comments                  Source

 

                                                    No known 

active 

problems                                No known 

active 

problems Disease                                                 [facility]

[facility]







Allergies, Adverse Reactions, Alerts





                                                    Allergy 

Name                                    Allergy 

Type            Status          Severity        Reaction(s)     Onset 

Date                                    Inactive 

Date                                    Treating 

Clinician                 Comments                  Source

 

                                                    NO KNOWN 

ALLERGIE

S                                       Drug 

Class   Active                                                  [facility]







Social History





                    Social Habit Start Date Stop Date Quantity  Comments  Source

 

                    [facility]ual [facility]                                         [facility]

 

                                                    Exposure to 

SARS-CoV-2 (event)                      2022 

00:00:00                                2022 

14:45:00            Not sure                                [facility]

 

                                                    History of Social 

function                                2022-03-15 

00:00:00                                2022-03-15 

00:00:00                                                    [facility]

 

                                                    Tobacco use and 

exposure                                2018-04-10 

00:00:00                                2018-04-10 

00:00:00                                Smokeless 

tobacco non-user                                    [facility]

 

                                                    Sex Assigned At 

Birth                                   2016 

00:00:00                                2016 

00:00:00                                                    [facility]







                          Smoking Status Start Date   Stop Date    Source

 

                          Never smoked tobacco                           [facility]







Medications





                                                    Ordered 

Medication 

Name                                    Filled 

Medication 

Name                                    Start 

Date                                    Stop 

Date                                    Current 

Medication?                             Ordering 

Clinician       Indication      Dosage          Frequency       Signature 

(SIG)               Comments            Components          Source

 

                                                    fluocinolon

e 

(DERMA-SMOO

THE/FS BODY 

OIL) 0.01 % 

body oil                                             

00:00:

00                  Yes                 [id number]                      Apply to 

area(s) 2 

(two) 

times 

daily. 

Safe for 

face.                                                       [facility]

 

                                                    fluocinolon

e 

(DERMA-SMOO

THE/FS BODY 

OIL) 0.01 % 

body oil                                            -16 

00:00:

00                  Yes                 [id number]                      Apply to 

area(s) 2 

(two) 

times 

daily. As 

needed for 

rash or 

itching                                                     [facility]

 

                                                    fluocinolon

e 

(DERMA-SMOO

THE/FS BODY 

OIL) 0.01 % 

body oil                                            2022-0

3-15 

00:00:

00                                       

00:00

:00        No                    [id number]                         Apply to 

area(s) 2 

(two) 

times 

daily. As 

needed for 

rash or 

itching                                                     [facility]

 

                                                    DERMA-MELVINA

HE/FS BODY 

OIL 0.01 % 

oil                                                 2021

202 

00:00:

00                                       

00:00

:00        No                    [id number]                         Apply to 

area(s) 2 

(two) 

times 

daily. 

Safe for 

face.                                                       [facility]







Immunizations





                                                    Ordered 

Immunization Name                       Filled 

Immunization Name Date            Status          Comments        Source

 

                                                    Proquad 

(MMR/VARICELLA)                                     2018-04-10 

00:00:00            Completed                               [facility]

 

                                DTAP                            2018-04-10 

00:00:00            Completed                               [facility]

 

                                                    Pneumococcal 13 

Conjugate, PCV13 

(Prevnar 13)                                        2018-04-10 

00:00:00            Completed                               [facility]

 

                                HEPATITIS A                     2018-04-10 

00:00:00            Completed                               [facility]

 

                                                    Proquad 

(MMR/VARICELLA)                                     2018-04-10 

00:00:00            Completed                               [facility]

 

                                DTAP                            2018-04-10 

00:00:00            Completed                               [facility]

 

                                                    Pneumococcal 13 

Conjugate, PCV13 

(Prevnar 13)                                        2018-04-10 

00:00:00            Completed                               [facility]

 

                                HEPATITIS A                     2018-04-10 

00:00:00            Completed                               [facility]

 

                                                    Proquad 

(MMR/VARICELLA)                                     2018-04-10 

00:00:00            Completed                               [facility]

 

                                DTAP                            2018-04-10 

00:00:00            Completed                               [facility]

 

                                                    Pneumococcal 13 

Conjugate, PCV13 

(Prevnar 13)                                        2018-04-10 

00:00:00            Completed                               [facility]

 

                                HEPATITIS A                     2018-04-10 

00:00:00            Completed                               [facility]

 

                                                    Proquad 

(MMR/VARICELLA)                                     2018-04-10 

00:00:00            Completed                               [facility]

 

                                DTAP                            2018-04-10 

00:00:00            Completed                               [facility]

 

                                                    Pneumococcal 13 

Conjugate, PCV13 

(Prevnar 13)                                        2018-04-10 

00:00:00            Completed                               [facility]

 

                                HEPATITIS A                     2018-04-10 

00:00:00            Completed                               [facility]

 

                                                    Hep B, Adol or Pedi 

Dosage                                              2017 

00:00:00            Completed                               [facility]

 

                                                    Hep B, Adol or Pedi 

Dosage                                              2017 

00:00:00            Completed                               [facility]

 

                                                    Hep B, Adol or Pedi 

Dosage                                              2017 

00:00:00            Completed                               [facility]

 

                                                    Hep B, Adol or Pedi 

Dosage                                              2017 

00:00:00            Completed                               [facility]

 

                                Polio (IPV/OPV)                 2017 

00:00:00            Completed                               [facility]

 

                                DTAP                            2017 

00:00:00            Completed                               [facility]

 

                                Polio (IPV/OPV)                 2017 

00:00:00            Completed                               [facility]

 

                                DTAP                            2017 

00:00:00            Completed                               [facility]

 

                                Polio (IPV/OPV)                 2017 

00:00:00            Completed                               [facility]

 

                                DTAP                            2017 

00:00:00            Completed                               [facility]

 

                                Polio (IPV/OPV)                 2017 

00:00:00            Completed                               [facility]

 

                                DTAP                            2017 

00:00:00            Completed                               [facility]

 

                                HIB 3 Dose Schedule                 2017 

00:00:00            Completed                               [facility]

 

                                                    Pneumococcal 13 

Conjugate, PCV13 

(Prevnar 13)                                        2017 

00:00:00            Completed                               [facility]

 

                                ROTAVIRUS                       2017 

00:00:00            Completed                               [facility]

 

                                HIB 3 Dose Schedule                 2017 

00:00:00            Completed                               [facility]

 

                                                    Pneumococcal 13 

Conjugate, PCV13 

(Prevnar 13)                                        2017 

00:00:00            Completed                               [facility]

 

                                ROTAVIRUS                       2017 

00:00:00            Completed                               [facility]

 

                                HIB 3 Dose Schedule                 2017 

00:00:00            Completed                               [facility]

 

                                                    Pneumococcal 13 

Conjugate, PCV13 

(Prevnar 13)                                        2017 

00:00:00            Completed                               [facility]

 

                                ROTAVIRUS                       2017 

00:00:00            Completed                               [facility]

 

                                HIB 3 Dose Schedule                 2017 

00:00:00            Completed                               [facility]

 

                                                    Pneumococcal 13 

Conjugate, PCV13 

(Prevnar 13)                                        2017 

00:00:00            Completed                               [facility]

 

                                ROTAVIRUS                       2017 

00:00:00            Completed                               [facility]

 

                                DTAP                            2017 

00:00:00            Completed                               [facility]

 

                                HIB 3 Dose Schedule                 2017 

00:00:00            Completed                               [facility]

 

                                                    Pneumococcal 13 

Conjugate, PCV13 

(Prevnar 13)                                        2017 

00:00:00            Completed                               [facility]

 

                                Polio (IPV/OPV)                 2017 

00:00:00            Completed                               [facility]

 

                                ROTAVIRUS                       2017 

00:00:00            Completed                               [facility]

 

                                DTAP                            2017 

00:00:00            Completed                               [facility]

 

                                HIB 3 Dose Schedule                 2017 

00:00:00            Completed                               [facility]

 

                                                    Pneumococcal 13 

Conjugate, PCV13 

(Prevnar 13)                                        2017 

00:00:00            Completed                               [facility]

 

                                Polio (IPV/OPV)                 2017 

00:00:00            Completed                               [facility]

 

                                ROTAVIRUS                       2017 

00:00:00            Completed                               [facility]

 

                                DTAP                            2017 

00:00:00            Completed                               [facility]

 

                                HIB 3 Dose Schedule                 2017 

00:00:00            Completed                               [facility]

 

                                                    Pneumococcal 13 

Conjugate, PCV13 

(Prevnar 13)                                        2017 

00:00:00            Completed                               [facility]

 

                                Polio (IPV/OPV)                 2017 

00:00:00            Completed                               [facility]

 

                                ROTAVIRUS                       2017 

00:00:00            Completed                               [facility]

 

                                DTAP                            2017 

00:00:00            Completed                               [facility]

 

                                HIB 3 Dose Schedule                 2017 

00:00:00            Completed                               [facility]

 

                                                    Pneumococcal 13 

Conjugate, PCV13 

(Prevnar 13)                                        2017 

00:00:00            Completed                               [facility]

 

                                Polio (IPV/OPV)                 2017 

00:00:00            Completed                               [facility]

 

                                ROTAVIRUS                       2017 

00:00:00            Completed                               [facility]

 

                                DTAP                            2017 

00:00:00            Completed                               [facility]

 

                                HIB 3 Dose Schedule                 2017 

00:00:00            Completed                               [facility]

 

                                                    Pneumococcal 13 

Conjugate, PCV13 

(Prevnar 13)                                        2017 

00:00:00            Completed                               [facility]

 

                                Polio (IPV/OPV)                 2017 

00:00:00            Completed                               [facility]

 

                                ROTAVIRUS                       2017 

00:00:00            Completed                               [facility]

 

                                DTAP                            2017 

00:00:00            Completed                               [facility]

 

                                HIB 3 Dose Schedule                 2017 

00:00:00            Completed                               [facility]

 

                                                    Pneumococcal 13 

Conjugate, PCV13 

(Prevnar 13)                                        2017 

00:00:00            Completed                               [facility]

 

                                Polio (IPV/OPV)                 2017 

00:00:00            Completed                               [facility]

 

                                ROTAVIRUS                       2017 

00:00:00            Completed                               [facility]

 

                                DTAP                            2017 

00:00:00            Completed                               [facility]

 

                                HIB 3 Dose Schedule                 2017 

00:00:00            Completed                               [facility]

 

                                                    Pneumococcal 13 

Conjugate, PCV13 

(Prevnar 13)                                        2017 

00:00:00            Completed                               [facility]

 

                                Polio (IPV/OPV)                 2017 

00:00:00            Completed                               [facility]

 

                                ROTAVIRUS                       2017 

00:00:00            Completed                               [facility]

 

                                DTAP                            2017 

00:00:00            Completed                               [facility]

 

                                HIB 3 Dose Schedule                 2017 

00:00:00            Completed                               [facility]

 

                                                    Pneumococcal 13 

Conjugate, PCV13 

(Prevnar 13)                                        2017 

00:00:00            Completed                               [facility]

 

                                Polio (IPV/OPV)                 2017 

00:00:00            Completed                               [facility]

 

                                ROTAVIRUS                       2017 

00:00:00            Completed                               [facility]

 

                                                    Hep B, Adol or Pedi 

Dosage                                              2016 

00:00:00            Completed                               [facility]

 

                                                    Hep B, Adol or Pedi 

Dosage                                              2016 

00:00:00            Completed                               [facility]

 

                                                    Hep B, Adol or Pedi 

Dosage                                              2016 

00:00:00            Completed                               [facility]

 

                                                    Hep B, Adol or Pedi 

Dosage                                              2016 

00:00:00            Completed                               [facility]

 

                                                    Hep B, Adol or Pedi 

Dosage                                              2016 

00:00:00            Completed                               [facility]

 

                                                    Hep B, Adol or Pedi 

Dosage                                              2016 

00:00:00            Completed                               [facility]

 

                                                    Hep B, Adol or Pedi 

Dosage                                              2016 

00:00:00            Completed                               [facility]

 

                                                    Hep B, Adol or Pedi 

Dosage                                              2016 

00:00:00            Completed                               [facility]

 

                    DTAP                Unknown   Completed           [facility]

 

                    HIB 3 Dose Schedule           Unknown   Completed           

[facility]

 

                                                    Hep B, Adol or Pedi 

Dosage                    Unknown      Completed                 [facility]

 

                                                    Pneumococcal 13 

Conjugate, PCV13 

(Prevnar 13)              Unknown      Completed                 [facility]

 

                    Polio (IPV/OPV)           Unknown   Completed           [facility]

 

                    ROTAVIRUS           Unknown   Completed           [facility]

 

                                                    Proquad 

(MMR/VARICELLA)              Unknown      Completed                 [facility]

 

                    HEPATITIS A           Unknown   Completed           [facility]

 

                    DTAP                Unknown   Completed           [facility]

 

                    HIB 3 Dose Schedule           Unknown   Completed           

[facility]

 

                                                    Hep B, Adol or Pedi 

Dosage                    Unknown      Completed                 [facility]

 

                                                    Pneumococcal 13 

Conjugate, PCV13 

(Prevnar 13)              Unknown      Completed                 [facility]

 

                    Polio (IPV/OPV)           Unknown   Completed           [facility]

 

                    ROTAVIRUS           Unknown   Completed           [facility]

 

                                                    Proquad 

(MMR/VARICELLA)              Unknown      Completed                 [facility]

 

                    HEPATITIS A           Unknown   Completed           [facility]







Vital Signs





                      Vital Name Observation Time Observation Value Comments   S

ource

 

                      Body height 2022 20:14:00 111.8 cm              [facility]

 

                      Body weight 2022 20:14:00 20.412 kg             [facility]

 

                      BMI        2022 20:14:00 16.34 kg/m2            [facility]

 

                                                    Body mass index 

(BMI) 

[Percentile] Per 

age and sex     2022 20:14:00 77.14 %                         [facility]

 

                                                    Weight-for-length 

Per age and sex 2022 20:14:00 73.08 %                         [facility]







Procedures





                                        Procedure           Date / Time 

Performed                 Performing Clinician      Source

 

                                                    AUTHORIZATION FOR 

RELEASE OF PHI            2022-10-31 05:01:00       Doctor Unassigned, No 

Name                                    [facility]







Encounters





                                                    Start 

Date/Time                               End 

Date/Time                               Encounter 

Type                                    Admission 

Type                                    Attending 

Clinicians                              Care 

Facility                                Care 

Department                              Encounter 

ID                                      Source

 

                                                    2021-10-30 

17:52:01         Emergency                 [facility]    [id number] [facility]

 

                                                    2022-10-31 

00:00:00                                2022-10-31 

00:00:00        Telephone                       Fernie Campbell       [facility] 

PEDIATRIC 

CLINIC                                  [id number]

[id number]

[id number]

[phone]

225                       [id number]                  [facility]

 

                                                    2022-10-31 

00:00:00                                2022-10-31 

00:00:00                                Orders 

Only                                                Doctor 

Unassigned, 

No Name                                 [facility]                                1.20.114

350.1.13.10

[id number]

[phone]

009                       [id number]                  [facility]

 

                                                    2022 

14:45:00                                2022 

15:17:07                                Office 

Visit                                               Albaro Guillaume Lindy 

[facility]                                 1..114

350.1.13.10

4.2.7.2.686

[phone]

027                       [id number]                  [facility]

 

                                                    2022 

14:45:00                                2022 

15:17:07  Outpatient TOMMIE PERALTA [facility]      [id number] [facility]

 

                                                    2022 

00:00:00                                2022 

00:00:00                                Letter 

(Out)                                               Albaro Guillaume                                   [facility]                                 1..114

350.1.13.10

4.2.7.2.686

[phone]

027                       [id number]                  [facility]

 

                                                    2022 

14:45:00                                2022 

15:41:25  Outpatient TOMMIE PERALTA [facility]      [id number] [facility]

 

                                                    2022 

14:45:00                                2022 

15:41:25                                Office 

Visit                                               Tommie Goins 

[facility]                                 1..114

350.1.13.10

4.2.7.2.686

[phone]

028                       [id number]                  [facility]

 

                                                    2022 

00:00:00                                2022 

00:00:00                                Orders 

Only                                                Doctor 

Unassigned, 

No Name                                 [facility]                                1.20.114

350.1.13.10

4.2.7.2.686

[phone]

009                       [id number]                  [facility]

 

                                                    2022 

00:00:00                                2022 

00:00:00                                Letter 

(Out)                                               Tommie Goins 

Danay                                    [facility]                                 1..840.114

350.1.13.10

4.2.7.2.686

[phone]

028                       [id number]                  [facility]

 

                                                    2022-03-15 

15:15:00                                2022-03-15 

16:07:19            Outpatient          SAMSON AMBROSE 

[facility]            [id number]      [facility]

 

                                                    2022-03-15 

15:15:00                                2022-03-15 

16:07:19                                Office 

Visit                                               Pilar Morales 

[facility]                                 1..840.114

350.1.13.10

4.2.7.2.686

[phone]

027                       [id number]                  [facility]

 

                                                    2022-03-10 

15:30:00                                2022-03-10 

15:30:00            Outpatient          SARAH OLGUIN          [facility]            [id number]      [facility]

 

                                                    2022-03-10 

15:30:00                                2022-03-10 

15:30:00            Outpatient          SARAH OLGUIN          [facility]            [id number]      [facility]

 

                                                    2022 

14:15:00                                2022 

14:15:00            Outpatient          SARAH OLGUIN          [facility]            [id number]      [facility]

 

                                                    2021 

15:05:05                                2021 

15:49:17                                Office 

Visit                                               Sarah Fuentes                              [facility]                                 1..840.114

350.1.13.10

4.2.7.2.686

[phone]

028                       [id number]                  [facility]

 

                                                    2021 

14:30:00                                2021 

15:49:17            Outpatient          SARAH OLGUIN          [facility]            [id number]      [facility]

 

                                                    2021 

14:30:00                                2021 

14:30:00            Outpatient          SARAH OLGUIN          [facility]            [id number]      [facility]

 

                                                    2021-10-13 

00:00:00                                2021-10-13 

00:00:00                                Patient 

Secure Msg                                          Doctor 

Unassigned, 

No Name                                 [facility]                                1.2840.114

350.1.13.10

4.2.7.2.686

[phone]

019                       [id number]                  [facility]

 

                                                    2021-10-11 

00:00:00                                2021-10-11 

00:00:00                                Orders 

Only                                                Doctor 

Unassigned, 

No Name                                 [facility]                                1.2840.114

350.1.13.10

4.2.7.2.686

[phone]

009                       [id number]                  [facility]

 

                                                    2021 

15:31:47                                2021 

15:51:47                                Laboratory 

Only                                                Lab, [facility] 

Office 

Building 

One                                     1.840.114

350.1.13.10

4.2.7.2.686

[phone]

044                       [id number]                  

 

                                                    2021 

15:31:47                                2021 

15:51:47                                Laboratory 

Only                                                Lab, [facility]

Erica Basilio [facility] 

Office 

Building 

One                                     1.0.114

350.1.13.10

4.2.7.2.686

[phone]

044                       [id number]                  [facility]

 

                                                    2021 

15:20:00                                2021 

15:20:00            Outpatient          R                   ERICA BASILIO        [facility]            [id number]      [facility]

 

                                                    2021 

15:57:00                                2021 

16:32:00            Emergency                               Albina Hannah                                 [facility]                                  1.2840.114

350.1.13.10

4.2.7.2.686

[phone]

084                       [id number]                  [facility]

 

                                                    2021 

15:57:00                                2021 

16:32:00            Emergency                               Albina Hannah                                 [facility]                                  1.2840.114

350.1.13.10

4.2.7.2.686

[phone]

084                       [id number] DISPLAY PLAN FREE TEXT

## 2025-01-10 NOTE — CONSULT NOTE ADULT - GASTROINTESTINAL
detailed exam Detail Level: Detailed Quality 226: Preventive Care And Screening: Tobacco Use: Screening And Cessation Intervention: Patient screened for tobacco use and is an ex/non-smoker

## 2025-01-24 NOTE — ED PROVIDER NOTE - CROS ED CONS ALL NEG
Med: Carmela 3 Sensor  Dosage: ?  Sig:  ?  Quantity requested:  ?  Form Requested: no  ID Number:  ?  Insurance Phone Number: ?  Preferred pharmacy has been set up and verified.     Per Fax action requested by 1-   negative...

## 2025-03-24 NOTE — ED ADULT NURSE NOTE - NSFALLRSKHARMRISK_ED_ALL_ED
[FreeTextEntry1] : Haris Douglas assisted in documentation on Mar 24 2025 acting as a scribe for Dr. Bowen Ragsdale.  yes

## 2025-04-18 NOTE — PATIENT PROFILE ADULT. - LANGUAGE ASSISTANCE NEEDED
- HD on MWF. Due tomorrow.   - Nephrology consulted, appreciate recs  - Patient reports her phos binder is VELPHORO now - exact dose unknown, use [facility] substitute TID with meals.    No-Patient/Caregiver offered and refused free interpretation services.